# Patient Record
Sex: MALE | Race: WHITE | NOT HISPANIC OR LATINO | URBAN - METROPOLITAN AREA
[De-identification: names, ages, dates, MRNs, and addresses within clinical notes are randomized per-mention and may not be internally consistent; named-entity substitution may affect disease eponyms.]

---

## 2023-02-28 ENCOUNTER — EMERGENCY (EMERGENCY)
Facility: HOSPITAL | Age: 48
LOS: 1 days | Discharge: HOME | End: 2023-02-28
Attending: EMERGENCY MEDICINE
Payer: MEDICAID

## 2023-02-28 VITALS
RESPIRATION RATE: 17 BRPM | WEIGHT: 259.93 LBS | DIASTOLIC BLOOD PRESSURE: 93 MMHG | HEIGHT: 72 IN | TEMPERATURE: 98 F | SYSTOLIC BLOOD PRESSURE: 151 MMHG | HEART RATE: 84 BPM | OXYGEN SATURATION: 96 %

## 2023-02-28 PROCEDURE — 99283 EMERGENCY DEPT VISIT LOW MDM: CPT

## 2023-02-28 PROCEDURE — 99284 EMERGENCY DEPT VISIT MOD MDM: CPT

## 2023-02-28 NOTE — ED ADULT NURSE NOTE - OBJECTIVE STATEMENT
Patient presents to ED in no acute distress with no complaint.  Patient needs housing and was seen at [facility] today where he was provided with an Uber to go to [facility]. 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

## 2023-02-28 NOTE — ED ADULT TRIAGE NOTE - CHIEF COMPLAINT QUOTE
pt states he is homeless and needs help with housing, went to DSS today and was told they can not help him because he is diabetic and incontinent.

## 2023-02-28 NOTE — ED PROVIDER NOTE - PROGRESS NOTE DETAILS
Elliott: offered labs which he declined, "you're not coming at me with needles." offered SW to which he said "I guess, but they are not going to like what I have to say." Case d/w SW Christo: Offered evaluation for [facility] placement but patient declines. States "I'm not going there."

## 2023-02-28 NOTE — ED PROVIDER NOTE - PATIENT PORTAL LINK FT
You can access the FollowMyHealth Patient Portal offered by [facility] by registering at the following website: [email or web address]. By joining [facility]’s FollowMyHealth portal, you will also be able to view your health information using other applications (apps) compatible with our system.

## 2023-02-28 NOTE — ED PROVIDER NOTE - CLINICAL SUMMARY MEDICAL DECISION MAKING FREE TEXT BOX
48 year old male who presents with need for housing. Declines medical workup/evaluation. SW consulted after initial interview.

## 2023-02-28 NOTE — ED PROVIDER NOTE - ATTENDING CONTRIBUTION TO CARE
Flory: I performed a face to face evaluation of this patient and performed a full history and physical examination on the patient.  I agree with the resident's history, physical examination, and plan of the patient unless otherwise noted. My brief assessment is as follows: pmh as documented, neurogenic bladder s/p gbs in past requiring PT to get to ambulation with walker, recent admission for 2 weeks to [facility] for uti/chronic pain, ws d/dinh to [facility] for housing reports was declined from [facility] for housing d/t medical issues. is here now requesting transfer to [facility] so SW there can do something for him. denies any acute medical change. states cant/wont go to [facility]/NH as has been placed in some before and signs out ama after 24 hours and now is "banned from them." states doesn't want to go there and doesn't want other w/u anyway. non toxic, nad, ctab, rrr, abd benign. advised pt do not provide transfers to [facility] ED for housing or without medical cause. offered pt SW.

## 2023-02-28 NOTE — ED PROVIDER NOTE - OBJECTIVE STATEMENT
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 For the last 2 weeks the patient has been at [facility] for chronic pain and UTI. Discharged this morning to [facility]. When he arrived at [facility] he was told they would not be able to provide shelter to him as he has too many medical conditions. Pt took ambulance to  (wanted to go to  however  was closer). States that all he wants is to return to [facility] as that is where his neurologist and urologist are. States "I want to be their problem."

## 2023-02-28 NOTE — CHART NOTE - NSCHARTNOTEFT_GEN_A_CORE
Dhruv: p/c from pt's EDMD (            ) to inform worker pt to benefit from SW to discuss dispo options, pt has declined labs wants to go back to [facility] . Worker met with pt, Reina ,states he called 911 to go back  to [facility]  from [facility] (where he was told they can not provide housing due to his medical needs) .However, ambulance crew brought him to this ED given that was the closest  from [place]. Pt does not want to be at this hospital, worker informed pt unable to send him from ED to another ED . Encouraged to request a friend or a family member to assist. Per pt, he does not have anyone, states he is homeless and unable to get DSS assistance . Worker informed pt about a NH option ,we can complete a EDIN and request insurance auth . Pt states if he is going to stay overnight he will need a bed not a stretcher and pain medication , pt demanding a NH nearby ,states he can not go too far . Worker informed pt , EDIN would be circulating to multiple facilities unable to guarantee a local facility . Pt states he will not stay in the ED, he just wants to go back to [facility], does not have money for a taxi ,declined bus tickets ,decided to call his mother. Encouraged to ask his mother to call an Uberfor him , pt responded with foul language referring to his mother's lack of support. Pt's mother did not offer any assistance to pt, encouraged him to go to a NH which pt continues to refuse. Pt denies any other SW needs at this moment, "only wanted transportation to [facility]" . Pt informed by Dr Ruth he will be discharged given that pt has been refusing all options presented and there is no medical need to keep him at the hospital . Pt in agreement ,mentioned he may call the police to take him to [facility]. Dhruv: p/c from pt's EDresidentMD (Elliott ) to inform worker pt to benefit from SW to discuss dispo options, pt has declined labs wants to go back to [facility] . Worker met with pt, Reina ,states he called 911 to go back  to [facility]  from [facility] (where he was told they can not provide housing due to his medical status). However, ambulance crew brought him to this ED given that was the closest  from [place]. Pt does not want to be at this hospital, worker informed pt unable to send him from ED to another ED . Encouraged to request a friend or a family member to assist. Per pt, he does not have anyone, states he is homeless and unable to get DSS assistance . Worker informed pt about a NH option, a EDIN can be completed and  insurance auth can be requested . Pt states if he is going to stay overnight he will need a bed not a stretcher and pain medication , pt demanding a NH nearby ,states he "can not go to [facility]" ... Worker informed pt , EDIN would be circulated to multiple facilities unable to guarantee a local facility . Pt states he will not stay in the ED, he just wants to go back to [place], does not have money for a taxi ,declined bus tickets ,decided to call his mother. Encouraged to ask his mother to call an Uber for him , pt responded with foul language referring to his mother's lack of support. Pt's mother did not offer any assistance to pt, encouraged him to go to a NH which pt continues to refuse. Pt denies any other SW needs at this moment, "only wanted transportation to [facility]" . Pt informed by Dr Ruth he will be discharged given that pt has been refusing all options presented and there is no medical need to keep him at the hospital . Pt in agreement ,mentioned he may call the police to take him to [facility]. SW signing off.

## 2023-02-28 NOTE — ED PROVIDER NOTE - PHYSICAL EXAMINATION
Gen: NAD, alert, talkative   Head: NC/AT  Neck: trachea midline  Resp:  No distress  Ext: no deformities  Neuro:  A&O appears non focal  Skin:  Warm and dry as visualized  Psych: Calm

## 2023-03-11 ENCOUNTER — INPATIENT (INPATIENT)
Facility: HOSPITAL | Age: 48
LOS: 10 days | Discharge: NURSING, REHAB OR LONG-TERM CARE | DRG: 304 | End: 2023-03-22
Attending: STUDENT IN AN ORGANIZED HEALTH CARE EDUCATION/TRAINING PROGRAM | Admitting: INTERNAL MEDICINE
Payer: MEDICAID

## 2023-03-11 VITALS
DIASTOLIC BLOOD PRESSURE: 78 MMHG | SYSTOLIC BLOOD PRESSURE: 151 MMHG | RESPIRATION RATE: 19 BRPM | WEIGHT: 270.07 LBS | TEMPERATURE: 99 F | HEART RATE: 120 BPM | OXYGEN SATURATION: 96 %

## 2023-03-11 LAB
ALBUMIN SERPL ELPH-MCNC: 3.4 G/DL — SIGNIFICANT CHANGE UP (ref 3.3–5)
ALP SERPL-CCNC: 103 U/L — SIGNIFICANT CHANGE UP (ref 40–120)
ALT FLD-CCNC: 30 U/L — SIGNIFICANT CHANGE UP (ref 12–78)
ANION GAP SERPL CALC-SCNC: 6 MMOL/L — SIGNIFICANT CHANGE UP (ref 5–17)
APTT BLD: 29.6 SEC — SIGNIFICANT CHANGE UP (ref 27.5–35.5)
AST SERPL-CCNC: 21 U/L — SIGNIFICANT CHANGE UP (ref 15–37)
BASOPHILS # BLD AUTO: 0.1 K/UL — SIGNIFICANT CHANGE UP (ref 0–0.2)
BASOPHILS NFR BLD AUTO: 0.8 % — SIGNIFICANT CHANGE UP (ref 0–2)
BILIRUB SERPL-MCNC: 0.2 MG/DL — SIGNIFICANT CHANGE UP (ref 0.2–1.2)
BLD GP AB SCN SERPL QL: SIGNIFICANT CHANGE UP
BUN SERPL-MCNC: 37 MG/DL — HIGH (ref 7–23)
CALCIUM SERPL-MCNC: 9.1 MG/DL — SIGNIFICANT CHANGE UP (ref 8.5–10.1)
CHLORIDE SERPL-SCNC: 119 MMOL/L — HIGH (ref 96–108)
CO2 SERPL-SCNC: 23 MMOL/L — SIGNIFICANT CHANGE UP (ref 22–31)
CREAT SERPL-MCNC: 2.36 MG/DL — HIGH (ref 0.5–1.3)
EGFR: 33 ML/MIN/1.73M2 — LOW
EOSINOPHIL # BLD AUTO: 0.16 K/UL — SIGNIFICANT CHANGE UP (ref 0–0.5)
EOSINOPHIL NFR BLD AUTO: 1.3 % — SIGNIFICANT CHANGE UP (ref 0–6)
GLUCOSE SERPL-MCNC: 152 MG/DL — HIGH (ref 70–99)
HCT VFR BLD CALC: 39.6 % — SIGNIFICANT CHANGE UP (ref 39–50)
HGB BLD-MCNC: 12.8 G/DL — LOW (ref 13–17)
IMM GRANULOCYTES NFR BLD AUTO: 0.3 % — SIGNIFICANT CHANGE UP (ref 0–0.9)
INR BLD: 1.31 RATIO — HIGH (ref 0.88–1.16)
LYMPHOCYTES # BLD AUTO: 1.91 K/UL — SIGNIFICANT CHANGE UP (ref 1–3.3)
LYMPHOCYTES # BLD AUTO: 15.8 % — SIGNIFICANT CHANGE UP (ref 13–44)
MCHC RBC-ENTMCNC: 24.8 PG — LOW (ref 27–34)
MCHC RBC-ENTMCNC: 32.3 GM/DL — SIGNIFICANT CHANGE UP (ref 32–36)
MCV RBC AUTO: 76.6 FL — LOW (ref 80–100)
MONOCYTES # BLD AUTO: 1.04 K/UL — HIGH (ref 0–0.9)
MONOCYTES NFR BLD AUTO: 8.6 % — SIGNIFICANT CHANGE UP (ref 2–14)
NEUTROPHILS # BLD AUTO: 8.82 K/UL — HIGH (ref 1.8–7.4)
NEUTROPHILS NFR BLD AUTO: 73.2 % — SIGNIFICANT CHANGE UP (ref 43–77)
PLATELET # BLD AUTO: 264 K/UL — SIGNIFICANT CHANGE UP (ref 150–400)
POTASSIUM SERPL-MCNC: 4.5 MMOL/L — SIGNIFICANT CHANGE UP (ref 3.5–5.3)
POTASSIUM SERPL-SCNC: 4.5 MMOL/L — SIGNIFICANT CHANGE UP (ref 3.5–5.3)
PROT SERPL-MCNC: 7.6 GM/DL — SIGNIFICANT CHANGE UP (ref 6–8.3)
PROTHROM AB SERPL-ACNC: 15.2 SEC — HIGH (ref 10.5–13.4)
RBC # BLD: 5.17 M/UL — SIGNIFICANT CHANGE UP (ref 4.2–5.8)
RBC # FLD: 16.9 % — HIGH (ref 10.3–14.5)
SODIUM SERPL-SCNC: 148 MMOL/L — HIGH (ref 135–145)
WBC # BLD: 12.07 K/UL — HIGH (ref 3.8–10.5)
WBC # FLD AUTO: 12.07 K/UL — HIGH (ref 3.8–10.5)

## 2023-03-11 PROCEDURE — 85027 COMPLETE CBC AUTOMATED: CPT

## 2023-03-11 PROCEDURE — 72125 CT NECK SPINE W/O DYE: CPT

## 2023-03-11 PROCEDURE — 83036 HEMOGLOBIN GLYCOSYLATED A1C: CPT

## 2023-03-11 PROCEDURE — 36415 COLL VENOUS BLD VENIPUNCTURE: CPT

## 2023-03-11 PROCEDURE — C9399: CPT

## 2023-03-11 PROCEDURE — 81001 URINALYSIS AUTO W/SCOPE: CPT

## 2023-03-11 PROCEDURE — 86901 BLOOD TYPING SEROLOGIC RH(D): CPT

## 2023-03-11 PROCEDURE — 87086 URINE CULTURE/COLONY COUNT: CPT

## 2023-03-11 PROCEDURE — 99406 BEHAV CHNG SMOKING 3-10 MIN: CPT

## 2023-03-11 PROCEDURE — 72131 CT LUMBAR SPINE W/O DYE: CPT | Mod: 26,MB

## 2023-03-11 PROCEDURE — 71045 X-RAY EXAM CHEST 1 VIEW: CPT

## 2023-03-11 PROCEDURE — 70450 CT HEAD/BRAIN W/O DYE: CPT | Mod: 26,MB

## 2023-03-11 PROCEDURE — 97530 THERAPEUTIC ACTIVITIES: CPT | Mod: GP

## 2023-03-11 PROCEDURE — C1713: CPT

## 2023-03-11 PROCEDURE — 85025 COMPLETE CBC W/AUTO DIFF WBC: CPT

## 2023-03-11 PROCEDURE — 80048 BASIC METABOLIC PNL TOTAL CA: CPT

## 2023-03-11 PROCEDURE — 99223 1ST HOSP IP/OBS HIGH 75: CPT

## 2023-03-11 PROCEDURE — U0003: CPT

## 2023-03-11 PROCEDURE — 76000 FLUOROSCOPY <1 HR PHYS/QHP: CPT

## 2023-03-11 PROCEDURE — 93005 ELECTROCARDIOGRAM TRACING: CPT

## 2023-03-11 PROCEDURE — U0005: CPT

## 2023-03-11 PROCEDURE — 82962 GLUCOSE BLOOD TEST: CPT

## 2023-03-11 PROCEDURE — C9290: CPT

## 2023-03-11 PROCEDURE — 86850 RBC ANTIBODY SCREEN: CPT

## 2023-03-11 PROCEDURE — 85730 THROMBOPLASTIN TIME PARTIAL: CPT

## 2023-03-11 PROCEDURE — 72128 CT CHEST SPINE W/O DYE: CPT

## 2023-03-11 PROCEDURE — 99285 EMERGENCY DEPT VISIT HI MDM: CPT

## 2023-03-11 PROCEDURE — C1889: CPT

## 2023-03-11 PROCEDURE — 87635 SARS-COV-2 COVID-19 AMP PRB: CPT

## 2023-03-11 PROCEDURE — 86900 BLOOD TYPING SEROLOGIC ABO: CPT

## 2023-03-11 PROCEDURE — 97116 GAIT TRAINING THERAPY: CPT | Mod: GP

## 2023-03-11 PROCEDURE — 72100 X-RAY EXAM L-S SPINE 2/3 VWS: CPT | Mod: 26

## 2023-03-11 PROCEDURE — 85610 PROTHROMBIN TIME: CPT

## 2023-03-11 RX ORDER — LANOLIN ALCOHOL/MO/W.PET/CERES
3 CREAM (GRAM) TOPICAL AT BEDTIME
Refills: 0 | Status: DISCONTINUED | OUTPATIENT
Start: 2023-03-11 | End: 2023-03-22

## 2023-03-11 RX ORDER — HEPARIN SODIUM 5000 [USP'U]/ML
5000 INJECTION INTRAVENOUS; SUBCUTANEOUS ONCE
Refills: 0 | Status: DISCONTINUED | OUTPATIENT
Start: 2023-03-11 | End: 2023-03-11

## 2023-03-11 RX ORDER — ACETAMINOPHEN 500 MG
650 TABLET ORAL EVERY 6 HOURS
Refills: 0 | Status: DISCONTINUED | OUTPATIENT
Start: 2023-03-11 | End: 2023-03-22

## 2023-03-11 RX ORDER — CYCLOBENZAPRINE HYDROCHLORIDE 10 MG/1
10 TABLET, FILM COATED ORAL ONCE
Refills: 0 | Status: COMPLETED | OUTPATIENT
Start: 2023-03-11 | End: 2023-03-11

## 2023-03-11 RX ORDER — SODIUM CHLORIDE 9 MG/ML
1000 INJECTION, SOLUTION INTRAVENOUS
Refills: 0 | Status: DISCONTINUED | OUTPATIENT
Start: 2023-03-11 | End: 2023-03-22

## 2023-03-11 RX ORDER — ACETAMINOPHEN 500 MG
650 TABLET ORAL ONCE
Refills: 0 | Status: COMPLETED | OUTPATIENT
Start: 2023-03-11 | End: 2023-03-11

## 2023-03-11 RX ORDER — SODIUM CHLORIDE 9 MG/ML
500 INJECTION INTRAMUSCULAR; INTRAVENOUS; SUBCUTANEOUS ONCE
Refills: 0 | Status: COMPLETED | OUTPATIENT
Start: 2023-03-11 | End: 2023-03-11

## 2023-03-11 RX ORDER — SODIUM CHLORIDE 9 MG/ML
1000 INJECTION INTRAMUSCULAR; INTRAVENOUS; SUBCUTANEOUS
Refills: 0 | Status: DISCONTINUED | OUTPATIENT
Start: 2023-03-11 | End: 2023-03-12

## 2023-03-11 RX ORDER — ONDANSETRON 8 MG/1
4 TABLET, FILM COATED ORAL EVERY 8 HOURS
Refills: 0 | Status: DISCONTINUED | OUTPATIENT
Start: 2023-03-11 | End: 2023-03-22

## 2023-03-11 RX ORDER — MORPHINE SULFATE 50 MG/1
4 CAPSULE, EXTENDED RELEASE ORAL ONCE
Refills: 0 | Status: DISCONTINUED | OUTPATIENT
Start: 2023-03-11 | End: 2023-03-11

## 2023-03-11 RX ADMIN — MORPHINE SULFATE 4 MILLIGRAM(S): 50 CAPSULE, EXTENDED RELEASE ORAL at 23:46

## 2023-03-11 RX ADMIN — SODIUM CHLORIDE 500 MILLILITER(S): 9 INJECTION INTRAMUSCULAR; INTRAVENOUS; SUBCUTANEOUS at 19:33

## 2023-03-11 RX ADMIN — CYCLOBENZAPRINE HYDROCHLORIDE 10 MILLIGRAM(S): 10 TABLET, FILM COATED ORAL at 19:33

## 2023-03-11 NOTE — ED PROVIDER NOTE - OBJECTIVE STATEMENT
49 y/o male with PMHx of HTN, DM, right-sided stroke presents to the ED c/o back pain, back spasms, and generalized weakness to bilateral lower extremities for the past 3 days. Pt states that he is unable to walk. Pt was at [place] where he had to sit in a wheelchair for 30 hours. Pt states that's when he began to notice generalized bilateral leg weakness. Pt still has some residual right leg weakness and right drop foot s/p stroke 6 years ago but reports that weakness that he is experiencing now is much worse than baseline. No problems with upper extremity. No recent falls, injuries, or trauma. Normally walks with walker. Has not walked in the last 10 days. Pt is currently on Coumadin. Current smoker, no alcohol, recreational marijuana.

## 2023-03-11 NOTE — ED ADULT NURSE NOTE - OBJECTIVE STATEMENT
p/w with acute on chronic lower back pain, radiating to BL flank and to B/L thigh, currently living at [facility] shelter. denies falls/injuries

## 2023-03-11 NOTE — H&P ADULT - NSHPLABSRESULTS_GEN_ALL_CORE
Labs personally reviewed and interpreted. Notable for slight leukocytosis (WBC 12.07), but no left shift or lymphopenia. Hb 12.8 with MCV 76.6, plt 264, INR 1.31 (on Warfarin), Na 148, K 4.5, Cl 119, HCO3 23, BUN/creatinine 37/2.36 (creatinine 1.98 on 3/8 and 1.5 on 12/6/22), , calcium 9.1 with albumin 3.4, and LFTs wnl.  RVP pending.    X-ray lumbar spine personally reviewed and interpreted. Notable for no acute fracture or clear osseous deformity.  CT head personally reviewed and interpreted. Notable for no hemorrhage, acute large infarct, or mass effect.  CT L-spine without contrast personally reviewed and interpreted. Notable for no evidence of an acute lumbar spine fracture. Multilevel disc bulges/degenerative changes greatest at L4-5 where a moderate disc bulge and moderate facet and ligamentous degenerative changes results in severe canal and moderate bilateral neural foramina narrowing.      EKG personally reviewed. Sinus tachycardia, normal axis. Slight insignificant Q wave in lead III. Poor R wave progression. No ST changes. No prior EKG for comparison. Rate 108, , QTc 466.

## 2023-03-11 NOTE — ED PROVIDER NOTE - PROGRESS NOTE DETAILS
Isaura Oakes: Pt is stable. Talked to pt about results so far. Needs hydration, needs to take regular medications

## 2023-03-11 NOTE — ED ADULT TRIAGE NOTE - CHIEF COMPLAINT QUOTE
Pt complains of back pain, back spasms and generalized weakness to his B/L L/E. Pt unable to walk. Also, complains of left lower leg tenderness. Pt states he had an ultrasound done last week of his lower left leg and nothing was found.

## 2023-03-11 NOTE — ED PROVIDER NOTE - NEUROLOGICAL, MLM
Alert and oriented, no focal deficits, no sensory deficits. Decreased strength in left and right legs.

## 2023-03-11 NOTE — H&P ADULT - NSHPREVIEWOFSYSTEMS_GEN_ALL_CORE
Gen: + malaise. Negative for fevers or chills  Eyes: no blurred vision or lacrimation  ENT: no tinnitus, vertigo, or decreased hearing  Resp: no wheezing, dyspnea, cough, or pleuritic chest pain  CV: no chest pain, dyspnea on exertion, or palpitations  GI: no nausea, vomiting, abdominal pain, diarrhea, or constipation  : + incontinence, ?dysuria. No hematuria  MSK: + arthralgias. No joint swelling  Neuro: + LE weakness. No confusion or dizziness  Skin: no rash, lesions, or edema

## 2023-03-11 NOTE — H&P ADULT - NSHPPHYSICALEXAM_GEN_ALL_CORE
Vital Signs Last 24 Hrs  T(C): 37.2 (11 Mar 2023 17:11), Max: 37.2 (11 Mar 2023 17:11)  T(F): 99 (11 Mar 2023 17:11), Max: 99 (11 Mar 2023 17:11)  HR: 120 (11 Mar 2023 17:11) (120 - 120)  BP: 151/78 (11 Mar 2023 17:11) (151/78 - 151/78)  BP(mean): --  RR: 19 (11 Mar 2023 17:11) (19 - 19)  SpO2: 96% (11 Mar 2023 17:11) (96% - 96%)    GENERAL: No acute distress  HEENT: PERRL, EOMI, MMM, no oropharyngeal lesions  NECK: supple, no stiffness, no JVD, no thyromegaly  PULM: respirations non-labored, clear to auscultation bilaterally, no rales, rhonchi, or wheezes  CV: regular rate and rhythm, no murmurs, gallops, or rubs  GI: abdomen soft, + slight RUQ TTP, nondistended, no masses felt, normal bowel sounds  MSK: + lumbar spinal TTP. No joint swelling, erythema, or warmth.  LYMPH: no anterior cervical, posterior cervical, supraclavicular, or inguinal lymphadenopathy  NEURO: occasional spasms of b/l LEs. A&Ox3, sensation intact. Strength 2/5 b/l LEs  SKIN: 1+ b/l LE edema. No rashes or lesions

## 2023-03-11 NOTE — H&P ADULT - ASSESSMENT
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    #Intractable Back Pain / Inability to Ambulate / Degenerative Disc Disease  - Patient with acute on chronic back spasms and worsening lumbar back pain, likely radicular  - CT lumbar spine shows DD with severe canal stenosis and moderate bilateral neural foraminal narrowing at L4-5, along with other milder disc bulges throughout the lumbar spine  - No indication for steroids at this time  - C/w baclofen 5 mg BID for muscle spasms  - Appreciate ortho spine consult, currently NPO, f/u current  47 yo M with a PMH of Guillain-Barré Syndrome with peripheral neuropathy and urinary incontinence, DM2 (not on insulin), HTN, BPH, DDD, DVT with unknown prothrombotic state (on Warfarin) s/p IVC filter (per patient), and CVA (2016) who p/w worsening back and leg pain and spasms and inability to ambulate, likely due to worsening degenerative disc disease.    #Intractable Back Pain / Inability to Ambulate / Degenerative Disc Disease  - Patient with acute on chronic back spasms and worsening lumbar back pain, likely radicular  - CT lumbar spine shows DD with severe canal stenosis and moderate bilateral neural foraminal narrowing at L4-5, along with other milder disc bulges throughout the lumbar spine  - No indication for steroids at this time  - C/w baclofen 5 mg BID for muscle spasms  - Appreciate ortho spine consult, currently NPO, f/u current plans for surgery  - Pain control PRN with Tylenol/Oxycodone/Dilaudid with baclofen 5 mg BID. Patient has not had opiates prescribed since October 2022 ([facility] #[id number])  - F/u MRI C/T/L spines without contrast  - WBAT, PT eval    #Peripheral Neuropathy / History of Guillain-[place] Burn  - C/w duloxetine 60 mg QD and gabapentin 600 mg Q8H    #Type 2 Diabetes Mellitus  - Per patient (and confirmed by Dr First Jamison), patient was on alogliptin 12.5 mg QD  - However, on patient's physical med prescriptions he brought with him, it is not listed. Suspect that it was either (purposely or mistakenly) dropped after recent hospital admission  - Will need to determine from PCP whether patient needs to continue outpatient or not  - While inpatient, check FS with low dose SSI Q6H while NPO (or QAC + HS)  - Check A1C    #History of DVT  - Per patient, he had LE DVTs in 2016 (in the setting of prolonged intubation from Guillain-[place] Burn) and reportedly has a history of "a blood clot disorder"  - Per patient, he also has an IVC filter  - Has been on warfarin, but is non-compliant and subtherapeutic  - Heparin gtt for now (no warfarin), no loading dose, in case of surgery  - Will need to determine if will continue A/C as an outpatient    #BPH  - With urinary incontinence in the setting of DDD  - C/w finasteride 5 mg QD and tamsulosin equivalent of alfuzosin 10 mg QD    #HTN  - C/w hydralazine 25 mg QID (appears to have been on 100 mg QD until recent admission to [place] one month ago)  - C/w amlodipine 10 mg QD  - C/w clonidine 0.1 mg BID    #Smoking Cessation Counseling  - Patient continues to smoke heavily. Patient is not interested in quitting  - However, he is agreeable to nicotine patch and gum    #Prophylactic Measure  - DVT PPX: heparin gtt  - Diet: consistent carbs/DASH  - Dispo: pending improvement in sxs, surgical plan 49 yo M with a PMH of Guillain-Barré Syndrome with peripheral neuropathy and urinary incontinence, DM2 (not on insulin), HTN, BPH, DDD, DVT with unknown prothrombotic state (on Warfarin) s/p IVC filter (per patient), and CVA (2016) who p/w worsening back and leg pain and spasms and inability to ambulate, likely due to worsening degenerative disc disease.    #Intractable Low Back Pain / Inability to Walk / Degenerative Disc Disease (Lumbar)  - Patient with acute on chronic back spasms and worsening lumbar back pain, likely radicular  - CT lumbar spine shows DD with severe canal stenosis and moderate bilateral neural foraminal narrowing at L4-5, along with other milder disc bulges throughout the lumbar spine  - No indication for steroids at this time  - C/w baclofen 5 mg BID for muscle spasms  - Appreciate ortho spine consult, currently NPO, f/u current plans for surgery  - Pain control PRN with Tylenol/Oxycodone/Dilaudid with baclofen 5 mg BID. Patient has not had opiates prescribed since October 2022 ([facility] #[id number])  - F/u MRI C/T/L spines without contrast  - WBAT, PT eval    #Peripheral Neuropathy / History of Guillain-[place]  - C/w duloxetine 60 mg QD and gabapentin 600 mg Q8H    #Type 2 Diabetes Mellitus  - Per patient (and confirmed by Dr First Jamison), patient was on alogliptin 12.5 mg QD  - However, on patient's physical med prescriptions he brought with him, it is not listed. Suspect that it was either (purposely or mistakenly) dropped after recent hospital admission  - Will need to determine from PCP whether patient needs to continue outpatient or not  - While inpatient, check FS with low dose SSI Q6H while NPO (or QAC + HS)  - Check A1C    #History of DVT  - Per patient, he had LE DVTs in 2016 (in the setting of prolonged intubation from Guillain-[place]) and reportedly has a history of "a blood clot disorder"  - Per patient, he also has an IVC filter  - Has been on warfarin, but is non-compliant and subtherapeutic  - Heparin gtt for now (no warfarin), no loading dose, in case of surgery  - Will need to determine if will continue A/C as an outpatient    #BPH  - With urinary incontinence in the setting of DDD  - C/w finasteride 5 mg QD and tamsulosin equivalent of alfuzosin 10 mg QD  - Of note, patient says he was recently on abx for UTI and was diagnosed with VRE, unsure if he currently has dysuria  - Check UA and urine cx, f/u PVR    #HTN  - C/w hydralazine 25 mg QID (appears to have been on 100 mg QD until recent admission to [place] one month ago)  - C/w amlodipine 10 mg QD  - C/w clonidine 0.1 mg BID    #Encounter for Smoking Cessation Counseling  - Patient continues to smoke heavily. Patient is not interested in quitting  - However, he is agreeable to nicotine patch and gum    #Prophylactic Measure  - DVT PPX: heparin gtt  - Diet: consistent carbs/DASH  - Dispo: pending improvement in sxs, surgical plan 47 yo M with a PMH of Guillain-Barré Syndrome with peripheral neuropathy and urinary incontinence, DM2 (not on insulin), HTN, BPH, DDD, DVT with unknown prothrombotic state (on Warfarin) s/p IVC filter (per patient), and CVA (2016) who p/w worsening back and leg pain and spasms and inability to ambulate, likely due to worsening degenerative disc disease.    #Intractable Low Back Pain / Inability to Walk / Degenerative Disc Disease (Lumbar)  - Patient with acute on chronic back spasms and worsening lumbar back pain, likely radicular  - CT lumbar spine shows DD with severe canal stenosis and moderate bilateral neural foraminal narrowing at L4-5, along with other milder disc bulges throughout the lumbar spine  - No indication for steroids at this time  - C/w baclofen 5 mg BID for muscle spasms  - Appreciate ortho spine consult, currently NPO, f/u current plans for surgery  - Pain control PRN with Tylenol/Oxycodone/Dilaudid with baclofen 5 mg BID. Patient has not had opiates prescribed since October 2022 ([facility] #[id number])  - F/u MRI C/T/L spines without contrast  - WBAT, PT eval    #Peripheral Neuropathy / History of Guillain-[place]  - C/w duloxetine 60 mg QD and gabapentin 600 mg Q8H    #Type 2 Diabetes Mellitus  - Per patient (and confirmed by Dr First Jamison), patient was on alogliptin 12.5 mg QD  - However, on patient's physical med prescriptions he brought with him, it is not listed. Suspect that it was either (purposely or mistakenly) dropped after recent hospital admission  - Will need to determine from PCP whether patient needs to continue outpatient or not  - While inpatient, check FS with low dose SSI Q6H while NPO (or QAC + HS)  - Check A1C    #History of DVT  - Per patient, he had LE DVTs in 2016 (in the setting of prolonged intubation from Guillain-[place]) and reportedly has a history of "a blood clot disorder"  - Per patient, he also has an IVC filter  - Has been on warfarin, but is non-compliant and subtherapeutic  - Heparin gtt for now (no warfarin), no loading dose, in case of surgery  - Will need to determine if will continue A/C as an outpatient    #BPH  - With urinary incontinence in the setting of DDD  - C/w finasteride 5 mg QD and tamsulosin equivalent of alfuzosin 10 mg QD  - Of note, patient says he was recently on abx for UTI and was diagnosed with VRE, unsure if he currently has dysuria  - Check UA and urine cx, f/u PVR    #HTN  - C/w hydralazine 25 mg QID (appears to have been on 100 mg QD until recent admission to [place] one month ago)  - C/w amlodipine 10 mg QD  - C/w clonidine 0.1 mg BID    #ANTONIO Superimposed on CKD  - ANTONIO on CKD3, creatinine 2.36 on admission, was 2.0 several days prior and baseline 1.5 in December  - Likely prerenal  - S/p IVF bolus and continue maintenance for now  - F/u BMP    #Encounter for Smoking Cessation Counseling  - Patient continues to smoke heavily. Patient is not interested in quitting  - However, he is agreeable to nicotine patch and gum    #Prophylactic Measure  - DVT PPX: heparin gtt  - Diet: consistent carbs/DASH  - Dispo: pending improvement in sxs, surgical plan 49 yo M with a PMH of Guillain-Barré Syndrome with peripheral neuropathy and urinary incontinence, DM2 (not on insulin), HTN, BPH, DDD, DVT with unknown prothrombotic state (on Warfarin) s/p IVC filter (per patient), and CVA (2016) who p/w worsening back and leg pain and spasms and inability to ambulate, likely due to worsening degenerative disc disease.    #Intractable Low Back Pain / Inability to Walk / Degenerative Disc Disease (Lumbar)  - Patient with acute on chronic back spasms and worsening lumbar back pain, likely radicular  - CT lumbar spine shows DD with severe canal stenosis and moderate bilateral neural foraminal narrowing at L4-5, along with other milder disc bulges throughout the lumbar spine  - No indication for steroids at this time  - C/w baclofen 5 mg BID for muscle spasms  - Appreciate ortho spine consult, currently NPO, f/u current plans for surgery  - Pain control PRN with Tylenol/Oxycodone/Dilaudid with baclofen 5 mg BID. Patient has not had opiates prescribed since October 2022 ([facility] #[id number])  - F/u MRI C/T/L spines without contrast  - WBAT, PT eval    #Preoperative Clearance  - Patient's RCRI is 2-3 (possible ischemic disease with poor R wave progression on EKG)  - Patient has a moderate risk of adverse cardiac event during surgery, with a 10.1% 30-day risk of death, MI, or cardiac arrest  - Given this, would have cardiology evaluation prior to surgery  - Repeat BMP after IVFs to evaluate improvement in creatinine from likely prerenal ANTONIO    #Peripheral Neuropathy / History of Guillain-[place]  - C/w duloxetine 60 mg QD and gabapentin 600 mg Q8H    #Type 2 Diabetes Mellitus  - Per patient (and confirmed by Dr First Jamison), patient was on alogliptin 12.5 mg QD  - However, on patient's physical med prescriptions he brought with him, it is not listed. Suspect that it was either (purposely or mistakenly) dropped after recent hospital admission  - Will need to determine from PCP whether patient needs to continue outpatient or not  - While inpatient, check FS with low dose SSI Q6H while NPO (or QAC + HS)  - Check A1C    #History of DVT  - Per patient, he had LE DVTs in 2016 (in the setting of prolonged intubation from Guillain-[place]) and reportedly has a history of "a blood clot disorder"  - Per patient, he also has an IVC filter  - Has been on warfarin, but is non-compliant and subtherapeutic  - Heparin gtt for now (no warfarin), no loading dose, in case of surgery  - Will need to determine if will continue A/C as an outpatient    #BPH  - With urinary incontinence in the setting of DDD  - C/w finasteride 5 mg QD and tamsulosin equivalent of alfuzosin 10 mg QD  - Of note, patient says he was recently on abx for UTI and was diagnosed with VRE, unsure if he currently has dysuria  - Check UA and urine cx, f/u PVR    #HTN  - C/w hydralazine 25 mg QID (appears to have been on 100 mg QD until recent admission to [facility] one month ago)  - C/w amlodipine 10 mg QD  - C/w clonidine 0.1 mg BID    #ANTONIO Superimposed on CKD  - ANTONIO on CKD3, creatinine 2.36 on admission, was 2.0 several days prior and baseline 1.5 in December  - Likely prerenal  - S/p IVF bolus and continue maintenance for now  - F/u BMP    #Encounter for Smoking Cessation Counseling  - Patient continues to smoke heavily. Patient is not interested in quitting  - However, he is agreeable to nicotine patch and gum    #Prophylactic Measure  - DVT PPX: heparin gtt  - Diet: consistent carbs/DASH  - Dispo: pending improvement in sxs, surgical plan

## 2023-03-11 NOTE — H&P ADULT - NSICDXPASTMEDICALHX_GEN_ALL_CORE_FT
PAST MEDICAL HISTORY:  BPH (benign prostatic hyperplasia)     DDD (degenerative disc disease), lumbar     DVT, lower extremity     History of CVA in adulthood     History of Guillain-[place] syndrome     HTN (hypertension)     Peripheral neuropathy     Type 2 diabetes mellitus

## 2023-03-11 NOTE — H&P ADULT - NSHPSOCIALHISTORY_GEN_ALL_CORE
Used to work in [employer].  He is homeless, and was in between hospitals for several months (his lease  at the beginning of January). He has been at a homeless shelter for about 3-4 weeks.

## 2023-03-11 NOTE — PHARMACOTHERAPY INTERVENTION NOTE - COMMENTS
Medication history complete. Medications and allergies reviewed with patient and confirmed with .

## 2023-03-11 NOTE — ED PROVIDER NOTE - CONSTITUTIONAL, MLM
normal... Fairly overweight male, awake, alert, oriented to person, place, time/situation and in no apparent distress.

## 2023-03-11 NOTE — ED PROVIDER NOTE - IV ALTEPLASE INCLUSION HIDDEN
84 year old female with PMHx of bipolar disorder and HTN presents to the ED complaining of SOB. Per , for the past month pt has had decreased appetite, increased sleeping, and bipolar episode for past 2 weeks of describing her food and clothing being "poisoned", wanting everything to be washed. Pt says she "can't breath". Nonsmoker. PCP Dr. Woodard .Found to have large R pleural effusion and underwent thoracocentesis in ED .Nephrology cons called due to  Palliative care consult requested ,to discuss advance directives and complete MOLST  (13 Sep 2022 18:38)    hyponatremia is corrected too fast   1- dc  sodium chloride 0.9%. 1000 milliLiter(s) (45 mL/Hr) IV Continuous   2- dc salt tablet   3- change diet to low salt diet   4- start d5w 45 cc per hr     will check ua , urine osmolality , urine sodium , urine uric acid , serum sodium , serum osmolality , serum uric acid , f/u with hyponatremia work up , f/u with bmp , monitor i and o  sodium chloride 1 Gram(s) Oral daily      gi ppx   pantoprazole    Tablet 40 milliGRAM(s) Oral before breakfast    BP monitoring,continue current antihypertensive meds, low salt diet,followup with PMD in 1-2 weeks  amLODIPine   Tablet 10 milliGRAM(s) Oral daily  
The patient is an 84 year old female with a history of HTN who presents with shortness of breath in the setting of large right pleural effusion.    Plan:  - ECG with no evidence of ischemia or infarction  - CT chest with right large pleural effusion  - Concern for malignancy or infection given unilateral findings  - BNP normal making heart failure unlikely  - Echo with normal LV systolic function, no significant valve issues  - Cardiac enzymes negative  - Continue amlodipine 10 mg daily  - Continue losartan 50 mg daily (formulary equivalent)  - Await results of thoracentesis  - Pulm follow-up
The patient is an 84 year old female with a history of HTN who presents with shortness of breath in the setting of large right pleural effusion.    Plan:  - ECG with no evidence of ischemia or infarction  - CT chest with right large pleural effusion  - Concern for malignancy or infection given unilateral findings  - BNP normal making heart failure unlikely  - Echo with normal LV systolic function, no significant valve issues  - Cardiac enzymes negative  - Continue amlodipine 10 mg daily  - Continue losartan 50 mg daily (formulary equivalent)  - Await results of thoracentesis - cytology pending
[ASSESSMENT and  PLAN]  85yo elderly F with large L effusion  admitted with dyspnea. more comfortable after thoracentesis    Hx HTN  hx Bipolar d/o  recent ? paranoia sx.     RECOMMENDATIONS  Await cytology  Supportive measures    With advanced age, GOC discussion recommended.     Consider psychiatry eval    DVT Prophylaxis    
The patient is an 84 year old female with a history of HTN who presents with shortness of breath in the setting of large right pleural effusion.    Plan:  - ECG with no evidence of ischemia or infarction  - CT chest with right large pleural effusion  - Concern for malignancy or infection given unilateral findings  - BNP normal making heart failure unlikely  - Echo with normal LV systolic function, no significant valve issues  - Cardiac enzymes negative  - Continue amlodipine 10 mg daily  - Continue losartan 50 mg daily (formulary equivalent)  - Cytology negative for malignant cells although malignancy remains likely
84 year old female with PMHx of bipolar disorder and HTN presents to the ED complaining of SOB. Per , for the past month pt has had decreased appetite, increased sleeping, and bipolar episode for past 2 weeks of describing her food and clothing being "poisoned", wanting everything to be washed. Pt says she "can't breath". Nonsmoker. PCP Dr. Woodard .Found to have large R pleural effusion and underwent thoracocentesis in ED .Nephrology cons called due to  Palliative care consult requested ,to discuss advance directives and complete MOLST  (13 Sep 2022 18:38)    hyponatremia is corrected too fast   1- dc  sodium chloride 0.9%. 1000 milliLiter(s) (45 mL/Hr) IV Continuous   2- dc salt tablet   3- change diet to low salt diet   4- start d5w 45 cc per hr     will check ua , urine osmolality , urine sodium , urine uric acid , serum sodium , serum osmolality , serum uric acid , f/u with hyponatremia work up , f/u with bmp , monitor i and o  sodium chloride 1 Gram(s) Oral daily      gi ppx   pantoprazole    Tablet 40 milliGRAM(s) Oral before breakfast    BP monitoring,continue current antihypertensive meds, low salt diet,followup with PMD in 1-2 weeks  amLODIPine   Tablet 10 milliGRAM(s) Oral daily  
85 y/o wman presented w SOB and found w on CT chest, large right effusion and nodularity of the right ant pleural hemidiaphramatic surface. left w a e3w scattered samll peripheral /subpleural ground glass opacities in apex and LL.  Pleural fluid cytology negative      -pt has been given name of CT surgeon Dr Calvo  -discussed w son and  wrt findings and sig, to see Dr Calvo, ?VATS, bx for definitive dx, ?pleuredesis if needed.    pt and family expressed understanding    discussed w Medicine  stable from Heme/Onc standpoint for planned dc
  REVIEW OF SYMPTOMS      Able to give (reliable) ROS  NO     PHYSICAL EXAM    HEENT Unremarkable  atraumatic   RESP Fair air entry EXP prolonged    Harsh breath sound Resp distres mild   CARDIAC S1 S2 No S3     NO JVD    ABDOMEN SOFT BS PRESENT NOT DISTENDED No hepatosplenomegaly   PEDAL EDEMA present No calf tenderness  NO rash       AGE/SEX.   84 f  DOA.  9/13/2022  CC .  9/13/2022 sob   PROCEDURE.  9/13/2022 Thorac 1.9 l      PMH .  pmh bipolar   pmh     GENERAL DATA .   GOC.  9/13/2022 full code       ALLGY.    nka                         WT.    9/13/2022 46     BMI.     9/13/2022 22     ICU STAY. none  COVID.  9/13/2022 scv2 (-)     BEST PRACTICE ISSUES.    HOB ELEVATN. Yes  DVT PPLX.  9/14/2022 lvnx 40    GO PPLX.      INFN PPLX.    SP SW BOO.        DIET.   9/14/2022 regl                   VS/ PO/IO/ VENT/ DRIPS.   9/15/2022 afeb 92 100/60   9/15/2022 ra 98%    ASSESSMENT/RECOMMENDATIONS .   RESP.  Gas exchange.  target po 90-95%    Pleural effusion.  ct ch 9/13/2022 large r pl effs with near complete atelect r lung nodular pl thickening along hemidiaphragm  9/13/2022 R thoracentesis 1.9 l   PFA  9/13 l 506/299  p 5.8/7.2 p5 l 27   a/r lymph predominant exudate which can be seen in cancer   9/13 cyto (-)    9/15/2022 dw pts spouse advised to see me in office for followup     INFECTION.  W 9/13-9/14/2022 w 10.2- 9.3   rvp 9/13/2022 (-)   no active infection suspected      CARDIAC.  bnp 9/13/2022 bnp 370  echo 9/14/2022 n lvsf   9/13/2022 amlodipine 10   9/13/2022 losartan 50   check echo    GI.  HEMAT.  Hb 9/13/2022 Hb 13.6     RENAL.  Hyponatremia.  Na 9/13-9/14/2022 Na 125 - 138   Cr 9/13/2022 cr .5   uosm 9/13 98 U Na 31 UUA 3.3   monitor       TIME SPENT   Over 25 minutes aggregate care time spent on encounter; activities included   direct patient care, counseling and/or coordinating care reviewing notes, lab data/ imaging , discussion with multidisciplinary team/ patient  /family and explaining in detail risks, benefits, alternatives  of the recommendations     NYEIN BRETT MIN 9/13/2022 1938 DR LYNNE RIVERO     
  REVIEW OF SYMPTOMS      Able to give (reliable) ROS  NO     PHYSICAL EXAM    HEENT Unremarkable  atraumatic   RESP Fair air entry EXP prolonged    Harsh breath sound Resp distres mild   CARDIAC S1 S2 No S3     NO JVD    ABDOMEN SOFT BS PRESENT NOT DISTENDED No hepatosplenomegaly   PEDAL EDEMA present No calf tenderness  NO rash       AGE/SEX.   84 f  DOA.  9/13/2022  CC .  9/13/2022 sob   PROCEDURE.  9/13/2022 Thorac 1.9 l      PMH .  pmh bipolar   pmh   pmh  pmh   pmh         GENERAL DATA .   GOC.  9/13/2022 full code       ALLGY.    nka                         WT.    9/13/2022 46     BMI.     9/13/2022 22     ICU STAY. none  COVID.  9/13/2022 scv2 (-)     BEST PRACTICE ISSUES.    HOB ELEVATN. Yes  DVT PPLX.  9/14/2022 lvnx 40    GO PPLX.      INFN PPLX.    SP SW BOO.        DIET.   9/14/2022 regl                ASSESSMENT/RECOMMENDATIONS .   RESP.  Gas exchange.  target po 90-95%    Pleural effusion.  ct ch 9/13/2022 large r pl effs with near complete atelect r lung nodular pl thickening along hemidiaphragm  9/13/2022 R thoracentesis 1.9 l   PFA  9/13 l 506/299  p 5.8/7.2 p5 l 27   a/r lymph predominant exudate which can be seen in cancer   9/13 cyto (-)    9/15/2022 dw pts spouse advised to see me in office for followup     INFECTION.  W 9/13-9/14/2022 w 10.2- 9.3   rvp 9/13/2022 (-)   no active infection suspected      CARDIAC.  bnp 9/13/2022 bnp 370  echo 9/14/2022 n lvsf   9/13/2022 amlodipine 10   9/13/2022 losartan 50   check echo    GI.  HEMAT.  Hb 9/13/2022 Hb 13.6     RENAL.  Hyponatremia.  Na 9/13-9/14/2022 Na 125 - 138   Cr 9/13/2022 cr .5   uosm 9/13 98 U Na 31 UUA 3.3   monitor       TIME SPENT   Over 25 minutes aggregate care time spent on encounter; activities included   direct patient care, counseling and/or coordinating care reviewing notes, lab data/ imaging , discussion with multidisciplinary team/ patient  /family and explaining in detail risks, benefits, alternatives  of the recommendations     NYEIN BRETT MIN 9/13/2022 1938 DR LYNNE CABRERA
  REVIEW OF SYMPTOMS      Able to give ROS  Yes     RELIABLE +/-   CONSTITUTIONAL Weakness Yes  Chills No   ENDOCRINE  No heat or cold intolerance    ALLERGY No hives  Sore throat No stridor  RESP Coughing blood no  Shortness of breath YES   NEURO No Headache  Confusion Pain neck No   CARDIAC No Chest pain No Palpitations   GI  Pain abdomen NO   Vomiting NO     NOTABLE FINDINGS    PHYSICAL EXAM    HEENT Unremarkable  atraumatic   RESP Fair air entry EXP prolonged    Harsh breath sound Resp distres mild   CARDIAC S1 S2 No S3     NO JVD    ABDOMEN SOFT BS PRESENT NOT DISTENDED No hepatosplenomegaly PEDAL EDEMA present No calf tenderness  NO rash       AGE/SEX.   84 f  DOA.  9/13/2022  CC .  9/13/2022 sob   PROCEDURE.  9/13/2022 Thorac 1.9 l  HOSPITAL COURSE.   Shortness of breath poa 9/13/2022  Pleural effsn r 1.9 l thora 9/13/2022   Hyponatremia 9/13/2022 Na 125      PMH .  pmh bipolar   pmh     GENERAL DATA .   GOC.  9/13/2022 full code       ALLGY.    nka                         WT.    9/13/2022 46                                BMI.     9/13/2022 22                          ICU STAY. none  COVID.  9/13/2022 scv2 (-)     BEST PRACTICE ISSUES.    HOB ELEVATN. Yes  DVT PPLX.  9/14/2022 lvnx 40    GO PPLX.      INFN PPLX.    SP SW BOO.        DIET.   9/14/2022 regl              VS/ PO/IO/ VENT/ DRIPS.   9/14/2022 afeb 80 100/50   9/14/2022 ra 96%     ASSESSMENT/RECOMMENDATIONS .   RESP.  Gas exchange.  target po 90-95%    Pleural effusion.  ct ch 9/13/2022 large r pl effs with near complete atelect r lung nodular pl thickening along hemidiaphragm  9/13/2022 R thoracentesis 1.9 l   PFA  9/13 l 506/299  p 5.8/7.2 p5 l 27   a/r lymph predominant exudate which can be seen in cancer   awaitcyto      INFECTION.  W 9/13-9/14/2022 w 10.2- 9.3   rvp 9/13/2022 (-)   no active infection suspected      CARDIAC.  bnp 9/13/2022 bnp 370  echo 9/14/2022 n lvsf   9/13/2022 amlodipine 10   9/13/2022 losartan 50   check echo    GI.  HEMAT.  Hb 9/13/2022 Hb 13.6     RENAL.  Hyponatremia.  Na 9/13-9/14/2022 Na 125 - 138   Cr 9/13/2022 cr .5   monitor       TIME SPENT   Over 25 minutes aggregate care time spent on encounter; activities included   direct patient care, counseling and/or coordinating care reviewing notes, lab data/ imaging , discussion with multidisciplinary team/ patient  /family and explaining in detail risks, benefits, alternatives  of the recommendations     CARLOS WEST MIN 9/13/2022 1938 DR LYNNE RIVERO 
84 year old female with PMHx of bipolar disorder and HTN presents to the ED complaining of SOB.     1.9 L drained - bloody eff - likely malignant - exudate - path neg  ONC eval noted  psych follow up noted    I sury  spoke with    and Son - GYN MDs  pt is full code at present  cardio eval  ct imaging reviewed  path - NEG  prognosis guarded  w/u in progress    
84 year old female with PMHx of bipolar disorder and HTN presents to the ED complaining of SOB.     1.9 L drained - bloody eff - likely malignant - exudate - path pending    specimens sent  I sury  spoke with    and Son - GYN MDs  pt is full code at present  cardio eval  ct imaging reviewed  path -   cytology -   prognosis guarded  w/u in progress    
84 year old female with PMHx of bipolar disorder and HTN presents to the ED complaining of SOB.     1.9 L drained - bloody eff - likely malignant - exudate - path pending  ONC eval noted    I sury  spoke with    and Son - GYN MDs  pt is full code at present  cardio eval  ct imaging reviewed  path -   cytology -   prognosis guarded  w/u in progress    
84 year old female with PMHx of bipolar disorder and HTN presents to the ED complaining of SOB. Per , for the past month pt has had decreased appetite, increased sleeping, and bipolar episode for past 2 weeks of describing her food and clothing being "poisoned", wanting everything to be washed. Pt says she "can't breath". Nonsmoker. PCP Dr. Woodard .Found to have large R pleural effusion and underwent thoracocentesis in ED .Nephrology cons called due to  Palliative care consult requested ,to discuss advance directives and complete MOLST  (13 Sep 2022 18:38)    hyponatremia is corrected too fast   1- dc  sodium chloride 0.9%. 1000 milliLiter(s) (45 mL/Hr) IV Continuous   2- dc salt tablet   3- change diet to low salt diet   4- start d5w     will check ua , urine osmolality , urine sodium , urine uric acid , serum sodium , serum osmolality , serum uric acid , f/u with hyponatremia work up , f/u with bmp , monitor i and o  sodium chloride 1 Gram(s) Oral daily      gi ppx   pantoprazole    Tablet 40 milliGRAM(s) Oral before breakfast    BP monitoring,continue current antihypertensive meds, low salt diet,followup with PMD in 1-2 weeks  amLODIPine   Tablet 10 milliGRAM(s) Oral daily  
< from: CT Chest No Cont (09.13.22 @ 14:24) >    ACC: [id number] EXAM:  CT CHEST                          PROCEDURE DATE:  09/13/2022          INTERPRETATION:  CLINICAL INFORMATION: Shortness of breath.    COMPARISON: Chest x-ray 9/13/2022.    CONTRAST/COMPLICATIONS:  IV Contrast: NONE  Oral Contrast: NONE  Complications: None reported at time of study completion    PROCEDURE:  CT of the Chest was performed.  Sagittal and coronal reformats were performed.    FINDINGS:    LUNGS AND AIRWAYS: PLEURA:  There is a large right-sided pleural effusion.  There is near complete atelectasis involving the right lung with a small   aerated portion of the right upper lobe.  There is marked narrowing of the right mainstem bronchus with collapse of   the right upper/middle and lower lobe bronchi.    There is leftward cardiomediastinal shift.    There are few scattered small peripheral/subpleural groundglass opacities   at the left lung apex and left lower lobe.  There is mild linear atelectasis or fibrosis inferior left lingula and   left lower lobes.  There appears to be in nodular thickening along the right anterior   pleural hemidiaphragmatic surface, evaluation limited without intravenous   contrast.    MEDIASTINUM AND RADHA:  The evaluation of the pulmonary hilum is limited without intravenous   contrast.  No enlarged mediastinal lymphadenopathy.    VESSELS: Atherosclerotic changes thoracic aorta and coronary artery   calcifications.    HEART: Heart size is normal. Trace pericardial effusion/thickening.    CHEST WALL AND LOWER NECK: Within normal limits.    VISUALIZED UPPER ABDOMEN:  Cholecystectomy.  Biliary ductal prominence.    BONES: Degenerative changes.    IMPRESSION:    Large right-sided pleural effusion with near complete atelectasis right   lung.  Correlate clinically for obstructive atelectasis.    Suggestion of nodular pleural thickening along the hemidiaphragmatic   surface.  Correlate for malignant pleural effusion.    Other findings as discussed above.    < end of copied text >
< from: CT Chest No Cont (09.13.22 @ 14:24) >    ACC: [id number] EXAM:  CT CHEST                          PROCEDURE DATE:  09/13/2022          INTERPRETATION:  CLINICAL INFORMATION: Shortness of breath.    COMPARISON: Chest x-ray 9/13/2022.    CONTRAST/COMPLICATIONS:  IV Contrast: NONE  Oral Contrast: NONE  Complications: None reported at time of study completion    PROCEDURE:  CT of the Chest was performed.  Sagittal and coronal reformats were performed.    FINDINGS:    LUNGS AND AIRWAYS: PLEURA:  There is a large right-sided pleural effusion.  There is near complete atelectasis involving the right lung with a small   aerated portion of the right upper lobe.  There is marked narrowing of the right mainstem bronchus with collapse of   the right upper/middle and lower lobe bronchi.    There is leftward cardiomediastinal shift.    There are few scattered small peripheral/subpleural groundglass opacities   at the left lung apex and left lower lobe.  There is mild linear atelectasis or fibrosis inferior left lingula and   left lower lobes.  There appears to be in nodular thickening along the right anterior   pleural hemidiaphragmatic surface, evaluation limited without intravenous   contrast.    MEDIASTINUM AND RADHA:  The evaluation of the pulmonary hilum is limited without intravenous   contrast.  No enlarged mediastinal lymphadenopathy.    VESSELS: Atherosclerotic changes thoracic aorta and coronary artery   calcifications.    HEART: Heart size is normal. Trace pericardial effusion/thickening.    CHEST WALL AND LOWER NECK: Within normal limits.    VISUALIZED UPPER ABDOMEN:  Cholecystectomy.  Biliary ductal prominence.    BONES: Degenerative changes.    IMPRESSION:    Large right-sided pleural effusion with near complete atelectasis right   lung.  Correlate clinically for obstructive atelectasis.    Suggestion of nodular pleural thickening along the hemidiaphragmatic   surface.  Correlate for malignant pleural effusion.    Other findings as discussed above.    < end of copied text >
< from: CT Chest No Cont (09.13.22 @ 14:24) >    ACC: [id number] EXAM:  CT CHEST                          PROCEDURE DATE:  09/13/2022          INTERPRETATION:  CLINICAL INFORMATION: Shortness of breath.    COMPARISON: Chest x-ray 9/13/2022.    CONTRAST/COMPLICATIONS:  IV Contrast: NONE  Oral Contrast: NONE  Complications: None reported at time of study completion    PROCEDURE:  CT of the Chest was performed.  Sagittal and coronal reformats were performed.    FINDINGS:    LUNGS AND AIRWAYS: PLEURA:  There is a large right-sided pleural effusion.  There is near complete atelectasis involving the right lung with a small   aerated portion of the right upper lobe.  There is marked narrowing of the right mainstem bronchus with collapse of   the right upper/middle and lower lobe bronchi.    There is leftward cardiomediastinal shift.    There are few scattered small peripheral/subpleural groundglass opacities   at the left lung apex and left lower lobe.  There is mild linear atelectasis or fibrosis inferior left lingula and   left lower lobes.  There appears to be in nodular thickening along the right anterior   pleural hemidiaphragmatic surface, evaluation limited without intravenous   contrast.    MEDIASTINUM AND RADHA:  The evaluation of the pulmonary hilum is limited without intravenous   contrast.  No enlarged mediastinal lymphadenopathy.    VESSELS: Atherosclerotic changes thoracic aorta and coronary artery   calcifications.    HEART: Heart size is normal. Trace pericardial effusion/thickening.    CHEST WALL AND LOWER NECK: Within normal limits.    VISUALIZED UPPER ABDOMEN:  Cholecystectomy.  Biliary ductal prominence.    BONES: Degenerative changes.    IMPRESSION:    Large right-sided pleural effusion with near complete atelectasis right   lung.  Correlate clinically for obstructive atelectasis.    Suggestion of nodular pleural thickening along the hemidiaphragmatic   surface.  Correlate for malignant pleural effusion.    Other findings as discussed above.    < end of copied text >
show

## 2023-03-11 NOTE — CONSULT NOTE ADULT - SUBJECTIVE AND OBJECTIVE BOX
Patient is a 48y homeless Male with complicated chronic medical history and inconsistent medication compliance who presents c/o inability to ambulate for the past 3 weeks. Patient states he has been bedridden for the past 3 weeks due to weakness and bilateral radicular pain. Patient states he normally walks with a walker and has not had any recent trauma or falls that would explain his symptoms. Patient reports history of sepsis from pyelonephritis. sp _. Denies HS/LOC. Denies pain/injury elsewhere. Denies numbness/tingling/paresthesias/weakness. Denies bowel/bladder incontinence. Denies fevers/chills. No other complaints at this time.    HEALTH ISSUES - PROBLEM Dx:          MEDICATIONS  (STANDING):      Allergies    sulfa drugs (Hives)    Intolerances        PAST MEDICAL & SURGICAL HISTORY:                            12.8   12.07 )-----------( 264      ( 11 Mar 2023 18:05 )             39.6       11 Mar 2023 18:05    148    |  119    |  37     ----------------------------<  152    4.5     |  23     |  2.36     Ca    9.1        11 Mar 2023 18:05    TPro  7.6    /  Alb  3.4    /  TBili  0.2    /  DBili  x      /  AST  21     /  ALT  30     /  AlkPhos  103    11 Mar 2023 18:05      PT/INR - ( 11 Mar 2023 18:05 )   PT: 15.2 sec;   INR: 1.31 ratio         PTT - ( 11 Mar 2023 18:05 )  PTT:29.6 sec        Vital Signs Last 24 Hrs  T(C): 37.2 (03-11-23 @ 17:11), Max: 37.2 (03-11-23 @ 17:11)  T(F): 99 (03-11-23 @ 17:11), Max: 99 (03-11-23 @ 17:11)  HR: 120 (03-11-23 @ 17:11) (120 - 120)  BP: 151/78 (03-11-23 @ 17:11) (151/78 - 151/78)  BP(mean): --  RR: 19 (03-11-23 @ 17:11) (19 - 19)  SpO2: 96% (03-11-23 @ 17:11) (96% - 96%)    Physical Exam:  Gen: NAD  Spine:  Skin intact  No gross deformity  No midline TTP C/T/L/S spine  No bony step offs  No paraspinal muscle ttp/hypertonicity   Negative Straight leg raise  Negative clonus  Negative babinski  Negative craig  + rectal tone  No saddle anesthesia    Motor:                   C5                C6              C7               C8           T1   R            5/5                5/5            5/5             5/5          5/5  L             5/5               5/5             5/5             5/5          5/5                L2             L3             L4               L5            S1  R         5/5           5/5          5/5             5/5           5/5  L          5/5          5/5           5/5             5/5           5/5    Sensory:            C5         C6         C7      C8       T1        (0=absent, 1=impaired, 2=normal, NT=not testable)  R         2            2           2        2         2  L          2            2           2        2         2               L2          L3         L4      L5       S1         (0=absent, 1=impaired, 2=normal, NT=not testable)  R         2            2            2        2        2  L          2            2           2        2         2    Imaging:     A/P: 48y Male with   Pain control  WBAT with assistive devices as needed  FU Labs/imaging  SCDs   Patient is a 48y homeless Male with complicated chronic medical history and inconsistent medication compliance who presents c/o inability to ambulate for the past 3 weeks. Patient states he has been bedridden for the past 3 weeks due to weakness and bilateral radicular pain. Patient states he normally walks with a walker and has not had any recent trauma or falls that would explain his symptoms. Patient reports 6 years ago he had a CVA and was diagnosed with Guillain [place] syndrome. Since then he as had a residual right foot drop for which he wears a special shoe/brace and urinary incontinence. 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 MRI Lspine reviewed from 2017 shows L4-S1 cord compression and his symptoms are consistent with chronic cauda equina syndrome. Patient reports he was at [facility] 3-4 times in the last 3 weeks and each time he was discharged and told to work on strengthening with PT. He reports being able to ambulate 300ft with PT at [facility] around 3 weeks ago. Denies recent fevers, chills, or cancer history. Additonally, patient reports history of sepsis 2/2 pyelonephritis about 1 year ago when he was hospitalized at [facility].     HEALTH ISSUES - PROBLEM Dx:          MEDICATIONS  (STANDING):      Allergies    sulfa drugs (Hives)    Intolerances        PAST MEDICAL & SURGICAL HISTORY:                            12.8   12.07 )-----------( 264      ( 11 Mar 2023 18:05 )             39.6       11 Mar 2023 18:05    148    |  119    |  37     ----------------------------<  152    4.5     |  23     |  2.36     Ca    9.1        11 Mar 2023 18:05    TPro  7.6    /  Alb  3.4    /  TBili  0.2    /  DBili  x      /  AST  21     /  ALT  30     /  AlkPhos  103    11 Mar 2023 18:05      PT/INR - ( 11 Mar 2023 18:05 )   PT: 15.2 sec;   INR: 1.31 ratio         PTT - ( 11 Mar 2023 18:05 )  PTT:29.6 sec        Vital Signs Last 24 Hrs  T(C): 37.2 (03-11-23 @ 17:11), Max: 37.2 (03-11-23 @ 17:11)  T(F): 99 (03-11-23 @ 17:11), Max: 99 (03-11-23 @ 17:11)  HR: 120 (03-11-23 @ 17:11) (120 - 120)  BP: 151/78 (03-11-23 @ 17:11) (151/78 - 151/78)  BP(mean): --  RR: 19 (03-11-23 @ 17:11) (19 - 19)  SpO2: 96% (03-11-23 @ 17:11) (96% - 96%)    Physical Exam:  Gen: NAD  Spine:  Skin intact, no sacral decub ulcer  No gross deformity  diffuse midline TTP C/T/L/S spine  No bony step offs  No paraspinal muscle ttp/hypertonicity   Negative Straight leg raise  positive clonus on the left  Negative babinski  Negative craig  + rectal tone  No saddle anesthesia    Motor:                   C5                C6              C7               C8           T1   R            5/5                4/5            4/5             5/5          5/5  L             5/5               4/5             4/5             4/5          5/5                L2             L3             L4               L5            S1  R         2/5           4/5          1/5             1/5           4/5  L          2/5          5/5           4/5             4/5           4/5    Sensory:            C5         C6         C7      C8       T1        (0=absent, 1=impaired, 2=normal, NT=not testable)  R         2            2           2        2         2  L          2            2           2        2         2               L2          L3         L4      L5       S1         (0=absent, 1=impaired, 2=normal, NT=not testable)  R         2            2            1        1        1  L          2            2           2        2         2    Imaging:   CT Lspine: Multilevel disc bulges/degenerative changes greatest at L4-5 where a moderate disc bulge and moderate facet and ligamentous degenerative changes results in severe canal and moderate bilateral neural foramina narrowing  XR Lspine: degenerative changes and bridging osteophytes T11-L1, pending official read    A/P: 48y Male with new onset inability to ambulate likely 2/2 progression of lumbar spine cord compression    Patient seen and examined at bedside with Dr. Mills  Imaging and outpatient MRI Lspine from 2017 at [facility] reviewed  Patient reports having an MRI of the spine several months ago at [facility] which showed a fluid collection that was subsequently drained  Plan for possible surgical intervention pending full spine MRI  FU MRI C/T/L spine without contrast  plan pending imaging results  admit to medicine for optimization, please document  FU neuro c/s  WBAT with assistive devices as needed  pain control prn  FU Labs/imaging  DVT ppx with SCDs in anticipation for possible OR  discussed with Dr. Mills who agrees with plan

## 2023-03-11 NOTE — ED ADULT NURSE NOTE - NS ED NURSE TRANSPORT WITH
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  Patient states she was told by OB to follow up with .   soonest appointment is 11.16.18.  Patient is requesting a work in appointment with  only 'any day or time in the next few weeks'.  Patient is requesting a call back with response.[SH1.1M]  Routed to provider's clinical pool.     Next and Last Visit with Provider and Department  Next visit with ALBERTO PORTILLO is on No match found  Next visit with [facility] is on No match found  Last visit with ALBERTO PORTILLO. was on 09/06/2017 at  8:50 AM in [facility] SEQ  Last visit with FAMILY [facility]ACTICE was on 09/06/2017 at  8:50 AM in [facility] SEQ     WEIGHT AND HEIGHT: As of 10/10/2018 weight is 206 lbs.(93.441 kg).   BMI is 38.91 kg/(m^2) calculated from:     Height 5' 3\" (1.6 m) as of 8/29/18     Weight 219 lb 9.3 oz (99.6 kg) as of 8/29/18      Allergies      Allergen   Reactions   • Codeine  Other - See Comments     agitation      Current outpatient prescriptions       Medication  Sig Dispense Refill   • cephALEXin (KEFLEX) 500 MG Cap TK ONE C PO  Q 6 H TAT  0   • amlodipine (NORVASC) 10 MG tablet TK 1 T PO  D  0   • Labetalol HCl (NORMODYNE) 100 MG tablet Take 1 Tab by mouth 2 (two) times daily. 60 Tab 0   • triamcinolone (KENALOG) 0.1 % cream Apply to aa BID for 2 weeks 80 g 2   • hydrOXYzine (ATARAX) 25 MG tablet Take 1 Tab by mouth every 6 (six) hours as needed for Itching. 30 Tab 2   • albuterol (PROAIR HFA) 108 (90 BASE) MCG/ACT inhaler  Inhale 1-2 Puffs by mouth every 4 (four) hours as needed for Wheezing. 1 Inhaler 1   • valacyclovir (VALTREX) 500 MG tablet take 1-2 tabs daily as directed for suppression (Patient taking differently: 500 mg 2 (two) times daily. take 1-2 tabs daily as directed for suppression) 180 Tab 3   • levocetirizine (XYZAL ALLERGY 24HR) 5 MG tablet Take 1 Tab by mouth every evening. 90 Tab 3      PHARMACY to use:[SH1.1T] [address][SH1.2T]            Pharmacy preference(s) on file: [address]    CALL BACK INFO:[SH1.1T] Ok to leave response (including medical information) on answering machine[SH1.1M]  ROUTING:[SH1.1T] Patient's physician/staff[SH1.1M]        PCP: Bev Chanel DO         INS: Payor: [employer] / Plan: *No Plan* / Product Type: *No Product type* / Note: This is the primary coverage, but no account was found for this location or the patient's primary location.   ADDRESS:  [address] [place] [id number][SH1.1T]       Revision History        User Key Date/Time User Provider Type Action    > SH1.2 10/12/18 12:30 PM Antionette Laureano Patient  Sign     SH1.1 10/12/18 12:28 PM Antionette Laureano Patient      M - Manual, T - Template             IV pump

## 2023-03-11 NOTE — H&P ADULT - HISTORY OF PRESENT ILLNESS
47 yo M with a PMH of Guillain-Barré Syndrome, DM2 (not on insulin)< HTN, HLD, and CVA (2016) who p/w worsening back and leg pain and spasms and inability to ambulate.    In the ED, he was given Flexeril 10 mg PO x1 and  mg x1 with Morphine 4 mg IV x1 ordered. 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 He has had off-and-on lower back pain with peripheral LE neuropathy since 2016 when he had Guillain-Barré and a CVA with R sided deficits in 2016. Normally, he can walk "shaky with a walker." However, over the past 3 weeks, his symptoms have gotten worse, with frequent burning pain in his lower back and spasms. His symptoms are worse when he lies flat or when he tries to transition to his walker. Therefore, he has had marked difficulty ambulating. He has baseline urinary incontinence and uses Pull-ups. He has some improved bowel continence, but cannot hold his bowels in for long. Those symptoms are not new. He denies CP, cough, N/V, abdominal pain, CP, or SOB.  He went to [facility] 2 nights ago and was "in the ER for 30 hours" not moving. His pain worsened even more after discharge.    He also notes a history of VRE and was recently (1 week ago) prescribed Augmentin, which he only took 2 days of. He says when he goes to [facility] he is "always on isolation," because of his history of VRE. He is not sure if he has dysuria currently, but he had it when he was prescribed antibiotics last week.  He also is on Warfarin for an unknown "blood clotting disorder," stemming from a blood clot in 2016 (of note, he was intubated at that time for Guillain-[place] and suffered a CVA during that time), but he has not had a blood clot since then. He states he has an IVC filter. He is non-compliant with his Warfarin, stating he takes it about 3-4 days per week.    In the ED, he was given Flexeril 10 mg PO x1 and  mg x1 with Morphine 4 mg IV x1 ordered. 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 He has had off-and-on lower back pain with peripheral LE neuropathy since 2016 when he had Guillain-Barré and a CVA with R sided deficits in 2016. Normally, he can walk "shaky with a walker." However, over the past 3 weeks, his symptoms have gotten worse, with frequent burning pain in his lower back and spasms. His symptoms are worse when he lies flat or when he tries to transition to his walker. Therefore, he has had marked difficulty ambulating. He has baseline urinary incontinence and uses Pull-ups. He has some improved bowel continence, but cannot hold his bowels in for long. Those symptoms are not new. He denies CP, cough, N/V, abdominal pain, CP, or SOB.  He went to [facility] 2 nights ago and was "in the ER for 30 hours" not moving. His pain worsened even more after discharge.    He also notes a history of VRE and was recently (1 week ago) prescribed Augmentin, which he only took 2 days of. He says when he goes to [facility] he is "always on isolation," because of his history of VRE. He is not sure if he has dysuria currently, but he had it when he was prescribed antibiotics last week.  He also is on Warfarin for an unknown "blood clotting disorder," stemming from a blood clot in 2016 (of note, he was intubated at that time for Guillain-[place] and suffered a CVA during that time), but he has not had a blood clot since then. He states he has an IVC filter. He is non-compliant with his Warfarin, stating he takes it about 3-4 days per week.    In the ED, he was given Flexeril 10 mg PO x1 and  mg x1 with Morphine 4 mg IV x1 ordered.

## 2023-03-12 DIAGNOSIS — Z01.818 ENCOUNTER FOR OTHER PREPROCEDURAL EXAMINATION: ICD-10-CM

## 2023-03-12 DIAGNOSIS — Z95.828 PRESENCE OF OTHER VASCULAR IMPLANTS AND GRAFTS: Chronic | ICD-10-CM

## 2023-03-12 DIAGNOSIS — M54.9 DORSALGIA, UNSPECIFIED: ICD-10-CM

## 2023-03-12 LAB
A1C WITH ESTIMATED AVERAGE GLUCOSE RESULT: 6.8 % — HIGH (ref 4–5.6)
ABO RH CONFIRMATION: SIGNIFICANT CHANGE UP
ANION GAP SERPL CALC-SCNC: 5 MMOL/L — SIGNIFICANT CHANGE UP (ref 5–17)
APPEARANCE UR: CLEAR — SIGNIFICANT CHANGE UP
APTT BLD: 143.2 SEC — CRITICAL HIGH (ref 27.5–35.5)
BACTERIA # UR AUTO: ABNORMAL
BASOPHILS # BLD AUTO: 0.07 K/UL — SIGNIFICANT CHANGE UP (ref 0–0.2)
BASOPHILS NFR BLD AUTO: 0.7 % — SIGNIFICANT CHANGE UP (ref 0–2)
BILIRUB UR-MCNC: NEGATIVE — SIGNIFICANT CHANGE UP
BUN SERPL-MCNC: 35 MG/DL — HIGH (ref 7–23)
CALCIUM SERPL-MCNC: 8.5 MG/DL — SIGNIFICANT CHANGE UP (ref 8.5–10.1)
CHLORIDE SERPL-SCNC: 116 MMOL/L — HIGH (ref 96–108)
CO2 SERPL-SCNC: 23 MMOL/L — SIGNIFICANT CHANGE UP (ref 22–31)
COLOR SPEC: YELLOW — SIGNIFICANT CHANGE UP
CREAT SERPL-MCNC: 2.07 MG/DL — HIGH (ref 0.5–1.3)
DIFF PNL FLD: ABNORMAL
EGFR: 39 ML/MIN/1.73M2 — LOW
EOSINOPHIL # BLD AUTO: 0.34 K/UL — SIGNIFICANT CHANGE UP (ref 0–0.5)
EOSINOPHIL NFR BLD AUTO: 3.6 % — SIGNIFICANT CHANGE UP (ref 0–6)
EPI CELLS # UR: ABNORMAL
ESTIMATED AVERAGE GLUCOSE: 148 MG/DL — HIGH (ref 68–114)
GLUCOSE BLDC GLUCOMTR-MCNC: 111 MG/DL — HIGH (ref 70–99)
GLUCOSE BLDC GLUCOMTR-MCNC: 151 MG/DL — HIGH (ref 70–99)
GLUCOSE BLDC GLUCOMTR-MCNC: 157 MG/DL — HIGH (ref 70–99)
GLUCOSE SERPL-MCNC: 169 MG/DL — HIGH (ref 70–99)
GLUCOSE UR QL: NEGATIVE — SIGNIFICANT CHANGE UP
HCT VFR BLD CALC: 33.9 % — LOW (ref 39–50)
HGB BLD-MCNC: 10.4 G/DL — LOW (ref 13–17)
IMM GRANULOCYTES NFR BLD AUTO: 0.4 % — SIGNIFICANT CHANGE UP (ref 0–0.9)
INR BLD: 1.36 RATIO — HIGH (ref 0.88–1.16)
KETONES UR-MCNC: NEGATIVE — SIGNIFICANT CHANGE UP
LEUKOCYTE ESTERASE UR-ACNC: ABNORMAL
LYMPHOCYTES # BLD AUTO: 3.1 K/UL — SIGNIFICANT CHANGE UP (ref 1–3.3)
LYMPHOCYTES # BLD AUTO: 32.5 % — SIGNIFICANT CHANGE UP (ref 13–44)
MCHC RBC-ENTMCNC: 24.3 PG — LOW (ref 27–34)
MCHC RBC-ENTMCNC: 30.7 GM/DL — LOW (ref 32–36)
MCV RBC AUTO: 79.2 FL — LOW (ref 80–100)
MONOCYTES # BLD AUTO: 0.8 K/UL — SIGNIFICANT CHANGE UP (ref 0–0.9)
MONOCYTES NFR BLD AUTO: 8.4 % — SIGNIFICANT CHANGE UP (ref 2–14)
NEUTROPHILS # BLD AUTO: 5.2 K/UL — SIGNIFICANT CHANGE UP (ref 1.8–7.4)
NEUTROPHILS NFR BLD AUTO: 54.4 % — SIGNIFICANT CHANGE UP (ref 43–77)
NITRITE UR-MCNC: NEGATIVE — SIGNIFICANT CHANGE UP
PH UR: 5 — SIGNIFICANT CHANGE UP (ref 5–8)
PLATELET # BLD AUTO: 190 K/UL — SIGNIFICANT CHANGE UP (ref 150–400)
POTASSIUM SERPL-MCNC: 3.7 MMOL/L — SIGNIFICANT CHANGE UP (ref 3.5–5.3)
POTASSIUM SERPL-SCNC: 3.7 MMOL/L — SIGNIFICANT CHANGE UP (ref 3.5–5.3)
PROT UR-MCNC: 100
PROTHROM AB SERPL-ACNC: 15.8 SEC — HIGH (ref 10.5–13.4)
RAPID RVP RESULT: SIGNIFICANT CHANGE UP
RBC # BLD: 4.28 M/UL — SIGNIFICANT CHANGE UP (ref 4.2–5.8)
RBC # FLD: 16.9 % — HIGH (ref 10.3–14.5)
RBC CASTS # UR COMP ASSIST: ABNORMAL /HPF (ref 0–4)
SARS-COV-2 RNA SPEC QL NAA+PROBE: SIGNIFICANT CHANGE UP
SODIUM SERPL-SCNC: 144 MMOL/L — SIGNIFICANT CHANGE UP (ref 135–145)
SP GR SPEC: 1.02 — SIGNIFICANT CHANGE UP (ref 1.01–1.02)
UROBILINOGEN FLD QL: NEGATIVE — SIGNIFICANT CHANGE UP
WBC # BLD: 9.55 K/UL — SIGNIFICANT CHANGE UP (ref 3.8–10.5)
WBC # FLD AUTO: 9.55 K/UL — SIGNIFICANT CHANGE UP (ref 3.8–10.5)
WBC UR QL: >50 /HPF (ref 0–5)

## 2023-03-12 PROCEDURE — 99223 1ST HOSP IP/OBS HIGH 75: CPT

## 2023-03-12 PROCEDURE — 93010 ELECTROCARDIOGRAM REPORT: CPT

## 2023-03-12 PROCEDURE — 71045 X-RAY EXAM CHEST 1 VIEW: CPT | Mod: 26

## 2023-03-12 PROCEDURE — 99233 SBSQ HOSP IP/OBS HIGH 50: CPT

## 2023-03-12 PROCEDURE — 99222 1ST HOSP IP/OBS MODERATE 55: CPT

## 2023-03-12 RX ORDER — GLUCAGON INJECTION, SOLUTION 0.5 MG/.1ML
1 INJECTION, SOLUTION SUBCUTANEOUS ONCE
Refills: 0 | Status: DISCONTINUED | OUTPATIENT
Start: 2023-03-12 | End: 2023-03-22

## 2023-03-12 RX ORDER — ACETAMINOPHEN 500 MG
1000 TABLET ORAL ONCE
Refills: 0 | Status: COMPLETED | OUTPATIENT
Start: 2023-03-12 | End: 2023-03-15

## 2023-03-12 RX ORDER — OXYCODONE HYDROCHLORIDE 5 MG/1
5 TABLET ORAL EVERY 4 HOURS
Refills: 0 | Status: DISCONTINUED | OUTPATIENT
Start: 2023-03-12 | End: 2023-03-19

## 2023-03-12 RX ORDER — AMLODIPINE BESYLATE 2.5 MG/1
10 TABLET ORAL DAILY
Refills: 0 | Status: DISCONTINUED | OUTPATIENT
Start: 2023-03-12 | End: 2023-03-22

## 2023-03-12 RX ORDER — NICOTINE POLACRILEX 2 MG
2 GUM BUCCAL
Refills: 0 | Status: DISCONTINUED | OUTPATIENT
Start: 2023-03-12 | End: 2023-03-17

## 2023-03-12 RX ORDER — ALFUZOSIN HYDROCHLORIDE 10 MG/1
1 TABLET, EXTENDED RELEASE ORAL
Qty: 0 | Refills: 0 | DISCHARGE

## 2023-03-12 RX ORDER — BACLOFEN 100 %
5 POWDER (GRAM) MISCELLANEOUS ONCE
Refills: 0 | Status: COMPLETED | OUTPATIENT
Start: 2023-03-12 | End: 2023-03-12

## 2023-03-12 RX ORDER — FINASTERIDE 5 MG/1
5 TABLET, FILM COATED ORAL DAILY
Refills: 0 | Status: DISCONTINUED | OUTPATIENT
Start: 2023-03-12 | End: 2023-03-22

## 2023-03-12 RX ORDER — SODIUM CHLORIDE 9 MG/ML
75 INJECTION, SOLUTION INTRAVENOUS
Qty: 0 | Refills: 0 | DISCHARGE
Start: 2023-03-12

## 2023-03-12 RX ORDER — HYDRALAZINE HCL 50 MG
25 TABLET ORAL
Refills: 0 | Status: DISCONTINUED | OUTPATIENT
Start: 2023-03-12 | End: 2023-03-22

## 2023-03-12 RX ORDER — TAMSULOSIN HYDROCHLORIDE 0.4 MG/1
0.4 CAPSULE ORAL AT BEDTIME
Refills: 0 | Status: DISCONTINUED | OUTPATIENT
Start: 2023-03-12 | End: 2023-03-22

## 2023-03-12 RX ORDER — AMPICILLIN SODIUM AND SULBACTAM SODIUM 250; 125 MG/ML; MG/ML
1.5 INJECTION, POWDER, FOR SUSPENSION INTRAMUSCULAR; INTRAVENOUS
Qty: 0 | Refills: 0 | DISCHARGE
Start: 2023-03-12

## 2023-03-12 RX ORDER — DEXTROSE 50 % IN WATER 50 %
15 SYRINGE (ML) INTRAVENOUS ONCE
Refills: 0 | Status: DISCONTINUED | OUTPATIENT
Start: 2023-03-12 | End: 2023-03-22

## 2023-03-12 RX ORDER — NICOTINE POLACRILEX 2 MG
1 GUM BUCCAL DAILY
Refills: 0 | Status: DISCONTINUED | OUTPATIENT
Start: 2023-03-12 | End: 2023-03-22

## 2023-03-12 RX ORDER — DULOXETINE HYDROCHLORIDE 30 MG/1
60 CAPSULE, DELAYED RELEASE ORAL DAILY
Refills: 0 | Status: DISCONTINUED | OUTPATIENT
Start: 2023-03-12 | End: 2023-03-22

## 2023-03-12 RX ORDER — AMPICILLIN SODIUM AND SULBACTAM SODIUM 250; 125 MG/ML; MG/ML
1.5 INJECTION, POWDER, FOR SUSPENSION INTRAMUSCULAR; INTRAVENOUS EVERY 6 HOURS
Refills: 0 | Status: DISCONTINUED | OUTPATIENT
Start: 2023-03-12 | End: 2023-03-13

## 2023-03-12 RX ORDER — HYDRALAZINE HCL 50 MG
1 TABLET ORAL
Qty: 0 | Refills: 0 | DISCHARGE

## 2023-03-12 RX ORDER — BACLOFEN 100 %
5 POWDER (GRAM) MISCELLANEOUS EVERY 12 HOURS
Refills: 0 | Status: DISCONTINUED | OUTPATIENT
Start: 2023-03-12 | End: 2023-03-22

## 2023-03-12 RX ORDER — HYDROMORPHONE HYDROCHLORIDE 2 MG/ML
1 INJECTION INTRAMUSCULAR; INTRAVENOUS; SUBCUTANEOUS EVERY 6 HOURS
Refills: 0 | Status: DISCONTINUED | OUTPATIENT
Start: 2023-03-12 | End: 2023-03-15

## 2023-03-12 RX ORDER — NICOTINE POLACRILEX 2 MG
1 GUM BUCCAL
Qty: 0 | Refills: 0 | DISCHARGE
Start: 2023-03-12

## 2023-03-12 RX ORDER — WARFARIN SODIUM 2.5 MG/1
1 TABLET ORAL
Qty: 0 | Refills: 0 | DISCHARGE

## 2023-03-12 RX ORDER — GABAPENTIN 400 MG/1
600 CAPSULE ORAL EVERY 8 HOURS
Refills: 0 | Status: DISCONTINUED | OUTPATIENT
Start: 2023-03-12 | End: 2023-03-22

## 2023-03-12 RX ORDER — DEXTROSE 50 % IN WATER 50 %
25 SYRINGE (ML) INTRAVENOUS ONCE
Refills: 0 | Status: DISCONTINUED | OUTPATIENT
Start: 2023-03-12 | End: 2023-03-22

## 2023-03-12 RX ORDER — INSULIN LISPRO 100/ML
VIAL (ML) SUBCUTANEOUS EVERY 6 HOURS
Refills: 0 | Status: DISCONTINUED | OUTPATIENT
Start: 2023-03-12 | End: 2023-03-17

## 2023-03-12 RX ORDER — HYDRALAZINE HCL 50 MG
1 TABLET ORAL
Qty: 0 | Refills: 0 | DISCHARGE
Start: 2023-03-12

## 2023-03-12 RX ORDER — HYDROMORPHONE HYDROCHLORIDE 2 MG/ML
1 INJECTION INTRAMUSCULAR; INTRAVENOUS; SUBCUTANEOUS
Qty: 0 | Refills: 0 | DISCHARGE
Start: 2023-03-12

## 2023-03-12 RX ORDER — HEPARIN SODIUM 5000 [USP'U]/ML
INJECTION INTRAVENOUS; SUBCUTANEOUS
Qty: 25000 | Refills: 0 | Status: DISCONTINUED | OUTPATIENT
Start: 2023-03-12 | End: 2023-03-12

## 2023-03-12 RX ORDER — HEPARIN SODIUM 5000 [USP'U]/ML
5000 INJECTION INTRAVENOUS; SUBCUTANEOUS EVERY 6 HOURS
Refills: 0 | Status: DISCONTINUED | OUTPATIENT
Start: 2023-03-12 | End: 2023-03-12

## 2023-03-12 RX ORDER — INSULIN LISPRO 100/ML
2 VIAL (ML) SUBCUTANEOUS
Qty: 0 | Refills: 0 | DISCHARGE
Start: 2023-03-12

## 2023-03-12 RX ORDER — HEPARIN SODIUM 5000 [USP'U]/ML
10000 INJECTION INTRAVENOUS; SUBCUTANEOUS EVERY 6 HOURS
Refills: 0 | Status: DISCONTINUED | OUTPATIENT
Start: 2023-03-12 | End: 2023-03-12

## 2023-03-12 RX ORDER — NICOTINE POLACRILEX 2 MG
2 GUM BUCCAL
Refills: 0 | Status: DISCONTINUED | OUTPATIENT
Start: 2023-03-12 | End: 2023-03-12

## 2023-03-12 RX ORDER — DEXTROSE 50 % IN WATER 50 %
12.5 SYRINGE (ML) INTRAVENOUS ONCE
Refills: 0 | Status: DISCONTINUED | OUTPATIENT
Start: 2023-03-12 | End: 2023-03-22

## 2023-03-12 RX ADMIN — Medication 1 PATCH: at 19:17

## 2023-03-12 RX ADMIN — SODIUM CHLORIDE 75 MILLILITER(S): 9 INJECTION, SOLUTION INTRAVENOUS at 00:13

## 2023-03-12 RX ADMIN — HYDROMORPHONE HYDROCHLORIDE 1 MILLIGRAM(S): 2 INJECTION INTRAMUSCULAR; INTRAVENOUS; SUBCUTANEOUS at 20:24

## 2023-03-12 RX ADMIN — AMPICILLIN SODIUM AND SULBACTAM SODIUM 100 GRAM(S): 250; 125 INJECTION, POWDER, FOR SUSPENSION INTRAMUSCULAR; INTRAVENOUS at 19:20

## 2023-03-12 RX ADMIN — SODIUM CHLORIDE 75 MILLILITER(S): 9 INJECTION, SOLUTION INTRAVENOUS at 22:04

## 2023-03-12 RX ADMIN — HYDROMORPHONE HYDROCHLORIDE 1 MILLIGRAM(S): 2 INJECTION INTRAMUSCULAR; INTRAVENOUS; SUBCUTANEOUS at 19:54

## 2023-03-12 RX ADMIN — Medication 25 MILLIGRAM(S): at 19:23

## 2023-03-12 RX ADMIN — Medication 2 MILLIGRAM(S): at 19:20

## 2023-03-12 RX ADMIN — Medication 0.1 MILLIGRAM(S): at 22:05

## 2023-03-12 RX ADMIN — Medication 25 MILLIGRAM(S): at 03:27

## 2023-03-12 RX ADMIN — HYDROMORPHONE HYDROCHLORIDE 1 MILLIGRAM(S): 2 INJECTION INTRAMUSCULAR; INTRAVENOUS; SUBCUTANEOUS at 04:01

## 2023-03-12 RX ADMIN — Medication 0.1 MILLIGRAM(S): at 03:27

## 2023-03-12 RX ADMIN — Medication 5 MILLIGRAM(S): at 22:04

## 2023-03-12 RX ADMIN — OXYCODONE HYDROCHLORIDE 5 MILLIGRAM(S): 5 TABLET ORAL at 22:06

## 2023-03-12 RX ADMIN — GABAPENTIN 600 MILLIGRAM(S): 400 CAPSULE ORAL at 22:03

## 2023-03-12 RX ADMIN — GABAPENTIN 600 MILLIGRAM(S): 400 CAPSULE ORAL at 03:27

## 2023-03-12 RX ADMIN — HEPARIN SODIUM 2200 UNIT(S)/HR: 5000 INJECTION INTRAVENOUS; SUBCUTANEOUS at 08:24

## 2023-03-12 RX ADMIN — HEPARIN SODIUM 2200 UNIT(S)/HR: 5000 INJECTION INTRAVENOUS; SUBCUTANEOUS at 03:01

## 2023-03-12 RX ADMIN — TAMSULOSIN HYDROCHLORIDE 0.4 MILLIGRAM(S): 0.4 CAPSULE ORAL at 22:05

## 2023-03-12 NOTE — DISCHARGE NOTE PROVIDER - NSDCFUADDINST_GEN_ALL_CORE_FT
1.	Pain Control as needed  2.	Walking with full weight bearing as tolerated, with assistive devices (walker/Cane as Needed)  3.	PT as needed  4.	Follow up with Dr. Mills as Outpatient in 10-14 Days after Discharge from the Hospital or Rehab. Call Office For Appointment.  6.	Remove Staples Post-Op Day 14, and Remove Dressing Post-Op Day 10, with Daily Dressing Changes as Need.  7.	Ice affected area as Needed  8.	Keep Dressing  Clean and dry.

## 2023-03-12 NOTE — DISCHARGE NOTE PROVIDER - PROVIDER TOKENS
FREE:[LAST:[Orthospine],PHONE:[(   )    -],FAX:[(   )    -]] FREE:[LAST:[Orthospine],PHONE:[(   )    -],FAX:[(   )    -]],PROVIDER:[TOKEN:[[id number]]]

## 2023-03-12 NOTE — CONSULT NOTE ADULT - PROBLEM SELECTOR RECOMMENDATION 9
-Periop risk factors: DM - but NOT on insulin, CVA, CKD,  poor functional capacity,   -ECG w/ NSR poor R wave progression likely normal variant doubt anterior infarct  no ischemia.  -Surgery is urgent/emergent.    -May proceed w/ surgery w/o further cardiac testing - pt is "cleared"  -Intermediate risk for cardiac events periop.  -will sign off please call if questions.

## 2023-03-12 NOTE — ED ADULT NURSE REASSESSMENT NOTE - NS ED NURSE REASSESS COMMENT FT1
As per MD Pennington pt is able to eat until 1 am. Pt provided snack and water. Pt aware POC to admit and start heparin drip.

## 2023-03-12 NOTE — DISCHARGE NOTE PROVIDER - NSDCMRMEDTOKEN_GEN_ALL_CORE_FT
alfuzosin 10 mg oral tablet, extended release: 1 tab(s) orally once a day (at bedtime)  amLODIPine 10 mg oral tablet: 1 tab(s) orally once a day  baclofen 10 mg oral tablet: 0.5 tab(s) orally 2 times a day  cloNIDine 0.1 mg oral tablet: 1 tab(s) orally 2 times a day  DULoxetine 60 mg oral delayed release capsule: 1 cap(s) orally once a day  finasteride 5 mg oral tablet: 1 tab(s) orally once a day  gabapentin 300 mg oral capsule: 2 cap(s) orally every 8 hours  hydrALAZINE 25 mg oral tablet: 1 tab(s) orally 4 times a day  Melatonin 5 mg oral tablet: 1 tab(s) orally once a day (at bedtime)  warfarin 5 mg oral tablet: 1 tab(s) orally once a day   alfuzosin 10 mg oral tablet, extended release: 1 tab(s) orally once a day (at bedtime)  amLODIPine 10 mg oral tablet: 1 tab(s) orally once a day  ampicillin-sulbactam: 1.5 gram(s) intravenous every 6 hours  baclofen 10 mg oral tablet: 0.5 tab(s) orally 2 times a day  cloNIDine 0.1 mg oral tablet: 1 tab(s) orally 2 times a day  DULoxetine 60 mg oral delayed release capsule: 1 cap(s) orally once a day  finasteride 5 mg oral tablet: 1 tab(s) orally once a day  gabapentin 300 mg oral capsule: 2 cap(s) orally every 8 hours  hydrALAZINE 25 mg oral tablet: 1 tab(s) orally 4 times a day  HYDROmorphone: 1 milligram(s) intravenous every 6 hours, As Needed  insulin lispro 100 units/mL injectable solution: 2 unit(s) injectable 4 times a day (before meals and at bedtime)  use moderate insulin sliding scale  Lactated Ringers Injection intravenous solution: 75 milliliter(s) intravenous every hour  Melatonin 5 mg oral tablet: 1 tab(s) orally once a day (at bedtime)  nicotine 14 mg/24 hr transdermal film, extended release: 1 patch transdermal once a day   acetaminophen 325 mg oral tablet: 2 tab(s) orally every 6 hours, As needed, Temp greater or equal to 38C (100.4F), Mild Pain (1 - 3)  acetaminophen 325 mg oral tablet: 2 tab(s) orally every 6 hours, As needed, Mild Pain (1 - 3)  alfuzosin 10 mg oral tablet, extended release: 1 tab(s) orally once a day (at bedtime)  amLODIPine 10 mg oral tablet: 1 tab(s) orally once a day  baclofen 10 mg oral tablet: 0.5 tab(s) orally 2 times a day  cloNIDine 0.1 mg oral tablet: 1 tab(s) orally 2 times a day  DULoxetine 60 mg oral delayed release capsule: 1 cap(s) orally once a day  finasteride 5 mg oral tablet: 1 tab(s) orally once a day  gabapentin 300 mg oral capsule: 2 cap(s) orally every 8 hours  hydrALAZINE 25 mg oral tablet: 1 tab(s) orally 4 times a day  Melatonin 5 mg oral tablet: 1 tab(s) orally once a day (at bedtime)  nicotine 14 mg/24 hr transdermal film, extended release: 1 patch transdermal once a day  oxyCODONE 10 mg oral tablet: 1 tab(s) orally every 4 hours, As needed, Moderate Pain (4 - 6)  oxyCODONE 15 mg oral tablet, extended release: 1 tab(s) orally every 12 hours  oxyCODONE 5 mg oral tablet: 1 tab(s) orally every 4 hours, As needed, Mild Pain (1 - 3)

## 2023-03-12 NOTE — PROGRESS NOTE ADULT - SUBJECTIVE AND OBJECTIVE BOX
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 He has had off-and-on lower back pain with peripheral LE neuropathy since 2016 when he had Guillain-Barré and a CVA with R sided deficits in 2016. Normally, he can walk "shaky with a walker." However, over the past 3 weeks, his symptoms have gotten worse, with frequent burning pain in his lower back and spasms. His symptoms are worse when he lies flat or when he tries to transition to his walker. Therefore, he has had marked difficulty ambulating. He has baseline urinary incontinence and uses Pull-ups. He has some improved bowel continence, but cannot hold his bowels in for long. Those symptoms are not new. He denies CP, cough, N/V, abdominal pain, CP, or SOB.  He went to [facility] 2 nights ago and was "in the ER for 30 hours" not moving. His pain worsened even more after discharge.    He also notes a history of VRE and was recently (1 week ago) prescribed Augmentin, which he only took 2 days of. He says when he goes to [facility] he is "always on isolation," because of his history of VRE. He is not sure if he has dysuria currently, but he had it when he was prescribed antibiotics last week.  He also is on Warfarin for an unknown "blood clotting disorder," stemming from a blood clot in 2016 (of note, he was intubated at that time for Guillain-[place] and suffered a CVA during that time), but he has not had a blood clot since then. He states he has an IVC filter. He is non-compliant with his Warfarin, stating he takes it about 3-4 days per week.    In the ED, he was given Flexeril 10 mg PO x1 and  mg x1 with Morphine 4 mg IV x1 ordered.    3/12 afebrile , says has mild urethral burning on micturation, no flank pain, , no chills   Gen: + malaise. Negative for fevers or chills  Eyes: no blurred vision or lacrimation  ENT: no tinnitus, vertigo, or decreased hearing  Resp: no wheezing, dyspnea, cough, or pleuritic chest pain  CV: no chest pain, dyspnea on exertion, or palpitations  GI: no nausea, vomiting, abdominal pain, diarrhea, or constipation  : + incontinence, ?dysuria. No hematuria  MSK: + arthralgias. No joint swelling  Neuro: + LE weakness. No confusion or dizziness  Skin: no rash, lesions, or edemaGENERAL: No acute distress  HEENT: PERRL, EOMI, MMM, no oropharyngeal lesions  NECK: supple, no stiffness, no JVD, no thyromegaly  PULM: respirations non-labored, clear to auscultation bilaterally, no rales, rhonchi, or wheezes  CV: regular rate and rhythm, no murmurs, gallops, or rubs  GI: abdomen soft, + slight RUQ TTP, nondistended, no masses felt, normal bowel sounds  MSK: + lumbar spinal TTP. No joint swelling, erythema, or warmth.  LYMPH: no anterior cervical, posterior cervical, supraclavicular, or inguinal lymphadenopathy  NEURO: occasional spasms of b/l LEs. A&Ox3, sensation intact. Strength 2/5 b/l LEs  SKIN: 1+ b/l LE edema. No rashes or lesions      PHYSICAL EXAM:    Daily     Daily     ICU Vital Signs Last 24 Hrs  T(C): 36.6 (12 Mar 2023 04:56), Max: 37.2 (11 Mar 2023 17:11)  T(F): 97.9 (12 Mar 2023 04:56), Max: 99 (11 Mar 2023 17:11)  HR: 69 (12 Mar 2023 04:56) (69 - 120)  BP: 138/68 (12 Mar 2023 04:56) (138/68 - 151/78)  BP(mean): 103 (12 Mar 2023 00:55) (103 - 103)  ABP: --  ABP(mean): --  RR: 18 (12 Mar 2023 04:56) (18 - 19)  SpO2: 98% (12 Mar 2023 04:56) (96% - 98%)    O2 Parameters below as of 12 Mar 2023 04:00  Patient On (Oxygen Delivery Method): room air                                  10.4   9.55  )-----------( 190      ( 12 Mar 2023 08:27 )             33.9       CBC Full  -  ( 12 Mar 2023 08:27 )  WBC Count : 9.55 K/uL  RBC Count : 4.28 M/uL  Hemoglobin : 10.4 g/dL  Hematocrit : 33.9 %  Platelet Count - Automated : 190 K/uL  Mean Cell Volume : 79.2 fl  Mean Cell Hemoglobin : 24.3 pg  Mean Cell Hemoglobin Concentration : 30.7 gm/dL  Auto Neutrophil # : 5.20 K/uL  Auto Lymphocyte # : 3.10 K/uL  Auto Monocyte # : 0.80 K/uL  Auto Eosinophil # : 0.34 K/uL  Auto Basophil # : 0.07 K/uL  Auto Neutrophil % : 54.4 %  Auto Lymphocyte % : 32.5 %  Auto Monocyte % : 8.4 %  Auto Eosinophil % : 3.6 %  Auto Basophil % : 0.7 %          144  |  116<H>  |  35<H>  ----------------------------<  169<H>  3.7   |  23  |  2.07<H>    Ca    8.5      12 Mar 2023 08:27    TPro  7.6  /  Alb  3.4  /  TBili  0.2  /  DBili  x   /  AST  21  /  ALT  30  /  AlkPhos  103  03-11      LIVER FUNCTIONS - ( 11 Mar 2023 18:05 )  Alb: 3.4 g/dL / Pro: 7.6 gm/dL / ALK PHOS: 103 U/L / ALT: 30 U/L / AST: 21 U/L / GGT: x             PT/INR - ( 12 Mar 2023 08:27 )   PT: 15.8 sec;   INR: 1.36 ratio         PTT - ( 12 Mar 2023 08:27 )  PTT:143.2 sec          Urinalysis Basic - ( 12 Mar 2023 03:16 )    Color: Yellow / Appearance: Clear / S.020 / pH: x  Gluc: x / Ketone: Negative  / Bili: Negative / Urobili: Negative   Blood: x / Protein: 100 / Nitrite: Negative   Leuk Esterase: Moderate / RBC: 3-5 /HPF / WBC >50 /HPF   Sq Epi: x / Non Sq Epi: Moderate / Bacteria: Moderate            MEDICATIONS  (STANDING):  amLODIPine   Tablet 10 milliGRAM(s) Oral daily  baclofen 5 milliGRAM(s) Oral every 12 hours  baclofen 5 milliGRAM(s) Oral once  cloNIDine 0.1 milliGRAM(s) Oral two times a day  dextrose 50% Injectable 25 Gram(s) IV Push once  dextrose 50% Injectable 12.5 Gram(s) IV Push once  dextrose 50% Injectable 25 Gram(s) IV Push once  DULoxetine 60 milliGRAM(s) Oral daily  finasteride 5 milliGRAM(s) Oral daily  gabapentin 600 milliGRAM(s) Oral every 8 hours  glucagon  Injectable 1 milliGRAM(s) IntraMuscular once  hydrALAZINE 25 milliGRAM(s) Oral four times a day  insulin lispro (ADMELOG) corrective regimen sliding scale   SubCutaneous every 6 hours  lactated ringers. 1000 milliLiter(s) (75 mL/Hr) IV Continuous <Continuous>  nicotine  Polacrilex Gum 2 milliGRAM(s) Oral every 2 hours  nicotine -  14 mG/24Hr(s) Patch 1 Patch Transdermal daily  tamsulosin 0.4 milliGRAM(s) Oral at bedtime

## 2023-03-12 NOTE — PROGRESS NOTE ADULT - SUBJECTIVE AND OBJECTIVE BOX
Patient seen and examined at bedside. No new acute complaints at this time. Pain well controlled. Denies chest pain, shortness of breath, nausea or vomiting.     Physical Exam:  Gen: NAD  Spine:  No gross deformity  diffuse midline TTP C/T/L/S spine  No bony step offs  No paraspinal muscle ttp/hypertonicity   Negative Straight leg raise  positive clonus on the left  Negative babinski  Negative craig      Motor:                   C5                C6              C7               C8           T1   R            5/5                4/5            4/5             5/5          5/5  L             5/5               4/5             4/5             4/5          5/5                L2             L3             L4               L5            S1  R         2/5           4/5          1/5             1/5           4/5  L          2/5          5/5           4/5             4/5           4/5    Sensory:            C5         C6         C7      C8       T1        (0=absent, 1=impaired, 2=normal, NT=not testable)  R         2            2           2        2         2  L          2            2           2        2         2               L2          L3         L4      L5       S1         (0=absent, 1=impaired, 2=normal, NT=not testable)  R         2            2            1        1        1  L          2            2           2        2         2        A/P: 48y Male with new onset inability to ambulate likely 2/2 progression of lumbar spine cord compression    Plan for possible surgical intervention pending full spine MRI  FU MRI C/T/L spine without contrast  plan pending imaging results  admit to medicine for optimization, please document  FU neuro c/s  WBAT with assistive devices as needed  pain control prn  FU Labs/imaging  DVT ppx per primary team, hold chemical AC for possible OR, heparin ok  discussed with Dr. Mills who agrees with plan

## 2023-03-12 NOTE — DISCHARGE NOTE PROVIDER - NSDCCPCAREPLAN_GEN_ALL_CORE_FT
PRINCIPAL DISCHARGE DIAGNOSIS  Diagnosis: Back pain  Assessment and Plan of Treatment: plan to transfer to [facility] for MRI and back       PRINCIPAL DISCHARGE DIAGNOSIS  Diagnosis: Back pain  Assessment and Plan of Treatment: s/p L4-5 lami and PSF. Please followup with orthospine soon after discharge      SECONDARY DISCHARGE DIAGNOSES  Diagnosis: Acute UTI  Assessment and Plan of Treatment: You were treated for UTI    Diagnosis: Antiphospholipid syndrome  Assessment and Plan of Treatment: Please resume your Coumadin on 03/25/23 and have your INR check on a regular basis. Medication compliance was discussed with you.

## 2023-03-12 NOTE — DISCHARGE NOTE PROVIDER - CARE PROVIDER_API CALL
Orthospine,   Phone: (   )    -  Fax: (   )    -  Follow Up Time:    [facility],   Phone: (   )    -  Fax: (   )    -  Follow Up Time:     Courtney Mills (DO)  Orthopaedic Surgery Surgery  [address] [facility], [place] [address]  Phone: [phone]  Fax: [phone]  Follow Up Time:

## 2023-03-12 NOTE — CONSULT NOTE ADULT - SUBJECTIVE AND OBJECTIVE BOX
Patient is a 48y old  Male who presents with a chief complaint of inability to walk, intractable back pain (12 Mar 2023 05:24)      CC: back pain    HPI:  47 yo M with a PMH of Guillain-Barré Syndrome with peripheral neuropathy and urinary incontinence, DM2 (not on insulin), HTN, BPH, DDD, DVT with unknown prothrombotic state (on Warfarin) s/p IVC filter (per patient), and CVA (2016) who p/w worsening back and leg pain and spasms and inability to ambulate. He has had off-and-on lower back pain with peripheral LE neuropathy since 2016 when he had Guillain-Barré and a CVA with R sided deficits in 2016. Normally, he can walk "shaky with a walker." However, over the past 3 weeks, his symptoms have gotten worse, with frequent burning pain in his lower back and spasms. His symptoms are worse when he lies flat or when he tries to transition to his walker. Therefore, he has had marked difficulty ambulating. He has baseline urinary incontinence and uses Pull-ups. He has some improved bowel continence, but cannot hold his bowels in for long. Those symptoms are not new. He denies CP, cough, N/V, abdominal pain, CP, or SOB.  He went to [facility] 2 nights ago and was "in the ER for 30 hours" not moving. His pain worsened even more after discharge.    He also notes a history of VRE and was recently (1 week ago) prescribed Augmentin, which he only took 2 days of. He says when he goes to [facility] he is "always on isolation," because of his history of VRE. He is not sure if he has dysuria currently, but he had it when he was prescribed antibiotics last week.  He also is on Warfarin for an unknown "blood clotting disorder," stemming from a blood clot in 2016 (of note, he was intubated at that time for Guillain-[place] and suffered a CVA during that time), but he has not had a blood clot since then. He states he has an IVC filter. He is non-compliant with his Warfarin, stating he takes it about 3-4 days per week.    In the ED, he was given Flexeril 10 mg PO x1 and  mg x1 with Morphine 4 mg IV x1 ordered. (11 Mar 2023 23:39)     This morning, patient appears sedated, likely from pain medication.  Complains of low back pain radiating down legs posteriorly    PAST MEDICAL & SURGICAL HISTORY:  BPH (benign prostatic hyperplasia)  HTN (hypertension)  Type 2 diabetes mellitus  Peripheral neuropathy  History of Guillain-[place] syndrome  History of CVA in adulthood  DDD (degenerative disc disease), lumbar  DVT, lower extremity  S/P IVC filter      FAMILY HISTORY:  FH: heart disease  FH: HTN (hypertension)    Social Hx:  Nonsmoker, no drug or alcohol use    MEDICATIONS  (STANDING):  amLODIPine   Tablet 10 milliGRAM(s) Oral daily  baclofen 5 milliGRAM(s) Oral every 12 hours  baclofen 5 milliGRAM(s) Oral once  cloNIDine 0.1 milliGRAM(s) Oral two times a day  dextrose 50% Injectable 25 Gram(s) IV Push once  dextrose 50% Injectable 12.5 Gram(s) IV Push once  dextrose 50% Injectable 25 Gram(s) IV Push once  DULoxetine 60 milliGRAM(s) Oral daily  finasteride 5 milliGRAM(s) Oral daily  gabapentin 600 milliGRAM(s) Oral every 8 hours  glucagon  Injectable 1 milliGRAM(s) IntraMuscular once  heparin  Infusion.  Unit(s)/Hr (22 mL/Hr) IV Continuous <Continuous>  hydrALAZINE 25 milliGRAM(s) Oral four times a day  insulin lispro (ADMELOG) corrective regimen sliding scale   SubCutaneous every 6 hours  lactated ringers. 1000 milliLiter(s) (75 mL/Hr) IV Continuous <Continuous>  nicotine  Polacrilex Gum 2 milliGRAM(s) Oral every 2 hours  nicotine -  14 mG/24Hr(s) Patch 1 Patch Transdermal daily  tamsulosin 0.4 milliGRAM(s) Oral at bedtime       Allergies  sulfa drugs (Hives)      ROS: Pertinent positives in HPI, all other ROS were reviewed and are negative.      Vital Signs Last 24 Hrs  T(C): 36.6 (12 Mar 2023 04:56), Max: 37.2 (11 Mar 2023 17:11)  T(F): 97.9 (12 Mar 2023 04:56), Max: 99 (11 Mar 2023 17:11)  HR: 69 (12 Mar 2023 04:56) (69 - 120)  BP: 138/68 (12 Mar 2023 04:56) (138/68 - 151/78)  BP(mean): 103 (12 Mar 2023 00:55) (103 - 103)  RR: 18 (12 Mar 2023 04:56) (18 - 19)  SpO2: 98% (12 Mar 2023 04:56) (96% - 98%)    Parameters below as of 12 Mar 2023 04:00  Patient On (Oxygen Delivery Method): room air      Neurological exam:  HF: Sleepy but arousable, speech fluent, comprehension intact, oriented.  CN: STEVE, EOMI, VFF, facial sensation normal, no NLFD, tongue midline, Palate moves equally, SCM equal bilaterally  Motor: No pronator drift, Strength 5/5 in UEs b/l, raises both legs off the bed but complains of pain and ROM is limited  Sens: Impaired vibration sense in right toes  Reflexes: 1+ at BR/Biceps b/l, 2+ at patella b/l, 1+ at ankle b/l, negative Babinski  Coord:  No FNFA, dysmetria, KASSY intact       Labs:   03-12    144  |  116<H>  |  35<H>  ----------------------------<  169<H>  3.7   |  23  |  2.07<H>    Ca    8.5      12 Mar 2023 08:27    TPro  7.6  /  Alb  3.4  /  TBili  0.2  /  DBili  x   /  AST  21  /  ALT  30  /  AlkPhos  103  03-11                              10.4   9.55  )-----------( 190      ( 12 Mar 2023 08:27 )             33.9       A: 47 yo man with difficulty ambulating likely due to severe low back pain and bilateral radiculopathy related to lumbar spondylosis.  Reflexes are present making GBS unlikely.    Recommend:  1. Orthopedic spine follow up  2. MRI spine pending  3. pain mgmt    Antoine Zee MD  Neurology Attending Physician

## 2023-03-12 NOTE — DISCHARGE NOTE PROVIDER - HOSPITAL COURSE
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 He has had off-and-on lower back pain with peripheral LE neuropathy since 2016 when he had Guillain-Barré and a CVA with R sided deficits in 2016. Normally, he can walk "shaky with a walker." However, over the past 3 weeks, his symptoms have gotten worse, with frequent burning pain in his lower back and spasms. His symptoms are worse when he lies flat or when he tries to transition to his walker. Therefore, he has had marked difficulty ambulating. He has baseline urinary incontinence and uses Pull-ups. He has some improved bowel continence, but cannot hold his bowels in for long. Those symptoms are not new. He denies CP, cough, N/V, abdominal pain, CP, or SOB.  He went to [facility] 2 nights ago and was "in the ER for 30 hours" not moving. His pain worsened even more after discharge.    He also notes a history of VRE and was recently (1 week ago) prescribed Augmentin, which he only took 2 days of. He says when he goes to [facility] he is "always on isolation," because of his history of VRE. He is not sure if he has dysuria currently, but he had it when he was prescribed antibiotics last week.  He also is on Warfarin for an unknown "blood clotting disorder," stemming from a blood clot in 2016 (of note, he was intubated at that time for Guillain-[place] and suffered a CVA during that time), but he has not had a blood clot since then. He states he has an IVC filter. He is non-compliant with his Warfarin, stating he takes it about 3-4 days per week.    In the ED, he was given Flexeril 10 mg PO x1 and  mg x1 with Morphine 4 mg IV x1 ordered.    3/12 dysuria - hx VRE UTI- start empiric unasyn preop , f/u ucx   stop heparin drip for possible neurosurgery for cord compression - resume AC as soon as possible after OR  no acute medical contraindication for proceeding with OR   - Patient has a moderate risk of adverse cardiac event during surgery, with a 10.1% 30-day risk of death, MI, or cardiac arrest  dispo- plan to transfer to [facility] for MRI spine and then transfer back to [facility] [place] course  47 yo M with a PMH of Guillain-Barré Syndrome with peripheral neuropathy and urinary incontinence, DM2 (not on insulin), HTN, BPH, DDD, DVT with unknown prothrombotic state (on Warfarin) s/p IVC filter (per patient), and CVA (2016) who p/w worsening back and leg pain and spasms and inability to ambulate, likely due to worsening degenerative disc disease.    #Intractable Low Back Pain / Inability to Walk / Degenerative Disc Disease (Lumbar) r/o cord compression/Unlikely GBS , has DTR's  - Patient with acute on chronic back spasms and worsening lumbar back pain, likely radicular  - CT lumbar spine shows DD with severe canal stenosis and moderate bilateral neural foraminal narrowing at L4-5, along with other milder disc bulges throughout the lumbar spine  - No indication for steroids at this time  - C/w baclofen 5 mg BID for muscle spasms  - Appreciate ortho spine consult, currently NPO, f/u current plans for surgery  - Pain control PRN with Tylenol/Oxycodone/Dilaudid with baclofen 5 mg BID. Patient has not had opiates prescribed since October 2022 ([facility]STOP #[id number])  - F/u MRI C/T/L spines without contrast  - WBAT, PT eval  neuro and orthospine consults appreciated     #Preoperative Clearance  - Patient's RCRI is 2-3 (possible ischemic disease with poor R wave progression on EKG)  - Patient has a moderate risk of adverse cardiac event during surgery, with a 10.1% 30-day risk of death, MI, or cardiac arrest  -no acute medical contraindications for proceeding with emergent surgery    #Peripheral Neuropathy / History of Guillain-[place]  - C/w duloxetine 60 mg QD and gabapentin 600 mg Q8H    #Type 2 Diabetes Mellitus  - Per patient (and confirmed by Dr First Jamison), patient was on alogliptin 12.5 mg QD  - However, on patient's physical med prescriptions he brought with him, it is not listed. Suspect that it was either (purposely or mistakenly) dropped after recent hospital admission  - Will need to determine from PCP whether patient needs to continue outpatient or not  - While inpatient, check FS with low dose SSI Q6H while NPO (or QAC + HS)  - Check A1C    #History of DVT  - Per patient, he had LE DVTs in 2016 (in the setting of prolonged intubation from Guillain-[place]) and reportedly has a history of "a blood clot disorder"  - Per patient, he also has an IVC filter  - Has been on warfarin, but is non-compliant and subtherapeutic  - Heparin gtt for now (no warfarin), no loading dose, in case of surgery  - Will need to determine if will continue A/C as an outpatient  stop heparin drip for pre op    #BPH, dysuria suspect LUTI , hx VRE   - With urinary incontinence in the setting of DDD  - C/w finasteride 5 mg QD and tamsulosin equivalent of alfuzosin 10 mg QD  - Of note, patient says he was recently on abx for UTI and was diagnosed with VRE, unsure if he currently has dysuria   UA positive for UTI  and urine cx, f/u PVR  start unasyn pending ucx     #HTN  - C/w hydralazine 25 mg QID (appears to have been on 100 mg QD until recent admission to [facility] one month ago)  - C/w amlodipine 10 mg QD  - C/w clonidine 0.1 mg BID    #ANTONIO Superimposed on CKD  - ANTONIO on CKD3, creatinine 2.36 on admission, was 2.0 several days prior and baseline 1.5 in December  - Likely prerenal  - S/p IVF bolus and continue maintenance for now  cr slowly improving     #Encounter for Smoking Cessation Counseling  - Patient continues to smoke heavily. Patient is not interested in quitting  - However, he is agreeable to nicotine patch and gum    #Prophylactic Measure  - DVT PPX: heparin gtt  - Diet: consistent carbs/DASH  - Dispo: pending improvement in sxs, surgical plan    dispo- plan to transfer to [facility] for MRI spine and then transfer back to [facility]    transfer time 47mins 49 yo M with a PMH of Guillain-Barré Syndrome with peripheral neuropathy and urinary incontinence, DM2 (not on insulin), HTN, BPH, DDD, DVT with unknown prothrombotic state (on Warfarin) s/p IVC filter (per patient), and CVA (2016) who p/w worsening back and leg pain and spasms and inability to ambulate. He has had off-and-on lower back pain with peripheral LE neuropathy since 2016 when he had Guillain-Barré and a CVA with R sided deficits in 2016. Normally, he can walk "shaky with a walker." However, over the past 3 weeks, his symptoms have gotten worse, with frequent burning pain in his lower back and spasms. His symptoms are worse when he lies flat or when he tries to transition to his walker. Therefore, he has had marked difficulty ambulating. He has baseline urinary incontinence and uses Pull-ups. He has some improved bowel continence, but cannot hold his bowels in for long. Those symptoms are not new. He denies CP, cough, N/V, abdominal pain, CP, or SOB.  He went to [facility] 2 nights ago and was "in the ER for 30 hours" not moving. His pain worsened even more after discharge.  He also notes a history of VRE and was recently (1 week ago) prescribed Augmentin, which he only took 2 days of. He says when he goes to [facility] he is "always on isolation," because of his history of VRE. He is not sure if he has dysuria currently, but he had it when he was prescribed antibiotics last week.  He also is on Warfarin for an unknown "blood clotting disorder," stemming from a blood clot in 2016 (of note, he was intubated at that time for Guillain-[place] and suffered a CVA during that time), but he has not had a blood clot since then. He states he has an IVC filter. He is non-compliant with his Warfarin, stating he takes it about 3-4 days per week.  In the ED, he was given Flexeril 10 mg PO x1 and  mg x1 with Morphine 4 mg IV x1 ordered.    hospital course    49 yo M with a PMH of Guillain-Barré Syndrome with peripheral neuropathy and urinary incontinence, DM2 (not on insulin), HTN, BPH, DDD, DVT with unknown prothrombotic state (on Warfarin) s/p IVC filter (per patient), and CVA (2016) who p/w worsening back and leg pain and spasms and inability to ambulate, likely due to worsening degenerative disc disease.    #Intractable Low Back Pain / Inability to Walk / Degenerative Disc Disease (Lumbar), progression of lumbar spine cord compression  -transferred to outside facility for neuraxial imaging due to body habitus/MRI bore diameter - however only MRI LSpine was performed  -MRI LSpine reviewed with L4/5 severe central stenosis, L1/2 central HNP without nerve root compression  -s/p Fusion of posterior lumbar spine 4/5 and decompression with bilateral mayank laminectomy on 3/15  -pain management/muscle relaxers/bowel regimen   -No indication for steroids at this time  -Appreciate ortho spine consult-Patient has not had opiates prescribed since October 2022 ([facility]STOP #[id number])    #Hx of APLS, Hx of DVT s/p IVC filter placement  -high risk for VTE    -plan to resume AC, will need to be on Coumadin, resume on POD#10  -Has been on warfarin, but is non-compliant and subtherapeutic  -heme/onc consult and recommendations noted     #ANTONIO Superimposed on CKD  -gentle hydration   -baseline Cr~1.5  -stable     #Dysuria.  UTI, hx VRE  s/p IV Unasyn  s/p Augmentin, finished course on 03/16    #Peripheral Neuropathy / History of Guillain-[place]  -C/w duloxetine 60 mg QD and gabapentin 600 mg Q8H    #Type 2 Diabetes Mellitus  -A1c: 6.8  -RISS   -target BGM <180    #BPH  -With urinary incontinence in the setting of DDD  -C/w finasteride 5 mg QD   -Flomax 0.4mg   -ayala d/c'ed: 03/18    #HTN  -C/w hydralazine 25 mg QID (appears to have been on 100 mg QD until recent admission to [facility] one month ago)  -C/w amlodipine 10 mg QD  -C/w clonidine 0.1 mg BID  #Encounter for Smoking Cessation Counseling  -Patient continues to smoke heavily. Patient is not interested in quitting  -However, he is agreeable to nicotine patch and gum    #Prophylactic Measure  -SubQ Heparin   -Resume Coumadin on POD#10 (03/25) for ALPS  -AC consult and recommendations noted

## 2023-03-12 NOTE — CONSULT NOTE ADULT - SUBJECTIVE AND OBJECTIVE BOX
REQUESTING PHYSICIAN:    REASON FOR CONSULT:    CHIEF COMPLAINT:    HPI:  49 yo M with a PMH of Guillain-Barré Syndrome with peripheral neuropathy and urinary incontinence, DM2 (not on insulin), HTN, BPH, DDD, DVT with unknown prothrombotic state (on Warfarin) s/p IVC filter (per patient), and CVA (2016) who p/w worsening back and leg pain and spasms and inability to ambulate. He has had off-and-on lower back pain with peripheral LE neuropathy since 2016 when he had Guillain-Barré and a CVA with R sided deficits in 2016. Normally, he can walk "shaky with a walker." However, over the past 3 weeks, his symptoms have gotten worse, with frequent burning pain in his lower back and spasms. His symptoms are worse when he lies flat or when he tries to transition to his walker. Therefore, he has had marked difficulty ambulating. He has baseline urinary incontinence and uses Pull-ups. He has some improved bowel continence, but cannot hold his bowels in for long. Those symptoms are not new. He denies CP, cough, N/V, abdominal pain, CP, or SOB.  He went to [facility] 2 nights ago and was "in the ER for 30 hours" not moving. His pain worsened even more after discharge.    He also notes a history of VRE and was recently (1 week ago) prescribed Augmentin, which he only took 2 days of. He says when he goes to [facility] he is "always on isolation," because of his history of VRE. He is not sure if he has dysuria currently, but he had it when he was prescribed antibiotics last week.  He also is on Warfarin for an unknown "blood clotting disorder," stemming from a blood clot in 2016 (of note, he was intubated at that time for Guillain-[place] and suffered a CVA during that time), but he has not had a blood clot since then. He states he has an IVC filter. He is non-compliant with his Warfarin, stating he takes it about 3-4 days per week.    In the ED, he was given Flexeril 10 mg PO x1 and  mg x1 with Morphine 4 mg IV x1 ordered. (11 Mar 2023 23:39)    Consulted for preop clearance.  Ortho plans laminectomy.  no chest pain, dyspnea.  Functional capacity limited b/c wheelchair bound due to GB.      PAST MEDICAL AND SURGICAL HISTORY:  PAST MEDICAL & SURGICAL HISTORY:  BPH (benign prostatic hyperplasia)      HTN (hypertension)      Type 2 diabetes mellitus      Peripheral neuropathy      History of Guillain-[place] syndrome      History of CVA in adulthood      DDD (degenerative disc disease), lumbar      DVT, lower extremity      S/P IVC filter          ALLERGIES:  Allergies    sulfa drugs (Hives)    Intolerances        SOCIAL HISTORY:  Social History:  Used to work in [employer].  He is homeless, and was in between hospitals for several months (his lease  at the beginning of January). He has been at a homeless shelter for about 3-4 weeks. (11 Mar 2023 23:39)      FAMILY  HISTORY:  FAMILY HISTORY:  FH: heart disease    FH: HTN (hypertension)        MEDICATIONS:  OUTPATIENT:  Home Medications:  alfuzosin 10 mg oral tablet, extended release: 1 tab(s) orally once a day (at bedtime) (12 Mar 2023 08:12)  amLODIPine 10 mg oral tablet: 1 tab(s) orally once a day (12 Mar 2023 08:12)  baclofen 10 mg oral tablet: 0.5 tab(s) orally 2 times a day (12 Mar 2023 08:12)  cloNIDine 0.1 mg oral tablet: 1 tab(s) orally 2 times a day (12 Mar 2023 08:12)  DULoxetine 60 mg oral delayed release capsule: 1 cap(s) orally once a day (12 Mar 2023 08:12)  finasteride 5 mg oral tablet: 1 tab(s) orally once a day (12 Mar 2023 08:12)  gabapentin 300 mg oral capsule: 2 cap(s) orally every 8 hours (12 Mar 2023 08:12)  hydrALAZINE 25 mg oral tablet: 1 tab(s) orally 4 times a day (12 Mar 2023 08:12)  Melatonin 5 mg oral tablet: 1 tab(s) orally once a day (at bedtime) (12 Mar 2023 08:12)  warfarin 5 mg oral tablet: 1 tab(s) orally once a day (12 Mar 2023 08:12)      INPATIENT:  MEDICATIONS  (STANDING):  amLODIPine   Tablet 10 milliGRAM(s) Oral daily  ampicillin/sulbactam  IVPB 1.5 Gram(s) IV Intermittent every 6 hours  baclofen 5 milliGRAM(s) Oral every 12 hours  baclofen 5 milliGRAM(s) Oral once  cloNIDine 0.1 milliGRAM(s) Oral two times a day  dextrose 50% Injectable 25 Gram(s) IV Push once  dextrose 50% Injectable 12.5 Gram(s) IV Push once  dextrose 50% Injectable 25 Gram(s) IV Push once  DULoxetine 60 milliGRAM(s) Oral daily  finasteride 5 milliGRAM(s) Oral daily  gabapentin 600 milliGRAM(s) Oral every 8 hours  glucagon  Injectable 1 milliGRAM(s) IntraMuscular once  hydrALAZINE 25 milliGRAM(s) Oral four times a day  insulin lispro (ADMELOG) corrective regimen sliding scale   SubCutaneous every 6 hours  lactated ringers. 1000 milliLiter(s) (75 mL/Hr) IV Continuous <Continuous>  nicotine  Polacrilex Gum 2 milliGRAM(s) Oral every 2 hours  nicotine -  14 mG/24Hr(s) Patch 1 Patch Transdermal daily  tamsulosin 0.4 milliGRAM(s) Oral at bedtime    MEDICATIONS  (PRN):  acetaminophen     Tablet .. 650 milliGRAM(s) Oral every 6 hours PRN Temp greater or equal to 38C (100.4F), Mild Pain (1 - 3)  dextrose Oral Gel 15 Gram(s) Oral once PRN Blood Glucose LESS THAN 70 milliGRAM(s)/deciliter  HYDROmorphone  Injectable 1 milliGRAM(s) IV Push every 6 hours PRN Severe Pain (7 - 10)  melatonin 3 milliGRAM(s) Oral at bedtime PRN Insomnia  ondansetron Injectable 4 milliGRAM(s) IV Push every 8 hours PRN Nausea and/or Vomiting  oxyCODONE    IR 5 milliGRAM(s) Oral every 4 hours PRN Moderate Pain (4 - 6)    MEDICATIONS  (PRN):  acetaminophen     Tablet .. 650 milliGRAM(s) Oral every 6 hours PRN Temp greater or equal to 38C (100.4F), Mild Pain (1 - 3)  dextrose Oral Gel 15 Gram(s) Oral once PRN Blood Glucose LESS THAN 70 milliGRAM(s)/deciliter  HYDROmorphone  Injectable 1 milliGRAM(s) IV Push every 6 hours PRN Severe Pain (7 - 10)  melatonin 3 milliGRAM(s) Oral at bedtime PRN Insomnia  ondansetron Injectable 4 milliGRAM(s) IV Push every 8 hours PRN Nausea and/or Vomiting  oxyCODONE    IR 5 milliGRAM(s) Oral every 4 hours PRN Moderate Pain (4 - 6)      REVIEW OF SYSTEMS:  ===============================  ===============================  CONSTITUTIONAL: No weakness, fevers or chills  EYES/ENT: No visual changes;  No vertigo or throat pain   NECK: No pain or stiffness  RESPIRATORY: No cough, wheezing, hemoptysis; No shortness of breath  CARDIOVASCULAR: No chest pain or palpitations  GASTROINTESTINAL: No abdominal or epigastric pain. No nausea, vomiting, or hematemesis;   No diarrhea or constipation. No melena or hematochezia.  GENITOURINARY: No dysuria, frequency or hematuria  NEUROLOGICAL: No numbness or weakness  SKIN: No itching, burning, rashes, or lesions   All other review of systems is negative unless indicated above    Vital Signs Last 24 Hrs  T(C): 36.6 (12 Mar 2023 04:56), Max: 37.2 (11 Mar 2023 17:11)  T(F): 97.9 (12 Mar 2023 04:56), Max: 99 (11 Mar 2023 17:11)  HR: 69 (12 Mar 2023 04:56) (69 - 120)  BP: 138/68 (12 Mar 2023 04:56) (138/68 - 151/78)  BP(mean): 103 (12 Mar 2023 00:55) (103 - 103)  RR: 18 (12 Mar 2023 04:56) (18 - 19)  SpO2: 98% (12 Mar 2023 04:56) (96% - 98%)    Parameters below as of 12 Mar 2023 04:00  Patient On (Oxygen Delivery Method): room air        I&O's Summary      I&O's Detail      PHYSICAL EXAM:    Constitutional: NAD, awake and alert, well-developed  HEENT: PERR, EOMI,  No oral cyananosis.  Neck:  supple,  No JVD  Respiratory: Breath sounds are clear bilaterally, No wheezing, rales or rhonchi  Cardiovascular: S1 and S2, regular rate and rhythm, no Murmurs, gallops or rubs  Gastrointestinal: Bowel Sounds present, soft, nontender.   Extremities: No peripheral edema. No clubbing or cyanosis.  Vascular: 2+ peripheral pulses      ===============================  ===============================  LABS:                         10.4   9.55  )-----------( 190      ( 12 Mar 2023 08:27 )             33.9                         12.8   12.07 )-----------( 264      ( 11 Mar 2023 18:05 )             39.6     12 Mar 2023 08:27    144    |  116    |  35     ----------------------------<  169    3.7     |  23     |  2.07   11 Mar 2023 18:05    148    |  119    |  37     ----------------------------<  152    4.5     |  23     |  2.36     Ca    8.5        12 Mar 2023 08:27  Ca    9.1        11 Mar 2023 18:05    TPro  7.6    /  Alb  3.4    /  TBili  0.2    /  DBili  x      /  AST  21     /  ALT  30     /  AlkPhos  103    11 Mar 2023 18:05    PT/INR - ( 12 Mar 2023 08:27 )   PT: 15.8 sec;   INR: 1.36 ratio         PTT - ( 12 Mar 2023 08:27 )  PTT:143.2 sec    THYROID STUDIES:     ===============================  ===============================  EKG:  NSR borderline poor R wave progression w/ Rs in V3  & transition to pos by V4, no ischemic changes.        ===============================    Wily Smith M.D.  Cardiology, [facility]  Cell:   Office:   [phone] ([facility] Office)  [phone] ([facility] Office)    ===============================

## 2023-03-13 LAB
ANION GAP SERPL CALC-SCNC: 8 MMOL/L — SIGNIFICANT CHANGE UP (ref 5–17)
APTT BLD: 28.5 SEC — SIGNIFICANT CHANGE UP (ref 27.5–35.5)
BASOPHILS # BLD AUTO: 0.05 K/UL — SIGNIFICANT CHANGE UP (ref 0–0.2)
BASOPHILS NFR BLD AUTO: 0.6 % — SIGNIFICANT CHANGE UP (ref 0–2)
BUN SERPL-MCNC: 28 MG/DL — HIGH (ref 7–23)
CALCIUM SERPL-MCNC: 8.2 MG/DL — LOW (ref 8.5–10.1)
CHLORIDE SERPL-SCNC: 109 MMOL/L — HIGH (ref 96–108)
CO2 SERPL-SCNC: 22 MMOL/L — SIGNIFICANT CHANGE UP (ref 22–31)
CREAT SERPL-MCNC: 1.86 MG/DL — HIGH (ref 0.5–1.3)
CULTURE RESULTS: SIGNIFICANT CHANGE UP
EGFR: 44 ML/MIN/1.73M2 — LOW
EOSINOPHIL # BLD AUTO: 0.3 K/UL — SIGNIFICANT CHANGE UP (ref 0–0.5)
EOSINOPHIL NFR BLD AUTO: 3.6 % — SIGNIFICANT CHANGE UP (ref 0–6)
GLUCOSE BLDC GLUCOMTR-MCNC: 134 MG/DL — HIGH (ref 70–99)
GLUCOSE BLDC GLUCOMTR-MCNC: 140 MG/DL — HIGH (ref 70–99)
GLUCOSE BLDC GLUCOMTR-MCNC: 153 MG/DL — HIGH (ref 70–99)
GLUCOSE BLDC GLUCOMTR-MCNC: 159 MG/DL — HIGH (ref 70–99)
GLUCOSE BLDC GLUCOMTR-MCNC: 249 MG/DL — HIGH (ref 70–99)
GLUCOSE SERPL-MCNC: 163 MG/DL — HIGH (ref 70–99)
HCT VFR BLD CALC: 33 % — LOW (ref 39–50)
HGB BLD-MCNC: 10.5 G/DL — LOW (ref 13–17)
IMM GRANULOCYTES NFR BLD AUTO: 0.4 % — SIGNIFICANT CHANGE UP (ref 0–0.9)
LYMPHOCYTES # BLD AUTO: 2.13 K/UL — SIGNIFICANT CHANGE UP (ref 1–3.3)
LYMPHOCYTES # BLD AUTO: 25.8 % — SIGNIFICANT CHANGE UP (ref 13–44)
MCHC RBC-ENTMCNC: 24.9 PG — LOW (ref 27–34)
MCHC RBC-ENTMCNC: 31.8 GM/DL — LOW (ref 32–36)
MCV RBC AUTO: 78.4 FL — LOW (ref 80–100)
MONOCYTES # BLD AUTO: 0.58 K/UL — SIGNIFICANT CHANGE UP (ref 0–0.9)
MONOCYTES NFR BLD AUTO: 7 % — SIGNIFICANT CHANGE UP (ref 2–14)
NEUTROPHILS # BLD AUTO: 5.15 K/UL — SIGNIFICANT CHANGE UP (ref 1.8–7.4)
NEUTROPHILS NFR BLD AUTO: 62.6 % — SIGNIFICANT CHANGE UP (ref 43–77)
PLATELET # BLD AUTO: 176 K/UL — SIGNIFICANT CHANGE UP (ref 150–400)
POTASSIUM SERPL-MCNC: 3.6 MMOL/L — SIGNIFICANT CHANGE UP (ref 3.5–5.3)
POTASSIUM SERPL-SCNC: 3.6 MMOL/L — SIGNIFICANT CHANGE UP (ref 3.5–5.3)
RBC # BLD: 4.21 M/UL — SIGNIFICANT CHANGE UP (ref 4.2–5.8)
RBC # FLD: 16.4 % — HIGH (ref 10.3–14.5)
SODIUM SERPL-SCNC: 139 MMOL/L — SIGNIFICANT CHANGE UP (ref 135–145)
SPECIMEN SOURCE: SIGNIFICANT CHANGE UP
WBC # BLD: 8.24 K/UL — SIGNIFICANT CHANGE UP (ref 3.8–10.5)
WBC # FLD AUTO: 8.24 K/UL — SIGNIFICANT CHANGE UP (ref 3.8–10.5)

## 2023-03-13 PROCEDURE — 99233 SBSQ HOSP IP/OBS HIGH 50: CPT

## 2023-03-13 RX ORDER — HEPARIN SODIUM 5000 [USP'U]/ML
10000 INJECTION INTRAVENOUS; SUBCUTANEOUS EVERY 6 HOURS
Refills: 0 | Status: DISCONTINUED | OUTPATIENT
Start: 2023-03-13 | End: 2023-03-14

## 2023-03-13 RX ORDER — HEPARIN SODIUM 5000 [USP'U]/ML
5000 INJECTION INTRAVENOUS; SUBCUTANEOUS EVERY 6 HOURS
Refills: 0 | Status: DISCONTINUED | OUTPATIENT
Start: 2023-03-13 | End: 2023-03-14

## 2023-03-13 RX ORDER — HEPARIN SODIUM 5000 [USP'U]/ML
INJECTION INTRAVENOUS; SUBCUTANEOUS
Qty: 25000 | Refills: 0 | Status: DISCONTINUED | OUTPATIENT
Start: 2023-03-13 | End: 2023-03-14

## 2023-03-13 RX ADMIN — GABAPENTIN 600 MILLIGRAM(S): 400 CAPSULE ORAL at 22:57

## 2023-03-13 RX ADMIN — HEPARIN SODIUM 2200 UNIT(S)/HR: 5000 INJECTION INTRAVENOUS; SUBCUTANEOUS at 19:12

## 2023-03-13 RX ADMIN — Medication 5 MILLIGRAM(S): at 09:31

## 2023-03-13 RX ADMIN — Medication 1: at 07:05

## 2023-03-13 RX ADMIN — Medication 2 MILLIGRAM(S): at 18:54

## 2023-03-13 RX ADMIN — Medication 25 MILLIGRAM(S): at 14:03

## 2023-03-13 RX ADMIN — HYDROMORPHONE HYDROCHLORIDE 1 MILLIGRAM(S): 2 INJECTION INTRAMUSCULAR; INTRAVENOUS; SUBCUTANEOUS at 02:35

## 2023-03-13 RX ADMIN — OXYCODONE HYDROCHLORIDE 5 MILLIGRAM(S): 5 TABLET ORAL at 10:55

## 2023-03-13 RX ADMIN — OXYCODONE HYDROCHLORIDE 5 MILLIGRAM(S): 5 TABLET ORAL at 06:02

## 2023-03-13 RX ADMIN — Medication 0.1 MILLIGRAM(S): at 09:32

## 2023-03-13 RX ADMIN — Medication 2 MILLIGRAM(S): at 02:41

## 2023-03-13 RX ADMIN — FINASTERIDE 5 MILLIGRAM(S): 5 TABLET, FILM COATED ORAL at 09:32

## 2023-03-13 RX ADMIN — Medication 650 MILLIGRAM(S): at 01:10

## 2023-03-13 RX ADMIN — GABAPENTIN 600 MILLIGRAM(S): 400 CAPSULE ORAL at 14:04

## 2023-03-13 RX ADMIN — HYDROMORPHONE HYDROCHLORIDE 1 MILLIGRAM(S): 2 INJECTION INTRAMUSCULAR; INTRAVENOUS; SUBCUTANEOUS at 03:05

## 2023-03-13 RX ADMIN — HYDROMORPHONE HYDROCHLORIDE 1 MILLIGRAM(S): 2 INJECTION INTRAMUSCULAR; INTRAVENOUS; SUBCUTANEOUS at 17:00

## 2023-03-13 RX ADMIN — Medication 2 MILLIGRAM(S): at 23:00

## 2023-03-13 RX ADMIN — Medication 0.1 MILLIGRAM(S): at 22:57

## 2023-03-13 RX ADMIN — HYDROMORPHONE HYDROCHLORIDE 1 MILLIGRAM(S): 2 INJECTION INTRAMUSCULAR; INTRAVENOUS; SUBCUTANEOUS at 08:32

## 2023-03-13 RX ADMIN — Medication 2 MILLIGRAM(S): at 12:03

## 2023-03-13 RX ADMIN — AMLODIPINE BESYLATE 10 MILLIGRAM(S): 2.5 TABLET ORAL at 09:30

## 2023-03-13 RX ADMIN — Medication 2 MILLIGRAM(S): at 08:32

## 2023-03-13 RX ADMIN — HYDROMORPHONE HYDROCHLORIDE 1 MILLIGRAM(S): 2 INJECTION INTRAMUSCULAR; INTRAVENOUS; SUBCUTANEOUS at 09:00

## 2023-03-13 RX ADMIN — Medication 1 PATCH: at 08:54

## 2023-03-13 RX ADMIN — Medication 2 MILLIGRAM(S): at 14:03

## 2023-03-13 RX ADMIN — Medication 1 PATCH: at 09:31

## 2023-03-13 RX ADMIN — Medication 25 MILLIGRAM(S): at 00:30

## 2023-03-13 RX ADMIN — TAMSULOSIN HYDROCHLORIDE 0.4 MILLIGRAM(S): 0.4 CAPSULE ORAL at 22:58

## 2023-03-13 RX ADMIN — Medication 25 MILLIGRAM(S): at 17:39

## 2023-03-13 RX ADMIN — Medication 2 MILLIGRAM(S): at 04:48

## 2023-03-13 RX ADMIN — Medication 650 MILLIGRAM(S): at 00:40

## 2023-03-13 RX ADMIN — Medication 5 MILLIGRAM(S): at 22:58

## 2023-03-13 RX ADMIN — Medication 2 MILLIGRAM(S): at 16:42

## 2023-03-13 RX ADMIN — HYDROMORPHONE HYDROCHLORIDE 1 MILLIGRAM(S): 2 INJECTION INTRAMUSCULAR; INTRAVENOUS; SUBCUTANEOUS at 16:41

## 2023-03-13 RX ADMIN — SODIUM CHLORIDE 75 MILLILITER(S): 9 INJECTION, SOLUTION INTRAVENOUS at 16:41

## 2023-03-13 RX ADMIN — Medication 2 MILLIGRAM(S): at 00:12

## 2023-03-13 RX ADMIN — Medication 25 MILLIGRAM(S): at 06:02

## 2023-03-13 RX ADMIN — OXYCODONE HYDROCHLORIDE 5 MILLIGRAM(S): 5 TABLET ORAL at 11:55

## 2023-03-13 RX ADMIN — Medication 2 MILLIGRAM(S): at 09:32

## 2023-03-13 RX ADMIN — ONDANSETRON 4 MILLIGRAM(S): 8 TABLET, FILM COATED ORAL at 19:12

## 2023-03-13 RX ADMIN — Medication 2 MILLIGRAM(S): at 00:11

## 2023-03-13 RX ADMIN — Medication 2 MILLIGRAM(S): at 06:46

## 2023-03-13 RX ADMIN — AMPICILLIN SODIUM AND SULBACTAM SODIUM 100 GRAM(S): 250; 125 INJECTION, POWDER, FOR SUSPENSION INTRAMUSCULAR; INTRAVENOUS at 06:17

## 2023-03-13 RX ADMIN — Medication 1 TABLET(S): at 23:44

## 2023-03-13 RX ADMIN — AMPICILLIN SODIUM AND SULBACTAM SODIUM 100 GRAM(S): 250; 125 INJECTION, POWDER, FOR SUSPENSION INTRAMUSCULAR; INTRAVENOUS at 00:25

## 2023-03-13 RX ADMIN — GABAPENTIN 600 MILLIGRAM(S): 400 CAPSULE ORAL at 06:01

## 2023-03-13 RX ADMIN — Medication 1: at 00:26

## 2023-03-13 RX ADMIN — Medication 2 MILLIGRAM(S): at 05:18

## 2023-03-13 RX ADMIN — HYDROMORPHONE HYDROCHLORIDE 1 MILLIGRAM(S): 2 INJECTION INTRAMUSCULAR; INTRAVENOUS; SUBCUTANEOUS at 22:58

## 2023-03-13 RX ADMIN — DULOXETINE HYDROCHLORIDE 60 MILLIGRAM(S): 30 CAPSULE, DELAYED RELEASE ORAL at 09:31

## 2023-03-13 NOTE — CONSULT NOTE ADULT - SUBJECTIVE AND OBJECTIVE BOX
Patient is a 48y old  Male who presents with a chief complaint of inability to walk, intractable back pain (13 Mar 2023 07:16)    HPI:  47 yo M with a PMH of Guillain-Barré Syndrome with peripheral neuropathy and urinary incontinence, DM2 (not on insulin), HTN, BPH, DDD, DVT with unknown prothrombotic state (on Warfarin) s/p IVC filter (per patient), and CVA (2016) who p/w worsening back and leg pain and spasms and inability to ambulate. He has had off-and-on lower back pain with peripheral LE neuropathy since 2016 when he had Guillain-Barré and a CVA with R sided deficits in 2016. Normally, he can walk "shaky with a walker." However, over the past 3 weeks, his symptoms have gotten worse, with frequent burning pain in his lower back and spasms. His symptoms are worse when he lies flat or when he tries to transition to his walker. Therefore, he has had marked difficulty ambulating. He has baseline urinary incontinence and uses Pull-ups. He has some improved bowel continence, but cannot hold his bowels in for long. Those symptoms are not new. He went to [facility] 2 nights ago and was "in the ER for 30 hours" not moving. His pain worsened even more after discharge. He also notes a history of VRE and was recently (1 week ago) prescribed Augmentin, which he only took 2 days of. 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 He is not sure if he has dysuria currently, but he had it when he was prescribed antibiotics last week. He also is on Warfarin for an unknown "blood clotting disorder," stemming from a blood clot in 2016 (of note, he was intubated at that time for Guillain-[place] and suffered a CVA during that time), but he has not had a blood clot since then. He states he has an IVC filter. He is non-compliant with his Warfarin, stating he takes it about 3-4 days per week.In the ED, he was given Flexeril 10 mg PO x1 and  mg x1 with Morphine 4 mg IV x1 ordered. Noted with dysuria, concern for UTI raised, started on unasyn.      PMH: as above  PSH: as above  Meds: per reconciliation sheet, noted below  MEDICATIONS  (STANDING):  acetaminophen   IVPB .. 1000 milliGRAM(s) IV Intermittent once  amLODIPine   Tablet 10 milliGRAM(s) Oral daily  ampicillin/sulbactam  IVPB 1.5 Gram(s) IV Intermittent every 6 hours  baclofen 5 milliGRAM(s) Oral every 12 hours  baclofen 5 milliGRAM(s) Oral once  cloNIDine 0.1 milliGRAM(s) Oral two times a day  dextrose 50% Injectable 25 Gram(s) IV Push once  dextrose 50% Injectable 12.5 Gram(s) IV Push once  dextrose 50% Injectable 25 Gram(s) IV Push once  DULoxetine 60 milliGRAM(s) Oral daily  finasteride 5 milliGRAM(s) Oral daily  gabapentin 600 milliGRAM(s) Oral every 8 hours  glucagon  Injectable 1 milliGRAM(s) IntraMuscular once  hydrALAZINE 25 milliGRAM(s) Oral four times a day  insulin lispro (ADMELOG) corrective regimen sliding scale   SubCutaneous every 6 hours  lactated ringers. 1000 milliLiter(s) (75 mL/Hr) IV Continuous <Continuous>  nicotine  Polacrilex Gum 2 milliGRAM(s) Oral every 2 hours  nicotine -  14 mG/24Hr(s) Patch 1 Patch Transdermal daily  tamsulosin 0.4 milliGRAM(s) Oral at bedtime    Allergies    sulfa drugs (Hives)    Intolerances      Social: no smoking, no alcohol, no illegal drugs; no recent travel, no exposure to TB  FAMILY HISTORY:  FH: heart disease    FH: HTN (hypertension)       no history of premature cardiovascular disease in first degree relatives    ROS: the patient denies fever, no chills, no HA, no dizziness, no sore throat, no blurry vision, no CP, no palpitations, no SOB, no cough, no abdominal pain, no diarrhea, no N/V, +dysuria, no leg pain, no claudication, no rash, no joint aches, no rectal pain or bleeding, no night sweats + back pain    All other systems reviewed and are negative    Vital Signs Last 24 Hrs  T(C): 36.3 (13 Mar 2023 08:23), Max: 37.1 (12 Mar 2023 22:12)  T(F): 97.4 (13 Mar 2023 08:23), Max: 98.8 (12 Mar 2023 22:12)  HR: 58 (13 Mar 2023 08:23) (58 - 74)  BP: 135/73 (13 Mar 2023 08:23) (135/73 - 156/84)  BP(mean): --  RR: 18 (13 Mar 2023 08:23) (16 - 18)  SpO2: 100% (13 Mar 2023 08:23) (98% - 100%)    Parameters below as of 13 Mar 2023 08:23  Patient On (Oxygen Delivery Method): room air    Daily     Daily     PE:  Constitutional: NAD  HEENT: NC/AT, EOMI, PERRLA, conjunctivae clear; ears and nose atraumatic; pharynx benign  Neck: supple; thyroid not palpable  Back: no tenderness  Respiratory: respiratory effort normal; clear to auscultation  Cardiovascular: S1S2 regular, no murmurs  Abdomen: soft, not tender, not distended, positive BS; liver and spleen WNL  Genitourinary: no suprapubic tenderness  Lymphatic: no LN palpable  Musculoskeletal: no muscle tenderness, no joint swelling or tenderness  Extremities: no pedal edema  Neurological/ Psychiatric: AxOx3, Judgement and insight normal;  moving all extremities  Skin: no rashes; no palpable lesions    Labs: all available labs reviewed                        10.5   8.24  )-----------( 176      ( 13 Mar 2023 08:26 )             33.0     03-13    139  |  109<H>  |  28<H>  ----------------------------<  163<H>  3.6   |  22  |  1.86<H>    Ca    8.2<L>      13 Mar 2023 08:26    TPro  7.6  /  Alb  3.4  /  TBili  0.2  /  DBili  x   /  AST  21  /  ALT  30  /  AlkPhos  103  03-11     LIVER FUNCTIONS - ( 11 Mar 2023 18:05 )  Alb: 3.4 g/dL / Pro: 7.6 gm/dL / ALK PHOS: 103 U/L / ALT: 30 U/L / AST: 21 U/L / GGT: x           Urinalysis Basic - ( 12 Mar 2023 03:16 )    Color: Yellow / Appearance: Clear / S.020 / pH: x  Gluc: x / Ketone: Negative  / Bili: Negative / Urobili: Negative   Blood: x / Protein: 100 / Nitrite: Negative   Leuk Esterase: Moderate / RBC: 3-5 /HPF / WBC >50 /HPF   Sq Epi: x / Non Sq Epi: Moderate / Bacteria: Moderate      Culture - Urine (23 @ 03:16)   Specimen Source: Catheterized None   Culture Results:   <10,000 CFU/mL Normal Urogenital Ellen     Radiology: all available radiological tests reviewed  < from: CT Lumbar Spine No Cont (23 @ 19:15) >  ACC: [id number] EXAM:  CT LUMBAR SPINE   ORDERED BY: ANA PAULA OWEN     PROCEDURE DATE:  2023          INTERPRETATION:  CT LUMBAR SPINE    CLINICAL INFORMATION: weakness    TECHNIQUE:  Noncontrast CT.  Axial acquisition. Sagittal and coronal reformations.    FINDINGS:  FRACTURES:  No evidence of an acute lumbar spine fracture.    ALIGNMENT:  Mild straightening of the normal lumbar lordosis. No subluxation.    SPINAL COLUMN:  Mild degrees of disc space narrowing throughout. Small endplate   osteophytes throughout.    DISC LEVELS:  T12/L1: No disc bulge or protrusion. No canal or neural foramina   narrowing.  L1-2: Mild disc bulge. Mild canal and neural foramina narrowing.  L2-3: Mild disc bulge. Mild facet degenerative changes. Mild [place]   neural foramina narrowing  L3-4: Mild disc bulge. Mild facet degenerative changes. Mild canal and   mild to moderate bilateral neural foramina narrowing  L4-5: Moderate disc bulge. Moderate facet and ligamentous degenerative   changes. Severe canal and moderate bilateral neural foramina narrowing  L5-S1: Mild disc bulge. Moderate facet and ligamentous degenerative   changes. Mild canal and moderate to severe bilateral neural foramina   narrowing    OTHER:  An IVC filter is seen.  Atrophic left kidney with associated calcification    IMPRESSION:  No evidence of an acute lumbar spine fracture.  Multilevel disc bulges/degenerative changes greatest at L4-5 where a   moderate disc bulge and moderate facet and ligamentous degenerative   changes results in severe canal and moderate bilateral neural foramina   narrowing. Please see report for detail and additional findings.        ACC: [id number] EXAM:  CT BRAIN   ORDERED BY: ANA PAULA OWEN     PROCEDURE DATE:  2023          INTERPRETATION:  CT HEAD  HEAD CT    INDICATIONS: pain, No additional history was provided.    TECHNIQUE:    HEAD CT:  Serial axial images were obtained from the skull base to the vertex   without the use of intravenous contrast.    COMPARISON EXAMINATION: None.    FINDINGS:    HEAD CT:    VENTRICLES AND SULCI: Ventricles and sulci are unremarkable for patient   age.  INTRA-AXIAL: No intracranial mass, acute hemorrhage, or midline shift is   present. Old lacunar infarct left basal ganglia versus developmental cyst.  EXTRA-AXIAL: No extra-axial fluid collection is present.  INTRACRANIAL HEMORRHAGE: None.    VISUALIZED SINUSES: No air-fluid levels are identified.  VISUALIZED MASTOIDS:  Clear.  CALVARIUM:  Intact.  MISCELLANEOUS:  None.    SOFT TISSUES: Unremarkable.  BONES: Unremarkable.      IMPRESSION:    HEAD CT: No acute hemorrhage or midline shift.      Advanced directives addressed: full resuscitation

## 2023-03-13 NOTE — CONSULT NOTE ADULT - ASSESSMENT
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 He has had off-and-on lower back pain with peripheral LE neuropathy since 2016 when he had Guillain-Barré and a CVA with R sided deficits in 2016. Normally, he can walk "shaky with a walker." However, over the past 3 weeks, his symptoms have gotten worse, with frequent burning pain in his lower back and spasms. Therefore, he has had marked difficulty ambulating. He has baseline urinary incontinence and uses Pull-ups. He went to [facility] 2 nights ago and was "in the ER for 30 hours" not moving. He also notes a history of VRE and was recently (1 week ago) prescribed Augmentin, which he only took 2 days of. 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 He is not sure if he has dysuria currently, but he had it when he was prescribed antibiotics last week. He also is on Warfarin for an unknown "blood clotting disorder," stemming from a blood clot in 2016 (of note, he was intubated at that time for Guillain-[place] and suffered a CVA during that time), but he has not had a blood clot since then. In the ED, he was given Flexeril 10 mg PO x1 and  mg x1 with Morphine 4 mg IV x1 ordered. Noted with dysuria, concern for UTI raised, started on unasyn.    1. Dysuria. ? UTI. BPH. Urinary incontinence. Spinal stenosis   - had recent UTI [facility] - was on 2 days augmentin  - here urine cx no growth but could have been sterilized   - change unasyn to augmentin complete 3 more days then stop  - tolerating abx well so far; no side effects noted  - reason for abx use and side effects reviewed with patient  - supportive care  - fu cbc    2. other issues - care per medicine

## 2023-03-13 NOTE — PROGRESS NOTE ADULT - SUBJECTIVE AND OBJECTIVE BOX
Pt has no new complaints, is alert, has low back pain radiating down legs posteriorly    Awaiting MRI Lumbar / thoracic spine pending    ROS: As above, other ROS Negative    MEDICATIONS  (STANDING):  acetaminophen   IVPB .. 1000 milliGRAM(s) IV Intermittent once  amLODIPine   Tablet 10 milliGRAM(s) Oral daily  amoxicillin  875 milliGRAM(s)/clavulanate 1 Tablet(s) Oral every 12 hours  baclofen 5 milliGRAM(s) Oral every 12 hours  baclofen 5 milliGRAM(s) Oral once  cloNIDine 0.1 milliGRAM(s) Oral two times a day  dextrose 50% Injectable 25 Gram(s) IV Push once  dextrose 50% Injectable 12.5 Gram(s) IV Push once  dextrose 50% Injectable 25 Gram(s) IV Push once  DULoxetine 60 milliGRAM(s) Oral daily  finasteride 5 milliGRAM(s) Oral daily  gabapentin 600 milliGRAM(s) Oral every 8 hours  glucagon  Injectable 1 milliGRAM(s) IntraMuscular once  hydrALAZINE 25 milliGRAM(s) Oral four times a day  insulin lispro (ADMELOG) corrective regimen sliding scale   SubCutaneous every 6 hours  lactated ringers. 1000 milliLiter(s) (75 mL/Hr) IV Continuous <Continuous>  nicotine  Polacrilex Gum 2 milliGRAM(s) Oral every 2 hours  nicotine -  14 mG/24Hr(s) Patch 1 Patch Transdermal daily  tamsulosin 0.4 milliGRAM(s) Oral at bedtime      Vital Signs Last 24 Hrs  T(C): 36.3 (13 Mar 2023 08:23), Max: 37.1 (12 Mar 2023 22:12)  T(F): 97.4 (13 Mar 2023 08:23), Max: 98.8 (12 Mar 2023 22:12)  HR: 60 (13 Mar 2023 14:00) (58 - 74)  BP: 156/78 (13 Mar 2023 14:00) (135/73 - 156/84)  BP(mean): --  RR: 18 (13 Mar 2023 08:23) (16 - 18)  SpO2: 100% (13 Mar 2023 08:23) (98% - 100%)    Parameters below as of 13 Mar 2023 08:23  Patient On (Oxygen Delivery Method): room air    Neurological exam:  HF: Alert, attentive, O X 3, speech fluent, comprehension intact, oriented.  CN: STEVE, EOMI, VFF, facial sensation normal, no NLFD, tongue midline, Palate moves equally, SCM equal bilaterally  Motor: No pronator drift, Strength 5/5 in UEs b/l, raises both legs off the bed but complains of pain and ROM is limited  Sens: Impaired vibration sense in right toes  Reflexes: 1+ at BR/Biceps b/l, 2+ at patella b/l, 1+ at ankle b/l, negative Babinski  Coord:  No FNFA, dysmetria, KASSY intact     Labs:                        10.5   8.24  )-----------( 176      ( 13 Mar 2023 08:26 )             33.0     03-13    139  |  109<H>  |  28<H>  ----------------------------<  163<H>  3.6   |  22  |  1.86<H>    Ca    8.2<L>      13 Mar 2023 08:26    TPro  7.6  /  Alb  3.4  /  TBili  0.2  /  DBili  x   /  AST  21  /  ALT  30  /  AlkPhos  103  03-11          Radiology report:  < from: CT Head No Cont (03.11.23 @ 19:14) >    HEAD CT: No acute hemorrhage or midline shift.    -< from: CT Lumbar Spine No Cont (03.11.23 @ 19:15) >  IMPRESSION:  No evidence of an acute lumbar spine fracture.  Multilevel disc bulges/degenerative changes greatest at L4-5 where a   moderate disc bulge and moderate facet and ligamentous degenerative   changes results in severe canal and moderate bilateral neural foramina   narrowing. Please see report for detail and additional findings.    < end of copied text >

## 2023-03-13 NOTE — PROGRESS NOTE ADULT - SUBJECTIVE AND OBJECTIVE BOX
Patient seen and examined at bedside. No new acute complaints at this time. Pain well controlled. Denies chest pain, shortness of breath, nausea or vomiting.     Physical Exam:  Gen: NAD  Spine:  No gross deformity  Diffuse midline TTP C/T/L/S spine  No bony step offs  No paraspinal muscle ttp/hypertonicity   Negative Straight leg raise  positive clonus on the left  Negative babinski  Negative craig      Motor:                   C5                C6              C7               C8           T1   R            5/5                4/5            4/5             5/5          5/5  L             5/5               4/5             4/5             4/5          5/5                L2             L3             L4               L5            S1  R         3/5           4/5          1/5             1/5           4/5  L          3/5          5/5           4/5             4/5           4/5    Sensory:            C5         C6         C7      C8       T1        (0=absent, 1=impaired, 2=normal, NT=not testable)  R         2            1           1        2         2  L          2            2           2        2         2               L2          L3         L4      L5       S1         (0=absent, 1=impaired, 2=normal, NT=not testable)  R         2            2            1        1        1  L          2            2           2        2         2        A/P: 48y Male with new onset inability to ambulate likely 2/2 progression of lumbar spine cord compression    Plan for possible surgical intervention pending full spine MRI  FU MRI C/T/L spine without contrast -- may require transfer to outside facility 2/2 body habitus and MRI bore diameter  plan pending imaging results  WBAT with assistive devices as needed  pain control prn  FU Labs/imaging  DVT ppx per primary team, hold chemical AC for possible OR, heparin ok  discussed with Dr. Mills who agrees with plan   Patient seen and examined at bedside. No new acute complaints at this time. Pain well controlled. Denies chest pain, shortness of breath, nausea or vomiting.     Physical Exam:  Gen: NAD  Spine:  No gross deformity  Diffuse midline TTP C/T/L/S spine  No bony step offs  No paraspinal muscle ttp/hypertonicity   Negative Straight leg raise  positive clonus on the left  Negative babinski  Negative craig      Motor:                   C5                C6              C7               C8           T1   R            5/5                4/5            4/5             5/5          5/5  L             5/5               4/5             4/5             4/5          5/5                L2             L3             L4               L5            S1  R         3/5           4/5          1/5             1/5           4/5  L          3/5          5/5           4/5             4/5           4/5    Sensory:            C5         C6         C7      C8       T1        (0=absent, 1=impaired, 2=normal, NT=not testable)  R         2            1           1        2         2  L          2            2           2        2         2               L2          L3         L4      L5       S1         (0=absent, 1=impaired, 2=normal, NT=not testable)  R         2            2            1        1        1  L          2            2           2        2         2      Vital Signs Last 24 Hrs  T(C): 37.1 (12 Mar 2023 22:12), Max: 37.1 (12 Mar 2023 22:12)  T(F): 98.8 (12 Mar 2023 22:12), Max: 98.8 (12 Mar 2023 22:12)  HR: 74 (13 Mar 2023 06:10) (60 - 74)  BP: 140/80 (13 Mar 2023 06:10) (140/80 - 156/84)  BP(mean): --  RR: 16 (12 Mar 2023 22:12) (16 - 16)  SpO2: 98% (12 Mar 2023 22:12) (98% - 100%)    Parameters below as of 12 Mar 2023 22:12  Patient On (Oxygen Delivery Method): room air        A/P: 48y Male with new onset inability to ambulate likely 2/2 progression of lumbar spine cord compression    Plan for possible surgical intervention pending full spine MRI  FU MRI C/T/L spine without contrast -- may require transfer to outside facility 2/2 body habitus and MRI bore diameter  plan pending imaging results  WBAT with assistive devices as needed  pain control prn  FU Labs/imaging  DVT ppx per primary team, hold chemical AC for possible OR, heparin ok  discussed with Dr. Mills who agrees with plan

## 2023-03-13 NOTE — PROGRESS NOTE ADULT - SUBJECTIVE AND OBJECTIVE BOX
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 He has had off-and-on lower back pain with peripheral LE neuropathy since 2016 when he had Guillain-Barré and a CVA with R sided deficits in 2016. Normally, he can walk "shaky with a walker." However, over the past 3 weeks, his symptoms have gotten worse, with frequent burning pain in his lower back and spasms. His symptoms are worse when he lies flat or when he tries to transition to his walker. Therefore, he has had marked difficulty ambulating. He has baseline urinary incontinence and uses Pull-ups. He has some improved bowel continence, but cannot hold his bowels in for long. Those symptoms are not new. He denies CP, cough, N/V, abdominal pain, CP, or SOB.  He went to [facility] 2 nights ago and was "in the ER for 30 hours" not moving. His pain worsened even more after discharge.    He also notes a history of VRE and was recently (1 week ago) prescribed Augmentin, which he only took 2 days of. 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 He is not sure if he has dysuria currently, but he had it when he was prescribed antibiotics last week.  He also is on Warfarin for an unknown "blood clotting disorder," stemming from a blood clot in 2016 (of note, he was intubated at that time for Guillain-[place] and suffered a CVA during that time), but he has not had a blood clot since then. He states he has an IVC filter. He is non-compliant with his Warfarin, stating he takes it about 3-4 days per week.    In the ED, he was given Flexeril 10 mg PO x1 and  mg x1 with Morphine 4 mg IV x1 ordered.    3/13 afebrile , says has mild urethral burning on micturation, no flank pain, , no chills , lower back pain not controlled with oxycodone 5, will increase oxycodone 10   Gen: + malaise. Negative for fevers or chills  Eyes: no blurred vision or lacrimation  ENT: no tinnitus, vertigo, or decreased hearing  Resp: no wheezing, dyspnea, cough, or pleuritic chest pain  CV: no chest pain, dyspnea on exertion, or palpitations  GI: no nausea, vomiting, abdominal pain, diarrhea, or constipation  : + incontinence, ?dysuria. No hematuria  MSK: + arthralgias. No joint swelling  Neuro: + LE weakness. No confusion or dizziness  Skin: no rash, lesions, or edemaGENERAL: No acute distress  HEENT: PERRL, EOMI, MMM, no oropharyngeal lesions  NECK: supple, no stiffness, no JVD, no thyromegaly  PULM: respirations non-labored, clear to auscultation bilaterally, no rales, rhonchi, or wheezes  CV: regular rate and rhythm, no murmurs, gallops, or rubs  GI: abdomen soft, + slight RUQ TTP, nondistended, no masses felt, normal bowel sounds  MSK: + lumbar spinal TTP. No joint swelling, erythema, or warmth.  LYMPH: no anterior cervical, posterior cervical, supraclavicular, or inguinal lymphadenopathy  NEURO: occasional spasms of b/l LEs. A&Ox3, sensation intact. Strength 2/5 b/l LEs  SKIN: 1+ b/l LE edema. No rashes or lesions    labs reviewed

## 2023-03-14 LAB
ANION GAP SERPL CALC-SCNC: 4 MMOL/L — LOW (ref 5–17)
APTT BLD: 102.3 SEC — HIGH (ref 27.5–35.5)
APTT BLD: 29.7 SEC — SIGNIFICANT CHANGE UP (ref 27.5–35.5)
APTT BLD: 95.5 SEC — HIGH (ref 27.5–35.5)
BASOPHILS # BLD AUTO: 0.06 K/UL — SIGNIFICANT CHANGE UP (ref 0–0.2)
BASOPHILS NFR BLD AUTO: 0.6 % — SIGNIFICANT CHANGE UP (ref 0–2)
BUN SERPL-MCNC: 25 MG/DL — HIGH (ref 7–23)
CALCIUM SERPL-MCNC: 8.7 MG/DL — SIGNIFICANT CHANGE UP (ref 8.5–10.1)
CHLORIDE SERPL-SCNC: 109 MMOL/L — HIGH (ref 96–108)
CO2 SERPL-SCNC: 25 MMOL/L — SIGNIFICANT CHANGE UP (ref 22–31)
CREAT SERPL-MCNC: 1.74 MG/DL — HIGH (ref 0.5–1.3)
EGFR: 48 ML/MIN/1.73M2 — LOW
EOSINOPHIL # BLD AUTO: 0.3 K/UL — SIGNIFICANT CHANGE UP (ref 0–0.5)
EOSINOPHIL NFR BLD AUTO: 2.8 % — SIGNIFICANT CHANGE UP (ref 0–6)
GLUCOSE BLDC GLUCOMTR-MCNC: 136 MG/DL — HIGH (ref 70–99)
GLUCOSE BLDC GLUCOMTR-MCNC: 153 MG/DL — HIGH (ref 70–99)
GLUCOSE BLDC GLUCOMTR-MCNC: 167 MG/DL — HIGH (ref 70–99)
GLUCOSE BLDC GLUCOMTR-MCNC: 205 MG/DL — HIGH (ref 70–99)
GLUCOSE SERPL-MCNC: 139 MG/DL — HIGH (ref 70–99)
HCT VFR BLD CALC: 33.5 % — LOW (ref 39–50)
HCT VFR BLD CALC: 33.9 % — LOW (ref 39–50)
HGB BLD-MCNC: 10.6 G/DL — LOW (ref 13–17)
HGB BLD-MCNC: 10.9 G/DL — LOW (ref 13–17)
IMM GRANULOCYTES NFR BLD AUTO: 0.5 % — SIGNIFICANT CHANGE UP (ref 0–0.9)
INR BLD: 1.26 RATIO — HIGH (ref 0.88–1.16)
LYMPHOCYTES # BLD AUTO: 1.69 K/UL — SIGNIFICANT CHANGE UP (ref 1–3.3)
LYMPHOCYTES # BLD AUTO: 16 % — SIGNIFICANT CHANGE UP (ref 13–44)
MCHC RBC-ENTMCNC: 24.5 PG — LOW (ref 27–34)
MCHC RBC-ENTMCNC: 24.5 PG — LOW (ref 27–34)
MCHC RBC-ENTMCNC: 31.6 GM/DL — LOW (ref 32–36)
MCHC RBC-ENTMCNC: 32.2 GM/DL — SIGNIFICANT CHANGE UP (ref 32–36)
MCV RBC AUTO: 76.2 FL — LOW (ref 80–100)
MCV RBC AUTO: 77.4 FL — LOW (ref 80–100)
MONOCYTES # BLD AUTO: 0.65 K/UL — SIGNIFICANT CHANGE UP (ref 0–0.9)
MONOCYTES NFR BLD AUTO: 6.1 % — SIGNIFICANT CHANGE UP (ref 2–14)
NEUTROPHILS # BLD AUTO: 7.82 K/UL — HIGH (ref 1.8–7.4)
NEUTROPHILS NFR BLD AUTO: 74 % — SIGNIFICANT CHANGE UP (ref 43–77)
PLATELET # BLD AUTO: 206 K/UL — SIGNIFICANT CHANGE UP (ref 150–400)
PLATELET # BLD AUTO: 206 K/UL — SIGNIFICANT CHANGE UP (ref 150–400)
POTASSIUM SERPL-MCNC: 3.7 MMOL/L — SIGNIFICANT CHANGE UP (ref 3.5–5.3)
POTASSIUM SERPL-SCNC: 3.7 MMOL/L — SIGNIFICANT CHANGE UP (ref 3.5–5.3)
PROTHROM AB SERPL-ACNC: 14.6 SEC — HIGH (ref 10.5–13.4)
RBC # BLD: 4.33 M/UL — SIGNIFICANT CHANGE UP (ref 4.2–5.8)
RBC # BLD: 4.45 M/UL — SIGNIFICANT CHANGE UP (ref 4.2–5.8)
RBC # FLD: 15.8 % — HIGH (ref 10.3–14.5)
RBC # FLD: 15.9 % — HIGH (ref 10.3–14.5)
SODIUM SERPL-SCNC: 138 MMOL/L — SIGNIFICANT CHANGE UP (ref 135–145)
WBC # BLD: 10.57 K/UL — HIGH (ref 3.8–10.5)
WBC # BLD: 9.47 K/UL — SIGNIFICANT CHANGE UP (ref 3.8–10.5)
WBC # FLD AUTO: 10.57 K/UL — HIGH (ref 3.8–10.5)
WBC # FLD AUTO: 9.47 K/UL — SIGNIFICANT CHANGE UP (ref 3.8–10.5)

## 2023-03-14 PROCEDURE — 72128 CT CHEST SPINE W/O DYE: CPT | Mod: 26

## 2023-03-14 PROCEDURE — 72125 CT NECK SPINE W/O DYE: CPT | Mod: 26

## 2023-03-14 PROCEDURE — 99233 SBSQ HOSP IP/OBS HIGH 50: CPT

## 2023-03-14 RX ORDER — HEPARIN SODIUM 5000 [USP'U]/ML
INJECTION INTRAVENOUS; SUBCUTANEOUS
Qty: 25000 | Refills: 0 | Status: DISCONTINUED | OUTPATIENT
Start: 2023-03-14 | End: 2023-03-14

## 2023-03-14 RX ORDER — SODIUM CHLORIDE 9 MG/ML
1000 INJECTION, SOLUTION INTRAVENOUS
Refills: 0 | Status: DISCONTINUED | OUTPATIENT
Start: 2023-03-14 | End: 2023-03-22

## 2023-03-14 RX ORDER — HEPARIN SODIUM 5000 [USP'U]/ML
5000 INJECTION INTRAVENOUS; SUBCUTANEOUS EVERY 6 HOURS
Refills: 0 | Status: DISCONTINUED | OUTPATIENT
Start: 2023-03-14 | End: 2023-03-14

## 2023-03-14 RX ORDER — HEPARIN SODIUM 5000 [USP'U]/ML
10000 INJECTION INTRAVENOUS; SUBCUTANEOUS EVERY 6 HOURS
Refills: 0 | Status: DISCONTINUED | OUTPATIENT
Start: 2023-03-14 | End: 2023-03-14

## 2023-03-14 RX ORDER — METOCLOPRAMIDE HCL 10 MG
10 TABLET ORAL ONCE
Refills: 0 | Status: COMPLETED | OUTPATIENT
Start: 2023-03-14 | End: 2023-03-14

## 2023-03-14 RX ADMIN — HEPARIN SODIUM 2200 UNIT(S)/HR: 5000 INJECTION INTRAVENOUS; SUBCUTANEOUS at 19:13

## 2023-03-14 RX ADMIN — Medication 2 MILLIGRAM(S): at 17:21

## 2023-03-14 RX ADMIN — Medication 1: at 13:09

## 2023-03-14 RX ADMIN — Medication 0.1 MILLIGRAM(S): at 10:57

## 2023-03-14 RX ADMIN — DULOXETINE HYDROCHLORIDE 60 MILLIGRAM(S): 30 CAPSULE, DELAYED RELEASE ORAL at 10:57

## 2023-03-14 RX ADMIN — OXYCODONE HYDROCHLORIDE 5 MILLIGRAM(S): 5 TABLET ORAL at 13:40

## 2023-03-14 RX ADMIN — Medication 1 TABLET(S): at 23:50

## 2023-03-14 RX ADMIN — Medication 2 MILLIGRAM(S): at 17:20

## 2023-03-14 RX ADMIN — AMLODIPINE BESYLATE 10 MILLIGRAM(S): 2.5 TABLET ORAL at 10:58

## 2023-03-14 RX ADMIN — Medication 2 MILLIGRAM(S): at 17:36

## 2023-03-14 RX ADMIN — HYDROMORPHONE HYDROCHLORIDE 1 MILLIGRAM(S): 2 INJECTION INTRAMUSCULAR; INTRAVENOUS; SUBCUTANEOUS at 23:50

## 2023-03-14 RX ADMIN — Medication 2: at 17:19

## 2023-03-14 RX ADMIN — Medication 25 MILLIGRAM(S): at 17:20

## 2023-03-14 RX ADMIN — OXYCODONE HYDROCHLORIDE 5 MILLIGRAM(S): 5 TABLET ORAL at 13:10

## 2023-03-14 RX ADMIN — OXYCODONE HYDROCHLORIDE 5 MILLIGRAM(S): 5 TABLET ORAL at 05:41

## 2023-03-14 RX ADMIN — Medication 5 MILLIGRAM(S): at 23:50

## 2023-03-14 RX ADMIN — Medication 1 PATCH: at 10:56

## 2023-03-14 RX ADMIN — HEPARIN SODIUM 10000 UNIT(S): 5000 INJECTION INTRAVENOUS; SUBCUTANEOUS at 06:10

## 2023-03-14 RX ADMIN — Medication 2 MILLIGRAM(S): at 23:51

## 2023-03-14 RX ADMIN — Medication 5 MILLIGRAM(S): at 10:58

## 2023-03-14 RX ADMIN — OXYCODONE HYDROCHLORIDE 5 MILLIGRAM(S): 5 TABLET ORAL at 07:13

## 2023-03-14 RX ADMIN — Medication 2 MILLIGRAM(S): at 05:42

## 2023-03-14 RX ADMIN — TAMSULOSIN HYDROCHLORIDE 0.4 MILLIGRAM(S): 0.4 CAPSULE ORAL at 21:10

## 2023-03-14 RX ADMIN — Medication 2 MILLIGRAM(S): at 10:49

## 2023-03-14 RX ADMIN — Medication 1 PATCH: at 13:11

## 2023-03-14 RX ADMIN — HYDROMORPHONE HYDROCHLORIDE 1 MILLIGRAM(S): 2 INJECTION INTRAMUSCULAR; INTRAVENOUS; SUBCUTANEOUS at 17:20

## 2023-03-14 RX ADMIN — Medication 2 MILLIGRAM(S): at 22:00

## 2023-03-14 RX ADMIN — HYDROMORPHONE HYDROCHLORIDE 1 MILLIGRAM(S): 2 INJECTION INTRAMUSCULAR; INTRAVENOUS; SUBCUTANEOUS at 17:36

## 2023-03-14 RX ADMIN — Medication 1 TABLET(S): at 10:58

## 2023-03-14 RX ADMIN — Medication 2 MILLIGRAM(S): at 13:18

## 2023-03-14 RX ADMIN — GABAPENTIN 600 MILLIGRAM(S): 400 CAPSULE ORAL at 05:41

## 2023-03-14 RX ADMIN — Medication 25 MILLIGRAM(S): at 13:10

## 2023-03-14 RX ADMIN — Medication 1: at 23:58

## 2023-03-14 RX ADMIN — Medication 2 MILLIGRAM(S): at 07:14

## 2023-03-14 RX ADMIN — Medication 25 MILLIGRAM(S): at 05:41

## 2023-03-14 RX ADMIN — Medication 1 PATCH: at 12:23

## 2023-03-14 RX ADMIN — GABAPENTIN 600 MILLIGRAM(S): 400 CAPSULE ORAL at 21:09

## 2023-03-14 RX ADMIN — HYDROMORPHONE HYDROCHLORIDE 1 MILLIGRAM(S): 2 INJECTION INTRAMUSCULAR; INTRAVENOUS; SUBCUTANEOUS at 11:15

## 2023-03-14 RX ADMIN — OXYCODONE HYDROCHLORIDE 5 MILLIGRAM(S): 5 TABLET ORAL at 21:10

## 2023-03-14 RX ADMIN — HEPARIN SODIUM 2700 UNIT(S)/HR: 5000 INJECTION INTRAVENOUS; SUBCUTANEOUS at 06:05

## 2023-03-14 RX ADMIN — HEPARIN SODIUM 2200 UNIT(S)/HR: 5000 INJECTION INTRAVENOUS; SUBCUTANEOUS at 17:16

## 2023-03-14 RX ADMIN — GABAPENTIN 600 MILLIGRAM(S): 400 CAPSULE ORAL at 13:10

## 2023-03-14 RX ADMIN — HEPARIN SODIUM 2700 UNIT(S)/HR: 5000 INJECTION INTRAVENOUS; SUBCUTANEOUS at 07:24

## 2023-03-14 RX ADMIN — FINASTERIDE 5 MILLIGRAM(S): 5 TABLET, FILM COATED ORAL at 10:57

## 2023-03-14 RX ADMIN — HYDROMORPHONE HYDROCHLORIDE 1 MILLIGRAM(S): 2 INJECTION INTRAMUSCULAR; INTRAVENOUS; SUBCUTANEOUS at 10:58

## 2023-03-14 RX ADMIN — Medication 1 PATCH: at 07:14

## 2023-03-14 NOTE — CONSULT NOTE ADULT - ASSESSMENT
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 Consulted for AC recommendations.    # Intractable Low Back Pain / Inability to Walk / Cord Compression    - Patient with acute on chronic back spasms and worsening lumbar back pain  - CT lumbar spine shows DD with severe canal stenosis and moderate bilateral neural foraminal narrowing at L4-5, along with other milder disc bulges throughout the lumbar spine  - Ortho spine consulted  - Transferred for MR imaging 03/12  - MRI LSpine revealed L4/5 severe central stenosis, L1/2 central HNP without nerve root compression  - Pending surgical intervention decision      # PMHx DVT with IVC Filter 2016    - Patient hospitalized for GBS, intubated, developed DVTs 2016  - Has been taking Warfarin since, but non compliant, only 3-4 times weekly, found to be subtherapeutic  - Unknown prothrombotic cause/underlying thrombophilia  - Pending surgical decision, currently on IV Heparin gtt which will be held prior if necessary  - Would recommend transition to Lovenox post op and would need to follow up outpatient to have hypercoagulable/thrombophilia workup done  - Will discuss further with attending Dr Ghotra   47 y/o M with a PMH of Guillain-Barré Syndrome with peripheral neuropathy and urinary incontinence, DM2 (not on insulin), HTN, BPH, DDD, DVT with unknown prothrombotic state (on Warfarin) s/p IVC filter (per patient), and CVA (2016). Consulted for AC recommendations.    # Intractable Low Back Pain / Inability to Walk / Cord Compression    - Patient with acute on chronic back spasms and worsening lumbar back pain  - CT lumbar spine shows DD with severe canal stenosis and moderate bilateral neural foraminal narrowing at L4-5, along with other milder disc bulges throughout the lumbar spine  - Ortho spine consulted  - Transferred for MR imaging 03/12  - MRI LSpine revealed L4/5 severe central stenosis, L1/2 central HNP without nerve root compression  - Pending surgical intervention decision      # PMHx DVT with IVC Filter 2016    - Patient hospitalized for GBS, intubated, developed DVTs 2016  - Has been taking Warfarin since, but non compliant, only 3-4 times weekly, found to be subtherapeutic  - Unknown prothrombotic cause/underlying thrombophilia  - Pending surgical decision, currently on IV Heparin gtt which will be held prior if necessary  - Would recommend transition to Lovenox post op and have him follow up outpatient with provider prescribing Warfarin

## 2023-03-14 NOTE — PROGRESS NOTE ADULT - SUBJECTIVE AND OBJECTIVE BOX
Orthopaedics    Patient seen and examined. No new acute complaints at this time. Pain well controlled. Denies chest pain, shortness of breath, nausea or vomiting. Patient was transferred to [facility] last night for MRI neuraxial imaging - however only MRI LSpine was performed, to be reviewed with attending Dr. Mills for further management.    Vital Signs Last 24 Hrs  T(C): 36.6 (13 Mar 2023 16:13), Max: 36.6 (13 Mar 2023 16:13)  T(F): 97.8 (13 Mar 2023 16:13), Max: 97.8 (13 Mar 2023 16:13)  HR: 64 (13 Mar 2023 16:13) (58 - 74)  BP: 141/81 (13 Mar 2023 16:13) (135/73 - 156/78)  BP(mean): --  RR: 18 (13 Mar 2023 16:13) (18 - 18)  SpO2: 98% (13 Mar 2023 16:13) (98% - 100%)    Parameters below as of 13 Mar 2023 16:13  Patient On (Oxygen Delivery Method): room air                          10.6   10.57 )-----------( 206      ( 14 Mar 2023 05:16 )             33.5       Physical Exam:  Gen: NAD  Spine:  Skin intact. No abrasions/lacerations appreciated.  Diffuse midline TTP C/T/LSp; No bony step offs; No paraspinal muscle ttp/hypertonicity   Negative Straight leg raise  positive clonus on the left; Negative babinski; Negative craig      Motor:                   C5                C6              C7               C8           T1   R            5/5                4/5            4/5             5/5          5/5  L             5/5               4/5             4/5             4/5          5/5                L2             L3             L4               L5            S1  R         3/5           4/5          1/5             1/5           4/5  L          3/5          5/5           4/5             4/5           4/5    Sensory:            C5         C6         C7      C8       T1        (0=absent, 1=impaired, 2=normal, NT=not testable)  R         2            1           1        2         2  L          2            2           2        2         2               L2          L3         L4      L5       S1         (0=absent, 1=impaired, 2=normal, NT=not testable)  R         2            2            1        1        1  L          2            2           2        2         2        A/P: 48y Male with new onset inability to ambulate likely 2/2 progression of lumbar spine cord compression    Medical management appreciated  Patient was transferred to outside facility for neuraxial imaging due to body habitus/MRI bore diameter - however only MRI LSpine was performed  MRI LSpine reviewed with L4/5 severe central stenosis, L1/2 central HNP without nerve root compression  FU AM Labs  WBAT w/ assistive devices as needed  PT/OT  NPO except medications for possible surgical intervention; IVF while NPO  Pain control PRN  DVT ppx per primary team: Heparin gttt -> to be held prior to procedure if decision made to proceed with operative intervention  Will discuss case and imaging with Dr. Mills this morning to determine further plan of care, may require operative decompression. Will advise if any changes to plan

## 2023-03-14 NOTE — PROGRESS NOTE ADULT - SUBJECTIVE AND OBJECTIVE BOX
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 He has had off-and-on lower back pain with peripheral LE neuropathy since 2016 when he had Guillain-Barré and a CVA with R sided deficits in 2016. Normally, he can walk "shaky with a walker." However, over the past 3 weeks, his symptoms have gotten worse, with frequent burning pain in his lower back and spasms. His symptoms are worse when he lies flat or when he tries to transition to his walker. Therefore, he has had marked difficulty ambulating. He has baseline urinary incontinence and uses Pull-ups. He has some improved bowel continence, but cannot hold his bowels in for long. Those symptoms are not new. He denies CP, cough, N/V, abdominal pain, CP, or SOB.  He went to [facility] 2 nights ago and was "in the ER for 30 hours" not moving. His pain worsened even more after discharge.    He also notes a history of VRE and was recently (1 week ago) prescribed Augmentin, which he only took 2 days of. He says when he goes to [facility] he is "always on isolation," because of his history of VRE. He is not sure if he has dysuria currently, but he had it when he was prescribed antibiotics last week.  He also is on Warfarin for an unknown "blood clotting disorder," stemming from a blood clot in 2016 (of note, he was intubated at that time for Guillain-Kennard and suffered a CVA during that time), but he has not had a blood clot since then. He states he has an IVC filter. He is non-compliant with his Warfarin, stating he takes it about 3-4 days per week.    In the ED, he was given Flexeril 10 mg PO x1 and  mg x1 with Morphine 4 mg IV x1 ordered.    3/13 afebrile , says has mild urethral burning on micturation, no flank pain, , no chills , lower back pain not controlled with oxycodone 5, will increase oxycodone 10   3/14 afebrile , Patient was transferred to [facility] last night for MRI neuraxial imaging - however only MRI LSpine was performed,no acute overnight events, WBC 10, denies CP, sob, no new complaints   B/L lower back pain controlled with oxycodone and dilaudid       ROS    Gen: + malaise. Negative for fevers or chills  Eyes: no blurred vision or lacrimation  ENT: no tinnitus, vertigo, or decreased hearing  Resp: no wheezing, dyspnea, cough, or pleuritic chest pain  CV: no chest pain, dyspnea on exertion, or palpitations  GI: no nausea, vomiting, abdominal pain, diarrhea, or constipation  : + incontinence, ?dysuria. No hematuria  MSK: + arthralgias. No joint swelling  Neuro: + LE weakness. No confusion or dizziness  Skin: no rash, lesions, or edema      PHYSICAL EXAM:    Daily     Daily     ICU Vital Signs Last 24 Hrs  T(C): 36.1 (14 Mar 2023 15:20), Max: 36.6 (14 Mar 2023 05:57)  T(F): 97 (14 Mar 2023 15:20), Max: 97.9 (14 Mar 2023 05:57)  HR: 67 (14 Mar 2023 15:20) (51 - 67)  BP: 119/60 (14 Mar 2023 15:20) (119/60 - 147/116)  BP(mean): 73 (14 Mar 2023 15:20) (73 - 73)  ABP: --  ABP(mean): --  RR: 18 (14 Mar 2023 15:20) (18 - 18)  SpO2: 97% (14 Mar 2023 15:20) (96% - 99%)    O2 Parameters below as of 14 Mar 2023 15:20  Patient On (Oxygen Delivery Method): room air  Physical exam   GENERAL: No acute distress  HEENT: PERRL, EOMI, MMM, no oropharyngeal lesions  NECK: supple, no stiffness, no JVD, no thyromegaly  PULM: respirations non-labored, clear to auscultation bilaterally, no rales, rhonchi, or wheezes  CV: regular rate and rhythm, no murmurs, gallops, or rubs  GI: abdomen soft, + slight RUQ TTP, nondistended, no masses felt, normal bowel sounds  MSK: + lumbar spinal TTP. No joint swelling, erythema, or warmth.  LYMPH: no anterior cervical, posterior cervical, supraclavicular, or inguinal lymphadenopathy  NEURO: occasional spasms of b/l LEs. A&Ox3, sensation intact. Strength 2/5 b/l LEs  SKIN: 1+ b/l LE edema. No rashes or lesions                                  10.6   10.57 )-----------( 206      ( 14 Mar 2023 05:16 )             33.5       CBC Full  -  ( 14 Mar 2023 05:16 )  WBC Count : 10.57 K/uL  RBC Count : 4.33 M/uL  Hemoglobin : 10.6 g/dL  Hematocrit : 33.5 %  Platelet Count - Automated : 206 K/uL  Mean Cell Volume : 77.4 fl  Mean Cell Hemoglobin : 24.5 pg  Mean Cell Hemoglobin Concentration : 31.6 gm/dL  Auto Neutrophil # : 7.82 K/uL  Auto Lymphocyte # : 1.69 K/uL  Auto Monocyte # : 0.65 K/uL  Auto Eosinophil # : 0.30 K/uL  Auto Basophil # : 0.06 K/uL  Auto Neutrophil % : 74.0 %  Auto Lymphocyte % : 16.0 %  Auto Monocyte % : 6.1 %  Auto Eosinophil % : 2.8 %  Auto Basophil % : 0.6 %      03-14    138  |  109<H>  |  25<H>  ----------------------------<  139<H>  3.7   |  25  |  1.74<H>    Ca    8.7      14 Mar 2023 05:16            PT/INR - ( 14 Mar 2023 05:16 )   PT: 14.6 sec;   INR: 1.26 ratio         PTT - ( 14 Mar 2023 12:04 )  PTT:102.3 sec                  MEDICATIONS  (STANDING):  acetaminophen   IVPB .. 1000 milliGRAM(s) IV Intermittent once  amLODIPine   Tablet 10 milliGRAM(s) Oral daily  amoxicillin  875 milliGRAM(s)/clavulanate 1 Tablet(s) Oral every 12 hours  baclofen 5 milliGRAM(s) Oral every 12 hours  baclofen 5 milliGRAM(s) Oral once  cloNIDine 0.1 milliGRAM(s) Oral two times a day  dextrose 50% Injectable 25 Gram(s) IV Push once  dextrose 50% Injectable 12.5 Gram(s) IV Push once  dextrose 50% Injectable 25 Gram(s) IV Push once  DULoxetine 60 milliGRAM(s) Oral daily  finasteride 5 milliGRAM(s) Oral daily  gabapentin 600 milliGRAM(s) Oral every 8 hours  glucagon  Injectable 1 milliGRAM(s) IntraMuscular once  heparin  Infusion.  Unit(s)/Hr (22 mL/Hr) IV Continuous <Continuous>  hydrALAZINE 25 milliGRAM(s) Oral four times a day  insulin lispro (ADMELOG) corrective regimen sliding scale   SubCutaneous every 6 hours  lactated ringers. 1000 milliLiter(s) (75 mL/Hr) IV Continuous <Continuous>  lactated ringers. 1000 milliLiter(s) (75 mL/Hr) IV Continuous <Continuous>  nicotine  Polacrilex Gum 2 milliGRAM(s) Oral every 2 hours  nicotine -  14 mG/24Hr(s) Patch 1 Patch Transdermal daily  tamsulosin 0.4 milliGRAM(s) Oral at bedtime

## 2023-03-14 NOTE — CHART NOTE - NSCHARTNOTEFT_GEN_A_CORE
Patient follows with Heme/Onc Dr Gena NG who recently saw him in [facility]. Please consult their covering team.

## 2023-03-14 NOTE — CONSULT NOTE ADULT - SUBJECTIVE AND OBJECTIVE BOX
HPI:    47 y/o M with a PMH of Guillain-Barré Syndrome with peripheral neuropathy and urinary incontinence, DM2 (not on insulin), HTN, BPH, DDD, DVT with unknown prothrombotic state (on Warfarin) s/p IVC filter (per patient), and CVA (2016) who p/w worsening back and leg pain and spasms and inability to ambulate. He has had off-and-on lower back pain with peripheral LE neuropathy since 2016 when he had Guillain-Barré and a CVA with R sided deficits in 2016. Normally, he can walk "shaky with a walker." However, over the past 3 weeks, his symptoms have gotten worse, with frequent burning pain in his lower back and spasms. His symptoms are worse when he lies flat or when he tries to transition to his walker. Therefore, he has had marked difficulty ambulating. He has baseline urinary incontinence and uses Pull-ups. He has some improved bowel continence, but cannot hold his bowels in for long. Those symptoms are not new. He denies CP, cough, N/V, abdominal pain, CP, or SOB.  He went to [facility] 2 nights ago and was "in the ER for 30 hours" not moving. His pain worsened even more after discharge.    He also notes a history of VRE and was recently (1 week ago) prescribed Augmentin, which he only took 2 days of. He says when he goes to [facility] he is "always on isolation," because of his history of VRE. He is not sure if he has dysuria currently, but he had it when he was prescribed antibiotics last week.  He also is on Warfarin for an unknown "blood clotting disorder," stemming from a blood clot in 2016 (of note, he was intubated at that time for Guillain-Watseka and suffered a CVA during that time), but he has not had a blood clot since then. He states he has an IVC filter. He is non-compliant with his Warfarin, stating he takes it about 3-4 days per week.    In the ED, he was given Flexeril 10 mg PO x1 and  mg x1 with Morphine 4 mg IV x1 ordered. (11 Mar 2023 23:39)        03/14/2023:        PAST MEDICAL & SURGICAL HISTORY:    BPH    HTN    T2DM    Peripheral neuropathy    History of Guillain-Watseka syndrome 2016    History of CVA in adulthood    DDD, lumbar    DVT, lower extremity 2016    S/P IVC filter        MEDICATIONS  (STANDING):    acetaminophen   IVPB .. 1000 milliGRAM(s) IV Intermittent once  amLODIPine   Tablet 10 milliGRAM(s) Oral daily  amoxicillin  875 milliGRAM(s)/clavulanate 1 Tablet(s) Oral every 12 hours  baclofen 5 milliGRAM(s) Oral every 12 hours  baclofen 5 milliGRAM(s) Oral once  cloNIDine 0.1 milliGRAM(s) Oral two times a day  dextrose 50% Injectable 25 Gram(s) IV Push once  dextrose 50% Injectable 12.5 Gram(s) IV Push once  dextrose 50% Injectable 25 Gram(s) IV Push once  DULoxetine 60 milliGRAM(s) Oral daily  finasteride 5 milliGRAM(s) Oral daily  gabapentin 600 milliGRAM(s) Oral every 8 hours  glucagon  Injectable 1 milliGRAM(s) IntraMuscular once  hydrALAZINE 25 milliGRAM(s) Oral four times a day  insulin lispro (ADMELOG) corrective regimen sliding scale   SubCutaneous every 6 hours  lactated ringers. 1000 milliLiter(s) (75 mL/Hr) IV Continuous <Continuous>  nicotine  Polacrilex Gum 2 milliGRAM(s) Oral every 2 hours  nicotine -  14 mG/24Hr(s) Patch 1 Patch Transdermal daily  tamsulosin 0.4 milliGRAM(s) Oral at bedtime      MEDICATIONS  (PRN):    acetaminophen     Tablet .. 650 milliGRAM(s) Oral every 6 hours PRN Temp greater or equal to 38C (100.4F), Mild Pain (1 - 3)  dextrose Oral Gel 15 Gram(s) Oral once PRN Blood Glucose LESS THAN 70 milliGRAM(s)/deciliter  HYDROmorphone  Injectable 1 milliGRAM(s) IV Push every 6 hours PRN Severe Pain (7 - 10)  melatonin 3 milliGRAM(s) Oral at bedtime PRN Insomnia  ondansetron Injectable 4 milliGRAM(s) IV Push every 8 hours PRN Nausea and/or Vomiting  oxyCODONE    IR 5 milliGRAM(s) Oral every 4 hours PRN Moderate Pain (4 - 6)      ALLERGIES:    sulfa drugs (Hives)      FAMILY HISTORY:    heart disease  HTN      REVIEW OF SYSTEMS:    Constitutional, Eyes, ENT, Cardiovascular, Respiratory, Gastrointestinal, Genitourinary, Musculoskeletal, Integumentary, Neurological, Psychiatric, Endocrine, Heme/Lymph and Allergic/Immunologic review of systems are otherwise negative except as noted in HPI.       VITALS:    T(C): 36.4 (14 Mar 2023 09:13), Max: 36.6 (13 Mar 2023 16:13)  T(F): 97.5 (14 Mar 2023 09:13), Max: 97.9 (14 Mar 2023 05:57)  HR: 51 (14 Mar 2023 09:13) (51 - 64)  BP: 147/116 (14 Mar 2023 09:13) (138/74 - 156/78)  BP(mean): --  RR: 18 (14 Mar 2023 09:13) (18 - 18)  SpO2: 96% (14 Mar 2023 09:13) (96% - 99%)    Parameters below as of 14 Mar 2023 09:13  Patient On (Oxygen Delivery Method): room air        PHYSICAL:    Constitutional: no acute distress  Eyes: PERRL, EOMI  ENT: pharynx is unremarkable   Neck: supple without JVD  Pulmonary: clear to auscultation bilaterally, no dullness, no wheezing  Cardiac: RRR, normal S1S2  Vascular: no calf tenderness, venous stasis changes, varices  Abdomen: normoactive bowel sounds, soft and nontender, no hepatosplenomegaly or masses appreciated  Lymphatic: no peripheral adenopathy appreciated  Musculoskeletal: full range of motion and no deformities appreciated  Skin: normal appearance, no rash/erythema  Neurology: grossly intact        LABS:    CBC Full  -  ( 14 Mar 2023 05:16 )  WBC Count : 10.57 K/uL  RBC Count : 4.33 M/uL  Hemoglobin : 10.6 g/dL  Hematocrit : 33.5 %  Platelet Count - Automated : 206 K/uL  Mean Cell Volume : 77.4 fl  Mean Cell Hemoglobin : 24.5 pg  Mean Cell Hemoglobin Concentration : 31.6 gm/dL  Auto Neutrophil # : 7.82 K/uL  Auto Lymphocyte # : 1.69 K/uL  Auto Monocyte # : 0.65 K/uL  Auto Eosinophil # : 0.30 K/uL  Auto Basophil # : 0.06 K/uL  Auto Neutrophil % : 74.0 %  Auto Lymphocyte % : 16.0 %  Auto Monocyte % : 6.1 %  Auto Eosinophil % : 2.8 %  Auto Basophil % : 0.6 %    03-14    138  |  109<H>  |  25<H>  ----------------------------<  139<H>  3.7   |  25  |  1.74<H>    Ca    8.7      14 Mar 2023 05:16      PT/INR - ( 14 Mar 2023 05:16 )   PT: 14.6 sec;   INR: 1.26 ratio         PTT - ( 14 Mar 2023 05:16 )  PTT:29.7 sec      RADIOLOGY & ADDITIONAL STUDIES:        CT Head No Cont (03.11.23 @ 19:14)    IMPRESSION:    HEAD CT: No acute hemorrhage or midline shift.          CT Lumbar Spine No Cont (03.11.23 @ 19:15)    IMPRESSION:    No evidence of an acute lumbar spine fracture.  Multilevel disc bulges/degenerative changes greatest at L4-5 where a   moderate disc bulge and moderate facet and ligamentous degenerative   changes results in severe canal and moderate bilateral neural foramina   narrowing. Please see report for detail and additional findings.          Xray Lumbosacral Spine (03.11.23 @ 21:08)    IMPRESSION:    No fracture or subluxation.  Moderate DJD.  If symptoms persist recommend MRI.          Xray Chest 1 View- PORTABLE-Urgent (Xray Chest 1 View- PORTABLE-Urgent .) (03.12.23 @ 00:47)    IMPRESSION:  No radiographic evidence of acute cardiopulmonary disease.

## 2023-03-15 LAB
ANION GAP SERPL CALC-SCNC: 4 MMOL/L — LOW (ref 5–17)
ANION GAP SERPL CALC-SCNC: 4 MMOL/L — LOW (ref 5–17)
APTT BLD: 29.4 SEC — SIGNIFICANT CHANGE UP (ref 27.5–35.5)
BASOPHILS # BLD AUTO: 0.04 K/UL — SIGNIFICANT CHANGE UP (ref 0–0.2)
BASOPHILS # BLD AUTO: 0.06 K/UL — SIGNIFICANT CHANGE UP (ref 0–0.2)
BASOPHILS NFR BLD AUTO: 0.4 % — SIGNIFICANT CHANGE UP (ref 0–2)
BASOPHILS NFR BLD AUTO: 0.7 % — SIGNIFICANT CHANGE UP (ref 0–2)
BUN SERPL-MCNC: 29 MG/DL — HIGH (ref 7–23)
BUN SERPL-MCNC: 30 MG/DL — HIGH (ref 7–23)
CALCIUM SERPL-MCNC: 8.5 MG/DL — SIGNIFICANT CHANGE UP (ref 8.5–10.1)
CALCIUM SERPL-MCNC: 8.6 MG/DL — SIGNIFICANT CHANGE UP (ref 8.5–10.1)
CHLORIDE SERPL-SCNC: 106 MMOL/L — SIGNIFICANT CHANGE UP (ref 96–108)
CHLORIDE SERPL-SCNC: 107 MMOL/L — SIGNIFICANT CHANGE UP (ref 96–108)
CO2 SERPL-SCNC: 24 MMOL/L — SIGNIFICANT CHANGE UP (ref 22–31)
CO2 SERPL-SCNC: 26 MMOL/L — SIGNIFICANT CHANGE UP (ref 22–31)
CREAT SERPL-MCNC: 1.72 MG/DL — HIGH (ref 0.5–1.3)
CREAT SERPL-MCNC: 1.89 MG/DL — HIGH (ref 0.5–1.3)
EGFR: 44 ML/MIN/1.73M2 — LOW
EGFR: 49 ML/MIN/1.73M2 — LOW
EOSINOPHIL # BLD AUTO: 0.01 K/UL — SIGNIFICANT CHANGE UP (ref 0–0.5)
EOSINOPHIL # BLD AUTO: 0.3 K/UL — SIGNIFICANT CHANGE UP (ref 0–0.5)
EOSINOPHIL NFR BLD AUTO: 0.1 % — SIGNIFICANT CHANGE UP (ref 0–6)
EOSINOPHIL NFR BLD AUTO: 3.6 % — SIGNIFICANT CHANGE UP (ref 0–6)
GLUCOSE BLDC GLUCOMTR-MCNC: 174 MG/DL — HIGH (ref 70–99)
GLUCOSE BLDC GLUCOMTR-MCNC: 182 MG/DL — HIGH (ref 70–99)
GLUCOSE BLDC GLUCOMTR-MCNC: 214 MG/DL — HIGH (ref 70–99)
GLUCOSE BLDC GLUCOMTR-MCNC: 229 MG/DL — HIGH (ref 70–99)
GLUCOSE BLDC GLUCOMTR-MCNC: 335 MG/DL — HIGH (ref 70–99)
GLUCOSE SERPL-MCNC: 182 MG/DL — HIGH (ref 70–99)
GLUCOSE SERPL-MCNC: 228 MG/DL — HIGH (ref 70–99)
HCT VFR BLD CALC: 33.9 % — LOW (ref 39–50)
HCT VFR BLD CALC: 36.4 % — LOW (ref 39–50)
HGB BLD-MCNC: 10.9 G/DL — LOW (ref 13–17)
HGB BLD-MCNC: 11.6 G/DL — LOW (ref 13–17)
IMM GRANULOCYTES NFR BLD AUTO: 0.5 % — SIGNIFICANT CHANGE UP (ref 0–0.9)
IMM GRANULOCYTES NFR BLD AUTO: 0.8 % — SIGNIFICANT CHANGE UP (ref 0–0.9)
INR BLD: 1.18 RATIO — HIGH (ref 0.88–1.16)
LYMPHOCYTES # BLD AUTO: 0.55 K/UL — LOW (ref 1–3.3)
LYMPHOCYTES # BLD AUTO: 1.95 K/UL — SIGNIFICANT CHANGE UP (ref 1–3.3)
LYMPHOCYTES # BLD AUTO: 23.5 % — SIGNIFICANT CHANGE UP (ref 13–44)
LYMPHOCYTES # BLD AUTO: 5.8 % — LOW (ref 13–44)
MCHC RBC-ENTMCNC: 24.7 PG — LOW (ref 27–34)
MCHC RBC-ENTMCNC: 24.8 PG — LOW (ref 27–34)
MCHC RBC-ENTMCNC: 31.9 GM/DL — LOW (ref 32–36)
MCHC RBC-ENTMCNC: 32.2 GM/DL — SIGNIFICANT CHANGE UP (ref 32–36)
MCV RBC AUTO: 77 FL — LOW (ref 80–100)
MCV RBC AUTO: 77.4 FL — LOW (ref 80–100)
MONOCYTES # BLD AUTO: 0.1 K/UL — SIGNIFICANT CHANGE UP (ref 0–0.9)
MONOCYTES # BLD AUTO: 0.53 K/UL — SIGNIFICANT CHANGE UP (ref 0–0.9)
MONOCYTES NFR BLD AUTO: 1.1 % — LOW (ref 2–14)
MONOCYTES NFR BLD AUTO: 6.4 % — SIGNIFICANT CHANGE UP (ref 2–14)
NEUTROPHILS # BLD AUTO: 5.43 K/UL — SIGNIFICANT CHANGE UP (ref 1.8–7.4)
NEUTROPHILS # BLD AUTO: 8.65 K/UL — HIGH (ref 1.8–7.4)
NEUTROPHILS NFR BLD AUTO: 65.3 % — SIGNIFICANT CHANGE UP (ref 43–77)
NEUTROPHILS NFR BLD AUTO: 91.8 % — HIGH (ref 43–77)
PLATELET # BLD AUTO: 208 K/UL — SIGNIFICANT CHANGE UP (ref 150–400)
PLATELET # BLD AUTO: 210 K/UL — SIGNIFICANT CHANGE UP (ref 150–400)
POTASSIUM SERPL-MCNC: 3.6 MMOL/L — SIGNIFICANT CHANGE UP (ref 3.5–5.3)
POTASSIUM SERPL-MCNC: 4.2 MMOL/L — SIGNIFICANT CHANGE UP (ref 3.5–5.3)
POTASSIUM SERPL-SCNC: 3.6 MMOL/L — SIGNIFICANT CHANGE UP (ref 3.5–5.3)
POTASSIUM SERPL-SCNC: 4.2 MMOL/L — SIGNIFICANT CHANGE UP (ref 3.5–5.3)
PROTHROM AB SERPL-ACNC: 13.7 SEC — HIGH (ref 10.5–13.4)
RBC # BLD: 4.4 M/UL — SIGNIFICANT CHANGE UP (ref 4.2–5.8)
RBC # BLD: 4.7 M/UL — SIGNIFICANT CHANGE UP (ref 4.2–5.8)
RBC # FLD: 15.6 % — HIGH (ref 10.3–14.5)
RBC # FLD: 15.6 % — HIGH (ref 10.3–14.5)
SARS-COV-2 RNA SPEC QL NAA+PROBE: SIGNIFICANT CHANGE UP
SODIUM SERPL-SCNC: 134 MMOL/L — LOW (ref 135–145)
SODIUM SERPL-SCNC: 137 MMOL/L — SIGNIFICANT CHANGE UP (ref 135–145)
WBC # BLD: 8.31 K/UL — SIGNIFICANT CHANGE UP (ref 3.8–10.5)
WBC # BLD: 9.43 K/UL — SIGNIFICANT CHANGE UP (ref 3.8–10.5)
WBC # FLD AUTO: 8.31 K/UL — SIGNIFICANT CHANGE UP (ref 3.8–10.5)
WBC # FLD AUTO: 9.43 K/UL — SIGNIFICANT CHANGE UP (ref 3.8–10.5)

## 2023-03-15 PROCEDURE — 99233 SBSQ HOSP IP/OBS HIGH 50: CPT

## 2023-03-15 RX ORDER — FOLIC ACID 0.8 MG
1 TABLET ORAL DAILY
Refills: 0 | Status: DISCONTINUED | OUTPATIENT
Start: 2023-03-15 | End: 2023-03-22

## 2023-03-15 RX ORDER — HYDROMORPHONE HYDROCHLORIDE 2 MG/ML
0.5 INJECTION INTRAMUSCULAR; INTRAVENOUS; SUBCUTANEOUS ONCE
Refills: 0 | Status: DISCONTINUED | OUTPATIENT
Start: 2023-03-15 | End: 2023-03-18

## 2023-03-15 RX ORDER — OXYCODONE HYDROCHLORIDE 5 MG/1
5 TABLET ORAL EVERY 4 HOURS
Refills: 0 | Status: DISCONTINUED | OUTPATIENT
Start: 2023-03-15 | End: 2023-03-18

## 2023-03-15 RX ORDER — SODIUM CHLORIDE 9 MG/ML
1000 INJECTION, SOLUTION INTRAVENOUS
Refills: 0 | Status: DISCONTINUED | OUTPATIENT
Start: 2023-03-15 | End: 2023-03-22

## 2023-03-15 RX ORDER — INSULIN LISPRO 100/ML
VIAL (ML) SUBCUTANEOUS AT BEDTIME
Refills: 0 | Status: DISCONTINUED | OUTPATIENT
Start: 2023-03-15 | End: 2023-03-22

## 2023-03-15 RX ORDER — ONDANSETRON 8 MG/1
4 TABLET, FILM COATED ORAL EVERY 6 HOURS
Refills: 0 | Status: DISCONTINUED | OUTPATIENT
Start: 2023-03-15 | End: 2023-03-22

## 2023-03-15 RX ORDER — DEXTROSE 50 % IN WATER 50 %
25 SYRINGE (ML) INTRAVENOUS ONCE
Refills: 0 | Status: DISCONTINUED | OUTPATIENT
Start: 2023-03-15 | End: 2023-03-22

## 2023-03-15 RX ORDER — INSULIN LISPRO 100/ML
VIAL (ML) SUBCUTANEOUS
Refills: 0 | Status: DISCONTINUED | OUTPATIENT
Start: 2023-03-15 | End: 2023-03-22

## 2023-03-15 RX ORDER — HYDROMORPHONE HYDROCHLORIDE 2 MG/ML
1 INJECTION INTRAMUSCULAR; INTRAVENOUS; SUBCUTANEOUS ONCE
Refills: 0 | Status: DISCONTINUED | OUTPATIENT
Start: 2023-03-15 | End: 2023-03-15

## 2023-03-15 RX ORDER — HYDROMORPHONE HYDROCHLORIDE 2 MG/ML
0.5 INJECTION INTRAMUSCULAR; INTRAVENOUS; SUBCUTANEOUS
Refills: 0 | Status: DISCONTINUED | OUTPATIENT
Start: 2023-03-15 | End: 2023-03-15

## 2023-03-15 RX ORDER — HYDROMORPHONE HYDROCHLORIDE 2 MG/ML
0.3 INJECTION INTRAMUSCULAR; INTRAVENOUS; SUBCUTANEOUS
Refills: 0 | Status: DISCONTINUED | OUTPATIENT
Start: 2023-03-15 | End: 2023-03-15

## 2023-03-15 RX ORDER — OXYCODONE HYDROCHLORIDE 5 MG/1
5 TABLET ORAL ONCE
Refills: 0 | Status: DISCONTINUED | OUTPATIENT
Start: 2023-03-15 | End: 2023-03-15

## 2023-03-15 RX ORDER — HYDROMORPHONE HYDROCHLORIDE 2 MG/ML
2 INJECTION INTRAMUSCULAR; INTRAVENOUS; SUBCUTANEOUS EVERY 4 HOURS
Refills: 0 | Status: DISCONTINUED | OUTPATIENT
Start: 2023-03-15 | End: 2023-03-21

## 2023-03-15 RX ORDER — OXYCODONE HYDROCHLORIDE 5 MG/1
15 TABLET ORAL EVERY 12 HOURS
Refills: 0 | Status: COMPLETED | OUTPATIENT
Start: 2023-03-15 | End: 2023-03-22

## 2023-03-15 RX ORDER — ACETAMINOPHEN 500 MG
650 TABLET ORAL EVERY 6 HOURS
Refills: 0 | Status: DISCONTINUED | OUTPATIENT
Start: 2023-03-15 | End: 2023-03-22

## 2023-03-15 RX ORDER — DEXTROSE 50 % IN WATER 50 %
15 SYRINGE (ML) INTRAVENOUS ONCE
Refills: 0 | Status: DISCONTINUED | OUTPATIENT
Start: 2023-03-15 | End: 2023-03-22

## 2023-03-15 RX ORDER — GLUCAGON INJECTION, SOLUTION 0.5 MG/.1ML
1 INJECTION, SOLUTION SUBCUTANEOUS ONCE
Refills: 0 | Status: DISCONTINUED | OUTPATIENT
Start: 2023-03-15 | End: 2023-03-22

## 2023-03-15 RX ORDER — SENNA PLUS 8.6 MG/1
2 TABLET ORAL AT BEDTIME
Refills: 0 | Status: DISCONTINUED | OUTPATIENT
Start: 2023-03-15 | End: 2023-03-22

## 2023-03-15 RX ORDER — ASCORBIC ACID 60 MG
500 TABLET,CHEWABLE ORAL
Refills: 0 | Status: DISCONTINUED | OUTPATIENT
Start: 2023-03-15 | End: 2023-03-22

## 2023-03-15 RX ORDER — CYCLOBENZAPRINE HYDROCHLORIDE 10 MG/1
10 TABLET, FILM COATED ORAL EVERY 8 HOURS
Refills: 0 | Status: DISCONTINUED | OUTPATIENT
Start: 2023-03-15 | End: 2023-03-22

## 2023-03-15 RX ORDER — CEFAZOLIN SODIUM 1 G
3000 VIAL (EA) INJECTION EVERY 8 HOURS
Refills: 0 | Status: COMPLETED | OUTPATIENT
Start: 2023-03-15 | End: 2023-03-16

## 2023-03-15 RX ORDER — DEXTROSE 50 % IN WATER 50 %
12.5 SYRINGE (ML) INTRAVENOUS ONCE
Refills: 0 | Status: DISCONTINUED | OUTPATIENT
Start: 2023-03-15 | End: 2023-03-22

## 2023-03-15 RX ORDER — OXYCODONE HYDROCHLORIDE 5 MG/1
10 TABLET ORAL EVERY 4 HOURS
Refills: 0 | Status: DISCONTINUED | OUTPATIENT
Start: 2023-03-15 | End: 2023-03-18

## 2023-03-15 RX ORDER — OXYCODONE HYDROCHLORIDE 5 MG/1
10 TABLET ORAL ONCE
Refills: 0 | Status: DISCONTINUED | OUTPATIENT
Start: 2023-03-15 | End: 2023-03-15

## 2023-03-15 RX ORDER — ONDANSETRON 8 MG/1
4 TABLET, FILM COATED ORAL ONCE
Refills: 0 | Status: DISCONTINUED | OUTPATIENT
Start: 2023-03-15 | End: 2023-03-15

## 2023-03-15 RX ORDER — PANTOPRAZOLE SODIUM 20 MG/1
40 TABLET, DELAYED RELEASE ORAL
Refills: 0 | Status: DISCONTINUED | OUTPATIENT
Start: 2023-03-15 | End: 2023-03-22

## 2023-03-15 RX ORDER — SODIUM CHLORIDE 9 MG/ML
1000 INJECTION, SOLUTION INTRAVENOUS
Refills: 0 | Status: DISCONTINUED | OUTPATIENT
Start: 2023-03-15 | End: 2023-03-15

## 2023-03-15 RX ADMIN — Medication 2 MILLIGRAM(S): at 06:02

## 2023-03-15 RX ADMIN — Medication 2 MILLIGRAM(S): at 21:37

## 2023-03-15 RX ADMIN — Medication 1 PATCH: at 15:04

## 2023-03-15 RX ADMIN — Medication 2: at 21:36

## 2023-03-15 RX ADMIN — Medication 1: at 05:51

## 2023-03-15 RX ADMIN — Medication 2 MILLIGRAM(S): at 16:54

## 2023-03-15 RX ADMIN — HYDROMORPHONE HYDROCHLORIDE 1 MILLIGRAM(S): 2 INJECTION INTRAMUSCULAR; INTRAVENOUS; SUBCUTANEOUS at 17:58

## 2023-03-15 RX ADMIN — Medication 1 PATCH: at 18:08

## 2023-03-15 RX ADMIN — Medication 2 MILLIGRAM(S): at 04:01

## 2023-03-15 RX ADMIN — Medication 400 MILLIGRAM(S): at 14:19

## 2023-03-15 RX ADMIN — HYDROMORPHONE HYDROCHLORIDE 1 MILLIGRAM(S): 2 INJECTION INTRAMUSCULAR; INTRAVENOUS; SUBCUTANEOUS at 16:52

## 2023-03-15 RX ADMIN — HYDROMORPHONE HYDROCHLORIDE 1 MILLIGRAM(S): 2 INJECTION INTRAMUSCULAR; INTRAVENOUS; SUBCUTANEOUS at 18:08

## 2023-03-15 RX ADMIN — Medication 0.1 MILLIGRAM(S): at 21:28

## 2023-03-15 RX ADMIN — GABAPENTIN 600 MILLIGRAM(S): 400 CAPSULE ORAL at 21:28

## 2023-03-15 RX ADMIN — Medication 25 MILLIGRAM(S): at 16:53

## 2023-03-15 RX ADMIN — Medication 5 MILLIGRAM(S): at 05:35

## 2023-03-15 RX ADMIN — HYDROMORPHONE HYDROCHLORIDE 2 MILLIGRAM(S): 2 INJECTION INTRAMUSCULAR; INTRAVENOUS; SUBCUTANEOUS at 23:10

## 2023-03-15 RX ADMIN — OXYCODONE HYDROCHLORIDE 10 MILLIGRAM(S): 5 TABLET ORAL at 15:05

## 2023-03-15 RX ADMIN — Medication 1 PATCH: at 16:51

## 2023-03-15 RX ADMIN — FINASTERIDE 5 MILLIGRAM(S): 5 TABLET, FILM COATED ORAL at 16:51

## 2023-03-15 RX ADMIN — SODIUM CHLORIDE 100 MILLILITER(S): 9 INJECTION, SOLUTION INTRAVENOUS at 13:59

## 2023-03-15 RX ADMIN — Medication 2 MILLIGRAM(S): at 06:01

## 2023-03-15 RX ADMIN — Medication 2 MILLIGRAM(S): at 05:53

## 2023-03-15 RX ADMIN — OXYCODONE HYDROCHLORIDE 15 MILLIGRAM(S): 5 TABLET ORAL at 21:27

## 2023-03-15 RX ADMIN — SENNA PLUS 2 TABLET(S): 8.6 TABLET ORAL at 21:27

## 2023-03-15 RX ADMIN — CYCLOBENZAPRINE HYDROCHLORIDE 10 MILLIGRAM(S): 10 TABLET, FILM COATED ORAL at 21:30

## 2023-03-15 RX ADMIN — Medication 25 MILLIGRAM(S): at 01:03

## 2023-03-15 RX ADMIN — OXYCODONE HYDROCHLORIDE 10 MILLIGRAM(S): 5 TABLET ORAL at 13:52

## 2023-03-15 RX ADMIN — HYDROMORPHONE HYDROCHLORIDE 0.5 MILLIGRAM(S): 2 INJECTION INTRAMUSCULAR; INTRAVENOUS; SUBCUTANEOUS at 15:04

## 2023-03-15 RX ADMIN — HYDROMORPHONE HYDROCHLORIDE 1 MILLIGRAM(S): 2 INJECTION INTRAMUSCULAR; INTRAVENOUS; SUBCUTANEOUS at 17:28

## 2023-03-15 RX ADMIN — Medication 1000 MILLIGRAM(S): at 15:04

## 2023-03-15 RX ADMIN — DULOXETINE HYDROCHLORIDE 60 MILLIGRAM(S): 30 CAPSULE, DELAYED RELEASE ORAL at 16:51

## 2023-03-15 RX ADMIN — Medication 500 MILLIGRAM(S): at 21:35

## 2023-03-15 RX ADMIN — TAMSULOSIN HYDROCHLORIDE 0.4 MILLIGRAM(S): 0.4 CAPSULE ORAL at 21:27

## 2023-03-15 RX ADMIN — Medication 5 MILLIGRAM(S): at 21:28

## 2023-03-15 RX ADMIN — Medication 2: at 16:56

## 2023-03-15 RX ADMIN — Medication 25 MILLIGRAM(S): at 06:01

## 2023-03-15 RX ADMIN — HYDROMORPHONE HYDROCHLORIDE 2 MILLIGRAM(S): 2 INJECTION INTRAMUSCULAR; INTRAVENOUS; SUBCUTANEOUS at 18:53

## 2023-03-15 RX ADMIN — GABAPENTIN 600 MILLIGRAM(S): 400 CAPSULE ORAL at 06:01

## 2023-03-15 RX ADMIN — HYDROMORPHONE HYDROCHLORIDE 0.5 MILLIGRAM(S): 2 INJECTION INTRAMUSCULAR; INTRAVENOUS; SUBCUTANEOUS at 13:51

## 2023-03-15 RX ADMIN — Medication 2 MILLIGRAM(S): at 17:27

## 2023-03-15 RX ADMIN — SODIUM CHLORIDE 75 MILLILITER(S): 9 INJECTION, SOLUTION INTRAVENOUS at 16:53

## 2023-03-15 RX ADMIN — Medication 1 TABLET(S): at 21:27

## 2023-03-15 NOTE — PROGRESS NOTE ADULT - SUBJECTIVE AND OBJECTIVE BOX
Postop Check    Patient tolerated the procedure well. Patient seen and examined at bedside. No acute complaints at this time. Pain well controlled. Denies weakness, numbness or tingling. Denies chest pain, shortness of breath, nausea or vomiting.     PE:  Vital Signs Last 24 Hrs  T(C): 36.2 (03-15-23 @ 14:30), Max: 36.6 (03-15-23 @ 06:03)  T(F): 97.1 (03-15-23 @ 14:30), Max: 97.9 (03-15-23 @ 06:03)  HR: 71 (03-15-23 @ 14:30) (63 - 71)  BP: 155/67 (03-15-23 @ 14:30) (126/72 - 155/67)  BP(mean): --  RR: 13 (03-15-23 @ 14:30) (12 - 18)  SpO2: 100% (03-15-23 @ 14:30) (95% - 100%)    General: NAD, resting comforatbly in bed  Dressing C/D/I  Drains present  2+ radial pulses  2+ DP Pulses    Motor:                   C5                C6              C7               C8           T1   R            5/5                4/5            4/5             5/5          5/5  L             5/5               4/5             4/5             4/5          5/5                L2             L3             L4               L5            S1  R         4/5           4/5          2/5             2/5           4/5  L          4/5          5/5           4/5             4/5           4/5    Sensory:            C5         C6         C7      C8       T1        (0=absent, 1=impaired, 2=normal, NT=not testable)  R         2            2           2        2         2  L          2            2           2        2         2               L2          L3         L4      L5       S1         (0=absent, 1=impaired, 2=normal, NT=not testable)  R         2            2            2        2        2  L          2            2           2        2         2          PT/INR - ( 15 Mar 2023 05:44 )   PT: 13.7 sec;   INR: 1.18 ratio         PTT - ( 15 Mar 2023 05:44 )  PTT:29.4 sec    A/P:  48y m s/p L4-5 lami and PSF POD 0  -PT/OT   -WBAT  -Pain Control  -DVT ppxSCD hold chemo dvt ppx  -Continue perioperative abx while drains in  -FU AM Labs  -Incentive Spirometry  -Medical management appreciated

## 2023-03-15 NOTE — PROGRESS NOTE ADULT - SUBJECTIVE AND OBJECTIVE BOX
Patient seen and examined at bedside with Dr. Mills. Imaging findings and case reviewed and discussed with the patient. Discussed risks, benefits, and alternatives to surgical intervention in detail; at this time, patient stated desire to proceed with surgery. Plan for OR this AM at 0730 for L4-5 posterior spinal decompression. Today, patient notes symptoms are stable. NAEON. Patient denies fevers, chills, saddle anesthesia, bowel or bladder incontinence, leg pain, numbness, weakness, or any other orthopaedic complaint.     Vital Signs Last 24 Hrs  T(C): 36.1 (14 Mar 2023 15:20), Max: 36.6 (14 Mar 2023 05:57)  T(F): 97 (14 Mar 2023 15:20), Max: 97.9 (14 Mar 2023 05:57)  HR: 67 (14 Mar 2023 15:20) (51 - 67)  BP: 119/60 (14 Mar 2023 15:20) (119/60 - 147/116)  BP(mean): 73 (14 Mar 2023 15:20) (73 - 73)  RR: 18 (14 Mar 2023 15:20) (18 - 18)  SpO2: 97% (14 Mar 2023 15:20) (96% - 99%)    Parameters below as of 14 Mar 2023 15:20  Patient On (Oxygen Delivery Method): room air        Physical Exam:  Gen: NAD  Spine:  Skin intact. No abrasions/lacerations appreciated.  Diffuse midline TTP C/T/LSp; No bony step offs; No paraspinal muscle ttp/hypertonicity   Negative Straight leg raise  positive clonus on the left; Negative babinski; Negative craig      Motor:                   C5                C6              C7               C8           T1   R            5/5                4/5            4/5             5/5          5/5  L             5/5               4/5             4/5             4/5          5/5                L2             L3             L4               L5            S1  R         3/5           4/5          1/5             1/5           4/5  L          3/5          5/5           4/5             4/5           4/5    Sensory:            C5         C6         C7      C8       T1        (0=absent, 1=impaired, 2=normal, NT=not testable)  R         2            1           1        2         2  L          2            2           2        2         2               L2          L3         L4      L5       S1         (0=absent, 1=impaired, 2=normal, NT=not testable)  R         2            2            1        1        1  L          2            2           2        2         2                            10.9   9.47  )-----------( 206      ( 14 Mar 2023 23:22 )             33.9     03-14    138  |  109<H>  |  25<H>  ----------------------------<  139<H>  3.7   |  25  |  1.74<H>    Ca    8.7      14 Mar 2023 05:16          A/P: 48y Male with new onset inability to ambulate likely 2/2 progression of lumbar spine cord compression    Medical management appreciated  Patient was transferred to outside facility for neuraxial imaging due to body habitus/MRI bore diameter - however only MRI LSpine was performed  MRI LSpine reviewed with L4/5 severe central stenosis, L1/2 central HNP without nerve root compression  FU AM Labs  WBAT w/ assistive devices as needed  PT/OT  NPO for OR this AM, hold heparin gtt  Pain control PRN  Dispo: pending OR this AM  Will discuss case and imaging with Dr. Mills this morning to determine further plan of care, may require operative decompression. Will advise if any changes to plan

## 2023-03-15 NOTE — PROGRESS NOTE ADULT - SUBJECTIVE AND OBJECTIVE BOX
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 He has had off-and-on lower back pain with peripheral LE neuropathy since 2016 when he had Guillain-Barré and a CVA with R sided deficits in 2016. Normally, he can walk "shaky with a walker." However, over the past 3 weeks, his symptoms have gotten worse, with frequent burning pain in his lower back and spasms. His symptoms are worse when he lies flat or when he tries to transition to his walker. Therefore, he has had marked difficulty ambulating. He has baseline urinary incontinence and uses Pull-ups. He has some improved bowel continence, but cannot hold his bowels in for long. Those symptoms are not new. He denies CP, cough, N/V, abdominal pain, CP, or SOB.  He went to [facility] 2 nights ago and was "in the ER for 30 hours" not moving. His pain worsened even more after discharge.    He also notes a history of VRE and was recently (1 week ago) prescribed Augmentin, which he only took 2 days of. He says when he goes to [facility] he is "always on isolation," because of his history of VRE. He is not sure if he has dysuria currently, but he had it when he was prescribed antibiotics last week.  He also is on Warfarin for an unknown "blood clotting disorder," stemming from a blood clot in 2016 (of note, he was intubated at that time for Guillain-[place] and suffered a CVA during that time), but he has not had a blood clot since then. He states he has an IVC filter. He is non-compliant with his Warfarin, stating he takes it about 3-4 days per week.    In the ED, he was given Flexeril 10 mg PO x1 and  mg x1 with Morphine 4 mg IV x1 ordered.    3/13 afebrile , says has mild urethral burning on micturation, no flank pain, , no chills , lower back pain not controlled with oxycodone 5, will increase oxycodone 10   3/14 afebrile , Patient was transferred to [facility] last night for MRI neuraxial imaging - however only MRI LSpine was performed,no acute overnight events, WBC 10, denies CP, sob, no new complaints   B/L lower back pain controlled with oxycodone and dilaudid     3/15: s/p Fusion of posterior lumbar spine 4/5 and decompression with bilateral mayank laminectomy  asking for more pain meds; says 2 mg dilaudid iv did nothing.         PHYSICAL EXAM:    Vital Signs Last 24 Hrs  T(C): 36.2 (15 Mar 2023 14:30), Max: 36.6 (15 Mar 2023 06:03)  T(F): 97.1 (15 Mar 2023 14:30), Max: 97.9 (15 Mar 2023 06:03)  HR: 71 (15 Mar 2023 14:30) (63 - 71)  BP: 155/67 (15 Mar 2023 14:30) (126/72 - 155/67)  BP(mean): --  RR: 13 (15 Mar 2023 14:30) (12 - 18)  SpO2: 100% (15 Mar 2023 14:30) (95% - 100%)    Parameters below as of 15 Mar 2023 14:30  Patient On (Oxygen Delivery Method): nasal cannula  O2 Flow (L/min): 3      Physical exam   GENERAL: No acute distress  HEENT: PERRL, EOMI, MMM, no oropharyngeal lesions  NECK: supple, no stiffness, no JVD, no thyromegaly  PULM: respirations non-labored, clear to auscultation bilaterally, no rales, rhonchi, or wheezes  CV: regular rate and rhythm, no murmurs, gallops, or rubs  GI: abdomen soft, + slight RUQ TTP, nondistended, no masses felt, normal bowel sounds  MSK: + lumbar spinal TTP. No joint swelling, erythema, or warmth.  LYMPH: no anterior cervical, posterior cervical, supraclavicular, or inguinal lymphadenopathy  NEURO: occasional spasms of b/l LEs. A&Ox3, sensation intact. Strength 2/5 b/l LEs  SKIN: 1+ b/l LE edema. No rashes or lesions                              11.6   9.43  )-----------( 210      ( 15 Mar 2023 14:02 )             36.4                             10.6   10.57 )-----------( 206      ( 14 Mar 2023 05:16 )             33.5       CBC Full  -  ( 14 Mar 2023 05:16 )  WBC Count : 10.57 K/uL  RBC Count : 4.33 M/uL  Hemoglobin : 10.6 g/dL  Hematocrit : 33.5 %  Platelet Count - Automated : 206 K/uL  Mean Cell Volume : 77.4 fl  Mean Cell Hemoglobin : 24.5 pg  Mean Cell Hemoglobin Concentration : 31.6 gm/dL  Auto Neutrophil # : 7.82 K/uL  Auto Lymphocyte # : 1.69 K/uL  Auto Monocyte # : 0.65 K/uL  Auto Eosinophil # : 0.30 K/uL  Auto Basophil # : 0.06 K/uL  Auto Neutrophil % : 74.0 %  Auto Lymphocyte % : 16.0 %  Auto Monocyte % : 6.1 %  Auto Eosinophil % : 2.8 %  Auto Basophil % : 0.6 %    03-15    134<L>  |  106  |  29<H>  ----------------------------<  228<H>  4.2   |  24  |  1.89<H>    Ca    8.6      15 Mar 2023 14:02      03-14    138  |  109<H>  |  25<H>  ----------------------------<  139<H>  3.7   |  25  |  1.74<H>    Ca    8.7      14 Mar 2023 05:16            PT/INR - ( 14 Mar 2023 05:16 )   PT: 14.6 sec;   INR: 1.26 ratio         PTT - ( 14 Mar 2023 12:04 )  PTT:102.3 sec          MEDICATIONS  (STANDING):  amLODIPine   Tablet 10 milliGRAM(s) Oral daily  amoxicillin  875 milliGRAM(s)/clavulanate 1 Tablet(s) Oral every 12 hours  ascorbic acid 500 milliGRAM(s) Oral two times a day  baclofen 5 milliGRAM(s) Oral every 12 hours  ceFAZolin   IVPB 3000 milliGRAM(s) IV Intermittent every 8 hours  cloNIDine 0.1 milliGRAM(s) Oral two times a day  cyclobenzaprine 10 milliGRAM(s) Oral every 8 hours  dextrose 5%. 1000 milliLiter(s) (50 mL/Hr) IV Continuous <Continuous>  dextrose 5%. 1000 milliLiter(s) (100 mL/Hr) IV Continuous <Continuous>  dextrose 50% Injectable 25 Gram(s) IV Push once  dextrose 50% Injectable 12.5 Gram(s) IV Push once  dextrose 50% Injectable 25 Gram(s) IV Push once  dextrose 50% Injectable 25 Gram(s) IV Push once  dextrose 50% Injectable 12.5 Gram(s) IV Push once  dextrose 50% Injectable 25 Gram(s) IV Push once  DULoxetine 60 milliGRAM(s) Oral daily  finasteride 5 milliGRAM(s) Oral daily  folic acid 1 milliGRAM(s) Oral daily  gabapentin 600 milliGRAM(s) Oral every 8 hours  glucagon  Injectable 1 milliGRAM(s) IntraMuscular once  glucagon  Injectable 1 milliGRAM(s) IntraMuscular once  hydrALAZINE 25 milliGRAM(s) Oral four times a day  HYDROmorphone  Injectable 0.5 milliGRAM(s) IV Push once  insulin lispro (ADMELOG) corrective regimen sliding scale   SubCutaneous three times a day before meals  insulin lispro (ADMELOG) corrective regimen sliding scale   SubCutaneous at bedtime  insulin lispro (ADMELOG) corrective regimen sliding scale   SubCutaneous every 6 hours  lactated ringers. 1000 milliLiter(s) (75 mL/Hr) IV Continuous <Continuous>  lactated ringers. 1000 milliLiter(s) (75 mL/Hr) IV Continuous <Continuous>  lactated ringers. 1000 milliLiter(s) (75 mL/Hr) IV Continuous <Continuous>  metoclopramide 10 milliGRAM(s) Oral once  multivitamin 1 Tablet(s) Oral daily  nicotine  Polacrilex Gum 2 milliGRAM(s) Oral every 2 hours  nicotine -  14 mG/24Hr(s) Patch 1 Patch Transdermal daily  oxyCODONE  ER Tablet 15 milliGRAM(s) Oral every 12 hours  pantoprazole    Tablet 40 milliGRAM(s) Oral before breakfast  senna 2 Tablet(s) Oral at bedtime  tamsulosin 0.4 milliGRAM(s) Oral at bedtime    MEDICATIONS  (PRN):  acetaminophen     Tablet .. 650 milliGRAM(s) Oral every 6 hours PRN Mild Pain (1 - 3)  acetaminophen     Tablet .. 650 milliGRAM(s) Oral every 6 hours PRN Temp greater or equal to 38C (100.4F), Mild Pain (1 - 3)  dextrose Oral Gel 15 Gram(s) Oral once PRN Blood Glucose LESS THAN 70 milliGRAM(s)/deciliter  dextrose Oral Gel 15 Gram(s) Oral once PRN Blood Glucose LESS THAN 70 milliGRAM(s)/deciliter  HYDROmorphone  Injectable 2 milliGRAM(s) IV Push every 4 hours PRN Severe Pain (7 - 10)  melatonin 3 milliGRAM(s) Oral at bedtime PRN Insomnia  ondansetron Injectable 4 milliGRAM(s) IV Push every 6 hours PRN Nausea and/or Vomiting  ondansetron Injectable 4 milliGRAM(s) IV Push every 8 hours PRN Nausea and/or Vomiting  oxyCODONE    IR 5 milliGRAM(s) Oral every 4 hours PRN Moderate Pain (4 - 6)  oxyCODONE    IR 5 milliGRAM(s) Oral every 4 hours PRN Moderate Pain (4 - 6)  oxyCODONE    IR 10 milliGRAM(s) Oral every 4 hours PRN Severe Pain (7 - 10)

## 2023-03-15 NOTE — BRIEF OPERATIVE NOTE - NSICDXBRIEFPROCEDURE_GEN_ALL_CORE_FT
PROCEDURES:  Fusion of posterior lumbar spine 15-Mar-2023 13:37:18 L4/5 and decompression with bilateral hemilami Fortunato Ruelas

## 2023-03-15 NOTE — CONSULT NOTE ADULT - ASSESSMENT
47 yo M who follows with my colleague Dr. 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     #  h/o APLS  - pt with positive lupus anticoagulant in the past with h/o DVT, previous h/o IVC filter placement and CVA  - pt is high risk for recurrence of DVT given body habitus, previous clotting history and immobility  - held heparin gtt overnight in preparation for spinal decompression today of L4-5  - would recommend re-initiating hep gtt following surgery when safe as per ortho team   - will then plan for transition back to coumadin 5 mg qd thereafter- pt has been on coumadin for the past 6 years and follows with DR. Devon Avery outpatient for continued monitoring   - INR 1.18- will need to trend daily    will continue to follow

## 2023-03-15 NOTE — CONSULT NOTE ADULT - SUBJECTIVE AND OBJECTIVE BOX
HPI:  49 yo M who follows with my colleague Dr. 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     He has had off-and-on lower back pain with peripheral LE neuropathy since 2016 when he had Guillain-Barré and a CVA with R sided deficits in 2016. Normally, he can walk "shaky with a walker." However, over the past 3 weeks, his symptoms have gotten worse, with frequent burning pain in his lower back and spasms. His symptoms are worse when he lies flat or when he tries to transition to his walker. Therefore, he has had marked difficulty ambulating. He has baseline urinary incontinence and uses Pull-ups. He has some improved bowel continence, but cannot hold his bowels in for long. Those symptoms are not new. He denies CP, cough, N/V, abdominal pain, CP, or SOB.  He went to [facility] 2 nights ago and was "in the ER for 30 hours" not moving. His pain worsened even more after discharge.    He also notes a history of VRE and was recently (1 week ago) prescribed Augmentin, which he only took 2 days of. He says when he goes to [facility] he is "always on isolation," because of his history of VRE. He is not sure if he has dysuria currently, but he had it when he was prescribed antibiotics last week.  He also is on Warfarin for an unknown "blood clotting disorder," stemming from a blood clot in 2016 (of note, he was intubated at that time for Guillain-[place] and suffered a CVA during that time), but he has not had a blood clot since then. He states he has an IVC filter. He is non-compliant with his Warfarin, stating he takes it about 3-4 days per week.    In the ED, he was given Flexeril 10 mg PO x1 and  mg x1 with Morphine 4 mg IV x1 ordered.  PT was transferred to outside facility for neuraxial imaging due to body habitus/MRI bore diameter - however only MRI LSpine was performed  MRI LSpine reviewed with L4/5 severe central stenosis, L1/2 central HNP without nerve root compression    Plan for OR this AM at 0730 for L4-5 posterior spinal decompression    PAST MEDICAL & SURGICAL HISTORY:  BPH (benign prostatic hyperplasia)      HTN (hypertension)      Type 2 diabetes mellitus      Peripheral neuropathy      History of Guillain-[place] syndrome      History of CVA in adulthood      DDD (degenerative disc disease), lumbar      DVT, lower extremity      S/P IVC filter          Allergies    sulfa drugs (Hives)  sulfa drugs (Unknown)    Intolerances        MEDICATIONS  (STANDING):  acetaminophen   IVPB .. 1000 milliGRAM(s) IV Intermittent once  amLODIPine   Tablet 10 milliGRAM(s) Oral daily  amoxicillin  875 milliGRAM(s)/clavulanate 1 Tablet(s) Oral every 12 hours  baclofen 5 milliGRAM(s) Oral every 12 hours  cloNIDine 0.1 milliGRAM(s) Oral two times a day  dextrose 50% Injectable 25 Gram(s) IV Push once  dextrose 50% Injectable 12.5 Gram(s) IV Push once  dextrose 50% Injectable 25 Gram(s) IV Push once  DULoxetine 60 milliGRAM(s) Oral daily  finasteride 5 milliGRAM(s) Oral daily  gabapentin 600 milliGRAM(s) Oral every 8 hours  glucagon  Injectable 1 milliGRAM(s) IntraMuscular once  hydrALAZINE 25 milliGRAM(s) Oral four times a day  insulin lispro (ADMELOG) corrective regimen sliding scale   SubCutaneous every 6 hours  lactated ringers. 1000 milliLiter(s) (75 mL/Hr) IV Continuous <Continuous>  lactated ringers. 1000 milliLiter(s) (75 mL/Hr) IV Continuous <Continuous>  metoclopramide 10 milliGRAM(s) Oral once  nicotine  Polacrilex Gum 2 milliGRAM(s) Oral every 2 hours  nicotine -  14 mG/24Hr(s) Patch 1 Patch Transdermal daily  tamsulosin 0.4 milliGRAM(s) Oral at bedtime    MEDICATIONS  (PRN):  acetaminophen     Tablet .. 650 milliGRAM(s) Oral every 6 hours PRN Temp greater or equal to 38C (100.4F), Mild Pain (1 - 3)  dextrose Oral Gel 15 Gram(s) Oral once PRN Blood Glucose LESS THAN 70 milliGRAM(s)/deciliter  HYDROmorphone  Injectable 1 milliGRAM(s) IV Push every 6 hours PRN Severe Pain (7 - 10)  melatonin 3 milliGRAM(s) Oral at bedtime PRN Insomnia  ondansetron Injectable 4 milliGRAM(s) IV Push every 8 hours PRN Nausea and/or Vomiting  oxyCODONE    IR 5 milliGRAM(s) Oral every 4 hours PRN Moderate Pain (4 - 6)      FAMILY HISTORY:  FH: heart disease    FH: HTN (hypertension)        SOCIAL HISTORY: No EtOH, no tobacco    REVIEW OF SYSTEMS:    CONSTITUTIONAL: + weakness, no fevers or chills  EYES/ENT: No visual changes;  No vertigo or throat pain   NECK: No pain or stiffness  RESPIRATORY: No cough, wheezing, hemoptysis; No shortness of breath  CARDIOVASCULAR: No chest pain or palpitations  GASTROINTESTINAL: No abdominal or epigastric pain. No nausea, vomiting, or hematemesis; No diarrhea or constipation. No melena or hematochezia.  GENITOURINARY: +urinary incontinence  NEUROLOGICAL: + numbness or weakness  SKIN: No itching, burning, rashes, or lesions   All other review of systems is negative unless indicated above.        T(F): 97.9 (03-15-23 @ 06:03), Max: 97.9 (03-15-23 @ 06:03)  HR: 64 (03-15-23 @ 06:03)  BP: 143/73 (03-15-23 @ 06:03)  RR: 18 (03-15-23 @ 06:03)  SpO2: 97% (03-14-23 @ 15:20)  Wt(kg): --    GENERAL: NAD, obese  HEAD:  Atraumatic,  EYES: EOMI,  CHEST/LUNG: Clear to auscultation bilaterally;   HEART: Regular rate and rhythm;   ABDOMEN: Soft, Nontender, Nondistended  EXTREMITIES:  no edema  NEUROLOGY: aox3  SKIN: No rashes or lesions                          10.9   8.31  )-----------( 208      ( 15 Mar 2023 05:44 )             33.9       03-15    137  |  107  |  30<H>  ----------------------------<  182<H>  3.6   |  26  |  1.72<H>    Ca    8.5      15 Mar 2023 05:44            PT/INR - ( 15 Mar 2023 05:44 )   PT: 13.7 sec;   INR: 1.18 ratio         PTT - ( 15 Mar 2023 05:44 )  PTT:29.4 sec    Catheterized None  03-12 @ 03:16   <10,000 CFU/mL Normal Urogenital Ellen  --  --

## 2023-03-16 LAB
ANION GAP SERPL CALC-SCNC: 6 MMOL/L — SIGNIFICANT CHANGE UP (ref 5–17)
BASOPHILS # BLD AUTO: 0.04 K/UL — SIGNIFICANT CHANGE UP (ref 0–0.2)
BASOPHILS NFR BLD AUTO: 0.3 % — SIGNIFICANT CHANGE UP (ref 0–2)
BUN SERPL-MCNC: 31 MG/DL — HIGH (ref 7–23)
CALCIUM SERPL-MCNC: 8.5 MG/DL — SIGNIFICANT CHANGE UP (ref 8.5–10.1)
CHLORIDE SERPL-SCNC: 108 MMOL/L — SIGNIFICANT CHANGE UP (ref 96–108)
CO2 SERPL-SCNC: 27 MMOL/L — SIGNIFICANT CHANGE UP (ref 22–31)
CREAT SERPL-MCNC: 1.84 MG/DL — HIGH (ref 0.5–1.3)
EGFR: 45 ML/MIN/1.73M2 — LOW
EOSINOPHIL # BLD AUTO: 0.09 K/UL — SIGNIFICANT CHANGE UP (ref 0–0.5)
EOSINOPHIL NFR BLD AUTO: 0.6 % — SIGNIFICANT CHANGE UP (ref 0–6)
GLUCOSE BLDC GLUCOMTR-MCNC: 144 MG/DL — HIGH (ref 70–99)
GLUCOSE BLDC GLUCOMTR-MCNC: 172 MG/DL — HIGH (ref 70–99)
GLUCOSE BLDC GLUCOMTR-MCNC: 211 MG/DL — HIGH (ref 70–99)
GLUCOSE BLDC GLUCOMTR-MCNC: 225 MG/DL — HIGH (ref 70–99)
GLUCOSE SERPL-MCNC: 161 MG/DL — HIGH (ref 70–99)
HCT VFR BLD CALC: 30.2 % — LOW (ref 39–50)
HGB BLD-MCNC: 9.9 G/DL — LOW (ref 13–17)
IMM GRANULOCYTES NFR BLD AUTO: 0.5 % — SIGNIFICANT CHANGE UP (ref 0–0.9)
LYMPHOCYTES # BLD AUTO: 1.64 K/UL — SIGNIFICANT CHANGE UP (ref 1–3.3)
LYMPHOCYTES # BLD AUTO: 11.3 % — LOW (ref 13–44)
MCHC RBC-ENTMCNC: 24.8 PG — LOW (ref 27–34)
MCHC RBC-ENTMCNC: 32.8 GM/DL — SIGNIFICANT CHANGE UP (ref 32–36)
MCV RBC AUTO: 75.5 FL — LOW (ref 80–100)
MONOCYTES # BLD AUTO: 1.16 K/UL — HIGH (ref 0–0.9)
MONOCYTES NFR BLD AUTO: 8 % — SIGNIFICANT CHANGE UP (ref 2–14)
NEUTROPHILS # BLD AUTO: 11.53 K/UL — HIGH (ref 1.8–7.4)
NEUTROPHILS NFR BLD AUTO: 79.3 % — HIGH (ref 43–77)
PLATELET # BLD AUTO: 233 K/UL — SIGNIFICANT CHANGE UP (ref 150–400)
POTASSIUM SERPL-MCNC: 3.9 MMOL/L — SIGNIFICANT CHANGE UP (ref 3.5–5.3)
POTASSIUM SERPL-SCNC: 3.9 MMOL/L — SIGNIFICANT CHANGE UP (ref 3.5–5.3)
RBC # BLD: 4 M/UL — LOW (ref 4.2–5.8)
RBC # FLD: 15.7 % — HIGH (ref 10.3–14.5)
SODIUM SERPL-SCNC: 141 MMOL/L — SIGNIFICANT CHANGE UP (ref 135–145)
WBC # BLD: 14.53 K/UL — HIGH (ref 3.8–10.5)
WBC # FLD AUTO: 14.53 K/UL — HIGH (ref 3.8–10.5)

## 2023-03-16 PROCEDURE — 99233 SBSQ HOSP IP/OBS HIGH 50: CPT

## 2023-03-16 RX ADMIN — Medication 2: at 17:14

## 2023-03-16 RX ADMIN — CYCLOBENZAPRINE HYDROCHLORIDE 10 MILLIGRAM(S): 10 TABLET, FILM COATED ORAL at 14:33

## 2023-03-16 RX ADMIN — Medication 2 MILLIGRAM(S): at 07:58

## 2023-03-16 RX ADMIN — Medication 500 MILLIGRAM(S): at 11:00

## 2023-03-16 RX ADMIN — Medication 2 MILLIGRAM(S): at 01:12

## 2023-03-16 RX ADMIN — HYDROMORPHONE HYDROCHLORIDE 2 MILLIGRAM(S): 2 INJECTION INTRAMUSCULAR; INTRAVENOUS; SUBCUTANEOUS at 03:20

## 2023-03-16 RX ADMIN — HYDROMORPHONE HYDROCHLORIDE 2 MILLIGRAM(S): 2 INJECTION INTRAMUSCULAR; INTRAVENOUS; SUBCUTANEOUS at 07:58

## 2023-03-16 RX ADMIN — HYDROMORPHONE HYDROCHLORIDE 2 MILLIGRAM(S): 2 INJECTION INTRAMUSCULAR; INTRAVENOUS; SUBCUTANEOUS at 12:14

## 2023-03-16 RX ADMIN — Medication 1 MILLIGRAM(S): at 11:01

## 2023-03-16 RX ADMIN — FINASTERIDE 5 MILLIGRAM(S): 5 TABLET, FILM COATED ORAL at 11:01

## 2023-03-16 RX ADMIN — Medication 2 MILLIGRAM(S): at 21:28

## 2023-03-16 RX ADMIN — Medication 100 MILLIGRAM(S): at 06:42

## 2023-03-16 RX ADMIN — Medication 2 MILLIGRAM(S): at 06:42

## 2023-03-16 RX ADMIN — Medication 2 MILLIGRAM(S): at 17:15

## 2023-03-16 RX ADMIN — SENNA PLUS 2 TABLET(S): 8.6 TABLET ORAL at 21:27

## 2023-03-16 RX ADMIN — GABAPENTIN 600 MILLIGRAM(S): 400 CAPSULE ORAL at 21:26

## 2023-03-16 RX ADMIN — Medication 1 PATCH: at 11:01

## 2023-03-16 RX ADMIN — Medication 25 MILLIGRAM(S): at 12:14

## 2023-03-16 RX ADMIN — Medication 100 MILLIGRAM(S): at 01:05

## 2023-03-16 RX ADMIN — Medication 1 TABLET(S): at 11:00

## 2023-03-16 RX ADMIN — HYDROMORPHONE HYDROCHLORIDE 2 MILLIGRAM(S): 2 INJECTION INTRAMUSCULAR; INTRAVENOUS; SUBCUTANEOUS at 21:46

## 2023-03-16 RX ADMIN — HYDROMORPHONE HYDROCHLORIDE 2 MILLIGRAM(S): 2 INJECTION INTRAMUSCULAR; INTRAVENOUS; SUBCUTANEOUS at 08:40

## 2023-03-16 RX ADMIN — Medication 500 MILLIGRAM(S): at 21:26

## 2023-03-16 RX ADMIN — Medication 0.1 MILLIGRAM(S): at 21:29

## 2023-03-16 RX ADMIN — Medication 25 MILLIGRAM(S): at 01:06

## 2023-03-16 RX ADMIN — Medication 25 MILLIGRAM(S): at 18:05

## 2023-03-16 RX ADMIN — AMLODIPINE BESYLATE 10 MILLIGRAM(S): 2.5 TABLET ORAL at 11:01

## 2023-03-16 RX ADMIN — Medication 2 MILLIGRAM(S): at 12:19

## 2023-03-16 RX ADMIN — OXYCODONE HYDROCHLORIDE 15 MILLIGRAM(S): 5 TABLET ORAL at 11:09

## 2023-03-16 RX ADMIN — CYCLOBENZAPRINE HYDROCHLORIDE 10 MILLIGRAM(S): 10 TABLET, FILM COATED ORAL at 21:27

## 2023-03-16 RX ADMIN — Medication 5 MILLIGRAM(S): at 21:27

## 2023-03-16 RX ADMIN — DULOXETINE HYDROCHLORIDE 60 MILLIGRAM(S): 30 CAPSULE, DELAYED RELEASE ORAL at 11:01

## 2023-03-16 RX ADMIN — PANTOPRAZOLE SODIUM 40 MILLIGRAM(S): 20 TABLET, DELAYED RELEASE ORAL at 11:00

## 2023-03-16 RX ADMIN — Medication 2 MILLIGRAM(S): at 04:44

## 2023-03-16 RX ADMIN — GABAPENTIN 600 MILLIGRAM(S): 400 CAPSULE ORAL at 06:41

## 2023-03-16 RX ADMIN — HYDROMORPHONE HYDROCHLORIDE 2 MILLIGRAM(S): 2 INJECTION INTRAMUSCULAR; INTRAVENOUS; SUBCUTANEOUS at 17:11

## 2023-03-16 RX ADMIN — Medication 5 MILLIGRAM(S): at 11:00

## 2023-03-16 RX ADMIN — CYCLOBENZAPRINE HYDROCHLORIDE 10 MILLIGRAM(S): 10 TABLET, FILM COATED ORAL at 06:41

## 2023-03-16 RX ADMIN — OXYCODONE HYDROCHLORIDE 15 MILLIGRAM(S): 5 TABLET ORAL at 21:31

## 2023-03-16 RX ADMIN — OXYCODONE HYDROCHLORIDE 15 MILLIGRAM(S): 5 TABLET ORAL at 21:46

## 2023-03-16 RX ADMIN — Medication 2 MILLIGRAM(S): at 11:05

## 2023-03-16 RX ADMIN — Medication 0.1 MILLIGRAM(S): at 11:00

## 2023-03-16 RX ADMIN — Medication 2 MILLIGRAM(S): at 14:38

## 2023-03-16 RX ADMIN — HYDROMORPHONE HYDROCHLORIDE 2 MILLIGRAM(S): 2 INJECTION INTRAMUSCULAR; INTRAVENOUS; SUBCUTANEOUS at 21:32

## 2023-03-16 RX ADMIN — GABAPENTIN 600 MILLIGRAM(S): 400 CAPSULE ORAL at 14:33

## 2023-03-16 RX ADMIN — TAMSULOSIN HYDROCHLORIDE 0.4 MILLIGRAM(S): 0.4 CAPSULE ORAL at 21:27

## 2023-03-16 RX ADMIN — Medication 1: at 13:16

## 2023-03-16 RX ADMIN — Medication 25 MILLIGRAM(S): at 06:43

## 2023-03-16 NOTE — PHYSICAL THERAPY INITIAL EVALUATION ADULT - AMBULATION SKILLS, REHAB EVAL
had been able to ambulate up to 300ft with RW/R AFO before neurogenic claudication,burning pain BLEs forced him to stop,sit down (due to spinal stenosis likely)/needs device

## 2023-03-16 NOTE — PHYSICAL THERAPY INITIAL EVALUATION ADULT - LEVEL OF INDEPENDENCE, REHAB EVAL
instructed in LOG-ROLLING (vs segmental rolling) emphasized no TWISTING when rolling/moderate assist (50% patients effort)

## 2023-03-16 NOTE — PHYSICAL THERAPY INITIAL EVALUATION ADULT - MANUAL MUSCLE TESTING RESULTS, REHAB EVAL
Rankle/toe EXT= 0-1/5 with h/o chronic drop-foot x 6 years ; R proximal LE 3+ to 4-/5 , LLE > 4/5 ; LUE=5/5 thruout , RUE =4-4+/5

## 2023-03-16 NOTE — PROGRESS NOTE ADULT - SUBJECTIVE AND OBJECTIVE BOX
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 He has had off-and-on lower back pain with peripheral LE neuropathy since 2016 when he had Guillain-Barré and a CVA with R sided deficits in 2016. Normally, he can walk "shaky with a walker." However, over the past 3 weeks, his symptoms have gotten worse, with frequent burning pain in his lower back and spasms. His symptoms are worse when he lies flat or when he tries to transition to his walker. Therefore, he has had marked difficulty ambulating. He has baseline urinary incontinence and uses Pull-ups. He has some improved bowel continence, but cannot hold his bowels in for long. Those symptoms are not new. He denies CP, cough, N/V, abdominal pain, CP, or SOB.  He went to [facility] 2 nights ago and was "in the ER for 30 hours" not moving. His pain worsened even more after discharge.    He also notes a history of VRE and was recently (1 week ago) prescribed Augmentin, which he only took 2 days of. He says when he goes to [facility] he is "always on isolation," because of his history of VRE. He is not sure if he has dysuria currently, but he had it when he was prescribed antibiotics last week.  He also is on Warfarin for an unknown "blood clotting disorder," stemming from a blood clot in 2016 (of note, he was intubated at that time for Guillain-[place] and suffered a CVA during that time), but he has not had a blood clot since then. He states he has an IVC filter. He is non-compliant with his Warfarin, stating he takes it about 3-4 days per week.    In the ED, he was given Flexeril 10 mg PO x1 and  mg x1 with Morphine 4 mg IV x1 ordered.    3/13 afebrile , says has mild urethral burning on micturation, no flank pain, , no chills , lower back pain not controlled with oxycodone 5, will increase oxycodone 10   3/14 afebrile , Patient was transferred to [facility] last night for MRI neuraxial imaging - however only MRI LSpine was performed,no acute overnight events, WBC 10, denies CP, sob, no new complaints   B/L lower back pain controlled with oxycodone and dilaudid     3/15: s/p Fusion of posterior lumbar spine 4/5 and decompression with bilateral mayank laminectomy  asking for more pain meds; says 2 mg dilaudid iv did nothing.     3/16: s/p fusion of LS, POD #1, pain well controlled on current regimen. No acute complaints.         PHYSICAL EXAM:  Vital Signs Last 24 Hrs  T(C): 36.9 (16 Mar 2023 15:49), Max: 36.9 (16 Mar 2023 15:49)  T(F): 98.4 (16 Mar 2023 15:49), Max: 98.4 (16 Mar 2023 15:49)  HR: 74 (16 Mar 2023 18:00) (67 - 99)  BP: 140/73 (16 Mar 2023 18:00) (132/78 - 169/91)  RR: 14 (16 Mar 2023 15:49) (14 - 19)  SpO2: 96% (16 Mar 2023 15:49) (93% - 96%)    Parameters below as of 16 Mar 2023 15:49  Patient On (Oxygen Delivery Method): room air            Physical exam   GENERAL: No acute distress  HEENT: PERRL, EOMI, MMM, no oropharyngeal lesions  NECK: supple, no stiffness, no JVD, no thyromegaly  PULM: respirations non-labored, clear to auscultation bilaterally, no rales, rhonchi, or wheezes  CV: regular rate and rhythm, no murmurs, gallops, or rubs  GI: abdomen soft, + slight RUQ TTP, nondistended, no masses felt, normal bowel sounds  MSK: + lumbar spinal TTP. No joint swelling, erythema, or warmth.  LYMPH: no anterior cervical, posterior cervical, supraclavicular, or inguinal lymphadenopathy  NEURO: occasional spasms of b/l LEs. A&Ox3, sensation intact. Strength 2/5 b/l LEs  SKIN: 1+ b/l LE edema. No rashes or lesions                            9.9    14.53 )-----------( 233      ( 16 Mar 2023 08:13 )             30.2   03-16    141  |  108  |  31<H>  ----------------------------<  161<H>  3.9   |  27  |  1.84<H>    Ca    8.5      16 Mar 2023 08:13            CBC Full  -  ( 14 Mar 2023 05:16 )  WBC Count : 10.57 K/uL  RBC Count : 4.33 M/uL  Hemoglobin : 10.6 g/dL  Hematocrit : 33.5 %  Platelet Count - Automated : 206 K/uL  Mean Cell Volume : 77.4 fl  Mean Cell Hemoglobin : 24.5 pg  Mean Cell Hemoglobin Concentration : 31.6 gm/dL  Auto Neutrophil # : 7.82 K/uL  Auto Lymphocyte # : 1.69 K/uL  Auto Monocyte # : 0.65 K/uL  Auto Eosinophil # : 0.30 K/uL  Auto Basophil # : 0.06 K/uL  Auto Neutrophil % : 74.0 %  Auto Lymphocyte % : 16.0 %  Auto Monocyte % : 6.1 %  Auto Eosinophil % : 2.8 %  Auto Basophil % : 0.6 %    03-15    134<L>  |  106  |  29<H>  ----------------------------<  228<H>  4.2   |  24  |  1.89<H>    Ca    8.6      15 Mar 2023 14:02      03-14    138  |  109<H>  |  25<H>  ----------------------------<  139<H>  3.7   |  25  |  1.74<H>    Ca    8.7      14 Mar 2023 05:16            PT/INR - ( 14 Mar 2023 05:16 )   PT: 14.6 sec;   INR: 1.26 ratio         PTT - ( 14 Mar 2023 12:04 )  PTT:102.3 sec          MEDICATIONS  (STANDING):  amLODIPine   Tablet 10 milliGRAM(s) Oral daily  ascorbic acid 500 milliGRAM(s) Oral two times a day  baclofen 5 milliGRAM(s) Oral every 12 hours  cloNIDine 0.1 milliGRAM(s) Oral two times a day  cyclobenzaprine 10 milliGRAM(s) Oral every 8 hours  dextrose 5%. 1000 milliLiter(s) (100 mL/Hr) IV Continuous <Continuous>  dextrose 5%. 1000 milliLiter(s) (50 mL/Hr) IV Continuous <Continuous>  dextrose 50% Injectable 25 Gram(s) IV Push once  dextrose 50% Injectable 12.5 Gram(s) IV Push once  dextrose 50% Injectable 25 Gram(s) IV Push once  dextrose 50% Injectable 25 Gram(s) IV Push once  dextrose 50% Injectable 12.5 Gram(s) IV Push once  dextrose 50% Injectable 25 Gram(s) IV Push once  DULoxetine 60 milliGRAM(s) Oral daily  finasteride 5 milliGRAM(s) Oral daily  folic acid 1 milliGRAM(s) Oral daily  gabapentin 600 milliGRAM(s) Oral every 8 hours  glucagon  Injectable 1 milliGRAM(s) IntraMuscular once  glucagon  Injectable 1 milliGRAM(s) IntraMuscular once  hydrALAZINE 25 milliGRAM(s) Oral four times a day  HYDROmorphone  Injectable 0.5 milliGRAM(s) IV Push once  insulin lispro (ADMELOG) corrective regimen sliding scale   SubCutaneous three times a day before meals  insulin lispro (ADMELOG) corrective regimen sliding scale   SubCutaneous at bedtime  insulin lispro (ADMELOG) corrective regimen sliding scale   SubCutaneous every 6 hours  lactated ringers. 1000 milliLiter(s) (75 mL/Hr) IV Continuous <Continuous>  lactated ringers. 1000 milliLiter(s) (75 mL/Hr) IV Continuous <Continuous>  lactated ringers. 1000 milliLiter(s) (75 mL/Hr) IV Continuous <Continuous>  metoclopramide 10 milliGRAM(s) Oral once  multivitamin 1 Tablet(s) Oral daily  nicotine  Polacrilex Gum 2 milliGRAM(s) Oral every 2 hours  nicotine -  14 mG/24Hr(s) Patch 1 Patch Transdermal daily  oxyCODONE  ER Tablet 15 milliGRAM(s) Oral every 12 hours  pantoprazole    Tablet 40 milliGRAM(s) Oral before breakfast  senna 2 Tablet(s) Oral at bedtime  tamsulosin 0.4 milliGRAM(s) Oral at bedtime    MEDICATIONS  (PRN):  acetaminophen     Tablet .. 650 milliGRAM(s) Oral every 6 hours PRN Mild Pain (1 - 3)  acetaminophen     Tablet .. 650 milliGRAM(s) Oral every 6 hours PRN Temp greater or equal to 38C (100.4F), Mild Pain (1 - 3)  dextrose Oral Gel 15 Gram(s) Oral once PRN Blood Glucose LESS THAN 70 milliGRAM(s)/deciliter  dextrose Oral Gel 15 Gram(s) Oral once PRN Blood Glucose LESS THAN 70 milliGRAM(s)/deciliter  HYDROmorphone  Injectable 2 milliGRAM(s) IV Push every 4 hours PRN Severe Pain (7 - 10)  melatonin 3 milliGRAM(s) Oral at bedtime PRN Insomnia  ondansetron Injectable 4 milliGRAM(s) IV Push every 6 hours PRN Nausea and/or Vomiting  ondansetron Injectable 4 milliGRAM(s) IV Push every 8 hours PRN Nausea and/or Vomiting  oxyCODONE    IR 5 milliGRAM(s) Oral every 4 hours PRN Moderate Pain (4 - 6)  oxyCODONE    IR 5 milliGRAM(s) Oral every 4 hours PRN Moderate Pain (4 - 6)  oxyCODONE    IR 10 milliGRAM(s) Oral every 4 hours PRN Severe Pain (7 - 10)

## 2023-03-16 NOTE — PHYSICAL THERAPY INITIAL EVALUATION ADULT - PLANNED THERAPY INTERVENTIONS, PT EVAL
pain relieving modalities to lower back PRN (ie-ice packs )/bed mobility training/gait training/ROM/strengthening/stretching/transfer training

## 2023-03-16 NOTE — PHYSICAL THERAPY INITIAL EVALUATION ADULT - PERTINENT HX OF CURRENT PROBLEM, REHAB EVAL
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 He has had off-and-on lower back pain with peripheral LE neuropathy since 2016 when he had Guillain-Barré and a CVA with R sided deficits in 2016. Normally, he can walk "shaky with a walker." However, over the past 3 weeks, his symptoms have gotten worse, with frequent burning pain in his lower back and spasms. His symptoms are worse when he lies flat or when he tries to transition to his walker. Therefore, he has had marked difficulty ambulating. He has baseline urinary incontinence and uses Pull-ups. He has some improved bowel continence, but cannot hold his bowels in for long. Those symptoms are not new. He denies CP, cough, N/V, abdominal pain, CP, or SOB.  He went to [facility] 2 nights ago and was "in the ER for 30 hours" not moving. His pain worsened even more after discharge.    He also notes a history of VRE and was recently (1 week ago) prescribed Augmentin, which he only took 2 days of. He says when he goes to [facility] he is "always on isolation," because of his history of VRE. He is not sure if he has dysuria currently, but he had it when he was prescribed antibiotics last week.  He also is on Warfarin for an unknown "blood clotting disorder," stemming from a blood clot in 2016 (of note, he was intubated at that time for Guillain-[place] and suffered a CVA during that time), but he has not had a blood clot since then. He states he has an IVC filter. He is non-compliant with his Warfarin, stating he takes it about 3-4 days per week.

## 2023-03-16 NOTE — PHYSICAL THERAPY INITIAL EVALUATION ADULT - LEVEL OF INDEPENDENCE: SUPINE/SIT, REHAB EVAL
deferred out of bed on this encounter , pt reports NO SLEEP LAST NIGHT and c/o mod-severe pain at surgical site , spoke with TEJINDER Hui who will address with pt (recently had oxycodone per RN,wants to give oppurtunity to take effect)

## 2023-03-16 NOTE — PROGRESS NOTE ADULT - SUBJECTIVE AND OBJECTIVE BOX
[facility]  Operative Report  Date of Surgery: 03/15/23  Patient: Hardik Christianson  Surgeon: Courtney Mills DO – Orthopedic Surgery Attending  Assistant: Orthopedic Resident  Dictation:   Preop Diagnosis: Acute cauda equina syndrome, history of guillain [place] syndrome, foot drop, acute loss of ambulatory function, urinary incontinence   Post op Diagnosis: same  Procedure Summary:   Lumbar decompression with Laminectomy of segment L4, L5 for severe stenosis  Partial facetectomy bilatearal L4 inferior facet  Partial facetectomy bilatearal L5 superior facet  Foraminotomy bilateral L4/5  Neurolysis of severe adhesions L4 nerve root and L5 nerve root bilateral  Segmental Instrumentation L4/L5 with Pedicle Screw Insertion and Interconnecting bilateral rods  Arthrodesis L4/L4 posterolateral  Bone autograft local from lamina  Bone allograft putty with BMP  Flouroscopy for localization and instrumentation    Anesthesia: General Endotracheal Intubation  Positioning: Prone on Reggie frame  Complication None:  Procedure in Detail:  Preoperative Consent was obtained. Patient with compromised health at baseline with morbid obesity and history of guillian barre syndrome > 7 years ago.  patient after extensive treatment became ambulatory for the last 6 years but had chronic pain.  Patient for the last 3 weeks became non-ambulatory.  He also is urinary incontinent.  Unfortunately, due to body habitus we had to identify institution where patient's abdominal circumference could be positioned into MRI.  Patient was transferred to that facility and brought back and confirming severe stenosis and severe cauda equina compression at L4-5.  patient also has a foot drop.  Risks, benefits and alternatives were discussed.  Patient is informed goals of surgery are to help preserve his walking with a walker.  Since worsening already happened 3 weeks ago he may not recover all of his strength  Patient has been compromised to some degree as a sequela of his guillian barre.  Risks, benefits, and alternative have been discussed.  Informed consent obtained. All questions are answered.  Informed consent in obtained.  No guarantees implied.  Goals are improvement in pain and quality of life.  Patient received general anesthesia.  Neuromonitoring devices were placed by neuromonitoring service.  Anderson catheter was inserted. Patient was placed prone on the operative table.  All bony prominences were padded.  Neuromonitoring signals were confirmed.  Patient received preoperative antibiotics.  EMG and SSEP showed absent signals in bilateral Lower extremity below L3.    Patient’s lumbar spine was prepped and draped in sterile manner.  Flouroscopy was used to confirm the location of the pedicle. Midline incision were made and lamina of L4 and L5 exposed.  Due to patient's body habitus exposure took significantly longer and was challenging. Interspaced confirmed with xray.  Subsequently, facet joints and TP was exposed from L4 and L5.      Next Each pedicle was then tapped and stimulated.  All screws were above the threshold of 10. Pedicle stimulation was more than 10 at all levels.  Subsequently 6.0*45 and 50mm depuy pedicle screws were instrumented due to excellent bone quality otherwise.  TP was dissected laterally at both L5 and L4.  At this point wound was irrigated.      Next attention was placed to decompression of spinal canal.  Laminectomy of L4 and L5 is performed.  Eggshell technique is used where bone is thinned out with midas александр 2.5mm until eggshell is left and the kerrison is used to remove remaining bone.  dura was severely compressed into a pencil thin tube.   Severe bilateral facet hypertrophy of L4 and L5 facets is noted.  We performed about 60-70% resection of both facets to get wide decompression to completely free the cauda quina.  interspinous ligament was perserved to prevent any additional instability of spine. Both L5 nerves were severely adherent to the fibrosis and released of all adhesions.  Next dura was mobilized over the disc-space.   Bipolar was used to maintain coagulation.  Broad based degenerative subannular bulge is noted without extruded fragment. No pressure on the nerve roots noted.  Severe pressure on the nerve is removed.  Brandon was used to confirm adequate foraminal space as well after work with kerrison to open the foramen of L4/5 bilateral.  Subsequently, BMP, autograft from the facets and laminectomy and decortication and allograft using corticocancellous chips were placed in the gutters after decortication of joints, pars and TP for posterolateral fusion.  Subsequently, leola was inserted and set screws placed.  All set screws were final tightened.  Final xrays were taken and I was satisfied with overall positioning of hardware and instrumentation.     Next, each incision was closed with number 1 vicryl for fascia, 0 vicryl for subcutaneous tissue and 2.0 vicryl.  Drain was inserted over the epidural space.   2gm of tobramycin powder was also placed in deep and superficial fascia.    Skin adhesive was applied.  Dry sterile dressing was applied.  Patient was transferred to supine position on regular bed.  Patient was extubated.  Patient had tolerated the procedure well.     I personally spoke with patient at the end of case in PACU.    Courtney Mills,   Orthopedic Spine Surgeon

## 2023-03-16 NOTE — PROGRESS NOTE ADULT - SUBJECTIVE AND OBJECTIVE BOX
Patient tolerated the procedure well. Patient seen and examined at bedside. No acute complaints at this time. Pain well controlled. Denies weakness, numbness or tingling. Denies chest pain, shortness of breath, nausea or vomiting. Pt stating RLE sensation improved since preop but slightly less than LLE    PE:  Vital Signs Last 24 Hrs  T(C): 36.8 (16 Mar 2023 07:30), Max: 36.8 (16 Mar 2023 07:30)  T(F): 98.2 (16 Mar 2023 07:30), Max: 98.2 (16 Mar 2023 07:30)  HR: 67 (16 Mar 2023 09:35) (67 - 99)  BP: 144/79 (16 Mar 2023 09:35) (126/72 - 155/67)  BP(mean): --  RR: 19 (16 Mar 2023 07:30) (12 - 19)  SpO2: 95% (16 Mar 2023 07:30) (93% - 100%)    Parameters below as of 16 Mar 2023 07:30  Patient On (Oxygen Delivery Method): room air      General: NAD, resting comforatbly in bed  Dressing C/D/I  Drains present  2+ radial pulses  2+ DP Pulses    Motor:                   C5                C6              C7               C8           T1   R            5/5                4/5            4/5             5/5          5/5  L             5/5               4/5             4/5             4/5          5/5                L2             L3             L4               L5            S1  R         4/5           5/5          2/5             2/5           4/5  L          4/5          5/5           4/5             4/5           5/5    Sensory:            C5         C6         C7      C8       T1        (0=absent, 1=impaired, 2=normal, NT=not testable)  R         2            2           2        2         2  L          2            2           2        2         2               L2          L3         L4      L5       S1         (0=absent, 1=impaired, 2=normal, NT=not testable)  R         2            2            1*        1*        1*  L          2            2           2        2         2    *reports improved since preop                            9.9    14.53 )-----------( 233      ( 16 Mar 2023 08:13 )             30.2       03-16    141  |  108  |  31<H>  ----------------------------<  161<H>  3.9   |  27  |  1.84<H>    Ca    8.5      16 Mar 2023 08:13              PT/INR - ( 15 Mar 2023 05:44 )   PT: 13.7 sec;   INR: 1.18 ratio         PTT - ( 15 Mar 2023 05:44 )  PTT:29.4 sec        A/P:  48y m s/p L4-5 lami and PSF POD 1  -PT/OT   -WBAT  -Pain Control  -DVT ppx: SCD hold chemo dvt ppx  -Continue perioperative abx while drains in  -FU AM Labs  -Incentive Spirometry  -Medical management appreciated

## 2023-03-16 NOTE — PHYSICAL THERAPY INITIAL EVALUATION ADULT - PRECAUTIONS/LIMITATIONS, REHAB EVAL
NO BENDING/LIFTING/TWISTING; CHRONIC R FOOT DROP x6 years ,wears AFO/fall precautions/spinal precautions/surgical precautions

## 2023-03-16 NOTE — PHYSICAL THERAPY INITIAL EVALUATION ADULT - GENERAL OBSERVATIONS, REHAB EVAL
resting supine in bed ,awake, alert, Ox4, states he was at [facility] for >30 hours in ED ,they "kicked him out " and told him"there is nothing more we can do for you here" ,pt subsequently admitted here ,underwent imaging L/S and underwent back surgery to relieve lumbar spinal stenosis

## 2023-03-16 NOTE — PHYSICAL THERAPY INITIAL EVALUATION ADULT - NSACTIVITYREC_GEN_A_PT
Assist pt with positional changes/logrolling S/S in bed q2h , further recommendations pending out of bed assessment

## 2023-03-16 NOTE — PHYSICAL THERAPY INITIAL EVALUATION ADULT - PATIENT PROFILE REVIEW, REHAB EVAL
pt is technically homeless as his lease  23 ,family emptied his apt and put his things in storage as he has been repeatedly hospitalized and at various rehab facilities including [facility]/yes

## 2023-03-16 NOTE — PHYSICAL THERAPY INITIAL EVALUATION ADULT - DIAGNOSIS, PT EVAL
Intractable back pain, inability to ambulate; severe central spinal stenosis L4/5, HNP L1/2 ; s/p B mayank-laminectomy and PSF L4/5, old L CVA 2016 with residual R hemiparesis/R foot drop; h/o GBS 2016, ?Anti-Phospholipid Syndrome on Warfarin

## 2023-03-17 LAB
ANION GAP SERPL CALC-SCNC: 6 MMOL/L — SIGNIFICANT CHANGE UP (ref 5–17)
BASOPHILS # BLD AUTO: 0.06 K/UL — SIGNIFICANT CHANGE UP (ref 0–0.2)
BASOPHILS NFR BLD AUTO: 0.5 % — SIGNIFICANT CHANGE UP (ref 0–2)
BUN SERPL-MCNC: 31 MG/DL — HIGH (ref 7–23)
CALCIUM SERPL-MCNC: 8.6 MG/DL — SIGNIFICANT CHANGE UP (ref 8.5–10.1)
CHLORIDE SERPL-SCNC: 106 MMOL/L — SIGNIFICANT CHANGE UP (ref 96–108)
CO2 SERPL-SCNC: 23 MMOL/L — SIGNIFICANT CHANGE UP (ref 22–31)
CREAT SERPL-MCNC: 1.83 MG/DL — HIGH (ref 0.5–1.3)
EGFR: 45 ML/MIN/1.73M2 — LOW
EOSINOPHIL # BLD AUTO: 0.1 K/UL — SIGNIFICANT CHANGE UP (ref 0–0.5)
EOSINOPHIL NFR BLD AUTO: 0.8 % — SIGNIFICANT CHANGE UP (ref 0–6)
GLUCOSE BLDC GLUCOMTR-MCNC: 156 MG/DL — HIGH (ref 70–99)
GLUCOSE BLDC GLUCOMTR-MCNC: 183 MG/DL — HIGH (ref 70–99)
GLUCOSE BLDC GLUCOMTR-MCNC: 189 MG/DL — HIGH (ref 70–99)
GLUCOSE SERPL-MCNC: 163 MG/DL — HIGH (ref 70–99)
HCT VFR BLD CALC: 32.8 % — LOW (ref 39–50)
HGB BLD-MCNC: 10.3 G/DL — LOW (ref 13–17)
IMM GRANULOCYTES NFR BLD AUTO: 0.7 % — SIGNIFICANT CHANGE UP (ref 0–0.9)
LYMPHOCYTES # BLD AUTO: 1.31 K/UL — SIGNIFICANT CHANGE UP (ref 1–3.3)
LYMPHOCYTES # BLD AUTO: 10.1 % — LOW (ref 13–44)
MCHC RBC-ENTMCNC: 24.3 PG — LOW (ref 27–34)
MCHC RBC-ENTMCNC: 31.4 GM/DL — LOW (ref 32–36)
MCV RBC AUTO: 77.4 FL — LOW (ref 80–100)
MONOCYTES # BLD AUTO: 1.29 K/UL — HIGH (ref 0–0.9)
MONOCYTES NFR BLD AUTO: 10 % — SIGNIFICANT CHANGE UP (ref 2–14)
NEUTROPHILS # BLD AUTO: 10.08 K/UL — HIGH (ref 1.8–7.4)
NEUTROPHILS NFR BLD AUTO: 77.9 % — HIGH (ref 43–77)
PLATELET # BLD AUTO: 232 K/UL — SIGNIFICANT CHANGE UP (ref 150–400)
POTASSIUM SERPL-MCNC: 3.8 MMOL/L — SIGNIFICANT CHANGE UP (ref 3.5–5.3)
POTASSIUM SERPL-SCNC: 3.8 MMOL/L — SIGNIFICANT CHANGE UP (ref 3.5–5.3)
RBC # BLD: 4.24 M/UL — SIGNIFICANT CHANGE UP (ref 4.2–5.8)
RBC # FLD: 15.9 % — HIGH (ref 10.3–14.5)
SODIUM SERPL-SCNC: 135 MMOL/L — SIGNIFICANT CHANGE UP (ref 135–145)
WBC # BLD: 12.93 K/UL — HIGH (ref 3.8–10.5)
WBC # FLD AUTO: 12.93 K/UL — HIGH (ref 3.8–10.5)

## 2023-03-17 PROCEDURE — 99233 SBSQ HOSP IP/OBS HIGH 50: CPT

## 2023-03-17 RX ORDER — NICOTINE POLACRILEX 2 MG
2 GUM BUCCAL
Refills: 0 | Status: DISCONTINUED | OUTPATIENT
Start: 2023-03-17 | End: 2023-03-22

## 2023-03-17 RX ORDER — INFLUENZA VIRUS VACCINE 15; 15; 15; 15 UG/.5ML; UG/.5ML; UG/.5ML; UG/.5ML
0.5 SUSPENSION INTRAMUSCULAR ONCE
Refills: 0 | Status: COMPLETED | OUTPATIENT
Start: 2023-03-17 | End: 2023-03-17

## 2023-03-17 RX ADMIN — HYDROMORPHONE HYDROCHLORIDE 2 MILLIGRAM(S): 2 INJECTION INTRAMUSCULAR; INTRAVENOUS; SUBCUTANEOUS at 05:30

## 2023-03-17 RX ADMIN — GABAPENTIN 600 MILLIGRAM(S): 400 CAPSULE ORAL at 21:03

## 2023-03-17 RX ADMIN — AMLODIPINE BESYLATE 10 MILLIGRAM(S): 2.5 TABLET ORAL at 09:27

## 2023-03-17 RX ADMIN — OXYCODONE HYDROCHLORIDE 10 MILLIGRAM(S): 5 TABLET ORAL at 16:57

## 2023-03-17 RX ADMIN — Medication 1 PATCH: at 06:22

## 2023-03-17 RX ADMIN — Medication 2 MILLIGRAM(S): at 09:29

## 2023-03-17 RX ADMIN — HYDROMORPHONE HYDROCHLORIDE 2 MILLIGRAM(S): 2 INJECTION INTRAMUSCULAR; INTRAVENOUS; SUBCUTANEOUS at 18:25

## 2023-03-17 RX ADMIN — TAMSULOSIN HYDROCHLORIDE 0.4 MILLIGRAM(S): 0.4 CAPSULE ORAL at 21:01

## 2023-03-17 RX ADMIN — Medication 1 MILLIGRAM(S): at 09:28

## 2023-03-17 RX ADMIN — Medication 0.1 MILLIGRAM(S): at 09:28

## 2023-03-17 RX ADMIN — OXYCODONE HYDROCHLORIDE 10 MILLIGRAM(S): 5 TABLET ORAL at 13:47

## 2023-03-17 RX ADMIN — PANTOPRAZOLE SODIUM 40 MILLIGRAM(S): 20 TABLET, DELAYED RELEASE ORAL at 06:27

## 2023-03-17 RX ADMIN — Medication 1 PATCH: at 09:31

## 2023-03-17 RX ADMIN — Medication 5 MILLIGRAM(S): at 21:02

## 2023-03-17 RX ADMIN — HYDROMORPHONE HYDROCHLORIDE 2 MILLIGRAM(S): 2 INJECTION INTRAMUSCULAR; INTRAVENOUS; SUBCUTANEOUS at 09:41

## 2023-03-17 RX ADMIN — HYDROMORPHONE HYDROCHLORIDE 2 MILLIGRAM(S): 2 INJECTION INTRAMUSCULAR; INTRAVENOUS; SUBCUTANEOUS at 14:15

## 2023-03-17 RX ADMIN — Medication 1: at 09:41

## 2023-03-17 RX ADMIN — Medication 2 MILLIGRAM(S): at 05:25

## 2023-03-17 RX ADMIN — OXYCODONE HYDROCHLORIDE 15 MILLIGRAM(S): 5 TABLET ORAL at 21:01

## 2023-03-17 RX ADMIN — OXYCODONE HYDROCHLORIDE 15 MILLIGRAM(S): 5 TABLET ORAL at 09:31

## 2023-03-17 RX ADMIN — FINASTERIDE 5 MILLIGRAM(S): 5 TABLET, FILM COATED ORAL at 09:27

## 2023-03-17 RX ADMIN — HYDROMORPHONE HYDROCHLORIDE 2 MILLIGRAM(S): 2 INJECTION INTRAMUSCULAR; INTRAVENOUS; SUBCUTANEOUS at 18:16

## 2023-03-17 RX ADMIN — GABAPENTIN 600 MILLIGRAM(S): 400 CAPSULE ORAL at 12:46

## 2023-03-17 RX ADMIN — OXYCODONE HYDROCHLORIDE 15 MILLIGRAM(S): 5 TABLET ORAL at 09:28

## 2023-03-17 RX ADMIN — Medication 25 MILLIGRAM(S): at 12:46

## 2023-03-17 RX ADMIN — OXYCODONE HYDROCHLORIDE 10 MILLIGRAM(S): 5 TABLET ORAL at 01:06

## 2023-03-17 RX ADMIN — Medication 500 MILLIGRAM(S): at 21:01

## 2023-03-17 RX ADMIN — Medication 25 MILLIGRAM(S): at 00:01

## 2023-03-17 RX ADMIN — Medication 25 MILLIGRAM(S): at 05:26

## 2023-03-17 RX ADMIN — SENNA PLUS 2 TABLET(S): 8.6 TABLET ORAL at 21:01

## 2023-03-17 RX ADMIN — Medication 500 MILLIGRAM(S): at 09:27

## 2023-03-17 RX ADMIN — OXYCODONE HYDROCHLORIDE 10 MILLIGRAM(S): 5 TABLET ORAL at 21:01

## 2023-03-17 RX ADMIN — Medication 1 PATCH: at 04:15

## 2023-03-17 RX ADMIN — Medication 1 PATCH: at 18:25

## 2023-03-17 RX ADMIN — OXYCODONE HYDROCHLORIDE 10 MILLIGRAM(S): 5 TABLET ORAL at 00:10

## 2023-03-17 RX ADMIN — OXYCODONE HYDROCHLORIDE 10 MILLIGRAM(S): 5 TABLET ORAL at 12:52

## 2023-03-17 RX ADMIN — DULOXETINE HYDROCHLORIDE 60 MILLIGRAM(S): 30 CAPSULE, DELAYED RELEASE ORAL at 09:27

## 2023-03-17 RX ADMIN — Medication 2 MILLIGRAM(S): at 00:01

## 2023-03-17 RX ADMIN — CYCLOBENZAPRINE HYDROCHLORIDE 10 MILLIGRAM(S): 10 TABLET, FILM COATED ORAL at 12:46

## 2023-03-17 RX ADMIN — Medication 2 MILLIGRAM(S): at 17:00

## 2023-03-17 RX ADMIN — CYCLOBENZAPRINE HYDROCHLORIDE 10 MILLIGRAM(S): 10 TABLET, FILM COATED ORAL at 05:25

## 2023-03-17 RX ADMIN — OXYCODONE HYDROCHLORIDE 10 MILLIGRAM(S): 5 TABLET ORAL at 17:35

## 2023-03-17 RX ADMIN — Medication 1 TABLET(S): at 09:28

## 2023-03-17 RX ADMIN — OXYCODONE HYDROCHLORIDE 10 MILLIGRAM(S): 5 TABLET ORAL at 22:01

## 2023-03-17 RX ADMIN — OXYCODONE HYDROCHLORIDE 15 MILLIGRAM(S): 5 TABLET ORAL at 22:01

## 2023-03-17 RX ADMIN — Medication 1 PATCH: at 09:26

## 2023-03-17 RX ADMIN — HYDROMORPHONE HYDROCHLORIDE 2 MILLIGRAM(S): 2 INJECTION INTRAMUSCULAR; INTRAVENOUS; SUBCUTANEOUS at 14:06

## 2023-03-17 RX ADMIN — CYCLOBENZAPRINE HYDROCHLORIDE 10 MILLIGRAM(S): 10 TABLET, FILM COATED ORAL at 21:02

## 2023-03-17 RX ADMIN — Medication 5 MILLIGRAM(S): at 09:27

## 2023-03-17 RX ADMIN — HYDROMORPHONE HYDROCHLORIDE 2 MILLIGRAM(S): 2 INJECTION INTRAMUSCULAR; INTRAVENOUS; SUBCUTANEOUS at 05:45

## 2023-03-17 RX ADMIN — Medication 1: at 18:21

## 2023-03-17 RX ADMIN — Medication 1: at 12:56

## 2023-03-17 RX ADMIN — Medication 0.1 MILLIGRAM(S): at 21:02

## 2023-03-17 RX ADMIN — HYDROMORPHONE HYDROCHLORIDE 2 MILLIGRAM(S): 2 INJECTION INTRAMUSCULAR; INTRAVENOUS; SUBCUTANEOUS at 09:51

## 2023-03-17 RX ADMIN — GABAPENTIN 600 MILLIGRAM(S): 400 CAPSULE ORAL at 06:27

## 2023-03-17 NOTE — PROGRESS NOTE ADULT - SUBJECTIVE AND OBJECTIVE BOX
pt seen at bedside, comfortable in NAD  2 DEEPAK drains removed from back, no active drainage noted   dry dressing and tegaderm applied   pt tolerated well      A/P:  48y m s/p L4-5 lami and PSF POD 2  -PT/OT   -WBAT  -Pain Control  -DVT ppx: SCD hold chemo dvt ppx, may resume POD5  -Continue perioperative abx while drains in  -FU AM Labs  -Incentive Spirometry  -Medical management appreciated

## 2023-03-17 NOTE — PATIENT PROFILE ADULT - FUNCTIONAL ASSESSMENT - BASIC MOBILITY 4.

## 2023-03-17 NOTE — PROGRESS NOTE ADULT - SUBJECTIVE AND OBJECTIVE BOX
Patient seen and examined at bedside. No acute complaints at this time. Pain well controlled. Denies weakness, numbness or tingling. Denies chest pain, shortness of breath, nausea or vomiting. Pt stating RLE sensation improved since preop but slightly less than LLE    PE:  Vital Signs Last 24 Hrs  T(C): 36.8 (16 Mar 2023 23:16), Max: 36.9 (16 Mar 2023 15:49)  T(F): 98.2 (16 Mar 2023 23:16), Max: 98.4 (16 Mar 2023 15:49)  HR: 77 (16 Mar 2023 23:16) (67 - 77)  BP: 112/64 (16 Mar 2023 23:16) (112/64 - 169/91)  BP(mean): --  RR: 18 (16 Mar 2023 23:16) (14 - 19)  SpO2: 96% (16 Mar 2023 23:16) (95% - 96%)    Parameters below as of 16 Mar 2023 23:16  Patient On (Oxygen Delivery Method): room air        General: NAD, resting comforatbly in bed  Dressing C/D/I  Drains present  2+ radial pulses  2+ DP Pulses    Motor:                   C5                C6              C7               C8           T1   R            5/5                5/5            5/5             5/5          5/5  L             5/5               5/5             5/5             4/5          5/5                L2             L3             L4               L5            S1  R         4/5           5/5          1/5             1/5           4/5  L          4/5          5/5           4/5             4/5           5/5    Sensory:            C5         C6         C7      C8       T1        (0=absent, 1=impaired, 2=normal, NT=not testable)  R         2            2           2        2         2  L          2            2           2        2         2               L2          L3         L4      L5       S1         (0=absent, 1=impaired, 2=normal, NT=not testable)  R         2            2            1*        1*        1*  L          2            2           2        2         2    *reports improved since preop                                  9.9    14.53 )-----------( 233      ( 16 Mar 2023 08:13 )             30.2       03-16    141  |  108  |  31<H>  ----------------------------<  161<H>  3.9   |  27  |  1.84<H>    Ca    8.5      16 Mar 2023 08:13              A/P:  48y m s/p L4-5 lami and PSF POD 2  -PT/OT   -WBAT  -Pain Control  - FU drain output  -DVT ppx: SCD hold chemo dvt ppx, may resume POD5  -Continue perioperative abx while drains in  - FU Drain output   -FU AM Labs  -Incentive Spirometry  -Medical management appreciated

## 2023-03-17 NOTE — PROGRESS NOTE ADULT - SUBJECTIVE AND OBJECTIVE BOX
INTERVAL HPI/OVERNIGHT EVENTS:  Patient S&E at bedside.  pt s/p surgery with spinal cord decompression and laminectomy of segment L4, L5 for severe stenosis  pt remains off a/c  reports back pain still 8/10    PAST MEDICAL & SURGICAL HISTORY:  BPH (benign prostatic hyperplasia)      HTN (hypertension)      Type 2 diabetes mellitus      Peripheral neuropathy      History of Guillain-[place] syndrome      History of CVA in adulthood      DDD (degenerative disc disease), lumbar      DVT, lower extremity      S/P IVC filter          FAMILY HISTORY:  FH: heart disease    FH: HTN (hypertension)        VITAL SIGNS:  T(F): 98.2 (03-17-23 @ 07:28)  HR: 81 (03-17-23 @ 07:28)  BP: 125/64 (03-17-23 @ 07:28)  RR: 16 (03-17-23 @ 07:28)  SpO2: 92% (03-17-23 @ 07:28)  Wt(kg): --    PHYSICAL EXAM:    GENERAL: NAD, obese  HEAD:  Atraumatic,  EYES: EOMI,  CHEST/LUNG: Clear to auscultation bilaterally;   HEART: Regular rate and rhythm;   ABDOMEN: Soft, Nontender, Nondistended  EXTREMITIES:  no edema, drain in place  NEUROLOGY: aox3  SKIN: No rashes or lesions      MEDICATIONS  (STANDING):  amLODIPine   Tablet 10 milliGRAM(s) Oral daily  ascorbic acid 500 milliGRAM(s) Oral two times a day  baclofen 5 milliGRAM(s) Oral every 12 hours  cloNIDine 0.1 milliGRAM(s) Oral two times a day  cyclobenzaprine 10 milliGRAM(s) Oral every 8 hours  dextrose 5%. 1000 milliLiter(s) (100 mL/Hr) IV Continuous <Continuous>  dextrose 5%. 1000 milliLiter(s) (50 mL/Hr) IV Continuous <Continuous>  dextrose 50% Injectable 25 Gram(s) IV Push once  dextrose 50% Injectable 12.5 Gram(s) IV Push once  dextrose 50% Injectable 25 Gram(s) IV Push once  dextrose 50% Injectable 25 Gram(s) IV Push once  dextrose 50% Injectable 12.5 Gram(s) IV Push once  dextrose 50% Injectable 25 Gram(s) IV Push once  DULoxetine 60 milliGRAM(s) Oral daily  finasteride 5 milliGRAM(s) Oral daily  folic acid 1 milliGRAM(s) Oral daily  gabapentin 600 milliGRAM(s) Oral every 8 hours  glucagon  Injectable 1 milliGRAM(s) IntraMuscular once  glucagon  Injectable 1 milliGRAM(s) IntraMuscular once  hydrALAZINE 25 milliGRAM(s) Oral four times a day  HYDROmorphone  Injectable 0.5 milliGRAM(s) IV Push once  influenza   Vaccine 0.5 milliLiter(s) IntraMuscular once  insulin lispro (ADMELOG) corrective regimen sliding scale   SubCutaneous three times a day before meals  insulin lispro (ADMELOG) corrective regimen sliding scale   SubCutaneous at bedtime  insulin lispro (ADMELOG) corrective regimen sliding scale   SubCutaneous every 6 hours  lactated ringers. 1000 milliLiter(s) (75 mL/Hr) IV Continuous <Continuous>  lactated ringers. 1000 milliLiter(s) (75 mL/Hr) IV Continuous <Continuous>  lactated ringers. 1000 milliLiter(s) (75 mL/Hr) IV Continuous <Continuous>  multivitamin 1 Tablet(s) Oral daily  nicotine  Polacrilex Gum 2 milliGRAM(s) Oral every 2 hours  nicotine -  14 mG/24Hr(s) Patch 1 Patch Transdermal daily  oxyCODONE  ER Tablet 15 milliGRAM(s) Oral every 12 hours  pantoprazole    Tablet 40 milliGRAM(s) Oral before breakfast  senna 2 Tablet(s) Oral at bedtime  tamsulosin 0.4 milliGRAM(s) Oral at bedtime    MEDICATIONS  (PRN):  acetaminophen     Tablet .. 650 milliGRAM(s) Oral every 6 hours PRN Mild Pain (1 - 3)  acetaminophen     Tablet .. 650 milliGRAM(s) Oral every 6 hours PRN Temp greater or equal to 38C (100.4F), Mild Pain (1 - 3)  dextrose Oral Gel 15 Gram(s) Oral once PRN Blood Glucose LESS THAN 70 milliGRAM(s)/deciliter  dextrose Oral Gel 15 Gram(s) Oral once PRN Blood Glucose LESS THAN 70 milliGRAM(s)/deciliter  HYDROmorphone  Injectable 2 milliGRAM(s) IV Push every 4 hours PRN Severe Pain (7 - 10)  melatonin 3 milliGRAM(s) Oral at bedtime PRN Insomnia  ondansetron Injectable 4 milliGRAM(s) IV Push every 6 hours PRN Nausea and/or Vomiting  ondansetron Injectable 4 milliGRAM(s) IV Push every 8 hours PRN Nausea and/or Vomiting  oxyCODONE    IR 5 milliGRAM(s) Oral every 4 hours PRN Moderate Pain (4 - 6)  oxyCODONE    IR 5 milliGRAM(s) Oral every 4 hours PRN Moderate Pain (4 - 6)  oxyCODONE    IR 10 milliGRAM(s) Oral every 4 hours PRN Severe Pain (7 - 10)      Allergies    sulfa drugs (Hives)  sulfa drugs (Unknown)    Intolerances        LABS:                        10.3   12.93 )-----------( 232      ( 17 Mar 2023 08:50 )             32.8     03-17    135  |  106  |  31<H>  ----------------------------<  163<H>  3.8   |  23  |  1.83<H>    Ca    8.6      17 Mar 2023 08:50            RADIOLOGY & ADDITIONAL TESTS:  Studies reviewed.

## 2023-03-17 NOTE — PROGRESS NOTE ADULT - SUBJECTIVE AND OBJECTIVE BOX
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 He has had off-and-on lower back pain with peripheral LE neuropathy since 2016 when he had Guillain-Barré and a CVA with R sided deficits in 2016. Normally, he can walk "shaky with a walker." However, over the past 3 weeks, his symptoms have gotten worse, with frequent burning pain in his lower back and spasms. His symptoms are worse when he lies flat or when he tries to transition to his walker. Therefore, he has had marked difficulty ambulating. He has baseline urinary incontinence and uses Pull-ups. He has some improved bowel continence, but cannot hold his bowels in for long. Those symptoms are not new. He denies CP, cough, N/V, abdominal pain, CP, or SOB.  He went to [facility] 2 nights ago and was "in the ER for 30 hours" not moving. His pain worsened even more after discharge.    He also notes a history of VRE and was recently (1 week ago) prescribed Augmentin, which he only took 2 days of. He says when he goes to [facility] he is "always on isolation," because of his history of VRE. He is not sure if he has dysuria currently, but he had it when he was prescribed antibiotics last week.  He also is on Warfarin for an unknown "blood clotting disorder," stemming from a blood clot in 2016 (of note, he was intubated at that time for Guillain-[place] and suffered a CVA during that time), but he has not had a blood clot since then. He states he has an IVC filter. He is non-compliant with his Warfarin, stating he takes it about 3-4 days per week.    In the ED, he was given Flexeril 10 mg PO x1 and  mg x1 with Morphine 4 mg IV x1 ordered.    3/13 afebrile , says has mild urethral burning on micturation, no flank pain, , no chills , lower back pain not controlled with oxycodone 5, will increase oxycodone 10   3/14 afebrile , Patient was transferred to [facility] last night for MRI neuraxial imaging - however only MRI LSpine was performed,no acute overnight events, WBC 10, denies CP, sob, no new complaints   B/L lower back pain controlled with oxycodone and dilaudid     3/15: s/p Fusion of posterior lumbar spine 4/5 and decompression with bilateral mayank laminectomy  asking for more pain meds; says 2 mg dilaudid iv did nothing.     3/16: s/p fusion of LS, POD #1, pain well controlled on current regimen. No acute complaints.     3/17: s/p L4/5 laminectomy, POD#2. Still with pain. DEEPAK drains in place. Finished course of Augmentin          PHYSICAL EXAM:  Vital Signs Last 24 Hrs  T(C): 36.8 (17 Mar 2023 23:15), Max: 36.8 (17 Mar 2023 07:28)  T(F): 98.2 (17 Mar 2023 23:15), Max: 98.2 (17 Mar 2023 07:28)  HR: 80 (17 Mar 2023 23:15) (80 - 91)  BP: 110/67 (17 Mar 2023 23:15) (106/66 - 125/64)  RR: 18 (17 Mar 2023 23:15) (16 - 18)  SpO2: 98% (17 Mar 2023 23:15) (92% - 98%)    Parameters below as of 17 Mar 2023 23:15  Patient On (Oxygen Delivery Method): room air              Physical exam   GENERAL: No acute distress  HEENT: PERRL, EOMI, MMM, no oropharyngeal lesions  NECK: supple, no stiffness, no JVD, no thyromegaly  PULM: respirations non-labored, clear to auscultation bilaterally, no rales, rhonchi, or wheezes  CV: regular rate and rhythm, no murmurs, gallops, or rubs  GI: abdomen soft, NT/NT,  MSK: + lumbar spinal TTP. DEEPAK in place   LYMPH: no anterior cervical, posterior cervical, supraclavicular, or inguinal lymphadenopathy  NEURO: occasional spasms of b/l LEs. A&Ox3, sensation intact. Strength 2/5 b/l LEs  SKIN: 1+ b/l LE edema. No rashes or lesions    Labs                          10.3   12.93 )-----------( 232      ( 17 Mar 2023 08:50 )             32.8       03-17    135  |  106  |  31<H>  ----------------------------<  163<H>  3.8   |  23  |  1.83<H>    Ca    8.6      17 Mar 2023 08:50            CBC Full  -  ( 14 Mar 2023 05:16 )  WBC Count : 10.57 K/uL  RBC Count : 4.33 M/uL  Hemoglobin : 10.6 g/dL  Hematocrit : 33.5 %  Platelet Count - Automated : 206 K/uL  Mean Cell Volume : 77.4 fl  Mean Cell Hemoglobin : 24.5 pg  Mean Cell Hemoglobin Concentration : 31.6 gm/dL  Auto Neutrophil # : 7.82 K/uL  Auto Lymphocyte # : 1.69 K/uL  Auto Monocyte # : 0.65 K/uL  Auto Eosinophil # : 0.30 K/uL  Auto Basophil # : 0.06 K/uL  Auto Neutrophil % : 74.0 %  Auto Lymphocyte % : 16.0 %  Auto Monocyte % : 6.1 %  Auto Eosinophil % : 2.8 %  Auto Basophil % : 0.6 %    03-15    134<L>  |  106  |  29<H>  ----------------------------<  228<H>  4.2   |  24  |  1.89<H>    Ca    8.6      15 Mar 2023 14:02      03-14    138  |  109<H>  |  25<H>  ----------------------------<  139<H>  3.7   |  25  |  1.74<H>    Ca    8.7      14 Mar 2023 05:16            PT/INR - ( 14 Mar 2023 05:16 )   PT: 14.6 sec;   INR: 1.26 ratio         PTT - ( 14 Mar 2023 12:04 )  PTT:102.3 sec      I&O's Detail    16 Mar 2023 07:01  -  17 Mar 2023 07:00  --------------------------------------------------------  IN:  Total IN: 0 mL    OUT:    Bulb (mL): 10 mL    Bulb (mL): 30 mL    Indwelling Catheter - Urethral (mL): 4500 mL  Total OUT: 4540 mL    Total NET: -4540 mL      17 Mar 2023 07:01  -  18 Mar 2023 00:26  --------------------------------------------------------  IN:  Total IN: 0 mL    OUT:    Bulb (mL): 20 mL    Bulb (mL): 5 mL    Indwelling Catheter - Urethral (mL): 800 mL    Voided (mL): 600 mL  Total OUT: 1425 mL    Total NET: -1425 mL              MEDICATIONS  (STANDING):  amLODIPine   Tablet 10 milliGRAM(s) Oral daily  ascorbic acid 500 milliGRAM(s) Oral two times a day  baclofen 5 milliGRAM(s) Oral every 12 hours  cloNIDine 0.1 milliGRAM(s) Oral two times a day  cyclobenzaprine 10 milliGRAM(s) Oral every 8 hours  dextrose 5%. 1000 milliLiter(s) (100 mL/Hr) IV Continuous <Continuous>  dextrose 5%. 1000 milliLiter(s) (50 mL/Hr) IV Continuous <Continuous>  dextrose 50% Injectable 25 Gram(s) IV Push once  dextrose 50% Injectable 12.5 Gram(s) IV Push once  dextrose 50% Injectable 25 Gram(s) IV Push once  dextrose 50% Injectable 25 Gram(s) IV Push once  dextrose 50% Injectable 12.5 Gram(s) IV Push once  dextrose 50% Injectable 25 Gram(s) IV Push once  DULoxetine 60 milliGRAM(s) Oral daily  finasteride 5 milliGRAM(s) Oral daily  folic acid 1 milliGRAM(s) Oral daily  gabapentin 600 milliGRAM(s) Oral every 8 hours  glucagon  Injectable 1 milliGRAM(s) IntraMuscular once  glucagon  Injectable 1 milliGRAM(s) IntraMuscular once  hydrALAZINE 25 milliGRAM(s) Oral four times a day  HYDROmorphone  Injectable 0.5 milliGRAM(s) IV Push once  influenza   Vaccine 0.5 milliLiter(s) IntraMuscular once  insulin lispro (ADMELOG) corrective regimen sliding scale   SubCutaneous three times a day before meals  insulin lispro (ADMELOG) corrective regimen sliding scale   SubCutaneous at bedtime  lactated ringers. 1000 milliLiter(s) (75 mL/Hr) IV Continuous <Continuous>  lactated ringers. 1000 milliLiter(s) (75 mL/Hr) IV Continuous <Continuous>  lactated ringers. 1000 milliLiter(s) (75 mL/Hr) IV Continuous <Continuous>  multivitamin 1 Tablet(s) Oral daily  nicotine -  14 mG/24Hr(s) Patch 1 Patch Transdermal daily  oxyCODONE  ER Tablet 15 milliGRAM(s) Oral every 12 hours  pantoprazole    Tablet 40 milliGRAM(s) Oral before breakfast  senna 2 Tablet(s) Oral at bedtime  tamsulosin 0.4 milliGRAM(s) Oral at bedtime    MEDICATIONS  (PRN):  acetaminophen     Tablet .. 650 milliGRAM(s) Oral every 6 hours PRN Mild Pain (1 - 3)  acetaminophen     Tablet .. 650 milliGRAM(s) Oral every 6 hours PRN Temp greater or equal to 38C (100.4F), Mild Pain (1 - 3)  dextrose Oral Gel 15 Gram(s) Oral once PRN Blood Glucose LESS THAN 70 milliGRAM(s)/deciliter  dextrose Oral Gel 15 Gram(s) Oral once PRN Blood Glucose LESS THAN 70 milliGRAM(s)/deciliter  HYDROmorphone  Injectable 2 milliGRAM(s) IV Push every 4 hours PRN Severe Pain (7 - 10)  melatonin 3 milliGRAM(s) Oral at bedtime PRN Insomnia  nicotine  Polacrilex Gum 2 milliGRAM(s) Oral every 2 hours PRN nicotine withdrawal  ondansetron Injectable 4 milliGRAM(s) IV Push every 8 hours PRN Nausea and/or Vomiting  ondansetron Injectable 4 milliGRAM(s) IV Push every 6 hours PRN Nausea and/or Vomiting  oxyCODONE    IR 5 milliGRAM(s) Oral every 4 hours PRN Moderate Pain (4 - 6)  oxyCODONE    IR 5 milliGRAM(s) Oral every 4 hours PRN Moderate Pain (4 - 6)  oxyCODONE    IR 10 milliGRAM(s) Oral every 4 hours PRN Severe Pain (7 - 10)      < from: CT Cervical Spine No Cont (03.14.23 @ 09:40) >  INTERPRETATION:  Clinical indication: Preop planning    Serial thin sections on a multi slice scanner were obtained through the   cervical and thoracic spine from the C1 to the L3-4 level in a stacked   axial fashion reformatted at 1.25 mm sections with sagittal and coronal   computer generated reconstructed views.      The cervical vertebrae are normal in height and density. There is   straightening of the normal cervical lordosis. Mild degenerative   vertebral joint changes are identified at C5-6. No significant spinal   stenosis is identified.    Evaluation of the thoracic spine demonstrates the thoracic vertebral   bodies to be normal in height and density. There is anterior ossification   extending from T6 through L1. No acute fractures or dislocations are   identified. There is no significant spinal stenosis.    Paraspinal soft tissues are unremarkable. There is a right-sided IVC   filter.    There are small calcifications in the left kidney with some volume loss.    IMPRESSION: Mild degenerative changes of the cervical and thoracic spine.   No significant spinal stenosis.    < end of copied text >

## 2023-03-17 NOTE — PATIENT PROFILE ADULT - FALL HARM RISK - HARM RISK INTERVENTIONS

## 2023-03-18 LAB
ANION GAP SERPL CALC-SCNC: 7 MMOL/L — SIGNIFICANT CHANGE UP (ref 5–17)
APTT BLD: 30.2 SEC — SIGNIFICANT CHANGE UP (ref 27.5–35.5)
BUN SERPL-MCNC: 31 MG/DL — HIGH (ref 7–23)
CALCIUM SERPL-MCNC: 9.1 MG/DL — SIGNIFICANT CHANGE UP (ref 8.5–10.1)
CHLORIDE SERPL-SCNC: 107 MMOL/L — SIGNIFICANT CHANGE UP (ref 96–108)
CO2 SERPL-SCNC: 24 MMOL/L — SIGNIFICANT CHANGE UP (ref 22–31)
CREAT SERPL-MCNC: 2.03 MG/DL — HIGH (ref 0.5–1.3)
EGFR: 40 ML/MIN/1.73M2 — LOW
GLUCOSE BLDC GLUCOMTR-MCNC: 136 MG/DL — HIGH (ref 70–99)
GLUCOSE BLDC GLUCOMTR-MCNC: 161 MG/DL — HIGH (ref 70–99)
GLUCOSE BLDC GLUCOMTR-MCNC: 198 MG/DL — HIGH (ref 70–99)
GLUCOSE BLDC GLUCOMTR-MCNC: 198 MG/DL — HIGH (ref 70–99)
GLUCOSE SERPL-MCNC: 187 MG/DL — HIGH (ref 70–99)
HCT VFR BLD CALC: 33.3 % — LOW (ref 39–50)
HGB BLD-MCNC: 10.5 G/DL — LOW (ref 13–17)
INR BLD: 1.27 RATIO — HIGH (ref 0.88–1.16)
MCHC RBC-ENTMCNC: 24.4 PG — LOW (ref 27–34)
MCHC RBC-ENTMCNC: 31.5 GM/DL — LOW (ref 32–36)
MCV RBC AUTO: 77.3 FL — LOW (ref 80–100)
PLATELET # BLD AUTO: 232 K/UL — SIGNIFICANT CHANGE UP (ref 150–400)
POTASSIUM SERPL-MCNC: 3.7 MMOL/L — SIGNIFICANT CHANGE UP (ref 3.5–5.3)
POTASSIUM SERPL-SCNC: 3.7 MMOL/L — SIGNIFICANT CHANGE UP (ref 3.5–5.3)
PROTHROM AB SERPL-ACNC: 14.8 SEC — HIGH (ref 10.5–13.4)
RBC # BLD: 4.31 M/UL — SIGNIFICANT CHANGE UP (ref 4.2–5.8)
RBC # FLD: 15.8 % — HIGH (ref 10.3–14.5)
SODIUM SERPL-SCNC: 138 MMOL/L — SIGNIFICANT CHANGE UP (ref 135–145)
WBC # BLD: 14.23 K/UL — HIGH (ref 3.8–10.5)
WBC # FLD AUTO: 14.23 K/UL — HIGH (ref 3.8–10.5)

## 2023-03-18 PROCEDURE — 99233 SBSQ HOSP IP/OBS HIGH 50: CPT

## 2023-03-18 RX ORDER — POLYETHYLENE GLYCOL 3350 17 G/17G
17 POWDER, FOR SOLUTION ORAL DAILY
Refills: 0 | Status: DISCONTINUED | OUTPATIENT
Start: 2023-03-18 | End: 2023-03-22

## 2023-03-18 RX ORDER — SIMETHICONE 80 MG/1
80 TABLET, CHEWABLE ORAL THREE TIMES A DAY
Refills: 0 | Status: DISCONTINUED | OUTPATIENT
Start: 2023-03-18 | End: 2023-03-22

## 2023-03-18 RX ORDER — SODIUM CHLORIDE 9 MG/ML
1000 INJECTION INTRAMUSCULAR; INTRAVENOUS; SUBCUTANEOUS
Refills: 0 | Status: DISCONTINUED | OUTPATIENT
Start: 2023-03-18 | End: 2023-03-22

## 2023-03-18 RX ADMIN — FINASTERIDE 5 MILLIGRAM(S): 5 TABLET, FILM COATED ORAL at 09:15

## 2023-03-18 RX ADMIN — CYCLOBENZAPRINE HYDROCHLORIDE 10 MILLIGRAM(S): 10 TABLET, FILM COATED ORAL at 05:19

## 2023-03-18 RX ADMIN — DULOXETINE HYDROCHLORIDE 60 MILLIGRAM(S): 30 CAPSULE, DELAYED RELEASE ORAL at 09:15

## 2023-03-18 RX ADMIN — HYDROMORPHONE HYDROCHLORIDE 2 MILLIGRAM(S): 2 INJECTION INTRAMUSCULAR; INTRAVENOUS; SUBCUTANEOUS at 05:22

## 2023-03-18 RX ADMIN — HYDROMORPHONE HYDROCHLORIDE 2 MILLIGRAM(S): 2 INJECTION INTRAMUSCULAR; INTRAVENOUS; SUBCUTANEOUS at 23:50

## 2023-03-18 RX ADMIN — Medication 5 MILLIGRAM(S): at 21:48

## 2023-03-18 RX ADMIN — AMLODIPINE BESYLATE 10 MILLIGRAM(S): 2.5 TABLET ORAL at 09:15

## 2023-03-18 RX ADMIN — CYCLOBENZAPRINE HYDROCHLORIDE 10 MILLIGRAM(S): 10 TABLET, FILM COATED ORAL at 21:48

## 2023-03-18 RX ADMIN — HYDROMORPHONE HYDROCHLORIDE 2 MILLIGRAM(S): 2 INJECTION INTRAMUSCULAR; INTRAVENOUS; SUBCUTANEOUS at 09:47

## 2023-03-18 RX ADMIN — Medication 5 MILLIGRAM(S): at 09:15

## 2023-03-18 RX ADMIN — Medication 25 MILLIGRAM(S): at 18:29

## 2023-03-18 RX ADMIN — Medication 1 MILLIGRAM(S): at 09:15

## 2023-03-18 RX ADMIN — Medication 1 PATCH: at 06:14

## 2023-03-18 RX ADMIN — Medication 1: at 12:51

## 2023-03-18 RX ADMIN — Medication 25 MILLIGRAM(S): at 05:19

## 2023-03-18 RX ADMIN — Medication 1 PATCH: at 09:16

## 2023-03-18 RX ADMIN — HYDROMORPHONE HYDROCHLORIDE 2 MILLIGRAM(S): 2 INJECTION INTRAMUSCULAR; INTRAVENOUS; SUBCUTANEOUS at 05:40

## 2023-03-18 RX ADMIN — Medication 2 MILLIGRAM(S): at 05:18

## 2023-03-18 RX ADMIN — OXYCODONE HYDROCHLORIDE 15 MILLIGRAM(S): 5 TABLET ORAL at 21:48

## 2023-03-18 RX ADMIN — HYDROMORPHONE HYDROCHLORIDE 2 MILLIGRAM(S): 2 INJECTION INTRAMUSCULAR; INTRAVENOUS; SUBCUTANEOUS at 11:59

## 2023-03-18 RX ADMIN — PANTOPRAZOLE SODIUM 40 MILLIGRAM(S): 20 TABLET, DELAYED RELEASE ORAL at 05:19

## 2023-03-18 RX ADMIN — GABAPENTIN 600 MILLIGRAM(S): 400 CAPSULE ORAL at 21:47

## 2023-03-18 RX ADMIN — SODIUM CHLORIDE 50 MILLILITER(S): 9 INJECTION INTRAMUSCULAR; INTRAVENOUS; SUBCUTANEOUS at 12:50

## 2023-03-18 RX ADMIN — Medication 25 MILLIGRAM(S): at 00:09

## 2023-03-18 RX ADMIN — Medication 500 MILLIGRAM(S): at 09:19

## 2023-03-18 RX ADMIN — TAMSULOSIN HYDROCHLORIDE 0.4 MILLIGRAM(S): 0.4 CAPSULE ORAL at 21:49

## 2023-03-18 RX ADMIN — Medication 1: at 08:12

## 2023-03-18 RX ADMIN — HYDROMORPHONE HYDROCHLORIDE 2 MILLIGRAM(S): 2 INJECTION INTRAMUSCULAR; INTRAVENOUS; SUBCUTANEOUS at 18:55

## 2023-03-18 RX ADMIN — Medication 0.1 MILLIGRAM(S): at 09:15

## 2023-03-18 RX ADMIN — Medication 0.1 MILLIGRAM(S): at 21:48

## 2023-03-18 RX ADMIN — OXYCODONE HYDROCHLORIDE 15 MILLIGRAM(S): 5 TABLET ORAL at 09:47

## 2023-03-18 RX ADMIN — Medication 1 PATCH: at 09:19

## 2023-03-18 RX ADMIN — HYDROMORPHONE HYDROCHLORIDE 2 MILLIGRAM(S): 2 INJECTION INTRAMUSCULAR; INTRAVENOUS; SUBCUTANEOUS at 13:52

## 2023-03-18 RX ADMIN — SODIUM CHLORIDE 50 MILLILITER(S): 9 INJECTION INTRAMUSCULAR; INTRAVENOUS; SUBCUTANEOUS at 20:18

## 2023-03-18 RX ADMIN — ONDANSETRON 4 MILLIGRAM(S): 8 TABLET, FILM COATED ORAL at 18:26

## 2023-03-18 RX ADMIN — HYDROMORPHONE HYDROCHLORIDE 2 MILLIGRAM(S): 2 INJECTION INTRAMUSCULAR; INTRAVENOUS; SUBCUTANEOUS at 00:13

## 2023-03-18 RX ADMIN — GABAPENTIN 600 MILLIGRAM(S): 400 CAPSULE ORAL at 05:21

## 2023-03-18 RX ADMIN — Medication 2 MILLIGRAM(S): at 09:16

## 2023-03-18 RX ADMIN — Medication 1 PATCH: at 18:31

## 2023-03-18 RX ADMIN — HYDROMORPHONE HYDROCHLORIDE 2 MILLIGRAM(S): 2 INJECTION INTRAMUSCULAR; INTRAVENOUS; SUBCUTANEOUS at 15:37

## 2023-03-18 RX ADMIN — GABAPENTIN 600 MILLIGRAM(S): 400 CAPSULE ORAL at 13:20

## 2023-03-18 RX ADMIN — Medication 1 TABLET(S): at 09:15

## 2023-03-18 RX ADMIN — Medication 500 MILLIGRAM(S): at 21:48

## 2023-03-18 RX ADMIN — HYDROMORPHONE HYDROCHLORIDE 2 MILLIGRAM(S): 2 INJECTION INTRAMUSCULAR; INTRAVENOUS; SUBCUTANEOUS at 00:35

## 2023-03-18 RX ADMIN — CYCLOBENZAPRINE HYDROCHLORIDE 10 MILLIGRAM(S): 10 TABLET, FILM COATED ORAL at 13:20

## 2023-03-18 RX ADMIN — OXYCODONE HYDROCHLORIDE 15 MILLIGRAM(S): 5 TABLET ORAL at 09:15

## 2023-03-18 RX ADMIN — Medication 25 MILLIGRAM(S): at 13:20

## 2023-03-18 RX ADMIN — SENNA PLUS 2 TABLET(S): 8.6 TABLET ORAL at 21:49

## 2023-03-18 NOTE — PROGRESS NOTE ADULT - SUBJECTIVE AND OBJECTIVE BOX
Patient seen and examined at bedside. No acute complaints at this time. Pain well controlled. Denies weakness, numbness or tingling. Denies chest pain, shortness of breath, nausea or vomiting. Pt stating RLE sensation improved since preop but slightly less than LLE    PE:  Vital Signs Last 24 Hrs  T(C): 36.8 (17 Mar 2023 23:15), Max: 36.8 (17 Mar 2023 07:28)  T(F): 98.2 (17 Mar 2023 23:15), Max: 98.2 (17 Mar 2023 07:28)  HR: 80 (17 Mar 2023 23:15) (80 - 91)  BP: 110/67 (17 Mar 2023 23:15) (106/66 - 125/64)  BP(mean): --  RR: 18 (17 Mar 2023 23:15) (16 - 18)  SpO2: 98% (17 Mar 2023 23:15) (92% - 98%)    Parameters below as of 17 Mar 2023 23:15  Patient On (Oxygen Delivery Method): room air      General: NAD, resting comforatbly in bed  Dressing C/D/I  2+ radial pulses  2+ DP Pulses    Motor:                   C5                C6              C7               C8           T1   R            5/5                5/5            5/5             5/5          5/5  L             5/5               5/5             5/5             4/5          5/5                L2             L3             L4               L5            S1  R         4/5           5/5          1/5             1/5           4/5  L          4/5          5/5           4/5             4/5           5/5    Sensory:            C5         C6         C7      C8       T1        (0=absent, 1=impaired, 2=normal, NT=not testable)  R         2            2           2        2         2  L          2            2           2        2         2               L2          L3         L4      L5       S1         (0=absent, 1=impaired, 2=normal, NT=not testable)  R         2            2            1*        1*        1*  L          2            2           2        2         2    *reports improved since preop                                             10.3   12.93 )-----------( 232      ( 17 Mar 2023 08:50 )             32.8       03-17    135  |  106  |  31<H>  ----------------------------<  163<H>  3.8   |  23  |  1.83<H>    Ca    8.6      17 Mar 2023 08:50              A/P:  48y m s/p L4-5 lami and PSF POD 3  -PT/OT   -WBAT  -Pain Control  - Drains DC'd 3/17  -DVT ppx: SCD hold chemo dvt ppx, may resume POD5  -Continue perioperative abx while drains in  - FU Drain output   -FU AM Labs  -Incentive Spirometry  -Medical management appreciated  - Dispo - rehab

## 2023-03-18 NOTE — PROGRESS NOTE ADULT - SUBJECTIVE AND OBJECTIVE BOX
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 He has had off-and-on lower back pain with peripheral LE neuropathy since 2016 when he had Guillain-Barré and a CVA with R sided deficits in 2016. Normally, he can walk "shaky with a walker." However, over the past 3 weeks, his symptoms have gotten worse, with frequent burning pain in his lower back and spasms. His symptoms are worse when he lies flat or when he tries to transition to his walker. Therefore, he has had marked difficulty ambulating. He has baseline urinary incontinence and uses Pull-ups. He has some improved bowel continence, but cannot hold his bowels in for long. Those symptoms are not new. He denies CP, cough, N/V, abdominal pain, CP, or SOB.  He went to [facility] 2 nights ago and was "in the ER for 30 hours" not moving. His pain worsened even more after discharge.    He also notes a history of VRE and was recently (1 week ago) prescribed Augmentin, which he only took 2 days of. 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 He is not sure if he has dysuria currently, but he had it when he was prescribed antibiotics last week.  He also is on Warfarin for an unknown "blood clotting disorder," stemming from a blood clot in 2016 (of note, he was intubated at that time for Guillain-[place] and suffered a CVA during that time), but he has not had a blood clot since then. He states he has an IVC filter. He is non-compliant with his Warfarin, stating he takes it about 3-4 days per week.    In the ED, he was given Flexeril 10 mg PO x1 and  mg x1 with Morphine 4 mg IV x1 ordered.    3/13 afebrile , says has mild urethral burning on micturation, no flank pain, , no chills , lower back pain not controlled with oxycodone 5, will increase oxycodone 10   3/14 afebrile , Patient was transferred to [facility] last night for MRI neuraxial imaging - however only MRI LSpine was performed,no acute overnight events, WBC 10, denies CP, sob, no new complaints   B/L lower back pain controlled with oxycodone and dilaudid     3/15: s/p Fusion of posterior lumbar spine 4/5 and decompression with bilateral mayank laminectomy  asking for more pain meds; says 2 mg dilaudid iv did nothing.     3/16: s/p fusion of LS, POD #1, pain well controlled on current regimen. No acute complaints.     3/17: s/p L4/5 laminectomy, POD#2. Still with pain. DEEPAK drains in place. Finished course of Augmentin      3/18: s/p L4/5 laminectomy, POD##, DEEPAK removed yesterday. +flatus, no BM. Anderson removed today             PHYSICAL EXAM:  Vital Signs Last 24 Hrs  T(C): 37.3 (18 Mar 2023 18:44), Max: 37.3 (18 Mar 2023 18:44)  T(F): 99.1 (18 Mar 2023 18:44), Max: 99.1 (18 Mar 2023 18:44)  HR: 85 (18 Mar 2023 18:44) (75 - 85)  BP: 140/72 (18 Mar 2023 18:44) (108/70 - 141/79)  RR: 18 (18 Mar 2023 18:44) (18 - 18)  SpO2: 96% (18 Mar 2023 18:44) (96% - 98%)    Parameters below as of 18 Mar 2023 18:44  Patient On (Oxygen Delivery Method): room air                  Physical exam   GENERAL: No acute distress  HEENT: PERRL, EOMI, MMM, no oropharyngeal lesions  NECK: supple, no stiffness, no JVD, no thyromegaly  PULM: respirations non-labored, clear to auscultation bilaterally, no rales, rhonchi, or wheezes  CV: regular rate and rhythm, no murmurs, gallops, or rubs  GI: abdomen soft, NT/NT,  MSK: + lumbar spinal TTP.   LYMPH: no anterior cervical, posterior cervical, supraclavicular, or inguinal lymphadenopathy  NEURO: occasional spasms of b/l LEs. A&Ox3, sensation intact. Strength 2/5 b/l LEs  SKIN: no edema, . No rashes or lesions    Labs                          10.5   14.23 )-----------( 232      ( 18 Mar 2023 07:41 )             33.3       03-18    138  |  107  |  31<H>  ----------------------------<  187<H>  3.7   |  24  |  2.03<H>    Ca    9.1      18 Mar 2023 07:41          CBC Full  -  ( 14 Mar 2023 05:16 )  WBC Count : 10.57 K/uL  RBC Count : 4.33 M/uL  Hemoglobin : 10.6 g/dL  Hematocrit : 33.5 %  Platelet Count - Automated : 206 K/uL  Mean Cell Volume : 77.4 fl  Mean Cell Hemoglobin : 24.5 pg  Mean Cell Hemoglobin Concentration : 31.6 gm/dL  Auto Neutrophil # : 7.82 K/uL  Auto Lymphocyte # : 1.69 K/uL  Auto Monocyte # : 0.65 K/uL  Auto Eosinophil # : 0.30 K/uL  Auto Basophil # : 0.06 K/uL  Auto Neutrophil % : 74.0 %  Auto Lymphocyte % : 16.0 %  Auto Monocyte % : 6.1 %  Auto Eosinophil % : 2.8 %  Auto Basophil % : 0.6 %    03-15    134<L>  |  106  |  29<H>  ----------------------------<  228<H>  4.2   |  24  |  1.89<H>    Ca    8.6      15 Mar 2023 14:02      03-14    138  |  109<H>  |  25<H>  ----------------------------<  139<H>  3.7   |  25  |  1.74<H>    Ca    8.7      14 Mar 2023 05:16            PT/INR - ( 14 Mar 2023 05:16 )   PT: 14.6 sec;   INR: 1.26 ratio         PTT - ( 14 Mar 2023 12:04 )  PTT:102.3 sec      I&O's Detail    17 Mar 2023 07:01  -  18 Mar 2023 07:00  --------------------------------------------------------  IN:  Total IN: 0 mL    OUT:    Bulb (mL): 20 mL    Bulb (mL): 5 mL    Indwelling Catheter - Urethral (mL): 800 mL    Voided (mL): 1100 mL  Total OUT: 1925 mL    Total NET: -1925 mL      18 Mar 2023 07:01  -  18 Mar 2023 20:03  --------------------------------------------------------  IN:    sodium chloride 0.9%: 300 mL  Total IN: 300 mL    OUT:    Indwelling Catheter - Urethral (mL): 600 mL  Total OUT: 600 mL    Total NET: -300 mL          MEDICATIONS  (STANDING):  amLODIPine   Tablet 10 milliGRAM(s) Oral daily  ascorbic acid 500 milliGRAM(s) Oral two times a day  baclofen 5 milliGRAM(s) Oral every 12 hours  cloNIDine 0.1 milliGRAM(s) Oral two times a day  cyclobenzaprine 10 milliGRAM(s) Oral every 8 hours  dextrose 5%. 1000 milliLiter(s) (50 mL/Hr) IV Continuous <Continuous>  dextrose 5%. 1000 milliLiter(s) (100 mL/Hr) IV Continuous <Continuous>  dextrose 50% Injectable 25 Gram(s) IV Push once  dextrose 50% Injectable 12.5 Gram(s) IV Push once  dextrose 50% Injectable 25 Gram(s) IV Push once  dextrose 50% Injectable 25 Gram(s) IV Push once  dextrose 50% Injectable 12.5 Gram(s) IV Push once  dextrose 50% Injectable 25 Gram(s) IV Push once  DULoxetine 60 milliGRAM(s) Oral daily  finasteride 5 milliGRAM(s) Oral daily  folic acid 1 milliGRAM(s) Oral daily  gabapentin 600 milliGRAM(s) Oral every 8 hours  glucagon  Injectable 1 milliGRAM(s) IntraMuscular once  glucagon  Injectable 1 milliGRAM(s) IntraMuscular once  hydrALAZINE 25 milliGRAM(s) Oral four times a day  influenza   Vaccine 0.5 milliLiter(s) IntraMuscular once  insulin lispro (ADMELOG) corrective regimen sliding scale   SubCutaneous three times a day before meals  insulin lispro (ADMELOG) corrective regimen sliding scale   SubCutaneous at bedtime  lactated ringers. 1000 milliLiter(s) (75 mL/Hr) IV Continuous <Continuous>  lactated ringers. 1000 milliLiter(s) (75 mL/Hr) IV Continuous <Continuous>  lactated ringers. 1000 milliLiter(s) (75 mL/Hr) IV Continuous <Continuous>  multivitamin 1 Tablet(s) Oral daily  nicotine -  14 mG/24Hr(s) Patch 1 Patch Transdermal daily  oxyCODONE  ER Tablet 15 milliGRAM(s) Oral every 12 hours  pantoprazole    Tablet 40 milliGRAM(s) Oral before breakfast  senna 2 Tablet(s) Oral at bedtime  sodium chloride 0.9%. 1000 milliLiter(s) (50 mL/Hr) IV Continuous <Continuous>  tamsulosin 0.4 milliGRAM(s) Oral at bedtime    MEDICATIONS  (PRN):  acetaminophen     Tablet .. 650 milliGRAM(s) Oral every 6 hours PRN Mild Pain (1 - 3)  acetaminophen     Tablet .. 650 milliGRAM(s) Oral every 6 hours PRN Temp greater or equal to 38C (100.4F), Mild Pain (1 - 3)  bisacodyl 5 milliGRAM(s) Oral every 12 hours PRN Constipation  dextrose Oral Gel 15 Gram(s) Oral once PRN Blood Glucose LESS THAN 70 milliGRAM(s)/deciliter  dextrose Oral Gel 15 Gram(s) Oral once PRN Blood Glucose LESS THAN 70 milliGRAM(s)/deciliter  HYDROmorphone  Injectable 2 milliGRAM(s) IV Push every 4 hours PRN Severe Pain (7 - 10)  melatonin 3 milliGRAM(s) Oral at bedtime PRN Insomnia  nicotine  Polacrilex Gum 2 milliGRAM(s) Oral every 2 hours PRN nicotine withdrawal  ondansetron Injectable 4 milliGRAM(s) IV Push every 8 hours PRN Nausea and/or Vomiting  ondansetron Injectable 4 milliGRAM(s) IV Push every 6 hours PRN Nausea and/or Vomiting  oxyCODONE    IR 5 milliGRAM(s) Oral every 4 hours PRN Moderate Pain (4 - 6)  polyethylene glycol 3350 17 Gram(s) Oral daily PRN Constipation  simethicone 80 milliGRAM(s) Chew three times a day PRN Gas        < from: CT Cervical Spine No Cont (03.14.23 @ 09:40) >  INTERPRETATION:  Clinical indication: Preop planning    Serial thin sections on a multi slice scanner were obtained through the   cervical and thoracic spine from the C1 to the L3-4 level in a stacked   axial fashion reformatted at 1.25 mm sections with sagittal and coronal   computer generated reconstructed views.      The cervical vertebrae are normal in height and density. There is   straightening of the normal cervical lordosis. Mild degenerative   vertebral joint changes are identified at C5-6. No significant spinal   stenosis is identified.    Evaluation of the thoracic spine demonstrates the thoracic vertebral   bodies to be normal in height and density. There is anterior ossification   extending from T6 through L1. No acute fractures or dislocations are   identified. There is no significant spinal stenosis.    Paraspinal soft tissues are unremarkable. There is a right-sided IVC   filter.    There are small calcifications in the left kidney with some volume loss.    IMPRESSION: Mild degenerative changes of the cervical and thoracic spine.   No significant spinal stenosis.    < end of copied text >

## 2023-03-19 LAB
ANION GAP SERPL CALC-SCNC: 8 MMOL/L — SIGNIFICANT CHANGE UP (ref 5–17)
APTT BLD: 26.8 SEC — LOW (ref 27.5–35.5)
BUN SERPL-MCNC: 32 MG/DL — HIGH (ref 7–23)
CALCIUM SERPL-MCNC: 8.4 MG/DL — LOW (ref 8.5–10.1)
CHLORIDE SERPL-SCNC: 109 MMOL/L — HIGH (ref 96–108)
CO2 SERPL-SCNC: 22 MMOL/L — SIGNIFICANT CHANGE UP (ref 22–31)
CREAT SERPL-MCNC: 1.88 MG/DL — HIGH (ref 0.5–1.3)
EGFR: 44 ML/MIN/1.73M2 — LOW
GLUCOSE BLDC GLUCOMTR-MCNC: 151 MG/DL — HIGH (ref 70–99)
GLUCOSE BLDC GLUCOMTR-MCNC: 161 MG/DL — HIGH (ref 70–99)
GLUCOSE BLDC GLUCOMTR-MCNC: 207 MG/DL — HIGH (ref 70–99)
GLUCOSE BLDC GLUCOMTR-MCNC: 218 MG/DL — HIGH (ref 70–99)
GLUCOSE SERPL-MCNC: 150 MG/DL — HIGH (ref 70–99)
HCT VFR BLD CALC: 30 % — LOW (ref 39–50)
HGB BLD-MCNC: 9.6 G/DL — LOW (ref 13–17)
INR BLD: 1.24 RATIO — HIGH (ref 0.88–1.16)
MCHC RBC-ENTMCNC: 24.7 PG — LOW (ref 27–34)
MCHC RBC-ENTMCNC: 32 GM/DL — SIGNIFICANT CHANGE UP (ref 32–36)
MCV RBC AUTO: 77.3 FL — LOW (ref 80–100)
PLATELET # BLD AUTO: 196 K/UL — SIGNIFICANT CHANGE UP (ref 150–400)
POTASSIUM SERPL-MCNC: 3.6 MMOL/L — SIGNIFICANT CHANGE UP (ref 3.5–5.3)
POTASSIUM SERPL-SCNC: 3.6 MMOL/L — SIGNIFICANT CHANGE UP (ref 3.5–5.3)
PROTHROM AB SERPL-ACNC: 14.4 SEC — HIGH (ref 10.5–13.4)
RBC # BLD: 3.88 M/UL — LOW (ref 4.2–5.8)
RBC # FLD: 15.3 % — HIGH (ref 10.3–14.5)
SODIUM SERPL-SCNC: 139 MMOL/L — SIGNIFICANT CHANGE UP (ref 135–145)
WBC # BLD: 12.07 K/UL — HIGH (ref 3.8–10.5)
WBC # FLD AUTO: 12.07 K/UL — HIGH (ref 3.8–10.5)

## 2023-03-19 PROCEDURE — 99233 SBSQ HOSP IP/OBS HIGH 50: CPT

## 2023-03-19 RX ADMIN — HYDROMORPHONE HYDROCHLORIDE 2 MILLIGRAM(S): 2 INJECTION INTRAMUSCULAR; INTRAVENOUS; SUBCUTANEOUS at 10:17

## 2023-03-19 RX ADMIN — HYDROMORPHONE HYDROCHLORIDE 2 MILLIGRAM(S): 2 INJECTION INTRAMUSCULAR; INTRAVENOUS; SUBCUTANEOUS at 00:20

## 2023-03-19 RX ADMIN — Medication 500 MILLIGRAM(S): at 22:33

## 2023-03-19 RX ADMIN — Medication 650 MILLIGRAM(S): at 01:23

## 2023-03-19 RX ADMIN — Medication 1: at 09:07

## 2023-03-19 RX ADMIN — CYCLOBENZAPRINE HYDROCHLORIDE 10 MILLIGRAM(S): 10 TABLET, FILM COATED ORAL at 05:40

## 2023-03-19 RX ADMIN — PANTOPRAZOLE SODIUM 40 MILLIGRAM(S): 20 TABLET, DELAYED RELEASE ORAL at 05:39

## 2023-03-19 RX ADMIN — Medication 650 MILLIGRAM(S): at 11:55

## 2023-03-19 RX ADMIN — Medication 2 MILLIGRAM(S): at 20:09

## 2023-03-19 RX ADMIN — GABAPENTIN 600 MILLIGRAM(S): 400 CAPSULE ORAL at 22:33

## 2023-03-19 RX ADMIN — Medication 1 MILLIGRAM(S): at 10:17

## 2023-03-19 RX ADMIN — Medication 1 PATCH: at 10:16

## 2023-03-19 RX ADMIN — Medication 2 MILLIGRAM(S): at 12:20

## 2023-03-19 RX ADMIN — OXYCODONE HYDROCHLORIDE 5 MILLIGRAM(S): 5 TABLET ORAL at 01:53

## 2023-03-19 RX ADMIN — Medication 1 TABLET(S): at 10:17

## 2023-03-19 RX ADMIN — Medication 1 PATCH: at 18:14

## 2023-03-19 RX ADMIN — OXYCODONE HYDROCHLORIDE 5 MILLIGRAM(S): 5 TABLET ORAL at 13:46

## 2023-03-19 RX ADMIN — GABAPENTIN 600 MILLIGRAM(S): 400 CAPSULE ORAL at 05:40

## 2023-03-19 RX ADMIN — Medication 2 MILLIGRAM(S): at 02:41

## 2023-03-19 RX ADMIN — HYDROMORPHONE HYDROCHLORIDE 2 MILLIGRAM(S): 2 INJECTION INTRAMUSCULAR; INTRAVENOUS; SUBCUTANEOUS at 20:06

## 2023-03-19 RX ADMIN — Medication 25 MILLIGRAM(S): at 05:39

## 2023-03-19 RX ADMIN — Medication 2: at 17:45

## 2023-03-19 RX ADMIN — Medication 0.1 MILLIGRAM(S): at 22:33

## 2023-03-19 RX ADMIN — AMLODIPINE BESYLATE 10 MILLIGRAM(S): 2.5 TABLET ORAL at 10:16

## 2023-03-19 RX ADMIN — Medication 2: at 11:54

## 2023-03-19 RX ADMIN — Medication 650 MILLIGRAM(S): at 12:55

## 2023-03-19 RX ADMIN — Medication 650 MILLIGRAM(S): at 01:53

## 2023-03-19 RX ADMIN — OXYCODONE HYDROCHLORIDE 15 MILLIGRAM(S): 5 TABLET ORAL at 22:34

## 2023-03-19 RX ADMIN — Medication 0.1 MILLIGRAM(S): at 10:17

## 2023-03-19 RX ADMIN — Medication 2 MILLIGRAM(S): at 10:14

## 2023-03-19 RX ADMIN — OXYCODONE HYDROCHLORIDE 5 MILLIGRAM(S): 5 TABLET ORAL at 18:33

## 2023-03-19 RX ADMIN — Medication 5 MILLIGRAM(S): at 10:15

## 2023-03-19 RX ADMIN — HYDROMORPHONE HYDROCHLORIDE 2 MILLIGRAM(S): 2 INJECTION INTRAMUSCULAR; INTRAVENOUS; SUBCUTANEOUS at 20:36

## 2023-03-19 RX ADMIN — Medication 25 MILLIGRAM(S): at 12:20

## 2023-03-19 RX ADMIN — DULOXETINE HYDROCHLORIDE 60 MILLIGRAM(S): 30 CAPSULE, DELAYED RELEASE ORAL at 12:20

## 2023-03-19 RX ADMIN — Medication 2 MILLIGRAM(S): at 17:45

## 2023-03-19 RX ADMIN — Medication 5 MILLIGRAM(S): at 22:33

## 2023-03-19 RX ADMIN — Medication 1 PATCH: at 06:23

## 2023-03-19 RX ADMIN — HYDROMORPHONE HYDROCHLORIDE 2 MILLIGRAM(S): 2 INJECTION INTRAMUSCULAR; INTRAVENOUS; SUBCUTANEOUS at 15:03

## 2023-03-19 RX ADMIN — HYDROMORPHONE HYDROCHLORIDE 2 MILLIGRAM(S): 2 INJECTION INTRAMUSCULAR; INTRAVENOUS; SUBCUTANEOUS at 10:30

## 2023-03-19 RX ADMIN — OXYCODONE HYDROCHLORIDE 5 MILLIGRAM(S): 5 TABLET ORAL at 01:24

## 2023-03-19 RX ADMIN — Medication 500 MILLIGRAM(S): at 10:17

## 2023-03-19 RX ADMIN — OXYCODONE HYDROCHLORIDE 15 MILLIGRAM(S): 5 TABLET ORAL at 10:20

## 2023-03-19 RX ADMIN — TAMSULOSIN HYDROCHLORIDE 0.4 MILLIGRAM(S): 0.4 CAPSULE ORAL at 22:37

## 2023-03-19 RX ADMIN — Medication 25 MILLIGRAM(S): at 01:22

## 2023-03-19 RX ADMIN — CYCLOBENZAPRINE HYDROCHLORIDE 10 MILLIGRAM(S): 10 TABLET, FILM COATED ORAL at 13:46

## 2023-03-19 RX ADMIN — OXYCODONE HYDROCHLORIDE 5 MILLIGRAM(S): 5 TABLET ORAL at 14:45

## 2023-03-19 RX ADMIN — HYDROMORPHONE HYDROCHLORIDE 2 MILLIGRAM(S): 2 INJECTION INTRAMUSCULAR; INTRAVENOUS; SUBCUTANEOUS at 15:20

## 2023-03-19 RX ADMIN — OXYCODONE HYDROCHLORIDE 15 MILLIGRAM(S): 5 TABLET ORAL at 11:20

## 2023-03-19 RX ADMIN — OXYCODONE HYDROCHLORIDE 5 MILLIGRAM(S): 5 TABLET ORAL at 19:30

## 2023-03-19 RX ADMIN — CYCLOBENZAPRINE HYDROCHLORIDE 10 MILLIGRAM(S): 10 TABLET, FILM COATED ORAL at 22:34

## 2023-03-19 RX ADMIN — GABAPENTIN 600 MILLIGRAM(S): 400 CAPSULE ORAL at 13:47

## 2023-03-19 RX ADMIN — FINASTERIDE 5 MILLIGRAM(S): 5 TABLET, FILM COATED ORAL at 10:17

## 2023-03-19 RX ADMIN — Medication 25 MILLIGRAM(S): at 17:41

## 2023-03-19 NOTE — PROGRESS NOTE ADULT - SUBJECTIVE AND OBJECTIVE BOX
Patient seen and examined at bedside. No acute complaints at this time. Pain well controlled. Denies weakness, numbness or tingling. Known R foot drop. Reports moving RLE slightly more than preop baseline. Denies chest pain, shortness of breath, nausea or vomiting. Pt stating RLE sensation improved since preop, equal to LLE    PE:  Vital Signs Last 24 Hrs  T(C): 37 (19 Mar 2023 04:00), Max: 37.3 (18 Mar 2023 18:44)  T(F): 98.6 (19 Mar 2023 04:00), Max: 99.1 (18 Mar 2023 18:44)  HR: 72 (19 Mar 2023 04:00) (72 - 94)  BP: 130/71 (19 Mar 2023 04:00) (108/70 - 141/79)  BP(mean): --  RR: 18 (19 Mar 2023 04:00) (18 - 18)  SpO2: 97% (19 Mar 2023 04:00) (96% - 98%)    Parameters below as of 19 Mar 2023 04:00  Patient On (Oxygen Delivery Method): room air          General: NAD, resting comfortably in bed  Dressing C/D/I  2+ radial pulses  2+ DP Pulses    Motor:                   C5                C6              C7               C8           T1   R            5/5                5/5            5/5             5/5          5/5  L             5/5               5/5             5/5             4/5          5/5                L2             L3             L4               L5            S1  R         5/5           5/5          1/5             1/5           4/5  L          5/5          5/5           4/5             4/5           5/5    Sensory:            C5         C6         C7      C8       T1        (0=absent, 1=impaired, 2=normal, NT=not testable)  R         2            2           2        2         2  L          2            2           2        2         2               L2          L3         L4      L5       S1         (0=absent, 1=impaired, 2=normal, NT=not testable)  R         2            2            1*        1*        1*  L          2            2           2        2         2    *reports improved since preop                                             10.5   14.23 )-----------( 232      ( 18 Mar 2023 07:41 )             33.3       03-18    138  |  107  |  31<H>  ----------------------------<  187<H>  3.7   |  24  |  2.03<H>    Ca    9.1      18 Mar 2023 07:41                PT/INR - ( 18 Mar 2023 07:41 )   PT: 14.8 sec;   INR: 1.27 ratio         PTT - ( 18 Mar 2023 07:41 )  PTT:30.2 sec                A/P:  48y m s/p L4-5 lami and PSF POD 4  -PT/OT   -WBAT  -Pain Control  - Drains DC'd 3/17  -DVT ppx: SCD hold chemo dvt ppx, may resume POD5  -FU AM Labs  -Incentive Spirometry  -Medical management appreciated  - Dispo - rehab

## 2023-03-19 NOTE — PROGRESS NOTE ADULT - SUBJECTIVE AND OBJECTIVE BOX
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 He has had off-and-on lower back pain with peripheral LE neuropathy since 2016 when he had Guillain-Barré and a CVA with R sided deficits in 2016. Normally, he can walk "shaky with a walker." However, over the past 3 weeks, his symptoms have gotten worse, with frequent burning pain in his lower back and spasms. His symptoms are worse when he lies flat or when he tries to transition to his walker. Therefore, he has had marked difficulty ambulating. He has baseline urinary incontinence and uses Pull-ups. He has some improved bowel continence, but cannot hold his bowels in for long. Those symptoms are not new. He denies CP, cough, N/V, abdominal pain, CP, or SOB.  He went to [facility] 2 nights ago and was "in the ER for 30 hours" not moving. His pain worsened even more after discharge.    He also notes a history of VRE and was recently (1 week ago) prescribed Augmentin, which he only took 2 days of. 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 He is not sure if he has dysuria currently, but he had it when he was prescribed antibiotics last week.  He also is on Warfarin for an unknown "blood clotting disorder," stemming from a blood clot in 2016 (of note, he was intubated at that time for Guillain-[place] and suffered a CVA during that time), but he has not had a blood clot since then. He states he has an IVC filter. He is non-compliant with his Warfarin, stating he takes it about 3-4 days per week.    In the ED, he was given Flexeril 10 mg PO x1 and  mg x1 with Morphine 4 mg IV x1 ordered.    3/13 afebrile , says has mild urethral burning on micturation, no flank pain, , no chills , lower back pain not controlled with oxycodone 5, will increase oxycodone 10   3/14 afebrile , Patient was transferred to [facility] last night for MRI neuraxial imaging - however only MRI LSpine was performed,no acute overnight events, WBC 10, denies CP, sob, no new complaints   B/L lower back pain controlled with oxycodone and dilaudid     3/15: s/p Fusion of posterior lumbar spine 4/5 and decompression with bilateral mayank laminectomy  asking for more pain meds; says 2 mg dilaudid iv did nothing.     3/16: s/p fusion of LS, POD #1, pain well controlled on current regimen. No acute complaints.     3/17: s/p L4/5 laminectomy, POD#2. Still with pain. DEEPAK drains in place. Finished course of Augmentin      3/18: s/p L4/5 laminectomy, POD#3, DEEPAK removed yesterday. +flatus, no BM. Anderson removed today     3/19: s/p L4/5 laminectomy, POD#4, +BM today. +worked with PT today. No acute complaints.             PHYSICAL EXAM:  Vital Signs Last 24 Hrs  T(C): 37.2 (19 Mar 2023 16:02), Max: 37.3 (18 Mar 2023 18:44)  T(F): 99 (19 Mar 2023 16:02), Max: 99.1 (18 Mar 2023 18:44)  HR: 72 (19 Mar 2023 16:02) (71 - 94)  BP: 111/58 (19 Mar 2023 16:02) (102/48 - 140/72)  RR: 16 (19 Mar 2023 16:02) (16 - 18)  SpO2: 96% (19 Mar 2023 16:02) (95% - 100%)    Parameters below as of 19 Mar 2023 16:02  Patient On (Oxygen Delivery Method): room air        Physical exam   GENERAL: No acute distress  HEENT: PERRL, EOMI, MMM, no oropharyngeal lesions  NECK: supple, no stiffness, no JVD, no thyromegaly  PULM: respirations non-labored, clear to auscultation bilaterally, no rales, rhonchi, or wheezes  CV: regular rate and rhythm, no murmurs, gallops, or rubs  GI: abdomen soft, NT/NT,  MSK: + lumbar spinal TTP.   LYMPH: no anterior cervical, posterior cervical, supraclavicular, or inguinal lymphadenopathy  NEURO: occasional spasms of b/l LEs. A&Ox3, sensation intact. Strength 2/5 b/l LEs  SKIN: no edema, . No rashes or lesions    Labs                        9.6    12.07 )-----------( 196      ( 19 Mar 2023 08:08 )             30.0   03-19    139  |  109<H>  |  32<H>  ----------------------------<  150<H>  3.6   |  22  |  1.88<H>    Ca    8.4<L>      19 Mar 2023 08:08            CBC Full  -  ( 14 Mar 2023 05:16 )  WBC Count : 10.57 K/uL  RBC Count : 4.33 M/uL  Hemoglobin : 10.6 g/dL  Hematocrit : 33.5 %  Platelet Count - Automated : 206 K/uL  Mean Cell Volume : 77.4 fl  Mean Cell Hemoglobin : 24.5 pg  Mean Cell Hemoglobin Concentration : 31.6 gm/dL  Auto Neutrophil # : 7.82 K/uL  Auto Lymphocyte # : 1.69 K/uL  Auto Monocyte # : 0.65 K/uL  Auto Eosinophil # : 0.30 K/uL  Auto Basophil # : 0.06 K/uL  Auto Neutrophil % : 74.0 %  Auto Lymphocyte % : 16.0 %  Auto Monocyte % : 6.1 %  Auto Eosinophil % : 2.8 %  Auto Basophil % : 0.6 %    03-15    134<L>  |  106  |  29<H>  ----------------------------<  228<H>  4.2   |  24  |  1.89<H>    Ca    8.6      15 Mar 2023 14:02      03-14    138  |  109<H>  |  25<H>  ----------------------------<  139<H>  3.7   |  25  |  1.74<H>    Ca    8.7      14 Mar 2023 05:16      PT/INR - ( 19 Mar 2023 09:08 )   PT: 14.4 sec;   INR: 1.24 ratio         PTT - ( 19 Mar 2023 09:08 )  PTT:26.8 sec    I&O's Detail    18 Mar 2023 07:01  -  19 Mar 2023 07:00  --------------------------------------------------------  IN:    sodium chloride 0.9%: 300 mL  Total IN: 300 mL    OUT:    Indwelling Catheter - Urethral (mL): 600 mL    Stool (mL): 1 mL    Voided (mL): 200 mL  Total OUT: 801 mL    Total NET: -501 mL      MEDICATIONS  (STANDING):  amLODIPine   Tablet 10 milliGRAM(s) Oral daily  ascorbic acid 500 milliGRAM(s) Oral two times a day  baclofen 5 milliGRAM(s) Oral every 12 hours  cloNIDine 0.1 milliGRAM(s) Oral two times a day  cyclobenzaprine 10 milliGRAM(s) Oral every 8 hours  dextrose 5%. 1000 milliLiter(s) (100 mL/Hr) IV Continuous <Continuous>  dextrose 5%. 1000 milliLiter(s) (50 mL/Hr) IV Continuous <Continuous>  dextrose 50% Injectable 25 Gram(s) IV Push once  dextrose 50% Injectable 12.5 Gram(s) IV Push once  dextrose 50% Injectable 25 Gram(s) IV Push once  dextrose 50% Injectable 25 Gram(s) IV Push once  dextrose 50% Injectable 12.5 Gram(s) IV Push once  dextrose 50% Injectable 25 Gram(s) IV Push once  DULoxetine 60 milliGRAM(s) Oral daily  finasteride 5 milliGRAM(s) Oral daily  folic acid 1 milliGRAM(s) Oral daily  gabapentin 600 milliGRAM(s) Oral every 8 hours  glucagon  Injectable 1 milliGRAM(s) IntraMuscular once  glucagon  Injectable 1 milliGRAM(s) IntraMuscular once  hydrALAZINE 25 milliGRAM(s) Oral four times a day  influenza   Vaccine 0.5 milliLiter(s) IntraMuscular once  insulin lispro (ADMELOG) corrective regimen sliding scale   SubCutaneous three times a day before meals  insulin lispro (ADMELOG) corrective regimen sliding scale   SubCutaneous at bedtime  lactated ringers. 1000 milliLiter(s) (75 mL/Hr) IV Continuous <Continuous>  lactated ringers. 1000 milliLiter(s) (75 mL/Hr) IV Continuous <Continuous>  lactated ringers. 1000 milliLiter(s) (75 mL/Hr) IV Continuous <Continuous>  multivitamin 1 Tablet(s) Oral daily  nicotine -  14 mG/24Hr(s) Patch 1 Patch Transdermal daily  oxyCODONE  ER Tablet 15 milliGRAM(s) Oral every 12 hours  pantoprazole    Tablet 40 milliGRAM(s) Oral before breakfast  senna 2 Tablet(s) Oral at bedtime  sodium chloride 0.9%. 1000 milliLiter(s) (50 mL/Hr) IV Continuous <Continuous>  tamsulosin 0.4 milliGRAM(s) Oral at bedtime    MEDICATIONS  (PRN):  acetaminophen     Tablet .. 650 milliGRAM(s) Oral every 6 hours PRN Mild Pain (1 - 3)  acetaminophen     Tablet .. 650 milliGRAM(s) Oral every 6 hours PRN Temp greater or equal to 38C (100.4F), Mild Pain (1 - 3)  bisacodyl 5 milliGRAM(s) Oral every 12 hours PRN Constipation  dextrose Oral Gel 15 Gram(s) Oral once PRN Blood Glucose LESS THAN 70 milliGRAM(s)/deciliter  dextrose Oral Gel 15 Gram(s) Oral once PRN Blood Glucose LESS THAN 70 milliGRAM(s)/deciliter  HYDROmorphone  Injectable 2 milliGRAM(s) IV Push every 4 hours PRN Severe Pain (7 - 10)  melatonin 3 milliGRAM(s) Oral at bedtime PRN Insomnia  nicotine  Polacrilex Gum 2 milliGRAM(s) Oral every 2 hours PRN nicotine withdrawal  ondansetron Injectable 4 milliGRAM(s) IV Push every 6 hours PRN Nausea and/or Vomiting  ondansetron Injectable 4 milliGRAM(s) IV Push every 8 hours PRN Nausea and/or Vomiting  oxyCODONE    IR 5 milliGRAM(s) Oral every 4 hours PRN Moderate Pain (4 - 6)  polyethylene glycol 3350 17 Gram(s) Oral daily PRN Constipation  simethicone 80 milliGRAM(s) Chew three times a day PRN Gas                    < from: CT Cervical Spine No Cont (03.14.23 @ 09:40) >  INTERPRETATION:  Clinical indication: Preop planning    Serial thin sections on a multi slice scanner were obtained through the   cervical and thoracic spine from the C1 to the L3-4 level in a stacked   axial fashion reformatted at 1.25 mm sections with sagittal and coronal   computer generated reconstructed views.      The cervical vertebrae are normal in height and density. There is   straightening of the normal cervical lordosis. Mild degenerative   vertebral joint changes are identified at C5-6. No significant spinal   stenosis is identified.    Evaluation of the thoracic spine demonstrates the thoracic vertebral   bodies to be normal in height and density. There is anterior ossification   extending from T6 through L1. No acute fractures or dislocations are   identified. There is no significant spinal stenosis.    Paraspinal soft tissues are unremarkable. There is a right-sided IVC   filter.    There are small calcifications in the left kidney with some volume loss.    IMPRESSION: Mild degenerative changes of the cervical and thoracic spine.   No significant spinal stenosis.    < end of copied text >

## 2023-03-20 LAB
ANION GAP SERPL CALC-SCNC: 8 MMOL/L — SIGNIFICANT CHANGE UP (ref 5–17)
APTT BLD: 28 SEC — SIGNIFICANT CHANGE UP (ref 27.5–35.5)
BUN SERPL-MCNC: 36 MG/DL — HIGH (ref 7–23)
CALCIUM SERPL-MCNC: 8.1 MG/DL — LOW (ref 8.5–10.1)
CHLORIDE SERPL-SCNC: 109 MMOL/L — HIGH (ref 96–108)
CO2 SERPL-SCNC: 22 MMOL/L — SIGNIFICANT CHANGE UP (ref 22–31)
CREAT SERPL-MCNC: 1.98 MG/DL — HIGH (ref 0.5–1.3)
EGFR: 41 ML/MIN/1.73M2 — LOW
GLUCOSE BLDC GLUCOMTR-MCNC: 178 MG/DL — HIGH (ref 70–99)
GLUCOSE BLDC GLUCOMTR-MCNC: 180 MG/DL — HIGH (ref 70–99)
GLUCOSE BLDC GLUCOMTR-MCNC: 184 MG/DL — HIGH (ref 70–99)
GLUCOSE BLDC GLUCOMTR-MCNC: 210 MG/DL — HIGH (ref 70–99)
GLUCOSE SERPL-MCNC: 180 MG/DL — HIGH (ref 70–99)
HCT VFR BLD CALC: 28.7 % — LOW (ref 39–50)
HGB BLD-MCNC: 9 G/DL — LOW (ref 13–17)
INR BLD: 1.2 RATIO — HIGH (ref 0.88–1.16)
MCHC RBC-ENTMCNC: 24.7 PG — LOW (ref 27–34)
MCHC RBC-ENTMCNC: 31.4 GM/DL — LOW (ref 32–36)
MCV RBC AUTO: 78.8 FL — LOW (ref 80–100)
PLATELET # BLD AUTO: 221 K/UL — SIGNIFICANT CHANGE UP (ref 150–400)
POTASSIUM SERPL-MCNC: 3.5 MMOL/L — SIGNIFICANT CHANGE UP (ref 3.5–5.3)
POTASSIUM SERPL-SCNC: 3.5 MMOL/L — SIGNIFICANT CHANGE UP (ref 3.5–5.3)
PROTHROM AB SERPL-ACNC: 13.9 SEC — HIGH (ref 10.5–13.4)
RBC # BLD: 3.64 M/UL — LOW (ref 4.2–5.8)
RBC # FLD: 15.4 % — HIGH (ref 10.3–14.5)
SARS-COV-2 RNA SPEC QL NAA+PROBE: SIGNIFICANT CHANGE UP
SODIUM SERPL-SCNC: 139 MMOL/L — SIGNIFICANT CHANGE UP (ref 135–145)
WBC # BLD: 10.19 K/UL — SIGNIFICANT CHANGE UP (ref 3.8–10.5)
WBC # FLD AUTO: 10.19 K/UL — SIGNIFICANT CHANGE UP (ref 3.8–10.5)

## 2023-03-20 PROCEDURE — 99221 1ST HOSP IP/OBS SF/LOW 40: CPT

## 2023-03-20 PROCEDURE — 99232 SBSQ HOSP IP/OBS MODERATE 35: CPT

## 2023-03-20 RX ORDER — OXYCODONE HYDROCHLORIDE 5 MG/1
5 TABLET ORAL EVERY 4 HOURS
Refills: 0 | Status: DISCONTINUED | OUTPATIENT
Start: 2023-03-20 | End: 2023-03-21

## 2023-03-20 RX ORDER — HEPARIN SODIUM 5000 [USP'U]/ML
5000 INJECTION INTRAVENOUS; SUBCUTANEOUS EVERY 8 HOURS
Refills: 0 | Status: DISCONTINUED | OUTPATIENT
Start: 2023-03-20 | End: 2023-03-22

## 2023-03-20 RX ADMIN — PANTOPRAZOLE SODIUM 40 MILLIGRAM(S): 20 TABLET, DELAYED RELEASE ORAL at 05:39

## 2023-03-20 RX ADMIN — CYCLOBENZAPRINE HYDROCHLORIDE 10 MILLIGRAM(S): 10 TABLET, FILM COATED ORAL at 05:38

## 2023-03-20 RX ADMIN — Medication 1 PATCH: at 09:00

## 2023-03-20 RX ADMIN — HEPARIN SODIUM 5000 UNIT(S): 5000 INJECTION INTRAVENOUS; SUBCUTANEOUS at 13:52

## 2023-03-20 RX ADMIN — DULOXETINE HYDROCHLORIDE 60 MILLIGRAM(S): 30 CAPSULE, DELAYED RELEASE ORAL at 09:06

## 2023-03-20 RX ADMIN — Medication 1 MILLIGRAM(S): at 09:06

## 2023-03-20 RX ADMIN — OXYCODONE HYDROCHLORIDE 15 MILLIGRAM(S): 5 TABLET ORAL at 23:16

## 2023-03-20 RX ADMIN — HYDROMORPHONE HYDROCHLORIDE 2 MILLIGRAM(S): 2 INJECTION INTRAMUSCULAR; INTRAVENOUS; SUBCUTANEOUS at 09:58

## 2023-03-20 RX ADMIN — Medication 5 MILLIGRAM(S): at 09:08

## 2023-03-20 RX ADMIN — HYDROMORPHONE HYDROCHLORIDE 2 MILLIGRAM(S): 2 INJECTION INTRAMUSCULAR; INTRAVENOUS; SUBCUTANEOUS at 18:41

## 2023-03-20 RX ADMIN — HYDROMORPHONE HYDROCHLORIDE 2 MILLIGRAM(S): 2 INJECTION INTRAMUSCULAR; INTRAVENOUS; SUBCUTANEOUS at 01:02

## 2023-03-20 RX ADMIN — GABAPENTIN 600 MILLIGRAM(S): 400 CAPSULE ORAL at 13:47

## 2023-03-20 RX ADMIN — Medication 1: at 16:41

## 2023-03-20 RX ADMIN — CYCLOBENZAPRINE HYDROCHLORIDE 10 MILLIGRAM(S): 10 TABLET, FILM COATED ORAL at 13:48

## 2023-03-20 RX ADMIN — OXYCODONE HYDROCHLORIDE 15 MILLIGRAM(S): 5 TABLET ORAL at 09:06

## 2023-03-20 RX ADMIN — FINASTERIDE 5 MILLIGRAM(S): 5 TABLET, FILM COATED ORAL at 09:08

## 2023-03-20 RX ADMIN — HYDROMORPHONE HYDROCHLORIDE 2 MILLIGRAM(S): 2 INJECTION INTRAMUSCULAR; INTRAVENOUS; SUBCUTANEOUS at 06:09

## 2023-03-20 RX ADMIN — OXYCODONE HYDROCHLORIDE 5 MILLIGRAM(S): 5 TABLET ORAL at 17:10

## 2023-03-20 RX ADMIN — Medication 1 PATCH: at 09:06

## 2023-03-20 RX ADMIN — HYDROMORPHONE HYDROCHLORIDE 2 MILLIGRAM(S): 2 INJECTION INTRAMUSCULAR; INTRAVENOUS; SUBCUTANEOUS at 14:26

## 2023-03-20 RX ADMIN — Medication 2 MILLIGRAM(S): at 15:38

## 2023-03-20 RX ADMIN — Medication 25 MILLIGRAM(S): at 18:01

## 2023-03-20 RX ADMIN — Medication 1: at 08:08

## 2023-03-20 RX ADMIN — OXYCODONE HYDROCHLORIDE 5 MILLIGRAM(S): 5 TABLET ORAL at 12:49

## 2023-03-20 RX ADMIN — TAMSULOSIN HYDROCHLORIDE 0.4 MILLIGRAM(S): 0.4 CAPSULE ORAL at 23:14

## 2023-03-20 RX ADMIN — HEPARIN SODIUM 5000 UNIT(S): 5000 INJECTION INTRAVENOUS; SUBCUTANEOUS at 23:15

## 2023-03-20 RX ADMIN — Medication 25 MILLIGRAM(S): at 23:57

## 2023-03-20 RX ADMIN — Medication 5 MILLIGRAM(S): at 23:15

## 2023-03-20 RX ADMIN — OXYCODONE HYDROCHLORIDE 5 MILLIGRAM(S): 5 TABLET ORAL at 16:40

## 2023-03-20 RX ADMIN — Medication 2: at 11:52

## 2023-03-20 RX ADMIN — HYDROMORPHONE HYDROCHLORIDE 2 MILLIGRAM(S): 2 INJECTION INTRAMUSCULAR; INTRAVENOUS; SUBCUTANEOUS at 05:39

## 2023-03-20 RX ADMIN — Medication 25 MILLIGRAM(S): at 01:03

## 2023-03-20 RX ADMIN — OXYCODONE HYDROCHLORIDE 5 MILLIGRAM(S): 5 TABLET ORAL at 12:19

## 2023-03-20 RX ADMIN — Medication 500 MILLIGRAM(S): at 23:16

## 2023-03-20 RX ADMIN — Medication 0.1 MILLIGRAM(S): at 23:16

## 2023-03-20 RX ADMIN — Medication 2 MILLIGRAM(S): at 18:30

## 2023-03-20 RX ADMIN — GABAPENTIN 600 MILLIGRAM(S): 400 CAPSULE ORAL at 23:15

## 2023-03-20 RX ADMIN — HYDROMORPHONE HYDROCHLORIDE 2 MILLIGRAM(S): 2 INJECTION INTRAMUSCULAR; INTRAVENOUS; SUBCUTANEOUS at 10:13

## 2023-03-20 RX ADMIN — Medication 1 TABLET(S): at 09:08

## 2023-03-20 RX ADMIN — Medication 2 MILLIGRAM(S): at 12:19

## 2023-03-20 RX ADMIN — HYDROMORPHONE HYDROCHLORIDE 2 MILLIGRAM(S): 2 INJECTION INTRAMUSCULAR; INTRAVENOUS; SUBCUTANEOUS at 18:26

## 2023-03-20 RX ADMIN — HYDROMORPHONE HYDROCHLORIDE 2 MILLIGRAM(S): 2 INJECTION INTRAMUSCULAR; INTRAVENOUS; SUBCUTANEOUS at 01:32

## 2023-03-20 RX ADMIN — HYDROMORPHONE HYDROCHLORIDE 2 MILLIGRAM(S): 2 INJECTION INTRAMUSCULAR; INTRAVENOUS; SUBCUTANEOUS at 14:41

## 2023-03-20 RX ADMIN — Medication 2 MILLIGRAM(S): at 05:40

## 2023-03-20 RX ADMIN — Medication 1 PATCH: at 18:46

## 2023-03-20 RX ADMIN — GABAPENTIN 600 MILLIGRAM(S): 400 CAPSULE ORAL at 05:39

## 2023-03-20 RX ADMIN — CYCLOBENZAPRINE HYDROCHLORIDE 10 MILLIGRAM(S): 10 TABLET, FILM COATED ORAL at 23:16

## 2023-03-20 RX ADMIN — Medication 500 MILLIGRAM(S): at 09:07

## 2023-03-20 RX ADMIN — Medication 2 MILLIGRAM(S): at 08:08

## 2023-03-20 RX ADMIN — Medication 1 PATCH: at 06:14

## 2023-03-20 NOTE — CONSULT NOTE ADULT - CONSULT REQUESTED BY NAME
[facility]
Dr Zamudio
ED
Dr Zamudio
[facility]
jefferson wilson
katie

## 2023-03-20 NOTE — CONSULT NOTE ADULT - CONSULT REASON
back pain
DVT/PE prophylaxis, risk stratification and Anticoagulation Management
? uti
inability to ambulate
AC Recs
preop clearance prior to spine surgery possibly today, Q waves on EKG
h/o apls

## 2023-03-20 NOTE — CONSULT NOTE ADULT - CONSULT REQUESTED DATE/TIME
11-Mar-2023 22:30
12-Mar-2023
13-Mar-2023 10:40
14-Mar-2023 11:14
12-Mar-2023 15:03
15-Mar-2023 08:02
20-Mar-2023 12:58

## 2023-03-20 NOTE — CONSULT NOTE ADULT - REASON FOR ADMISSION
inability to walk, intractable back pain

## 2023-03-20 NOTE — PROGRESS NOTE ADULT - SUBJECTIVE AND OBJECTIVE BOX
INTERVAL HPI/OVERNIGHT EVENTS:  Patient S&E at bedside. No o/n events,   pt reports back pain is well controlled with current pain regimen  having bms  no blood in stool reported    PAST MEDICAL & SURGICAL HISTORY:  BPH (benign prostatic hyperplasia)      HTN (hypertension)      Type 2 diabetes mellitus      Peripheral neuropathy      History of Guillain-Macfarlan syndrome      History of CVA in adulthood      DDD (degenerative disc disease), lumbar      DVT, lower extremity      S/P IVC filter          FAMILY HISTORY:  FH: heart disease    FH: HTN (hypertension)        VITAL SIGNS:  T(F): 98.1 (03-20-23 @ 07:52)  HR: 71 (03-20-23 @ 07:52)  BP: 103/59 (03-20-23 @ 07:52)  RR: 16 (03-20-23 @ 07:52)  SpO2: 99% (03-20-23 @ 07:52)  Wt(kg): --    PHYSICAL EXAM:    GENERAL: NAD, obese  HEAD:  Atraumatic,  EYES: EOMI,  CHEST/LUNG: Clear to auscultation bilaterally;   HEART: Regular rate and rhythm;   ABDOMEN: Soft, Nontender, Nondistended  EXTREMITIES:  no edema  NEUROLOGY: aox3        MEDICATIONS  (STANDING):  amLODIPine   Tablet 10 milliGRAM(s) Oral daily  ascorbic acid 500 milliGRAM(s) Oral two times a day  baclofen 5 milliGRAM(s) Oral every 12 hours  cloNIDine 0.1 milliGRAM(s) Oral two times a day  cyclobenzaprine 10 milliGRAM(s) Oral every 8 hours  dextrose 5%. 1000 milliLiter(s) (100 mL/Hr) IV Continuous <Continuous>  dextrose 5%. 1000 milliLiter(s) (50 mL/Hr) IV Continuous <Continuous>  dextrose 50% Injectable 25 Gram(s) IV Push once  dextrose 50% Injectable 12.5 Gram(s) IV Push once  dextrose 50% Injectable 25 Gram(s) IV Push once  dextrose 50% Injectable 25 Gram(s) IV Push once  dextrose 50% Injectable 12.5 Gram(s) IV Push once  dextrose 50% Injectable 25 Gram(s) IV Push once  DULoxetine 60 milliGRAM(s) Oral daily  finasteride 5 milliGRAM(s) Oral daily  folic acid 1 milliGRAM(s) Oral daily  gabapentin 600 milliGRAM(s) Oral every 8 hours  glucagon  Injectable 1 milliGRAM(s) IntraMuscular once  glucagon  Injectable 1 milliGRAM(s) IntraMuscular once  hydrALAZINE 25 milliGRAM(s) Oral four times a day  influenza   Vaccine 0.5 milliLiter(s) IntraMuscular once  insulin lispro (ADMELOG) corrective regimen sliding scale   SubCutaneous three times a day before meals  insulin lispro (ADMELOG) corrective regimen sliding scale   SubCutaneous at bedtime  lactated ringers. 1000 milliLiter(s) (75 mL/Hr) IV Continuous <Continuous>  lactated ringers. 1000 milliLiter(s) (75 mL/Hr) IV Continuous <Continuous>  lactated ringers. 1000 milliLiter(s) (75 mL/Hr) IV Continuous <Continuous>  multivitamin 1 Tablet(s) Oral daily  nicotine -  14 mG/24Hr(s) Patch 1 Patch Transdermal daily  oxyCODONE  ER Tablet 15 milliGRAM(s) Oral every 12 hours  pantoprazole    Tablet 40 milliGRAM(s) Oral before breakfast  senna 2 Tablet(s) Oral at bedtime  sodium chloride 0.9%. 1000 milliLiter(s) (50 mL/Hr) IV Continuous <Continuous>  tamsulosin 0.4 milliGRAM(s) Oral at bedtime    MEDICATIONS  (PRN):  acetaminophen     Tablet .. 650 milliGRAM(s) Oral every 6 hours PRN Mild Pain (1 - 3)  acetaminophen     Tablet .. 650 milliGRAM(s) Oral every 6 hours PRN Temp greater or equal to 38C (100.4F), Mild Pain (1 - 3)  bisacodyl 5 milliGRAM(s) Oral every 12 hours PRN Constipation  dextrose Oral Gel 15 Gram(s) Oral once PRN Blood Glucose LESS THAN 70 milliGRAM(s)/deciliter  dextrose Oral Gel 15 Gram(s) Oral once PRN Blood Glucose LESS THAN 70 milliGRAM(s)/deciliter  HYDROmorphone  Injectable 2 milliGRAM(s) IV Push every 4 hours PRN Severe Pain (7 - 10)  melatonin 3 milliGRAM(s) Oral at bedtime PRN Insomnia  nicotine  Polacrilex Gum 2 milliGRAM(s) Oral every 2 hours PRN nicotine withdrawal  ondansetron Injectable 4 milliGRAM(s) IV Push every 8 hours PRN Nausea and/or Vomiting  ondansetron Injectable 4 milliGRAM(s) IV Push every 6 hours PRN Nausea and/or Vomiting  polyethylene glycol 3350 17 Gram(s) Oral daily PRN Constipation  simethicone 80 milliGRAM(s) Chew three times a day PRN Gas      Allergies    sulfa drugs (Hives)  sulfa drugs (Unknown)    Intolerances        LABS:                        9.6    12.07 )-----------( 196      ( 19 Mar 2023 08:08 )             30.0     03-19    139  |  109<H>  |  32<H>  ----------------------------<  150<H>  3.6   |  22  |  1.88<H>    Ca    8.4<L>      19 Mar 2023 08:08      PT/INR - ( 19 Mar 2023 09:08 )   PT: 14.4 sec;   INR: 1.24 ratio         PTT - ( 19 Mar 2023 09:08 )  PTT:26.8 sec      RADIOLOGY & ADDITIONAL TESTS:  Studies reviewed.

## 2023-03-20 NOTE — CONSULT NOTE ADULT - ASSESSMENT
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 He is non-compliant with his Warfarin, stating he takes it about 3-4 days per week.who p/w worsening back and leg pain and spasms and inability to ambulate. He has had off-and-on lower back pain with peripheral LE neuropathy since 2016 when he had Guillain-Barré and a CVA with R sided deficits in 2016. Normally, he can walk "shaky with a walker."    now with lumbar cord compression and s/p surgical decompression of L4-5  Consulted by Dr. Deyvi peter   for VTE prophylaxis, risk stratification, and anticoagulation management.  pt is high risk for recurrence of DVT due to obesity, previous clotting history and immobility, and recent surgery discussed in length about the high risk of clotting and bleeding,  patient is high risk for bleeding due to recent spine surgery  ortho wants to hold full anticoagulation till PO day 10 patient is aware of the above     Plan    START Heparin 5,000 units SQ Q8hour resume coumadin when ok with ortho   Daily CBC/BMP  Enc ambulation  Venodynes  Thanks for the consult will f/u  Dispo:rehab Pt will f/u with his PCP out patient for Coumadin mgt

## 2023-03-20 NOTE — PROGRESS NOTE ADULT - SUBJECTIVE AND OBJECTIVE BOX
Patient seen and examined at bedside. Pain well controlled with medication. Notes that he has been ambulating well with PT and his symptomatology, both weakness and paresthesias, have been improving. Patient denies fevers, chills, saddle anesthesia, bowel or bladder incontinence, leg pain, numbness, weakness, or any other orthopaedic complaint. Would like to go to [place] for rehabilitation if possible.     PE:  Vital Signs Last 24 Hrs  T(C): 36.7 (20 Mar 2023 07:52), Max: 37.2 (19 Mar 2023 16:02)  T(F): 98.1 (20 Mar 2023 07:52), Max: 99 (19 Mar 2023 16:02)  HR: 71 (20 Mar 2023 07:52) (70 - 77)  BP: 103/59 (20 Mar 2023 07:52) (99/53 - 128/66)  BP(mean): --  ABP: --  ABP(mean): --  RR: 16 (20 Mar 2023 07:52) (16 - 18)  SpO2: 99% (20 Mar 2023 07:52) (95% - 100%)    O2 Parameters below as of 20 Mar 2023 07:52  Patient On (Oxygen Delivery Method): room air    Spine:  Dressing in place, c/d/i  Sonja-incisional TTP; otherwise, NTTP throughout the rest of the extremity.   Negative Burgos, Negative Babinski, positive L ankle clonus  Radial, DP pulses palpable.  No calf tenderness bilaterally.  Compartments soft and compressible.     Motor:                   Elbow Flex     Wrist Ext      Elbow Ext      Finger Flex     Finger Abd               R                   5/5                 5/5                 5/5                  5/5                 5/5  L                    5/5                 5/5                 5/5                  5/5                 5/5              Hip Flex     Knee Ext     Ankle Dorsi     Hallux Ext     Ankle Plant  R            4/5              5/5               1/5                    1/5                5/5  L             5/5             5/5                5/5                   5/5                 5/5    Sensory:            C5         C6         C7      C8       T1        (0=absent, 1=impaired, 2=normal, NT=not testable)  R         2            2           2        2         2  L          2            2           2        2         2               L2          L3         L4      L5       S1         (0=absent, 1=impaired, 2=normal, NT=not testable)  R         2            2            2        1        1  L          2            2           2        2         2                                                10.5   14.23 )-----------( 232      ( 18 Mar 2023 07:41 )             33.3       03-18    138  |  107  |  31<H>  ----------------------------<  187<H>  3.7   |  24  |  2.03<H>    Ca    9.1      18 Mar 2023 07:41                PT/INR - ( 18 Mar 2023 07:41 )   PT: 14.8 sec;   INR: 1.27 ratio         PTT - ( 18 Mar 2023 07:41 )  PTT:30.2 sec                A/P:  48y m s/p L4-5 lami and PSF POD 5  -PT/OT   -WBAT  -Pain Control  - Drains DC'd 3/17  -DVT ppx: may resume DVT ppx today  -FU AM Labs  -Incentive Spirometry  -Medical management appreciated  - Dispo - rehab    Patient seen and examined at bedside. Pain well controlled with medication. Notes that he has been ambulating well with PT and his symptomatology, both weakness and paresthesias, have been improving. Patient denies fevers, chills, saddle anesthesia, bowel or bladder incontinence, leg pain, numbness, weakness, or any other orthopaedic complaint. Would like to go to [place] for rehabilitation if possible.     PE:  Vital Signs Last 24 Hrs  T(C): 36.7 (20 Mar 2023 07:52), Max: 37.2 (19 Mar 2023 16:02)  T(F): 98.1 (20 Mar 2023 07:52), Max: 99 (19 Mar 2023 16:02)  HR: 71 (20 Mar 2023 07:52) (70 - 77)  BP: 103/59 (20 Mar 2023 07:52) (99/53 - 128/66)  BP(mean): --  ABP: --  ABP(mean): --  RR: 16 (20 Mar 2023 07:52) (16 - 18)  SpO2: 99% (20 Mar 2023 07:52) (95% - 100%)    O2 Parameters below as of 20 Mar 2023 07:52  Patient On (Oxygen Delivery Method): room air    Spine:  Dressing in place, c/d/i  Sonja-incisional TTP; otherwise, NTTP throughout the rest of the extremity.   Negative Burgos, Negative Babinski, positive L ankle clonus  Radial, DP pulses palpable.  No calf tenderness bilaterally.  Compartments soft and compressible.     Motor:                   Elbow Flex     Wrist Ext      Elbow Ext      Finger Flex     Finger Abd               R                   5/5                 5/5                 5/5                  5/5                 5/5  L                    5/5                 5/5                 5/5                  5/5                 5/5              Hip Flex     Knee Ext     Ankle Dorsi     Hallux Ext     Ankle Plant  R            4/5              5/5               1/5                    1/5                5/5  L             5/5             5/5                5/5                   5/5                 5/5    Sensory:            C5         C6         C7      C8       T1        (0=absent, 1=impaired, 2=normal, NT=not testable)  R         2            2           2        2         2  L          2            2           2        2         2               L2          L3         L4      L5       S1         (0=absent, 1=impaired, 2=normal, NT=not testable)  R         2            2            2        1        1  L          2            2           2        2         2                                                10.5   14.23 )-----------( 232      ( 18 Mar 2023 07:41 )             33.3       03-18    138  |  107  |  31<H>  ----------------------------<  187<H>  3.7   |  24  |  2.03<H>    Ca    9.1      18 Mar 2023 07:41                PT/INR - ( 18 Mar 2023 07:41 )   PT: 14.8 sec;   INR: 1.27 ratio         PTT - ( 18 Mar 2023 07:41 )  PTT:30.2 sec                A/P:  48y m s/p L4-5 lami and PSF POD 5  -PT/OT   -WBAT  -Pain Control  - Drains DC'd 3/17  -DVT ppx: may resume DVT ppx today --> per Dr. Mills, rec subq heparin and coumadin restarted POD10. Per primary team due to kidney disease and APLS contra-indicate DOACs; Risk of bleeding too elevated w/ hep gtt  -FU AM Labs  -Incentive Spirometry  -Medical management appreciated  - Dispo - rehab   - ortho stable for dc

## 2023-03-20 NOTE — PROGRESS NOTE ADULT - SUBJECTIVE AND OBJECTIVE BOX
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 He has had off-and-on lower back pain with peripheral LE neuropathy since 2016 when he had Guillain-Barré and a CVA with R sided deficits in 2016. Normally, he can walk "shaky with a walker." However, over the past 3 weeks, his symptoms have gotten worse, with frequent burning pain in his lower back and spasms. His symptoms are worse when he lies flat or when he tries to transition to his walker. Therefore, he has had marked difficulty ambulating. He has baseline urinary incontinence and uses Pull-ups. He has some improved bowel continence, but cannot hold his bowels in for long. Those symptoms are not new. He denies CP, cough, N/V, abdominal pain, CP, or SOB.  He went to [facility] 2 nights ago and was "in the ER for 30 hours" not moving. His pain worsened even more after discharge.    He also notes a history of VRE and was recently (1 week ago) prescribed Augmentin, which he only took 2 days of. He says when he goes to [facility] he is "always on isolation," because of his history of VRE. He is not sure if he has dysuria currently, but he had it when he was prescribed antibiotics last week.  He also is on Warfarin for an unknown "blood clotting disorder," stemming from a blood clot in 2016 (of note, he was intubated at that time for Guillain-[place] and suffered a CVA during that time), but he has not had a blood clot since then. He states he has an IVC filter. He is non-compliant with his Warfarin, stating he takes it about 3-4 days per week.    In the ED, he was given Flexeril 10 mg PO x1 and  mg x1 with Morphine 4 mg IV x1 ordered.    3/13 afebrile , says has mild urethral burning on micturation, no flank pain, , no chills , lower back pain not controlled with oxycodone 5, will increase oxycodone 10   3/14 afebrile , Patient was transferred to [facility] last night for MRI neuraxial imaging - however only MRI LSpine was performed,no acute overnight events, WBC 10, denies CP, sob, no new complaints   B/L lower back pain controlled with oxycodone and dilaudid     3/15: s/p Fusion of posterior lumbar spine 4/5 and decompression with bilateral mayank laminectomy  asking for more pain meds; says 2 mg dilaudid iv did nothing.     3/16: s/p fusion of LS, POD #1, pain well controlled on current regimen. No acute complaints.     3/17: s/p L4/5 laminectomy, POD#2. Still with pain. DEEPAK drains in place. Finished course of Augmentin      3/18: s/p L4/5 laminectomy, POD#3, DEEPAK removed yesterday. +flatus, no BM. Anderson removed today     3/19: s/p L4/5 laminectomy, POD#4, +BM today. +worked with PT today. No acute complaints.     03/20: s/p L4/5 laminectomy, POD#5, worked with PT. Ok to start SubQ heparin and not full dose AC at this time, discussed with ortho. AC team consulted       PHYSICAL EXAM:  Vital Signs Last 24 Hrs  T(C): 37.1 (20 Mar 2023 16:52), Max: 37.1 (20 Mar 2023 05:31)  T(F): 98.7 (20 Mar 2023 16:52), Max: 98.8 (20 Mar 2023 05:31)  HR: 73 (20 Mar 2023 16:52) (70 - 77)  BP: 124/60 (20 Mar 2023 16:52) (99/53 - 128/66)  RR: 16 (20 Mar 2023 16:52) (16 - 16)  SpO2: 98% (20 Mar 2023 16:52) (96% - 99%)    Parameters below as of 20 Mar 2023 16:52  Patient On (Oxygen Delivery Method): room air        Physical exam   GENERAL: No acute distress  HEENT: PERRL, EOMI, MMM, no oropharyngeal lesions  NECK: supple, no stiffness, no JVD, no thyromegaly  PULM: respirations non-labored, clear to auscultation bilaterally, no rales, rhonchi, or wheezes  CV: regular rate and rhythm, no murmurs, gallops, or rubs  GI: abdomen soft, NT/NT,  MSK: + lumbar spinal TTP.   LYMPH: no anterior cervical, posterior cervical, supraclavicular, or inguinal lymphadenopathy  NEURO: occasional spasms of b/l LEs. A&Ox3, sensation intact. Strength 2/5 b/l LEs  SKIN: no edema, . No rashes or lesions    Labs                        9.0    10.19 )-----------( 221      ( 20 Mar 2023 08:42 )             28.7   03-20    139  |  109<H>  |  36<H>  ----------------------------<  180<H>  3.5   |  22  |  1.98<H>    Ca    8.1<L>      20 Mar 2023 08:42    PT/INR - ( 20 Mar 2023 08:42 )   PT: 13.9 sec;   INR: 1.20 ratio         PTT - ( 20 Mar 2023 08:42 )  PTT:28.0 sec                         CBC Full  -  ( 14 Mar 2023 05:16 )  WBC Count : 10.57 K/uL  RBC Count : 4.33 M/uL  Hemoglobin : 10.6 g/dL  Hematocrit : 33.5 %  Platelet Count - Automated : 206 K/uL  Mean Cell Volume : 77.4 fl  Mean Cell Hemoglobin : 24.5 pg  Mean Cell Hemoglobin Concentration : 31.6 gm/dL  Auto Neutrophil # : 7.82 K/uL  Auto Lymphocyte # : 1.69 K/uL  Auto Monocyte # : 0.65 K/uL  Auto Eosinophil # : 0.30 K/uL  Auto Basophil # : 0.06 K/uL  Auto Neutrophil % : 74.0 %  Auto Lymphocyte % : 16.0 %  Auto Monocyte % : 6.1 %  Auto Eosinophil % : 2.8 %  Auto Basophil % : 0.6 %    03-15    134<L>  |  106  |  29<H>  ----------------------------<  228<H>  4.2   |  24  |  1.89<H>    Ca    8.6      15 Mar 2023 14:02      03-14    138  |  109<H>  |  25<H>  ----------------------------<  139<H>  3.7   |  25  |  1.74<H>    Ca    8.7      14 Mar 2023 05:16      PT/INR - ( 19 Mar 2023 09:08 )   PT: 14.4 sec;   INR: 1.24 ratio         PTT - ( 19 Mar 2023 09:08 )  PTT:26.8 sec    I&O's Detail    18 Mar 2023 07:01  -  19 Mar 2023 07:00  --------------------------------------------------------  IN:    sodium chloride 0.9%: 300 mL  Total IN: 300 mL    OUT:    Indwelling Catheter - Urethral (mL): 600 mL    Stool (mL): 1 mL    Voided (mL): 200 mL  Total OUT: 801 mL    Total NET: -501 mL      MEDICATIONS  (STANDING):  amLODIPine   Tablet 10 milliGRAM(s) Oral daily  ascorbic acid 500 milliGRAM(s) Oral two times a day  baclofen 5 milliGRAM(s) Oral every 12 hours  cloNIDine 0.1 milliGRAM(s) Oral two times a day  cyclobenzaprine 10 milliGRAM(s) Oral every 8 hours  dextrose 5%. 1000 milliLiter(s) (100 mL/Hr) IV Continuous <Continuous>  dextrose 5%. 1000 milliLiter(s) (50 mL/Hr) IV Continuous <Continuous>  dextrose 50% Injectable 25 Gram(s) IV Push once  dextrose 50% Injectable 12.5 Gram(s) IV Push once  dextrose 50% Injectable 25 Gram(s) IV Push once  dextrose 50% Injectable 25 Gram(s) IV Push once  dextrose 50% Injectable 12.5 Gram(s) IV Push once  dextrose 50% Injectable 25 Gram(s) IV Push once  DULoxetine 60 milliGRAM(s) Oral daily  finasteride 5 milliGRAM(s) Oral daily  folic acid 1 milliGRAM(s) Oral daily  gabapentin 600 milliGRAM(s) Oral every 8 hours  glucagon  Injectable 1 milliGRAM(s) IntraMuscular once  glucagon  Injectable 1 milliGRAM(s) IntraMuscular once  hydrALAZINE 25 milliGRAM(s) Oral four times a day  influenza   Vaccine 0.5 milliLiter(s) IntraMuscular once  insulin lispro (ADMELOG) corrective regimen sliding scale   SubCutaneous three times a day before meals  insulin lispro (ADMELOG) corrective regimen sliding scale   SubCutaneous at bedtime  lactated ringers. 1000 milliLiter(s) (75 mL/Hr) IV Continuous <Continuous>  lactated ringers. 1000 milliLiter(s) (75 mL/Hr) IV Continuous <Continuous>  lactated ringers. 1000 milliLiter(s) (75 mL/Hr) IV Continuous <Continuous>  multivitamin 1 Tablet(s) Oral daily  nicotine -  14 mG/24Hr(s) Patch 1 Patch Transdermal daily  oxyCODONE  ER Tablet 15 milliGRAM(s) Oral every 12 hours  pantoprazole    Tablet 40 milliGRAM(s) Oral before breakfast  senna 2 Tablet(s) Oral at bedtime  sodium chloride 0.9%. 1000 milliLiter(s) (50 mL/Hr) IV Continuous <Continuous>  tamsulosin 0.4 milliGRAM(s) Oral at bedtime    MEDICATIONS  (PRN):  acetaminophen     Tablet .. 650 milliGRAM(s) Oral every 6 hours PRN Mild Pain (1 - 3)  acetaminophen     Tablet .. 650 milliGRAM(s) Oral every 6 hours PRN Temp greater or equal to 38C (100.4F), Mild Pain (1 - 3)  bisacodyl 5 milliGRAM(s) Oral every 12 hours PRN Constipation  dextrose Oral Gel 15 Gram(s) Oral once PRN Blood Glucose LESS THAN 70 milliGRAM(s)/deciliter  dextrose Oral Gel 15 Gram(s) Oral once PRN Blood Glucose LESS THAN 70 milliGRAM(s)/deciliter  HYDROmorphone  Injectable 2 milliGRAM(s) IV Push every 4 hours PRN Severe Pain (7 - 10)  melatonin 3 milliGRAM(s) Oral at bedtime PRN Insomnia  nicotine  Polacrilex Gum 2 milliGRAM(s) Oral every 2 hours PRN nicotine withdrawal  ondansetron Injectable 4 milliGRAM(s) IV Push every 6 hours PRN Nausea and/or Vomiting  ondansetron Injectable 4 milliGRAM(s) IV Push every 8 hours PRN Nausea and/or Vomiting  oxyCODONE    IR 5 milliGRAM(s) Oral every 4 hours PRN Moderate Pain (4 - 6)  polyethylene glycol 3350 17 Gram(s) Oral daily PRN Constipation  simethicone 80 milliGRAM(s) Chew three times a day PRN Gas                    < from: CT Cervical Spine No Cont (03.14.23 @ 09:40) >  INTERPRETATION:  Clinical indication: Preop planning    Serial thin sections on a multi slice scanner were obtained through the   cervical and thoracic spine from the C1 to the L3-4 level in a stacked   axial fashion reformatted at 1.25 mm sections with sagittal and coronal   computer generated reconstructed views.      The cervical vertebrae are normal in height and density. There is   straightening of the normal cervical lordosis. Mild degenerative   vertebral joint changes are identified at C5-6. No significant spinal   stenosis is identified.    Evaluation of the thoracic spine demonstrates the thoracic vertebral   bodies to be normal in height and density. There is anterior ossification   extending from T6 through L1. No acute fractures or dislocations are   identified. There is no significant spinal stenosis.    Paraspinal soft tissues are unremarkable. There is a right-sided IVC   filter.    There are small calcifications in the left kidney with some volume loss.    IMPRESSION: Mild degenerative changes of the cervical and thoracic spine.   No significant spinal stenosis.    < end of copied text >

## 2023-03-20 NOTE — CONSULT NOTE ADULT - SUBJECTIVE AND OBJECTIVE BOX
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 He has had off-and-on lower back pain with peripheral LE neuropathy since 2016 when he had Guillain-Barré and a CVA with R sided deficits in 2016. Normally, he can walk "shaky with a walker." However, over the past 3 weeks, his symptoms have gotten worse, with frequent burning pain in his lower back and spasms. His symptoms are worse when he lies flat or when he tries to transition to his walker. Therefore, he has had marked difficulty ambulating. He has baseline urinary incontinence and uses Pull-ups. He has some improved bowel continence, but cannot hold his bowels in for long. Those symptoms are not new. He denies CP, cough, N/V, abdominal pain, CP, or SOB.  He went to [facility] 2 nights ago and was "in the ER for 30 hours" not moving. His pain worsened even more after discharge.    He also notes a history of VRE and was recently (1 week ago) prescribed Augmentin, which he only took 2 days of. He says when he goes to [facility] he is "always on isolation," because of his history of VRE. He is not sure if he has dysuria currently, but he had it when he was prescribed antibiotics last week.  He also is on Warfarin for an unknown "blood clotting disorder," stemming from a blood clot in 2016 (of note, he was intubated at that time for Guillain-Yoder and suffered a CVA during that time), but he has not had a blood clot since then. He states he has an IVC filter. He is non-compliant with his Warfarin, stating he takes it about 3-4 days per week.    In the ED, he was given Flexeril 10 mg PO x1 and  mg x1 with Morphine 4 mg IV x1 ordered. (11 Mar 2023 23:39)      Patient is a 48y old  Male who presents with a chief complaint of inability to walk, intractable back pain (20 Mar 2023 08:54)      Consulted by Dr. Deyvi peter   for VTE prophylaxis, risk stratification, and anticoagulation management.    PAST MEDICAL & SURGICAL HISTORY:  BPH (benign prostatic hyperplasia)      HTN (hypertension)      Type 2 diabetes mellitus      Peripheral neuropathy      History of Guillain-Yoder syndrome      History of CVA in adulthood      DDD (degenerative disc disease), lumbar      DVT, lower extremity      S/P IVC filter    3/20/2023: patient seen at bedside discussed the resumption of anticoagulation, , patienr was paced on Coumadin 6 yrs ago due to APS and positive DVT, patient gets [place] lab draw at home from his PCP, usually takes Coumadin 3mg daily, discussed the high risk of bleeding and clotting ortho is ok to start heparin SQ today           CrCl:78.5  BMI:    Caprini VTE Risk Score:  AGE RELATED RISK FACTORS                                                       MOBILITY RELATED FACTORS  [x ] Age 41-60 years                                            (1 Point)                  [ ] Bed rest /restricted mobility                             (1 Point)  [ ] Age: 61-74 years                                           (2 Points)                [ ] Plaster cast                                                   (2 Points)  [ ] Age= 75 years                                              (3 Points)                 [ ] Bed bound for more than 72 hours                   (2 Points)    DISEASE RELATED RISK FACTORS                                               GENDER SPECIFIC FACTORS  [ ] Edema in the lower extremities                       (1 Point)           [ ] Pregnancy                                                            (1 Point)  [ ] Varicose veins                                               (1 Point)                  [ ] Post-partum < 6 weeks                                      (1 Point)             [x ] BMI > 25 Kg/m2                                            (1 Point)                  [ ] Hormonal therapy or oral contraception       (1 Point)                 [ ] Sepsis (in the previous month)                        (1 Point)             [ ] History of pregnancy complications                (1Point)  [ ] Pneumonia or serious lung disease                                             [ ] Unexplained or recurrent  (=/>3), premature                                 (In the previous month)                               (1 Point)                birth with toxemia or growth-restricted infant (1 Point)  [ ] Abnormal pulmonary function test            (1 Point)                                   SURGERY RELATED RISK FACTORS  [ ] Acute myocardial infarction                       (1 Point)                  [ ]  Section                                         (1 Point)  [ ] Congestive heart failure (in the previous month) (1 Point)   [ ] Minor surgery   lasting <45 minutes       (1 Point)   [ ] Inflammatory bowel disease                             (1 Point)          [ ] Arthroscopic surgery                                  (2 Points)  [ ] Central venous access                                    (2 Points)            [x ] General surgery lasting >45 minutes      (2 Points)       [ ] Stroke (in the previous month)                  (5 Points)            [ ] Elective major lower extremity arthroplasty (5 Points)                                   [  ] Malignancy (present or past include skin melanoma                                          but exclude  basal skin cell)    (2 points)                                      TRAUMA RELATED RISK FACTORS                HEMATOLOGY RELATED FACTORS                                  [ ] Fracture of the hip, pelvis, or leg                       (5 Points)  [x ] Prior episodes of VTE                                     (3 Points)          [ ] Acute spinal cord injury (in the previous month)  (5 Points)  [ ] Positive family history for VTE                         (3 Points)       [ ] Paralysis (less than 1 month)                          (5 Points)  [ ] Prothrombin 37519 A                                      (3 Points)         [ ] Multiple Trauma (within 1month)                 (5Points)                                                                                                                                                                [ ] Factor V Leiden                                          (3 Points)                                OTHER RISK FACTORS                          [x ] Lupus anticoagulants                                     (3 Points)                       [ ] BMI > 40                          (1 Point)                                                         [ ] Anticardiolipin antibodies                                (3 Points)                   [ ] Smoking                              (1Point)                                                [ ] High homocysteine in the blood                      (3 Points)                [  ] Diabetes requiring insulin (1point)                         [ ] Other congenital or acquired thrombophilia       (3 Points)          [  ] Chemotherapy                   (1 Point)  [ ] Heparin induced thrombocytopenia                  (3 Points)             [  ] Blood Transfusion                (1 point)                                                                                                             Total Score [  10        ]                                                                                                                                                                                                                                                                                                                                                                                                                                         IMPROVE Bleeding Risk Score:3.5      Falls Risk:   High ( x )  Mod (  )  Low (  )      FAMILY HISTORY:  FH: heart disease    FH: HTN (hypertension)      Denies any personal or familial history of clotting or bleeding disorders.    Allergies    sulfa drugs (Hives)  sulfa drugs (Unknown)    Intolerances        REVIEW OF SYSTEMS    (  )Fever	     (  )Constipation	(  )SOB				(  )Headache	(  )Dysuria  (  )Chills	     (  )Melena	(  )Dyspnea present on exertion	                    (  )Dizziness                    (  )Polyuria  (  )Nausea	     (  )Hematochezia	(  )Cough			                    (  )Syncope   	(  )Hematuria  (  )Vomiting    (  )Chest Pain	(  )Wheezing			(x  )Weakness  (  )Diarrhea     (  )Palpitations	(  )Anorexia			( x )joint pain    All  other review of systems negative: Yes    Vital Signs Last 24 Hrs  T(C): 36.5 (20 Mar 2023 12:00), Max: 37.2 (19 Mar 2023 16:02)  T(F): 97.7 (20 Mar 2023 12:00), Max: 99 (19 Mar 2023 16:02)  HR: 75 (20 Mar 2023 12:00) (70 - 77)  BP: 115/54 (20 Mar 2023 12:00) (99/53 - 128/66)  BP(mean): --  RR: 16 (20 Mar 2023 12:00) (16 - 16)  SpO2: 98% (20 Mar 2023 12:00) (96% - 100%)    PHYSICAL EXAM:    Constitutional: Appears Well    Neurological: A& O x 3    Skin: Warm    Respiratory and Thorax: normal effort; Breath sounds: normal; No rales/wheezing/rhonchi  	  Cardiovascular: S1, S2, regular, NMBR	    Gastrointestinal: BS + x 4Q, nontender	    Genitourinary:  Bladder nondistended, nontender    Musculoskeletal:   General Right:   no muscle/joint tenderness,   normal tone, no joint swelling,   ROM: limited	    General Left:   no muscle/joint tenderness,   normal tone, no joint swelling,   ROM: limited    Back  Dressing CDI;              Lower extrems:   Right: no calf tenderness              negative agnes's sign               + pedal pulses    Left:   no calf tenderness              negative agnes's sign               + pedal pulses                          9.0    10.19 )-----------( 221      ( 20 Mar 2023 08:42 )             28.7       03-20    139  |  109<H>  |  36<H>  ----------------------------<  180<H>  3.5   |  22  |  1.98<H>    Ca    8.1<L>      20 Mar 2023 08:42        PT/INR - ( 20 Mar 2023 08:42 )   PT: 13.9 sec;   INR: 1.20 ratio         PTT - ( 20 Mar 2023 08:42 )  PTT:28.0 sec				    MEDICATIONS  (STANDING):  amLODIPine   Tablet 10 milliGRAM(s) Oral daily  ascorbic acid 500 milliGRAM(s) Oral two times a day  baclofen 5 milliGRAM(s) Oral every 12 hours  cloNIDine 0.1 milliGRAM(s) Oral two times a day  cyclobenzaprine 10 milliGRAM(s) Oral every 8 hours  dextrose 5%. 1000 milliLiter(s) IV Continuous <Continuous>  dextrose 5%. 1000 milliLiter(s) IV Continuous <Continuous>  dextrose 50% Injectable 25 Gram(s) IV Push once  dextrose 50% Injectable 12.5 Gram(s) IV Push once  dextrose 50% Injectable 25 Gram(s) IV Push once  dextrose 50% Injectable 25 Gram(s) IV Push once  dextrose 50% Injectable 12.5 Gram(s) IV Push once  dextrose 50% Injectable 25 Gram(s) IV Push once  DULoxetine 60 milliGRAM(s) Oral daily  finasteride 5 milliGRAM(s) Oral daily  folic acid 1 milliGRAM(s) Oral daily  gabapentin 600 milliGRAM(s) Oral every 8 hours  glucagon  Injectable 1 milliGRAM(s) IntraMuscular once  glucagon  Injectable 1 milliGRAM(s) IntraMuscular once  hydrALAZINE 25 milliGRAM(s) Oral four times a day  insulin lispro (ADMELOG) corrective regimen sliding scale   SubCutaneous three times a day before meals  insulin lispro (ADMELOG) corrective regimen sliding scale   SubCutaneous at bedtime  lactated ringers. 1000 milliLiter(s) IV Continuous <Continuous>  lactated ringers. 1000 milliLiter(s) IV Continuous <Continuous>  lactated ringers. 1000 milliLiter(s) IV Continuous <Continuous>  multivitamin 1 Tablet(s) Oral daily  nicotine -  14 mG/24Hr(s) Patch 1 Patch Transdermal daily  oxyCODONE  ER Tablet 15 milliGRAM(s) Oral every 12 hours  pantoprazole    Tablet 40 milliGRAM(s) Oral before breakfast  senna 2 Tablet(s) Oral at bedtime  sodium chloride 0.9%. 1000 milliLiter(s) IV Continuous <Continuous>  tamsulosin 0.4 milliGRAM(s) Oral at bedtime          DVT Prophylaxis:  LMWH                   (  )  Heparin SQ           (  )  Coumadin             (  )  Xarelto                  (  )  Eliquis                   (  )  Venodynes           (  )  Ambulation          (  )  UFH                       (  )  Contraindicated  (  )  EC ASPIRIN       (  )

## 2023-03-21 LAB
APTT BLD: 27.6 SEC — SIGNIFICANT CHANGE UP (ref 27.5–35.5)
GLUCOSE BLDC GLUCOMTR-MCNC: 144 MG/DL — HIGH (ref 70–99)
GLUCOSE BLDC GLUCOMTR-MCNC: 145 MG/DL — HIGH (ref 70–99)
GLUCOSE BLDC GLUCOMTR-MCNC: 145 MG/DL — HIGH (ref 70–99)
GLUCOSE BLDC GLUCOMTR-MCNC: 193 MG/DL — HIGH (ref 70–99)
HCT VFR BLD CALC: 27.9 % — LOW (ref 39–50)
HGB BLD-MCNC: 8.8 G/DL — LOW (ref 13–17)
INR BLD: 1.22 RATIO — HIGH (ref 0.88–1.16)
MCHC RBC-ENTMCNC: 24.3 PG — LOW (ref 27–34)
MCHC RBC-ENTMCNC: 31.5 GM/DL — LOW (ref 32–36)
MCV RBC AUTO: 77.1 FL — LOW (ref 80–100)
PLATELET # BLD AUTO: 222 K/UL — SIGNIFICANT CHANGE UP (ref 150–400)
PROTHROM AB SERPL-ACNC: 14.2 SEC — HIGH (ref 10.5–13.4)
RBC # BLD: 3.62 M/UL — LOW (ref 4.2–5.8)
RBC # FLD: 15.4 % — HIGH (ref 10.3–14.5)
WBC # BLD: 9.1 K/UL — SIGNIFICANT CHANGE UP (ref 3.8–10.5)
WBC # FLD AUTO: 9.1 K/UL — SIGNIFICANT CHANGE UP (ref 3.8–10.5)

## 2023-03-21 PROCEDURE — 99231 SBSQ HOSP IP/OBS SF/LOW 25: CPT

## 2023-03-21 PROCEDURE — 99232 SBSQ HOSP IP/OBS MODERATE 35: CPT

## 2023-03-21 RX ORDER — OXYCODONE HYDROCHLORIDE 5 MG/1
10 TABLET ORAL EVERY 4 HOURS
Refills: 0 | Status: DISCONTINUED | OUTPATIENT
Start: 2023-03-21 | End: 2023-03-22

## 2023-03-21 RX ORDER — HYDROMORPHONE HYDROCHLORIDE 2 MG/ML
1 INJECTION INTRAMUSCULAR; INTRAVENOUS; SUBCUTANEOUS EVERY 4 HOURS
Refills: 0 | Status: DISCONTINUED | OUTPATIENT
Start: 2023-03-21 | End: 2023-03-22

## 2023-03-21 RX ORDER — OXYCODONE HYDROCHLORIDE 5 MG/1
5 TABLET ORAL EVERY 4 HOURS
Refills: 0 | Status: DISCONTINUED | OUTPATIENT
Start: 2023-03-21 | End: 2023-03-22

## 2023-03-21 RX ADMIN — AMLODIPINE BESYLATE 10 MILLIGRAM(S): 2.5 TABLET ORAL at 10:18

## 2023-03-21 RX ADMIN — SENNA PLUS 2 TABLET(S): 8.6 TABLET ORAL at 21:16

## 2023-03-21 RX ADMIN — Medication 1 PATCH: at 21:08

## 2023-03-21 RX ADMIN — Medication 1 TABLET(S): at 10:18

## 2023-03-21 RX ADMIN — PANTOPRAZOLE SODIUM 40 MILLIGRAM(S): 20 TABLET, DELAYED RELEASE ORAL at 06:09

## 2023-03-21 RX ADMIN — Medication 25 MILLIGRAM(S): at 13:07

## 2023-03-21 RX ADMIN — HEPARIN SODIUM 5000 UNIT(S): 5000 INJECTION INTRAVENOUS; SUBCUTANEOUS at 13:07

## 2023-03-21 RX ADMIN — CYCLOBENZAPRINE HYDROCHLORIDE 10 MILLIGRAM(S): 10 TABLET, FILM COATED ORAL at 13:07

## 2023-03-21 RX ADMIN — Medication 2 MILLIGRAM(S): at 00:45

## 2023-03-21 RX ADMIN — Medication 1 MILLIGRAM(S): at 10:17

## 2023-03-21 RX ADMIN — GABAPENTIN 600 MILLIGRAM(S): 400 CAPSULE ORAL at 13:07

## 2023-03-21 RX ADMIN — Medication 2 MILLIGRAM(S): at 08:35

## 2023-03-21 RX ADMIN — OXYCODONE HYDROCHLORIDE 15 MILLIGRAM(S): 5 TABLET ORAL at 02:15

## 2023-03-21 RX ADMIN — HYDROMORPHONE HYDROCHLORIDE 1 MILLIGRAM(S): 2 INJECTION INTRAMUSCULAR; INTRAVENOUS; SUBCUTANEOUS at 21:28

## 2023-03-21 RX ADMIN — FINASTERIDE 5 MILLIGRAM(S): 5 TABLET, FILM COATED ORAL at 10:19

## 2023-03-21 RX ADMIN — Medication 25 MILLIGRAM(S): at 06:10

## 2023-03-21 RX ADMIN — Medication 5 MILLIGRAM(S): at 10:18

## 2023-03-21 RX ADMIN — HEPARIN SODIUM 5000 UNIT(S): 5000 INJECTION INTRAVENOUS; SUBCUTANEOUS at 21:17

## 2023-03-21 RX ADMIN — Medication 25 MILLIGRAM(S): at 17:08

## 2023-03-21 RX ADMIN — Medication 500 MILLIGRAM(S): at 21:16

## 2023-03-21 RX ADMIN — HYDROMORPHONE HYDROCHLORIDE 2 MILLIGRAM(S): 2 INJECTION INTRAMUSCULAR; INTRAVENOUS; SUBCUTANEOUS at 06:38

## 2023-03-21 RX ADMIN — DULOXETINE HYDROCHLORIDE 60 MILLIGRAM(S): 30 CAPSULE, DELAYED RELEASE ORAL at 10:18

## 2023-03-21 RX ADMIN — HYDROMORPHONE HYDROCHLORIDE 2 MILLIGRAM(S): 2 INJECTION INTRAMUSCULAR; INTRAVENOUS; SUBCUTANEOUS at 14:50

## 2023-03-21 RX ADMIN — OXYCODONE HYDROCHLORIDE 15 MILLIGRAM(S): 5 TABLET ORAL at 10:18

## 2023-03-21 RX ADMIN — OXYCODONE HYDROCHLORIDE 5 MILLIGRAM(S): 5 TABLET ORAL at 09:20

## 2023-03-21 RX ADMIN — Medication 2 MILLIGRAM(S): at 13:07

## 2023-03-21 RX ADMIN — OXYCODONE HYDROCHLORIDE 15 MILLIGRAM(S): 5 TABLET ORAL at 21:15

## 2023-03-21 RX ADMIN — CYCLOBENZAPRINE HYDROCHLORIDE 10 MILLIGRAM(S): 10 TABLET, FILM COATED ORAL at 21:16

## 2023-03-21 RX ADMIN — Medication 5 MILLIGRAM(S): at 21:16

## 2023-03-21 RX ADMIN — HYDROMORPHONE HYDROCHLORIDE 2 MILLIGRAM(S): 2 INJECTION INTRAMUSCULAR; INTRAVENOUS; SUBCUTANEOUS at 14:34

## 2023-03-21 RX ADMIN — HYDROMORPHONE HYDROCHLORIDE 2 MILLIGRAM(S): 2 INJECTION INTRAMUSCULAR; INTRAVENOUS; SUBCUTANEOUS at 01:19

## 2023-03-21 RX ADMIN — HEPARIN SODIUM 5000 UNIT(S): 5000 INJECTION INTRAVENOUS; SUBCUTANEOUS at 06:08

## 2023-03-21 RX ADMIN — OXYCODONE HYDROCHLORIDE 5 MILLIGRAM(S): 5 TABLET ORAL at 14:00

## 2023-03-21 RX ADMIN — CYCLOBENZAPRINE HYDROCHLORIDE 10 MILLIGRAM(S): 10 TABLET, FILM COATED ORAL at 06:10

## 2023-03-21 RX ADMIN — OXYCODONE HYDROCHLORIDE 5 MILLIGRAM(S): 5 TABLET ORAL at 13:07

## 2023-03-21 RX ADMIN — Medication 500 MILLIGRAM(S): at 10:18

## 2023-03-21 RX ADMIN — Medication 1 PATCH: at 21:17

## 2023-03-21 RX ADMIN — Medication 0.1 MILLIGRAM(S): at 21:16

## 2023-03-21 RX ADMIN — HYDROMORPHONE HYDROCHLORIDE 2 MILLIGRAM(S): 2 INJECTION INTRAMUSCULAR; INTRAVENOUS; SUBCUTANEOUS at 00:49

## 2023-03-21 RX ADMIN — HYDROMORPHONE HYDROCHLORIDE 1 MILLIGRAM(S): 2 INJECTION INTRAMUSCULAR; INTRAVENOUS; SUBCUTANEOUS at 21:58

## 2023-03-21 RX ADMIN — HYDROMORPHONE HYDROCHLORIDE 2 MILLIGRAM(S): 2 INJECTION INTRAMUSCULAR; INTRAVENOUS; SUBCUTANEOUS at 06:08

## 2023-03-21 RX ADMIN — TAMSULOSIN HYDROCHLORIDE 0.4 MILLIGRAM(S): 0.4 CAPSULE ORAL at 21:16

## 2023-03-21 RX ADMIN — Medication 0.1 MILLIGRAM(S): at 10:18

## 2023-03-21 RX ADMIN — GABAPENTIN 600 MILLIGRAM(S): 400 CAPSULE ORAL at 06:09

## 2023-03-21 RX ADMIN — GABAPENTIN 600 MILLIGRAM(S): 400 CAPSULE ORAL at 21:16

## 2023-03-21 RX ADMIN — Medication 1: at 17:06

## 2023-03-21 RX ADMIN — Medication 2 MILLIGRAM(S): at 19:49

## 2023-03-21 RX ADMIN — OXYCODONE HYDROCHLORIDE 5 MILLIGRAM(S): 5 TABLET ORAL at 08:34

## 2023-03-21 RX ADMIN — Medication 1 PATCH: at 10:35

## 2023-03-21 NOTE — PROGRESS NOTE ADULT - SUBJECTIVE AND OBJECTIVE BOX
Patient seen and examined at bedside. Pain well controlled with medication. Notes that he has been ambulating well with PT and his symptomatology, both weakness and paresthesias, have been improving. Patient denies fevers, chills, saddle anesthesia, bowel or bladder incontinence, leg pain, numbness, weakness, or any other orthopaedic complaint. Pt stating would like to go to [facility] for rehabilitation if possible.     PE:  Vital Signs Last 24 Hrs  T(C): 37.4 (21 Mar 2023 04:06), Max: 37.4 (21 Mar 2023 04:06)  T(F): 99.3 (21 Mar 2023 04:06), Max: 99.3 (21 Mar 2023 04:06)  HR: 71 (21 Mar 2023 04:06) (71 - 78)  BP: 135/73 (21 Mar 2023 04:06) (103/59 - 144/77)  BP(mean): --  RR: 18 (21 Mar 2023 04:06) (16 - 18)  SpO2: 95% (21 Mar 2023 04:06) (95% - 99%)    Parameters below as of 21 Mar 2023 04:06  Patient On (Oxygen Delivery Method): room air      Spine:  Dressing in place, c/d/i  Sonja-incisional TTP; otherwise, NTTP throughout the rest of the extremity.   Negative Burgos, Negative Babinski, positive L ankle clonus  Radial, DP pulses palpable.  No calf tenderness bilaterally.  Compartments soft and compressible.     Motor:                   Elbow Flex     Wrist Ext      Elbow Ext      Finger Flex     Finger Abd               R                   5/5                 5/5                 5/5                  5/5                 5/5  L                    5/5                 5/5                 5/5                  5/5                 5/5              Hip Flex     Knee Ext     Ankle Dorsi     Hallux Ext     Ankle Plant  R            4/5              5/5               1/5                    1/5                5/5  L             5/5             5/5                5/5                   5/5                 5/5    Sensory:            C5         C6         C7      C8       T1        (0=absent, 1=impaired, 2=normal, NT=not testable)  R         2            2           2        2         2  L          2            2           2        2         2               L2          L3         L4      L5       S1         (0=absent, 1=impaired, 2=normal, NT=not testable)  R         2            2            2        1        1  L          2            2           2        2         2                                      A/P:  48y m s/p L4-5 lami and PSF POD 6  -PT/OT   -WBAT  -Pain Control  - Drains DC'd 3/17  -DVT ppx: resumed subq Heparin 3/20--> per Dr. Mills, rec subq heparin and coumadin restarted POD10. Per primary team due to kidney disease and APLS contra-indicate DOACs; Risk of bleeding too elevated w/ hep gtt  -FU AM Labs  -Incentive Spirometry  -Medical management appreciated  - Dispo - rehab   - ortho stable for dc

## 2023-03-21 NOTE — PROGRESS NOTE ADULT - SUBJECTIVE AND OBJECTIVE BOX
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 He has had off-and-on lower back pain with peripheral LE neuropathy since 2016 when he had Guillain-Barré and a CVA with R sided deficits in 2016. Normally, he can walk "shaky with a walker." However, over the past 3 weeks, his symptoms have gotten worse, with frequent burning pain in his lower back and spasms. His symptoms are worse when he lies flat or when he tries to transition to his walker. Therefore, he has had marked difficulty ambulating. He has baseline urinary incontinence and uses Pull-ups. He has some improved bowel continence, but cannot hold his bowels in for long. Those symptoms are not new. He denies CP, cough, N/V, abdominal pain, CP, or SOB.  He went to [facility] 2 nights ago and was "in the ER for 30 hours" not moving. His pain worsened even more after discharge.    He also notes a history of VRE and was recently (1 week ago) prescribed Augmentin, which he only took 2 days of. He says when he goes to [facility] he is "always on isolation," because of his history of VRE. He is not sure if he has dysuria currently, but he had it when he was prescribed antibiotics last week.  He also is on Warfarin for an unknown "blood clotting disorder," stemming from a blood clot in 2016 (of note, he was intubated at that time for Guillain-[place] and suffered a CVA during that time), but he has not had a blood clot since then. He states he has an IVC filter. He is non-compliant with his Warfarin, stating he takes it about 3-4 days per week.    In the ED, he was given Flexeril 10 mg PO x1 and  mg x1 with Morphine 4 mg IV x1 ordered. (11 Mar 2023 23:39)      Patient is a 48y old  Male who presents with a chief complaint of inability to walk, intractable back pain (20 Mar 2023 08:54)      Consulted by Dr. Deyvi peter   for VTE prophylaxis, risk stratification, and anticoagulation management.    PAST MEDICAL & SURGICAL HISTORY:  BPH (benign prostatic hyperplasia)      HTN (hypertension)      Type 2 diabetes mellitus      Peripheral neuropathy      History of Guillain-[place] syndrome      History of CVA in adulthood      DDD (degenerative disc disease), lumbar      DVT, lower extremity      S/P IVC filter    3/20/2023: patient seen at bedside discussed the resumption of anticoagulation, , patienr was paced on Coumadin 6 yrs ago due to APS and positive DVT, patient gets Vandervoort lab draw at home from his PCP, usually takes Coumadin 3mg daily, discussed the high risk of bleeding and clotting ortho is ok to start heparin SQ today   3/21/2023: patient seen at bedside heparin started yesterday tolerating well no concerns         CrCl:78.5  BMI:    Caprini VTE Risk Score:  AGE RELATED RISK FACTORS                                                       MOBILITY RELATED FACTORS  [x ] Age 41-60 years                                            (1 Point)                  [ ] Bed rest /restricted mobility                             (1 Point)  [ ] Age: 61-74 years                                           (2 Points)                [ ] Plaster cast                                                   (2 Points)  [ ] Age= 75 years                                              (3 Points)                 [ ] Bed bound for more than 72 hours                   (2 Points)    DISEASE RELATED RISK FACTORS                                               GENDER SPECIFIC FACTORS  [ ] Edema in the lower extremities                       (1 Point)           [ ] Pregnancy                                                            (1 Point)  [ ] Varicose veins                                               (1 Point)                  [ ] Post-partum < 6 weeks                                      (1 Point)             [x ] BMI > 25 Kg/m2                                            (1 Point)                  [ ] Hormonal therapy or oral contraception       (1 Point)                 [ ] Sepsis (in the previous month)                        (1 Point)             [ ] History of pregnancy complications                (1Point)  [ ] Pneumonia or serious lung disease                                             [ ] Unexplained or recurrent  (=/>3), premature                                 (In the previous month)                               (1 Point)                birth with toxemia or growth-restricted infant (1 Point)  [ ] Abnormal pulmonary function test            (1 Point)                                   SURGERY RELATED RISK FACTORS  [ ] Acute myocardial infarction                       (1 Point)                  [ ]  Section                                         (1 Point)  [ ] Congestive heart failure (in the previous month) (1 Point)   [ ] Minor surgery   lasting <45 minutes       (1 Point)   [ ] Inflammatory bowel disease                             (1 Point)          [ ] Arthroscopic surgery                                  (2 Points)  [ ] Central venous access                                    (2 Points)            [x ] General surgery lasting >45 minutes      (2 Points)       [ ] Stroke (in the previous month)                  (5 Points)            [ ] Elective major lower extremity arthroplasty (5 Points)                                   [  ] Malignancy (present or past include skin melanoma                                          but exclude  basal skin cell)    (2 points)                                      TRAUMA RELATED RISK FACTORS                HEMATOLOGY RELATED FACTORS                                  [ ] Fracture of the hip, pelvis, or leg                       (5 Points)  [x ] Prior episodes of VTE                                     (3 Points)          [ ] Acute spinal cord injury (in the previous month)  (5 Points)  [ ] Positive family history for VTE                         (3 Points)       [ ] Paralysis (less than 1 month)                          (5 Points)  [ ] Prothrombin 60835 A                                      (3 Points)         [ ] Multiple Trauma (within 1month)                 (5Points)                                                                                                                                                                [ ] Factor V Leiden                                          (3 Points)                                OTHER RISK FACTORS                          [x ] Lupus anticoagulants                                     (3 Points)                       [ ] BMI > 40                          (1 Point)                                                         [ ] Anticardiolipin antibodies                                (3 Points)                   [ ] Smoking                              (1Point)                                                [ ] High homocysteine in the blood                      (3 Points)                [  ] Diabetes requiring insulin (1point)                         [ ] Other congenital or acquired thrombophilia       (3 Points)          [  ] Chemotherapy                   (1 Point)  [ ] Heparin induced thrombocytopenia                  (3 Points)             [  ] Blood Transfusion                (1 point)                                                                                                             Total Score [  10        ]                                                                                                                                                                                                                                                                                                                                                                                                                                         IMPROVE Bleeding Risk Score:3.5      Falls Risk:   High ( x )  Mod (  )  Low (  )      FAMILY HISTORY:  FH: heart disease    FH: HTN (hypertension)      Denies any personal or familial history of clotting or bleeding disorders.    Allergies    sulfa drugs (Hives)  sulfa drugs (Unknown)    Intolerances        REVIEW OF SYSTEMS    (  )Fever	     (  )Constipation	(  )SOB				(  )Headache	(  )Dysuria  (  )Chills	     (  )Melena	(  )Dyspnea present on exertion	                    (  )Dizziness                    (  )Polyuria  (  )Nausea	     (  )Hematochezia	(  )Cough			                    (  )Syncope   	(  )Hematuria  (  )Vomiting    (  )Chest Pain	(  )Wheezing			(x  )Weakness  (  )Diarrhea     (  )Palpitations	(  )Anorexia			( x )joint pain    All  other review of systems negative: Yes    Vital Signs Last 24 Hrs  T(C): 37.1 (21 Mar 2023 08:00), Max: 37.4 (21 Mar 2023 04:06)  T(F): 98.7 (21 Mar 2023 08:00), Max: 99.3 (21 Mar 2023 04:06)  HR: 69 (21 Mar 2023 08:00) (69 - 78)  BP: 135/64 (21 Mar 2023 08:00) (115/54 - 144/77)  BP(mean): --  RR: 18 (21 Mar 2023 08:00) (16 - 18)  SpO2: 97% (21 Mar 2023 08:00) (95% - 98%)    PHYSICAL EXAM:    Constitutional: Appears Well    Neurological: A& O x 3    Skin: Warm    Respiratory and Thorax: normal effort; Breath sounds: normal; No rales/wheezing/rhonchi  	  Cardiovascular: S1, S2, regular, NMBR	    Gastrointestinal: BS + x 4Q, nontender	    Genitourinary:  Bladder nondistended, nontender    Musculoskeletal:   General Right:   no muscle/joint tenderness,   normal tone, no joint swelling,   ROM: limited	    General Left:   no muscle/joint tenderness,   normal tone, no joint swelling,   ROM: limited    Back  Dressing CDI;              Lower extrems:   Right: no calf tenderness              negative agnes's sign               + pedal pulses    Left:   no calf tenderness              negative agnes's sign               + pedal pulses                          9.0    10.19 )-----------( 221      ( 20 Mar 2023 08:42 )             28.7       03-20    139  |  109<H>  |  36<H>  ----------------------------<  180<H>  3.5   |  22  |  1.98<H>    Ca    8.1<L>      20 Mar 2023 08:42        PT/INR - ( 20 Mar 2023 08:42 )   PT: 13.9 sec;   INR: 1.20 ratio         PTT - ( 20 Mar 2023 08:42 )  PTT:28.0 sec				    MEDICATIONS  (STANDING):  amLODIPine   Tablet 10 milliGRAM(s) Oral daily  ascorbic acid 500 milliGRAM(s) Oral two times a day  baclofen 5 milliGRAM(s) Oral every 12 hours  cloNIDine 0.1 milliGRAM(s) Oral two times a day  cyclobenzaprine 10 milliGRAM(s) Oral every 8 hours  dextrose 5%. 1000 milliLiter(s) (100 mL/Hr) IV Continuous <Continuous>  dextrose 5%. 1000 milliLiter(s) (50 mL/Hr) IV Continuous <Continuous>  dextrose 50% Injectable 25 Gram(s) IV Push once  dextrose 50% Injectable 12.5 Gram(s) IV Push once  dextrose 50% Injectable 25 Gram(s) IV Push once  dextrose 50% Injectable 25 Gram(s) IV Push once  dextrose 50% Injectable 12.5 Gram(s) IV Push once  dextrose 50% Injectable 25 Gram(s) IV Push once  DULoxetine 60 milliGRAM(s) Oral daily  finasteride 5 milliGRAM(s) Oral daily  folic acid 1 milliGRAM(s) Oral daily  gabapentin 600 milliGRAM(s) Oral every 8 hours  glucagon  Injectable 1 milliGRAM(s) IntraMuscular once  glucagon  Injectable 1 milliGRAM(s) IntraMuscular once  heparin   Injectable 5000 Unit(s) SubCutaneous every 8 hours  hydrALAZINE 25 milliGRAM(s) Oral four times a day  insulin lispro (ADMELOG) corrective regimen sliding scale   SubCutaneous three times a day before meals  insulin lispro (ADMELOG) corrective regimen sliding scale   SubCutaneous at bedtime  lactated ringers. 1000 milliLiter(s) (75 mL/Hr) IV Continuous <Continuous>  lactated ringers. 1000 milliLiter(s) (75 mL/Hr) IV Continuous <Continuous>  lactated ringers. 1000 milliLiter(s) (75 mL/Hr) IV Continuous <Continuous>  multivitamin 1 Tablet(s) Oral daily  nicotine -  14 mG/24Hr(s) Patch 1 Patch Transdermal daily  oxyCODONE  ER Tablet 15 milliGRAM(s) Oral every 12 hours  pantoprazole    Tablet 40 milliGRAM(s) Oral before breakfast  senna 2 Tablet(s) Oral at bedtime  sodium chloride 0.9%. 1000 milliLiter(s) (50 mL/Hr) IV Continuous <Continuous>  tamsulosin 0.4 milliGRAM(s) Oral at bedtime        DVT Prophylaxis:  LMWH                   (  )  Heparin SQ           (  )  Coumadin             (  )  Xarelto                  (  )  Eliquis                   (  )  Venodynes           (  )  Ambulation          (  )  UFH                       (  )  Contraindicated  (  )  EC ASPIRIN       (  )

## 2023-03-21 NOTE — PROGRESS NOTE ADULT - SUBJECTIVE AND OBJECTIVE BOX
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 He has had off-and-on lower back pain with peripheral LE neuropathy since 2016 when he had Guillain-Barré and a CVA with R sided deficits in 2016. Normally, he can walk "shaky with a walker." However, over the past 3 weeks, his symptoms have gotten worse, with frequent burning pain in his lower back and spasms. His symptoms are worse when he lies flat or when he tries to transition to his walker. Therefore, he has had marked difficulty ambulating. He has baseline urinary incontinence and uses Pull-ups. He has some improved bowel continence, but cannot hold his bowels in for long. Those symptoms are not new. He denies CP, cough, N/V, abdominal pain, CP, or SOB.  He went to [facility] 2 nights ago and was "in the ER for 30 hours" not moving. His pain worsened even more after discharge.    He also notes a history of VRE and was recently (1 week ago) prescribed Augmentin, which he only took 2 days of. He says when he goes to [facility] he is "always on isolation," because of his history of VRE. He is not sure if he has dysuria currently, but he had it when he was prescribed antibiotics last week.  He also is on Warfarin for an unknown "blood clotting disorder," stemming from a blood clot in 2016 (of note, he was intubated at that time for Guillain-[place] and suffered a CVA during that time), but he has not had a blood clot since then. He states he has an IVC filter. He is non-compliant with his Warfarin, stating he takes it about 3-4 days per week.    In the ED, he was given Flexeril 10 mg PO x1 and  mg x1 with Morphine 4 mg IV x1 ordered.    3/13 afebrile , says has mild urethral burning on micturation, no flank pain, , no chills , lower back pain not controlled with oxycodone 5, will increase oxycodone 10   3/14 afebrile , Patient was transferred to [facility] last night for MRI neuraxial imaging - however only MRI LSpine was performed,no acute overnight events, WBC 10, denies CP, sob, no new complaints   B/L lower back pain controlled with oxycodone and dilaudid     3/15: s/p Fusion of posterior lumbar spine 4/5 and decompression with bilateral mayank laminectomy  asking for more pain meds; says 2 mg dilaudid iv did nothing.     3/16: s/p fusion of LS, POD #1, pain well controlled on current regimen. No acute complaints.     3/17: s/p L4/5 laminectomy, POD#2. Still with pain. DEEPAK drains in place. Finished course of Augmentin      3/18: s/p L4/5 laminectomy, POD#3, DEEPAK removed yesterday. +flatus, no BM. Anderson removed today     3/19: s/p L4/5 laminectomy, POD#4, +BM today. +worked with PT today. No acute complaints.     03/20: s/p L4/5 laminectomy, POD#5, worked with PT. Ok to start SubQ heparin and not full dose AC at this time, discussed with ortho. AC team consulted     3/21: No complaints.      PHYSICAL EXAM:  Vital Signs Last 24 Hrs  T(C): 37.1 (21 Mar 2023 08:00), Max: 37.4 (21 Mar 2023 04:06)  T(F): 98.7 (21 Mar 2023 08:00), Max: 99.3 (21 Mar 2023 04:06)  HR: 76 (21 Mar 2023 17:05) (69 - 81)  BP: 112/62 (21 Mar 2023 17:05) (112/62 - 144/77)  RR: 18 (21 Mar 2023 08:00) (16 - 18)  SpO2: 97% (21 Mar 2023 08:00) (95% - 98%)    Parameters below as of 21 Mar 2023 08:00  Patient On (Oxygen Delivery Method): room air            Physical exam   GENERAL: No acute distress  HEENT: PERRL, EOMI, MMM, no oropharyngeal lesions  NECK: supple, no stiffness, no JVD, no thyromegaly  PULM: respirations non-labored, clear to auscultation bilaterally, no rales, rhonchi, or wheezes  CV: regular rate and rhythm, no murmurs, gallops, or rubs  GI: abdomen soft, NT/NT,  MSK: + lumbar spinal TTP.   LYMPH: no anterior cervical, posterior cervical, supraclavicular, or inguinal lymphadenopathy  NEURO: occasional spasms of b/l LEs. A&Ox3, sensation intact. Strength 2/5 b/l LEs  SKIN: no edema, . No rashes or lesions    Labs                        8.8    9.10  )-----------( 222      ( 21 Mar 2023 08:20 )             27.9   03-20    139  |  109<H>  |  36<H>  ----------------------------<  180<H>  3.5   |  22  |  1.98<H>    Ca    8.1<L>      20 Mar 2023 08:42                  CBC Full  -  ( 14 Mar 2023 05:16 )  WBC Count : 10.57 K/uL  RBC Count : 4.33 M/uL  Hemoglobin : 10.6 g/dL  Hematocrit : 33.5 %  Platelet Count - Automated : 206 K/uL  Mean Cell Volume : 77.4 fl  Mean Cell Hemoglobin : 24.5 pg  Mean Cell Hemoglobin Concentration : 31.6 gm/dL  Auto Neutrophil # : 7.82 K/uL  Auto Lymphocyte # : 1.69 K/uL  Auto Monocyte # : 0.65 K/uL  Auto Eosinophil # : 0.30 K/uL  Auto Basophil # : 0.06 K/uL  Auto Neutrophil % : 74.0 %  Auto Lymphocyte % : 16.0 %  Auto Monocyte % : 6.1 %  Auto Eosinophil % : 2.8 %  Auto Basophil % : 0.6 %    03-15    134<L>  |  106  |  29<H>  ----------------------------<  228<H>  4.2   |  24  |  1.89<H>    Ca    8.6      15 Mar 2023 14:02      03-14    138  |  109<H>  |  25<H>  ----------------------------<  139<H>  3.7   |  25  |  1.74<H>    Ca    8.7      14 Mar 2023 05:16      PT/INR - ( 19 Mar 2023 09:08 )   PT: 14.4 sec;   INR: 1.24 ratio         PTT - ( 19 Mar 2023 09:08 )  PTT:26.8 sec    I&O's Detail    18 Mar 2023 07:01  -  19 Mar 2023 07:00  --------------------------------------------------------  IN:    sodium chloride 0.9%: 300 mL  Total IN: 300 mL    OUT:    Indwelling Catheter - Urethral (mL): 600 mL    Stool (mL): 1 mL    Voided (mL): 200 mL  Total OUT: 801 mL    Total NET: -501 mL      MEDICATIONS  (STANDING):  amLODIPine   Tablet 10 milliGRAM(s) Oral daily  ascorbic acid 500 milliGRAM(s) Oral two times a day  baclofen 5 milliGRAM(s) Oral every 12 hours  cloNIDine 0.1 milliGRAM(s) Oral two times a day  cyclobenzaprine 10 milliGRAM(s) Oral every 8 hours  dextrose 5%. 1000 milliLiter(s) (50 mL/Hr) IV Continuous <Continuous>  dextrose 5%. 1000 milliLiter(s) (100 mL/Hr) IV Continuous <Continuous>  dextrose 50% Injectable 25 Gram(s) IV Push once  dextrose 50% Injectable 12.5 Gram(s) IV Push once  dextrose 50% Injectable 25 Gram(s) IV Push once  dextrose 50% Injectable 25 Gram(s) IV Push once  dextrose 50% Injectable 12.5 Gram(s) IV Push once  dextrose 50% Injectable 25 Gram(s) IV Push once  DULoxetine 60 milliGRAM(s) Oral daily  finasteride 5 milliGRAM(s) Oral daily  folic acid 1 milliGRAM(s) Oral daily  gabapentin 600 milliGRAM(s) Oral every 8 hours  glucagon  Injectable 1 milliGRAM(s) IntraMuscular once  glucagon  Injectable 1 milliGRAM(s) IntraMuscular once  heparin   Injectable 5000 Unit(s) SubCutaneous every 8 hours  hydrALAZINE 25 milliGRAM(s) Oral four times a day  insulin lispro (ADMELOG) corrective regimen sliding scale   SubCutaneous three times a day before meals  insulin lispro (ADMELOG) corrective regimen sliding scale   SubCutaneous at bedtime  lactated ringers. 1000 milliLiter(s) (75 mL/Hr) IV Continuous <Continuous>  lactated ringers. 1000 milliLiter(s) (75 mL/Hr) IV Continuous <Continuous>  lactated ringers. 1000 milliLiter(s) (75 mL/Hr) IV Continuous <Continuous>  multivitamin 1 Tablet(s) Oral daily  nicotine -  14 mG/24Hr(s) Patch 1 Patch Transdermal daily  oxyCODONE  ER Tablet 15 milliGRAM(s) Oral every 12 hours  pantoprazole    Tablet 40 milliGRAM(s) Oral before breakfast  senna 2 Tablet(s) Oral at bedtime  sodium chloride 0.9%. 1000 milliLiter(s) (50 mL/Hr) IV Continuous <Continuous>  tamsulosin 0.4 milliGRAM(s) Oral at bedtime    MEDICATIONS  (PRN):  acetaminophen     Tablet .. 650 milliGRAM(s) Oral every 6 hours PRN Mild Pain (1 - 3)  acetaminophen     Tablet .. 650 milliGRAM(s) Oral every 6 hours PRN Temp greater or equal to 38C (100.4F), Mild Pain (1 - 3)  bisacodyl 5 milliGRAM(s) Oral every 12 hours PRN Constipation  dextrose Oral Gel 15 Gram(s) Oral once PRN Blood Glucose LESS THAN 70 milliGRAM(s)/deciliter  dextrose Oral Gel 15 Gram(s) Oral once PRN Blood Glucose LESS THAN 70 milliGRAM(s)/deciliter  HYDROmorphone  Injectable 1 milliGRAM(s) SubCutaneous every 4 hours PRN Severe Pain (7 - 10)  HYDROmorphone  Injectable 2 milliGRAM(s) IV Push every 4 hours PRN Severe Pain (7 - 10)  melatonin 3 milliGRAM(s) Oral at bedtime PRN Insomnia  nicotine  Polacrilex Gum 2 milliGRAM(s) Oral every 2 hours PRN nicotine withdrawal  ondansetron Injectable 4 milliGRAM(s) IV Push every 6 hours PRN Nausea and/or Vomiting  ondansetron Injectable 4 milliGRAM(s) IV Push every 8 hours PRN Nausea and/or Vomiting  oxyCODONE    IR 5 milliGRAM(s) Oral every 4 hours PRN Mild Pain (1 - 3)  oxyCODONE    IR 10 milliGRAM(s) Oral every 4 hours PRN Moderate Pain (4 - 6)  polyethylene glycol 3350 17 Gram(s) Oral daily PRN Constipation  simethicone 80 milliGRAM(s) Chew three times a day PRN Gas                  < from: CT Cervical Spine No Cont (03.14.23 @ 09:40) >  INTERPRETATION:  Clinical indication: Preop planning    Serial thin sections on a multi slice scanner were obtained through the   cervical and thoracic spine from the C1 to the L3-4 level in a stacked   axial fashion reformatted at 1.25 mm sections with sagittal and coronal   computer generated reconstructed views.      The cervical vertebrae are normal in height and density. There is   straightening of the normal cervical lordosis. Mild degenerative   vertebral joint changes are identified at C5-6. No significant spinal   stenosis is identified.    Evaluation of the thoracic spine demonstrates the thoracic vertebral   bodies to be normal in height and density. There is anterior ossification   extending from T6 through L1. No acute fractures or dislocations are   identified. There is no significant spinal stenosis.    Paraspinal soft tissues are unremarkable. There is a right-sided IVC   filter.    There are small calcifications in the left kidney with some volume loss.    IMPRESSION: Mild degenerative changes of the cervical and thoracic spine.   No significant spinal stenosis.    < end of copied text >

## 2023-03-22 VITALS
RESPIRATION RATE: 17 BRPM | HEART RATE: 69 BPM | TEMPERATURE: 99 F | DIASTOLIC BLOOD PRESSURE: 67 MMHG | SYSTOLIC BLOOD PRESSURE: 122 MMHG | OXYGEN SATURATION: 98 %

## 2023-03-22 LAB
APTT BLD: 27.8 SEC — SIGNIFICANT CHANGE UP (ref 27.5–35.5)
GLUCOSE BLDC GLUCOMTR-MCNC: 129 MG/DL — HIGH (ref 70–99)
GLUCOSE BLDC GLUCOMTR-MCNC: 133 MG/DL — HIGH (ref 70–99)
GLUCOSE BLDC GLUCOMTR-MCNC: 161 MG/DL — HIGH (ref 70–99)
HCT VFR BLD CALC: 27.4 % — LOW (ref 39–50)
HGB BLD-MCNC: 8.9 G/DL — LOW (ref 13–17)
INR BLD: 1.2 RATIO — HIGH (ref 0.88–1.16)
MCHC RBC-ENTMCNC: 24.7 PG — LOW (ref 27–34)
MCHC RBC-ENTMCNC: 32.5 GM/DL — SIGNIFICANT CHANGE UP (ref 32–36)
MCV RBC AUTO: 76.1 FL — LOW (ref 80–100)
PLATELET # BLD AUTO: 229 K/UL — SIGNIFICANT CHANGE UP (ref 150–400)
PROTHROM AB SERPL-ACNC: 14 SEC — HIGH (ref 10.5–13.4)
RBC # BLD: 3.6 M/UL — LOW (ref 4.2–5.8)
RBC # FLD: 15.4 % — HIGH (ref 10.3–14.5)
WBC # BLD: 9.72 K/UL — SIGNIFICANT CHANGE UP (ref 3.8–10.5)
WBC # FLD AUTO: 9.72 K/UL — SIGNIFICANT CHANGE UP (ref 3.8–10.5)

## 2023-03-22 PROCEDURE — 99239 HOSP IP/OBS DSCHRG MGMT >30: CPT

## 2023-03-22 PROCEDURE — 99231 SBSQ HOSP IP/OBS SF/LOW 25: CPT

## 2023-03-22 RX ORDER — ACETAMINOPHEN 500 MG
2 TABLET ORAL
Qty: 0 | Refills: 0 | DISCHARGE
Start: 2023-03-22

## 2023-03-22 RX ORDER — OXYCODONE HYDROCHLORIDE 5 MG/1
1 TABLET ORAL
Qty: 0 | Refills: 0 | DISCHARGE
Start: 2023-03-22

## 2023-03-22 RX ADMIN — Medication 25 MILLIGRAM(S): at 05:16

## 2023-03-22 RX ADMIN — Medication 650 MILLIGRAM(S): at 17:35

## 2023-03-22 RX ADMIN — HYDROMORPHONE HYDROCHLORIDE 1 MILLIGRAM(S): 2 INJECTION INTRAMUSCULAR; INTRAVENOUS; SUBCUTANEOUS at 00:41

## 2023-03-22 RX ADMIN — Medication 1: at 17:33

## 2023-03-22 RX ADMIN — Medication 2 MILLIGRAM(S): at 13:33

## 2023-03-22 RX ADMIN — Medication 1 TABLET(S): at 10:23

## 2023-03-22 RX ADMIN — HEPARIN SODIUM 5000 UNIT(S): 5000 INJECTION INTRAVENOUS; SUBCUTANEOUS at 05:12

## 2023-03-22 RX ADMIN — OXYCODONE HYDROCHLORIDE 15 MILLIGRAM(S): 5 TABLET ORAL at 10:22

## 2023-03-22 RX ADMIN — Medication 25 MILLIGRAM(S): at 17:36

## 2023-03-22 RX ADMIN — Medication 0.1 MILLIGRAM(S): at 10:23

## 2023-03-22 RX ADMIN — Medication 2 MILLIGRAM(S): at 17:36

## 2023-03-22 RX ADMIN — Medication 1 PATCH: at 06:31

## 2023-03-22 RX ADMIN — Medication 500 MILLIGRAM(S): at 10:23

## 2023-03-22 RX ADMIN — HEPARIN SODIUM 5000 UNIT(S): 5000 INJECTION INTRAVENOUS; SUBCUTANEOUS at 13:43

## 2023-03-22 RX ADMIN — GABAPENTIN 600 MILLIGRAM(S): 400 CAPSULE ORAL at 05:12

## 2023-03-22 RX ADMIN — DULOXETINE HYDROCHLORIDE 60 MILLIGRAM(S): 30 CAPSULE, DELAYED RELEASE ORAL at 10:23

## 2023-03-22 RX ADMIN — HYDROMORPHONE HYDROCHLORIDE 1 MILLIGRAM(S): 2 INJECTION INTRAMUSCULAR; INTRAVENOUS; SUBCUTANEOUS at 05:11

## 2023-03-22 RX ADMIN — Medication 5 MILLIGRAM(S): at 10:23

## 2023-03-22 RX ADMIN — Medication 1 MILLIGRAM(S): at 10:23

## 2023-03-22 RX ADMIN — Medication 25 MILLIGRAM(S): at 01:02

## 2023-03-22 RX ADMIN — GABAPENTIN 600 MILLIGRAM(S): 400 CAPSULE ORAL at 13:34

## 2023-03-22 RX ADMIN — PANTOPRAZOLE SODIUM 40 MILLIGRAM(S): 20 TABLET, DELAYED RELEASE ORAL at 05:12

## 2023-03-22 RX ADMIN — AMLODIPINE BESYLATE 10 MILLIGRAM(S): 2.5 TABLET ORAL at 10:22

## 2023-03-22 RX ADMIN — FINASTERIDE 5 MILLIGRAM(S): 5 TABLET, FILM COATED ORAL at 10:22

## 2023-03-22 RX ADMIN — Medication 2 MILLIGRAM(S): at 06:10

## 2023-03-22 RX ADMIN — Medication 25 MILLIGRAM(S): at 13:33

## 2023-03-22 RX ADMIN — CYCLOBENZAPRINE HYDROCHLORIDE 10 MILLIGRAM(S): 10 TABLET, FILM COATED ORAL at 05:12

## 2023-03-22 RX ADMIN — CYCLOBENZAPRINE HYDROCHLORIDE 10 MILLIGRAM(S): 10 TABLET, FILM COATED ORAL at 13:34

## 2023-03-22 NOTE — PROGRESS NOTE ADULT - ASSESSMENT
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    #Intractable Low Back Pain / Inability to Walk / Degenerative Disc Disease (Lumbar)  #progression of lumbar spine cord compression  s/p Fusion of posterior lumbar spine 4/5 and decompression with bilateral mayank laminectomy on 3/15  no acute medical contraindication for proceeding with OR   - Patient has a moderate risk of adverse cardiac event during surgery, with a 10.1% 30-day risk of death, MI, or cardiac arrest   transferred to outside facility for neuraxial imaging due to body habitus/MRI bore diameter - however only MRI LSpine was performed  MRI LSpine reviewed with L4/5 severe central stenosis, L1/2 central HNP without nerve root compression   No indication for steroids at this time  - C/w baclofen 5 mg BID for muscle spasms  - Appreciate ortho spine consult, currently NPO, f/u current plans for surgery  - Pain control PRN with Tylenol/Oxycodone/Dilaudid with baclofen 5 mg BID. Patient has not had opiates prescribed since October 2022 ([facility] #[id number])        #History of DVT  - Per patient, he had LE DVTs in 2016 (in the setting of prolonged intubation from Guillain-[place]) and reportedly has a history of "a blood clot disorder"  - Per patient, he also has an IVC filter  - Has been on warfarin, but is non-compliant and subtherapeutic  - Will need to determine if will continue A/C as an outpatient and whether pt needs to continue bridging as inpatient - heme consult  Anticoagulation to be restarted when cleared by ortho     #ANTONIO Superimposed on CKD  - ANTONIO on CKD3, creatinine 2.36 on admission, was 2.0 several days prior and baseline 1.5 in December  - Likely prerenal  on IVF while NPO  Cr at baseline - continue monitoring     #  Dysuria.  UTI, hx VRE  s/p IV  unasyn now switch to  to augmentin complete on 3/16      #Peripheral Neuropathy / History of Guillain-[place]  - C/w duloxetine 60 mg QD and gabapentin 600 mg Q8H    #Type 2 Diabetes Mellitus  - Per patient (and confirmed by Dr First Jamison), patient was on alogliptin 12.5 mg QD  - However, on patient's physical med prescriptions he brought with him, it is not listed. Suspect that it was either (purposely or mistakenly) dropped after recent hospital admission  - Will need to determine from PCP whether patient needs to continue outpatient or not  - While inpatient, check FS with low dose SSI Q6H while NPO (or QAC + HS)  A1c 6.8      #BPH  - With urinary incontinence in the setting of DDD  - C/w finasteride 5 mg QD and tamsulosin equivalent of alfuzosin 10 mg QD  - Of note, patient says he was recently on abx for UTI and was diagnosed with VRE, unsure if he currently has dysuria  - Check UA and urine cx, f/u PVR    #HTN  - C/w hydralazine 25 mg QID (appears to have been on 100 mg QD until recent admission to [place] one month ago)  - C/w amlodipine 10 mg QD  - C/w clonidine 0.1 mg BID        #Encounter for Smoking Cessation Counseling  - Patient continues to smoke heavily. Patient is not interested in quitting  - However, he is agreeable to nicotine patch and gum    #Prophylactic Measure  - DVT PPX: heparin gtt    Dispo- s/p Fusion of posterior lumbar spine 4/5 and decompression with bilateral mayank laminectomy; f/u heme for recs on AC, on augmentin for UTI ; pain Mx        
47 yo M who follows with my colleague Dr. 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    #  h/o APLS  - pt with positive lupus anticoagulant in the past with h/o DVT, previous h/o IVC filter placement and CVA  - pt is high risk for recurrence of DVT given body habitus, previous clotting history and immobility  - held heparin gtt now s/p spinal decompression of L4-5  - would recommend re-initiating hep gtt following surgery when safe as per ortho team with overlap of coumadin 5 mg qd   - will need to monitor for any bleeding, worsening pain in back or any such s/s  - pt has been on coumadin for the past 6 years and follows with DR. Devon Avery outpatient for continued monitoring   - will need to trend INR daily- at baseline 3/19 1.24  - pt has [facility] come to house to monitor INR set up by his pmd     will continue to follow   
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 He is non-compliant with his Warfarin, stating he takes it about 3-4 days per week.who p/w worsening back and leg pain and spasms and inability to ambulate. He has had off-and-on lower back pain with peripheral LE neuropathy since 2016 when he had Guillain-Barré and a CVA with R sided deficits in 2016. Normally, he can walk "shaky with a walker."    now with lumbar cord compression and s/p surgical decompression of L4-5  Consulted by Dr. Deyvi peter   for VTE prophylaxis, risk stratification, and anticoagulation management.  pt is high risk for recurrence of DVT due to obesity, previous clotting history and immobility, and recent surgery discussed in length about the high risk of clotting and bleeding,  patient is high risk for bleeding due to recent spine surgery  ortho wants to hold full anticoagulation till PO day 10 patient is aware of the above     Plan    Continue  Heparin 5,000 units SQ Q8hour resume coumadin when ok with ortho (neurosurgery)  Daily CBC/BMP  Enc ambulation  Venodynes   will f/u  Dispo:rehab Pt will f/u with his PCP out patient for Coumadin mgt     
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    #Intractable Low Back Pain / Inability to Walk / Degenerative Disc Disease (Lumbar)  #progression of lumbar spine cord compression  s/p Fusion of posterior lumbar spine 4/5 and decompression with bilateral mayank laminectomy on 3/15  no acute medical contraindication for proceeding with OR   - Patient has a moderate risk of adverse cardiac event during surgery, with a 10.1% 30-day risk of death, MI, or cardiac arrest   transferred to outside facility for neuraxial imaging due to body habitus/MRI bore diameter - however only MRI LSpine was performed  MRI LSpine reviewed with L4/5 severe central stenosis, L1/2 central HNP without nerve root compression   No indication for steroids at this time  - C/w baclofen 5 mg BID for muscle spasms  - Appreciate ortho spine consult, currently NPO, f/u current plans for surgery  - Pain control PRN with Tylenol/Oxycodone/Dilaudid with baclofen 5 mg BID. Patient has not had opiates prescribed since October 2022 ([facility] #[id number])        #History of DVT  - Per patient, he had LE DVTs in 2016 (in the setting of prolonged intubation from Guillain-[place]) and reportedly has a history of "a blood clot disorder"  - Per patient, he also has an IVC filter  - Has been on warfarin, but is non-compliant and subtherapeutic  - Will need to determine if will continue A/C as an outpatient and whether pt needs to continue bridging as inpatient - heme consult  Anticoagulation to be restarted when cleared by ortho     #ANTONIO Superimposed on CKD  - ANTONIO on CKD3, creatinine 2.36 on admission, was 2.0 several days prior and baseline 1.5 in December  - Likely prerenal  on IVF while NPO  Cr at baseline - continue monitoring     #  Dysuria.  UTI, hx VRE  s/p IV  unasyn now switch to  to augmentin complete on 3/16      #Peripheral Neuropathy / History of Guillain-[place]  - C/w duloxetine 60 mg QD and gabapentin 600 mg Q8H    #Type 2 Diabetes Mellitus  - Per patient (and confirmed by Dr First Jamison), patient was on alogliptin 12.5 mg QD  - However, on patient's physical med prescriptions he brought with him, it is not listed. Suspect that it was either (purposely or mistakenly) dropped after recent hospital admission  - Will need to determine from PCP whether patient needs to continue outpatient or not  - While inpatient, check FS with low dose SSI Q6H while NPO (or QAC + HS)  A1c 6.8      #BPH  - With urinary incontinence in the setting of DDD  - C/w finasteride 5 mg QD and tamsulosin equivalent of alfuzosin 10 mg QD  - Of note, patient says he was recently on abx for UTI and was diagnosed with VRE, unsure if he currently has dysuria  - Check UA and urine cx, f/u PVR    #HTN  - C/w hydralazine 25 mg QID (appears to have been on 100 mg QD until recent admission to [place] one month ago)  - C/w amlodipine 10 mg QD  - C/w clonidine 0.1 mg BID        #Encounter for Smoking Cessation Counseling  - Patient continues to smoke heavily. Patient is not interested in quitting  - However, he is agreeable to nicotine patch and gum    #Prophylactic Measure  - DVT PPX: heparin gtt    Dispo  -s/p Fusion of posterior lumbar spine 4/5 and decompression with bilateral mayank laminectomy  -will resume AC when OK with Ortho. discussed with ortho  -f/u heme for recs on AC, on Augmentin for UTI ; pain Mx        
47 yo man with difficulty ambulating likely due to severe low back pain and bilateral radiculopathy related to lumbar spondylosis.  Reflexes are present making GBS unlikely.    # Lumbar radiculopathy:     Recommend:  1. Orthopedic spine follow up  2. MRI spine L/T pending  3. pain mgmt    As D/W pt in detail, he needs F/U with his neurologist Dr. Kruger for GBS / NCV studies
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    #Intractable Low Back Pain / Inability to Walk / Degenerative Disc Disease (Lumbar)  - Patient with acute on chronic back spasms and worsening lumbar back pain, likely radicular  - CT lumbar spine shows DD with severe canal stenosis and moderate bilateral neural foraminal narrowing at L4-5, along with other milder disc bulges throughout the lumbar spine  - No indication for steroids at this time  - C/w baclofen 5 mg BID for muscle spasms  - Appreciate ortho spine consult, currently NPO, f/u current plans for surgery  - Pain control PRN with Tylenol/Oxycodone/Dilaudid with baclofen 5 mg BID. Patient has not had opiates prescribed since October 2022 ([facility] #[id number])  - F/u MRI C/T/L spines without contrast  - WBAT, PT eval    #Preoperative Clearance  - Patient's RCRI is 2-3 (possible ischemic disease with poor R wave progression on EKG)  - Patient has a moderate risk of adverse cardiac event during surgery, with a 10.1% 30-day risk of death, MI, or cardiac arrest  - Given this, would have cardiology evaluation prior to surgery  - Repeat BMP after IVFs to evaluate improvement in creatinine from likely prerenal ANTONIO    #Peripheral Neuropathy / History of Guillain-[place]  - C/w duloxetine 60 mg QD and gabapentin 600 mg Q8H    #Type 2 Diabetes Mellitus  - Per patient (and confirmed by Dr First Jamison), patient was on alogliptin 12.5 mg QD  - However, on patient's physical med prescriptions he brought with him, it is not listed. Suspect that it was either (purposely or mistakenly) dropped after recent hospital admission  - Will need to determine from PCP whether patient needs to continue outpatient or not  - While inpatient, check FS with low dose SSI Q6H while NPO (or QAC + HS)  - Check A1C    #History of DVT  - Per patient, he had LE DVTs in 2016 (in the setting of prolonged intubation from Guillain-[place]) and reportedly has a history of "a blood clot disorder"  - Per patient, he also has an IVC filter  - Has been on warfarin, but is non-compliant and subtherapeutic  - Heparin gtt for now (no warfarin), no loading dose, in case of surgery  - Will need to determine if will continue A/C as an outpatient    #BPH  - With urinary incontinence in the setting of DDD  - C/w finasteride 5 mg QD and tamsulosin equivalent of alfuzosin 10 mg QD  - Of note, patient says he was recently on abx for UTI and was diagnosed with VRE, unsure if he currently has dysuria  - Check UA and urine cx, f/u PVR    #HTN  - C/w hydralazine 25 mg QID (appears to have been on 100 mg QD until recent admission to [place] one month ago)  - C/w amlodipine 10 mg QD  - C/w clonidine 0.1 mg BID    #ANTONIO Superimposed on CKD  - ANTONIO on CKD3, creatinine 2.36 on admission, was 2.0 several days prior and baseline 1.5 in December  - Likely prerenal  - S/p IVF bolus and continue maintenance for now  - F/u BMP    #Encounter for Smoking Cessation Counseling  - Patient continues to smoke heavily. Patient is not interested in quitting  - However, he is agreeable to nicotine patch and gum    #Prophylactic Measure  - DVT PPX: heparin gtt  - Diet: consistent carbs/DASH  - Dispo: pending improvement in sxs, surgical plan  
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    #Intractable Low Back Pain / Inability to Walk / Degenerative Disc Disease (Lumbar), progression of lumbar spine cord compression  -transferred to outside facility for neuraxial imaging due to body habitus/MRI bore diameter - however only MRI LSpine was performed  -MRI LSpine reviewed with L4/5 severe central stenosis, L1/2 central HNP without nerve root compression  -s/p Fusion of posterior lumbar spine 4/5 and decompression with bilateral mayank laminectomy on 3/15  -DEEPAK in place  -pain management/muscle relaxers/bowel regimen   -No indication for steroids at this time  -Appreciate ortho spine consult-Patient has not had opiates prescribed since October 2022 ([facility] #[id number])    #Hx of APLS, Hx of DVT s/p IVC filter placement  -high risk for VTE   -currently OFF AC, no AC until POD#5, per ortho   -plan to resume AC, will need to be on Coumadin   -Has been on warfarin, but is non-compliant and subtherapeutic  -heme/onc consult and recommendations noted     #ANTONIO Superimposed on CKD  -gentle hydration   -baseline Cr~1.5  -stable     #Dysuria.  UTI, hx VRE  s/p IV Unasyn  s/p Augmentin, finished course on 03/16    #Peripheral Neuropathy / History of Guillain-[place]  -C/w duloxetine 60 mg QD and gabapentin 600 mg Q8H    #Type 2 Diabetes Mellitus  -A1c: 6.8  -RISS   -target BGM <180    #BPH  -With urinary incontinence in the setting of DDD  -C/w finasteride 5 mg QD   -Flomax 0.4mg   -ayala d/c'ed: 03/18    #HTN  -C/w hydralazine 25 mg QID (appears to have been on 100 mg QD until recent admission to [place] one month ago)  -C/w amlodipine 10 mg QD  -C/w clonidine 0.1 mg BID  #Encounter for Smoking Cessation Counseling  -Patient continues to smoke heavily. Patient is not interested in quitting  -However, he is agreeable to nicotine patch and gum    #Prophylactic Measure  -SubQ Heparin   -Resume Coumadin when OK with ortho team   -AC consult and recommendations noted     Dispo  -discharge tomorrow                 
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    #Intractable Low Back Pain / Inability to Walk / Degenerative Disc Disease (Lumbar), progression of lumbar spine cord compression  -transferred to outside facility for neuraxial imaging due to body habitus/MRI bore diameter - however only MRI LSpine was performed  -MRI LSpine reviewed with L4/5 severe central stenosis, L1/2 central HNP without nerve root compression  -s/p Fusion of posterior lumbar spine 4/5 and decompression with bilateral mayank laminectomy on 3/15  -DEEPAK in place  -pain management/muscle relaxers/bowel regimen   -No indication for steroids at this time  -Appreciate ortho spine consult-Patient has not had opiates prescribed since October 2022 ([facility] #[id number])    #Hx of APLS, Hx of DVT s/p IVC filter placement  -high risk for VTE   -currently OFF AC, no AC until POD#5, per ortho   -plan to resume AC, will need to be on Coumadin   -Has been on warfarin, but is non-compliant and subtherapeutic  -heme/onc consult and recommendations noted     #ANTONIO Superimposed on CKD  -gentle hydration   -baseline Cr~1.5  -improving     #Dysuria.  UTI, hx VRE  s/p IV Unasyn  s/p Augmentin, finished course on 03/16    #Peripheral Neuropathy / History of Guillain-[place]  -C/w duloxetine 60 mg QD and gabapentin 600 mg Q8H    #Type 2 Diabetes Mellitus  -A1c: 6.8  -RISS   -target BGM <180    #BPH  -With urinary incontinence in the setting of DDD  -C/w finasteride 5 mg QD   -Flomax 0.4mg   -ayala d/c'ed: 03/18    #HTN  -C/w hydralazine 25 mg QID (appears to have been on 100 mg QD until recent admission to [place] one month ago)  -C/w amlodipine 10 mg QD  -C/w clonidine 0.1 mg BID        #Encounter for Smoking Cessation Counseling  -Patient continues to smoke heavily. Patient is not interested in quitting  -However, he is agreeable to nicotine patch and gum    #Prophylactic Measure  -SCDs for now     Dispo  -plan to discharge to [facility]   -resume AC, POD#5, per ortho for APLS/Hx of DVT               
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    #Intractable Low Back Pain / Inability to Walk / Degenerative Disc Disease (Lumbar)  - Patient with acute on chronic back spasms and worsening lumbar back pain, likely radicular  - CT lumbar spine shows DD with severe canal stenosis and moderate bilateral neural foraminal narrowing at L4-5, along with other milder disc bulges throughout the lumbar spine  - No indication for steroids at this time  - C/w baclofen 5 mg BID for muscle spasms  - Appreciate ortho spine consult, currently NPO, f/u current plans for surgery  - Pain control PRN with Tylenol/Oxycodone/Dilaudid with baclofen 5 mg BID. Patient has not had opiates prescribed since October 2022 ([facility]STOP #[id number])  - F/u MRI C/T/L spines without contrast  - WBAT, PT eval    #Preoperative Clearance  - Patient's RCRI is 2-3 (possible ischemic disease with poor R wave progression on EKG)  - Patient has a moderate risk of adverse cardiac event during surgery, with a 10.1% 30-day risk of death, MI, or cardiac arrest  - Given this, would have cardiology evaluation prior to surgery  - Repeat BMP after IVFs to evaluate improvement in creatinine from likely prerenal ANTONIO    #Peripheral Neuropathy / History of Guillain-[place]  - C/w duloxetine 60 mg QD and gabapentin 600 mg Q8H    #Type 2 Diabetes Mellitus  - Per patient (and confirmed by Dr First Jamison), patient was on alogliptin 12.5 mg QD  - However, on patient's physical med prescriptions he brought with him, it is not listed. Suspect that it was either (purposely or mistakenly) dropped after recent hospital admission  - Will need to determine from PCP whether patient needs to continue outpatient or not  - While inpatient, check FS with low dose SSI Q6H while NPO (or QAC + HS)  - Check A1C    #History of DVT  - Per patient, he had LE DVTs in 2016 (in the setting of prolonged intubation from Guillain-[place]) and reportedly has a history of "a blood clot disorder"  - Per patient, he also has an IVC filter  - Has been on warfarin, but is non-compliant and subtherapeutic  - Heparin gtt for now (no warfarin), no loading dose, in case of surgery  - Will need to determine if will continue A/C as an outpatient    #BPH  - With urinary incontinence in the setting of DDD  - C/w finasteride 5 mg QD and tamsulosin equivalent of alfuzosin 10 mg QD  - Of note, patient says he was recently on abx for UTI and was diagnosed with VRE, unsure if he currently has dysuria  - Check UA and urine cx, f/u PVR    #HTN  - C/w hydralazine 25 mg QID (appears to have been on 100 mg QD until recent admission to [place] one month ago)  - C/w amlodipine 10 mg QD  - C/w clonidine 0.1 mg BID    #ANTONIO Superimposed on CKD  - ANTONIO on CKD3, creatinine 2.36 on admission, was 2.0 several days prior and baseline 1.5 in December  - Likely prerenal  - S/p IVF bolus and continue maintenance for now  - F/u BMP    #Encounter for Smoking Cessation Counseling  - Patient continues to smoke heavily. Patient is not interested in quitting  - However, he is agreeable to nicotine patch and gum    #Prophylactic Measure  - DVT PPX: heparin gtt  - Diet: consistent carbs/DASH  - Dispo: pending improvement in sxs, surgical plan    
47 yo M who follows with my colleague Dr. 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     #  h/o APLS  - pt with positive lupus anticoagulant in the past with h/o DVT, previous h/o IVC filter placement and CVA  - pt is high risk for recurrence of DVT given body habitus, previous clotting history and immobility  - held heparin gtt now s/p spinal decompression of L4-5  - would recommend re-initiating hep gtt following surgery when safe as per ortho team   - will then plan for transition back to coumadin 5 mg qd thereafter- pt has been on coumadin for the past 6 years and follows with DR. Devon Avery outpatient for continued monitoring   - will need to trend INR daily  - pt has [facility] come to house to monitor INR set up by his pmd     will continue to follow   
      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    #Intractable Low Back Pain / Inability to Walk / Degenerative Disc Disease (Lumbar)  #progression of lumbar spine cord compression    NPO after midnight patient declined OR today , plan by ortho for OR tmrw   no acute medical contraindication for proceeding with OR   - Patient has a moderate risk of adverse cardiac event during surgery, with a 10.1% 30-day risk of death, MI, or cardiac arrest   transferred to outside facility for neuraxial imaging due to body habitus/MRI bore diameter - however only MRI LSpine was performed  MRI LSpine reviewed with L4/5 severe central stenosis, L1/2 central HNP without nerve root compression   No indication for steroids at this time  - C/w baclofen 5 mg BID for muscle spasms  - Appreciate ortho spine consult, currently NPO, f/u current plans for surgery  - Pain control PRN with Tylenol/Oxycodone/Dilaudid with baclofen 5 mg BID. Patient has not had opiates prescribed since October 2022 ([facility] #[id number])        #History of DVT  - Per patient, he had LE DVTs in 2016 (in the setting of prolonged intubation from Guillain-[place]) and reportedly has a history of "a blood clot disorder"  - Per patient, he also has an IVC filter  - Has been on warfarin, but is non-compliant and subtherapeutic  - Heparin gtt for now (no warfarin), no loading dose, in lieu of OR tmrw   - Will need to determine if will continue A/C as an outpatient and whether pt needs to continue bridging as inpatient - heme consult    #ANTONIO Superimposed on CKD  - ANTONIO on CKD3, creatinine 2.36 on admission, was 2.0 several days prior and baseline 1.5 in December  - Likely prerenal  on IVF while NPO  Cr at baseline - continue monitoring     #  Dysuria.  UTI, hx VRE  s/p IV  unasyn now switch to  to augmentin complete 3 more days then stop  .recent UTI [place] [facility] - was on 2 days augmentin       #Peripheral Neuropathy / History of Guillain-[place]  - C/w duloxetine 60 mg QD and gabapentin 600 mg Q8H    #Type 2 Diabetes Mellitus  - Per patient (and confirmed by Dr First Jamison), patient was on alogliptin 12.5 mg QD  - However, on patient's physical med prescriptions he brought with him, it is not listed. Suspect that it was either (purposely or mistakenly) dropped after recent hospital admission  - Will need to determine from PCP whether patient needs to continue outpatient or not  - While inpatient, check FS with low dose SSI Q6H while NPO (or QAC + HS)  A1c 6.8      #BPH  - With urinary incontinence in the setting of DDD  - C/w finasteride 5 mg QD and tamsulosin equivalent of alfuzosin 10 mg QD  - Of note, patient says he was recently on abx for UTI and was diagnosed with VRE, unsure if he currently has dysuria  - Check UA and urine cx, f/u PVR    #HTN  - C/w hydralazine 25 mg QID (appears to have been on 100 mg QD until recent admission to [facility] one month ago)  - C/w amlodipine 10 mg QD  - C/w clonidine 0.1 mg BID        #Encounter for Smoking Cessation Counseling  - Patient continues to smoke heavily. Patient is not interested in quitting  - However, he is agreeable to nicotine patch and gum    #Prophylactic Measure  - DVT PPX: heparin gtt    Dispo- plan for OR by Ortho 3/15 for spinal cord decompression, on heparin gtt to be stopped 6 to 8 hrs prior to OR determined by ortho-ortho aware, f/u heme for recs on AC, on augmentin for UTI         
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 He is non-compliant with his Warfarin, stating he takes it about 3-4 days per week.who p/w worsening back and leg pain and spasms and inability to ambulate. He has had off-and-on lower back pain with peripheral LE neuropathy since 2016 when he had Guillain-Barré and a CVA with R sided deficits in 2016. Normally, he can walk "shaky with a walker."    now with lumbar cord compression and s/p surgical decompression of L4-5  Consulted by Dr. Deyvi peter   for VTE prophylaxis, risk stratification, and anticoagulation management.  pt is high risk for recurrence of DVT due to obesity, previous clotting history and immobility, and recent surgery discussed in length about the high risk of clotting and bleeding,  patient is high risk for bleeding due to recent spine surgery  ortho wants to hold full anticoagulation till PO day 10 patient is aware of the above     Plan    Continue  Heparin 5,000 units SQ Q8hour resume coumadin when ok with ortho   Daily CBC/BMP  Enc ambulation  Venodynes   will f/u  Dispo:rehab Pt will f/u with his PCP out patient for Coumadin mgt     
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    #Intractable Low Back Pain / Inability to Walk / Degenerative Disc Disease (Lumbar), progression of lumbar spine cord compression  -transferred to outside facility for neuraxial imaging due to body habitus/MRI bore diameter - however only MRI LSpine was performed  -MRI LSpine reviewed with L4/5 severe central stenosis, L1/2 central HNP without nerve root compression  -s/p Fusion of posterior lumbar spine 4/5 and decompression with bilateral mayank laminectomy on 3/15  -DEEPAK in place  -pain management/muscle relaxers/bowel regimen   -No indication for steroids at this time  -Appreciate ortho spine consult-Patient has not had opiates prescribed since October 2022 ([facility] #[id number])    #Hx of APLS, Hx of DVT s/p IVC filter placement  -high risk for VTE   -currently OFF AC, no AC until POD#5, per ortho   -plan to resume AC, will need to be on Coumadin   -Has been on warfarin, but is non-compliant and subtherapeutic  -heme/onc consult and recommendations noted     #ANTONIO Superimposed on CKD  -gentle hydration   -baseline Cr~1.5  -improving     #Dysuria.  UTI, hx VRE  s/p IV Unasyn  s/p Augmentin, finished course on 03/16    #Peripheral Neuropathy / History of Guillain-[place]  -C/w duloxetine 60 mg QD and gabapentin 600 mg Q8H    #Type 2 Diabetes Mellitus  -A1c: 6.8  -RISS   -target BGM <180    #BPH  -With urinary incontinence in the setting of DDD  -C/w finasteride 5 mg QD   -Flomax 0.4mg   -ayala d/c'ed: 03/18    #HTN  -C/w hydralazine 25 mg QID (appears to have been on 100 mg QD until recent admission to [place] [facility] one month ago)  -C/w amlodipine 10 mg QD  -C/w clonidine 0.1 mg BID        #Encounter for Smoking Cessation Counseling  -Patient continues to smoke heavily. Patient is not interested in quitting  -However, he is agreeable to nicotine patch and gum    #Prophylactic Measure  -SCDs for now     Dispo  -plan to discharge to [facility]   -resume AC, POD#5, per ortho for APLS/Hx of DVT               
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    #Intractable Low Back Pain / Inability to Walk / Degenerative Disc Disease (Lumbar), progression of lumbar spine cord compression  -transferred to outside facility for neuraxial imaging due to body habitus/MRI bore diameter - however only MRI LSpine was performed  -MRI LSpine reviewed with L4/5 severe central stenosis, L1/2 central HNP without nerve root compression  -s/p Fusion of posterior lumbar spine 4/5 and decompression with bilateral mayank laminectomy on 3/15  -DEEPAK in place  -pain management/muscle relaxers/bowel regimen   -No indication for steroids at this time  -Appreciate ortho spine consult-Patient has not had opiates prescribed since October 2022 ([facility] #[id number])    #Hx of APLS, Hx of DVT s/p IVC filter placement  -high risk for VTE   -currently OFF AC, no AC until POD#5, per ortho   -plan to resume AC, will need to be on Coumadin   -Has been on warfarin, but is non-compliant and subtherapeutic  -heme/onc consult and recommendations noted     #ANTONIO Superimposed on CKD  -gentle hydration   -baseline Cr~1.5  -stable     #Dysuria.  UTI, hx VRE  s/p IV Unasyn  s/p Augmentin, finished course on 03/16    #Peripheral Neuropathy / History of Guillain-[place]  -C/w duloxetine 60 mg QD and gabapentin 600 mg Q8H    #Type 2 Diabetes Mellitus  -A1c: 6.8  -RISS   -target BGM <180    #BPH  -With urinary incontinence in the setting of DDD  -C/w finasteride 5 mg QD   -Flomax 0.4mg   -ayala d/c'ed: 03/18    #HTN  -C/w hydralazine 25 mg QID (appears to have been on 100 mg QD until recent admission to [place] one month ago)  -C/w amlodipine 10 mg QD  -C/w clonidine 0.1 mg BID  #Encounter for Smoking Cessation Counseling  -Patient continues to smoke heavily. Patient is not interested in quitting  -However, he is agreeable to nicotine patch and gum    #Prophylactic Measure  -SubQ Heparin   -Resume Coumadin when OK with ortho team   -AC consult and recommendations noted     Dispo  -discharge plan to [facility]                 
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    #Intractable Low Back Pain / Inability to Walk / Degenerative Disc Disease (Lumbar), progression of lumbar spine cord compression  -transferred to outside facility for neuraxial imaging due to body habitus/MRI bore diameter - however only MRI LSpine was performed  -MRI LSpine reviewed with L4/5 severe central stenosis, L1/2 central HNP without nerve root compression  -s/p Fusion of posterior lumbar spine 4/5 and decompression with bilateral mayank laminectomy on 3/15  -EDEPAK in place  -pain management/muscle relaxers/bowel regimen   -No indication for steroids at this time  -Appreciate ortho spine consult-Patient has not had opiates prescribed since October 2022 ([facility] #[id number])    #Hx of APLS, Hx of DVT s/p IVC filter placement  -high risk for VTE   -currently OFF AC, no AC until POD#5, per ortho   -plan to resume AC, will need to be on Coumadin   -Has been on warfarin, but is non-compliant and subtherapeutic  -heme/onc consult and recommendations noted           #ANTONIO Superimposed on CKD  -gentle hydration   -baseline Cr~1.5  -improving     #Dysuria.  UTI, hx VRE  s/p IV Unasyn  s/p Augmentin, finished course on 03/16    #Peripheral Neuropathy / History of Guillain-[place]  -C/w duloxetine 60 mg QD and gabapentin 600 mg Q8H    #Type 2 Diabetes Mellitus  -A1c: 6.8  -RISS   -target BGM <180    #BPH  -With urinary incontinence in the setting of DDD  -C/w finasteride 5 mg QD   -Flomax 0.4mg   -[place] in place   -will do TOV     #HTN  -C/w hydralazine 25 mg QID (appears to have been on 100 mg QD until recent admission to [place] one month ago)  -C/w amlodipine 10 mg QD  -C/w clonidine 0.1 mg BID        #Encounter for Smoking Cessation Counseling  -Patient continues to smoke heavily. Patient is not interested in quitting  -However, he is agreeable to nicotine patch and gum    #Prophylactic Measure  -SCDs for now     Dispo  -plan to discharge to [facility]   -resume AC, POD#5, per ortho for APLS/Hx of DVT

## 2023-03-22 NOTE — PROGRESS NOTE ADULT - PROVIDER SPECIALTY LIST ADULT
Anticoag Management
Hospitalist
Orthopedics
Heme/Onc
Hospitalist
Orthopedics
Anticoag Management
Hospitalist
Hospitalist
Neurology
Orthopedics
Orthopedics
Heme/Onc
Hospitalist
Hospitalist

## 2023-03-22 NOTE — PROGRESS NOTE ADULT - SUBJECTIVE AND OBJECTIVE BOX
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 He has had off-and-on lower back pain with peripheral LE neuropathy since 2016 when he had Guillain-Barré and a CVA with R sided deficits in 2016. Normally, he can walk "shaky with a walker." However, over the past 3 weeks, his symptoms have gotten worse, with frequent burning pain in his lower back and spasms. His symptoms are worse when he lies flat or when he tries to transition to his walker. Therefore, he has had marked difficulty ambulating. He has baseline urinary incontinence and uses Pull-ups. He has some improved bowel continence, but cannot hold his bowels in for long. Those symptoms are not new. He denies CP, cough, N/V, abdominal pain, CP, or SOB.  He went to [facility] 2 nights ago and was "in the ER for 30 hours" not moving. His pain worsened even more after discharge.    He also notes a history of VRE and was recently (1 week ago) prescribed Augmentin, which he only took 2 days of. He says when he goes to [facility] he is "always on isolation," because of his history of VRE. He is not sure if he has dysuria currently, but he had it when he was prescribed antibiotics last week.  He also is on Warfarin for an unknown "blood clotting disorder," stemming from a blood clot in 2016 (of note, he was intubated at that time for Guillain-[place] and suffered a CVA during that time), but he has not had a blood clot since then. He states he has an IVC filter. He is non-compliant with his Warfarin, stating he takes it about 3-4 days per week.    In the ED, he was given Flexeril 10 mg PO x1 and  mg x1 with Morphine 4 mg IV x1 ordered. (11 Mar 2023 23:39)      Patient is a 48y old  Male who presents with a chief complaint of inability to walk, intractable back pain (20 Mar 2023 08:54)      Consulted by Dr. Deyvi peter   for VTE prophylaxis, risk stratification, and anticoagulation management.    PAST MEDICAL & SURGICAL HISTORY:  BPH (benign prostatic hyperplasia)      HTN (hypertension)      Type 2 diabetes mellitus      Peripheral neuropathy      History of Guillain-[place] syndrome      History of CVA in adulthood      DDD (degenerative disc disease), lumbar      DVT, lower extremity      S/P IVC filter    3/20/2023: patient seen at bedside discussed the resumption of anticoagulation, , patienr was paced on Coumadin 6 yrs ago due to APS and positive DVT, patient gets [place] lab draw at home from his PCP, usually takes Coumadin 3mg daily, discussed the high risk of bleeding and clotting ortho is ok to start heparin SQ today   3/21/2023: patient seen at bedside heparin started yesterday tolerating well no concerns   3-: Pt seen at bedside on [facility].  Discussed his anticoagulation with heparin for now then switching back to his coumaidn after 10 days as per neuro surg.  Pt v/u the need but kept his eyes closed during visit.        CrCl:78.5  BMI:    Caprini VTE Risk Score:  AGE RELATED RISK FACTORS                                                       MOBILITY RELATED FACTORS  [x ] Age 41-60 years                                            (1 Point)                  [ ] Bed rest /restricted mobility                             (1 Point)  [ ] Age: 61-74 years                                           (2 Points)                [ ] Plaster cast                                                   (2 Points)  [ ] Age= 75 years                                              (3 Points)                 [ ] Bed bound for more than 72 hours                   (2 Points)    DISEASE RELATED RISK FACTORS                                               GENDER SPECIFIC FACTORS  [ ] Edema in the lower extremities                       (1 Point)           [ ] Pregnancy                                                            (1 Point)  [ ] Varicose veins                                               (1 Point)                  [ ] Post-partum < 6 weeks                                      (1 Point)             [x ] BMI > 25 Kg/m2                                            (1 Point)                  [ ] Hormonal therapy or oral contraception       (1 Point)                 [ ] Sepsis (in the previous month)                        (1 Point)             [ ] History of pregnancy complications                (1Point)  [ ] Pneumonia or serious lung disease                                             [ ] Unexplained or recurrent  (=/>3), premature                                 (In the previous month)                               (1 Point)                birth with toxemia or growth-restricted infant (1 Point)  [ ] Abnormal pulmonary function test            (1 Point)                                   SURGERY RELATED RISK FACTORS  [ ] Acute myocardial infarction                       (1 Point)                  [ ]  Section                                         (1 Point)  [ ] Congestive heart failure (in the previous month) (1 Point)   [ ] Minor surgery   lasting <45 minutes       (1 Point)   [ ] Inflammatory bowel disease                             (1 Point)          [ ] Arthroscopic surgery                                  (2 Points)  [ ] Central venous access                                    (2 Points)            [x ] General surgery lasting >45 minutes      (2 Points)       [ ] Stroke (in the previous month)                  (5 Points)            [ ] Elective major lower extremity arthroplasty (5 Points)                                   [  ] Malignancy (present or past include skin melanoma                                          but exclude  basal skin cell)    (2 points)                                      TRAUMA RELATED RISK FACTORS                HEMATOLOGY RELATED FACTORS                                  [ ] Fracture of the hip, pelvis, or leg                       (5 Points)  [x ] Prior episodes of VTE                                     (3 Points)          [ ] Acute spinal cord injury (in the previous month)  (5 Points)  [ ] Positive family history for VTE                         (3 Points)       [ ] Paralysis (less than 1 month)                          (5 Points)  [ ] Prothrombin 02323 A                                      (3 Points)         [ ] Multiple Trauma (within 1month)                 (5Points)                                                                                                                                                                [ ] Factor V Leiden                                          (3 Points)                                OTHER RISK FACTORS                          [x ] Lupus anticoagulants                                     (3 Points)                       [ ] BMI > 40                          (1 Point)                                                         [ ] Anticardiolipin antibodies                                (3 Points)                   [ ] Smoking                              (1Point)                                                [ ] High homocysteine in the blood                      (3 Points)                [  ] Diabetes requiring insulin (1point)                         [ ] Other congenital or acquired thrombophilia       (3 Points)          [  ] Chemotherapy                   (1 Point)  [ ] Heparin induced thrombocytopenia                  (3 Points)             [  ] Blood Transfusion                (1 point)                                                                                                             Total Score [  10        ]                                                                                                                                                                                                                                                                                                                                                                                                                                         IMPROVE Bleeding Risk Score:3.5      Falls Risk:   High ( x )  Mod (  )  Low (  )      FAMILY HISTORY:  FH: heart disease    FH: HTN (hypertension)      Denies any personal or familial history of clotting or bleeding disorders.    Allergies    sulfa drugs (Hives)  sulfa drugs (Unknown)    Intolerances        REVIEW OF SYSTEMS    (  )Fever	     (  )Constipation	(  )SOB				(  )Headache	(  )Dysuria  (  )Chills	     (  )Melena	(  )Dyspnea present on exertion	                    (  )Dizziness                    (  )Polyuria  (  )Nausea	     (  )Hematochezia	(  )Cough			                    (  )Syncope   	(  )Hematuria  (  )Vomiting    (  )Chest Pain	(  )Wheezing			(x  )Weakness  (  )Diarrhea     (  )Palpitations	(  )Anorexia			( x )joint pain    All  other review of systems negative: Yes    Vital Signs Last 24 Hrs  T(C): 37.1 (23 @ 04:21), Max: 37.3 (23 @ 00:00)  T(F): 98.8 (23 @ 04:21), Max: 99.1 (23 @ 00:00)  HR: 69 (23 @ 04:21) (69 - 81)  BP: 122/67 (23 @ 04:21) (112/62 - 144/69)  BP(mean): 87 (23 @ 20:46) (87 - 87)  RR: 17 (23 @ 04:21) (17 - 18)  SpO2: 98% (23 @ 04:21) (97% - 100%)    PHYSICAL EXAM:    Constitutional: Appears Well    Neurological: A& O x 3    Skin: Warm    Respiratory and Thorax: normal effort; Breath sounds: normal; No rales/wheezing/rhonchi  	  Cardiovascular: S1, S2, regular, NMBR	    Gastrointestinal: BS + x 4Q, nontender	    Genitourinary:  Bladder nondistended, nontender    Musculoskeletal:   General Right:   no muscle/joint tenderness,   normal tone, no joint swelling,   ROM: limited	    General Left:   no muscle/joint tenderness,   normal tone, no joint swelling,   ROM: limited    Back  Dressing CDI;              Lower extrems:   Right: no calf tenderness              negative agnes's sign               + pedal pulses    Left:   no calf tenderness              negative agnes's sign               + pedal pulses                                   8.9    9.72  )-----------( 229      ( 22 Mar 2023 08:06 )             27.4                      PTT - ( 22 Mar 2023 08:06 )  PTT:27.8 sec             9.0    10.19 )-----------( 221      ( 20 Mar 2023 08:42 )             28.7       03-20    139  |  109<H>  |  36<H>  ----------------------------<  180<H>  3.5   |  22  |  1.98<H>    Ca    8.1<L>      20 Mar 2023 08:42      PT/INR - ( 22 Mar 2023 08:06 )   PT: 14.0 sec;   INR: 1.20 ratio    PT/INR - ( 20 Mar 2023 08:42 )   PT: 13.9 sec;   INR: 1.20 ratio         PTT - ( 20 Mar 2023 08:42 )  PTT:28.0 sec				    MEDICATIONS  (STANDING):  amLODIPine   Tablet 10 milliGRAM(s) Oral daily  ascorbic acid 500 milliGRAM(s) Oral two times a day  baclofen 5 milliGRAM(s) Oral every 12 hours  cloNIDine 0.1 milliGRAM(s) Oral two times a day  cyclobenzaprine 10 milliGRAM(s) Oral every 8 hours  dextrose 5%. 1000 milliLiter(s) IV Continuous <Continuous>  dextrose 5%. 1000 milliLiter(s) IV Continuous <Continuous>  dextrose 50% Injectable 25 Gram(s) IV Push once  dextrose 50% Injectable 12.5 Gram(s) IV Push once  dextrose 50% Injectable 25 Gram(s) IV Push once  dextrose 50% Injectable 25 Gram(s) IV Push once  dextrose 50% Injectable 12.5 Gram(s) IV Push once  dextrose 50% Injectable 25 Gram(s) IV Push once  DULoxetine 60 milliGRAM(s) Oral daily  finasteride 5 milliGRAM(s) Oral daily  folic acid 1 milliGRAM(s) Oral daily  gabapentin 600 milliGRAM(s) Oral every 8 hours  glucagon  Injectable 1 milliGRAM(s) IntraMuscular once  glucagon  Injectable 1 milliGRAM(s) IntraMuscular once  heparin   Injectable 5000 Unit(s) SubCutaneous every 8 hours  hydrALAZINE 25 milliGRAM(s) Oral four times a day  insulin lispro (ADMELOG) corrective regimen sliding scale   SubCutaneous three times a day before meals  insulin lispro (ADMELOG) corrective regimen sliding scale   SubCutaneous at bedtime  lactated ringers. 1000 milliLiter(s) IV Continuous <Continuous>  lactated ringers. 1000 milliLiter(s) IV Continuous <Continuous>  lactated ringers. 1000 milliLiter(s) IV Continuous <Continuous>  multivitamin 1 Tablet(s) Oral daily  nicotine -  14 mG/24Hr(s) Patch 1 Patch Transdermal daily  oxyCODONE  ER Tablet 15 milliGRAM(s) Oral every 12 hours  pantoprazole    Tablet 40 milliGRAM(s) Oral before breakfast  senna 2 Tablet(s) Oral at bedtime  sodium chloride 0.9%. 1000 milliLiter(s) IV Continuous <Continuous>  tamsulosin 0.4 milliGRAM(s) Oral at bedtime          DVT Prophylaxis:  LMWH                   (  )  Heparin SQ           (  )  Coumadin             (  )  Xarelto                  (  )  Eliquis                   (  )  Venodynes           (  )  Ambulation          (  )  UFH                       (  )  Contraindicated  (  )  EC ASPIRIN       (  )

## 2023-03-22 NOTE — DISCHARGE NOTE NURSING/CASE MANAGEMENT/SOCIAL WORK - NSDCPEFALRISK_GEN_ALL_CORE
For information on Fall & Injury Prevention, visit: [email or web address] OR  [email or web address] OR  https://www.cdc.gov/steadi/patient.html

## 2023-03-22 NOTE — PROGRESS NOTE ADULT - REASON FOR ADMISSION
inability to walk, intractable back pain

## 2023-03-22 NOTE — PROGRESS NOTE ADULT - SUBJECTIVE AND OBJECTIVE BOX
Patient seen and examined at bedside. Pain well controlled with medication. Notes that he has been ambulating well with PT and his symptomatology, both weakness and paresthesias, have been improving. Patient denies fevers, chills, saddle anesthesia, bowel or bladder incontinence, leg pain, numbness, weakness, or any other orthopaedic complaint. Pt stating would like to go to [place] for rehabilitation if possible.     PE:  Vital Signs Last 24 Hrs  T(C): 37.1 (22 Mar 2023 04:21), Max: 37.3 (22 Mar 2023 00:00)  T(F): 98.8 (22 Mar 2023 04:21), Max: 99.1 (22 Mar 2023 00:00)  HR: 69 (22 Mar 2023 04:21) (69 - 81)  BP: 122/67 (22 Mar 2023 04:21) (112/62 - 144/69)  BP(mean): 87 (21 Mar 2023 20:46) (87 - 87)  RR: 17 (22 Mar 2023 04:21) (17 - 18)  SpO2: 98% (22 Mar 2023 04:21) (97% - 100%)    Parameters below as of 22 Mar 2023 04:21  Patient On (Oxygen Delivery Method): room air                          8.9    9.72  )-----------( 229      ( 22 Mar 2023 08:06 )             27.4         PT/INR - ( 21 Mar 2023 08:20 )   PT: 14.2 sec;   INR: 1.22 ratio         PTT - ( 21 Mar 2023 08:20 )  PTT:27.6 sec        Spine:  Dressing in place, c/d/i  Sonja-incisional TTP; otherwise, NTTP throughout   Negative Burgos, Negative Babinski, positive L ankle clonus  Radial, DP pulses palpable.  No calf tenderness bilaterally.  Compartments soft and compressible.     Motor:                   Elbow Flex     Wrist Ext      Elbow Ext      Finger Flex     Finger Abd               R                   5/5                 5/5                 5/5                  5/5                 5/5  L                    5/5                 5/5                 5/5                  5/5                 5/5              Hip Flex     Knee Ext     Ankle Dorsi     Hallux Ext     Ankle Plant  R            5/5              5/5               1/5                    1/5                5/5  L             5/5             5/5                5/5                   5/5                 5/5    Sensory:            C5         C6         C7      C8       T1        (0=absent, 1=impaired, 2=normal, NT=not testable)  R         2            2           2        2         2  L          2            2           2        2         2               L2          L3         L4      L5       S1         (0=absent, 1=impaired, 2=normal, NT=not testable)  R         2            2            2        1        1  L          2            2           2        2         2         A/P:  48y m s/p L4-5 lami and PSF POD 7  -PT/OT   -WBAT  -Pain Control  - Drains DC'd 3/17  -DVT ppx: resumed subq Heparin 3/20--> per Dr. Mills, rec subq heparin and coumadin restarted POD10. Per primary team due to kidney disease and APLS contra-indicate DOACs; Risk of bleeding too elevated w/ hep gtt  -FU AM Labs  -Incentive Spirometry  -Medical management appreciated  - Dispo - rehab   - ortho stable for dc

## 2023-03-22 NOTE — DISCHARGE NOTE NURSING/CASE MANAGEMENT/SOCIAL WORK - PATIENT PORTAL LINK FT
You can access the FollowMyHealth Patient Portal offered by [facility] by registering at the following website: [email or web address]. By joining [employer]’s FollowMyHealth portal, you will also be able to view your health information using other applications (apps) compatible with our system.

## 2023-03-24 ENCOUNTER — EMERGENCY (EMERGENCY)
Facility: HOSPITAL | Age: 48
LOS: 0 days | Discharge: HOME | End: 2023-03-24
Attending: EMERGENCY MEDICINE
Payer: MEDICAID

## 2023-03-24 VITALS
RESPIRATION RATE: 18 BRPM | SYSTOLIC BLOOD PRESSURE: 110 MMHG | HEART RATE: 89 BPM | DIASTOLIC BLOOD PRESSURE: 70 MMHG | TEMPERATURE: 98 F | OXYGEN SATURATION: 99 %

## 2023-03-24 VITALS
RESPIRATION RATE: 18 BRPM | OXYGEN SATURATION: 99 % | WEIGHT: 309.97 LBS | HEIGHT: 72 IN | HEART RATE: 122 BPM | SYSTOLIC BLOOD PRESSURE: 107 MMHG | TEMPERATURE: 98 F | DIASTOLIC BLOOD PRESSURE: 66 MMHG

## 2023-03-24 DIAGNOSIS — Z86.73 PERSONAL HISTORY OF TRANSIENT ISCHEMIC ATTACK (TIA), AND CEREBRAL INFARCTION WITHOUT RESIDUAL DEFICITS: ICD-10-CM

## 2023-03-24 DIAGNOSIS — M21.371 FOOT DROP, RIGHT FOOT: ICD-10-CM

## 2023-03-24 DIAGNOSIS — Z86.718 PERSONAL HISTORY OF OTHER VENOUS THROMBOSIS AND EMBOLISM: ICD-10-CM

## 2023-03-24 DIAGNOSIS — E66.9 OBESITY, UNSPECIFIED: ICD-10-CM

## 2023-03-24 DIAGNOSIS — Z20.822 CONTACT WITH AND (SUSPECTED) EXPOSURE TO COVID-19: ICD-10-CM

## 2023-03-24 DIAGNOSIS — M51.36 OTHER INTERVERTEBRAL DISC DEGENERATION, LUMBAR REGION: ICD-10-CM

## 2023-03-24 DIAGNOSIS — N40.0 BENIGN PROSTATIC HYPERPLASIA WITHOUT LOWER URINARY TRACT SYMPTOMS: ICD-10-CM

## 2023-03-24 DIAGNOSIS — R20.8 OTHER DISTURBANCES OF SKIN SENSATION: ICD-10-CM

## 2023-03-24 DIAGNOSIS — Z59.00 HOMELESSNESS UNSPECIFIED: ICD-10-CM

## 2023-03-24 DIAGNOSIS — M54.50 LOW BACK PAIN, UNSPECIFIED: ICD-10-CM

## 2023-03-24 DIAGNOSIS — Z95.828 PRESENCE OF OTHER VASCULAR IMPLANTS AND GRAFTS: Chronic | ICD-10-CM

## 2023-03-24 DIAGNOSIS — I10 ESSENTIAL (PRIMARY) HYPERTENSION: ICD-10-CM

## 2023-03-24 DIAGNOSIS — E11.42 TYPE 2 DIABETES MELLITUS WITH DIABETIC POLYNEUROPATHY: ICD-10-CM

## 2023-03-24 DIAGNOSIS — Z88.2 ALLERGY STATUS TO SULFONAMIDES: ICD-10-CM

## 2023-03-24 DIAGNOSIS — R68.2 DRY MOUTH, UNSPECIFIED: ICD-10-CM

## 2023-03-24 DIAGNOSIS — G61.0 GUILLAIN-BARRE SYNDROME: ICD-10-CM

## 2023-03-24 LAB
ALBUMIN SERPL ELPH-MCNC: 2.7 G/DL — LOW (ref 3.3–5)
ALP SERPL-CCNC: 82 U/L — SIGNIFICANT CHANGE UP (ref 40–120)
ALT FLD-CCNC: 28 U/L — SIGNIFICANT CHANGE UP (ref 12–78)
ANION GAP SERPL CALC-SCNC: 8 MMOL/L — SIGNIFICANT CHANGE UP (ref 5–17)
AST SERPL-CCNC: 19 U/L — SIGNIFICANT CHANGE UP (ref 15–37)
BASOPHILS # BLD AUTO: 0.06 K/UL — SIGNIFICANT CHANGE UP (ref 0–0.2)
BASOPHILS NFR BLD AUTO: 0.5 % — SIGNIFICANT CHANGE UP (ref 0–2)
BILIRUB SERPL-MCNC: 0.3 MG/DL — SIGNIFICANT CHANGE UP (ref 0.2–1.2)
BUN SERPL-MCNC: 30 MG/DL — HIGH (ref 7–23)
CALCIUM SERPL-MCNC: 9.2 MG/DL — SIGNIFICANT CHANGE UP (ref 8.5–10.1)
CHLORIDE SERPL-SCNC: 104 MMOL/L — SIGNIFICANT CHANGE UP (ref 96–108)
CO2 SERPL-SCNC: 24 MMOL/L — SIGNIFICANT CHANGE UP (ref 22–31)
CREAT SERPL-MCNC: 1.97 MG/DL — HIGH (ref 0.5–1.3)
EGFR: 41 ML/MIN/1.73M2 — LOW
EOSINOPHIL # BLD AUTO: 0.08 K/UL — SIGNIFICANT CHANGE UP (ref 0–0.5)
EOSINOPHIL NFR BLD AUTO: 0.6 % — SIGNIFICANT CHANGE UP (ref 0–6)
GLUCOSE SERPL-MCNC: 167 MG/DL — HIGH (ref 70–99)
HCT VFR BLD CALC: 33 % — LOW (ref 39–50)
HGB BLD-MCNC: 10.6 G/DL — LOW (ref 13–17)
IMM GRANULOCYTES NFR BLD AUTO: 0.7 % — SIGNIFICANT CHANGE UP (ref 0–0.9)
LYMPHOCYTES # BLD AUTO: 0.96 K/UL — LOW (ref 1–3.3)
LYMPHOCYTES # BLD AUTO: 7.5 % — LOW (ref 13–44)
MCHC RBC-ENTMCNC: 24.1 PG — LOW (ref 27–34)
MCHC RBC-ENTMCNC: 32.1 GM/DL — SIGNIFICANT CHANGE UP (ref 32–36)
MCV RBC AUTO: 75.2 FL — LOW (ref 80–100)
MONOCYTES # BLD AUTO: 0.61 K/UL — SIGNIFICANT CHANGE UP (ref 0–0.9)
MONOCYTES NFR BLD AUTO: 4.8 % — SIGNIFICANT CHANGE UP (ref 2–14)
NEUTROPHILS # BLD AUTO: 10.98 K/UL — HIGH (ref 1.8–7.4)
NEUTROPHILS NFR BLD AUTO: 85.9 % — HIGH (ref 43–77)
PLATELET # BLD AUTO: 319 K/UL — SIGNIFICANT CHANGE UP (ref 150–400)
POTASSIUM SERPL-MCNC: 4.2 MMOL/L — SIGNIFICANT CHANGE UP (ref 3.5–5.3)
POTASSIUM SERPL-SCNC: 4.2 MMOL/L — SIGNIFICANT CHANGE UP (ref 3.5–5.3)
PROT SERPL-MCNC: 7.6 GM/DL — SIGNIFICANT CHANGE UP (ref 6–8.3)
RBC # BLD: 4.39 M/UL — SIGNIFICANT CHANGE UP (ref 4.2–5.8)
RBC # FLD: 15.4 % — HIGH (ref 10.3–14.5)
SARS-COV-2 RNA SPEC QL NAA+PROBE: SIGNIFICANT CHANGE UP
SODIUM SERPL-SCNC: 136 MMOL/L — SIGNIFICANT CHANGE UP (ref 135–145)
WBC # BLD: 12.78 K/UL — HIGH (ref 3.8–10.5)
WBC # FLD AUTO: 12.78 K/UL — HIGH (ref 3.8–10.5)

## 2023-03-24 PROCEDURE — 80053 COMPREHEN METABOLIC PANEL: CPT

## 2023-03-24 PROCEDURE — 96375 TX/PRO/DX INJ NEW DRUG ADDON: CPT

## 2023-03-24 PROCEDURE — 72100 X-RAY EXAM L-S SPINE 2/3 VWS: CPT

## 2023-03-24 PROCEDURE — 36415 COLL VENOUS BLD VENIPUNCTURE: CPT

## 2023-03-24 PROCEDURE — 72100 X-RAY EXAM L-S SPINE 2/3 VWS: CPT | Mod: 26

## 2023-03-24 PROCEDURE — 72131 CT LUMBAR SPINE W/O DYE: CPT | Mod: MA

## 2023-03-24 PROCEDURE — 99285 EMERGENCY DEPT VISIT HI MDM: CPT | Mod: 25

## 2023-03-24 PROCEDURE — 99285 EMERGENCY DEPT VISIT HI MDM: CPT

## 2023-03-24 PROCEDURE — 96374 THER/PROPH/DIAG INJ IV PUSH: CPT

## 2023-03-24 PROCEDURE — 72131 CT LUMBAR SPINE W/O DYE: CPT | Mod: 26,MA

## 2023-03-24 PROCEDURE — 85025 COMPLETE CBC W/AUTO DIFF WBC: CPT

## 2023-03-24 PROCEDURE — 93005 ELECTROCARDIOGRAM TRACING: CPT

## 2023-03-24 PROCEDURE — 93010 ELECTROCARDIOGRAM REPORT: CPT

## 2023-03-24 PROCEDURE — 87635 SARS-COV-2 COVID-19 AMP PRB: CPT

## 2023-03-24 RX ORDER — ONDANSETRON 8 MG/1
4 TABLET, FILM COATED ORAL ONCE
Refills: 0 | Status: COMPLETED | OUTPATIENT
Start: 2023-03-24 | End: 2023-03-24

## 2023-03-24 RX ORDER — SODIUM CHLORIDE 9 MG/ML
1000 INJECTION INTRAMUSCULAR; INTRAVENOUS; SUBCUTANEOUS ONCE
Refills: 0 | Status: COMPLETED | OUTPATIENT
Start: 2023-03-24 | End: 2023-03-24

## 2023-03-24 RX ORDER — OXYCODONE AND ACETAMINOPHEN 5; 325 MG/1; MG/1
1 TABLET ORAL
Qty: 12 | Refills: 0
Start: 2023-03-24 | End: 2023-03-26

## 2023-03-24 RX ORDER — MORPHINE SULFATE 50 MG/1
4 CAPSULE, EXTENDED RELEASE ORAL ONCE
Refills: 0 | Status: DISCONTINUED | OUTPATIENT
Start: 2023-03-24 | End: 2023-03-24

## 2023-03-24 RX ADMIN — MORPHINE SULFATE 4 MILLIGRAM(S): 50 CAPSULE, EXTENDED RELEASE ORAL at 16:43

## 2023-03-24 RX ADMIN — SODIUM CHLORIDE 1000 MILLILITER(S): 9 INJECTION INTRAMUSCULAR; INTRAVENOUS; SUBCUTANEOUS at 16:44

## 2023-03-24 RX ADMIN — SODIUM CHLORIDE 666.67 MILLILITER(S): 9 INJECTION INTRAMUSCULAR; INTRAVENOUS; SUBCUTANEOUS at 17:14

## 2023-03-24 RX ADMIN — ONDANSETRON 4 MILLIGRAM(S): 8 TABLET, FILM COATED ORAL at 16:43

## 2023-03-24 SDOH — ECONOMIC STABILITY - HOUSING INSECURITY: HOMELESSNESS UNSPECIFIED: Z59.00

## 2023-03-24 NOTE — ED PROVIDER NOTE - [employer]
pt homeless s/p signed out AMA earlier today from [facility] facility

## 2023-03-24 NOTE — ED ADULT NURSE REASSESSMENT NOTE - NS ED NURSE REASSESS COMMENT FT1
pt escorted out with security awaiting taxi, pt belongings taken out pt has walker, wheelchair and 6 bags of belongings with him.

## 2023-03-24 NOTE — ED PROVIDER NOTE - PATIENT PORTAL LINK FT
You can access the FollowMyHealth Patient Portal offered by [facility] by registering at the following website: [email or web address]. By joining [employer]’s FollowMyHealth portal, you will also be able to view your health information using other applications (apps) compatible with our system.

## 2023-03-24 NOTE — ED PROVIDER NOTE - CLINICAL SUMMARY MEDICAL DECISION MAKING FREE TEXT BOX
Plan: XR, CT, pain meds, consult ortho spine. Plan: XR, CT, pain meds, consult ortho spine.    C MD Martín, ED Attdg MD:  49 y/o male with mult. PMHx incl. CVA, DDD, DVT, DM2, Guillain-Bigfoot dz., HTN, peripheral neuropathy presents ambulatory with walker to the ED s/p lumbar spine surgery 3/15, + signed out AMA from [facility] facility today, c/o acute LBP incurred while being rolled onto his side yesterday. Plan: basic labs, including COVID, LS x-ray and CT, IV fluid, IV morphine and zofran.  Ortho consult.    See Progress Notes re: case progression, further management & disposition.

## 2023-03-24 NOTE — ED ADULT TRIAGE NOTE - CHIEF COMPLAINT QUOTE
Pt with c/o back pain.  States he had back surgery at  9 days ago and went to [facility].  States he was rolled in bed by the aid and felt discomfort.  C/O discomfort since then.  Minimal pain relief with Oxy.

## 2023-03-24 NOTE — ED PROVIDER NOTE - ATTENDING APP SHARED VISIT CONTRIBUTION OF CARE
LINDA Resendiz MD, ED Attending physician:  This was a shared visit with MARYJANE.  I reviewed and verified the documentation and independently performed the documented history/exam/mdm.

## 2023-03-24 NOTE — ED STATDOCS - NSICDXPASTMEDICALHX_GEN_ALL_CORE_FT
PAST MEDICAL HISTORY:  BPH (benign prostatic hyperplasia)     DDD (degenerative disc disease), lumbar     DVT, lower extremity     History of CVA in adulthood     History of Guillain-[place] syndrome     HTN (hypertension)     Peripheral neuropathy     Type 2 diabetes mellitus

## 2023-03-24 NOTE — CONSULT NOTE ADULT - SUBJECTIVE AND OBJECTIVE BOX
Patient is a 48yMale  home ambulator with assistive devices who presents to  ED w/ a c/o of lumbar apin with burning sensation to LLE. Patient states last night at rehab facility nursing aide rotated him in bed causing him to experience a sharp pain in his lumbar spine, denies radiation to either LE. Pt states able to ambulate yesterday morning berfore incident with walker and AFO brace, pt now stated difficult to ambulate 2/2 lumbar pain. Denies pain down legs, denies numbness/tingling/weakness, denies incontinence of bowel/bladder or saddle anesthesia. Pt complaining of subjective burning sensation LLE.  Denies having any other pain elsewhere. Pt s/p L4-5 PSF on 3/15 with Dr. Mills, incision examined, appears C/D/I approximated with staples. No other orthopaedic concerns at this time.       PAST MEDICAL & SURGICAL HISTORY:  BPH (benign prostatic hyperplasia)      HTN (hypertension)      Type 2 diabetes mellitus      Peripheral neuropathy      History of Guillain-[place] syndrome      History of CVA in adulthood      DDD (degenerative disc disease), lumbar      DVT, lower extremity      S/P IVC filter          Vital Signs Last 24 Hrs  T(C): 36.9 (24 Mar 2023 13:20), Max: 36.9 (24 Mar 2023 13:20)  T(F): 98.5 (24 Mar 2023 13:20), Max: 98.5 (24 Mar 2023 13:20)  HR: 122 (24 Mar 2023 13:20) (122 - 122)  BP: 107/66 (24 Mar 2023 13:20) (107/66 - 107/66)  BP(mean): 80 (24 Mar 2023 13:20) (80 - 80)  RR: 18 (24 Mar 2023 13:20) (18 - 18)  SpO2: 99% (24 Mar 2023 13:20) (99% - 99%)    Parameters below as of 24 Mar 2023 13:20  Patient On (Oxygen Delivery Method): room air      I&O's Detail      LABS:                        10.6   12.78 )-----------( 319      ( 24 Mar 2023 16:15 )             33.0     03-24    136  |  104  |  30<H>  ----------------------------<  167<H>  4.2   |  24  |  1.97<H>    Ca    9.2      24 Mar 2023 16:15    TPro  7.6  /  Alb  2.7<L>  /  TBili  0.3  /  DBili  x   /  AST  19  /  ALT  28  /  AlkPhos  82  03-24          PHYSICAL EXAM:  General; Awake and alert, Oriented x 3  Spine: No TTP over spinous processes or paraspinal muscles at C/T/L spine. No palpable step off.  Surgical incision healing, well approximated with staples, C/D/I, redressed.   Able to actively SLR BL.              Motor exam:        C5       C6       C7       C8       T1   R  5/5      5/5     5/5     5/5      5/5  L   5/5      5/5     5/5     5/5      5/5         L2        L3       L4       L5      S1  R  4/5      5/5     1/5     1/5    5/5  L   4/5     5/5      5/5     5/5    5/5    Sensory:  (0=absent, 1=impaired, 2=normal, NT=not testable)      C5    C6   C7   C8   T1         R   2     2      2     2      2  L    2     2      2     2      2        L2    L3   L4   L5   S1   R   2     2     2     1     1  L    2     2     2     2     2    Right Upper extremity:   Negative Burgos  +Rad Pulse  Compartments soft and compressible    Left Upper extremity:   Negative Burgos  +Rad Pulse  Compartments soft and compressible    Right Lower Extremity:   Negative Clonus  Negative Babinski  +DP  Compartments soft and compressible           Left Lower Extremity:  Positive Clonus   Negative Babinski  +DP  Compartments soft and compressible      Imaging: XR/CT L Spine s/p L4-5 PSF pending official read, no obvious fracture or deformity                                           A/P :   Patient is a 48yMale w/ lumbar pain     ***INCOMPLETE NOTE, CHARTING IN PROGRESS***      -Clinical presentation and physical exam are not consistent w/ acute cord compression/cauda equina. Does not demonstrate red flag symptoms such as bowel/bladder incontinence, saddle anesthesia, fevers/chills, or weight loss.    - WBAT with assistive devices prn   -No heavy lifting/twisting  -Multimodal analgesia - recommend low dose opioids, acetaminophen, muscle relaxant as tolerated  - Remove staples on POD 14  -Will disuss w/ attending and advise if plan changes.  - Plan pending discussion with Dr. Mills        .sig  .pv/.ht   Patient is a 48yMale  home ambulator with assistive devices who presents to  ED w/ a c/o of lumbar apin with burning sensation to LLE. Patient states last night at rehab facility nursing aide rotated him in bed causing him to experience a sharp pain in his lumbar spine, denies radiation to either LE. Pt states able to ambulate yesterday morning berfore incident with walker and AFO brace, pt now stated difficult to ambulate 2/2 lumbar pain. Denies pain down legs, denies numbness/tingling/weakness, denies incontinence of bowel/bladder or saddle anesthesia. Pt complaining of subjective burning sensation LLE.  Denies having any other pain elsewhere. Pt s/p L4-5 PSF on 3/15 with Dr. Mills, incision examined, appears C/D/I approximated with staples. No other orthopaedic concerns at this time.       PAST MEDICAL & SURGICAL HISTORY:  BPH (benign prostatic hyperplasia)      HTN (hypertension)      Type 2 diabetes mellitus      Peripheral neuropathy      History of Guillain-[place] syndrome      History of CVA in adulthood      DDD (degenerative disc disease), lumbar      DVT, lower extremity      S/P IVC filter          Vital Signs Last 24 Hrs  T(C): 36.9 (24 Mar 2023 13:20), Max: 36.9 (24 Mar 2023 13:20)  T(F): 98.5 (24 Mar 2023 13:20), Max: 98.5 (24 Mar 2023 13:20)  HR: 122 (24 Mar 2023 13:20) (122 - 122)  BP: 107/66 (24 Mar 2023 13:20) (107/66 - 107/66)  BP(mean): 80 (24 Mar 2023 13:20) (80 - 80)  RR: 18 (24 Mar 2023 13:20) (18 - 18)  SpO2: 99% (24 Mar 2023 13:20) (99% - 99%)    Parameters below as of 24 Mar 2023 13:20  Patient On (Oxygen Delivery Method): room air      I&O's Detail      LABS:                        10.6   12.78 )-----------( 319      ( 24 Mar 2023 16:15 )             33.0     03-24    136  |  104  |  30<H>  ----------------------------<  167<H>  4.2   |  24  |  1.97<H>    Ca    9.2      24 Mar 2023 16:15    TPro  7.6  /  Alb  2.7<L>  /  TBili  0.3  /  DBili  x   /  AST  19  /  ALT  28  /  AlkPhos  82  03-24          PHYSICAL EXAM:  General; Awake and alert, Oriented x 3  Spine: No TTP over spinous processes or paraspinal muscles at C/T/L spine. No palpable step off.  Surgical incision healing, well approximated with staples, C/D/I, redressed.   Able to actively SLR BL.              Motor exam:        C5       C6       C7       C8       T1   R  5/5      5/5     5/5     5/5      5/5  L   5/5      5/5     5/5     5/5      5/5         L2        L3       L4       L5      S1  R  4/5      5/5     1/5     1/5    5/5  L   4/5     5/5      5/5     5/5    5/5    Sensory:  (0=absent, 1=impaired, 2=normal, NT=not testable)      C5    C6   C7   C8   T1         R   2     2      2     2      2  L    2     2      2     2      2        L2    L3   L4   L5   S1   R   2     2     2     1     1  L    2     2     2     2     2    Right Upper extremity:   Negative Burgos  +Rad Pulse  Compartments soft and compressible    Left Upper extremity:   Negative Burgos  +Rad Pulse  Compartments soft and compressible    Right Lower Extremity:   Negative Clonus  Negative Babinski  +DP  Compartments soft and compressible           Left Lower Extremity:  Positive Clonus   Negative Babinski  +DP  Compartments soft and compressible      Imaging: XR/CT L Spine s/p L4-5 PSF pending official read, no obvious fracture or deformity                                           A/P :   Patient is a 48y Male w/ lumbar pain, S/p L4-5 PSF w/ Dr. Mills       - Clinical presentation and physical exam are not consistent w/ acute cord compression/cauda equina. Does not demonstrate red flag symptoms such as bowel/bladder incontinence, saddle anesthesia, fevers/chills, or weight loss.  - WBAT with assistive devices prn   - No heavy lifting/twisting  - Multimodal analgesia - recommend low dose opioids, acetaminophen, muscle relaxant as tolerated  - Remove staples on POD 14  - PT/OT  - No acute orthopaedic surgical intervention indicated  - Will discuss w/ attending and advise if plan changes. Patient is a 48yMale  home ambulator with assistive devices who presents to  ED w/ a c/o of lumbar apin with burning sensation to LLE. Patient states last night at rehab facility nursing aide rotated him in bed causing him to experience a sharp pain in his lumbar spine, denies radiation to either LE. Pt states able to ambulate yesterday morning berfore incident with walker and AFO brace, pt now stated difficult to ambulate 2/2 lumbar pain. Denies pain down legs, denies numbness/tingling/weakness, denies incontinence of bowel/bladder or saddle anesthesia. Pt complaining of subjective burning sensation LLE.  Denies having any other pain elsewhere. Pt s/p L4-5 PSF on 3/15 with Dr. Mills, incision examined, appears C/D/I approximated with staples. No other orthopaedic concerns at this time.       PAST MEDICAL & SURGICAL HISTORY:  BPH (benign prostatic hyperplasia)      HTN (hypertension)      Type 2 diabetes mellitus      Peripheral neuropathy      History of Guillain-[place] syndrome      History of CVA in adulthood      DDD (degenerative disc disease), lumbar      DVT, lower extremity      S/P IVC filter          Vital Signs Last 24 Hrs  T(C): 36.9 (24 Mar 2023 13:20), Max: 36.9 (24 Mar 2023 13:20)  T(F): 98.5 (24 Mar 2023 13:20), Max: 98.5 (24 Mar 2023 13:20)  HR: 122 (24 Mar 2023 13:20) (122 - 122)  BP: 107/66 (24 Mar 2023 13:20) (107/66 - 107/66)  BP(mean): 80 (24 Mar 2023 13:20) (80 - 80)  RR: 18 (24 Mar 2023 13:20) (18 - 18)  SpO2: 99% (24 Mar 2023 13:20) (99% - 99%)    Parameters below as of 24 Mar 2023 13:20  Patient On (Oxygen Delivery Method): room air      I&O's Detail      LABS:                        10.6   12.78 )-----------( 319      ( 24 Mar 2023 16:15 )             33.0     03-24    136  |  104  |  30<H>  ----------------------------<  167<H>  4.2   |  24  |  1.97<H>    Ca    9.2      24 Mar 2023 16:15    TPro  7.6  /  Alb  2.7<L>  /  TBili  0.3  /  DBili  x   /  AST  19  /  ALT  28  /  AlkPhos  82  03-24          PHYSICAL EXAM:  General; Awake and alert, Oriented x 3  Spine: No TTP over spinous processes or paraspinal muscles at C/T/L spine. No palpable step off.  Surgical incision healing, well approximated with staples, C/D/I, redressed.   Able to actively SLR BL.              Motor exam:        C5       C6       C7       C8       T1   R  5/5      5/5     5/5     5/5      5/5  L   5/5      5/5     5/5     5/5      5/5         L2        L3       L4       L5      S1  R  4/5      5/5     1/5     1/5    5/5  L   4/5     5/5      5/5     5/5    5/5    Sensory:  (0=absent, 1=impaired, 2=normal, NT=not testable)      C5    C6   C7   C8   T1         R   2     2      2     2      2  L    2     2      2     2      2        L2    L3   L4   L5   S1   R   2     2     2     1     1  L    2     2     2     2     2    Right Upper extremity:   Negative Burgos  +Rad Pulse  Compartments soft and compressible    Left Upper extremity:   Negative Burgos  +Rad Pulse  Compartments soft and compressible    Right Lower Extremity:   Negative Clonus  Negative Babinski  +DP  Compartments soft and compressible           Left Lower Extremity:  Positive Clonus   Negative Babinski  +DP  Compartments soft and compressible      Imaging: XR/CT L Spine s/p L4-5 PSF pending official read, no obvious fracture or deformity                                           A/P :   Patient is a 48y Male w/ lumbar pain, S/p L4-5 PSF w/ Dr. Mills       - Clinical presentation and physical exam are not consistent w/ acute cord compression/cauda equina. Does not demonstrate red flag symptoms such as bowel/bladder incontinence, saddle anesthesia, fevers/chills, or weight loss.  - WBAT with assistive devices prn   - No heavy lifting/twisting  - Multimodal analgesia - recommend low dose opioids, acetaminophen, muscle relaxant as tolerated  - Remove staples on POD 14  - PT/OT  - DVT ppx subQ heparin until POD 10 (3/25), after than plan to restart coumadin, please refer to documentation from prior hospitalization.   - No acute orthopaedic surgical intervention indicated  - Will discuss w/ attending and advise if plan changes. Patient is a 48yMale  home ambulator with assistive devices who presents to  ED w/ a c/o of lumbar apin with burning sensation to LLE. Patient states last night at rehab facility nursing aide rotated him in bed causing him to experience a sharp pain in his lumbar spine, denies radiation to either LE. Pt states able to ambulate yesterday morning berfore incident with walker and AFO brace, pt now stated difficult to ambulate 2/2 lumbar pain. Denies pain down legs, denies numbness/tingling/weakness, denies incontinence of bowel/bladder or saddle anesthesia. Pt complaining of subjective burning sensation LLE.  Denies having any other pain elsewhere. Pt s/p L4-5 PSF on 3/15 with Dr. Mills, incision examined, appears C/D/I approximated with staples. No other orthopaedic concerns at this time.       PAST MEDICAL & SURGICAL HISTORY:  BPH (benign prostatic hyperplasia)      HTN (hypertension)      Type 2 diabetes mellitus      Peripheral neuropathy      History of Guillain-[place] syndrome      History of CVA in adulthood      DDD (degenerative disc disease), lumbar      DVT, lower extremity      S/P IVC filter          Vital Signs Last 24 Hrs  T(C): 36.9 (24 Mar 2023 13:20), Max: 36.9 (24 Mar 2023 13:20)  T(F): 98.5 (24 Mar 2023 13:20), Max: 98.5 (24 Mar 2023 13:20)  HR: 122 (24 Mar 2023 13:20) (122 - 122)  BP: 107/66 (24 Mar 2023 13:20) (107/66 - 107/66)  BP(mean): 80 (24 Mar 2023 13:20) (80 - 80)  RR: 18 (24 Mar 2023 13:20) (18 - 18)  SpO2: 99% (24 Mar 2023 13:20) (99% - 99%)    Parameters below as of 24 Mar 2023 13:20  Patient On (Oxygen Delivery Method): room air      I&O's Detail      LABS:                        10.6   12.78 )-----------( 319      ( 24 Mar 2023 16:15 )             33.0     03-24    136  |  104  |  30<H>  ----------------------------<  167<H>  4.2   |  24  |  1.97<H>    Ca    9.2      24 Mar 2023 16:15    TPro  7.6  /  Alb  2.7<L>  /  TBili  0.3  /  DBili  x   /  AST  19  /  ALT  28  /  AlkPhos  82  03-24          PHYSICAL EXAM:  General; Awake and alert, Oriented x 3  Spine: No TTP over spinous processes or paraspinal muscles at C/T/L spine. No palpable step off.  Surgical incision healing, well approximated with staples, C/D/I, redressed.   Able to actively SLR BL.              Motor exam:        C5       C6       C7       C8       T1   R  5/5      5/5     5/5     5/5      5/5  L   5/5      5/5     5/5     5/5      5/5         L2        L3       L4       L5      S1  R  4/5      5/5     1/5     1/5    5/5  L   4/5     5/5      5/5     5/5    5/5    Sensory:  (0=absent, 1=impaired, 2=normal, NT=not testable)      C5    C6   C7   C8   T1         R   2     2      2     2      2  L    2     2      2     2      2        L2    L3   L4   L5   S1   R   2     2     2     1     1  L    2     2     2     2     2    Right Upper extremity:   Negative Burgos  +Rad Pulse  Compartments soft and compressible    Left Upper extremity:   Negative Burgos  +Rad Pulse  Compartments soft and compressible    Right Lower Extremity:   Negative Clonus  Negative Babinski  +DP  Compartments soft and compressible           Left Lower Extremity:  Positive Clonus   Negative Babinski  +DP  Compartments soft and compressible      Imaging: XR/CT L Spine s/p L4-5 PSF pending official read, no obvious fracture or deformity                                           A/P :   Patient is a 48y Male w/ lumbar pain, S/p L4-5 PSF w/ Dr. Mills       - Clinical presentation and physical exam are not consistent w/ acute cord compression/cauda equina. Does not demonstrate red flag symptoms such as bowel/bladder incontinence, saddle anesthesia, fevers/chills, or weight loss.  - WBAT with assistive devices prn   - No heavy lifting/twisting  - Multimodal analgesia - recommend low dose opioids, acetaminophen, muscle relaxant as tolerated  - Remove staples on POD 21  - PT/OT  - DVT ppx subQ heparin until POD 10 (3/25), after than plan to restart coumadin, please refer to documentation from prior hospitalization.   - No acute orthopaedic surgical intervention indicated  - Ortho stable for discharge  - Discussed with Dr. Mills who is aware and agrees with above plan

## 2023-03-24 NOTE — CHART NOTE - NSCHARTNOTEFT_GEN_A_CORE
Chart reviewed. Met with patient at bedside. He was recently DC from [facility] after an extended stay. He was discharged to [facility] in [place] a few days ago. Today, he signed out AMA from their facility and took an Uber to [facility]. He is refusing to return to the facility. We called the facility and they said that they will not accept him back because he left AMA. He can ambulate with a walker, but states that only for short distances. He also has a wheelchair and a leg brace.  Fatou MEHTA and I met with patient at bedside and he told us that he is homeless. He was instructed to call the DSS after hours line. He refused. Fatou Torres is calling DSS to see if she can find placement. There is no reason for hospital admission at this time. Chart reviewed. Met with patient at bedside. He was recently DC from [facility] after an extended stay. He was discharged to [facility] in [place] a few days ago. Today, he signed out AMA from their facility and took an Uber to [facility]. He is refusing to return to the facility. We called the facility and they said that they will not accept him back because he left AMA. He can ambulate with a walker, but states that only for short distances. He also has a wheelchair and a leg brace.  Fatou MEHTA and I met with patient at bedside and he told us that he is homeless. He was instructed to call the DSS after hours line. He refused. Fatou Torres called DSS to see if she could find placement. She got placement at [facility] shelter in [place]. She will offer him that placement.

## 2023-03-24 NOTE — ED PROVIDER NOTE - CONSTITUTIONAL, MLM
normal... Obese male laying in bed, awake, alert, oriented to person, place, time/situation and in no apparent distress.

## 2023-03-24 NOTE — ED STATDOCS - CARE PROVIDER_API CALL
Courtney Mills (DO)  Orthopaedic Surgery Surgery  [address] [facility], [address] [place] [address]  Phone: [phone]  Fax: [phone]  Established Patient  Follow Up Time:

## 2023-03-24 NOTE — ED STATDOCS - PROGRESS NOTE DETAILS
Isaura Roberson for attending Dr. 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 Pt reports he is s/p back surgery at  x12 days ago, states he was at [facility] yesterday where was rolled in bed by the aid when he felt a sudden discomfort and heard a pop which has developed into constant back pain. Pt endorses taking Oxy w/ mild improvement, no meds taken PTA. Pt states he is able to walk w/ a walker but w/ difficulty. Pt endorses marijuana use today. Will send pt to main ED for further evaluation. Isaura Roberson for attending Dr. 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 Pt reports he is s/p lower back surgery at  x12 days ago w. Dr. Mckeon, states he was at [facility] yesterday where was rolled in bed by the aid when he felt a sudden discomfort and heard a pop which has developed into constant back pain. Pt endorses taking Oxy w/ mild improvement, no meds taken PTA. Pt states he is able to walk w/ a walker but w/ difficulty. Pt endorses marijuana use today. Will send pt to main ED for further evaluation. Pt was evaluated by Ortho Spine in the ED.  CT and Xray imaging reviewed.  Pt cleared from their perspective for new back pain that started with turning in bed at the rehab facility last night.  Case discussed with SW for possible return to Rehab facility ,but pt had signed out AMA from the facility earlier today.  Pt will be dc home and is placed by DSS.  Pt should f/u with Dr. Mills as directed.  Irma Lai PA-C Complex Repair And Flap Additional Text (Will Appearing After The Standard Complex Repair Text): The complex repair was not sufficient to completely close the primary defect. The remaining additional defect was repaired with the flap mentioned below.

## 2023-03-24 NOTE — ED ADULT NURSE NOTE - OBJECTIVE STATEMENT
49 y/o male presents ambulatory with walker to the ED c/o back pain. Pt s/p L4/L5 fusion with decompression on 3/15 with Dr. Mills. Pt reports acute onset of pain "like knuckle popping" in his lumbar spine yesterday while being turned in bed at his rehab facility. Pain 10/10 and constant since this motion. Pt was getting Oxycodone 30mg at rehab without relief, last dose last night. Pt called a cab, was able to use walker to leave facility and come to the ED for evaluation. No new neuro issues. No other complaints at this time. pt reports that only Dilaudid works for the pain

## 2023-03-24 NOTE — ED PROVIDER NOTE - NSICDXPASTMEDICALHX_GEN_ALL_CORE_FT
PAST MEDICAL HISTORY:  BPH (benign prostatic hyperplasia)     DDD (degenerative disc disease), lumbar     DVT, lower extremity     History of CVA in adulthood     History of Guillain-[place] syndrome     HTN (hypertension)     Peripheral neuropathy     Type 2 diabetes mellitus

## 2023-03-24 NOTE — ED STATDOCS - PATIENT PORTAL LINK FT
You can access the FollowMyHealth Patient Portal offered by [facility] by registering at the following website: [email or web address]. By joining [employer]’s FollowMyHealth portal, you will also be able to view your health information using other applications (apps) compatible with our system.

## 2023-03-24 NOTE — ED ADULT NURSE NOTE - CHIEF COMPLAINT QUOTE
Pt with c/o back pain.  States he had back surgery at  9 days ago and went to [facility].  States he was rolled in bed by the aid and felt discomfort.  C/O discomfort since then.  Minimal pain relief with Oxy.

## 2023-03-24 NOTE — ED PROVIDER NOTE - NS ED ATTENDING STATEMENT MOD
This was a shared visit with the MARYJANE. I reviewed and verified the documentation and independently performed the documented:

## 2023-03-24 NOTE — ED ADULT NURSE NOTE - EXTENSIONS OF SELF_ADULT
Follow up with [facility] Department of Ophthalmology within 1 week of after discharge, sooner if symptoms worsen or change at:
None

## 2023-03-24 NOTE — ED STATDOCS - NS ED ATTENDING STATEMENT MOD
Attending Only This was a shared visit with the MARYJANE. I reviewed and verified the documentation and independently performed the documented:

## 2023-03-24 NOTE — ED PROVIDER NOTE - MUSCULOSKELETAL, MLM
Spine appears normal, range of motion is not limited, no muscle or joint tenderness. Brace for foot drop on RLE. Dressing in place over L S spine. Spine appears normal, no muscle or joint tenderness. Brace for foot drop on RLE.  Surgical dressing in place over LS spine.

## 2023-03-24 NOTE — ED ADULT TRIAGE NOTE - NSWEIGHTCALCTOOLDRUG_GEN_A_CORE
Admit Note     Met with patient to assess needs. Patient is a 55 y.o. single female, admitted for elevated liver enzymes .      Patient admitted from direct admission on 3/9/2018 .  At this time, patient presents as alert and oriented x 4, pleasant, good eye contact, communicative, cooperative and asking and answering questions appropriately.  At this time, patients caregiver were not present at the time the pt is currently alone.     Household/Family Systems      Patient resides with patient's daughter and three grandchildren, at [address] [address].  Support system includes pt's adult children Zaire and Reginaldo and siblings.  Patient does not have dependents that are need of being cared for.     Patients primary caregiver is Zaire Bazan, patients son, and daughter, Viola Bazan phone number [phone] (Zaire) and [phone] (Segundo) .  Confirmed patients contact information is [phone] (home);          Telephone Information:   Mobile [phone]         During admission, patient's caregiver plans to stay in patient's room.  Confirmed patient and patients caregivers do have access to reliable transportation.     Cognitive Status/Learning      Patient reports reading ability as college and states patient does have difficulty with vision and wears prescription glasses. Patient reports patient learns best by verbal information.   Needed: No.   Highest education level: Associate/Bachelor Degree     Vocation/Disability      Working for Income: No  If no, reason not working: Demands of Treatment    Patient is physically unable to work due to her medical issues and is in process of appealing her disability claim. Pt reported she went to court and the court system has given pt 30 days to present most recent medical records to her . Pt reported previous SW assist her with completing and submitting for the information but she has still not been successful.  SW provided  "education regarding the next steps to be taken.  Pt reported she is having a hard time getting the department to respond to their request.  SW provided information for Patient Relations. Pt reports that she will update SW team once she finds out the outcome of her disability.      Adherence      Patient reports a high level of adherence to patients health care regimen.  Adherence counseling and education provided. Patient verbalizes understanding.     Substance Use     Patient reports the following substance usage.    Tobacco: none, patient denies any use.  Alcohol: none, patient denies any use. Previously documented and confirmed with pt on today.  "Pt denies any use since pt became acutely ill in October 2017. Pt enrolled in an inpatient program at [facility] located at [address] [place] [address] [place] [address] (ph#[phone]). Pt reported she was enrolled there in October and was about 2 weeks from graduation. Pt was unable to complete before transplant due to being too ill. Pt reports that once she has improved physical strength she will enroll in the outpatient program and reports that she has already been in touch with the . Pt reported she has not felt well enough to sit up for several hours during the day due to abdominal and leg pain. Pt also reported she would like to enroll when she is able to drive and it is a large commitment from family members to drive her. Pt reported she is still going to medical appointments about 3-4 times per week. Pt reported she has a positive view on inpatient rehab and is excited to start IOP. Pt reported she was told by  that she would be eligible to receive in home therapy. Pt has not yet enrolled with in home therapy as she stated she was hoping to get into IOP first. SW encouraged pt to set this up as soon as she has a better idea of her discharge date. KARLA explained that if pt is unable to sit up long enough for IOP she " "needs to set up any services available since she has access to in home therapy. Pt expressed understanding and in agreement. SW requested a call from pt when she does enroll. Pt expressed understanding and in agreement. Pt reiterated her excitement for starting therapy again."      Illicit Drugs/Non-prescribed Medications: none, patient denies any use.  Patient states clear understanding of the potential impact of substance use.  Substance abstinence/cessation counseling, education and resources provided and reviewed.      Services Utilizing/ADLS     Infusion Service: Prior to admission, patient utilizing? no  Home Health: Prior to admission, patient utilizing? no; Pt enrolled with [facility] outpatient PT program at [facility] (#[phone]).  DME: Prior to admission, yes; Pt has a rolling walker but does not use. Pt reported she uses hospital wheelchair for long distances during appointments.   Pulmonary/Cardiac Rehab: Prior to admission, no  Dialysis:  Prior to admission, no  Transplant Specialty Pharmacy:  Prior to admission, yes; [facility] located at [address] [place] [address].     Prior to admission, patient reports patient was independent with ADLS and was not driving.  Patient reports patient is able to care for self at this time. Patient indicates a willingness to care for self once medically cleared to do so.     Insurance/Medications     Insured by   Payor/Plan Subscr  Sex Relation Sub. Ins. ID Effective Group Num   1. [facility]* GUADALUPE TOMLIN 1962 Female   [id number]                                     [address] [address]      2. MEDICAID - * GUADALUPE TOMLIN 1962 Female   [id number] [place] [employer]                                   [address] [address]         Primary Insurance (for UNOS reporting): Public Insurance - Medicaid-[facility]  Secondary Insurance (for UNOS reporting): None     Patient reports patient is able to obtain and afford medications at " this time and at time of discharge.     Living Will/Healthcare Power of      Patient states patient does not have a LW and/or HCPA.   provided education regarding LW and HCPA and the completion of forms.     Coping/Mental Health     Patient is coping adequately with the aid of  family members and bhavin.      Discharge Planning     At time of discharge, patient plans to return to patient's home under the care of pt's daughter Reginaldo.  Patients daughter will transport patient.  Per rounds today, expected discharge date has not been medically determined at this time. Patient and patients caregiver  verbalize understanding and are involved in treatment planning and discharge process.     Additional Concerns  Pt reports she is still trying to obtain paper from her medical records for her disability.  SW provided education.  Patient is being followed for needs, education, resources, information, emotional support, supportive counseling, and for supportive and skilled discharge plan of care.  providing ongoing psychosocial support, education, resources and d/c planning as needed.  SW remains available.     used

## 2023-03-24 NOTE — ED PROVIDER NOTE - OBJECTIVE STATEMENT
49 y/o male with a PMHx of BPH, CVA, DDD, DVT, DM2, Guillain [place], HTN, peripheral neuropathy presents ambulatory with walker to the ED c/o back pain. Pt s/p L4/L5 fusion with decompression on 3/15 with Dr. Mills. Pt reports acute onset of pain "like knuckle popping" in his lumbar spine yesterday while being turned in bed at his rehab facility. Pain 10/10 and constant since this motion. Pt was getting Oxycodone 30mg at rehab without relief, last dose last night. Pt called a cab, was able to use walker to leave facility and come to the ED for evaluation. No new neuro issues. No other complaints at this time. 49 y/o male with multiple PMHx of BPH, CVA, DDD, DVT, DM2, Guillain-Solgohachia synd., HTN, peripheral neuropathy presents ambulatory with walker via cab from [facility] facility from which he signed out AMA, to the ED c/o back pain. Pt s/p L4/L5 fusion with decompression on 3/15 with Dr. Mills. Pt reports acute onset of pain "like knuckle popping" in his lumbar spine yesterday while being turned in bed at his rehab facility. Pt c/os that he wasn't properly log-rolled at that time. Pain 10/10 and constant since that incident. Pt was getting Oxycodone 30mg at rehab without relief, last dose last night. Pt requested transfer or DC to  for Ortho Spine eval & management, but facility refused.  Pt today called a cab, was able to use walker to leave facility and come to the ED for evaluation. No F/C, nor new Neuro issues. No other complaints at this time.

## 2023-03-24 NOTE — ED PROVIDER NOTE - PROGRESS NOTE DETAILS
Isaura CO for Dr. Resendiz   In addition to information in HPI: Pt never received the x-ray at [facility] and [facility] and signed out AMA and took a cab to [facility]. Denies any urinary or bowel incontinence. Allergy to sulfa drugs.   Constitutional: overweight white male, awake, alert no respiratory distress  Eyes: PERRL EOMI   HENT: mild to moderately dry mouth  Cardiac: regular rate and rhythm, normal radial pulse  Respiratory: lungs are clear, respirations normal  Abdomen: bowel sounds positive, nontender  MSK: RIOS x4, bilateral SLR 10 degrees 4/5 motor  Skin: no rash, no tactile warmth  Neuro: AAO x3, normal speech, CN intact, non acute LE mild motor weakens, +symmetric, +right foot brace in position with chronic foot drop 47 y/o male with a PMHx of BPH, CVA, DDD, DVT, DM2, Guillain [place], HTN, peripheral neuropathy presents ambulatory with walker to the ED s/p lumbar spine surgery 3/15 after signed out AMA form [facility] c/o acute LBP incurred while being rolled onto his side yesterday. Plan: basic labs, including COVID, LS x-ray and CT, IV fluid, IV morphine, IV zofran. I Jose Cowart attest that this documentation has been prepared under the direction and in the presence of Dr. Resendiz 47 y/o male with a PMHx of BPH, CVA, DDD, DVT, DM2, Guillain [place], HTN, peripheral neuropathy presents ambulatory with walker to the ED s/p lumbar spine surgery 3/15 after signed out AMA form [facility] facility c/o acute LBP incurred while being rolled onto his side yesterday. Plan: basic labs, including COVID, LS x-ray and CT, IV fluid, IV morphine and zofran. Pt's pulse was repeated with Pulse ox and was only 83.  Irma Lai PA-C Isaura MCFARLAND for Dr. Resendiz  Wet read for Lspine x-ray: Hardware in place and intact, +IVC filter, no fx. Isaura MCFARLAND for Dr. Resendiz  Pt cleared by orthopedic by their perspective. Involving case management/social work for possible placement. Isaura CO for Dr. Resendiz   In addition to information in HPI: Pt never received the x-ray at [facility] and [facility] and signed out AMA and took a cab to [facility]. Denies any urinary or bowel incontinence. Allergy to sulfa drugs.   Constitutional: overweight white male, awake, alert no respiratory distress  Eyes: PERRL EOMI   HENT: mild to moderately dry mouth  Cardiac: regular rate and rhythm, normal radial pulse  Respiratory: lungs are clear, respirations normal  Abdomen: bowel sounds positive, nontender  MSK: RIOS x4, bilateral SLR 10 degrees with 4/5 motor  Skin: no rash, no tactile warmth  Neuro: AAO x3, normal speech, CNs intact, nonacute LE mild motor weakens, +symmetric, +right foot brace in position with chronic foot drop Isaura Resendiz  Labs results reveiwed.  Pt cleared by orthopedic by their perspective. Involving case management/social work for possible placement. PAT Resendiz MD:  Pt + ambulates w/ walker, no current medical indication for admission.  Pt refuses to go back to facility where just was at & signed out AMA from.  Facility likewise refuses to receive pt back d/t he signed out AMA.  This then leaves pt homeless.  SW, Case Management consulted --> DSS involved & obtained placement at shelter.

## 2023-03-26 PROBLEM — G62.9 POLYNEUROPATHY, UNSPECIFIED: Chronic | Status: ACTIVE | Noted: 2023-03-12

## 2023-03-26 PROBLEM — I10 ESSENTIAL (PRIMARY) HYPERTENSION: Chronic | Status: ACTIVE | Noted: 2023-03-12

## 2023-03-26 PROBLEM — I82.409 ACUTE EMBOLISM AND THROMBOSIS OF UNSPECIFIED DEEP VEINS OF UNSPECIFIED LOWER EXTREMITY: Chronic | Status: ACTIVE | Noted: 2023-03-12

## 2023-03-26 PROBLEM — M51.36 OTHER INTERVERTEBRAL DISC DEGENERATION, LUMBAR REGION: Chronic | Status: ACTIVE | Noted: 2023-03-12

## 2023-03-26 PROBLEM — Z86.73 PERSONAL HISTORY OF TRANSIENT ISCHEMIC ATTACK (TIA), AND CEREBRAL INFARCTION WITHOUT RESIDUAL DEFICITS: Chronic | Status: ACTIVE | Noted: 2023-03-12

## 2023-03-26 PROBLEM — N40.0 BENIGN PROSTATIC HYPERPLASIA WITHOUT LOWER URINARY TRACT SYMPTOMS: Chronic | Status: ACTIVE | Noted: 2023-03-12

## 2023-03-26 PROBLEM — Z86.69 PERSONAL HISTORY OF OTHER DISEASES OF THE NERVOUS SYSTEM AND SENSE ORGANS: Chronic | Status: ACTIVE | Noted: 2023-03-12

## 2023-03-26 PROBLEM — E11.9 TYPE 2 DIABETES MELLITUS WITHOUT COMPLICATIONS: Chronic | Status: ACTIVE | Noted: 2023-03-12

## 2023-03-28 DIAGNOSIS — M47.26 OTHER SPONDYLOSIS WITH RADICULOPATHY, LUMBAR REGION: ICD-10-CM

## 2023-03-28 DIAGNOSIS — Z86.19 PERSONAL HISTORY OF OTHER INFECTIOUS AND PARASITIC DISEASES: ICD-10-CM

## 2023-03-28 DIAGNOSIS — N40.1 BENIGN PROSTATIC HYPERPLASIA WITH LOWER URINARY TRACT SYMPTOMS: ICD-10-CM

## 2023-03-28 DIAGNOSIS — Z79.899 OTHER LONG TERM (CURRENT) DRUG THERAPY: ICD-10-CM

## 2023-03-28 DIAGNOSIS — E66.01 MORBID (SEVERE) OBESITY DUE TO EXCESS CALORIES: ICD-10-CM

## 2023-03-28 DIAGNOSIS — N18.30 CHRONIC KIDNEY DISEASE, STAGE 3 UNSPECIFIED: ICD-10-CM

## 2023-03-28 DIAGNOSIS — Z79.01 LONG TERM (CURRENT) USE OF ANTICOAGULANTS: ICD-10-CM

## 2023-03-28 DIAGNOSIS — R32 UNSPECIFIED URINARY INCONTINENCE: ICD-10-CM

## 2023-03-28 DIAGNOSIS — M51.16 INTERVERTEBRAL DISC DISORDERS WITH RADICULOPATHY, LUMBAR REGION: ICD-10-CM

## 2023-03-28 DIAGNOSIS — I69.341 MONOPLEGIA OF LOWER LIMB FOLLOWING CEREBRAL INFARCTION AFFECTING RIGHT DOMINANT SIDE: ICD-10-CM

## 2023-03-28 DIAGNOSIS — Z91.14 PATIENT'S OTHER NONCOMPLIANCE WITH MEDICATION REGIMEN: ICD-10-CM

## 2023-03-28 DIAGNOSIS — I12.9 HYPERTENSIVE CHRONIC KIDNEY DISEASE WITH STAGE 1 THROUGH STAGE 4 CHRONIC KIDNEY DISEASE, OR UNSPECIFIED CHRONIC KIDNEY DISEASE: ICD-10-CM

## 2023-03-28 DIAGNOSIS — D68.61 ANTIPHOSPHOLIPID SYNDROME: ICD-10-CM

## 2023-03-28 DIAGNOSIS — N17.9 ACUTE KIDNEY FAILURE, UNSPECIFIED: ICD-10-CM

## 2023-03-28 DIAGNOSIS — F17.210 NICOTINE DEPENDENCE, CIGARETTES, UNCOMPLICATED: ICD-10-CM

## 2023-03-28 DIAGNOSIS — G65.0 SEQUELAE OF GUILLAIN-BARRE SYNDROME: ICD-10-CM

## 2023-03-28 DIAGNOSIS — G83.4 CAUDA EQUINA SYNDROME: ICD-10-CM

## 2023-03-28 DIAGNOSIS — M48.061 SPINAL STENOSIS, LUMBAR REGION WITHOUT NEUROGENIC CLAUDICATION: ICD-10-CM

## 2023-03-28 DIAGNOSIS — E11.22 TYPE 2 DIABETES MELLITUS WITH DIABETIC CHRONIC KIDNEY DISEASE: ICD-10-CM

## 2023-03-28 DIAGNOSIS — Z59.01 SHELTERED HOMELESSNESS: ICD-10-CM

## 2023-03-28 DIAGNOSIS — Z95.828 PRESENCE OF OTHER VASCULAR IMPLANTS AND GRAFTS: ICD-10-CM

## 2023-03-28 DIAGNOSIS — Z88.2 ALLERGY STATUS TO SULFONAMIDES: ICD-10-CM

## 2023-03-28 DIAGNOSIS — M21.371 FOOT DROP, RIGHT FOOT: ICD-10-CM

## 2023-03-28 DIAGNOSIS — Z86.718 PERSONAL HISTORY OF OTHER VENOUS THROMBOSIS AND EMBOLISM: ICD-10-CM

## 2023-03-28 DIAGNOSIS — N39.0 URINARY TRACT INFECTION, SITE NOT SPECIFIED: ICD-10-CM

## 2023-03-28 SDOH — ECONOMIC STABILITY - HOUSING INSECURITY: SHELTERED HOMELESSNESS: Z59.01

## 2024-02-17 ENCOUNTER — INPATIENT (INPATIENT)
Facility: HOSPITAL | Age: 49
LOS: 5 days | Discharge: NURSING, REHAB OR LONG-TERM CARE | DRG: 690 | End: 2024-02-23
Attending: STUDENT IN AN ORGANIZED HEALTH CARE EDUCATION/TRAINING PROGRAM | Admitting: STUDENT IN AN ORGANIZED HEALTH CARE EDUCATION/TRAINING PROGRAM
Payer: MEDICAID

## 2024-02-17 VITALS
WEIGHT: 300.05 LBS | TEMPERATURE: 98 F | OXYGEN SATURATION: 96 % | SYSTOLIC BLOOD PRESSURE: 134 MMHG | HEART RATE: 60 BPM | HEIGHT: 72 IN | DIASTOLIC BLOOD PRESSURE: 83 MMHG | RESPIRATION RATE: 18 BRPM

## 2024-02-17 DIAGNOSIS — Z95.828 PRESENCE OF OTHER VASCULAR IMPLANTS AND GRAFTS: Chronic | ICD-10-CM

## 2024-02-17 DIAGNOSIS — Z29.9 ENCOUNTER FOR PROPHYLACTIC MEASURES, UNSPECIFIED: ICD-10-CM

## 2024-02-17 DIAGNOSIS — N39.0 URINARY TRACT INFECTION, SITE NOT SPECIFIED: ICD-10-CM

## 2024-02-17 DIAGNOSIS — Z86.69 PERSONAL HISTORY OF OTHER DISEASES OF THE NERVOUS SYSTEM AND SENSE ORGANS: ICD-10-CM

## 2024-02-17 DIAGNOSIS — N40.0 BENIGN PROSTATIC HYPERPLASIA WITHOUT LOWER URINARY TRACT SYMPTOMS: ICD-10-CM

## 2024-02-17 DIAGNOSIS — Z87.39 PERSONAL HISTORY OF OTHER DISEASES OF THE MUSCULOSKELETAL SYSTEM AND CONNECTIVE TISSUE: ICD-10-CM

## 2024-02-17 DIAGNOSIS — N17.9 ACUTE KIDNEY FAILURE, UNSPECIFIED: ICD-10-CM

## 2024-02-17 DIAGNOSIS — E11.9 TYPE 2 DIABETES MELLITUS WITHOUT COMPLICATIONS: ICD-10-CM

## 2024-02-17 DIAGNOSIS — I10 ESSENTIAL (PRIMARY) HYPERTENSION: ICD-10-CM

## 2024-02-17 DIAGNOSIS — Z98.890 OTHER SPECIFIED POSTPROCEDURAL STATES: Chronic | ICD-10-CM

## 2024-02-17 DIAGNOSIS — D68.59 OTHER PRIMARY THROMBOPHILIA: ICD-10-CM

## 2024-02-17 DIAGNOSIS — D50.9 IRON DEFICIENCY ANEMIA, UNSPECIFIED: ICD-10-CM

## 2024-02-17 DIAGNOSIS — R10.9 UNSPECIFIED ABDOMINAL PAIN: ICD-10-CM

## 2024-02-17 DIAGNOSIS — F41.9 ANXIETY DISORDER, UNSPECIFIED: ICD-10-CM

## 2024-02-17 DIAGNOSIS — M54.9 DORSALGIA, UNSPECIFIED: ICD-10-CM

## 2024-02-17 DIAGNOSIS — N18.9 CHRONIC KIDNEY DISEASE, UNSPECIFIED: ICD-10-CM

## 2024-02-17 LAB
ALBUMIN SERPL ELPH-MCNC: 2.6 G/DL — LOW (ref 3.3–5)
ALP SERPL-CCNC: 80 U/L — SIGNIFICANT CHANGE UP (ref 40–120)
ALT FLD-CCNC: 23 U/L — SIGNIFICANT CHANGE UP (ref 12–78)
ANION GAP SERPL CALC-SCNC: 6 MMOL/L — SIGNIFICANT CHANGE UP (ref 5–17)
APPEARANCE UR: CLEAR — SIGNIFICANT CHANGE UP
APTT BLD: 44.5 SEC — HIGH (ref 24.5–35.6)
AST SERPL-CCNC: 17 U/L — SIGNIFICANT CHANGE UP (ref 15–37)
BASOPHILS # BLD AUTO: 0.09 K/UL — SIGNIFICANT CHANGE UP (ref 0–0.2)
BASOPHILS NFR BLD AUTO: 0.7 % — SIGNIFICANT CHANGE UP (ref 0–2)
BILIRUB SERPL-MCNC: 0.3 MG/DL — SIGNIFICANT CHANGE UP (ref 0.2–1.2)
BILIRUB UR-MCNC: NEGATIVE — SIGNIFICANT CHANGE UP
BUN SERPL-MCNC: 39 MG/DL — HIGH (ref 7–23)
CALCIUM SERPL-MCNC: 8.7 MG/DL — SIGNIFICANT CHANGE UP (ref 8.5–10.1)
CHLORIDE SERPL-SCNC: 110 MMOL/L — HIGH (ref 96–108)
CO2 SERPL-SCNC: 22 MMOL/L — SIGNIFICANT CHANGE UP (ref 22–31)
COLOR SPEC: YELLOW — SIGNIFICANT CHANGE UP
CREAT SERPL-MCNC: 2.2 MG/DL — HIGH (ref 0.5–1.3)
DIFF PNL FLD: ABNORMAL
EGFR: 36 ML/MIN/1.73M2 — LOW
EOSINOPHIL # BLD AUTO: 0.35 K/UL — SIGNIFICANT CHANGE UP (ref 0–0.5)
EOSINOPHIL NFR BLD AUTO: 2.8 % — SIGNIFICANT CHANGE UP (ref 0–6)
GLUCOSE SERPL-MCNC: 124 MG/DL — HIGH (ref 70–99)
GLUCOSE UR QL: NEGATIVE MG/DL — SIGNIFICANT CHANGE UP
HCT VFR BLD CALC: 33.3 % — LOW (ref 39–50)
HGB BLD-MCNC: 10.6 G/DL — LOW (ref 13–17)
IMM GRANULOCYTES NFR BLD AUTO: 0.4 % — SIGNIFICANT CHANGE UP (ref 0–0.9)
INR BLD: 2.24 RATIO — HIGH (ref 0.85–1.18)
KETONES UR-MCNC: NEGATIVE MG/DL — SIGNIFICANT CHANGE UP
LEUKOCYTE ESTERASE UR-ACNC: ABNORMAL
LIDOCAIN IGE QN: 21 U/L — SIGNIFICANT CHANGE UP (ref 13–75)
LYMPHOCYTES # BLD AUTO: 2.55 K/UL — SIGNIFICANT CHANGE UP (ref 1–3.3)
LYMPHOCYTES # BLD AUTO: 20.4 % — SIGNIFICANT CHANGE UP (ref 13–44)
MCHC RBC-ENTMCNC: 24.3 PG — LOW (ref 27–34)
MCHC RBC-ENTMCNC: 31.8 GM/DL — LOW (ref 32–36)
MCV RBC AUTO: 76.2 FL — LOW (ref 80–100)
MONOCYTES # BLD AUTO: 0.68 K/UL — SIGNIFICANT CHANGE UP (ref 0–0.9)
MONOCYTES NFR BLD AUTO: 5.4 % — SIGNIFICANT CHANGE UP (ref 2–14)
NEUTROPHILS # BLD AUTO: 8.8 K/UL — HIGH (ref 1.8–7.4)
NEUTROPHILS NFR BLD AUTO: 70.3 % — SIGNIFICANT CHANGE UP (ref 43–77)
NITRITE UR-MCNC: NEGATIVE — SIGNIFICANT CHANGE UP
NRBC # BLD: 0 /100 WBCS — SIGNIFICANT CHANGE UP (ref 0–0)
PH UR: 6 — SIGNIFICANT CHANGE UP (ref 5–8)
PLATELET # BLD AUTO: 211 K/UL — SIGNIFICANT CHANGE UP (ref 150–400)
POTASSIUM SERPL-MCNC: 3.9 MMOL/L — SIGNIFICANT CHANGE UP (ref 3.5–5.3)
POTASSIUM SERPL-SCNC: 3.9 MMOL/L — SIGNIFICANT CHANGE UP (ref 3.5–5.3)
PROT SERPL-MCNC: 6.9 G/DL — SIGNIFICANT CHANGE UP (ref 6–8.3)
PROT UR-MCNC: 100 MG/DL
PROTHROM AB SERPL-ACNC: 25.6 SEC — HIGH (ref 9.5–13)
RBC # BLD: 4.37 M/UL — SIGNIFICANT CHANGE UP (ref 4.2–5.8)
RBC # FLD: 17.1 % — HIGH (ref 10.3–14.5)
SODIUM SERPL-SCNC: 138 MMOL/L — SIGNIFICANT CHANGE UP (ref 135–145)
SP GR SPEC: 1.01 — SIGNIFICANT CHANGE UP (ref 1–1.03)
UROBILINOGEN FLD QL: 0.2 MG/DL — SIGNIFICANT CHANGE UP (ref 0.2–1)
WBC # BLD: 12.52 K/UL — HIGH (ref 3.8–10.5)
WBC # FLD AUTO: 12.52 K/UL — HIGH (ref 3.8–10.5)

## 2024-02-17 PROCEDURE — 99285 EMERGENCY DEPT VISIT HI MDM: CPT

## 2024-02-17 PROCEDURE — 74176 CT ABD & PELVIS W/O CONTRAST: CPT | Mod: 26,MA

## 2024-02-17 RX ORDER — PANTOPRAZOLE SODIUM 20 MG/1
40 TABLET, DELAYED RELEASE ORAL
Refills: 0 | Status: DISCONTINUED | OUTPATIENT
Start: 2024-02-17 | End: 2024-02-23

## 2024-02-17 RX ORDER — HYDROMORPHONE HYDROCHLORIDE 2 MG/ML
1 INJECTION INTRAMUSCULAR; INTRAVENOUS; SUBCUTANEOUS ONCE
Refills: 0 | Status: DISCONTINUED | OUTPATIENT
Start: 2024-02-17 | End: 2024-02-17

## 2024-02-17 RX ORDER — MEROPENEM 1 G/30ML
1000 INJECTION INTRAVENOUS EVERY 12 HOURS
Refills: 0 | Status: DISCONTINUED | OUTPATIENT
Start: 2024-02-17 | End: 2024-02-19

## 2024-02-17 RX ORDER — NICOTINE POLACRILEX 2 MG
2 GUM BUCCAL
Refills: 0 | Status: DISCONTINUED | OUTPATIENT
Start: 2024-02-17 | End: 2024-02-23

## 2024-02-17 RX ORDER — CEFTRIAXONE 500 MG/1
1000 INJECTION, POWDER, FOR SOLUTION INTRAMUSCULAR; INTRAVENOUS ONCE
Refills: 0 | Status: COMPLETED | OUTPATIENT
Start: 2024-02-17 | End: 2024-02-17

## 2024-02-17 RX ORDER — GLUCAGON INJECTION, SOLUTION 0.5 MG/.1ML
1 INJECTION, SOLUTION SUBCUTANEOUS ONCE
Refills: 0 | Status: DISCONTINUED | OUTPATIENT
Start: 2024-02-17 | End: 2024-02-23

## 2024-02-17 RX ORDER — HYDROMORPHONE HYDROCHLORIDE 2 MG/ML
0.5 INJECTION INTRAMUSCULAR; INTRAVENOUS; SUBCUTANEOUS EVERY 4 HOURS
Refills: 0 | Status: DISCONTINUED | OUTPATIENT
Start: 2024-02-17 | End: 2024-02-19

## 2024-02-17 RX ORDER — POLYETHYLENE GLYCOL 3350 17 G/17G
17 POWDER, FOR SOLUTION ORAL DAILY
Refills: 0 | Status: DISCONTINUED | OUTPATIENT
Start: 2024-02-17 | End: 2024-02-23

## 2024-02-17 RX ORDER — INSULIN LISPRO 100/ML
VIAL (ML) SUBCUTANEOUS
Refills: 0 | Status: DISCONTINUED | OUTPATIENT
Start: 2024-02-17 | End: 2024-02-23

## 2024-02-17 RX ORDER — INSULIN LISPRO 100/ML
2 VIAL (ML) SUBCUTANEOUS
Refills: 0 | Status: DISCONTINUED | OUTPATIENT
Start: 2024-02-17 | End: 2024-02-23

## 2024-02-17 RX ORDER — TAMSULOSIN HYDROCHLORIDE 0.4 MG/1
0.4 CAPSULE ORAL AT BEDTIME
Refills: 0 | Status: DISCONTINUED | OUTPATIENT
Start: 2024-02-17 | End: 2024-02-23

## 2024-02-17 RX ORDER — NICOTINE POLACRILEX 2 MG
1 GUM BUCCAL DAILY
Refills: 0 | Status: DISCONTINUED | OUTPATIENT
Start: 2024-02-17 | End: 2024-02-19

## 2024-02-17 RX ORDER — SODIUM CHLORIDE 9 MG/ML
1000 INJECTION, SOLUTION INTRAVENOUS
Refills: 0 | Status: DISCONTINUED | OUTPATIENT
Start: 2024-02-17 | End: 2024-02-23

## 2024-02-17 RX ORDER — MEROPENEM 1 G/30ML
1000 INJECTION INTRAVENOUS ONCE
Refills: 0 | Status: COMPLETED | OUTPATIENT
Start: 2024-02-17 | End: 2024-02-17

## 2024-02-17 RX ORDER — METOPROLOL TARTRATE 50 MG
25 TABLET ORAL
Refills: 0 | Status: DISCONTINUED | OUTPATIENT
Start: 2024-02-17 | End: 2024-02-23

## 2024-02-17 RX ORDER — FERROUS SULFATE 325(65) MG
325 TABLET ORAL DAILY
Refills: 0 | Status: DISCONTINUED | OUTPATIENT
Start: 2024-02-17 | End: 2024-02-23

## 2024-02-17 RX ORDER — GABAPENTIN 400 MG/1
600 CAPSULE ORAL THREE TIMES A DAY
Refills: 0 | Status: DISCONTINUED | OUTPATIENT
Start: 2024-02-17 | End: 2024-02-23

## 2024-02-17 RX ORDER — HYDROMORPHONE HYDROCHLORIDE 2 MG/ML
1 INJECTION INTRAMUSCULAR; INTRAVENOUS; SUBCUTANEOUS EVERY 6 HOURS
Refills: 0 | Status: DISCONTINUED | OUTPATIENT
Start: 2024-02-17 | End: 2024-02-17

## 2024-02-17 RX ORDER — LANOLIN ALCOHOL/MO/W.PET/CERES
5 CREAM (GRAM) TOPICAL AT BEDTIME
Refills: 0 | Status: DISCONTINUED | OUTPATIENT
Start: 2024-02-17 | End: 2024-02-23

## 2024-02-17 RX ORDER — HYDROMORPHONE HYDROCHLORIDE 2 MG/ML
0.5 INJECTION INTRAMUSCULAR; INTRAVENOUS; SUBCUTANEOUS EVERY 6 HOURS
Refills: 0 | Status: DISCONTINUED | OUTPATIENT
Start: 2024-02-17 | End: 2024-02-17

## 2024-02-17 RX ORDER — SODIUM CHLORIDE 9 MG/ML
1000 INJECTION INTRAMUSCULAR; INTRAVENOUS; SUBCUTANEOUS
Refills: 0 | Status: DISCONTINUED | OUTPATIENT
Start: 2024-02-17 | End: 2024-02-21

## 2024-02-17 RX ORDER — KETOROLAC TROMETHAMINE 30 MG/ML
30 SYRINGE (ML) INJECTION ONCE
Refills: 0 | Status: DISCONTINUED | OUTPATIENT
Start: 2024-02-17 | End: 2024-02-17

## 2024-02-17 RX ORDER — DULOXETINE HYDROCHLORIDE 30 MG/1
60 CAPSULE, DELAYED RELEASE ORAL DAILY
Refills: 0 | Status: DISCONTINUED | OUTPATIENT
Start: 2024-02-17 | End: 2024-02-23

## 2024-02-17 RX ORDER — HYDROMORPHONE HYDROCHLORIDE 2 MG/ML
2 INJECTION INTRAMUSCULAR; INTRAVENOUS; SUBCUTANEOUS ONCE
Refills: 0 | Status: DISCONTINUED | OUTPATIENT
Start: 2024-02-17 | End: 2024-02-17

## 2024-02-17 RX ORDER — WARFARIN SODIUM 2.5 MG/1
4 TABLET ORAL ONCE
Refills: 0 | Status: COMPLETED | OUTPATIENT
Start: 2024-02-17 | End: 2024-02-17

## 2024-02-17 RX ORDER — INSULIN LISPRO 100/ML
VIAL (ML) SUBCUTANEOUS AT BEDTIME
Refills: 0 | Status: DISCONTINUED | OUTPATIENT
Start: 2024-02-17 | End: 2024-02-23

## 2024-02-17 RX ORDER — AMLODIPINE BESYLATE 2.5 MG/1
10 TABLET ORAL DAILY
Refills: 0 | Status: DISCONTINUED | OUTPATIENT
Start: 2024-02-17 | End: 2024-02-23

## 2024-02-17 RX ORDER — LACTOBACILLUS ACIDOPHILUS 100MM CELL
1 CAPSULE ORAL DAILY
Refills: 0 | Status: DISCONTINUED | OUTPATIENT
Start: 2024-02-17 | End: 2024-02-23

## 2024-02-17 RX ORDER — MEROPENEM 1 G/30ML
INJECTION INTRAVENOUS
Refills: 0 | Status: DISCONTINUED | OUTPATIENT
Start: 2024-02-17 | End: 2024-02-19

## 2024-02-17 RX ORDER — ASCORBIC ACID 60 MG
500 TABLET,CHEWABLE ORAL DAILY
Refills: 0 | Status: DISCONTINUED | OUTPATIENT
Start: 2024-02-17 | End: 2024-02-23

## 2024-02-17 RX ORDER — DEXTROSE 50 % IN WATER 50 %
25 SYRINGE (ML) INTRAVENOUS ONCE
Refills: 0 | Status: DISCONTINUED | OUTPATIENT
Start: 2024-02-17 | End: 2024-02-23

## 2024-02-17 RX ORDER — HYDROMORPHONE HYDROCHLORIDE 2 MG/ML
0.5 INJECTION INTRAMUSCULAR; INTRAVENOUS; SUBCUTANEOUS ONCE
Refills: 0 | Status: DISCONTINUED | OUTPATIENT
Start: 2024-02-17 | End: 2024-02-17

## 2024-02-17 RX ORDER — METHOCARBAMOL 500 MG/1
750 TABLET, FILM COATED ORAL THREE TIMES A DAY
Refills: 0 | Status: DISCONTINUED | OUTPATIENT
Start: 2024-02-17 | End: 2024-02-23

## 2024-02-17 RX ORDER — FINASTERIDE 5 MG/1
5 TABLET, FILM COATED ORAL DAILY
Refills: 0 | Status: DISCONTINUED | OUTPATIENT
Start: 2024-02-17 | End: 2024-02-23

## 2024-02-17 RX ORDER — INSULIN GLARGINE 100 [IU]/ML
12 INJECTION, SOLUTION SUBCUTANEOUS AT BEDTIME
Refills: 0 | Status: DISCONTINUED | OUTPATIENT
Start: 2024-02-17 | End: 2024-02-23

## 2024-02-17 RX ORDER — DEXTROSE 50 % IN WATER 50 %
12.5 SYRINGE (ML) INTRAVENOUS ONCE
Refills: 0 | Status: DISCONTINUED | OUTPATIENT
Start: 2024-02-17 | End: 2024-02-23

## 2024-02-17 RX ORDER — TRAZODONE HCL 50 MG
50 TABLET ORAL AT BEDTIME
Refills: 0 | Status: DISCONTINUED | OUTPATIENT
Start: 2024-02-17 | End: 2024-02-23

## 2024-02-17 RX ORDER — ACETAMINOPHEN 500 MG
650 TABLET ORAL EVERY 6 HOURS
Refills: 0 | Status: DISCONTINUED | OUTPATIENT
Start: 2024-02-17 | End: 2024-02-23

## 2024-02-17 RX ORDER — HYDROMORPHONE HYDROCHLORIDE 2 MG/ML
0.2 INJECTION INTRAMUSCULAR; INTRAVENOUS; SUBCUTANEOUS EVERY 6 HOURS
Refills: 0 | Status: DISCONTINUED | OUTPATIENT
Start: 2024-02-17 | End: 2024-02-17

## 2024-02-17 RX ORDER — HYDROMORPHONE HYDROCHLORIDE 2 MG/ML
1 INJECTION INTRAMUSCULAR; INTRAVENOUS; SUBCUTANEOUS EVERY 4 HOURS
Refills: 0 | Status: DISCONTINUED | OUTPATIENT
Start: 2024-02-17 | End: 2024-02-19

## 2024-02-17 RX ORDER — SODIUM CHLORIDE 9 MG/ML
1000 INJECTION INTRAMUSCULAR; INTRAVENOUS; SUBCUTANEOUS ONCE
Refills: 0 | Status: COMPLETED | OUTPATIENT
Start: 2024-02-17 | End: 2024-02-17

## 2024-02-17 RX ORDER — DEXTROSE 50 % IN WATER 50 %
15 SYRINGE (ML) INTRAVENOUS ONCE
Refills: 0 | Status: DISCONTINUED | OUTPATIENT
Start: 2024-02-17 | End: 2024-02-23

## 2024-02-17 RX ADMIN — SODIUM CHLORIDE 60 MILLILITER(S): 9 INJECTION INTRAMUSCULAR; INTRAVENOUS; SUBCUTANEOUS at 10:10

## 2024-02-17 RX ADMIN — Medication 500 MILLIGRAM(S): at 11:36

## 2024-02-17 RX ADMIN — Medication 50 MILLIGRAM(S): at 21:13

## 2024-02-17 RX ADMIN — Medication 1 TABLET(S): at 11:36

## 2024-02-17 RX ADMIN — Medication 2 MILLIGRAM(S): at 22:00

## 2024-02-17 RX ADMIN — Medication 5 MILLIGRAM(S): at 21:12

## 2024-02-17 RX ADMIN — CEFTRIAXONE 100 MILLIGRAM(S): 500 INJECTION, POWDER, FOR SOLUTION INTRAMUSCULAR; INTRAVENOUS at 05:45

## 2024-02-17 RX ADMIN — Medication 2 UNIT(S): at 10:55

## 2024-02-17 RX ADMIN — HYDROMORPHONE HYDROCHLORIDE 1 MILLIGRAM(S): 2 INJECTION INTRAMUSCULAR; INTRAVENOUS; SUBCUTANEOUS at 23:10

## 2024-02-17 RX ADMIN — HYDROMORPHONE HYDROCHLORIDE 1 MILLIGRAM(S): 2 INJECTION INTRAMUSCULAR; INTRAVENOUS; SUBCUTANEOUS at 22:14

## 2024-02-17 RX ADMIN — TAMSULOSIN HYDROCHLORIDE 0.4 MILLIGRAM(S): 0.4 CAPSULE ORAL at 21:12

## 2024-02-17 RX ADMIN — Medication 1 PATCH: at 21:17

## 2024-02-17 RX ADMIN — HYDROMORPHONE HYDROCHLORIDE 1 MILLIGRAM(S): 2 INJECTION INTRAMUSCULAR; INTRAVENOUS; SUBCUTANEOUS at 17:40

## 2024-02-17 RX ADMIN — HYDROMORPHONE HYDROCHLORIDE 1 MILLIGRAM(S): 2 INJECTION INTRAMUSCULAR; INTRAVENOUS; SUBCUTANEOUS at 07:30

## 2024-02-17 RX ADMIN — Medication 325 MILLIGRAM(S): at 11:36

## 2024-02-17 RX ADMIN — Medication 2: at 17:10

## 2024-02-17 RX ADMIN — WARFARIN SODIUM 4 MILLIGRAM(S): 2.5 TABLET ORAL at 21:13

## 2024-02-17 RX ADMIN — DULOXETINE HYDROCHLORIDE 60 MILLIGRAM(S): 30 CAPSULE, DELAYED RELEASE ORAL at 11:36

## 2024-02-17 RX ADMIN — MEROPENEM 100 MILLIGRAM(S): 1 INJECTION INTRAVENOUS at 21:13

## 2024-02-17 RX ADMIN — HYDROMORPHONE HYDROCHLORIDE 1 MILLIGRAM(S): 2 INJECTION INTRAMUSCULAR; INTRAVENOUS; SUBCUTANEOUS at 06:43

## 2024-02-17 RX ADMIN — GABAPENTIN 600 MILLIGRAM(S): 400 CAPSULE ORAL at 21:12

## 2024-02-17 RX ADMIN — POLYETHYLENE GLYCOL 3350 17 GRAM(S): 17 POWDER, FOR SOLUTION ORAL at 11:38

## 2024-02-17 RX ADMIN — SODIUM CHLORIDE 1000 MILLILITER(S): 9 INJECTION INTRAMUSCULAR; INTRAVENOUS; SUBCUTANEOUS at 04:04

## 2024-02-17 RX ADMIN — Medication 2 UNIT(S): at 17:11

## 2024-02-17 RX ADMIN — INSULIN GLARGINE 12 UNIT(S): 100 INJECTION, SOLUTION SUBCUTANEOUS at 22:01

## 2024-02-17 RX ADMIN — HYDROMORPHONE HYDROCHLORIDE 1 MILLIGRAM(S): 2 INJECTION INTRAMUSCULAR; INTRAVENOUS; SUBCUTANEOUS at 13:03

## 2024-02-17 RX ADMIN — HYDROMORPHONE HYDROCHLORIDE 0.5 MILLIGRAM(S): 2 INJECTION INTRAMUSCULAR; INTRAVENOUS; SUBCUTANEOUS at 10:06

## 2024-02-17 RX ADMIN — MEROPENEM 100 MILLIGRAM(S): 1 INJECTION INTRAVENOUS at 08:52

## 2024-02-17 RX ADMIN — HYDROMORPHONE HYDROCHLORIDE 2 MILLIGRAM(S): 2 INJECTION INTRAMUSCULAR; INTRAVENOUS; SUBCUTANEOUS at 10:12

## 2024-02-17 RX ADMIN — GABAPENTIN 600 MILLIGRAM(S): 400 CAPSULE ORAL at 16:34

## 2024-02-17 RX ADMIN — FINASTERIDE 5 MILLIGRAM(S): 5 TABLET, FILM COATED ORAL at 11:37

## 2024-02-17 RX ADMIN — HYDROMORPHONE HYDROCHLORIDE 1 MILLIGRAM(S): 2 INJECTION INTRAMUSCULAR; INTRAVENOUS; SUBCUTANEOUS at 18:00

## 2024-02-17 RX ADMIN — HYDROMORPHONE HYDROCHLORIDE 0.5 MILLIGRAM(S): 2 INJECTION INTRAMUSCULAR; INTRAVENOUS; SUBCUTANEOUS at 04:04

## 2024-02-17 RX ADMIN — METHOCARBAMOL 750 MILLIGRAM(S): 500 TABLET, FILM COATED ORAL at 17:40

## 2024-02-17 RX ADMIN — HYDROMORPHONE HYDROCHLORIDE 0.5 MILLIGRAM(S): 2 INJECTION INTRAMUSCULAR; INTRAVENOUS; SUBCUTANEOUS at 08:51

## 2024-02-17 NOTE — H&P ADULT - NSHPSOCIALHISTORY_GEN_ALL_CORE
Tobacco:   EtOH:    Recreational drug use:   Lives with:   Ambulates:   ADLs: Tobacco: 1-2 packs a day since 16  EtOH:  None  Recreational drug use: 1 bowl of marijuana daily 0.5g  Ambulates: Walker  ADLs: Independent

## 2024-02-17 NOTE — H&P ADULT - PROBLEM SELECTOR PLAN 2
Hgb 10.6, MCV 76.2 on admission  - no signs of bleeding on exam  - f/u AM iron panel  - monitor daily CBC Hgb 10.6, MCV 76.2 on admission  - no signs of bleeding on exam  - f/u AM iron panel  - c/w iron  - monitor daily CBC

## 2024-02-17 NOTE — H&P ADULT - PROBLEM SELECTOR PLAN 3
ANTONIO (on CKD) likely 2/2 pre-renal azotemia in the setting of dehydration, GIB, poor PO intake, medications (NSAIDs, ACE inhibitors, chemotherapy, IV contrast)  - Baseline creatinine 1.5  - Creatinine Currently 2.2  - IVF 75 cc/hr for 12 hours  - Monitor BMP  - FeNa/FeUrea, UA  - Avoid nephrotoxic medications as able  - Consider nephrology consult for worsening renal function ANTONIO (on CKD) likely 2/2 pre-renal azotemia in the setting of , poor PO intake   - Baseline creatinine 1.5  - Creatinine Currently 2.2  - IVF ?  - Monitor BMP  - FeNa/FeUrea, UA  - Avoid nephrotoxic medications as able ANTONIO (on CKD) likely 2/2 pre-renal azotemia in the setting of , poor PO intake   - Baseline creatinine 1.5  - Creatinine Currently 2.2  - IVF 60 cc/hr   - Monitor BMP  - FeNa/FeUrea, UA  - Avoid nephrotoxic medications as able

## 2024-02-17 NOTE — H&P ADULT - NSICDXFAMILYHX_GEN_ALL_CORE_FT
FAMILY HISTORY:  Father  Still living? Unknown  Family history of sinus tachycardia, Age at diagnosis: Age Unknown    Mother  Still living? Unknown  FH: HTN (hypertension), Age at diagnosis: Age Unknown

## 2024-02-17 NOTE — ED PROVIDER NOTE - CLINICAL SUMMARY MEDICAL DECISION MAKING FREE TEXT BOX
48-year-old male history of renal stones being followed by urologist in [place] was diagnosed with 2 stones 2 weeks ago unsure whether it was intrarenal or obstructive complaining of left-sided flank pain today sent from [facility].  Has been on 10 mg of oxycodone without much relief.  Denies any nausea vomiting diarrhea.  Denies any fever or chills.    r/o renal stone, infection, labs, CT, UA, IV analgesia

## 2024-02-17 NOTE — ED ADULT NURSE NOTE - ED STAT RN HANDOFF DETAILS
Report given to Seema RN, pt alert and oriented able to make all needs known, rr even and unlabored GCS 15 NAD last contact with pt

## 2024-02-17 NOTE — H&P ADULT - NSHPREVIEWOFSYSTEMS_GEN_ALL_CORE
CONSTITUTIONAL: denies fever, chills, fatigue, weakness  HEENT: denies blurred vision, sore throat  SKIN: denies new lesions, rash  CARDIOVASCULAR: denies chest pain, chest pressure, palpitations  RESPIRATORY: denies shortness of breath, sputum production  GASTROINTESTINAL: denies nausea, vomiting, diarrhea, abdominal pain  GENITOURINARY: denies dysuria, discharge  NEUROLOGICAL: denies numbness, headache, focal weakness  MUSCULOSKELETAL: denies new joint pain, muscle aches  HEMATOLOGIC: denies gross bleeding, bruising  LYMPHATICS: denies enlarged lymph nodes, extremity swelling  PSYCHIATRIC: denies recent changes in anxiety, depression  ENDOCRINOLOGIC: denies sweating, cold or heat intolerance CONSTITUTIONAL: denies fever, chills, fatigue, weakness  HEENT: denies blurred vision, sore throat  SKIN: denies new lesions, rash  CARDIOVASCULAR: denies chest pain, chest pressure, palpitations  RESPIRATORY: denies shortness of breath, sputum production  GASTROINTESTINAL: denies nausea, vomiting, diarrhea, + left flank pain, denies abdominal pain  GENITOURINARY: denies dysuria, discharge  NEUROLOGICAL: denies numbness, headache, focal weakness  MUSCULOSKELETAL: denies new joint pain, muscle aches  HEMATOLOGIC: denies gross bleeding, bruising  LYMPHATICS: denies enlarged lymph nodes, extremity swelling  PSYCHIATRIC: denies recent changes in anxiety, depression  ENDOCRINOLOGIC: denies sweating, cold or heat intolerance CONSTITUTIONAL: denies fever, chills, fatigue, weakness  HEENT: denies blurred vision, sore throat  SKIN: denies new lesions, rash  CARDIOVASCULAR: denies chest pain, chest pressure, palpitations  RESPIRATORY: denies shortness of breath, sputum production  GASTROINTESTINAL: denies nausea, vomiting, diarrhea, + left flank pain, denies abdominal pain  GENITOURINARY: denies dysuria, discharge  NEUROLOGICAL: denies numbness, headache, focal weakness  MUSCULOSKELETAL: denies new joint pain, muscle aches  HEMATOLOGIC: denies gross bleeding, bruising  PSYCHIATRIC: denies recent changes in anxiety, depression  ENDOCRINOLOGIC: denies sweating, cold or heat intolerance

## 2024-02-17 NOTE — ED PROVIDER NOTE - NS ED MD DISPO ADMITTING SERVICE
[address] [facility]  Outpatient Progress Note  Mel Aguilera MD    Patient Name: Bridger Lucas MRN: <[id number]>   Age: 81 y.o. YOB: 1936   Sex: female      [address] Complaint   Patient presents with    Follow-up     Right Ankle Pain ref by Andry   Bridger Lucas is a 81 y.o. female presents to the office today for evaluation of complaints of pain in her right lower leg. Patient reports chronic pain in the lateral aspect of the foot ankle and calf. Symptoms present themselves most frequently at night. She has had no injury to her foot or ankle. She reports history of chronic low back pain. She has to use a rolling walker to ambulate because of the pain. Often the pain will radiate from her lower back into her right lateral hip thigh knee lower leg and ankle. She has occasional numbness in the lateral aspect of the leg. She has a history of poorly controlled diabetes. She has not previously been diagnosed with peripheral neuropathy but does note some numbness from time to time in her feet. She had seen Dr. Betito Alexandre for problem with her shoulder. He suggested that she see me for evaluation of her ankle pain.     Pain Assessment  Location of Pain: Ankle  Location Modifiers: Right  Severity of Pain: 9  Quality of Pain: Throbbing,Aching,Popping  Duration of Pain: Persistent  Frequency of Pain: Constant  Aggravating Factors: Walking,Standing  Limiting Behavior: Yes  Relieving Factors: Rest  Result of Injury: No  Work-Related Injury: No  Are there other pain locations you wish to document?: No    PAST MEDICAL HISTORY      Past Medical History:   Diagnosis Date    [facility]     Cancer ([facility]     Diabetes mellitus ([facility]     GERD (gastroesophageal reflux disease)     History of therapeutic radiation     Hyperlipidemia     Hypertension     Influenza A 12/19/2017    Kidney stone        PAST SURGICAL HISTORY     Past Surgical History:   Procedure Laterality Date    BREAST LUMPECTOMY      right    COLONOSCOPY  8/31/2015    HYSTERECTOMY      LITHOTRIPSY      RECTAL SURGERY         MEDICATIONS     Current Outpatient Medications   Medication Sig Dispense Refill    metoprolol tartrate (LOPRESSOR) 50 MG tablet Take 50 mg by mouth 2 times daily      cephALEXin (KEFLEX) 500 MG capsule Take 250 mg by mouth 2 times daily      insulin lispro (HUMALOG) 100 UNIT/ML injection vial Inject 25 Units into the skin 3 times daily (before meals)       sitagliptan-metformin (JANUMET)  MG per tablet Take 1 tablet by mouth 2 times daily (with meals).  lisinopril (PRINIVIL;ZESTRIL) 40 MG tablet Take 40 mg by mouth daily.  pantoprazole (PROTONIX) 40 MG tablet Take 20 mg by mouth daily       aspirin 81 MG EC tablet Take 81 mg by mouth daily.  simvastatin (ZOCOR) 20 MG tablet Take 20 mg by mouth nightly.  insulin glargine (LANTUS) 100 UNIT/ML injection Inject 55 Units into the skin nightly        No current facility-administered medications for this visit.        ALLERGIES     Allergies   Allergen Reactions    Demerol Other (See Comments)     mean    Antihistamine [Diphenhydramine Hcl]     Ciprofloxacin     Codeine     Levofloxacin     Librax [Chlordiazepoxide-Methscopol]     Macrodantin [Nitrofurantoin Macrocrystal]     Morphine     Penicillins     Pyridium [Phenazopyridine Hcl]     Ranitidine Hcl     Spironolactone Diarrhea    Sulfa Antibiotics     Vicodin [Hydrocodone-Acetaminophen] Nausea Only     STATES THAT IT MOSTLY MAKES HER GOOFY, AND SLIGHTLY NAUSEOUS    Dilaudid [Hydromorphone] Nausea And Vomiting     WORKED WELL FOR PAIN BUT MADE HER NAUSEOUS FOR 2 HOURS       FAMILY HISTORY     Family History   Problem Relation Age of Onset    Breast Cancer Paternal Cousin     Breast Cancer Maternal Cousin        SOCIAL HISTORY     Social History     Socioeconomic History    Marital status:      Spouse name: None    Number of children: None    Years of education: None    Highest education level: None   Occupational History    None   Tobacco Use    Smoking status: Never Smoker    Smokeless tobacco: Never Used   Substance and Sexual Activity    Alcohol use: No    Drug use: None    Sexual activity: None   Other Topics Concern    None   Social History Narrative    None     Social Determinants of Health     Financial Resource Strain:     Difficulty of Paying Living Expenses: Not on file   Food Insecurity:     Worried About Running Out of Food in the Last Year: Not on file    Andres of Food in the Last Year: Not on file   Transportation Needs:     Lack of Transportation (Medical): Not on file    Lack of Transportation (Non-Medical):  Not on file   Physical Activity:     Days of Exercise per Week: Not on file    Minutes of Exercise per Session: Not on file   Stress:     Feeling of Stress : Not on file   Social Connections:     Frequency of Communication with Friends and Family: Not on file    Frequency of Social Gatherings with Friends and Family: Not on file    Attends [employer] Services: Not on file    Active Member of [address] [place] [address] or Organizations: Not on file    Attends Club or Organization Meetings: Not on file    Marital Status: Not on file   Intimate Partner Violence:     Fear of Current or Ex-Partner: Not on file    Emotionally Abused: Not on file    Physically Abused: Not on file    Sexually Abused: Not on file   Housing Stability:     Unable to Pay for Housing in the Last Year: Not on file    Number of [place] in the Last Year: Not on file    Unstable Housing in the Last Year: Not on file       REVIEW OF SYSTEMS   General: no fever, chills, night sweats, anorexia, malaise, fatigue, or weight change  Hematologic:  no unexplained bleeding or bruising  HEENT:   no nasal congestion, rhinorrhea, sore throat, or facial pain  Respiratory:  no cough, dyspnea, or chest pain  Cardiovascular:  no angina, SHARMA, PND, orthopnea, dependent edema, or palpitations  Gastrointestinal:  no nausea, vomiting, diarrhea, constipation, or abdominal pain  Genitourinary:  no urinary urgency, frequency, dysuria, or hematuria  Musculoskeletal: see HPI  Endocrine:  no heat or cold intolerance and no polyphagia, polydipsia, or polyuria  Skin:  no skin eruptions or changing lesions  Neurologic:  no focal weakness, numbness/tingling, tremor, or severe headache. See HPI. See HPI for pertinent positives. PHYSICAL EXAM   Vital Signs:   Vitals:    02/08/22 1201   Weight: 250 lb (113.4 kg)   Height: 5' 2\" (1.575 m)       General appearance: healthy, alert, no distress  Skin: Skin color, texture, turgor normal. No rashes or lesions  HEENT: atraumatic, normocephalic. PERRL  Respiratory: Unlabored breathing  Lymphatic: No adenopathy   Neuro: Alert and oriented, normal distal sensation, normal bilateral DTRs  Vascular: Normal distal capillary and distal pulses  Muskuloskeletal Exam: Bilateral foot and ankle examination reveals no swelling erythema warmth ecchymosis or edema. Weightbearing alignment of the ankle hindfoot midfoot and forefoot are normal.  There is no specific tenderness to palpation. There is no restriction in range of motion. No ligamentous instability. She has 2+ dorsalis pedis pulses 1+ posterior tib pulses. No sensory deficit. No motor deficit. RADIOLOGY   X-rays obtained and reviewed in office:  Views bilateral feet and ankles standing 3 views    Impression: No significant abnormality    IMPRESSION     1. Lumbar radiculopathy, right         PLAN   I had a lengthy discussion with patient today regarding diagnosis and treatment options and recommendations. No identifiable foot or ankle pathology. I suspect her symptoms are secondary to a lumbar radiculopathy.   Recommend further evaluation with EMG and nerve conduction velocities as well as an MRI scan of the lumbar spine followed by consultation with the spine team    FOLLOWUP     Return for consultation with spine team.    Orders Placed This Encounter   Procedures    XR ANKLE RIGHT (MIN 3 VIEWS)     Standing Status:   Future     Number of Occurrences:   1     Standing Expiration Date:   2/2/2023     Order Specific Question:   Reason for exam:     Answer:   PAIN    XR FOOT RIGHT (MIN 3 VIEWS)     Standing Status:   Future     Number of Occurrences:   1     Standing Expiration Date:   2/2/2023     Order Specific Question:   Reason for exam:     Answer:   PAIN      No orders of the defined types were placed in this encounter.       Patient was instructed on appropriate use of braces, participation in home exercise programs, healthy lifestyle choices and weight loss as appropriate     Sera Sykes MD MED

## 2024-02-17 NOTE — H&P ADULT - PROBLEM SELECTOR PLAN 4
DVT ppx: heparin chronic  c/w home clonidine and metoprolol chronic  c/w home clonidine, amlodipine, and metoprolol

## 2024-02-17 NOTE — CONSULT NOTE ADULT - ASSESSMENT
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 non-obstructive B/L renal calculi are present.  Mild trabeculated bladder wall thickening.  - per chart review, cultures show klebsiella pneumonia ESBL resistant to rocephin  Recommendations:   C/w meropenem for now  F/u cx  Trend temps/WBC  Monitor renal fxn  Appreciate  recs  Additional care per primary team    Dr. Callahan to resume care 2/19  Infectious Diseases will continue to follow. Please call with any questions.   Frieda Unger M.D.  [facility] Division of Infectious Diseases [phone]  For after 5 P.M. and weekends, please call [phone]

## 2024-02-17 NOTE — H&P ADULT - PROBLEM SELECTOR PLAN 7
on warfarin for unknown prothrombic state after hx of dvt  - INR 2.2  - c/w ___ on warfarin for unknown prothrombic state after hx of dvt  - INR 2.2  - hold until uro recs

## 2024-02-17 NOTE — CONSULT NOTE ADULT - SUBJECTIVE AND OBJECTIVE BOX
[facility], Division of Infectious Diseases  FAIZAN [facility], Y. Patel, 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    Patient states that 4 days ago, he began to develop left sided flank pain. Patient describes the pain as sharp, tender, and worse with movement. He states the pain is 10/10 and has been taking oxycodone 10mg and tylenol twice a day, without much relief. Patient has a history of multiple utis, and kidney stones (s/p lithotripsy ).  He endorses nausea, but denies vomiting. The pain has worsened progressively over the past few days, which prompted him to visit the emergency room for further evaluation.     Of note, he recently had a uti last month, and was treated with meropenem     Patient denies F/C, HA, N/V, CP, SOB, C/D, Dysuria, Leg pain, or Leg swelling.    Patient denies any recent travel, sick contacts, or long periods of immobilization.      ED Course:  Vitals: 134/83mmHg, 60bpm, 97.5F, 96% on RA  Labs: WBc 12.5, Hgb 10.6, MCV 76.2, PT 25.6, INR 2.24, aPTT 44.5, Cl 110, BUN/Cr 39/2.2, glucose 2.2, GFR 36  UA: many bacteria, squamous cells present. mod blood. large leuk esterase, 100 protein  CT renal stone hunt: mild B/L hydroureteronephrosis w/o calcified stone identified along course of the ureter. non-obstructive B/L renal calculi are present.  Mild trabeculated bladder wall thickening.  Given - rocephin 1g, IV dilaudid 0.5mg x1, IV dilaudid 1mg x1, 1L NS (2024 07:12)    Pt seen at bedside  Hx as above      Active Medications--  acetaminophen     Tablet .. 650 milliGRAM(s) Oral every 6 hours PRN  amLODIPine   Tablet 10 milliGRAM(s) Oral daily  ascorbic acid 500 milliGRAM(s) Oral daily  cloNIDine 0.1 milliGRAM(s) Oral daily  dextrose 5%. 1000 milliLiter(s) IV Continuous <Continuous>  dextrose 5%. 1000 milliLiter(s) IV Continuous <Continuous>  dextrose 50% Injectable 12.5 Gram(s) IV Push once  dextrose 50% Injectable 25 Gram(s) IV Push once  dextrose 50% Injectable 25 Gram(s) IV Push once  dextrose Oral Gel 15 Gram(s) Oral once PRN  DULoxetine 60 milliGRAM(s) Oral daily  ferrous    sulfate 325 milliGRAM(s) Oral daily  finasteride 5 milliGRAM(s) Oral daily  gabapentin 600 milliGRAM(s) Oral three times a day  glucagon  Injectable 1 milliGRAM(s) IntraMuscular once  HYDROmorphone  Injectable 0.5 milliGRAM(s) IV Push every 4 hours PRN  HYDROmorphone  Injectable 1 milliGRAM(s) IV Push every 4 hours PRN  insulin glargine Injectable (LANTUS) 12 Unit(s) SubCutaneous at bedtime  insulin lispro (ADMELOG) corrective regimen sliding scale   SubCutaneous at bedtime  insulin lispro (ADMELOG) corrective regimen sliding scale   SubCutaneous three times a day before meals  insulin lispro Injectable (ADMELOG) 2 Unit(s) SubCutaneous three times a day before meals  lactobacillus acidophilus 1 Tablet(s) Oral daily  melatonin 5 milliGRAM(s) Oral at bedtime  meropenem  IVPB 1000 milliGRAM(s) IV Intermittent every 12 hours  meropenem  IVPB      methocarbamol 750 milliGRAM(s) Oral three times a day  metoprolol tartrate 25 milliGRAM(s) Oral two times a day  multivitamin 1 Tablet(s) Oral daily  nicotine  Polacrilex Gum 2 milliGRAM(s) Oral every 2 hours PRN  pantoprazole    Tablet 40 milliGRAM(s) Oral before breakfast  polyethylene glycol 3350 17 Gram(s) Oral daily  sodium chloride 0.9%. 1000 milliLiter(s) IV Continuous <Continuous>  tamsulosin 0.4 milliGRAM(s) Oral at bedtime  traZODone 50 milliGRAM(s) Oral at bedtime  warfarin 4 milliGRAM(s) Oral once    Antimicrobials:   meropenem  IVPB 1000 milliGRAM(s) IV Intermittent every 12 hours  meropenem  IVPB        Immunologic:     ROS:  CONSTITUTIONAL: No fevers or chills. No weakness or headache. No weight changes.  EYES/ENT: No visual or hearing changes. No sore throat or throat pain .  NECK: No pain or stiffness  RESPIRATORY: No cough, wheezing, or hemoptysis. No shortness of breath  CARDIOVASCULAR: No chest pain or palpitations  GASTROINTESTINAL: No abdominal pain. No nausea or vomiting. No diarrhea or constipation.  GENITOURINARY: No dysuria, frequency or hematuria  NEUROLOGICAL: No numbness or weakness  SKIN: No itching or rashes  PSYCHIATRIC: Pleasant. Appropriate affect    Allergies: sulfa drugs (Rash)    PMH -- Renal stones    H/O Guillain-Shawnee syndrome    History of neurogenic bowel    DM2 (diabetes mellitus, type 2)    HTN (hypertension)    BPH without obstruction/lower urinary tract symptoms    Degenerative arthritis    DVT of lower limb, acute    CVA (cerebrovascular accident)      PSH -- H/O lithotripsy      FH -- Family history of sinus tachycardia (Father)    FH: HTN (hypertension) (Mother)      Social History --  EtOH: denies   Tobacco: denies   Drug Use: denies     Travel/Environmental/Occupational History:    Physical Exam--  Vital Signs Last 24 Hrs  T(F): 97 (2024 12:06), Max: 97.5 (2024 01:56)  HR: 58 (2024 17:36) (48 - 60)  BP: 135/85 (2024 17:36) (112/69 - 135/85)  RR: 17 (2024 12:06) (16 - 18)  SpO2: 93% (2024 12:06) (93% - 96%)  General: nontoxic-appearing, no acute distress  HEENT: NC/AT, EOMI,   Lungs: Clear bilaterally without rales, wheezing or rhonchi  Heart: Regular rate and rhythm. No murmur, rub or gallop.  Abdomen: Soft. Nondistended. Nontender  Extremities: No cyanosis or clubbing. No edema.   Skin: Warm. Dry. Good turgor.     Laboratory & Imaging Data:  CBC:                       10.6   12.52 )-----------( 211      ( 2024 03:36 )             33.3     CMP: 02-17    138  |  110<H>  |  39<H>  ----------------------------<  124<H>  3.9   |  22  |  2.20<H>    Ca    8.7      2024 03:36    TPro  6.9  /  Alb  2.6<L>  /  TBili  0.3  /  DBili  x   /  AST  17  /  ALT  23  /  AlkPhos  80      LIVER FUNCTIONS - ( 2024 03:36 )  Alb: 2.6 g/dL / Pro: 6.9 g/dL / ALK PHOS: 80 U/L / ALT: 23 U/L / AST: 17 U/L / GGT: x           Urinalysis Basic - ( 2024 04:25 )    Color: Yellow / Appearance: Clear / S.008 / pH: x  Gluc: x / Ketone: Negative mg/dL  / Bili: Negative / Urobili: 0.2 mg/dL   Blood: x / Protein: 100 mg/dL / Nitrite: Negative   Leuk Esterase: Large / RBC: 3-5 /HPF / WBC >50 /HPF   Sq Epi: x / Non Sq Epi: x / Bacteria: Many /HPF        Microbiology: reviewed        Radiology: reviewed    < from: CT Renal Stone Hunt (24 @ 05:02) >    ACC: [id number] EXAM:  CT RENAL STONE ROSALES   ORDERED BY: SAMPSON JONES     PROCEDURE DATE:  2024          INTERPRETATION:  CLINICAL INFORMATION: Left flank pain.    COMPARISON: None.    PROCEDURE:  CT of the Abdomen and Pelvis was performed without intravenous contrast.  Intravenous contrast: None.  Oral contrast: None.  Sagittal and coronal reformats were performed.    FINDINGS: Absence of intravenous contrast limits evaluation of   vasculature and solid organs.    LOWER CHEST: Mild bibasilar dependent atelectasis.    LIVER: Within normal limits.  BILE DUCTS: Normal caliber.  GALLBLADDER: Vague opacities identified at the fundal portion of the   gallbladder. Cholelithiasis with stone measuring up to 3.2 cm the body of   the gallbladder. No significant wall thickening or pericholecystic edema.  SPLEEN: Within normal limits.  PANCREAS: Within normal limits.  ADRENALS: Within normal limits.  KIDNEYS/URETERS: Mild bilateral hydroureteronephrosis without calcified   stone identified along thecourse of the ureter. Additional punctate   nonobstructive bilateral renal calculi are present.    BLADDER: Mild trabeculated bladder wall thickening. Anderson catheter   identified.  REPRODUCTIVE ORGANS: No prostatomegaly.    BOWEL: No bowel obstruction. Normal appendix.  PERITONEUM: No ascites.  VESSELS:  Normal aortic caliber. Infrarenal IVC filter.  RETROPERITONEUM: No lymphadenopathy.  ABDOMINAL WALL: Soft tissue density at the level of sacral coccygeal   junction, likely decubitus ulcer. No foci of air or abscess identified.    Small bilateral groin hernias containing fat. Left spigelian hernia   containing nonobstructed bowel loop.  BONES: Degenerative changes. Posterior metallic fusion of L4 and L5,   metallic artifact limits detailed evaluation of the spinal canal on   adjacent structures.    IMPRESSION:    Cholelithiasis. Vague opacities at the fundal portion of the gallbladder.   Right upper quadrant ultrasound suggested. No significant wall thickening   or pericholecystic edema.    Mild bilateral hydroureteronephrosis without calcified stone identified   along the course of the ureter. Differential includes recently passed   kidney stone or infection. Additional punctate nonobstructive bilateral   renal calculi are present.    Mild trabeculated bladder wall thickening. Correlate with urinalysis and   lab values to assess for cystitis and/or ascending urinary tract   infection.              --- End of Report ---            CAROLINE MEDRANO MD; Attending Radiologist  This document has been electronically signed. 2024  5:21AM    < end of copied text >

## 2024-02-17 NOTE — ED ADULT NURSE REASSESSMENT NOTE - NS ED NURSE REASSESS COMMENT FT1
report received from previous rn. received patient resting in stretcher asleep at this time, easily arousable, with respirations even and nonlabored. awaiting urology consult.

## 2024-02-17 NOTE — H&P ADULT - PROBLEM SELECTOR PLAN 1
- mild leukocytosis, afebrile on admission  - CT renal stone hunt - mild B/L hydroureteronephrosis w/o calcified stone identified along course of the ureter. non-obstructive B/L renal calculi are present.  Mild trabeculated bladder wall thickening.  - UA shows UTI  - Given rocephin 1g, IV dilaudid 0.5mg x1, IV dilaudid 1mg x1, 1L NS in ED  - per chart review, cultures show klebsiella pneumonia ESBL resistant to rocephin  - will start meropenem.  - PRN pain regimen: PO tylenol for mild, IV   - urology consulted - mild leukocytosis, afebrile on admission  - CT renal stone hunt - mild B/L hydroureteronephrosis w/o calcified stone identified along course of the ureter. non-obstructive B/L renal calculi are present.  Mild trabeculated bladder wall thickening.  - UA shows UTI  - Given rocephin 1g, IV dilaudid 0.5mg x1, IV dilaudid 1mg x1, 1L NS in ED  - per chart review, cultures show klebsiella pneumonia ESBL resistant to rocephin  - will start meropenem.  - PRN pain regimen: PO tylenol for mild, IV Dilaudid 1mg mod, IV Dilaudid 2mg Severe  - urology consulted - mild leukocytosis, afebrile on admission  - CT renal stone hunt - mild B/L hydroureteronephrosis w/o calcified stone identified along course of the ureter. non-obstructive B/L renal calculi are present.  Mild trabeculated bladder wall thickening.  - UA shows UTI  - Given rocephin 1g, IV dilaudid 0.5mg x1, IV dilaudid 1mg x1, 1L NS in ED  - per chart review, cultures show klebsiella pneumonia ESBL resistant to rocephin  - will start meropenem.  - PRN pain regimen: PO tylenol for mild, IV Dilaudid .5 mg mod, IV Dilaudid 1 mg Severe  - c/w miralax  - urology consulted - mild leukocytosis, afebrile on admission  - CT renal stone hunt - mild B/L hydroureteronephrosis w/o calcified stone identified along course of the ureter. non-obstructive B/L renal calculi are present.  Mild trabeculated bladder wall thickening.  - UA shows UTI  - Given rocephin 1g, IV dilaudid 0.5mg x1, IV dilaudid 1mg x1, 1L NS in ED  - per chart review, cultures show klebsiella pneumonia ESBL resistant to rocephin  - will start meropenem.  - PRN pain regimen: PO tylenol for mild, IV Dilaudid .2 mg mod, IV Dilaudid .5 mg Severe  - c/w miralax  - Npo except meds until uro recs  - urology consulted

## 2024-02-17 NOTE — PHARMACY COMMUNICATION NOTE - COMMENTS
Search Terms: agnes estrada, 1975Search Date: 02/17/2024 08:37:05 AM    The Drug Utilization Report below displays all of the controlled substance prescriptions, if any, that your patient has filled in the last twelve months. The information displayed on this report is compiled from pharmacy submissions to the Department, and accurately reflects the information as submitted by the pharmacies.    This report was requested by: Ursula Vera | Reference #: [id number]    Practitioner Count: 1  Pharmacy Count: 1  Current Opioid Prescriptions: 0  Current Benzodiazepine Prescriptions: 0  Current Stimulant Prescriptions: 0      Patient Demographic Information (PDI)       PDI	First Name	Last Name	Birth Date	Gender	Street Address	[facility] Code  A	Agnes Estrada	1975	Male	[address] [facility]	[address]  B	Agnes Estrada	1975	Male	[address] [place]	[address]  C	Agnes Estrada	1975	Male	[address]	[address]    Prescription Information      PDI Filter:    PDI	Current Rx	Drug Type	Rx Written	Rx Dispensed	Drug	Quantity	Days Supply	Prescriber Name	Prescriber GOPI #	Payment Method	Dispenser  A	N	O	01/18/2024	01/18/2024	oxycodone hcl (ir) 10 mg tab	60	10	Sony Das	[id number]	[facility]  A	N		01/07/2024	01/07/2024	zolpidem tartrate 5 mg tablet	15	15	Sony Das	[id number]	Medicaid	[facility]  A	N	O	12/03/2023	01/01/2024	oxycodone hcl (ir) 10 mg tab	18	3	Sony Das	[id number]	[facility]  A	N		12/03/2023	12/17/2023	zolpidem tartrate 5 mg tablet	15	15	Sony Das	[id number]	Medicaid	[facility]  A	N	O	12/03/2023	12/04/2023	oxycodone hcl (ir) 10 mg tab	42	7	Sony Das	[id number]	[facility]  A	N		12/03/2023	12/04/2023	zolpidem tartrate 5 mg tablet	15	15	Sony Das	[id number]	Medicaid	[facility]  A	N		11/07/2023	11/20/2023	zolpidem tartrate 5 mg tablet	15	15	Sony Das	[id number]	Medicaid	[facility]  A	N	O	10/02/2023	11/13/2023	oxycodone hcl (ir) 10 mg tab	30	5	Sony Das	[id number]	[facility]  A	N		11/07/2023	11/07/2023	zolpidem tartrate 5 mg tablet	15	15	Sony Das	[id number]	Medicaid	[facility]  A	N		10/07/2023	10/07/2023	zolpidem tartrate 5 mg tablet	30	30	Sony Das	[id number]	Medicaid	[facility]  A	N	O	10/02/2023	10/03/2023	oxycodone hcl (ir) 10 mg tab	30	5	Sony Das	[id number]	[facility]  A	N	O	10/02/2023	10/02/2023	oxycodone hcl (ir) 10 mg tab	42	7	Sony Das	[id number]	[facility]  A	N	O	09/24/2023	09/24/2023	oxycodone hcl (ir) 5 mg tablet	7	7	Sony Das	[id number]	[facility]  A	N		08/31/2023	09/15/2023	zolpidem tartrate 5 mg tablet	15	15	Sony Das	[id number]	Medicaid	[facility]  A	N		08/31/2023	08/31/2023	zolpidem tartrate 5 mg tablet	15	15	Sony Das	[id number]	Medicaid	[facility]  A	N	O	08/28/2023	08/28/2023	oxycodone hcl (ir) 5 mg tablet	28	14	Sony Das	[id number]	Medicaid	[facility]  A	N		08/02/2023	08/18/2023	zolpidem tartrate 5 mg tablet	15	15	Sony Das	[id number]	Medicaid	[facility]  A	N	O	07/13/2023	08/13/2023	oxycodone hcl (ir) 10 mg tab	28	7	Sony Das	[id number]	[facility]  A	N		08/02/2023	08/03/2023	zolpidem tartrate 5 mg tablet	15	15	Sony Das	[id number]	Medicaid	[facility]  A	N	O	07/13/2023	07/13/2023	oxycodone hcl (ir) 10 mg tab	28	7	Sony Das	[id number]	Medicaid	[facility]  A	N	O	06/05/2023	06/06/2023	oxycodone hcl (ir) 15 mg tab	42	7	Sony Das	[id number]	Medicaid	[facility]  A	N	O	04/09/2023	04/29/2023	oxycodone hcl (ir) 15 mg tab	39	6	Thiago Sony	[id number]	[facility]  A	N	O	04/09/2023	04/21/2023	oxycodone hcl (ir) 15 mg tab	15	2	Thiago Sony	[id number]	[facility]  A	N	O	04/09/2023	04/17/2023	oxycodone hcl (ir) 15 mg tab	15	2	Thiago Sony	[id number]	[facility]  A	N	O	04/05/2023	04/05/2023	oxycodone hcl (ir) 5 mg tablet	28	7	Charissa Torres	[id number]	Medicaid	[facility]  A	N	O	04/05/2023	04/05/2023	oxycodone hcl (ir) 10 mg tab	42	7	Charissa Torres	[id number]	Medicaid	[facility]  B	N	O	03/24/2023	04/01/2023	oxycodone-acetaminophen 5-325 mg tablet	12	3	Irma Lai (MS)	[id number]	Medicaid	[id number]  C	N	O	03/23/2023	03/23/2023	oxycodone hcl (ir) 15 mg tab	60	30	Alberto Bird	[id number]	[facility]    * - Details of Drug Type : O = Opioid, B = Benzodiazepine, S = Stimulant    * - Drugs marked with an asterisk are compound drugs. If the compound drug is made up of more than one controlled substance, then each controlled substance will be a separate row in the table.

## 2024-02-17 NOTE — ED PROVIDER NOTE - OBJECTIVE STATEMENT
48-year-old male history of renal stones being followed by urologist in [place] was diagnosed with 2 stones 2 weeks ago unsure whether it was intrarenal or obstructive complaining of left-sided flank pain today sent from [facility].  Has been on 10 mg of oxycodone without much relief.  Denies any nausea vomiting diarrhea.  Denies any fever or chills.

## 2024-02-17 NOTE — PROGRESS NOTE ADULT - PROBLEM SELECTOR PLAN 1
Hx of recurrent UTIs in the setting of urinary incontinence   - UA showed moderate blood, protein+, LE large, and bacteria+  - Last month urine culture grew ESBL given meropenem   - Will continue meropenem until cultures come back and ID recs   - Continue current pain regimen   - Trend fever and CBC with diff   - ID Dr. Callahan consulted   - Uro consulted by admitting team

## 2024-02-17 NOTE — H&P ADULT - ATTENDING COMMENTS
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  IVF, pain control, f/u cultures, trend WBC and fever curves.  Urology consulted, will f/u recs,    Daniel Parmar, Attending Physician

## 2024-02-17 NOTE — H&P ADULT - NSHPPHYSICALEXAM_GEN_ALL_CORE
T(C): 36.4 (02-17-24 @ 01:56), Max: 36.4 (02-17-24 @ 01:56)  HR: 60 (02-17-24 @ 01:56) (60 - 60)  BP: 134/83 (02-17-24 @ 01:56) (134/83 - 134/83)  RR: 18 (02-17-24 @ 01:56) (18 - 18)  SpO2: 96% (02-17-24 @ 01:56) (96% - 96%)    GENERAL: patient appears well, no acute distress, appropriate, pleasant  EYES: sclera clear, no exudates  ENMT: oropharynx clear without erythema, no exudates, moist mucous membranes  NECK: supple, soft, no thyromegaly noted  LUNGS: good air entry bilaterally, clear to auscultation, symmetric breath sounds, no wheezing or rhonchi appreciated  HEART: soft S1/S2, regular rate and rhythm, no murmurs noted, no lower extremity edema  GASTROINTESTINAL: abdomen is soft, nontender, nondistended, normoactive bowel sounds, no palpable masses  INTEGUMENT: good skin turgor, no lesions noted  MUSCULOSKELETAL: no clubbing or cyanosis, no obvious deformity  NEUROLOGIC: awake, alert, oriented x3, good muscle tone in 4 extremities, no obvious sensory deficits  PSYCHIATRIC: mood is good, affect is congruent, linear and logical thought process  HEME/LYMPH: no palpable supraclavicular nodules, no obvious ecchymosis or petechiae T(C): 36.4 (02-17-24 @ 01:56), Max: 36.4 (02-17-24 @ 01:56)  HR: 60 (02-17-24 @ 01:56) (60 - 60)  BP: 134/83 (02-17-24 @ 01:56) (134/83 - 134/83)  RR: 18 (02-17-24 @ 01:56) (18 - 18)  SpO2: 96% (02-17-24 @ 01:56) (96% - 96%)    GENERAL: patient appears well, no acute distress, appropriate, pleasant  EYES: sclera clear, no exudates  ENMT: oropharynx clear without erythema, no exudates, moist mucous membranes  NECK: supple, soft, no thyromegaly noted  LUNGS: good air entry bilaterally, clear to auscultation, symmetric breath sounds, no wheezing or rhonchi appreciated  HEART: soft S1/S2, regular rate and rhythm, no murmurs noted, no lower extremity edema  GASTROINTESTINAL: abdomen is soft, nontender, nondistended, normoactive bowel sounds, no palpable masses, + left flank pain  INTEGUMENT: good skin turgor, no lesions noted  MUSCULOSKELETAL: no clubbing or cyanosis, no obvious deformity  NEUROLOGIC: awake, alert, oriented x3, good muscle tone in 4 extremities, no obvious sensory deficits  PSYCHIATRIC: mood is good, affect is congruent, linear and logical thought process

## 2024-02-17 NOTE — H&P ADULT - HISTORY OF PRESENT ILLNESS
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      -followed by urologist in [place] was diagnosed with 2 stones 2 weeks ago unsure whether it was intrarenal or obstructive complaining of left-sided flank pain today sent from [facility].  Has been on 10 mg of oxycodone without much relief.  Denies any nausea vomiting diarrhea.  Denies any fever or chills.    ED Course:  Vitals: 134/83mmHg, 60bpm, 97.5F, 96% on RA  Labs: WBc 12.5, Hgb 10.6, MCV 76.2, PT 25.6, INR 2.24, aPTT 44.5, Cl 110, BUN/Cr 39/2.2, glucose 2.2, GFR 36  UA: many bacteria, squamous cells present. mod blood. large leuk esterase, 100 protein  CT renal stone hunt: mild B/L hydroureteronephrosis w/o calcified stone identified along course of the ureter. non-obstructive B/L renal calculi are present.  Mild trabeculated bladder wall thickening.    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    Patient states that 4 days ago, he began to develop left sided flank pain. Patient describes the pain as sharp, tender, and worse with movement. He states the pain is 10/10 and has been taking oxycodone 10mg and tylenol twice a day, without much relief. Patient has a history of multiple utis, and kidney stones (s/p lithotripsy 2023).  He endorses nausea, but denies vomiting. The pain has worsened progressively over the past few days, which prompted him to visit the emergency room for further evaluation.     Of note, he recently had a uti last month, and was treated with meropenem     Patient denies F/C, HA, N/V, CP, SOB, C/D, Dysuria, Leg pain, or Leg swelling.    Patient denies any recent travel, sick contacts, or long periods of immobilization.      ED Course:  Vitals: 134/83mmHg, 60bpm, 97.5F, 96% on RA  Labs: WBc 12.5, Hgb 10.6, MCV 76.2, PT 25.6, INR 2.24, aPTT 44.5, Cl 110, BUN/Cr 39/2.2, glucose 2.2, GFR 36  UA: many bacteria, squamous cells present. mod blood. large leuk esterase, 100 protein  CT renal stone hunt: mild B/L hydroureteronephrosis w/o calcified stone identified along course of the ureter. non-obstructive B/L renal calculi are present.  Mild trabeculated bladder wall thickening.  Given - rocephin 1g, IV dilaudid 0.5mg x1, IV dilaudid 1mg x1, 1L NS

## 2024-02-17 NOTE — PROGRESS NOTE ADULT - SUBJECTIVE AND OBJECTIVE BOX
Patient is a 48y old  Male who presents with a chief complaint of UTI (2024 07:12)    ----  INTERVAL HPI/OVERNIGHT EVENTS: Pt seen and evaluated at the bedside. Patient complaining persistent severe left flank pain, states that oxycodone is not helping anymore with pain.     ----  PAST MEDICAL & SURGICAL HISTORY:  Renal stones  H/OGuillain-[place] syndrome  History of neurogenic bowel  DM2 (diabetes mellitus, type 2)  HTN (hypertension)  BPH without obstruction/lower urinary tract symptoms  Degenerative arthritis  DVT of lower limb, acute  CVA (cerebrovascular accident)  H/O lithotripsy          FAMILY HISTORY:  Family history of sinus tachycardia (Father)    FH: HTN (hypertension) (Mother)        Allergies    sulfa drugs (Rash)    Intolerances        ----  REVIEW OF SYSTEMS:  CONSTITUTIONAL: denies fever, chills, fatigue, weakness  HEENT: denies blurred vision, sore throat  SKIN: denies new lesions, rash  CARDIOVASCULAR: denies chest pain, chest pressure, palpitations  RESPIRATORY: denies shortness of breath, sputum production  GASTROINTESTINAL: denies nausea, vomiting, diarrhea, abdominal pain  GENITOURINARY: admits urinary incontinence   NEUROLOGICAL: chronic LE weakness   MUSCULOSKELETAL: denies new joint pain, muscle aches  HEMATOLOGIC: denies gross bleeding, bruising  LYMPHATICS: denies enlarged lymph nodes, extremity swelling  PSYCHIATRIC: denies recent changes in anxiety, depression  ENDOCRINOLOGIC: denies sweating, cold or heat intolerance    ----  PHYSICAL EXAM:  GENERAL: patient appears well  ENMT: , moist mucous membranes  LUNGS: good air entry bilaterally, clear to auscultation, no wheezing or rhonchi appreciated  HEART: S1/S2, regular rate and rhythm, no murmurs noted, no noted edema to b/l LE  GASTROINTESTINAL: abdomen is soft, mild Left flank pain during palpation, nondistended, normoactive bowel sounds. Obese  INTEGUMENT: good skin turgor, appropriate for ethnicity, appears well perfused, no jaundice noted  MUSCULOSKELETAL: no clubbing or cyanosis, no obvious deformity  NEUROLOGIC: awake, alert, oriented x3  PSYCHIATRIC: mood is good      T(C): 36.1 (24 @ 12:06), Max: 36.4 (24 @ 01:56)  HR: 52 (24 @ 12:06) (48 - 60)  BP: 126/73 (24 @ 12:06) (112/69 - 134/83)  RR: 17 (24 @ 12:06) (16 - 18)  SpO2: 93% (24 @ 12:06) (93% - 96%)  Wt(kg): --    ----  I&O's Summary      LABS:                        10.6   12.52 )-----------( 211      ( 2024 03:36 )             33.3         138  |  110<H>  |  39<H>  ----------------------------<  124<H>  3.9   |  22  |  2.20<H>    Ca    8.7      2024 03:36    TPro  6.9  /  Alb  2.6<L>  /  TBili  0.3  /  DBili  x   /  AST  17  /  ALT  23  /  AlkPhos  80      PT/INR - ( 2024 03:36 )   PT: 25.6 sec;   INR: 2.24 ratio         PTT - ( 2024 03:36 )  PTT:44.5 sec  Urinalysis Basic - ( 2024 04:25 )    Color: Yellow / Appearance: Clear / S.008 / pH: x  Gluc: x / Ketone: Negative mg/dL  / Bili: Negative / Urobili: 0.2 mg/dL   Blood: x / Protein: 100 mg/dL / Nitrite: Negative   Leuk Esterase: Large / RBC: 3-5 /HPF / WBC >50 /HPF   Sq Epi: x / Non Sq Epi: x / Bacteria: Many /HPF      CAPILLARY BLOOD GLUCOSE      POCT Blood Glucose.: 135 mg/dL (2024 12:55)  POCT Blood Glucose.: 137 mg/dL (2024 10:54)        ---  CT Renal Stone Gallego (24 @ 05:02) >  Cholelithiasis. Vague opacities at the fundal portion of the gallbladder.   Right upper quadrant ultrasound suggested. No significant wall thickening   or pericholecystic edema.    Mild bilateral hydroureteronephrosis without calcified stone identified   along the course of the ureter. Differential includes recently passed   kidney stone or infection. Additional punctate nonobstructive bilateral   renal calculi are present.    Mild trabeculated bladder wall thickening. Correlate with urinalysis and   lab values to assess for cystitis and/or ascending urinary tract   infection.              ----  Personally reviewed:  Vital sign trends: [  ] yes    [  ] no     [  ] n/a  Laboratory results: [  ] yes    [  ] no     [  ] n/a  Radiology results: [  ] yes    [  ] no     [  ] n/a  Culture results: [  ] yes    [  ] no     [  ] n/a  Consultant recommendations: [  ] yes    [  ] no     [  ] n/a

## 2024-02-17 NOTE — H&P ADULT - NSICDXPASTMEDICALHX_GEN_ALL_CORE_FT
PAST MEDICAL HISTORY:  BPH without obstruction/lower urinary tract symptoms     CVA (cerebrovascular accident)     Degenerative arthritis     DM2 (diabetes mellitus, type 2)     DVT of lower limb, acute     H/O Guillain-[place] syndrome     History of neurogenic bowel     HTN (hypertension)     Renal stones

## 2024-02-17 NOTE — H&P ADULT - ASSESSMENT
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

## 2024-02-17 NOTE — PROGRESS NOTE ADULT - ASSESSMENT
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

## 2024-02-17 NOTE — ED PROVIDER NOTE - CARE PLAN
Principal Discharge DX:	Lt flank pain   1 Principal Discharge DX:	Lt flank pain  Secondary Diagnosis:	Acute UTI

## 2024-02-18 DIAGNOSIS — K80.20 CALCULUS OF GALLBLADDER WITHOUT CHOLECYSTITIS WITHOUT OBSTRUCTION: ICD-10-CM

## 2024-02-18 LAB
A1C WITH ESTIMATED AVERAGE GLUCOSE RESULT: 7.8 % — HIGH (ref 4–5.6)
ALBUMIN SERPL ELPH-MCNC: 2.5 G/DL — LOW (ref 3.3–5)
ALP SERPL-CCNC: 82 U/L — SIGNIFICANT CHANGE UP (ref 40–120)
ALT FLD-CCNC: 23 U/L — SIGNIFICANT CHANGE UP (ref 12–78)
ANION GAP SERPL CALC-SCNC: 5 MMOL/L — SIGNIFICANT CHANGE UP (ref 5–17)
AST SERPL-CCNC: 15 U/L — SIGNIFICANT CHANGE UP (ref 15–37)
BASOPHILS # BLD AUTO: 0.07 K/UL — SIGNIFICANT CHANGE UP (ref 0–0.2)
BASOPHILS NFR BLD AUTO: 0.8 % — SIGNIFICANT CHANGE UP (ref 0–2)
BILIRUB SERPL-MCNC: 0.3 MG/DL — SIGNIFICANT CHANGE UP (ref 0.2–1.2)
BUN SERPL-MCNC: 32 MG/DL — HIGH (ref 7–23)
CALCIUM SERPL-MCNC: 8.6 MG/DL — SIGNIFICANT CHANGE UP (ref 8.5–10.1)
CHLORIDE SERPL-SCNC: 112 MMOL/L — HIGH (ref 96–108)
CHOLEST SERPL-MCNC: 172 MG/DL — SIGNIFICANT CHANGE UP
CO2 SERPL-SCNC: 24 MMOL/L — SIGNIFICANT CHANGE UP (ref 22–31)
CREAT SERPL-MCNC: 2.1 MG/DL — HIGH (ref 0.5–1.3)
EGFR: 38 ML/MIN/1.73M2 — LOW
EOSINOPHIL # BLD AUTO: 0.29 K/UL — SIGNIFICANT CHANGE UP (ref 0–0.5)
EOSINOPHIL NFR BLD AUTO: 3.2 % — SIGNIFICANT CHANGE UP (ref 0–6)
ESTIMATED AVERAGE GLUCOSE: 177 MG/DL — HIGH (ref 68–114)
GLUCOSE SERPL-MCNC: 140 MG/DL — HIGH (ref 70–99)
HCT VFR BLD CALC: 33.5 % — LOW (ref 39–50)
HDLC SERPL-MCNC: 24 MG/DL — LOW
HGB BLD-MCNC: 10.6 G/DL — LOW (ref 13–17)
IMM GRANULOCYTES NFR BLD AUTO: 0.2 % — SIGNIFICANT CHANGE UP (ref 0–0.9)
INR BLD: 2.36 RATIO — HIGH (ref 0.85–1.18)
LIPID PNL WITH DIRECT LDL SERPL: 122 MG/DL — HIGH
LYMPHOCYTES # BLD AUTO: 1.84 K/UL — SIGNIFICANT CHANGE UP (ref 1–3.3)
LYMPHOCYTES # BLD AUTO: 20.4 % — SIGNIFICANT CHANGE UP (ref 13–44)
MCHC RBC-ENTMCNC: 24.1 PG — LOW (ref 27–34)
MCHC RBC-ENTMCNC: 31.6 GM/DL — LOW (ref 32–36)
MCV RBC AUTO: 76.3 FL — LOW (ref 80–100)
MELD SCORE WITH DIALYSIS: 29 POINTS — SIGNIFICANT CHANGE UP
MELD SCORE WITHOUT DIALYSIS: 23 POINTS — SIGNIFICANT CHANGE UP
MONOCYTES # BLD AUTO: 0.53 K/UL — SIGNIFICANT CHANGE UP (ref 0–0.9)
MONOCYTES NFR BLD AUTO: 5.9 % — SIGNIFICANT CHANGE UP (ref 2–14)
NEUTROPHILS # BLD AUTO: 6.29 K/UL — SIGNIFICANT CHANGE UP (ref 1.8–7.4)
NEUTROPHILS NFR BLD AUTO: 69.5 % — SIGNIFICANT CHANGE UP (ref 43–77)
NON HDL CHOLESTEROL: 148 MG/DL — HIGH
NRBC # BLD: 0 /100 WBCS — SIGNIFICANT CHANGE UP (ref 0–0)
PLATELET # BLD AUTO: 222 K/UL — SIGNIFICANT CHANGE UP (ref 150–400)
POTASSIUM SERPL-MCNC: 3.7 MMOL/L — SIGNIFICANT CHANGE UP (ref 3.5–5.3)
POTASSIUM SERPL-SCNC: 3.7 MMOL/L — SIGNIFICANT CHANGE UP (ref 3.5–5.3)
PROT SERPL-MCNC: 6.5 G/DL — SIGNIFICANT CHANGE UP (ref 6–8.3)
PROTHROM AB SERPL-ACNC: 26.9 SEC — HIGH (ref 9.5–13)
RBC # BLD: 4.39 M/UL — SIGNIFICANT CHANGE UP (ref 4.2–5.8)
RBC # FLD: 16.8 % — HIGH (ref 10.3–14.5)
SODIUM SERPL-SCNC: 141 MMOL/L — SIGNIFICANT CHANGE UP (ref 135–145)
TRIGL SERPL-MCNC: 142 MG/DL — SIGNIFICANT CHANGE UP
WBC # BLD: 9.04 K/UL — SIGNIFICANT CHANGE UP (ref 3.8–10.5)
WBC # FLD AUTO: 9.04 K/UL — SIGNIFICANT CHANGE UP (ref 3.8–10.5)

## 2024-02-18 RX ORDER — VANCOMYCIN HCL 1 G
VIAL (EA) INTRAVENOUS
Refills: 0 | Status: DISCONTINUED | OUTPATIENT
Start: 2024-02-18 | End: 2024-02-19

## 2024-02-18 RX ORDER — VANCOMYCIN HCL 1 G
1250 VIAL (EA) INTRAVENOUS ONCE
Refills: 0 | Status: COMPLETED | OUTPATIENT
Start: 2024-02-18 | End: 2024-02-18

## 2024-02-18 RX ORDER — WARFARIN SODIUM 2.5 MG/1
4 TABLET ORAL ONCE
Refills: 0 | Status: COMPLETED | OUTPATIENT
Start: 2024-02-18 | End: 2024-02-18

## 2024-02-18 RX ORDER — HYDROMORPHONE HYDROCHLORIDE 2 MG/ML
1 INJECTION INTRAMUSCULAR; INTRAVENOUS; SUBCUTANEOUS ONCE
Refills: 0 | Status: DISCONTINUED | OUTPATIENT
Start: 2024-02-18 | End: 2024-02-18

## 2024-02-18 RX ORDER — VANCOMYCIN HCL 1 G
1250 VIAL (EA) INTRAVENOUS EVERY 24 HOURS
Refills: 0 | Status: DISCONTINUED | OUTPATIENT
Start: 2024-02-19 | End: 2024-02-19

## 2024-02-18 RX ADMIN — INSULIN GLARGINE 12 UNIT(S): 100 INJECTION, SOLUTION SUBCUTANEOUS at 22:13

## 2024-02-18 RX ADMIN — MEROPENEM 100 MILLIGRAM(S): 1 INJECTION INTRAVENOUS at 17:35

## 2024-02-18 RX ADMIN — TAMSULOSIN HYDROCHLORIDE 0.4 MILLIGRAM(S): 0.4 CAPSULE ORAL at 21:47

## 2024-02-18 RX ADMIN — Medication 1 PATCH: at 19:35

## 2024-02-18 RX ADMIN — Medication 1 PATCH: at 11:21

## 2024-02-18 RX ADMIN — Medication 1: at 11:33

## 2024-02-18 RX ADMIN — Medication 500 MILLIGRAM(S): at 11:22

## 2024-02-18 RX ADMIN — DULOXETINE HYDROCHLORIDE 60 MILLIGRAM(S): 30 CAPSULE, DELAYED RELEASE ORAL at 11:21

## 2024-02-18 RX ADMIN — Medication 2 UNIT(S): at 17:15

## 2024-02-18 RX ADMIN — Medication 25 MILLIGRAM(S): at 06:02

## 2024-02-18 RX ADMIN — HYDROMORPHONE HYDROCHLORIDE 1 MILLIGRAM(S): 2 INJECTION INTRAMUSCULAR; INTRAVENOUS; SUBCUTANEOUS at 14:25

## 2024-02-18 RX ADMIN — GABAPENTIN 600 MILLIGRAM(S): 400 CAPSULE ORAL at 21:47

## 2024-02-18 RX ADMIN — PANTOPRAZOLE SODIUM 40 MILLIGRAM(S): 20 TABLET, DELAYED RELEASE ORAL at 06:02

## 2024-02-18 RX ADMIN — Medication 2 MILLIGRAM(S): at 20:06

## 2024-02-18 RX ADMIN — METHOCARBAMOL 750 MILLIGRAM(S): 500 TABLET, FILM COATED ORAL at 21:48

## 2024-02-18 RX ADMIN — Medication 50 MILLIGRAM(S): at 21:47

## 2024-02-18 RX ADMIN — Medication 1: at 17:15

## 2024-02-18 RX ADMIN — GABAPENTIN 600 MILLIGRAM(S): 400 CAPSULE ORAL at 06:03

## 2024-02-18 RX ADMIN — AMLODIPINE BESYLATE 10 MILLIGRAM(S): 2.5 TABLET ORAL at 06:03

## 2024-02-18 RX ADMIN — FINASTERIDE 5 MILLIGRAM(S): 5 TABLET, FILM COATED ORAL at 11:21

## 2024-02-18 RX ADMIN — Medication 0.1 MILLIGRAM(S): at 06:03

## 2024-02-18 RX ADMIN — Medication 1 TABLET(S): at 11:22

## 2024-02-18 RX ADMIN — WARFARIN SODIUM 4 MILLIGRAM(S): 2.5 TABLET ORAL at 21:48

## 2024-02-18 RX ADMIN — GABAPENTIN 600 MILLIGRAM(S): 400 CAPSULE ORAL at 14:25

## 2024-02-18 RX ADMIN — Medication 2 MILLIGRAM(S): at 15:33

## 2024-02-18 RX ADMIN — Medication 650 MILLIGRAM(S): at 17:34

## 2024-02-18 RX ADMIN — HYDROMORPHONE HYDROCHLORIDE 1 MILLIGRAM(S): 2 INJECTION INTRAMUSCULAR; INTRAVENOUS; SUBCUTANEOUS at 02:20

## 2024-02-18 RX ADMIN — HYDROMORPHONE HYDROCHLORIDE 1 MILLIGRAM(S): 2 INJECTION INTRAMUSCULAR; INTRAVENOUS; SUBCUTANEOUS at 14:30

## 2024-02-18 RX ADMIN — HYDROMORPHONE HYDROCHLORIDE 1 MILLIGRAM(S): 2 INJECTION INTRAMUSCULAR; INTRAVENOUS; SUBCUTANEOUS at 23:19

## 2024-02-18 RX ADMIN — HYDROMORPHONE HYDROCHLORIDE 1 MILLIGRAM(S): 2 INJECTION INTRAMUSCULAR; INTRAVENOUS; SUBCUTANEOUS at 01:27

## 2024-02-18 RX ADMIN — Medication 2 MILLIGRAM(S): at 00:21

## 2024-02-18 RX ADMIN — Medication 2 UNIT(S): at 08:09

## 2024-02-18 RX ADMIN — Medication 166.67 MILLIGRAM(S): at 18:53

## 2024-02-18 RX ADMIN — Medication 325 MILLIGRAM(S): at 11:22

## 2024-02-18 RX ADMIN — MEROPENEM 100 MILLIGRAM(S): 1 INJECTION INTRAVENOUS at 06:03

## 2024-02-18 RX ADMIN — HYDROMORPHONE HYDROCHLORIDE 1 MILLIGRAM(S): 2 INJECTION INTRAMUSCULAR; INTRAVENOUS; SUBCUTANEOUS at 07:01

## 2024-02-18 RX ADMIN — METHOCARBAMOL 750 MILLIGRAM(S): 500 TABLET, FILM COATED ORAL at 06:03

## 2024-02-18 RX ADMIN — Medication 2 MILLIGRAM(S): at 22:13

## 2024-02-18 RX ADMIN — Medication 5 MILLIGRAM(S): at 21:47

## 2024-02-18 RX ADMIN — Medication 2 UNIT(S): at 11:34

## 2024-02-18 RX ADMIN — HYDROMORPHONE HYDROCHLORIDE 1 MILLIGRAM(S): 2 INJECTION INTRAMUSCULAR; INTRAVENOUS; SUBCUTANEOUS at 18:54

## 2024-02-18 RX ADMIN — METHOCARBAMOL 750 MILLIGRAM(S): 500 TABLET, FILM COATED ORAL at 00:21

## 2024-02-18 RX ADMIN — METHOCARBAMOL 750 MILLIGRAM(S): 500 TABLET, FILM COATED ORAL at 14:25

## 2024-02-18 RX ADMIN — Medication 1 TABLET(S): at 11:21

## 2024-02-18 RX ADMIN — Medication 25 MILLIGRAM(S): at 17:35

## 2024-02-18 NOTE — PROGRESS NOTE ADULT - PROBLEM SELECTOR PLAN 1
Hx of recurrent UTIs in the setting of urinary incontinence   - UA showed moderate blood, protein+, LE large, and bacteria+  - Last month urine culture grew ESBL given meropenem   - Will continue meropenem until cultures come back and ID recs   - CT renal stone hunt - mild B/L hydroureteronephrosis w/o calcified stone identified along course of the ureter. non-obstructive B/L renal calculi are present.  Mild trabeculated bladder wall thickening.  - Continue current pain regimen   - Trend fever and CBC with diff   - ID Dr. Callahan consulted   - Uro consult pending Hx of recurrent UTIs in the setting of urinary incontinence   - UA showed moderate blood, protein+, LE large, and bacteria+  - Last month urine culture grew ESBL given meropenem   - On meropenem until cultures come back and ID recs   - CT renal stone hunt - mild B/L hydroureteronephrosis w/o calcified stone identified along course of the ureter. non-obstructive B/L renal calculi are present.  Mild trabeculated bladder wall thickening.  - Continue current pain regimen   - Trend fever and CBC with diff   - ID Dr. Callahan consulted   - Uro consult pending

## 2024-02-18 NOTE — PROGRESS NOTE ADULT - PROBLEM SELECTOR PLAN 3
Incidentally found on CT: Cholelithiasis w/o significant wall thickening or pericholecystic edema.  -Denies RUQ pain  -Bender Sign negative  -RUQ US ordered

## 2024-02-18 NOTE — PROGRESS NOTE ADULT - PROBLEM SELECTOR PLAN 12
Diet: consistent carb   DVP: warfarin 4 mg   GI ppx: none   Dispo: [facility] Diet: consistent carb   DVP: warfarin 4 mg   GI ppx: none   Dispo: [facility], pending urine cultures

## 2024-02-18 NOTE — PHYSICAL THERAPY INITIAL EVALUATION ADULT - PERTINENT HX OF CURRENT PROBLEM, REHAB EVAL
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    Patient states that 4 days ago, he began to develop left sided flank pain. Patient describes the pain as sharp, tender, and worse with movement. He states the pain is 10/10 and has been taking oxycodone 10mg and tylenol twice a day, without much relief. Patient has a history of multiple utis, and kidney stones (s/p lithotripsy 2023).  He endorses nausea, but denies vomiting. The pain has worsened progressively over the past few days, which prompted him to visit the emergency room for further evaluation.   Of note, he recently had a uti last month, and was treated with meropenem   Patient denies F/C, HA, N/V, CP, SOB, C/D, Dysuria, Leg pain, or Leg swelling.  Patient denies any recent travel, sick contacts, or long periods of immobilization.

## 2024-02-18 NOTE — PROGRESS NOTE ADULT - ASSESSMENT
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 non-obstructive B/L renal calculi are present.  Mild trabeculated bladder wall thickening.  - per chart review, cultures show klebsiella pneumonia ESBL resistant to rocephin  - UCx from this admission noted +GPC    Recommendations:   C/w meropenem for now pending above  Adding vancomycin x1 dose  F/u pending cultures  Trend temps/WBC  Monitor renal fxn  Appreciate  recs--no intervention  Additional care per primary team    Dr. Callahan to resume care 2/19  Please call with any questions.   Frieda Unger M.D.  [facility] Division of Infectious Diseases [phone]  For after 5 P.M. 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 non-obstructive B/L renal calculi are present.  Mild trabeculated bladder wall thickening.  - per chart review, cultures show klebsiella pneumonia ESBL resistant to rocephin  - UCx from this admission noted +GPC    Recommendations:   C/w meropenem for now pending above final urine culture  Adding vancomycin given GPC  F/u pending cultures  Trend temps/WBC  Monitor renal fxn  Appreciate  recs--no intervention  Additional care per primary team    Dr. Callahan to resume care 2/19  Please call with any questions.   Frieda Unger M.D.  [facility] [phone]  For after 5 P.M. and weekends, please call [phone]

## 2024-02-18 NOTE — PROGRESS NOTE ADULT - SUBJECTIVE AND OBJECTIVE BOX
[facility], Division of Infectious Diseases  FAIZAN Grant Y. Patel, S. Shah, G. [facility]  [phone]    Name: JOIE BRUNO  Age: 48y  Gender: Male  MRN: [id number]    Chart reviewed        Microbiology: reviewed    Culture - Urine (collected 02-17-24 @ 04:25)  Source: Clean Catch Clean Catch (Midstream)  Preliminary Report (02-18-24 @ 15:06):    >100,000 CFU/ml Gram Positive Cocci in Pairs and Chains          Radiology: reviewed

## 2024-02-18 NOTE — PROGRESS NOTE ADULT - ASSESSMENT
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

## 2024-02-18 NOTE — PHYSICAL THERAPY INITIAL EVALUATION ADULT - TRANSFER TRAINING, PT EVAL
(3 - 5 sessions) Pt will be able to transfer sit to stand CG with rolling walker to improve mobility.

## 2024-02-18 NOTE — PROGRESS NOTE ADULT - SUBJECTIVE AND OBJECTIVE BOX
Patient is a 48y old  Male who presents with a chief complaint of UTI     ----  INTERVAL HPI/OVERNIGHT EVENTS:   Pt seen and evaluated at the bedside.   No fever over the past 24 hours   Patient reports pain is better controlled with current pain regimen Dilaudid      ----  PAST MEDICAL & SURGICAL HISTORY:  Renal stones  H/OGuillain-Pendleton syndrome  History of neurogenic bowel  DM2 (diabetes mellitus, type 2)  HTN (hypertension)  BPH without obstruction/lower urinary tract symptoms  Degenerative arthritis  DVT of lower limb, acute  CVA (cerebrovascular accident)  H/O lithotripsy          FAMILY HISTORY:  Family history of sinus tachycardia (Father)    FH: HTN (hypertension) (Mother)        Allergies    sulfa drugs (Rash)    Intolerances        ----  REVIEW OF SYSTEMS:  CONSTITUTIONAL: denies fever, chills, fatigue, weakness  HEENT: denies blurred vision, sore throat  SKIN: denies new lesions, rash  CARDIOVASCULAR: denies chest pain, chest pressure, palpitations  RESPIRATORY: denies shortness of breath, sputum production  GASTROINTESTINAL: denies nausea, vomiting, diarrhea, + flank pain  GENITOURINARY: admits urinary incontinence   NEUROLOGICAL: chronic LE weakness   MUSCULOSKELETAL: denies new joint pain, muscle aches  HEMATOLOGIC: denies gross bleeding, bruising  LYMPHATICS: denies enlarged lymph nodes, extremity swelling  PSYCHIATRIC: denies recent changes in anxiety, depression  ENDOCRINOLOGIC: denies sweating, cold or heat intolerance    ----  Vital Signs Last 24 Hrs  T(C): 37.1 (18 Feb 2024 05:05), Max: 37.1 (18 Feb 2024 05:05)  T(F): 98.8 (18 Feb 2024 05:05), Max: 98.8 (18 Feb 2024 05:05)  HR: 69 (18 Feb 2024 05:05) (52 - 69)  BP: 145/81 (18 Feb 2024 05:05) (126/73 - 169/84)  RR: 18 (18 Feb 2024 05:05) (17 - 18)  SpO2: 96% (18 Feb 2024 05:05) (93% - 96%)    PHYSICAL EXAM:  GENERAL: patient appears well  ENMT: , moist mucous membranes  LUNGS: good air entry bilaterally, clear to auscultation, no wheezing or rhonchi appreciated  HEART: S1/S2, regular rate and rhythm, no murmurs noted, no edema to b/l LE  GASTROINTESTINAL: abdomen is soft, Bender Sign negative mildly tender to palpation on left flank area, nondistended, normoactive bowel sounds. Obese  INTEGUMENT: good skin turgor, appropriate for ethnicity, appears well perfused, no jaundice noted  MUSCULOSKELETAL: no clubbing or cyanosis, no obvious deformity  NEUROLOGIC: awake, alert, oriented x3  PSYCHIATRIC: mood is good      .  LABS:                         10.6   9.04  )-----------( 222      ( 18 Feb 2024 06:30 )             33.5     02-18    141  |  112<H>  |  32<H>  ----------------------------<  140<H>  3.7   |  24  |  2.10<H>    Ca    8.6      18 Feb 2024 06:30    TPro  6.5  /  Alb  2.5<L>  /  TBili  0.3  /  DBili  x   /  AST  15  /  ALT  23  /  AlkPhos  82  02-18    PT/INR - ( 18 Feb 2024 06:30 )   PT: 26.9 sec;   INR: 2.36 ratio         PTT - ( 17 Feb 2024 03:36 )  PTT:44.5 sec  Urinalysis Basic - ( 18 Feb 2024 06:30 )    Color: x / Appearance: x / SG: x / pH: x  Gluc: 140 mg/dL / Ketone: x  / Bili: x / Urobili: x   Blood: x / Protein: x / Nitrite: x   Leuk Esterase: x / RBC: x / WBC x   Sq Epi: x / Non Sq Epi: x / Bacteria: x            RADIOLOGY, EKG & ADDITIONAL TESTS: Reviewed.    Patient is a 48y old  Male who presents with a chief complaint of UTI     ----  INTERVAL HPI/OVERNIGHT EVENTS:   Pt seen and evaluated at the bedside.   No fever over the past 24 hours   Patient reports pain is better controlled with current pain regimen Dilaudid  but still persistent left flank, described as a sharpy pain, 9/10 in severity, localized, during encounter this AM patient fell asleep. Patient denies N/V, dysuria, hematuria or other symptoms. This AM urology consulted.   Required Dilaudid 1 mg x4 within 24hrs period   ----  PAST MEDICAL & SURGICAL HISTORY:  Renal stones  H/OGuillain-Greene syndrome  History of neurogenic bowel  DM2 (diabetes mellitus, type 2)  HTN (hypertension)  BPH without obstruction/lower urinary tract symptoms  Degenerative arthritis  DVT of lower limb, acute  CVA (cerebrovascular accident)  H/O lithotripsy          FAMILY HISTORY:  Family history of sinus tachycardia (Father)    FH: HTN (hypertension) (Mother)        Allergies    sulfa drugs (Rash)    Intolerances        ----  REVIEW OF SYSTEMS:  CONSTITUTIONAL: denies fever, chills, fatigue, weakness  HEENT: denies blurred vision, sore throat  SKIN: denies new lesions, rash  CARDIOVASCULAR: denies chest pain, chest pressure, palpitations  RESPIRATORY: denies shortness of breath, sputum production  GASTROINTESTINAL: denies nausea, vomiting, diarrhea, + flank pain  GENITOURINARY: admits urinary incontinence, and left flank pain    NEUROLOGICAL: chronic LE weakness   MUSCULOSKELETAL: denies new joint pain, muscle aches  HEMATOLOGIC: denies gross bleeding, bruising  PSYCHIATRIC: denies recent changes in anxiety, depression      ----  Vital Signs Last 24 Hrs  T(C): 37.1 (18 Feb 2024 05:05), Max: 37.1 (18 Feb 2024 05:05)  T(F): 98.8 (18 Feb 2024 05:05), Max: 98.8 (18 Feb 2024 05:05)  HR: 69 (18 Feb 2024 05:05) (52 - 69)  BP: 145/81 (18 Feb 2024 05:05) (126/73 - 169/84)  RR: 18 (18 Feb 2024 05:05) (17 - 18)  SpO2: 96% (18 Feb 2024 05:05) (93% - 96%)    PHYSICAL EXAM:  GENERAL: patient appears well  ENMT: , moist mucous membranes  LUNGS: good air entry bilaterally, clear to auscultation, no wheezing or rhonchi appreciated  HEART: S1/S2, regular rate and rhythm, no murmurs noted, no edema to b/l LE  GASTROINTESTINAL: abdomen is soft, Bender Sign negative mildly tender to palpation on left flank area, nondistended, normoactive bowel sounds. Obese  INTEGUMENT: good skin turgor, appropriate for ethnicity, appears well perfused, no jaundice noted  MUSCULOSKELETAL: no clubbing or cyanosis, no obvious deformity  NEUROLOGIC: awake, alert, oriented x3  PSYCHIATRIC: mood is good      .  LABS:                         10.6   9.04  )-----------( 222      ( 18 Feb 2024 06:30 )             33.5     02-18    141  |  112<H>  |  32<H>  ----------------------------<  140<H>  3.7   |  24  |  2.10<H>    Ca    8.6      18 Feb 2024 06:30    TPro  6.5  /  Alb  2.5<L>  /  TBili  0.3  /  DBili  x   /  AST  15  /  ALT  23  /  AlkPhos  82  02-18    PT/INR - ( 18 Feb 2024 06:30 )   PT: 26.9 sec;   INR: 2.36 ratio         PTT - ( 17 Feb 2024 03:36 )  PTT:44.5 sec  Urinalysis Basic - ( 18 Feb 2024 06:30 )    Color: x / Appearance: x / SG: x / pH: x  Gluc: 140 mg/dL / Ketone: x  / Bili: x / Urobili: x   Blood: x / Protein: x / Nitrite: x   Leuk Esterase: x / RBC: x / WBC x   Sq Epi: x / Non Sq Epi: x / Bacteria: x      RADIOLOGY, EKG & ADDITIONAL TESTS: Reviewed.

## 2024-02-18 NOTE — PHYSICAL THERAPY INITIAL EVALUATION ADULT - ADDITIONAL COMMENTS
Pt reports prior to admission he was able to ambulate with rolling walker for a bout 20 ft with assistance. Pt mostly wheelchair bound but able to transfer to the chair with assistance. Pt has an aide for 24 hours. Pt has bariatric rolling walker and hospital bed.

## 2024-02-18 NOTE — CONSULT NOTE ADULT - SUBJECTIVE AND OBJECTIVE BOX
SURGERY PA CONSULT NOTE:    CHIEF COMPLAINT:  Patient is a 48y old  Male who presents with a chief complaint of UTI (17 Feb 2024 17:44)      HPI FROM ED:  HPI:  49yo M, PMHx of renal stones, Guillain-Hague Syndrome with peripheral neuropathy, urinary incontinence, DM2 (on insulin), HTN, BPH, DDD, DVT with unknown prothrombotic state (on Warfarin) , and CVA (2016) presented from [facility] complaining of left-sided flank pain.    Patient states that 4 days ago, he began to develop left sided flank pain. Patient describes the pain as sharp, tender, and worse with movement. He states the pain is 10/10 and has been taking oxycodone 10mg and tylenol twice a day, without much relief. Patient has a history of multiple utis, and kidney stones (s/p lithotripsy 2023).  He endorses nausea, but denies vomiting. The pain has worsened progressively over the past few days, which prompted him to visit the emergency room for further evaluation.     Of note, he recently had a uti last month, and was treated with meropenem     Patient denies F/C, HA, N/V, CP, SOB, C/D, Dysuria, Leg pain, or Leg swelling.    Patient denies any recent travel, sick contacts, or long periods of immobilization.      ED Course:  Vitals: 134/83mmHg, 60bpm, 97.5F, 96% on RA  Labs: WBc 12.5, Hgb 10.6, MCV 76.2, PT 25.6, INR 2.24, aPTT 44.5, Cl 110, BUN/Cr 39/2.2, glucose 2.2, GFR 36  UA: many bacteria, squamous cells present. mod blood. large leuk esterase, 100 protein  CT renal stone hunt: mild B/L hydroureteronephrosis w/o calcified stone identified along course of the ureter. non-obstructive B/L renal calculi are present.  Mild trabeculated bladder wall thickening.  Given - rocephin 1g, IV dilaudid 0.5mg x1, IV dilaudid 1mg x1, 1L NS (17 Feb 2024 07:12)      PAST MEDICAL HISTORY:  PAST MEDICAL & SURGICAL HISTORY:  Renal stones      H/O Guillain-Hague syndrome      History of neurogenic bowel      DM2 (diabetes mellitus, type 2)      HTN (hypertension)      BPH without obstruction/lower urinary tract symptoms      Degenerative arthritis      DVT of lower limb, acute      CVA (cerebrovascular accident)      H/O lithotripsy          PAST SURGICAL HISTORY:    REVIEW OF SYSTEMS:  General/Constitutional: No acute distress, no headache, weakness, fevers, or chills   HEENT: Denies auditory or visual changes/disturbances, no vertigo, no throat pain, no dysphagia    Neck: Denies neck pain/stiffness, denies swelling/lumps/hoarseness   Lymphatic: Denies lumps or swelling in the axillae, groin, or neck bilaterally   Respiratory: Denies cough/hemoptysis, denies wheezing/SOB/dyspnea  Cardiac: Denies chest pain, palpitations  Abdomen: Denies abdominal bloating/fullness, nausea or vomiting, denies abdominal pain  Extremities: Denies sores, swelling, discoloration bilat UE/LE  Genitourinary: Denies urinary issues or complaints, denies dysuria/hematuria  Neuro: Denies weakness, paraesthesias, paralysis, syncope, loss of vision  Skin: Denies pruritus, pain, rashes  Psych: Denies hallucinations, visual disturbances, or depression    MEDICATIONS:  Home Medications:  Acidophilus oral capsule: 1 cap(s) orally once a day (17 Feb 2024 08:22)  Admelog 100 units/mL injectable solution: 4 unit(s) injectable 3 times a day (before meals) 4-8 units (17 Feb 2024 07:57)  alfuzosin 10 mg oral tablet, extended release: 1 tab(s) orally once a day (at bedtime) (17 Feb 2024 08:21)  Ambien 5 mg oral tablet: 1 tab(s) orally once a day (at bedtime) (17 Feb 2024 08:21)  amLODIPine 10 mg oral tablet: 1 tab(s) orally once a day (17 Feb 2024 08:21)  ascorbic acid 500 mg oral tablet: 1 tab(s) orally once a day (17 Feb 2024 08:23)  cloNIDine 0.1 mg oral tablet: 1 tab(s) orally once a day (17 Feb 2024 07:57)  cranberry oral capsule: 1 cap(s) orally once a day (17 Feb 2024 08:21)  DULoxetine 60 mg oral delayed release capsule: 1 cap(s) orally once a day (17 Feb 2024 08:21)  ferrous sulfate 325 mg (65 mg elemental iron) oral delayed release tablet: 1 tab(s) orally once a day (17 Feb 2024 08:21)  finasteride 5 mg oral tablet: 1 tab(s) orally once a day (17 Feb 2024 08:21)  gabapentin 600 mg oral tablet: 1 tab(s) orally 3 times a day (17 Feb 2024 08:21)  Lantus 100 units/mL subcutaneous solution: 20 unit(s) subcutaneous once a day (17 Feb 2024 07:57)  melatonin 5 mg oral tablet: 2 tab(s) orally once a day (at bedtime) (17 Feb 2024 08:21)  methocarbamol 750 mg oral tablet: 1 tab(s) orally 3 times a day (17 Feb 2024 08:21)  metoprolol tartrate 25 mg oral tablet: 1 tab(s) orally 2 times a day (17 Feb 2024 08:23)  Multiple Vitamins oral capsule: 1 cap(s) orally once a day (17 Feb 2024 08:21)  nicotine 2 mg oral transmucosal gum: 1 gum chewed every 2 hours as needed for as needed (17 Feb 2024 08:22)  oxyCODONE 10 mg oral tablet: 1 tab(s) orally every 6 hours as needed for  moderate pain (17 Feb 2024 08:22)  polyethylene glycol 3350 oral kit: 1 dose(s) orally once a day (17 Feb 2024 08:21)  Protonix 40 mg oral delayed release tablet: 1 tab(s) orally once a day (17 Feb 2024 08:21)  traZODone 50 mg oral tablet: 1 tab(s) orally once a day (at bedtime) (17 Feb 2024 08:21)  warfarin 4 mg oral tablet: 1 tab(s) orally once a day (17 Feb 2024 07:57)    MEDICATIONS  (STANDING):  amLODIPine   Tablet 10 milliGRAM(s) Oral daily  ascorbic acid 500 milliGRAM(s) Oral daily  cloNIDine 0.1 milliGRAM(s) Oral daily  dextrose 5%. 1000 milliLiter(s) (50 mL/Hr) IV Continuous <Continuous>  dextrose 5%. 1000 milliLiter(s) (100 mL/Hr) IV Continuous <Continuous>  dextrose 50% Injectable 25 Gram(s) IV Push once  dextrose 50% Injectable 25 Gram(s) IV Push once  dextrose 50% Injectable 12.5 Gram(s) IV Push once  DULoxetine 60 milliGRAM(s) Oral daily  ferrous    sulfate 325 milliGRAM(s) Oral daily  finasteride 5 milliGRAM(s) Oral daily  gabapentin 600 milliGRAM(s) Oral three times a day  glucagon  Injectable 1 milliGRAM(s) IntraMuscular once  insulin glargine Injectable (LANTUS) 12 Unit(s) SubCutaneous at bedtime  insulin lispro (ADMELOG) corrective regimen sliding scale   SubCutaneous at bedtime  insulin lispro (ADMELOG) corrective regimen sliding scale   SubCutaneous three times a day before meals  insulin lispro Injectable (ADMELOG) 2 Unit(s) SubCutaneous three times a day before meals  lactobacillus acidophilus 1 Tablet(s) Oral daily  melatonin 5 milliGRAM(s) Oral at bedtime  meropenem  IVPB 1000 milliGRAM(s) IV Intermittent every 12 hours  meropenem  IVPB      methocarbamol 750 milliGRAM(s) Oral three times a day  metoprolol tartrate 25 milliGRAM(s) Oral two times a day  multivitamin 1 Tablet(s) Oral daily  nicotine - 21 mG/24Hr(s) Patch 1 Patch Transdermal daily  pantoprazole    Tablet 40 milliGRAM(s) Oral before breakfast  polyethylene glycol 3350 17 Gram(s) Oral daily  sodium chloride 0.9%. 1000 milliLiter(s) (60 mL/Hr) IV Continuous <Continuous>  tamsulosin 0.4 milliGRAM(s) Oral at bedtime  traZODone 50 milliGRAM(s) Oral at bedtime    MEDICATIONS  (PRN):  acetaminophen     Tablet .. 650 milliGRAM(s) Oral every 6 hours PRN Temp greater or equal to 38C (100.4F), Mild Pain (1 - 3)  dextrose Oral Gel 15 Gram(s) Oral once PRN Blood Glucose LESS THAN 70 milliGRAM(s)/deciliter  HYDROmorphone  Injectable 0.5 milliGRAM(s) IV Push every 4 hours PRN Moderate Pain (4 - 6)  HYDROmorphone  Injectable 1 milliGRAM(s) IV Push every 4 hours PRN Severe Pain (7 - 10)  nicotine  Polacrilex Gum 2 milliGRAM(s) Oral every 2 hours PRN Cravings      ALLERGIES:  Allergies    sulfa drugs (Rash)    Intolerances        SOCIAL HISTORY:  Social History:  Tobacco: 1-2 packs a day since 16  EtOH:  None  Recreational drug use: 1 bowl of marijuana daily 0.5g  Ambulates: Walker  ADLs: Independent (17 Feb 2024 07:12)    Smoking: Yes [ ]  No [ ]   ______pk yrs  ETOH  Yes [ ]  No [ ]  Social [ ]  DRUGS:  Yes [ ]  No [ ]  if so what______________    FAMILY HISTORY:  FAMILY HISTORY:  Family history of sinus tachycardia (Father)    FH: HTN (hypertension) (Mother)        VITAL SIGNS:  Vital Signs Last 24 Hrs  T(C): 37.1 (18 Feb 2024 05:05), Max: 37.1 (18 Feb 2024 05:05)  T(F): 98.8 (18 Feb 2024 05:05), Max: 98.8 (18 Feb 2024 05:05)  HR: 69 (18 Feb 2024 05:05) (52 - 69)  BP: 145/81 (18 Feb 2024 05:05) (126/73 - 169/84)  BP(mean): --  RR: 18 (18 Feb 2024 05:05) (17 - 18)  SpO2: 96% (18 Feb 2024 05:05) (93% - 96%)    Parameters below as of 18 Feb 2024 05:05  Patient On (Oxygen Delivery Method): room air        PHYSICAL EXAM:  General: No acute distress, appears comfortable, well-groomed, appears stated age  Head, Eyes, Ears, Nose, Throat: Normal cephalic/atraumatic, anicteric, conjunctiva-non injected and moist, vision grossly intact, hearing grossly intact, no nasal discharge, ears and nose symmetrical and atraumatic.  Nasal, oral, and oropharyngeal mucosa pink moist with no evidence of ulceration  Neck: Supple, carotids have good upstroke, trachea in the midline, without JVD or thyromegaly  Lymphatic: No evidence of masses or lymphadenopathy in the head, neck, trunk, axillary, inguinal, or supraclavicular regions  Chest: Lungs are clear to P&A, no wheezing, no rales, no ronchi, with good inspiratory effort  Heart: Heart rhythm regular, no murmurs  Abdomen: Soft, non tender, good bowel sounds present in all four quadrants.  No guarding, rebound, and no peritoneal signs.  No evidence of hepatosplenomegaly.  No evidence of abdominal wall hernias.  Inguinal regions are unremarkable with no evidence of hernias.   Extremity: No swelling, or open sores, no gross deformities,  good range of motion, 2+ peripheral pulses bilat UE/LE, no edema,  negative Vicki's sign, no lymphadenopathy  Neuro: Alert and oriented x3, motor and sensory intact  Psychiatric: Awake , alert, oriented x3 with an appropriate affect.   Skin: Good color, turgor, texture with no gross lesions, no eruptions, no rashes, no subcutaneous nodules and normal temperature.     LABS:                        10.6   9.04  )-----------( 222      ( 18 Feb 2024 06:30 )             33.5     02-18    141  |  112<H>  |  32<H>  ----------------------------<  140<H>  3.7   |  24  |  2.10<H>    Ca    8.6      18 Feb 2024 06:30    TPro  6.5  /  Alb  2.5<L>  /  TBili  0.3  /  DBili  x   /  AST  15  /  ALT  23  /  AlkPhos  82  02-18    PT/INR - ( 18 Feb 2024 06:30 )   PT: 26.9 sec;   INR: 2.36 ratio         PTT - ( 17 Feb 2024 03:36 )  PTT:44.5 sec  Urinalysis Basic - ( 18 Feb 2024 06:30 )    Color: x / Appearance: x / SG: x / pH: x  Gluc: 140 mg/dL / Ketone: x  / Bili: x / Urobili: x   Blood: x / Protein: x / Nitrite: x   Leuk Esterase: x / RBC: x / WBC x   Sq Epi: x / Non Sq Epi: x / Bacteria: x      LIVER FUNCTIONS - ( 18 Feb 2024 06:30 )  Alb: 2.5 g/dL / Pro: 6.5 g/dL / ALK PHOS: 82 U/L / ALT: 23 U/L / AST: 15 U/L / GGT: x               RADIOLOGY & ADDITIONAL STUDIES:    ASSESSMENT:    PLAN: SURGERY PA CONSULT NOTE:    CHIEF COMPLAINT:  Patient is a 48y old  Male who presents with a chief complaint of left flank pain x 4 days.     HPI FROM ED:  HPI:  47yo M, PMHx of renal stones, Guillain-Normalville Syndrome with peripheral neuropathy, urinary incontinence, DM2 (on insulin), HTN, BPH, DDD, DVT with unknown prothrombotic state (on Warfarin) , and CVA (2016) presented from [facility] complaining of left-sided flank pain x 4 days.     Patient states that 4 days ago, he began to develop left sided flank pain. Patient describes the pain as sharp, tender, and worse with movement. He states the pain is 10/10 and has been taking oxycodone 10mg and tylenol twice a day, without much relief. Patient has a history of multiple utis, and kidney stones (s/p lithotripsy 2023).  He endorses nausea, but denies vomiting. The pain has worsened progressively over the past few days, which prompted him to visit the emergency room for further evaluation.     Of note, he recently had a uti last month, and was treated with meropenem     Patient denies F/C, HA, N/V, CP, SOB, C/D, Dysuria, Leg pain, or Leg swelling.    Patient denies any recent travel, sick contacts, or long periods of immobilization.      ED Course:  Vitals: 134/83mmHg, 60bpm, 97.5F, 96% on RA  Labs: WBc 12.5, Hgb 10.6, MCV 76.2, PT 25.6, INR 2.24, aPTT 44.5, Cl 110, BUN/Cr 39/2.2, glucose 2.2, GFR 36  UA: many bacteria, squamous cells present. mod blood. large leuk esterase, 100 protein  CT renal stone hunt: mild B/L hydroureteronephrosis w/o calcified stone identified along course of the ureter. non-obstructive B/L renal calculi are present.  Mild trabeculated bladder wall thickening.  Given - rocephin 1g, IV dilaudid 0.5mg x1, IV dilaudid 1mg x1, 1L NS (17 Feb 2024 07:12)      Patient is seen and evaluated at Bedside. According to patient, he has been having worsening pain in left flank x 4 days, sharp and tender, worse with movement.  Reports continuous pain with movement, 10/10 in pain scale, thus he tries not to move.  He had similar pain 2 weeks ago and was seen at [facility] and was told he has renal stones but does not need to be treated. Pt reports hx of multiple kidney stones and underwent lithotripsy in 2022 at [facility].  Pt reports ayala placed in [facility] 3 weeks ago for Neurogenic bladder.  Denies fever, chills, n/v/d, abdominal pain, chest pain, SOB.          PAST MEDICAL HISTORY:  PAST MEDICAL & SURGICAL HISTORY:  Renal stones      H/O Guillain-Normalville syndrome      History of neurogenic bowel      DM2 (diabetes mellitus, type 2)      HTN (hypertension)      BPH without obstruction/lower urinary tract symptoms      Degenerative arthritis      DVT of lower limb, acute      CVA (cerebrovascular accident)      H/O lithotripsy          PAST SURGICAL HISTORY:    REVIEW OF SYSTEMS:  General/Constitutional: No acute distress, no headache, weakness, fevers, or chills   HEENT: Denies auditory or visual changes/disturbances, no vertigo, no throat pain, no dysphagia    Neck: Denies neck pain/stiffness, denies swelling/lumps/hoarseness   Lymphatic: Denies lumps or swelling in the axillae, groin, or neck bilaterally   Respiratory: Denies cough/hemoptysis, denies wheezing/SOB/dyspnea  Cardiac: Denies chest pain, palpitations  Abdomen: Denies abdominal bloating/fullness, nausea or vomiting, denies abdominal pain  Extremities: Denies sores, swelling, discoloration bilat UE/LE  Genitourinary: Denies urinary issues or complaints, denies dysuria/hematuria  Neuro: Denies weakness, paraesthesias, paralysis, syncope, loss of vision  Skin: Denies pruritus, pain, rashes  Psych: Denies hallucinations, visual disturbances, or depression    MEDICATIONS:  Home Medications:  Acidophilus oral capsule: 1 cap(s) orally once a day (17 Feb 2024 08:22)  Admelog 100 units/mL injectable solution: 4 unit(s) injectable 3 times a day (before meals) 4-8 units (17 Feb 2024 07:57)  alfuzosin 10 mg oral tablet, extended release: 1 tab(s) orally once a day (at bedtime) (17 Feb 2024 08:21)  Ambien 5 mg oral tablet: 1 tab(s) orally once a day (at bedtime) (17 Feb 2024 08:21)  amLODIPine 10 mg oral tablet: 1 tab(s) orally once a day (17 Feb 2024 08:21)  ascorbic acid 500 mg oral tablet: 1 tab(s) orally once a day (17 Feb 2024 08:23)  cloNIDine 0.1 mg oral tablet: 1 tab(s) orally once a day (17 Feb 2024 07:57)  cranberry oral capsule: 1 cap(s) orally once a day (17 Feb 2024 08:21)  DULoxetine 60 mg oral delayed release capsule: 1 cap(s) orally once a day (17 Feb 2024 08:21)  ferrous sulfate 325 mg (65 mg elemental iron) oral delayed release tablet: 1 tab(s) orally once a day (17 Feb 2024 08:21)  finasteride 5 mg oral tablet: 1 tab(s) orally once a day (17 Feb 2024 08:21)  gabapentin 600 mg oral tablet: 1 tab(s) orally 3 times a day (17 Feb 2024 08:21)  Lantus 100 units/mL subcutaneous solution: 20 unit(s) subcutaneous once a day (17 Feb 2024 07:57)  melatonin 5 mg oral tablet: 2 tab(s) orally once a day (at bedtime) (17 Feb 2024 08:21)  methocarbamol 750 mg oral tablet: 1 tab(s) orally 3 times a day (17 Feb 2024 08:21)  metoprolol tartrate 25 mg oral tablet: 1 tab(s) orally 2 times a day (17 Feb 2024 08:23)  Multiple Vitamins oral capsule: 1 cap(s) orally once a day (17 Feb 2024 08:21)  nicotine 2 mg oral transmucosal gum: 1 gum chewed every 2 hours as needed for as needed (17 Feb 2024 08:22)  oxyCODONE 10 mg oral tablet: 1 tab(s) orally every 6 hours as needed for  moderate pain (17 Feb 2024 08:22)  polyethylene glycol 3350 oral kit: 1 dose(s) orally once a day (17 Feb 2024 08:21)  Protonix 40 mg oral delayed release tablet: 1 tab(s) orally once a day (17 Feb 2024 08:21)  traZODone 50 mg oral tablet: 1 tab(s) orally once a day (at bedtime) (17 Feb 2024 08:21)  warfarin 4 mg oral tablet: 1 tab(s) orally once a day (17 Feb 2024 07:57)    MEDICATIONS  (STANDING):  amLODIPine   Tablet 10 milliGRAM(s) Oral daily  ascorbic acid 500 milliGRAM(s) Oral daily  cloNIDine 0.1 milliGRAM(s) Oral daily  dextrose 5%. 1000 milliLiter(s) (50 mL/Hr) IV Continuous <Continuous>  dextrose 5%. 1000 milliLiter(s) (100 mL/Hr) IV Continuous <Continuous>  dextrose 50% Injectable 25 Gram(s) IV Push once  dextrose 50% Injectable 25 Gram(s) IV Push once  dextrose 50% Injectable 12.5 Gram(s) IV Push once  DULoxetine 60 milliGRAM(s) Oral daily  ferrous    sulfate 325 milliGRAM(s) Oral daily  finasteride 5 milliGRAM(s) Oral daily  gabapentin 600 milliGRAM(s) Oral three times a day  glucagon  Injectable 1 milliGRAM(s) IntraMuscular once  insulin glargine Injectable (LANTUS) 12 Unit(s) SubCutaneous at bedtime  insulin lispro (ADMELOG) corrective regimen sliding scale   SubCutaneous at bedtime  insulin lispro (ADMELOG) corrective regimen sliding scale   SubCutaneous three times a day before meals  insulin lispro Injectable (ADMELOG) 2 Unit(s) SubCutaneous three times a day before meals  lactobacillus acidophilus 1 Tablet(s) Oral daily  melatonin 5 milliGRAM(s) Oral at bedtime  meropenem  IVPB 1000 milliGRAM(s) IV Intermittent every 12 hours  meropenem  IVPB      methocarbamol 750 milliGRAM(s) Oral three times a day  metoprolol tartrate 25 milliGRAM(s) Oral two times a day  multivitamin 1 Tablet(s) Oral daily  nicotine - 21 mG/24Hr(s) Patch 1 Patch Transdermal daily  pantoprazole    Tablet 40 milliGRAM(s) Oral before breakfast  polyethylene glycol 3350 17 Gram(s) Oral daily  sodium chloride 0.9%. 1000 milliLiter(s) (60 mL/Hr) IV Continuous <Continuous>  tamsulosin 0.4 milliGRAM(s) Oral at bedtime  traZODone 50 milliGRAM(s) Oral at bedtime    MEDICATIONS  (PRN):  acetaminophen     Tablet .. 650 milliGRAM(s) Oral every 6 hours PRN Temp greater or equal to 38C (100.4F), Mild Pain (1 - 3)  dextrose Oral Gel 15 Gram(s) Oral once PRN Blood Glucose LESS THAN 70 milliGRAM(s)/deciliter  HYDROmorphone  Injectable 0.5 milliGRAM(s) IV Push every 4 hours PRN Moderate Pain (4 - 6)  HYDROmorphone  Injectable 1 milliGRAM(s) IV Push every 4 hours PRN Severe Pain (7 - 10)  nicotine  Polacrilex Gum 2 milliGRAM(s) Oral every 2 hours PRN Cravings      ALLERGIES:  Allergies    sulfa drugs (Rash)    Intolerances        SOCIAL HISTORY:  Social History:  Tobacco: 1-2 packs a day since 16  EtOH:  None  Recreational drug use: 1 bowl of marijuana daily 0.5g  Ambulates: Walker  ADLs: Independent (17 Feb 2024 07:12)    Smoking: Yes [ ]  No [ ]   ______pk yrs  ETOH  Yes [ ]  No [ ]  Social [ ]  DRUGS:  Yes [ ]  No [ ]  if so what______________    FAMILY HISTORY:  FAMILY HISTORY:  Family history of sinus tachycardia (Father)    FH: HTN (hypertension) (Mother)        VITAL SIGNS:  Vital Signs Last 24 Hrs  T(C): 37.1 (18 Feb 2024 05:05), Max: 37.1 (18 Feb 2024 05:05)  T(F): 98.8 (18 Feb 2024 05:05), Max: 98.8 (18 Feb 2024 05:05)  HR: 69 (18 Feb 2024 05:05) (52 - 69)  BP: 145/81 (18 Feb 2024 05:05) (126/73 - 169/84)  BP(mean): --  RR: 18 (18 Feb 2024 05:05) (17 - 18)  SpO2: 96% (18 Feb 2024 05:05) (93% - 96%)    Parameters below as of 18 Feb 2024 05:05  Patient On (Oxygen Delivery Method): room air        PHYSICAL EXAM:  General: No acute distress, appears comfortable, obese.   Head, Eyes, Ears, Nose, Throat: Normal cephalic/atraumatic, anicteric, conjunctiva-non injected and moist, vision grossly intact, hearing grossly intact, no nasal discharge, ears and nose symmetrical and atraumatic.  Nasal, oral, and oropharyngeal mucosa pink moist.    Neck: Supple, carotids have good upstroke, trachea in the midline, without JVD or thyromegaly  Chest: Lungs are clear to P&A, no wheezing, no rales, no ronchi, with good inspiratory effort  Heart: Heart rhythm regular.   Abdomen: Soft, non tender, good bowel sounds present in all four quadrants.  No guarding, rebound, and no peritoneal signs.  Left CVAT.   Extremity: No swelling.   Neuro: Alert and oriented x3, motor and sensory intact  Psychiatric: Awake , alert, oriented x3 with an appropriate affect.   Skin: warm, dry.     LABS:                        10.6   9.04  )-----------( 222      ( 18 Feb 2024 06:30 )             33.5     02-18    141  |  112<H>  |  32<H>  ----------------------------<  140<H>  3.7   |  24  |  2.10<H>    Ca    8.6      18 Feb 2024 06:30    TPro  6.5  /  Alb  2.5<L>  /  TBili  0.3  /  DBili  x   /  AST  15  /  ALT  23  /  AlkPhos  82  02-18    PT/INR - ( 18 Feb 2024 06:30 )   PT: 26.9 sec;   INR: 2.36 ratio         PTT - ( 17 Feb 2024 03:36 )  PTT:44.5 sec  Urinalysis Basic - ( 18 Feb 2024 06:30 )    Color: x / Appearance: x / SG: x / pH: x  Gluc: 140 mg/dL / Ketone: x  / Bili: x / Urobili: x   Blood: x / Protein: x / Nitrite: x   Leuk Esterase: x / RBC: x / WBC x   Sq Epi: x / Non Sq Epi: x / Bacteria: x      LIVER FUNCTIONS - ( 18 Feb 2024 06:30 )  Alb: 2.5 g/dL / Pro: 6.5 g/dL / ALK PHOS: 82 U/L / ALT: 23 U/L / AST: 15 U/L / GGT: x               RADIOLOGY & ADDITIONAL STUDIES:      < from: CT Renal Stone Hunt (02.17.24 @ 05:02) >  IMPRESSION:    Cholelithiasis. Vague opacities at the fundal portion of the gallbladder.   Right upper quadrant ultrasound suggested. No significant wall thickening   or pericholecystic edema.    Mild bilateral hydroureteronephrosis without calcified stone identified   along the course of the ureter. Differential includes recently passed   kidney stone or infection. Additional punctate nonobstructive bilateral   renal calculi are present.    Mild trabeculated bladder wall thickening. Correlate with urinalysis and   lab values to assess for cystitis and/or ascending urinary tract   infection.              --- End of Report ---    < end of copied text >      ASSESSMENT: 47yo M, PMHx of renal stones, Guillain-Normal[place] Syndrome with peripheral neuropathy, urinary incontinence, DM2 (on insulin), HTN, BPH, DDD, DVT with unknown prothrombotic state (on Warfarin) , and CVA (2016) presented from [facility] complaining of left-sided flank pain x 4 days, admitted for UTI.  Urology consulted for CT renal stone hunt showing mild bilateral hydroureteronephrosis without calcified stone identified along the course of the ureter, additional punctate non-obstructive bilateral renal calculi are present.   Pt on ayala x 3 weeks (placed in [facility] for neurogenic bladder per patient) with adequate UOP.   Imaging without stones in ureter and only punctate renal calculi without any obstruction.   Afebrile. vitals are stable.     PLAN:  - Given CT Imaging not assuring for his left flank pain 2/2 punctate renal calculi.  Mild bilateral hydroureteronephrosis likely due to neurogenic bladder.   - No emergent Urology intervention indicated at this time.   - Trend Cr level   - Call Urology service if there is changes in clinical status - fever, chills, nausea, vomiting.   - Pain control   - Urology service following.   - Case and plan discussed with Dr. John

## 2024-02-18 NOTE — PHYSICAL THERAPY INITIAL EVALUATION ADULT - BED MOBILITY TRAINING, PT EVAL
(3 - 5 sessions) Pt will be able to transfer supine to sit independently to improve function and mobility upon d/c.

## 2024-02-18 NOTE — PROGRESS NOTE ADULT - PROBLEM SELECTOR PLAN 11
Chronic, s/p L5-5 SPF   - Continue home gabapentin and muscle relaxant   - Pain regimen: IV Dilaudid 0.5 q4hr prn for MP and 1 q4hrs prn for severe pain   - Bowel regimen while on opioids  - PT consulted Chronic, s/p L5-5 SPF   - Required Dilaudid 1 mg x4 within 24hrs period   - Continue home gabapentin and muscle relaxant   - Pain regimen: IV Dilaudid 0.5 q4hr prn for MP and 1 q4hrs prn for severe pain   - Bowel regimen while on opioids  - PT consulted

## 2024-02-19 LAB
-  AMPICILLIN: SIGNIFICANT CHANGE UP
-  CIPROFLOXACIN: SIGNIFICANT CHANGE UP
-  DAPTOMYCIN: SIGNIFICANT CHANGE UP
-  LEVOFLOXACIN: SIGNIFICANT CHANGE UP
-  LINEZOLID: SIGNIFICANT CHANGE UP
-  NITROFURANTOIN: SIGNIFICANT CHANGE UP
-  TETRACYCLINE: SIGNIFICANT CHANGE UP
-  VANCOMYCIN: SIGNIFICANT CHANGE UP
ALBUMIN SERPL ELPH-MCNC: 2.7 G/DL — LOW (ref 3.3–5)
ALP SERPL-CCNC: 86 U/L — SIGNIFICANT CHANGE UP (ref 40–120)
ALT FLD-CCNC: 24 U/L — SIGNIFICANT CHANGE UP (ref 12–78)
ANION GAP SERPL CALC-SCNC: 6 MMOL/L — SIGNIFICANT CHANGE UP (ref 5–17)
APTT BLD: 43 SEC — HIGH (ref 24.5–35.6)
AST SERPL-CCNC: 11 U/L — LOW (ref 15–37)
BASOPHILS # BLD AUTO: 0.06 K/UL — SIGNIFICANT CHANGE UP (ref 0–0.2)
BASOPHILS NFR BLD AUTO: 0.7 % — SIGNIFICANT CHANGE UP (ref 0–2)
BILIRUB SERPL-MCNC: 0.3 MG/DL — SIGNIFICANT CHANGE UP (ref 0.2–1.2)
BUN SERPL-MCNC: 30 MG/DL — HIGH (ref 7–23)
CALCIUM SERPL-MCNC: 8.8 MG/DL — SIGNIFICANT CHANGE UP (ref 8.5–10.1)
CHLORIDE SERPL-SCNC: 108 MMOL/L — SIGNIFICANT CHANGE UP (ref 96–108)
CO2 SERPL-SCNC: 26 MMOL/L — SIGNIFICANT CHANGE UP (ref 22–31)
CREAT SERPL-MCNC: 2.3 MG/DL — HIGH (ref 0.5–1.3)
CULTURE RESULTS: ABNORMAL
EGFR: 34 ML/MIN/1.73M2 — LOW
EOSINOPHIL # BLD AUTO: 0.3 K/UL — SIGNIFICANT CHANGE UP (ref 0–0.5)
EOSINOPHIL NFR BLD AUTO: 3.4 % — SIGNIFICANT CHANGE UP (ref 0–6)
GLUCOSE SERPL-MCNC: 169 MG/DL — HIGH (ref 70–99)
HCT VFR BLD CALC: 35.7 % — LOW (ref 39–50)
HGB BLD-MCNC: 11.4 G/DL — LOW (ref 13–17)
IMM GRANULOCYTES NFR BLD AUTO: 0.3 % — SIGNIFICANT CHANGE UP (ref 0–0.9)
INR BLD: 2.33 RATIO — HIGH (ref 0.85–1.18)
LYMPHOCYTES # BLD AUTO: 1.71 K/UL — SIGNIFICANT CHANGE UP (ref 1–3.3)
LYMPHOCYTES # BLD AUTO: 19.3 % — SIGNIFICANT CHANGE UP (ref 13–44)
MCHC RBC-ENTMCNC: 24.2 PG — LOW (ref 27–34)
MCHC RBC-ENTMCNC: 31.9 GM/DL — LOW (ref 32–36)
MCV RBC AUTO: 75.8 FL — LOW (ref 80–100)
METHOD TYPE: SIGNIFICANT CHANGE UP
MONOCYTES # BLD AUTO: 0.65 K/UL — SIGNIFICANT CHANGE UP (ref 0–0.9)
MONOCYTES NFR BLD AUTO: 7.3 % — SIGNIFICANT CHANGE UP (ref 2–14)
NEUTROPHILS # BLD AUTO: 6.12 K/UL — SIGNIFICANT CHANGE UP (ref 1.8–7.4)
NEUTROPHILS NFR BLD AUTO: 69 % — SIGNIFICANT CHANGE UP (ref 43–77)
NRBC # BLD: 0 /100 WBCS — SIGNIFICANT CHANGE UP (ref 0–0)
ORGANISM # SPEC MICROSCOPIC CNT: ABNORMAL
ORGANISM # SPEC MICROSCOPIC CNT: SIGNIFICANT CHANGE UP
PLATELET # BLD AUTO: 236 K/UL — SIGNIFICANT CHANGE UP (ref 150–400)
POTASSIUM SERPL-MCNC: 4.3 MMOL/L — SIGNIFICANT CHANGE UP (ref 3.5–5.3)
POTASSIUM SERPL-SCNC: 4.3 MMOL/L — SIGNIFICANT CHANGE UP (ref 3.5–5.3)
PROT SERPL-MCNC: 7 G/DL — SIGNIFICANT CHANGE UP (ref 6–8.3)
PROTHROM AB SERPL-ACNC: 26.6 SEC — HIGH (ref 9.5–13)
RBC # BLD: 4.71 M/UL — SIGNIFICANT CHANGE UP (ref 4.2–5.8)
RBC # FLD: 16.7 % — HIGH (ref 10.3–14.5)
SODIUM SERPL-SCNC: 140 MMOL/L — SIGNIFICANT CHANGE UP (ref 135–145)
SPECIMEN SOURCE: SIGNIFICANT CHANGE UP
WBC # BLD: 8.87 K/UL — SIGNIFICANT CHANGE UP (ref 3.8–10.5)
WBC # FLD AUTO: 8.87 K/UL — SIGNIFICANT CHANGE UP (ref 3.8–10.5)

## 2024-02-19 PROCEDURE — 99222 1ST HOSP IP/OBS MODERATE 55: CPT

## 2024-02-19 PROCEDURE — 76705 ECHO EXAM OF ABDOMEN: CPT | Mod: 26

## 2024-02-19 RX ORDER — HYDROMORPHONE HYDROCHLORIDE 2 MG/ML
1 INJECTION INTRAMUSCULAR; INTRAVENOUS; SUBCUTANEOUS EVERY 4 HOURS
Refills: 0 | Status: DISCONTINUED | OUTPATIENT
Start: 2024-02-19 | End: 2024-02-22

## 2024-02-19 RX ORDER — ACETAMINOPHEN 500 MG
1000 TABLET ORAL ONCE
Refills: 0 | Status: COMPLETED | OUTPATIENT
Start: 2024-02-19 | End: 2024-02-19

## 2024-02-19 RX ORDER — NICOTINE POLACRILEX 2 MG
1 GUM BUCCAL EVERY 24 HOURS
Refills: 0 | Status: DISCONTINUED | OUTPATIENT
Start: 2024-02-19 | End: 2024-02-23

## 2024-02-19 RX ORDER — LIDOCAINE 4 G/100G
1 CREAM TOPICAL ONCE
Refills: 0 | Status: COMPLETED | OUTPATIENT
Start: 2024-02-19 | End: 2024-02-19

## 2024-02-19 RX ORDER — DIPHENHYDRAMINE HCL 50 MG
25 CAPSULE ORAL ONCE
Refills: 0 | Status: COMPLETED | OUTPATIENT
Start: 2024-02-19 | End: 2024-02-19

## 2024-02-19 RX ORDER — HYDROMORPHONE HYDROCHLORIDE 2 MG/ML
1.5 INJECTION INTRAMUSCULAR; INTRAVENOUS; SUBCUTANEOUS EVERY 4 HOURS
Refills: 0 | Status: DISCONTINUED | OUTPATIENT
Start: 2024-02-19 | End: 2024-02-22

## 2024-02-19 RX ORDER — PIPERACILLIN AND TAZOBACTAM 4; .5 G/20ML; G/20ML
3.38 INJECTION, POWDER, LYOPHILIZED, FOR SOLUTION INTRAVENOUS ONCE
Refills: 0 | Status: COMPLETED | OUTPATIENT
Start: 2024-02-20 | End: 2024-02-20

## 2024-02-19 RX ORDER — WARFARIN SODIUM 2.5 MG/1
4 TABLET ORAL ONCE
Refills: 0 | Status: COMPLETED | OUTPATIENT
Start: 2024-02-19 | End: 2024-02-19

## 2024-02-19 RX ORDER — PIPERACILLIN AND TAZOBACTAM 4; .5 G/20ML; G/20ML
3.38 INJECTION, POWDER, LYOPHILIZED, FOR SOLUTION INTRAVENOUS ONCE
Refills: 0 | Status: COMPLETED | OUTPATIENT
Start: 2024-02-19 | End: 2024-02-19

## 2024-02-19 RX ORDER — PIPERACILLIN AND TAZOBACTAM 4; .5 G/20ML; G/20ML
3.38 INJECTION, POWDER, LYOPHILIZED, FOR SOLUTION INTRAVENOUS EVERY 8 HOURS
Refills: 0 | Status: DISCONTINUED | OUTPATIENT
Start: 2024-02-20 | End: 2024-02-20

## 2024-02-19 RX ORDER — DIPHENHYDRAMINE HCL 50 MG
25 CAPSULE ORAL EVERY 6 HOURS
Refills: 0 | Status: DISCONTINUED | OUTPATIENT
Start: 2024-02-19 | End: 2024-02-23

## 2024-02-19 RX ADMIN — HYDROMORPHONE HYDROCHLORIDE 1 MILLIGRAM(S): 2 INJECTION INTRAMUSCULAR; INTRAVENOUS; SUBCUTANEOUS at 03:31

## 2024-02-19 RX ADMIN — MEROPENEM 100 MILLIGRAM(S): 1 INJECTION INTRAVENOUS at 06:15

## 2024-02-19 RX ADMIN — Medication 650 MILLIGRAM(S): at 13:29

## 2024-02-19 RX ADMIN — Medication 50 MILLIGRAM(S): at 22:20

## 2024-02-19 RX ADMIN — LIDOCAINE 1 PATCH: 4 CREAM TOPICAL at 13:50

## 2024-02-19 RX ADMIN — HYDROMORPHONE HYDROCHLORIDE 1 MILLIGRAM(S): 2 INJECTION INTRAMUSCULAR; INTRAVENOUS; SUBCUTANEOUS at 08:20

## 2024-02-19 RX ADMIN — Medication 1: at 12:01

## 2024-02-19 RX ADMIN — Medication 400 MILLIGRAM(S): at 01:14

## 2024-02-19 RX ADMIN — PANTOPRAZOLE SODIUM 40 MILLIGRAM(S): 20 TABLET, DELAYED RELEASE ORAL at 06:14

## 2024-02-19 RX ADMIN — HYDROMORPHONE HYDROCHLORIDE 1 MILLIGRAM(S): 2 INJECTION INTRAMUSCULAR; INTRAVENOUS; SUBCUTANEOUS at 07:46

## 2024-02-19 RX ADMIN — METHOCARBAMOL 750 MILLIGRAM(S): 500 TABLET, FILM COATED ORAL at 06:15

## 2024-02-19 RX ADMIN — GABAPENTIN 600 MILLIGRAM(S): 400 CAPSULE ORAL at 22:20

## 2024-02-19 RX ADMIN — Medication 1000 MILLIGRAM(S): at 02:10

## 2024-02-19 RX ADMIN — GABAPENTIN 600 MILLIGRAM(S): 400 CAPSULE ORAL at 13:29

## 2024-02-19 RX ADMIN — FINASTERIDE 5 MILLIGRAM(S): 5 TABLET, FILM COATED ORAL at 12:00

## 2024-02-19 RX ADMIN — METHOCARBAMOL 750 MILLIGRAM(S): 500 TABLET, FILM COATED ORAL at 22:19

## 2024-02-19 RX ADMIN — Medication 1 PATCH: at 00:00

## 2024-02-19 RX ADMIN — HYDROMORPHONE HYDROCHLORIDE 1 MILLIGRAM(S): 2 INJECTION INTRAMUSCULAR; INTRAVENOUS; SUBCUTANEOUS at 00:15

## 2024-02-19 RX ADMIN — HYDROMORPHONE HYDROCHLORIDE 1.5 MILLIGRAM(S): 2 INJECTION INTRAMUSCULAR; INTRAVENOUS; SUBCUTANEOUS at 12:45

## 2024-02-19 RX ADMIN — Medication 325 MILLIGRAM(S): at 12:00

## 2024-02-19 RX ADMIN — Medication 1 TABLET(S): at 12:00

## 2024-02-19 RX ADMIN — Medication 650 MILLIGRAM(S): at 14:00

## 2024-02-19 RX ADMIN — WARFARIN SODIUM 4 MILLIGRAM(S): 2.5 TABLET ORAL at 22:22

## 2024-02-19 RX ADMIN — Medication 2 MILLIGRAM(S): at 12:01

## 2024-02-19 RX ADMIN — AMLODIPINE BESYLATE 10 MILLIGRAM(S): 2.5 TABLET ORAL at 06:14

## 2024-02-19 RX ADMIN — Medication 2 MILLIGRAM(S): at 20:40

## 2024-02-19 RX ADMIN — LIDOCAINE 1 PATCH: 4 CREAM TOPICAL at 01:14

## 2024-02-19 RX ADMIN — Medication 25 MILLIGRAM(S): at 17:30

## 2024-02-19 RX ADMIN — Medication 2 UNIT(S): at 08:04

## 2024-02-19 RX ADMIN — DULOXETINE HYDROCHLORIDE 60 MILLIGRAM(S): 30 CAPSULE, DELAYED RELEASE ORAL at 12:00

## 2024-02-19 RX ADMIN — HYDROMORPHONE HYDROCHLORIDE 1.5 MILLIGRAM(S): 2 INJECTION INTRAMUSCULAR; INTRAVENOUS; SUBCUTANEOUS at 16:26

## 2024-02-19 RX ADMIN — Medication 25 MILLIGRAM(S): at 18:05

## 2024-02-19 RX ADMIN — Medication 2 MILLIGRAM(S): at 03:37

## 2024-02-19 RX ADMIN — HYDROMORPHONE HYDROCHLORIDE 1.5 MILLIGRAM(S): 2 INJECTION INTRAMUSCULAR; INTRAVENOUS; SUBCUTANEOUS at 20:50

## 2024-02-19 RX ADMIN — Medication 2 MILLIGRAM(S): at 01:24

## 2024-02-19 RX ADMIN — Medication 1 PATCH: at 22:29

## 2024-02-19 RX ADMIN — Medication 2 UNIT(S): at 12:00

## 2024-02-19 RX ADMIN — Medication 1: at 16:32

## 2024-02-19 RX ADMIN — Medication 2 MILLIGRAM(S): at 16:27

## 2024-02-19 RX ADMIN — PIPERACILLIN AND TAZOBACTAM 200 GRAM(S): 4; .5 INJECTION, POWDER, LYOPHILIZED, FOR SOLUTION INTRAVENOUS at 16:27

## 2024-02-19 RX ADMIN — HYDROMORPHONE HYDROCHLORIDE 1.5 MILLIGRAM(S): 2 INJECTION INTRAMUSCULAR; INTRAVENOUS; SUBCUTANEOUS at 21:30

## 2024-02-19 RX ADMIN — HYDROMORPHONE HYDROCHLORIDE 1.5 MILLIGRAM(S): 2 INJECTION INTRAMUSCULAR; INTRAVENOUS; SUBCUTANEOUS at 16:58

## 2024-02-19 RX ADMIN — Medication 1 PATCH: at 00:41

## 2024-02-19 RX ADMIN — POLYETHYLENE GLYCOL 3350 17 GRAM(S): 17 POWDER, FOR SOLUTION ORAL at 12:01

## 2024-02-19 RX ADMIN — LIDOCAINE 1 PATCH: 4 CREAM TOPICAL at 07:22

## 2024-02-19 RX ADMIN — Medication 0.1 MILLIGRAM(S): at 06:14

## 2024-02-19 RX ADMIN — Medication 25 MILLIGRAM(S): at 06:14

## 2024-02-19 RX ADMIN — Medication 500 MILLIGRAM(S): at 12:00

## 2024-02-19 RX ADMIN — Medication 25 MILLIGRAM(S): at 22:28

## 2024-02-19 RX ADMIN — PIPERACILLIN AND TAZOBACTAM 25 GRAM(S): 4; .5 INJECTION, POWDER, LYOPHILIZED, FOR SOLUTION INTRAVENOUS at 22:14

## 2024-02-19 RX ADMIN — Medication 2 MILLIGRAM(S): at 22:33

## 2024-02-19 RX ADMIN — Medication 2 UNIT(S): at 16:32

## 2024-02-19 RX ADMIN — Medication 2 MILLIGRAM(S): at 06:15

## 2024-02-19 RX ADMIN — Medication 5 MILLIGRAM(S): at 22:20

## 2024-02-19 RX ADMIN — METHOCARBAMOL 750 MILLIGRAM(S): 500 TABLET, FILM COATED ORAL at 13:29

## 2024-02-19 RX ADMIN — HYDROMORPHONE HYDROCHLORIDE 1 MILLIGRAM(S): 2 INJECTION INTRAMUSCULAR; INTRAVENOUS; SUBCUTANEOUS at 04:30

## 2024-02-19 RX ADMIN — INSULIN GLARGINE 12 UNIT(S): 100 INJECTION, SOLUTION SUBCUTANEOUS at 22:21

## 2024-02-19 RX ADMIN — HYDROMORPHONE HYDROCHLORIDE 1.5 MILLIGRAM(S): 2 INJECTION INTRAMUSCULAR; INTRAVENOUS; SUBCUTANEOUS at 12:12

## 2024-02-19 RX ADMIN — TAMSULOSIN HYDROCHLORIDE 0.4 MILLIGRAM(S): 0.4 CAPSULE ORAL at 22:19

## 2024-02-19 RX ADMIN — Medication 1 PATCH: at 07:24

## 2024-02-19 RX ADMIN — GABAPENTIN 600 MILLIGRAM(S): 400 CAPSULE ORAL at 06:14

## 2024-02-19 NOTE — PROGRESS NOTE ADULT - PROBLEM SELECTOR PLAN 3
Found on CT: Cholelithiasis w/o significant wall thickening or pericholecystic edema.  -Bender Sign negative, on physical exam  -RUQ US: Cholelithiasis with sludge distended gallbladder fundus. Palpable tenderness/Bender' sign concerning for acute cholecystitis.  -Surgery consulted Found on CT: Cholelithiasis w/o significant wall thickening or pericholecystic edema.  -Bender Sign positve, on physical exam  -RUQ US: Cholelithiasis with sludge distended gallbladder fundus. Palpable tenderness/Bender' sign concerning for acute cholecystitis.  -Surgery consulted  - GI consulted

## 2024-02-19 NOTE — CONSULT NOTE ADULT - SUBJECTIVE AND OBJECTIVE BOX
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 patient is currently being treated for pyelonephritis on vanco  noted to have cholelithiases on CT scan prompting surgical consult.   patient states he has been eating regular diet with no n/v , abdominal pain, or any changes or GI complaints. no RUQ, cp, radiating pain has been endorsed , no known transaminitis           < from: US Abdomen Upper Quadrant Right (02.19.24 @ 06:52) >  Liver: Within normal limits.  Bile ducts: Normal caliber. Common bile duct measures 3 mm.  Gallbladder: 1-2 cm sized gallstones with sludge distended gallbladder   fundus, corresponding to recent CT. Palpable tenderness without   pericholecystic edema. 4 mm gallbladder mural prominence  Pancreas: Visualized portions are within normal limits.  Right kidney: 10 cm. No hydronephrosis.  Ascites: None.  IVC: Visualized portions are within normal limits.    IMPRESSION:  Cholelithiasis with sludge distended gallbladder fundus. Palpable   tenderness/Bender's sign raises possibility of acute cholecystitis      < from: CT Renal Stone Hunt (02.17.24 @ 05:02) >  LIVER: Within normal limits.  BILE DUCTS: Normal caliber.  GALLBLADDER: Vague opacities identified at the fundal portion of the   gallbladder. Cholelithiasis with stone measuring up to 3.2 cm the body of   the gallbladder. No significant wall thickening or pericholecystic edema.  SPLEEN: Within normal limits.  PANCREAS: Within normal limits.  ADRENALS: Within normal limits.  KIDNEYS/URETERS: Mild bilateral hydroureteronephrosis without calcified   stone identified along thecourse of the ureter. Additional punctate   nonobstructive bilateral renal calculi are present.    BLADDER: Mild trabeculated bladder wall thickening. Anderson catheter   identified.  REPRODUCTIVE ORGANS: No prostatomegaly.    BOWEL: No bowel obstruction. Normal appendix.  PERITONEUM: No ascites.  VESSELS:  Normal aortic caliber. Infrarenal IVC filter.  RETROPERITONEUM: No lymphadenopathy.  ABDOMINAL WALL: Soft tissue density at the level of sacral coccygeal   junction, likely decubitus ulcer. No foci of air or abscess identified.    Small bilateral groin hernias containing fat. Left spigelian hernia   containing nonobstructed bowel loop.  BONES: Degenerative changes. Posterior metallic fusion of L4 and L5,   metallic artifact limits detailed evaluation of the spinal canal on   adjacent structures.    IMPRESSION:    Cholelithiasis. Vague opacities at the fundal portion of the gallbladder.   Right upper quadrant ultrasound suggested. No significant wall thickening   or pericholecystic edema.    Mild bilateral hydroureteronephrosis without calcified stone identified   along the course of the ureter. Differential includes recently passed   kidney stone or infection. Additional punctate nonobstructive bilateral   renal calculi are present.              ED Course:  Vitals: 134/83mmHg, 60bpm, 97.5F, 96% on RA  Labs: WBc 12.5, Hgb 10.6, MCV 76.2, PT 25.6, INR 2.24, aPTT 44.5, Cl 110, BUN/Cr 39/2.2, glucose 2.2, GFR 36  UA: many bacteria, squamous cells present. mod blood. large leuk esterase, 100 protein  CT renal stone hunt: mild B/L hydroureteronephrosis w/o calcified stone identified along course of the ureter. non-obstructive B/L renal calculi are present.  Mild trabeculated bladder wall thickening.  Given - rocephin 1g, IV dilaudid 0.5mg x1, IV dilaudid 1mg x1, 1L NS (17 Feb 2024 07:12)      PAST MEDICAL & SURGICAL HISTORY:  Renal stones      H/O Guillain-[place] syndrome      History of neurogenic bowel      DM2 (diabetes mellitus, type 2)      HTN (hypertension)      BPH without obstruction/lower urinary tract symptoms      Degenerative arthritis      DVT of lower limb, acute      CVA (cerebrovascular accident)      H/O lithotripsy          Review of Systems:    I have reviewed 9 systems with the patient and the only positive findings were     MEDICATIONS  (STANDING):  amLODIPine   Tablet 10 milliGRAM(s) Oral daily  ascorbic acid 500 milliGRAM(s) Oral daily  cloNIDine 0.1 milliGRAM(s) Oral daily  dextrose 5%. 1000 milliLiter(s) (50 mL/Hr) IV Continuous <Continuous>  dextrose 5%. 1000 milliLiter(s) (100 mL/Hr) IV Continuous <Continuous>  dextrose 50% Injectable 12.5 Gram(s) IV Push once  dextrose 50% Injectable 25 Gram(s) IV Push once  dextrose 50% Injectable 25 Gram(s) IV Push once  DULoxetine 60 milliGRAM(s) Oral daily  ferrous    sulfate 325 milliGRAM(s) Oral daily  finasteride 5 milliGRAM(s) Oral daily  gabapentin 600 milliGRAM(s) Oral three times a day  glucagon  Injectable 1 milliGRAM(s) IntraMuscular once  insulin glargine Injectable (LANTUS) 12 Unit(s) SubCutaneous at bedtime  insulin lispro (ADMELOG) corrective regimen sliding scale   SubCutaneous at bedtime  insulin lispro (ADMELOG) corrective regimen sliding scale   SubCutaneous three times a day before meals  insulin lispro Injectable (ADMELOG) 2 Unit(s) SubCutaneous three times a day before meals  lactobacillus acidophilus 1 Tablet(s) Oral daily  melatonin 5 milliGRAM(s) Oral at bedtime  methocarbamol 750 milliGRAM(s) Oral three times a day  metoprolol tartrate 25 milliGRAM(s) Oral two times a day  multivitamin 1 Tablet(s) Oral daily  nicotine - 21 mG/24Hr(s) Patch 1 Patch Transdermal every 24 hours  pantoprazole    Tablet 40 milliGRAM(s) Oral before breakfast  polyethylene glycol 3350 17 Gram(s) Oral daily  sodium chloride 0.9%. 1000 milliLiter(s) (60 mL/Hr) IV Continuous <Continuous>  tamsulosin 0.4 milliGRAM(s) Oral at bedtime  traZODone 50 milliGRAM(s) Oral at bedtime  vancomycin  IVPB      vancomycin  IVPB 1250 milliGRAM(s) IV Intermittent every 24 hours  warfarin 4 milliGRAM(s) Oral once    MEDICATIONS  (PRN):  acetaminophen     Tablet .. 650 milliGRAM(s) Oral every 6 hours PRN Temp greater or equal to 38C (100.4F), Mild Pain (1 - 3)  dextrose Oral Gel 15 Gram(s) Oral once PRN Blood Glucose LESS THAN 70 milliGRAM(s)/deciliter  HYDROmorphone  Injectable 1.5 milliGRAM(s) IV Push every 4 hours PRN Severe Pain (7 - 10)  HYDROmorphone  Injectable 1 milliGRAM(s) IV Push every 4 hours PRN Moderate Pain (4 - 6)  nicotine  Polacrilex Gum 2 milliGRAM(s) Oral every 2 hours PRN Cravings      Allergies    sulfa drugs (Rash)    Intolerances        SOCIAL HISTORY          Smoking: Yes [ ]  No [ ]   ______pk yrs          ETOH  Yes [ ]  No [ ]  Social [ ]          DRUGS:  Yes [ ]  No [ ]  if so what______________    FAMILY HISTORY:  Family history of sinus tachycardia (Father)    FH: HTN (hypertension) (Mother)        Vital Signs Last 24 Hrs  T(C): 36.6 (19 Feb 2024 12:24), Max: 36.7 (19 Feb 2024 05:16)  T(F): 97.9 (19 Feb 2024 12:24), Max: 98.1 (19 Feb 2024 05:16)  HR: 57 (19 Feb 2024 12:24) (57 - 68)  BP: 164/91 (19 Feb 2024 12:24) (135/78 - 164/91)  BP(mean): --  RR: 18 (19 Feb 2024 12:24) (18 - 18)  SpO2: 92% (19 Feb 2024 12:24) (92% - 94%)    Parameters below as of 19 Feb 2024 12:24  Patient On (Oxygen Delivery Method): room air        Physical Exam:    General:  Appears stated age, well-groomed, well-nourished, no distress  Eyes : ODELL  HENT:  WNL, no JVD  Chest:  clear breath sounds good insiratory effort   Cardiovascular:  Regular rate & rhythm  Abdomen:    Extremities:  no edema , good capillary refill   Skin:  No rash  Musculoskeletal:  normal strength  Neuro/Psych:  Alert, oriented tp time, place and person       LABS:                        11.4   8.87  )-----------( 236      ( 19 Feb 2024 09:05 )             35.7     02-19    140  |  108  |  30<H>  ----------------------------<  169<H>  4.3   |  26  |  2.30<H>    Ca    8.8      19 Feb 2024 09:05    TPro  7.0  /  Alb  2.7<L>  /  TBili  0.3  /  DBili  x   /  AST  11<L>  /  ALT  24  /  AlkPhos  86  02-19    PT/INR - ( 18 Feb 2024 06:30 )   PT: 26.9 sec;   INR: 2.36 ratio           Urinalysis Basic - ( 19 Feb 2024 09:05 )    Color: x / Appearance: x / SG: x / pH: x  Gluc: 169 mg/dL / Ketone: x  / Bili: x / Urobili: x   Blood: x / Protein: x / Nitrite: x   Leuk Esterase: x / RBC: x / WBC x   Sq Epi: x / Non Sq Epi: x / Bacteria: x           GENERAL SURGERY CONSULT NOTE    Patient is a 48y old  Male who presents with a chief complaint of UTI , now consulted for r/o cholecystitis on incidental finding of gallstone on CT       HPI:  47yo M, PMHx of renal stones, 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 patient is currently being treated for pyelonephritis on vanco  noted to have cholelithiases on CT scan prompting surgical consult.   patient states he has been eating regular diet with no n/v , abdominal pain, or any changes or GI complaints. no RUQ, cp, radiating pain has been endorsed , no known transaminitis           < from: US Abdomen Upper Quadrant Right (02.19.24 @ 06:52) >  Liver: Within normal limits.  Bile ducts: Normal caliber. Common bile duct measures 3 mm.  Gallbladder: 1-2 cm sized gallstones with sludge distended gallbladder   fundus, corresponding to recent CT. Palpable tenderness without   pericholecystic edema. 4 mm gallbladder mural prominence  Pancreas: Visualized portions are within normal limits.  Right kidney: 10 cm. No hydronephrosis.  Ascites: None.  IVC: Visualized portions are within normal limits.    IMPRESSION:  Cholelithiasis with sludge distended gallbladder fundus. Palpable   tenderness/Bender's sign raises possibility of acute cholecystitis      < from: CT Renal Stone Hunt (02.17.24 @ 05:02) >  LIVER: Within normal limits.  BILE DUCTS: Normal caliber.  GALLBLADDER: Vague opacities identified at the fundal portion of the   gallbladder. Cholelithiasis with stone measuring up to 3.2 cm the body of   the gallbladder. No significant wall thickening or pericholecystic edema.  SPLEEN: Within normal limits.  PANCREAS: Within normal limits.  ADRENALS: Within normal limits.  KIDNEYS/URETERS: Mild bilateral hydroureteronephrosis without calcified   stone identified along thecourse of the ureter. Additional punctate   nonobstructive bilateral renal calculi are present.    BLADDER: Mild trabeculated bladder wall thickening. Anderson catheter   identified.  REPRODUCTIVE ORGANS: No prostatomegaly.    BOWEL: No bowel obstruction. Normal appendix.  PERITONEUM: No ascites.  VESSELS:  Normal aortic caliber. Infrarenal IVC filter.  RETROPERITONEUM: No lymphadenopathy.  ABDOMINAL WALL: Soft tissue density at the level of sacral coccygeal   junction, likely decubitus ulcer. No foci of air or abscess identified.    Small bilateral groin hernias containing fat. Left spigelian hernia   containing nonobstructed bowel loop.  BONES: Degenerative changes. Posterior metallic fusion of L4 and L5,   metallic artifact limits detailed evaluation of the spinal canal on   adjacent structures.    IMPRESSION:    Cholelithiasis. Vague opacities at the fundal portion of the gallbladder.   Right upper quadrant ultrasound suggested. No significant wall thickening   or pericholecystic edema.    Mild bilateral hydroureteronephrosis without calcified stone identified   along the course of the ureter. Differential includes recently passed   kidney stone or infection. Additional punctate nonobstructive bilateral   renal calculi are present.              ED Course:  Vitals: 134/83mmHg, 60bpm, 97.5F, 96% on RA  Labs: WBc 12.5, Hgb 10.6, MCV 76.2, PT 25.6, INR 2.24, aPTT 44.5, Cl 110, BUN/Cr 39/2.2, glucose 2.2, GFR 36  UA: many bacteria, squamous cells present. mod blood. large leuk esterase, 100 protein  CT renal stone hunt: mild B/L hydroureteronephrosis w/o calcified stone identified along course of the ureter. non-obstructive B/L renal calculi are present.  Mild trabeculated bladder wall thickening.  Given - rocephin 1g, IV dilaudid 0.5mg x1, IV dilaudid 1mg x1, 1L NS (17 Feb 2024 07:12)      PAST MEDICAL & SURGICAL HISTORY:  Renal stones      H/O Guillain-[place] syndrome      History of neurogenic bowel      DM2 (diabetes mellitus, type 2)      HTN (hypertension)      BPH without obstruction/lower urinary tract symptoms      Degenerative arthritis      DVT of lower limb, acute      CVA (cerebrovascular accident)      H/O lithotripsy          Review of Systems:    I have reviewed 9 systems with the patient and the only positive findings were     MEDICATIONS  (STANDING):  amLODIPine   Tablet 10 milliGRAM(s) Oral daily  ascorbic acid 500 milliGRAM(s) Oral daily  cloNIDine 0.1 milliGRAM(s) Oral daily  dextrose 5%. 1000 milliLiter(s) (50 mL/Hr) IV Continuous <Continuous>  dextrose 5%. 1000 milliLiter(s) (100 mL/Hr) IV Continuous <Continuous>  dextrose 50% Injectable 12.5 Gram(s) IV Push once  dextrose 50% Injectable 25 Gram(s) IV Push once  dextrose 50% Injectable 25 Gram(s) IV Push once  DULoxetine 60 milliGRAM(s) Oral daily  ferrous    sulfate 325 milliGRAM(s) Oral daily  finasteride 5 milliGRAM(s) Oral daily  gabapentin 600 milliGRAM(s) Oral three times a day  glucagon  Injectable 1 milliGRAM(s) IntraMuscular once  insulin glargine Injectable (LANTUS) 12 Unit(s) SubCutaneous at bedtime  insulin lispro (ADMELOG) corrective regimen sliding scale   SubCutaneous at bedtime  insulin lispro (ADMELOG) corrective regimen sliding scale   SubCutaneous three times a day before meals  insulin lispro Injectable (ADMELOG) 2 Unit(s) SubCutaneous three times a day before meals  lactobacillus acidophilus 1 Tablet(s) Oral daily  melatonin 5 milliGRAM(s) Oral at bedtime  methocarbamol 750 milliGRAM(s) Oral three times a day  metoprolol tartrate 25 milliGRAM(s) Oral two times a day  multivitamin 1 Tablet(s) Oral daily  nicotine - 21 mG/24Hr(s) Patch 1 Patch Transdermal every 24 hours  pantoprazole    Tablet 40 milliGRAM(s) Oral before breakfast  polyethylene glycol 3350 17 Gram(s) Oral daily  sodium chloride 0.9%. 1000 milliLiter(s) (60 mL/Hr) IV Continuous <Continuous>  tamsulosin 0.4 milliGRAM(s) Oral at bedtime  traZODone 50 milliGRAM(s) Oral at bedtime  vancomycin  IVPB      vancomycin  IVPB 1250 milliGRAM(s) IV Intermittent every 24 hours  warfarin 4 milliGRAM(s) Oral once    MEDICATIONS  (PRN):  acetaminophen     Tablet .. 650 milliGRAM(s) Oral every 6 hours PRN Temp greater or equal to 38C (100.4F), Mild Pain (1 - 3)  dextrose Oral Gel 15 Gram(s) Oral once PRN Blood Glucose LESS THAN 70 milliGRAM(s)/deciliter  HYDROmorphone  Injectable 1.5 milliGRAM(s) IV Push every 4 hours PRN Severe Pain (7 - 10)  HYDROmorphone  Injectable 1 milliGRAM(s) IV Push every 4 hours PRN Moderate Pain (4 - 6)  nicotine  Polacrilex Gum 2 milliGRAM(s) Oral every 2 hours PRN Cravings      Allergies    sulfa drugs (Rash)    Intolerances        SOCIAL HISTORY          Smoking: Yes [ ]  No [ ]   ______pk yrs          ETOH  Yes [ ]  No [ ]  Social [ ]          DRUGS:  Yes [ ]  No [ ]  if so what______________    FAMILY HISTORY:  Family history of sinus tachycardia (Father)    FH: HTN (hypertension) (Mother)        Vital Signs Last 24 Hrs  T(C): 36.6 (19 Feb 2024 12:24), Max: 36.7 (19 Feb 2024 05:16)  T(F): 97.9 (19 Feb 2024 12:24), Max: 98.1 (19 Feb 2024 05:16)  HR: 57 (19 Feb 2024 12:24) (57 - 68)  BP: 164/91 (19 Feb 2024 12:24) (135/78 - 164/91)  BP(mean): --  RR: 18 (19 Feb 2024 12:24) (18 - 18)  SpO2: 92% (19 Feb 2024 12:24) (92% - 94%)    Parameters below as of 19 Feb 2024 12:24  Patient On (Oxygen Delivery Method): room air        Physical Exam:    General:  , no distress  Eyes : ODELL  HENT:  WNL, no JVD  Chest:  clear breath sounds good inspiratory effort   Cardiovascular:  Regular rate & rhythm  Abdomen: soft , obese, Nt <ND , no guarding , no pain on palpation , +bs    Extremities:  no edema , good capillary refill   Skin:  No rash  Musculoskeletal:  normal strength  Neuro/Psych:  Alert, oriented tp time, place and person       LABS:                        11.4   8.87  )-----------( 236      ( 19 Feb 2024 09:05 )             35.7     02-19    140  |  108  |  30<H>  ----------------------------<  169<H>  4.3   |  26  |  2.30<H>    Ca    8.8      19 Feb 2024 09:05    TPro  7.0  /  Alb  2.7<L>  /  TBili  0.3  /  DBili  x   /  AST  11<L>  /  ALT  24  /  AlkPhos  86  02-19    PT/INR - ( 18 Feb 2024 06:30 )   PT: 26.9 sec;   INR: 2.36 ratio           Urinalysis Basic - ( 19 Feb 2024 09:05 )    Color: x / Appearance: x / SG: x / pH: x  Gluc: 169 mg/dL / Ketone: x  / Bili: x / Urobili: x   Blood: x / Protein: x / Nitrite: x   Leuk Esterase: x / RBC: x / WBC x   Sq Epi: x / Non Sq Epi: x / Bacteria: x

## 2024-02-19 NOTE — CONSULT NOTE ADULT - ASSESSMENT
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        Plan: no indication for surgical intervention at this time, patient remains asymptomatic  please notify surgical team should there be any changes in physical exam, post prandial n/v or transaminitis   48 year old male with complex PMH.  Now admitted with complaints/ clinical picture c/w UTI- history of same- discussed with ID consultant.  Denies abdominal pain now at rest and denies ongoing GI complaints.  Exam generally reassuring with only +/- tenderness to deepest palpation of RUQ without any guarding/ rebound/ referred pain.  Labs non suggestive with normal WBCs, no shift and normal WBCs.  Sono and CT without obvious evidence of acute cholecystitis, though tenderness/ ? Bender's noted on Sono?  As such, history goes against acute cholecystitis.  However, imaging indeterminate.  Will move forward with HIDA.  Placed to low fat diet, NPO after midnight, HIDA ordered for tomorrow am.  IF negative, will consider surgically stable.  IF positive, will need to consider R/B of cholecystectomy given his complex PMH/ anticoagulation and further clinical course.     Nahed Johnson)  2021 21:11:24 Nahed Johnson)  2021 21:01:02

## 2024-02-19 NOTE — PROGRESS NOTE ADULT - PROBLEM SELECTOR PLAN 1
Hx of recurrent UTIs in the setting of urinary incontinence   - UA showed moderate blood, protein+, LE large, and bacteria+  - Last month urine culture grew ESBL given meropenem   - On meropenem, Vancomycin by ID, given GPC   - CT renal stone hunt - mild B/L hydroureteronephrosis w/o calcified stone identified along course of the ureter. non-obstructive B/L renal calculi are present.  Mild trabeculated bladder wall thickening.  - Continue current pain regimen   - Trend fever and CBC with diff   - ID Dr. Callahan consulted   - Uro consulted, no surgical intervention at this time Hx of recurrent UTIs in the setting of urinary incontinence   - Last month urine culture grew ESBL given meropenem   - Urine culture growing Enterococcus Fecalis, f/up final   - On meropenem, Vancomycin by ID, given GPC   - CT renal stone hunt - mild B/L hydroureteronephrosis w/o calcified stone identified along course of the ureter. non-obstructive B/L renal calculi are present.  Mild trabeculated bladder wall thickening.  - Trend fever and CBC with diff   - ID Dr. Callahan following   - Uro consulted, no surgical intervention at this time

## 2024-02-19 NOTE — PROGRESS NOTE ADULT - PROBLEM SELECTOR PLAN 12
Diet: consistent carb   DVP: warfarin 4 mg   GI ppx: none   Dispo: RMF, pending clinical improvement

## 2024-02-19 NOTE — PROGRESS NOTE ADULT - SUBJECTIVE AND OBJECTIVE BOX
Patient is a 48y old  Male who presents with a chief complaint of UTI     ----  INTERVAL HPI/OVERNIGHT EVENTS:   Pt seen and evaluated at the bedside.   Has been afebrile for the past 24 hours   Patient improving has received Dilaudid 1 mg IVP x 2, pain over the past 12 hours comes back when meds wear off.  Patient denies N/V, dysuria, hematuria or other symptoms.       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  History of neurogenic bowel  DM2 (diabetes mellitus, type 2)  HTN (hypertension)  BPH without obstruction/lower urinary tract symptoms  Degenerative arthritis  DVT of lower limb, acute  CVA (cerebrovascular accident)  H/O lithotripsy          FAMILY HISTORY:  Family history of sinus tachycardia (Father)    FH: HTN (hypertension) (Mother)        Allergies    sulfa drugs (Rash)    Intolerances        ----  REVIEW OF SYSTEMS:  CONSTITUTIONAL: denies fever, chills, fatigue, weakness  HEENT: denies blurred vision, sore throat  SKIN: denies new lesions, rash  CARDIOVASCULAR: denies chest pain, chest pressure, palpitations  RESPIRATORY: denies shortness of breath, sputum production  GASTROINTESTINAL: denies nausea, vomiting, diarrhea, + flank pain  GENITOURINARY: admits urinary incontinence, and left flank pain    NEUROLOGICAL: chronic LE weakness   MUSCULOSKELETAL: denies new joint pain, muscle aches  HEMATOLOGIC: denies gross bleeding, bruising  PSYCHIATRIC: denies recent changes in anxiety, depression      ----  Vital Signs Last 24 Hrs  T(C): 36.7 (19 Feb 2024 05:16), Max: 36.7 (18 Feb 2024 11:57)  T(F): 98.1 (19 Feb 2024 05:16), Max: 98.1 (19 Feb 2024 05:16)  HR: 68 (19 Feb 2024 05:16) (64 - 68)  BP: 135/78 (19 Feb 2024 05:16) (135/78 - 159/80)  RR: 18 (19 Feb 2024 05:16) (18 - 18)  SpO2: 94% (19 Feb 2024 05:16) (94% - 94%)      PHYSICAL EXAM:  GENERAL: patient appears well  ENMT: , moist mucous membranes  LUNGS: good air entry bilaterally, clear to auscultation, no wheezing or rhonchi appreciated  HEART: S1/S2, regular rate and rhythm, no murmurs noted, no edema to b/l LE  GASTROINTESTINAL: abdomen is soft, Bender Sign negative but mildly tender to palpation on left flank area, nondistended, normoactive bowel sounds. Obese  INTEGUMENT: good skin turgor, appropriate for ethnicity, appears well perfused, no jaundice noted  MUSCULOSKELETAL: no clubbing or cyanosis, no obvious deformity  NEUROLOGIC: awake, alert, oriented x3  PSYCHIATRIC: mood is good    -----  .  LABS:                         11.4   8.87  )-----------( 236      ( 19 Feb 2024 09:05 )             35.7     02-19    140  |  108  |  30<H>  ----------------------------<  169<H>  4.3   |  26  |  2.30<H>    Ca    8.8      19 Feb 2024 09:05    TPro  7.0  /  Alb  2.7<L>  /  TBili  0.3  /  DBili  x   /  AST  11<L>  /  ALT  24  /  AlkPhos  86  02-19    PT/INR - ( 18 Feb 2024 06:30 )   PT: 26.9 sec;   INR: 2.36 ratio           Urinalysis Basic - ( 19 Feb 2024 09:05 )    Color: x / Appearance: x / SG: x / pH: x  Gluc: 169 mg/dL / Ketone: x  / Bili: x / Urobili: x   Blood: x / Protein: x / Nitrite: x   Leuk Esterase: x / RBC: x / WBC x   Sq Epi: x / Non Sq Epi: x / Bacteria: x      PROCEDURE DATE:  02/19/2024          INTERPRETATION:  CLINICAL INFORMATION: Cholelithiasis    COMPARISON: CT 2/17/2024    TECHNIQUE: Sonography of the right upper quadrant.    FINDINGS:  Liver: Within normal limits.  Bile ducts: Normal caliber. Common bile duct measures 3 mm.  Gallbladder: 1-2 cm sized gallstones with sludge distended gallbladder   fundus, corresponding to recent CT. Palpable tenderness without   pericholecystic edema. 4 mm gallbladder mural prominence  Pancreas: Visualized portions are within normal limits.  Right kidney: 10 cm. No hydronephrosis.  Ascites: None.  IVC: Visualized portions are within normal limits.    IMPRESSION:  Cholelithiasis with sludge distended gallbladder fundus. Palpable   tenderness/Bender's sign raises possibility of acute cholecystitis    < end of copied text >   Patient is a 48y old  Male who presents with a chief complaint of UTI     ----  INTERVAL HPI/OVERNIGHT EVENTS:   Pt seen and evaluated at the bedside.   Has been afebrile for the past 24 hours   Patient improving has received Dilaudid 1 mg IVP x4 within 24hrs period. Pt complaining persistent severe left flank pain.   Patient denies N/V, dysuria, hematuria or other symptoms.       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  History of neurogenic bowel  DM2 (diabetes mellitus, type 2)  HTN (hypertension)  BPH without obstruction/lower urinary tract symptoms  Degenerative arthritis  DVT of lower limb, acute  CVA (cerebrovascular accident)  H/O lithotripsy          FAMILY HISTORY:  Family history of sinus tachycardia (Father)    FH: HTN (hypertension) (Mother)        Allergies    sulfa drugs (Rash)    Intolerances        ----  REVIEW OF SYSTEMS:  CONSTITUTIONAL: denies fever, chills, fatigue, weakness  HEENT: denies blurred vision, sore throat  SKIN: denies new lesions, rash  CARDIOVASCULAR: denies chest pain, chest pressure, palpitations  RESPIRATORY: denies shortness of breath, sputum production  GASTROINTESTINAL: denies nausea, vomiting, diarrhea, severe pain in the left flank pain  GENITOURINARY: admits urinary incontinence, and left flank pain    NEUROLOGICAL: chronic LE weakness   MUSCULOSKELETAL: denies new joint pain, muscle aches  HEMATOLOGIC: denies gross bleeding, bruising  PSYCHIATRIC: denies recent changes in anxiety, depression      ----  Vital Signs Last 24 Hrs  T(C): 36.7 (19 Feb 2024 05:16), Max: 36.7 (18 Feb 2024 11:57)  T(F): 98.1 (19 Feb 2024 05:16), Max: 98.1 (19 Feb 2024 05:16)  HR: 68 (19 Feb 2024 05:16) (64 - 68)  BP: 135/78 (19 Feb 2024 05:16) (135/78 - 159/80)  RR: 18 (19 Feb 2024 05:16) (18 - 18)  SpO2: 94% (19 Feb 2024 05:16) (94% - 94%)      PHYSICAL EXAM:  GENERAL: patient appears well  ENMT: , moist mucous membranes  LUNGS: good air entry bilaterally, clear to auscultation, no wheezing or rhonchi appreciated  HEART: S1/S2, regular rate and rhythm, no murmurs noted, no edema to b/l LE  GASTROINTESTINAL: abdomen is soft, Bender Sign positive but mildly tender to palpation on left flank area, nondistended, normoactive bowel sounds. Obese  INTEGUMENT: good skin turgor, appropriate for ethnicity, appears well perfused, no jaundice noted  MUSCULOSKELETAL: no clubbing or cyanosis, no obvious deformity  NEUROLOGIC: awake, alert, oriented x3  PSYCHIATRIC: mood is good    -----  .  LABS:                         11.4   8.87  )-----------( 236      ( 19 Feb 2024 09:05 )             35.7     02-19    140  |  108  |  30<H>  ----------------------------<  169<H>  4.3   |  26  |  2.30<H>    Ca    8.8      19 Feb 2024 09:05    TPro  7.0  /  Alb  2.7<L>  /  TBili  0.3  /  DBili  x   /  AST  11<L>  /  ALT  24  /  AlkPhos  86  02-19    PT/INR - ( 18 Feb 2024 06:30 )   PT: 26.9 sec;   INR: 2.36 ratio           Urinalysis Basic - ( 19 Feb 2024 09:05 )    Color: x / Appearance: x / SG: x / pH: x  Gluc: 169 mg/dL / Ketone: x  / Bili: x / Urobili: x   Blood: x / Protein: x / Nitrite: x   Leuk Esterase: x / RBC: x / WBC x   Sq Epi: x / Non Sq Epi: x / Bacteria: x    -------    COMPARISON: CT 2/17/2024    TECHNIQUE: Sonography of the right upper quadrant.    FINDINGS:  Liver: Within normal limits.  Bile ducts: Normal caliber. Common bile duct measures 3 mm.  Gallbladder: 1-2 cm sized gallstones with sludge distended gallbladder   fundus, corresponding to recent CT. Palpable tenderness without   pericholecystic edema. 4 mm gallbladder mural prominence  Pancreas: Visualized portions are within normal limits.  Right kidney: 10 cm. No hydronephrosis.  Ascites: None.  IVC: Visualized portions are within normal limits.    IMPRESSION:  Cholelithiasis with sludge distended gallbladder fundus. Palpable   tenderness/Bender's sign raises possibility of acute cholecystitis

## 2024-02-19 NOTE — PHARMACOTHERAPY INTERVENTION NOTE - COMMENTS
Patient currently on IV Vancomycin 1000 mg q12h for treatment of UTI - discussed with ID Dr. Callahan to recommend discontinuing vancomycin since cultures growing vancomycin resistant E. Faecalis. MD agreed and would like to switch to zosyn 3.375 q8h since team is also looking to rule out possible cholecystitis. Will re-evaluate abx regimen after HIDA scan. orders updated to reflect discussion.

## 2024-02-19 NOTE — PROGRESS NOTE ADULT - ASSESSMENT
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 Found to have Cholelithiasis, surgery team consulted

## 2024-02-19 NOTE — PROGRESS NOTE ADULT - SUBJECTIVE AND OBJECTIVE BOX
[facility]   INFECTIOUS DISEASES   [address] [place] [address]  Tel: [phone]     Fax: [phone]  =========================================================  MD Cornel Regan Kaushal, MD Cho, Michelle, MD Sunjit, Jaspal, MD  =========================================================    JOIE BRUNO [id number]    Follow up: UTI    He has a ayala, no symptoms. No fever. No abdominal pain but has left flank pain.     Allergies:  sulfa drugs (Rash)    acetaminophen     Tablet .. 650 milliGRAM(s) Oral every 6 hours PRN  amLODIPine   Tablet 10 milliGRAM(s) Oral daily  ascorbic acid 500 milliGRAM(s) Oral daily  cloNIDine 0.1 milliGRAM(s) Oral daily  dextrose 5%. 1000 milliLiter(s) IV Continuous <Continuous>  dextrose 5%. 1000 milliLiter(s) IV Continuous <Continuous>  dextrose 50% Injectable 25 Gram(s) IV Push once  dextrose 50% Injectable 25 Gram(s) IV Push once  dextrose 50% Injectable 12.5 Gram(s) IV Push once  dextrose Oral Gel 15 Gram(s) Oral once PRN  DULoxetine 60 milliGRAM(s) Oral daily  ferrous    sulfate 325 milliGRAM(s) Oral daily  finasteride 5 milliGRAM(s) Oral daily  gabapentin 600 milliGRAM(s) Oral three times a day  glucagon  Injectable 1 milliGRAM(s) IntraMuscular once  HYDROmorphone  Injectable 1.5 milliGRAM(s) IV Push every 4 hours PRN  HYDROmorphone  Injectable 1 milliGRAM(s) IV Push every 4 hours PRN  insulin glargine Injectable (LANTUS) 12 Unit(s) SubCutaneous at bedtime  insulin lispro (ADMELOG) corrective regimen sliding scale   SubCutaneous at bedtime  insulin lispro (ADMELOG) corrective regimen sliding scale   SubCutaneous three times a day before meals  insulin lispro Injectable (ADMELOG) 2 Unit(s) SubCutaneous three times a day before meals  lactobacillus acidophilus 1 Tablet(s) Oral daily  melatonin 5 milliGRAM(s) Oral at bedtime  methocarbamol 750 milliGRAM(s) Oral three times a day  metoprolol tartrate 25 milliGRAM(s) Oral two times a day  multivitamin 1 Tablet(s) Oral daily  nicotine  Polacrilex Gum 2 milliGRAM(s) Oral every 2 hours PRN  nicotine - 21 mG/24Hr(s) Patch 1 Patch Transdermal every 24 hours  pantoprazole    Tablet 40 milliGRAM(s) Oral before breakfast  polyethylene glycol 3350 17 Gram(s) Oral daily  sodium chloride 0.9%. 1000 milliLiter(s) IV Continuous <Continuous>  tamsulosin 0.4 milliGRAM(s) Oral at bedtime  traZODone 50 milliGRAM(s) Oral at bedtime  vancomycin  IVPB      vancomycin  IVPB 1250 milliGRAM(s) IV Intermittent every 24 hours  warfarin 4 milliGRAM(s) Oral once    REVIEW OF SYSTEMS:  CONSTITUTIONAL:  No Fever or chills  HEENT:   No diplopia or blurred vision.  No earache, sore throat or runny nose.  CARDIOVASCULAR:  No chest pain  RESPIRATORY:  No cough, shortness of breath, PND or orthopnea.  GASTROINTESTINAL:  No nausea, vomiting or diarrhea.  GENITOURINARY:  No dysuria, frequency or urgency. No Blood in urine  MUSCULOSKELETAL:  no joint aches, no muscle pain  SKIN:  No change in skin, hair or nails.     Physical Exam:  ICU Vital Signs Last 24 Hrs  T(C): 36.6 (19 Feb 2024 12:24), Max: 36.7 (19 Feb 2024 05:16)  T(F): 97.9 (19 Feb 2024 12:24), Max: 98.1 (19 Feb 2024 05:16)  HR: 57 (19 Feb 2024 12:24) (57 - 68)  BP: 164/91 (19 Feb 2024 12:24) (135/78 - 164/91)  RR: 18 (19 Feb 2024 12:24) (18 - 18)  SpO2: 92% (19 Feb 2024 12:24) (92% - 94%)  O2 Parameters below as of 19 Feb 2024 12:24  Patient On (Oxygen Delivery Method): room air  GEN: NAD  HEENT: normocephalic and atraumatic. EOMI. STEVE.    NECK: Supple.  No lymphadenopathy   LUNGS: Clear to auscultation.  HEART: Regular rate and rhythm without murmur.  ABDOMEN: Soft, nontender, and nondistended.  Positive bowel sounds.    : No CVA tenderness  EXTREMITIES: Without edema.  MSK: no joint swelling  NEUROLOGIC: grossly intact.  PSYCHIATRIC: Appropriate affect .  SKIN: No rash     Labs:  02-19    140  |  108  |  30<H>  ----------------------------<  169<H>  4.3   |  26  |  2.30<H>    Ca    8.8      19 Feb 2024 09:05    TPro  7.0  /  Alb  2.7<L>  /  TBili  0.3  /  DBili  x   /  AST  11<L>  /  ALT  24  /  AlkPhos  86  02-19                        11.4   8.87  )-----------( 236      ( 19 Feb 2024 09:05 )             35.7     PT/INR - ( 18 Feb 2024 06:30 )   PT: 26.9 sec;   INR: 2.36 ratio      Urinalysis Basic - ( 19 Feb 2024 09:05 )    Color: x / Appearance: x / SG: x / pH: x  Gluc: 169 mg/dL / Ketone: x  / Bili: x / Urobili: x   Blood: x / Protein: x / Nitrite: x   Leuk Esterase: x / RBC: x / WBC x   Sq Epi: x / Non Sq Epi: x / Bacteria: x    LIVER FUNCTIONS - ( 19 Feb 2024 09:05 )  Alb: 2.7 g/dL / Pro: 7.0 g/dL / ALK PHOS: 86 U/L / ALT: 24 U/L / AST: 11 U/L / GGT: x           RECENT CULTURES:  02-17 @ 04:25 Clean Catch Clean Catch (Midstream) Enterococcus faecalis (vancomycin resistant)    >100,000 CFU/ml Enterococcus faecalis (vancomycin resistant)    All imaging and data are reviewed.   < from: CT Renal Stone Hunt (02.17.24 @ 05:02) >  IMPRESSION:  Cholelithiasis. Vague opacities at the fundal portion of the gallbladder.   Right upper quadrant ultrasound suggested. No significant wall thickening   or pericholecystic edema.  Mild bilateral hydroureteronephrosis without calcified stone identified   along the course of the ureter. Differential includes recently passed   kidney stone or infection. Additional punctate nonobstructive bilateral   renal calculi are present.  Mild trabeculated bladder wall thickening. Correlate with urinalysis and   lab values to assess for cystitis and/or ascending urinary tract   infection.    < from: US Abdomen Upper Quadrant Right (02.19.24 @ 06:52) >  IMPRESSION:  Cholelithiasis with sludge distended gallbladder fundus. 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 non-obstructive B/L renal calculi are present. Mild trabeculated bladder wall thickening.  - In the past h/o klebsiella pneumonia ESBL resistant to Rocephin  - UCx from this admission noted enterococcus faecalis   - Stop Meropenem   - Continue vancomycin   - Check levels   - Trend temps/WBC  - Monitor renal fxn  -  recs--no intervention  - Additional care per primary team    Will follow.     Sia Callahan MD  Division of Infectious Diseases   Please call ID service at [phone] with any question.      50 minutes spent on total encounter assessing patient, examination, chart review, counseling and coordinating care by the attending physician/nurse/care manager.   [facility]   INFECTIOUS DISEASES   [address] [place] [address]  Tel: [phone]     Fax: [phone]  =========================================================  MD Cornel Regan Kaushal, MD Cho, Michelle, MD Sunjit, Jaspal, MD  =========================================================    JOIE BRUNO [id number]    Follow up: UTI    He has a ayala, no symptoms. No fever. No abdominal pain but has left flank pain.     Allergies:  sulfa drugs (Rash)    acetaminophen     Tablet .. 650 milliGRAM(s) Oral every 6 hours PRN  amLODIPine   Tablet 10 milliGRAM(s) Oral daily  ascorbic acid 500 milliGRAM(s) Oral daily  cloNIDine 0.1 milliGRAM(s) Oral daily  dextrose 5%. 1000 milliLiter(s) IV Continuous <Continuous>  dextrose 5%. 1000 milliLiter(s) IV Continuous <Continuous>  dextrose 50% Injectable 25 Gram(s) IV Push once  dextrose 50% Injectable 25 Gram(s) IV Push once  dextrose 50% Injectable 12.5 Gram(s) IV Push once  dextrose Oral Gel 15 Gram(s) Oral once PRN  DULoxetine 60 milliGRAM(s) Oral daily  ferrous    sulfate 325 milliGRAM(s) Oral daily  finasteride 5 milliGRAM(s) Oral daily  gabapentin 600 milliGRAM(s) Oral three times a day  glucagon  Injectable 1 milliGRAM(s) IntraMuscular once  HYDROmorphone  Injectable 1.5 milliGRAM(s) IV Push every 4 hours PRN  HYDROmorphone  Injectable 1 milliGRAM(s) IV Push every 4 hours PRN  insulin glargine Injectable (LANTUS) 12 Unit(s) SubCutaneous at bedtime  insulin lispro (ADMELOG) corrective regimen sliding scale   SubCutaneous at bedtime  insulin lispro (ADMELOG) corrective regimen sliding scale   SubCutaneous three times a day before meals  insulin lispro Injectable (ADMELOG) 2 Unit(s) SubCutaneous three times a day before meals  lactobacillus acidophilus 1 Tablet(s) Oral daily  melatonin 5 milliGRAM(s) Oral at bedtime  methocarbamol 750 milliGRAM(s) Oral three times a day  metoprolol tartrate 25 milliGRAM(s) Oral two times a day  multivitamin 1 Tablet(s) Oral daily  nicotine  Polacrilex Gum 2 milliGRAM(s) Oral every 2 hours PRN  nicotine - 21 mG/24Hr(s) Patch 1 Patch Transdermal every 24 hours  pantoprazole    Tablet 40 milliGRAM(s) Oral before breakfast  polyethylene glycol 3350 17 Gram(s) Oral daily  sodium chloride 0.9%. 1000 milliLiter(s) IV Continuous <Continuous>  tamsulosin 0.4 milliGRAM(s) Oral at bedtime  traZODone 50 milliGRAM(s) Oral at bedtime  vancomycin  IVPB      vancomycin  IVPB 1250 milliGRAM(s) IV Intermittent every 24 hours  warfarin 4 milliGRAM(s) Oral once    REVIEW OF SYSTEMS:  CONSTITUTIONAL:  No Fever or chills  HEENT:   No diplopia or blurred vision.  No earache, sore throat or runny nose.  CARDIOVASCULAR:  No chest pain  RESPIRATORY:  No cough, shortness of breath, PND or orthopnea.  GASTROINTESTINAL:  No nausea, vomiting or diarrhea.  GENITOURINARY:  No dysuria, frequency or urgency. No Blood in urine  MUSCULOSKELETAL:  no joint aches, no muscle pain  SKIN:  No change in skin, hair or nails.     Physical Exam:  ICU Vital Signs Last 24 Hrs  T(C): 36.6 (19 Feb 2024 12:24), Max: 36.7 (19 Feb 2024 05:16)  T(F): 97.9 (19 Feb 2024 12:24), Max: 98.1 (19 Feb 2024 05:16)  HR: 57 (19 Feb 2024 12:24) (57 - 68)  BP: 164/91 (19 Feb 2024 12:24) (135/78 - 164/91)  RR: 18 (19 Feb 2024 12:24) (18 - 18)  SpO2: 92% (19 Feb 2024 12:24) (92% - 94%)  O2 Parameters below as of 19 Feb 2024 12:24  Patient On (Oxygen Delivery Method): room air  GEN: NAD  HEENT: normocephalic and atraumatic. EOMI. STEVE.    NECK: Supple.  No lymphadenopathy   LUNGS: Clear to auscultation.  HEART: Regular rate and rhythm without murmur.  ABDOMEN: Soft, nontender, and nondistended.  Positive bowel sounds.    : No CVA tenderness  EXTREMITIES: Without edema.  MSK: no joint swelling  NEUROLOGIC: grossly intact.  PSYCHIATRIC: Appropriate affect .  SKIN: No rash     Labs:  02-19    140  |  108  |  30<H>  ----------------------------<  169<H>  4.3   |  26  |  2.30<H>    Ca    8.8      19 Feb 2024 09:05    TPro  7.0  /  Alb  2.7<L>  /  TBili  0.3  /  DBili  x   /  AST  11<L>  /  ALT  24  /  AlkPhos  86  02-19                        11.4   8.87  )-----------( 236      ( 19 Feb 2024 09:05 )             35.7     PT/INR - ( 18 Feb 2024 06:30 )   PT: 26.9 sec;   INR: 2.36 ratio      Urinalysis Basic - ( 19 Feb 2024 09:05 )    Color: x / Appearance: x / SG: x / pH: x  Gluc: 169 mg/dL / Ketone: x  / Bili: x / Urobili: x   Blood: x / Protein: x / Nitrite: x   Leuk Esterase: x / RBC: x / WBC x   Sq Epi: x / Non Sq Epi: x / Bacteria: x    LIVER FUNCTIONS - ( 19 Feb 2024 09:05 )  Alb: 2.7 g/dL / Pro: 7.0 g/dL / ALK PHOS: 86 U/L / ALT: 24 U/L / AST: 11 U/L / GGT: x           RECENT CULTURES:  02-17 @ 04:25 Clean Catch Clean Catch (Midstream) Enterococcus faecalis (vancomycin resistant)    >100,000 CFU/ml Enterococcus faecalis (vancomycin resistant)    All imaging and data are reviewed.   < from: CT Renal Stone Hunt (02.17.24 @ 05:02) >  IMPRESSION:  Cholelithiasis. Vague opacities at the fundal portion of the gallbladder.   Right upper quadrant ultrasound suggested. No significant wall thickening   or pericholecystic edema.  Mild bilateral hydroureteronephrosis without calcified stone identified   along the course of the ureter. Differential includes recently passed   kidney stone or infection. Additional punctate nonobstructive bilateral   renal calculi are present.  Mild trabeculated bladder wall thickening. Correlate with urinalysis and   lab values to assess for cystitis and/or ascending urinary tract   infection.    < from: US Abdomen Upper Quadrant Right (02.19.24 @ 06:52) >  IMPRESSION:  Cholelithiasis with sludge distended gallbladder fundus. 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 non-obstructive B/L renal calculi are present. Mild trabeculated bladder wall thickening.  - In the past h/o klebsiella pneumonia ESBL resistant to Rocephin  - UCx from this admission noted enterococcus faecalis   - Stop Meropenem   - Continue vancomycin   - Check levels   - Trend temps/WBC  - Monitor renal fxn  -  recs--no intervention  - Seen by surgery to rule out possible cholecystitis (no symptoms, LFTs normal)    Will follow.     Sia Callahan MD  Division of Infectious Diseases   Please call ID service at [phone] with any question.      50 minutes spent on total encounter assessing patient, examination, chart review, counseling and coordinating care by the attending physician/nurse/care manager.

## 2024-02-19 NOTE — CONSULT NOTE ADULT - SUBJECTIVE AND OBJECTIVE BOX
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    Patient states that 4 days ago, he began to develop left sided flank pain. Patient describes the pain as sharp, tender, and worse with movement. He states the pain is 10/10 and has been taking oxycodone 10mg and tylenol twice a day, without much relief. Patient has a history of multiple utis, and kidney stones (s/p lithotripsy 2023).  He endorses nausea, but denies vomiting. The pain has worsened progressively over the past few days, which prompted him to visit the emergency room for further evaluation.     Allergies:  sulfa drugs (Rash)      Medications:  acetaminophen     Tablet .. 650 milliGRAM(s) Oral every 6 hours PRN  amLODIPine   Tablet 10 milliGRAM(s) Oral daily  ascorbic acid 500 milliGRAM(s) Oral daily  cloNIDine 0.1 milliGRAM(s) Oral daily  dextrose 5%. 1000 milliLiter(s) IV Continuous <Continuous>  dextrose 5%. 1000 milliLiter(s) IV Continuous <Continuous>  dextrose 50% Injectable 12.5 Gram(s) IV Push once  dextrose 50% Injectable 25 Gram(s) IV Push once  dextrose 50% Injectable 25 Gram(s) IV Push once  dextrose Oral Gel 15 Gram(s) Oral once PRN  DULoxetine 60 milliGRAM(s) Oral daily  ferrous    sulfate 325 milliGRAM(s) Oral daily  finasteride 5 milliGRAM(s) Oral daily  gabapentin 600 milliGRAM(s) Oral three times a day  glucagon  Injectable 1 milliGRAM(s) IntraMuscular once  HYDROmorphone  Injectable 1.5 milliGRAM(s) IV Push every 4 hours PRN  HYDROmorphone  Injectable 1 milliGRAM(s) IV Push every 4 hours PRN  insulin glargine Injectable (LANTUS) 12 Unit(s) SubCutaneous at bedtime  insulin lispro (ADMELOG) corrective regimen sliding scale   SubCutaneous at bedtime  insulin lispro (ADMELOG) corrective regimen sliding scale   SubCutaneous three times a day before meals  insulin lispro Injectable (ADMELOG) 2 Unit(s) SubCutaneous three times a day before meals  lactobacillus acidophilus 1 Tablet(s) Oral daily  melatonin 5 milliGRAM(s) Oral at bedtime  methocarbamol 750 milliGRAM(s) Oral three times a day  metoprolol tartrate 25 milliGRAM(s) Oral two times a day  multivitamin 1 Tablet(s) Oral daily  nicotine  Polacrilex Gum 2 milliGRAM(s) Oral every 2 hours PRN  nicotine - 21 mG/24Hr(s) Patch 1 Patch Transdermal every 24 hours  pantoprazole    Tablet 40 milliGRAM(s) Oral before breakfast  polyethylene glycol 3350 17 Gram(s) Oral daily  sodium chloride 0.9%. 1000 milliLiter(s) IV Continuous <Continuous>  tamsulosin 0.4 milliGRAM(s) Oral at bedtime  traZODone 50 milliGRAM(s) Oral at bedtime  vancomycin  IVPB      vancomycin  IVPB 1250 milliGRAM(s) IV Intermittent every 24 hours      PMHX/PSHX:  Renal stones    H/O Guillain-Marlboro syndrome    History of neurogenic bowel    DM2 (diabetes mellitus, type 2)    HTN (hypertension)    BPH without obstruction/lower urinary tract symptoms    Degenerative arthritis    DVT of lower limb, acute    CVA (cerebrovascular accident)    H/O lithotripsy        Family history:  Family history of sinus tachycardia (Father)    FH: HTN (hypertension) (Mother)        Social History:     ROS:     General:  no fevers, chills, night sweats, fatigue,   Eyes:  Good vision, no reported pain  ENT:  No sore throat, pain, runny nose, dysphagia  CV:  No pain, palpitations, hypo/hypertension  Resp:  No dyspnea, cough, tachypnea, wheezing  GI:  No pain, No nausea, No vomiting, No diarrhea, No constipation, No weight loss, No fever, No pruritis, No rectal bleeding, No tarry stools, No dysphagia,  :  No pain, bleeding, incontinence, nocturia  Muscle:  No pain, weakness  Neuro:  No weakness, tingling, memory problems  Psych:  No fatigue, insomnia, mood problems, depression  Endocrine:  No polyuria, polydipsia, cold/heat intolerance  Heme:  No petechiae, ecchymosis, easy bruisability  Skin:  No rash, tattoos, scars, edema      PHYSICAL EXAM:   Vital Signs:  Vital Signs Last 24 Hrs  T(C): 36.7 (19 Feb 2024 05:16), Max: 36.7 (18 Feb 2024 11:57)  T(F): 98.1 (19 Feb 2024 05:16), Max: 98.1 (19 Feb 2024 05:16)  HR: 68 (19 Feb 2024 05:16) (64 - 68)  BP: 135/78 (19 Feb 2024 05:16) (135/78 - 159/80)  BP(mean): --  RR: 18 (19 Feb 2024 05:16) (18 - 18)  SpO2: 94% (19 Feb 2024 05:16) (94% - 94%)    Parameters below as of 19 Feb 2024 05:16  Patient On (Oxygen Delivery Method): room air      Daily     Daily     GENERAL:  Appears stated age,   HEENT:  NC/AT,    CHEST:  Full & symmetric excursion,   HEART:  Regular rhythm  ABDOMEN:  Soft, non-tender, non-distended,   EXTEREMITIES:  no cyanosis,clubbing or edema  SKIN:  No rash  NEURO:  Alert,    LABS:                        11.4   8.87  )-----------( 236      ( 19 Feb 2024 09:05 )             35.7     02-19    140  |  108  |  30<H>  ----------------------------<  169<H>  4.3   |  26  |  2.30<H>    Ca    8.8      19 Feb 2024 09:05    TPro  7.0  /  Alb  2.7<L>  /  TBili  0.3  /  DBili  x   /  AST  11<L>  /  ALT  24  /  AlkPhos  86  02-19    LIVER FUNCTIONS - ( 19 Feb 2024 09:05 )  Alb: 2.7 g/dL / Pro: 7.0 g/dL / ALK PHOS: 86 U/L / ALT: 24 U/L / AST: 11 U/L / GGT: x           PT/INR - ( 18 Feb 2024 06:30 )   PT: 26.9 sec;   INR: 2.36 ratio           Urinalysis Basic - ( 19 Feb 2024 09:05 )    Color: x / Appearance: x / SG: x / pH: x  Gluc: 169 mg/dL / Ketone: x  / Bili: x / Urobili: x   Blood: x / Protein: x / Nitrite: x   Leuk Esterase: x / RBC: x / WBC x   Sq Epi: x / Non Sq Epi: x / Bacteria: x          Imaging:

## 2024-02-19 NOTE — CONSULT NOTE ADULT - ASSESSMENT
gallstones    CT and u/s noted  doubt acute ccy  patient clinically asymptomatic  cont iv antibiotics for UTI  if he develops ruq pain then consider hida but can defer for now  reg diet  will follow   d/w patient

## 2024-02-19 NOTE — CHART NOTE - NSCHARTNOTEFT_GEN_A_CORE
Called by RN for Pt c/o itchiness around IV site. Patient most recently received Zosyn through the IV. RN confirmed the IV flushes. Patient denies pain or tenderness but endorses itching and redness around the IV site. Patient denies itching or redness anywhere else. Denies any sob or swelling or the face or mouth. Denies lighteadedness/dizziness, chest pain.        T(C): 36.6 (02-19-24 @ 12:24), Max: 36.7 (02-19-24 @ 05:16)  HR: 63 (02-19-24 @ 17:25) (57 - 68)  BP: 151/87 (02-19-24 @ 17:25) (135/78 - 164/91)  RR: 18 (02-19-24 @ 12:24) (18 - 18)  SpO2: 92% (02-19-24 @ 12:24) (92% - 94%)  Wt(kg): --    Physical :  Gen- NAD  Cardio - s+1,s+2  Lung - unlabored respirations  Ext- right upper extremity erythema and mild edema localized to the IV site. Non-tender to palpation. No other obvious site of erythema  Neuro- appropriately answers questions and follows commands    LABS:                        11.4   8.87  )-----------( 236      ( 19 Feb 2024 09:05 )             35.7     02-19    140  |  108  |  30<H>  ----------------------------<  169<H>  4.3   |  26  |  2.30<H>    Ca    8.8      19 Feb 2024 09:05    TPro  7.0  /  Alb  2.7<L>  /  TBili  0.3  /  DBili  x   /  AST  11<L>  /  ALT  24  /  AlkPhos  86  02-19    PT/INR - ( 19 Feb 2024 13:45 )   PT: 26.6 sec;   INR: 2.33 ratio         PTT - ( 19 Feb 2024 13:45 )  PTT:43.0 sec  Urinalysis Basic - ( 19 Feb 2024 09:05 )    Color: x / Appearance: x / SG: x / pH: x  Gluc: 169 mg/dL / Ketone: x  / Bili: x / Urobili: x   Blood: x / Protein: x / Nitrite: x   Leuk Esterase: x / RBC: x / WBC x   Sq Epi: x / Non Sq Epi: x / Bacteria: x              Assessment/Plan  48yMale admitted for UTI and hydroureteronephrosis  1. Ordered 25mg benadryl stat  2. Ordered PRN benadryl with pharmacy's recommendation    RN to call if any changes

## 2024-02-19 NOTE — PROGRESS NOTE ADULT - PROBLEM SELECTOR PLAN 11
Chronic, s/p L5-5 SPF   - Required Dilaudid 1 mg x4 within 12hrs period   - Continue home gabapentin and muscle relaxant   - Pain regimen: IV Dilaudid 0.5 q4hr prn for MP and 1 q4hrs prn for severe pain   - Bowel regimen while on opioids  - PT consulted Chronic, s/p L5-5 SPF   - Required Dilaudid 1 mg x4 within 12hrs period   - Continue home gabapentin and muscle relaxant   - Increased IV Dilaudid to 1 mg q4hr for MP and 1.5 mg q4hr prn for SP.   - Narcan ordered.    - Bowel regimen while on opioids  - PT consulted

## 2024-02-20 LAB
ALBUMIN SERPL ELPH-MCNC: 2.6 G/DL — LOW (ref 3.3–5)
ALP SERPL-CCNC: 79 U/L — SIGNIFICANT CHANGE UP (ref 40–120)
ALT FLD-CCNC: 17 U/L — SIGNIFICANT CHANGE UP (ref 12–78)
ANION GAP SERPL CALC-SCNC: 8 MMOL/L — SIGNIFICANT CHANGE UP (ref 5–17)
APTT BLD: 42.3 SEC — HIGH (ref 24.5–35.6)
AST SERPL-CCNC: 12 U/L — LOW (ref 15–37)
BASOPHILS # BLD AUTO: 0.05 K/UL — SIGNIFICANT CHANGE UP (ref 0–0.2)
BASOPHILS NFR BLD AUTO: 0.6 % — SIGNIFICANT CHANGE UP (ref 0–2)
BILIRUB SERPL-MCNC: 0.3 MG/DL — SIGNIFICANT CHANGE UP (ref 0.2–1.2)
BUN SERPL-MCNC: 31 MG/DL — HIGH (ref 7–23)
CALCIUM SERPL-MCNC: 8.7 MG/DL — SIGNIFICANT CHANGE UP (ref 8.5–10.1)
CHLORIDE SERPL-SCNC: 106 MMOL/L — SIGNIFICANT CHANGE UP (ref 96–108)
CO2 SERPL-SCNC: 26 MMOL/L — SIGNIFICANT CHANGE UP (ref 22–31)
CREAT SERPL-MCNC: 2.4 MG/DL — HIGH (ref 0.5–1.3)
EGFR: 33 ML/MIN/1.73M2 — LOW
EOSINOPHIL # BLD AUTO: 0.35 K/UL — SIGNIFICANT CHANGE UP (ref 0–0.5)
EOSINOPHIL NFR BLD AUTO: 4.5 % — SIGNIFICANT CHANGE UP (ref 0–6)
GLUCOSE SERPL-MCNC: 134 MG/DL — HIGH (ref 70–99)
HCT VFR BLD CALC: 33.6 % — LOW (ref 39–50)
HGB BLD-MCNC: 10.7 G/DL — LOW (ref 13–17)
IMM GRANULOCYTES NFR BLD AUTO: 0.3 % — SIGNIFICANT CHANGE UP (ref 0–0.9)
INR BLD: 2.16 RATIO — HIGH (ref 0.85–1.18)
LYMPHOCYTES # BLD AUTO: 1.21 K/UL — SIGNIFICANT CHANGE UP (ref 1–3.3)
LYMPHOCYTES # BLD AUTO: 15.4 % — SIGNIFICANT CHANGE UP (ref 13–44)
MCHC RBC-ENTMCNC: 24 PG — LOW (ref 27–34)
MCHC RBC-ENTMCNC: 31.8 GM/DL — LOW (ref 32–36)
MCV RBC AUTO: 75.3 FL — LOW (ref 80–100)
MONOCYTES # BLD AUTO: 0.56 K/UL — SIGNIFICANT CHANGE UP (ref 0–0.9)
MONOCYTES NFR BLD AUTO: 7.1 % — SIGNIFICANT CHANGE UP (ref 2–14)
NEUTROPHILS # BLD AUTO: 5.66 K/UL — SIGNIFICANT CHANGE UP (ref 1.8–7.4)
NEUTROPHILS NFR BLD AUTO: 72.1 % — SIGNIFICANT CHANGE UP (ref 43–77)
NRBC # BLD: 0 /100 WBCS — SIGNIFICANT CHANGE UP (ref 0–0)
PLATELET # BLD AUTO: 228 K/UL — SIGNIFICANT CHANGE UP (ref 150–400)
POTASSIUM SERPL-MCNC: 3.6 MMOL/L — SIGNIFICANT CHANGE UP (ref 3.5–5.3)
POTASSIUM SERPL-SCNC: 3.6 MMOL/L — SIGNIFICANT CHANGE UP (ref 3.5–5.3)
PROT SERPL-MCNC: 6.3 G/DL — SIGNIFICANT CHANGE UP (ref 6–8.3)
PROTHROM AB SERPL-ACNC: 24.7 SEC — HIGH (ref 9.5–13)
RBC # BLD: 4.46 M/UL — SIGNIFICANT CHANGE UP (ref 4.2–5.8)
RBC # FLD: 16.7 % — HIGH (ref 10.3–14.5)
SODIUM SERPL-SCNC: 140 MMOL/L — SIGNIFICANT CHANGE UP (ref 135–145)
WBC # BLD: 7.85 K/UL — SIGNIFICANT CHANGE UP (ref 3.8–10.5)
WBC # FLD AUTO: 7.85 K/UL — SIGNIFICANT CHANGE UP (ref 3.8–10.5)

## 2024-02-20 PROCEDURE — 99231 SBSQ HOSP IP/OBS SF/LOW 25: CPT

## 2024-02-20 PROCEDURE — 78226 HEPATOBILIARY SYSTEM IMAGING: CPT | Mod: 26

## 2024-02-20 RX ORDER — WARFARIN SODIUM 2.5 MG/1
4 TABLET ORAL ONCE
Refills: 0 | Status: COMPLETED | OUTPATIENT
Start: 2024-02-20 | End: 2024-02-20

## 2024-02-20 RX ORDER — AMPICILLIN TRIHYDRATE 250 MG
1 CAPSULE ORAL EVERY 6 HOURS
Refills: 0 | Status: DISCONTINUED | OUTPATIENT
Start: 2024-02-20 | End: 2024-02-21

## 2024-02-20 RX ADMIN — INSULIN GLARGINE 12 UNIT(S): 100 INJECTION, SOLUTION SUBCUTANEOUS at 22:05

## 2024-02-20 RX ADMIN — Medication 2 UNIT(S): at 12:04

## 2024-02-20 RX ADMIN — Medication 1 TABLET(S): at 12:05

## 2024-02-20 RX ADMIN — WARFARIN SODIUM 4 MILLIGRAM(S): 2.5 TABLET ORAL at 23:35

## 2024-02-20 RX ADMIN — FINASTERIDE 5 MILLIGRAM(S): 5 TABLET, FILM COATED ORAL at 12:05

## 2024-02-20 RX ADMIN — Medication 1 TABLET(S): at 12:04

## 2024-02-20 RX ADMIN — Medication 2 MILLIGRAM(S): at 12:24

## 2024-02-20 RX ADMIN — Medication 108 GRAM(S): at 23:35

## 2024-02-20 RX ADMIN — HYDROMORPHONE HYDROCHLORIDE 1.5 MILLIGRAM(S): 2 INJECTION INTRAMUSCULAR; INTRAVENOUS; SUBCUTANEOUS at 01:40

## 2024-02-20 RX ADMIN — TAMSULOSIN HYDROCHLORIDE 0.4 MILLIGRAM(S): 0.4 CAPSULE ORAL at 22:05

## 2024-02-20 RX ADMIN — Medication 2 MILLIGRAM(S): at 18:06

## 2024-02-20 RX ADMIN — PIPERACILLIN AND TAZOBACTAM 25 GRAM(S): 4; .5 INJECTION, POWDER, LYOPHILIZED, FOR SOLUTION INTRAVENOUS at 03:15

## 2024-02-20 RX ADMIN — HYDROMORPHONE HYDROCHLORIDE 1.5 MILLIGRAM(S): 2 INJECTION INTRAMUSCULAR; INTRAVENOUS; SUBCUTANEOUS at 18:52

## 2024-02-20 RX ADMIN — METHOCARBAMOL 750 MILLIGRAM(S): 500 TABLET, FILM COATED ORAL at 22:06

## 2024-02-20 RX ADMIN — Medication 5 MILLIGRAM(S): at 22:50

## 2024-02-20 RX ADMIN — Medication 2 MILLIGRAM(S): at 23:42

## 2024-02-20 RX ADMIN — Medication 2 MILLIGRAM(S): at 06:17

## 2024-02-20 RX ADMIN — Medication 25 MILLIGRAM(S): at 18:10

## 2024-02-20 RX ADMIN — METHOCARBAMOL 750 MILLIGRAM(S): 500 TABLET, FILM COATED ORAL at 15:15

## 2024-02-20 RX ADMIN — Medication 1 PATCH: at 09:07

## 2024-02-20 RX ADMIN — HYDROMORPHONE HYDROCHLORIDE 1.5 MILLIGRAM(S): 2 INJECTION INTRAMUSCULAR; INTRAVENOUS; SUBCUTANEOUS at 23:35

## 2024-02-20 RX ADMIN — Medication 500 MILLIGRAM(S): at 12:04

## 2024-02-20 RX ADMIN — Medication 1 PATCH: at 00:10

## 2024-02-20 RX ADMIN — HYDROMORPHONE HYDROCHLORIDE 1.5 MILLIGRAM(S): 2 INJECTION INTRAMUSCULAR; INTRAVENOUS; SUBCUTANEOUS at 06:25

## 2024-02-20 RX ADMIN — Medication 108 GRAM(S): at 18:07

## 2024-02-20 RX ADMIN — HYDROMORPHONE HYDROCHLORIDE 1.5 MILLIGRAM(S): 2 INJECTION INTRAMUSCULAR; INTRAVENOUS; SUBCUTANEOUS at 01:13

## 2024-02-20 RX ADMIN — Medication 2 MILLIGRAM(S): at 14:41

## 2024-02-20 RX ADMIN — Medication 25 MILLIGRAM(S): at 18:07

## 2024-02-20 RX ADMIN — Medication 2 MILLIGRAM(S): at 01:21

## 2024-02-20 RX ADMIN — Medication 325 MILLIGRAM(S): at 12:05

## 2024-02-20 RX ADMIN — HYDROMORPHONE HYDROCHLORIDE 1.5 MILLIGRAM(S): 2 INJECTION INTRAMUSCULAR; INTRAVENOUS; SUBCUTANEOUS at 06:17

## 2024-02-20 RX ADMIN — Medication 1 PATCH: at 23:35

## 2024-02-20 RX ADMIN — GABAPENTIN 600 MILLIGRAM(S): 400 CAPSULE ORAL at 15:15

## 2024-02-20 RX ADMIN — Medication 25 MILLIGRAM(S): at 23:49

## 2024-02-20 RX ADMIN — Medication 50 MILLIGRAM(S): at 22:05

## 2024-02-20 RX ADMIN — Medication 1 PATCH: at 00:37

## 2024-02-20 RX ADMIN — HYDROMORPHONE HYDROCHLORIDE 1.5 MILLIGRAM(S): 2 INJECTION INTRAMUSCULAR; INTRAVENOUS; SUBCUTANEOUS at 11:30

## 2024-02-20 RX ADMIN — GABAPENTIN 600 MILLIGRAM(S): 400 CAPSULE ORAL at 22:05

## 2024-02-20 RX ADMIN — DULOXETINE HYDROCHLORIDE 60 MILLIGRAM(S): 30 CAPSULE, DELAYED RELEASE ORAL at 12:04

## 2024-02-20 RX ADMIN — Medication 2 UNIT(S): at 17:49

## 2024-02-20 RX ADMIN — HYDROMORPHONE HYDROCHLORIDE 1.5 MILLIGRAM(S): 2 INJECTION INTRAMUSCULAR; INTRAVENOUS; SUBCUTANEOUS at 15:40

## 2024-02-20 RX ADMIN — HYDROMORPHONE HYDROCHLORIDE 1.5 MILLIGRAM(S): 2 INJECTION INTRAMUSCULAR; INTRAVENOUS; SUBCUTANEOUS at 14:41

## 2024-02-20 RX ADMIN — HYDROMORPHONE HYDROCHLORIDE 1.5 MILLIGRAM(S): 2 INJECTION INTRAMUSCULAR; INTRAVENOUS; SUBCUTANEOUS at 10:34

## 2024-02-20 NOTE — PROGRESS NOTE ADULT - SUBJECTIVE AND OBJECTIVE BOX
r/o acute cally    SUBJECTIVE:  Patient seen and examined at bedside, resting comfortably, denies any acute complaints, NAEON, AVSS    MEDICATIONS  (STANDING):  amLODIPine   Tablet 10 milliGRAM(s) Oral daily  ascorbic acid 500 milliGRAM(s) Oral daily  cloNIDine 0.1 milliGRAM(s) Oral daily  dextrose 5%. 1000 milliLiter(s) (100 mL/Hr) IV Continuous <Continuous>  dextrose 5%. 1000 milliLiter(s) (50 mL/Hr) IV Continuous <Continuous>  dextrose 50% Injectable 25 Gram(s) IV Push once  dextrose 50% Injectable 12.5 Gram(s) IV Push once  dextrose 50% Injectable 25 Gram(s) IV Push once  DULoxetine 60 milliGRAM(s) Oral daily  ferrous    sulfate 325 milliGRAM(s) Oral daily  finasteride 5 milliGRAM(s) Oral daily  gabapentin 600 milliGRAM(s) Oral three times a day  glucagon  Injectable 1 milliGRAM(s) IntraMuscular once  insulin glargine Injectable (LANTUS) 12 Unit(s) SubCutaneous at bedtime  insulin lispro (ADMELOG) corrective regimen sliding scale   SubCutaneous at bedtime  insulin lispro (ADMELOG) corrective regimen sliding scale   SubCutaneous three times a day before meals  insulin lispro Injectable (ADMELOG) 2 Unit(s) SubCutaneous three times a day before meals  lactobacillus acidophilus 1 Tablet(s) Oral daily  melatonin 5 milliGRAM(s) Oral at bedtime  methocarbamol 750 milliGRAM(s) Oral three times a day  metoprolol tartrate 25 milliGRAM(s) Oral two times a day  multivitamin 1 Tablet(s) Oral daily  nicotine - 21 mG/24Hr(s) Patch 1 Patch Transdermal every 24 hours  pantoprazole    Tablet 40 milliGRAM(s) Oral before breakfast  piperacillin/tazobactam IVPB.- 3.375 Gram(s) IV Intermittent once  piperacillin/tazobactam IVPB.. 3.375 Gram(s) IV Intermittent every 8 hours  polyethylene glycol 3350 17 Gram(s) Oral daily  sodium chloride 0.9%. 1000 milliLiter(s) (60 mL/Hr) IV Continuous <Continuous>  tamsulosin 0.4 milliGRAM(s) Oral at bedtime  traZODone 50 milliGRAM(s) Oral at bedtime    MEDICATIONS  (PRN):  acetaminophen     Tablet .. 650 milliGRAM(s) Oral every 6 hours PRN Temp greater or equal to 38C (100.4F), Mild Pain (1 - 3)  dextrose Oral Gel 15 Gram(s) Oral once PRN Blood Glucose LESS THAN 70 milliGRAM(s)/deciliter  diphenhydrAMINE 25 milliGRAM(s) Oral every 6 hours PRN Rash and/or Itching  HYDROmorphone  Injectable 1.5 milliGRAM(s) IV Push every 4 hours PRN Severe Pain (7 - 10)  HYDROmorphone  Injectable 1 milliGRAM(s) IV Push every 4 hours PRN Moderate Pain (4 - 6)  nicotine  Polacrilex Gum 2 milliGRAM(s) Oral every 2 hours PRN Cravings      Vital Signs Last 24 Hrs  T(C): 36.4 (20 Feb 2024 04:44), Max: 36.6 (19 Feb 2024 12:24)  T(F): 97.5 (20 Feb 2024 04:44), Max: 97.9 (19 Feb 2024 12:24)  HR: 55 (20 Feb 2024 04:44) (55 - 63)  BP: 146/79 (20 Feb 2024 04:44) (146/79 - 164/91)  BP(mean): --  RR: 18 (20 Feb 2024 04:44) (18 - 18)  SpO2: 95% (20 Feb 2024 04:44) (92% - 95%)    Parameters below as of 20 Feb 2024 04:44  Patient On (Oxygen Delivery Method): room air        PHYSICAL EXAM:  Constitutional: AAOx3, no acute distress  HEENT: NCAT, airway patent  Cardiovascular: RRR, pulses present bilaterally  Respiratory: nonlabored breathing  Gastrointestinal: abdomen soft, obese, nontender, non distended, no rebound or guarding  Neuro: no focal deficits  Extremities: non edematous, no calf pain bilaterally       I&O's Detail    19 Feb 2024 07:01  -  20 Feb 2024 07:00  --------------------------------------------------------  IN:  Total IN: 0 mL    OUT:    Indwelling Catheter - Urethral (mL): 1100 mL    Voided (mL): 3100 mL  Total OUT: 4200 mL    Total NET: -4200 mL          LABS:                        10.7   7.85  )-----------( 228      ( 20 Feb 2024 06:18 )             33.6     02-20    140  |  106  |  31<H>  ----------------------------<  134<H>  3.6   |  26  |  2.40<H>    Ca    8.7      20 Feb 2024 06:18    TPro  6.3  /  Alb  2.6<L>  /  TBili  0.3  /  DBili  x   /  AST  12<L>  /  ALT  17  /  AlkPhos  79  02-20    PT/INR - ( 20 Feb 2024 06:18 )   PT: 24.7 sec;   INR: 2.16 ratio         PTT - ( 20 Feb 2024 06:18 )  PTT:42.3 sec  Urinalysis Basic - ( 20 Feb 2024 06:18 )    Color: x / Appearance: x / SG: x / pH: x  Gluc: 134 mg/dL / Ketone: x  / Bili: x / Urobili: x   Blood: x / Protein: x / Nitrite: x   Leuk Esterase: x / RBC: x / WBC x   Sq Epi: x / Non Sq Epi: x / Bacteria: x        RADIOLOGY & ADDITIONAL STUDIES:

## 2024-02-20 NOTE — PROGRESS NOTE ADULT - ASSESSMENT
gallstones    CT and u/s noted  doubt acute ccy  patient clinically asymptomatic  cont iv antibiotics for UTI  HIDA negative  reg diet  will follow

## 2024-02-20 NOTE — CARE COORDINATION ASSESSMENT. - NSPASTMEDSURGHISTORY_GEN_ALL_CORE_FT
PAST MEDICAL & SURGICAL HISTORY:  CVA (cerebrovascular accident)      DVT of lower limb, acute      Degenerative arthritis      BPH without obstruction/lower urinary tract symptoms      HTN (hypertension)      DM2 (diabetes mellitus, type 2)      History of neurogenic bowel      H/O Guillain-[place] syndrome      Renal stones      H/O lithotripsy

## 2024-02-20 NOTE — PROGRESS NOTE ADULT - TIME BILLING
Reviewed with patient, GI team, Surgical PA's as well as RN team.
as above
Note written by attending. Meds, labs, vitals, chart reviewed.
as above

## 2024-02-20 NOTE — PROGRESS NOTE ADULT - ASSESSMENT
49 yo male with extensive pmh, a/w UTI, consulted for abdominal pain with ultrasound findings of cholelithiasis and sludge with positive murphys. Exam reassuring and labs grossly wnl    Plan:  - HIDA this morning to r/o acute cholecystitis   - clinical course pending results  47 yo male with extensive pmh, a/w UTI, consulted for abdominal pain with ultrasound findings of cholelithiasis and sludge with positive murphys. Exam reassuring and labs grossly wnl    Plan:  - HIDA this morning to r/o acute cholecystitis   - clinical course pending results     As in above full PA notation except if countered below.  Mr. Colindres personally seen/ examined during evening rounds.  No abdominal pain.  Vitals non-suggestive.  Abdomen soft without RUQ tenderness.  Labs reassuring with normal WBCs and normal LFT's.  HIDA NEGATIVE.  Clinically, gallstones without now clinical/ radiographic evidence of acute cholecystitis.  Surgically, stable for present.  To continue current care as per medical services.  Surgery remains available as needed.  Please recall PRN for questions/ concerns/ changes.  Thank you.

## 2024-02-20 NOTE — CONSULT NOTE ADULT - CONSULT REASON
UTI
incidental finding of cholelithiases
ANTONIO on CKD
Nephrolithiasis and hydronephrosis.
r/o acute ccy

## 2024-02-20 NOTE — PATIENT CHOICE NOTE. - NSPTCHOICESTATE_GEN_ALL_CORE

## 2024-02-20 NOTE — PROGRESS NOTE ADULT - SUBJECTIVE AND OBJECTIVE BOX
[facility]   INFECTIOUS DISEASES   [address] [place] [address]  Tel: [phone]     Fax: [phone]  =========================================================  MD Cornel Regan Kaushal, MD Cho, Michelle, MD Sunjit, Jaspal, MD  =========================================================    JOIE BRUNO [id number]    Follow up: UTI    He has a chronic ayala due to neurogenic bladder, no symptoms. No fever. No abdominal pain but has left flank pain.     Allergies:  sulfa drugs (Rash)    MEDICATIONS  (STANDING):  amLODIPine   Tablet 10 milliGRAM(s) Oral daily  ascorbic acid 500 milliGRAM(s) Oral daily  cloNIDine 0.1 milliGRAM(s) Oral daily  dextrose 5%. 1000 milliLiter(s) (50 mL/Hr) IV Continuous <Continuous>  dextrose 5%. 1000 milliLiter(s) (100 mL/Hr) IV Continuous <Continuous>  dextrose 50% Injectable 25 Gram(s) IV Push once  dextrose 50% Injectable 25 Gram(s) IV Push once  dextrose 50% Injectable 12.5 Gram(s) IV Push once  DULoxetine 60 milliGRAM(s) Oral daily  ferrous    sulfate 325 milliGRAM(s) Oral daily  finasteride 5 milliGRAM(s) Oral daily  gabapentin 600 milliGRAM(s) Oral three times a day  glucagon  Injectable 1 milliGRAM(s) IntraMuscular once  insulin glargine Injectable (LANTUS) 12 Unit(s) SubCutaneous at bedtime  insulin lispro (ADMELOG) corrective regimen sliding scale   SubCutaneous at bedtime  insulin lispro (ADMELOG) corrective regimen sliding scale   SubCutaneous three times a day before meals  insulin lispro Injectable (ADMELOG) 2 Unit(s) SubCutaneous three times a day before meals  lactobacillus acidophilus 1 Tablet(s) Oral daily  melatonin 5 milliGRAM(s) Oral at bedtime  methocarbamol 750 milliGRAM(s) Oral three times a day  metoprolol tartrate 25 milliGRAM(s) Oral two times a day  multivitamin 1 Tablet(s) Oral daily  nicotine - 21 mG/24Hr(s) Patch 1 Patch Transdermal every 24 hours  pantoprazole    Tablet 40 milliGRAM(s) Oral before breakfast  piperacillin/tazobactam IVPB.. 3.375 Gram(s) IV Intermittent every 8 hours  polyethylene glycol 3350 17 Gram(s) Oral daily  sodium chloride 0.9%. 1000 milliLiter(s) (60 mL/Hr) IV Continuous <Continuous>  tamsulosin 0.4 milliGRAM(s) Oral at bedtime  traZODone 50 milliGRAM(s) Oral at bedtime  warfarin 4 milliGRAM(s) Oral once    REVIEW OF SYSTEMS:  CONSTITUTIONAL:  No Fever or chills  HEENT:   No diplopia or blurred vision.  No earache, sore throat or runny nose.  CARDIOVASCULAR:  No chest pain  RESPIRATORY:  No cough, shortness of breath, PND or orthopnea.  GASTROINTESTINAL:  No nausea, vomiting or diarrhea.  GENITOURINARY:  No dysuria, frequency or urgency. No Blood in urine  MUSCULOSKELETAL:  no joint aches, no muscle pain  SKIN:  No change in skin, hair or nails.     Physical Exam:  Vital Signs Last 24 Hrs  T(C): 36.4 (20 Feb 2024 12:20), Max: 36.6 (19 Feb 2024 20:31)  T(F): 97.6 (20 Feb 2024 12:20), Max: 97.9 (19 Feb 2024 20:31)  HR: 56 (20 Feb 2024 12:20) (55 - 63)  BP: 159/90 (20 Feb 2024 12:20) (146/79 - 159/90)  BP(mean): --  RR: 18 (20 Feb 2024 12:20) (18 - 18)  SpO2: 95% (20 Feb 2024 12:20) (94% - 95%)  Parameters below as of 20 Feb 2024 12:20  Patient On (Oxygen Delivery Method): room air  GEN: NAD  HEENT: normocephalic and atraumatic. EOMI. STEVE.    NECK: Supple.  No lymphadenopathy   LUNGS: Clear to auscultation.  HEART: Regular rate and rhythm without murmur.  ABDOMEN: Soft, nontender, and nondistended.  Positive bowel sounds.    : No CVA tenderness  EXTREMITIES: Without edema.  MSK: no joint swelling  NEUROLOGIC: grossly intact.  PSYCHIATRIC: Appropriate affect .  SKIN: No rash       Labs:                        10.7   7.85  )-----------( 228      ( 20 Feb 2024 06:18 )             33.6     02-20    140  |  106  |  31<H>  ----------------------------<  134<H>  3.6   |  26  |  2.40<H>    Ca    8.7      20 Feb 2024 06:18    TPro  6.3  /  Alb  2.6<L>  /  TBili  0.3  /  DBili  x   /  AST  12<L>  /  ALT  17  /  AlkPhos  79  02-20    Culture - Urine (collected 02-17-24 @ 04:25)  Source: Clean Catch Clean Catch (Midstream)  Final Report (02-19-24 @ 12:24):    >100,000 CFU/ml Enterococcus faecalis (vancomycin resistant)  Organism: Enterococcus faecalis (vancomycin resistant) (02-19-24 @ 12:24)  Organism: Enterococcus faecalis (vancomycin resistant) (02-19-24 @ 12:24)    Sensitivities:      Method Type: JOSE RAMON      -  Ampicillin: S <=2 Predicts results to ampicillin/sulbactam, amoxacillin-clavulanate and  piperacillin-tazobactam.      -  Ciprofloxacin: R >2      -  Daptomycin: S 1      -  Levofloxacin: R >4      -  Linezolid: S 2      -  Nitrofurantoin: S <=32 Should not be used to treat pyelonephritis.      -  Tetracycline: R >8      -  Vancomycin: R >16    WBC Count: 7.85 K/uL (02-20-24 @ 06:18)  WBC Count: 8.87 K/uL (02-19-24 @ 09:05)  WBC Count: 9.04 K/uL (02-18-24 @ 06:30)  WBC Count: 12.52 K/uL (02-17-24 @ 03:36)    Creatinine: 2.40 mg/dL (02-20-24 @ 06:18)  Creatinine: 2.30 mg/dL (02-19-24 @ 09:05)  Creatinine: 2.10 mg/dL (02-18-24 @ 06:30)  Creatinine: 2.20 mg/dL (02-17-24 @ 03:36)    All imaging and data are reviewed.   < from: CT Renal Stone Hunt (02.17.24 @ 05:02) >  IMPRESSION:  Cholelithiasis. Vague opacities at the fundal portion of the gallbladder.   Right upper quadrant ultrasound suggested. No significant wall thickening   or pericholecystic edema.  Mild bilateral hydroureteronephrosis without calcified stone identified   along the course of the ureter. Differential includes recently passed   kidney stone or infection. Additional punctate nonobstructive bilateral   renal calculi are present.  Mild trabeculated bladder wall thickening. Correlate with urinalysis and   lab values to assess for cystitis and/or ascending urinary tract   infection.    < from: US Abdomen Upper Quadrant Right (02.19.24 @ 06:52) >  IMPRESSION:  Cholelithiasis with sludge distended gallbladder fundus. 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 non-obstructive B/L renal calculi are present. Mild trabeculated bladder wall thickening.    # Acute Cystitis   # ANTONIO  # R/PCholelithiasis     - In the past h/o klebsiella pneumonia ESBL resistant to Rocephin  - UCx from this admission noted enterococcus faecalis S to ampi but R to vancomycin   - Switched to zosyn yesterday while cover for possible Cholecystosis but HIDA is back negative   - Can switch to ampicillin from zosyn to cover VRE UTI, adjust for renal function   - Trend temps/WBC  - Monitor renal fxn  -  recs--no intervention    Will follow.     Sia Callahan MD  Division of Infectious Diseases   Please call ID service at [phone] with any question.      50 minutes spent on total encounter assessing patient, examination, chart review, counseling and coordinating care by the attending physician/nurse/care manager.   [facility]   INFECTIOUS DISEASES   [address] [place] [address]  Tel: [phone]     Fax: [phone]  =========================================================  MD Cornel Regan Kaushal, MD Cho, Michelle, MD Sunjit, Jaspal, MD  =========================================================    JOIE BRUNO [id number]    Follow up: UTI    He has a chronic ayala due to neurogenic bladder, no symptoms. No fever. No abdominal pain but has left flank pain.     Allergies:  sulfa drugs (Rash)    MEDICATIONS  (STANDING):  amLODIPine   Tablet 10 milliGRAM(s) Oral daily  ascorbic acid 500 milliGRAM(s) Oral daily  cloNIDine 0.1 milliGRAM(s) Oral daily  dextrose 5%. 1000 milliLiter(s) (50 mL/Hr) IV Continuous <Continuous>  dextrose 5%. 1000 milliLiter(s) (100 mL/Hr) IV Continuous <Continuous>  dextrose 50% Injectable 25 Gram(s) IV Push once  dextrose 50% Injectable 25 Gram(s) IV Push once  dextrose 50% Injectable 12.5 Gram(s) IV Push once  DULoxetine 60 milliGRAM(s) Oral daily  ferrous    sulfate 325 milliGRAM(s) Oral daily  finasteride 5 milliGRAM(s) Oral daily  gabapentin 600 milliGRAM(s) Oral three times a day  glucagon  Injectable 1 milliGRAM(s) IntraMuscular once  insulin glargine Injectable (LANTUS) 12 Unit(s) SubCutaneous at bedtime  insulin lispro (ADMELOG) corrective regimen sliding scale   SubCutaneous at bedtime  insulin lispro (ADMELOG) corrective regimen sliding scale   SubCutaneous three times a day before meals  insulin lispro Injectable (ADMELOG) 2 Unit(s) SubCutaneous three times a day before meals  lactobacillus acidophilus 1 Tablet(s) Oral daily  melatonin 5 milliGRAM(s) Oral at bedtime  methocarbamol 750 milliGRAM(s) Oral three times a day  metoprolol tartrate 25 milliGRAM(s) Oral two times a day  multivitamin 1 Tablet(s) Oral daily  nicotine - 21 mG/24Hr(s) Patch 1 Patch Transdermal every 24 hours  pantoprazole    Tablet 40 milliGRAM(s) Oral before breakfast  piperacillin/tazobactam IVPB.. 3.375 Gram(s) IV Intermittent every 8 hours  polyethylene glycol 3350 17 Gram(s) Oral daily  sodium chloride 0.9%. 1000 milliLiter(s) (60 mL/Hr) IV Continuous <Continuous>  tamsulosin 0.4 milliGRAM(s) Oral at bedtime  traZODone 50 milliGRAM(s) Oral at bedtime  warfarin 4 milliGRAM(s) Oral once    REVIEW OF SYSTEMS:  CONSTITUTIONAL:  No Fever or chills  HEENT:   No diplopia or blurred vision.  No earache, sore throat or runny nose.  CARDIOVASCULAR:  No chest pain  RESPIRATORY:  No cough, shortness of breath, PND or orthopnea.  GASTROINTESTINAL:  No nausea, vomiting or diarrhea.  GENITOURINARY:  No dysuria, frequency or urgency. No Blood in urine  MUSCULOSKELETAL:  no joint aches, no muscle pain  SKIN:  No change in skin, hair or nails.     Physical Exam:  Vital Signs Last 24 Hrs  T(C): 36.4 (20 Feb 2024 12:20), Max: 36.6 (19 Feb 2024 20:31)  T(F): 97.6 (20 Feb 2024 12:20), Max: 97.9 (19 Feb 2024 20:31)  HR: 56 (20 Feb 2024 12:20) (55 - 63)  BP: 159/90 (20 Feb 2024 12:20) (146/79 - 159/90)  BP(mean): --  RR: 18 (20 Feb 2024 12:20) (18 - 18)  SpO2: 95% (20 Feb 2024 12:20) (94% - 95%)  Parameters below as of 20 Feb 2024 12:20  Patient On (Oxygen Delivery Method): room air  GEN: NAD  HEENT: normocephalic and atraumatic. EOMI. STEVE.    NECK: Supple.  No lymphadenopathy   LUNGS: Clear to auscultation.  HEART: Regular rate and rhythm without murmur.  ABDOMEN: Soft, nontender, and nondistended.  Positive bowel sounds.    : No CVA tenderness  EXTREMITIES: Without edema.  MSK: no joint swelling  NEUROLOGIC: grossly intact.  PSYCHIATRIC: Appropriate affect .  SKIN: No rash       Labs:                        10.7   7.85  )-----------( 228      ( 20 Feb 2024 06:18 )             33.6     02-20    140  |  106  |  31<H>  ----------------------------<  134<H>  3.6   |  26  |  2.40<H>    Ca    8.7      20 Feb 2024 06:18    TPro  6.3  /  Alb  2.6<L>  /  TBili  0.3  /  DBili  x   /  AST  12<L>  /  ALT  17  /  AlkPhos  79  02-20    Culture - Urine (collected 02-17-24 @ 04:25)  Source: Clean Catch Clean Catch (Midstream)  Final Report (02-19-24 @ 12:24):    >100,000 CFU/ml Enterococcus faecalis (vancomycin resistant)  Organism: Enterococcus faecalis (vancomycin resistant) (02-19-24 @ 12:24)  Organism: Enterococcus faecalis (vancomycin resistant) (02-19-24 @ 12:24)    Sensitivities:      Method Type: JOSE RAMON      -  Ampicillin: S <=2 Predicts results to ampicillin/sulbactam, amoxacillin-clavulanate and  piperacillin-tazobactam.      -  Ciprofloxacin: R >2      -  Daptomycin: S 1      -  Levofloxacin: R >4      -  Linezolid: S 2      -  Nitrofurantoin: S <=32 Should not be used to treat pyelonephritis.      -  Tetracycline: R >8      -  Vancomycin: R >16    WBC Count: 7.85 K/uL (02-20-24 @ 06:18)  WBC Count: 8.87 K/uL (02-19-24 @ 09:05)  WBC Count: 9.04 K/uL (02-18-24 @ 06:30)  WBC Count: 12.52 K/uL (02-17-24 @ 03:36)    Creatinine: 2.40 mg/dL (02-20-24 @ 06:18)  Creatinine: 2.30 mg/dL (02-19-24 @ 09:05)  Creatinine: 2.10 mg/dL (02-18-24 @ 06:30)  Creatinine: 2.20 mg/dL (02-17-24 @ 03:36)    All imaging and data are reviewed.   < from: CT Renal Stone Hunt (02.17.24 @ 05:02) >  IMPRESSION:  Cholelithiasis. Vague opacities at the fundal portion of the gallbladder.   Right upper quadrant ultrasound suggested. No significant wall thickening   or pericholecystic edema.  Mild bilateral hydroureteronephrosis without calcified stone identified   along the course of the ureter. Differential includes recently passed   kidney stone or infection. Additional punctate nonobstructive bilateral   renal calculi are present.  Mild trabeculated bladder wall thickening. Correlate with urinalysis and   lab values to assess for cystitis and/or ascending urinary tract   infection.    < from: US Abdomen Upper Quadrant Right (02.19.24 @ 06:52) >  IMPRESSION:  Cholelithiasis with sludge distended gallbladder fundus. 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 non-obstructive B/L renal calculi are present. Mild trabeculated bladder wall thickening.    Reported last night that he had itching on the IV site, no issue at this time, I don't believe it is related to zosyn allergy.     # Acute Cystitis   # ANTONIO  # R/PCholelithiasis     - In the past h/o klebsiella pneumonia ESBL resistant to Rocephin  - UCx from this admission noted enterococcus faecalis S to ampi but R to vancomycin   - Switched to zosyn yesterday while cover for possible Cholecystosis but HIDA is back negative   - Can switch to ampicillin from zosyn to cover VRE UTI, adjust for renal function (will watch for reaction)  - Trend temps/WBC  - Monitor renal fxn  -  follow up for possible suprapubic catheter (he is interested and asking)    Will follow.     Sia Callahan MD  Division of Infectious Diseases   Please call ID service at [phone] with any question.      50 minutes spent on total encounter assessing patient, examination, chart review, counseling and coordinating care by the attending physician/nurse/care manager.

## 2024-02-20 NOTE — CONSULT NOTE ADULT - SUBJECTIVE AND OBJECTIVE BOX
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    Patient states that 4 days ago, he began to develop left sided flank pain. Patient describes the pain as sharp, tender, and worse with movement. He states the pain is 10/10 and has been taking oxycodone 10mg and tylenol twice a day, without much relief. Patient has a history of multiple utis, and kidney stones (s/p lithotripsy 2023).  He endorses nausea, but denies vomiting. The pain has worsened progressively over the past few days, which prompted him to visit the emergency room for further evaluation.     Of note, he recently had a uti last month, and was treated with meropenem     Patient denies F/C, HA, N/V, CP, SOB, C/D, Dysuria, Leg pain, or Leg swelling.    Patient denies any recent travel, sick contacts, or long periods of immobilization.      ED Course:  Vitals: 134/83mmHg, 60bpm, 97.5F, 96% on RA  Labs: WBc 12.5, Hgb 10.6, MCV 76.2, PT 25.6, INR 2.24, aPTT 44.5, Cl 110, BUN/Cr 39/2.2, glucose 2.2, GFR 36  UA: many bacteria, squamous cells present. mod blood. large leuk esterase, 100 protein  CT renal stone hunt: mild B/L hydroureteronephrosis w/o calcified stone identified along course of the ureter. non-obstructive B/L renal calculi are present.  Mild trabeculated bladder wall thickening.  Given - rocephin 1g, IV dilaudid 0.5mg x1, IV dilaudid 1mg x1, 1L NS (17 Feb 2024 07:12)       PAST MEDICAL & SURGICAL HISTORY:  Renal stones      H/O Guillain-[place] syndrome      History of neurogenic bowel      DM2 (diabetes mellitus, type 2)      HTN (hypertension)      BPH without obstruction/lower urinary tract symptoms      Degenerative arthritis      DVT of lower limb, acute      CVA (cerebrovascular accident)      H/O lithotripsy           FAMILY HISTORY:  Family history of sinus tachycardia (Father)    FH: HTN (hypertension) (Mother)    NC    Social History:Non smoker    MEDICATIONS  (STANDING):  amLODIPine   Tablet 10 milliGRAM(s) Oral daily  ampicillin  IVPB 1 Gram(s) IV Intermittent every 6 hours  ascorbic acid 500 milliGRAM(s) Oral daily  cloNIDine 0.1 milliGRAM(s) Oral daily  dextrose 5%. 1000 milliLiter(s) (50 mL/Hr) IV Continuous <Continuous>  dextrose 5%. 1000 milliLiter(s) (100 mL/Hr) IV Continuous <Continuous>  dextrose 50% Injectable 25 Gram(s) IV Push once  dextrose 50% Injectable 25 Gram(s) IV Push once  dextrose 50% Injectable 12.5 Gram(s) IV Push once  DULoxetine 60 milliGRAM(s) Oral daily  ferrous    sulfate 325 milliGRAM(s) Oral daily  finasteride 5 milliGRAM(s) Oral daily  gabapentin 600 milliGRAM(s) Oral three times a day  glucagon  Injectable 1 milliGRAM(s) IntraMuscular once  insulin glargine Injectable (LANTUS) 12 Unit(s) SubCutaneous at bedtime  insulin lispro (ADMELOG) corrective regimen sliding scale   SubCutaneous three times a day before meals  insulin lispro (ADMELOG) corrective regimen sliding scale   SubCutaneous at bedtime  insulin lispro Injectable (ADMELOG) 2 Unit(s) SubCutaneous three times a day before meals  lactobacillus acidophilus 1 Tablet(s) Oral daily  melatonin 5 milliGRAM(s) Oral at bedtime  methocarbamol 750 milliGRAM(s) Oral three times a day  metoprolol tartrate 25 milliGRAM(s) Oral two times a day  multivitamin 1 Tablet(s) Oral daily  nicotine - 21 mG/24Hr(s) Patch 1 Patch Transdermal every 24 hours  pantoprazole    Tablet 40 milliGRAM(s) Oral before breakfast  polyethylene glycol 3350 17 Gram(s) Oral daily  sodium chloride 0.9%. 1000 milliLiter(s) (60 mL/Hr) IV Continuous <Continuous>  tamsulosin 0.4 milliGRAM(s) Oral at bedtime  traZODone 50 milliGRAM(s) Oral at bedtime  warfarin 4 milliGRAM(s) Oral once    MEDICATIONS  (PRN):  acetaminophen     Tablet .. 650 milliGRAM(s) Oral every 6 hours PRN Temp greater or equal to 38C (100.4F), Mild Pain (1 - 3)  dextrose Oral Gel 15 Gram(s) Oral once PRN Blood Glucose LESS THAN 70 milliGRAM(s)/deciliter  diphenhydrAMINE 25 milliGRAM(s) Oral every 6 hours PRN Rash and/or Itching  HYDROmorphone  Injectable 1.5 milliGRAM(s) IV Push every 4 hours PRN Severe Pain (7 - 10)  HYDROmorphone  Injectable 1 milliGRAM(s) IV Push every 4 hours PRN Moderate Pain (4 - 6)  nicotine  Polacrilex Gum 2 milliGRAM(s) Oral every 2 hours PRN Cravings   Meds reviewed    Allergies    sulfa drugs (Rash)    Intolerances    Pipercillin Sodium-Tazobactam Sodium (Pruritus)       REVIEW OF SYSTEMS:    Review of Systems:   Constitutional: Denies fatigue  HEENT: Denies headaches and dizziness  Respiratory: denies SOB, cough, or wheezing  Cardiovascular: denies CP, palpitations  Gastrointestinal: Denies nausea, denies vomiting, diarrhea, constipation, abdominal pain, or bloody stools  Genitourinary: denies painful urination, increased frequency, urgency, or bloody urine  Skin: denies rashes or itching  Musculoskeletal: denies muscle aches, joint swelling  Neurologic: Denies generalized weakness, denies loss of sensation, numbness, or tingling      Vital Signs Last 24 Hrs  T(C): 36.4 (20 Feb 2024 12:20), Max: 36.6 (19 Feb 2024 20:31)  T(F): 97.6 (20 Feb 2024 12:20), Max: 97.9 (19 Feb 2024 20:31)  HR: 93 (20 Feb 2024 18:09) (55 - 93)  BP: 152/81 (20 Feb 2024 18:09) (146/79 - 159/90)  BP(mean): --  RR: 18 (20 Feb 2024 12:20) (18 - 18)  SpO2: 95% (20 Feb 2024 12:20) (94% - 95%)    Parameters below as of 20 Feb 2024 12:20  Patient On (Oxygen Delivery Method): room air      Daily     Daily     PHYSICAL EXAM:    GENERAL: NAD  HEAD:  Atraumatic, Normocephalic  EYES: EOMI, conjunctiva and sclera clear  ENMT: No Drainage from nares, No drainage from ears  NERVOUS SYSTEM:  Awake and Alert  CHEST/LUNG: Clear to percussion bilaterally  EXTREMITIES:  No Edema  SKIN: No rashes No obvious ecchymosis      LABS:                        10.7   7.85  )-----------( 228      ( 20 Feb 2024 06:18 )             33.6     02-20    140  |  106  |  31<H>  ----------------------------<  134<H>  3.6   |  26  |  2.40<H>    Ca    8.7      20 Feb 2024 06:18    TPro  6.3  /  Alb  2.6<L>  /  TBili  0.3  /  DBili  x   /  AST  12<L>  /  ALT  17  /  AlkPhos  79  02-20    PT/INR - ( 20 Feb 2024 06:18 )   PT: 24.7 sec;   INR: 2.16 ratio         PTT - ( 20 Feb 2024 06:18 )  PTT:42.3 sec  Urinalysis Basic - ( 20 Feb 2024 06:18 )    Color: x / Appearance: x / SG: x / pH: x  Gluc: 134 mg/dL / Ketone: x  / Bili: x / Urobili: x   Blood: x / Protein: x / Nitrite: x   Leuk Esterase: x / RBC: x / WBC x   Sq Epi: x / Non Sq Epi: x / Bacteria: x              RADIOLOGY & ADDITIONAL TESTS:

## 2024-02-20 NOTE — PROGRESS NOTE ADULT - SUBJECTIVE AND OBJECTIVE BOX
Patient is a 48y old  Male who presents with a chief complaint of UTI (19 Feb 2024 13:45)   SOB    INTERVAL HPI/OVERNIGHT EVENTS:    MEDICATIONS  (STANDING):  amLODIPine   Tablet 10 milliGRAM(s) Oral daily  ascorbic acid 500 milliGRAM(s) Oral daily  cloNIDine 0.1 milliGRAM(s) Oral daily  dextrose 5%. 1000 milliLiter(s) (50 mL/Hr) IV Continuous <Continuous>  dextrose 5%. 1000 milliLiter(s) (100 mL/Hr) IV Continuous <Continuous>  dextrose 50% Injectable 25 Gram(s) IV Push once  dextrose 50% Injectable 25 Gram(s) IV Push once  dextrose 50% Injectable 12.5 Gram(s) IV Push once  DULoxetine 60 milliGRAM(s) Oral daily  ferrous    sulfate 325 milliGRAM(s) Oral daily  finasteride 5 milliGRAM(s) Oral daily  gabapentin 600 milliGRAM(s) Oral three times a day  glucagon  Injectable 1 milliGRAM(s) IntraMuscular once  insulin glargine Injectable (LANTUS) 12 Unit(s) SubCutaneous at bedtime  insulin lispro (ADMELOG) corrective regimen sliding scale   SubCutaneous at bedtime  insulin lispro (ADMELOG) corrective regimen sliding scale   SubCutaneous three times a day before meals  insulin lispro Injectable (ADMELOG) 2 Unit(s) SubCutaneous three times a day before meals  lactobacillus acidophilus 1 Tablet(s) Oral daily  melatonin 5 milliGRAM(s) Oral at bedtime  methocarbamol 750 milliGRAM(s) Oral three times a day  metoprolol tartrate 25 milliGRAM(s) Oral two times a day  multivitamin 1 Tablet(s) Oral daily  nicotine - 21 mG/24Hr(s) Patch 1 Patch Transdermal every 24 hours  pantoprazole    Tablet 40 milliGRAM(s) Oral before breakfast  piperacillin/tazobactam IVPB.- 3.375 Gram(s) IV Intermittent once  piperacillin/tazobactam IVPB.. 3.375 Gram(s) IV Intermittent every 8 hours  polyethylene glycol 3350 17 Gram(s) Oral daily  sodium chloride 0.9%. 1000 milliLiter(s) (60 mL/Hr) IV Continuous <Continuous>  tamsulosin 0.4 milliGRAM(s) Oral at bedtime  traZODone 50 milliGRAM(s) Oral at bedtime    MEDICATIONS  (PRN):  acetaminophen     Tablet .. 650 milliGRAM(s) Oral every 6 hours PRN Temp greater or equal to 38C (100.4F), Mild Pain (1 - 3)  dextrose Oral Gel 15 Gram(s) Oral once PRN Blood Glucose LESS THAN 70 milliGRAM(s)/deciliter  diphenhydrAMINE 25 milliGRAM(s) Oral every 6 hours PRN Rash and/or Itching  HYDROmorphone  Injectable 1.5 milliGRAM(s) IV Push every 4 hours PRN Severe Pain (7 - 10)  HYDROmorphone  Injectable 1 milliGRAM(s) IV Push every 4 hours PRN Moderate Pain (4 - 6)  nicotine  Polacrilex Gum 2 milliGRAM(s) Oral every 2 hours PRN Cravings      Allergies    sulfa drugs (Rash)    Intolerances    Pipercillin Sodium-Tazobactam Sodium (Pruritus)      REVIEW OF SYSTEMS:  CONSTITUTIONAL: No fever, weight loss, or fatigue  EYES: No eye pain, visual disturbances, or discharge  ENMT:  No difficulty hearing, tinnitus, vertigo; No sinus or throat pain  NECK: No pain or stiffness  BREASTS: No pain, masses, or nipple discharge  RESPIRATORY: No cough, wheezing, chills or hemoptysis; No shortness of breath  CARDIOVASCULAR: No chest pain, palpitations, dizziness, or leg swelling  GASTROINTESTINAL: No abdominal or epigastric pain. No nausea, vomiting, or hematemesis; No diarrhea or constipation. No melena or hematochezia.  GENITOURINARY: No dysuria, frequency, hematuria, or incontinence  NEUROLOGICAL: No headaches, memory loss, loss of strength, numbness, or tremors  SKIN: No itching, burning, rashes, or lesions   LYMPH NODES: No enlarged glands  ENDOCRINE: No heat or cold intolerance; No hair loss; No polydipsia or polyuria  MUSCULOSKELETAL: No joint pain or swelling; No muscle, back, or extremity pain  PSYCHIATRIC: No depression, anxiety, mood swings, or difficulty sleeping  HEME/LYMPH: No easy bruising, or bleeding gums  ALLERGY AND IMMUNOLOGIC: No hives or eczema    Vital Signs Last 24 Hrs  T(C): 36.4 (20 Feb 2024 04:44), Max: 36.6 (19 Feb 2024 12:24)  T(F): 97.5 (20 Feb 2024 04:44), Max: 97.9 (19 Feb 2024 12:24)  HR: 55 (20 Feb 2024 04:44) (55 - 63)  BP: 146/79 (20 Feb 2024 04:44) (146/79 - 164/91)  BP(mean): --  RR: 18 (20 Feb 2024 04:44) (18 - 18)  SpO2: 95% (20 Feb 2024 04:44) (92% - 95%)    Parameters below as of 20 Feb 2024 04:44  Patient On (Oxygen Delivery Method): room air        PHYSICAL EXAM:  GENERAL: NAD, well-groomed, well-developed  HEAD:  Atraumatic, Normocephalic  EYES: EOMI, PERRLA, conjunctiva and sclera clear  ENMT: No tonsillar erythema, exudates, or enlargement; Moist mucous membranes, Good dentition, No lesions  NECK: Supple, No JVD, Normal thyroid  NERVOUS SYSTEM:  Alert & Oriented X3, Good concentration; Motor Strength 5/5 B/L upper and lower extremities; DTRs 2+ intact and symmetric  CHEST/LUNG: Clear to auscultation bilaterally; No rales, rhonchi, wheezing, or rubs  HEART: Regular rate and rhythm; No murmurs, rubs, or gallops  ABDOMEN: Soft, Nontender, Nondistended; Bowel sounds present  EXTREMITIES:  2+ Peripheral Pulses, No clubbing, cyanosis, or edema  LYMPH: No lymphadenopathy noted  SKIN: No rashes or lesions    LABS:                        10.7   7.85  )-----------( 228      ( 20 Feb 2024 06:18 )             33.6     20 Feb 2024 06:18    140    |  106    |  31     ----------------------------<  134    3.6     |  26     |  2.40     Ca    8.7        20 Feb 2024 06:18    TPro  6.3    /  Alb  2.6    /  TBili  0.3    /  DBili  x      /  AST  12     /  ALT  17     /  AlkPhos  79     20 Feb 2024 06:18    PT/INR - ( 20 Feb 2024 06:18 )   PT: 24.7 sec;   INR: 2.16 ratio         PTT - ( 20 Feb 2024 06:18 )  PTT:42.3 sec  CAPILLARY BLOOD GLUCOSE      POCT Blood Glucose.: 142 mg/dL (20 Feb 2024 02:20)  POCT Blood Glucose.: 160 mg/dL (19 Feb 2024 21:32)  POCT Blood Glucose.: 175 mg/dL (19 Feb 2024 16:27)  POCT Blood Glucose.: 185 mg/dL (19 Feb 2024 11:19)    BLOOD CULTURE  02-17 @ 04:25   >100,000 CFU/ml Enterococcus faecalis (vancomycin resistant)  --  Enterococcus faecalis (vancomycin resistant)    RADIOLOGY & ADDITIONAL TESTS:    Imaging Personally Reviewed:  [ ] YES     Consultant(s) Notes Reviewed:      Care Discussed with Consultants/Other Providers: Patient is a 48y old  Male who presents with a chief complaint of UTI (19 Feb 2024 13:45)      INTERVAL HPI/OVERNIGHT EVENTS: Patient seen and examined at bedside. No new events/complaints noted     MEDICATIONS  (STANDING):  amLODIPine   Tablet 10 milliGRAM(s) Oral daily  ascorbic acid 500 milliGRAM(s) Oral daily  cloNIDine 0.1 milliGRAM(s) Oral daily  dextrose 5%. 1000 milliLiter(s) (50 mL/Hr) IV Continuous <Continuous>  dextrose 5%. 1000 milliLiter(s) (100 mL/Hr) IV Continuous <Continuous>  dextrose 50% Injectable 25 Gram(s) IV Push once  dextrose 50% Injectable 25 Gram(s) IV Push once  dextrose 50% Injectable 12.5 Gram(s) IV Push once  DULoxetine 60 milliGRAM(s) Oral daily  ferrous    sulfate 325 milliGRAM(s) Oral daily  finasteride 5 milliGRAM(s) Oral daily  gabapentin 600 milliGRAM(s) Oral three times a day  glucagon  Injectable 1 milliGRAM(s) IntraMuscular once  insulin glargine Injectable (LANTUS) 12 Unit(s) SubCutaneous at bedtime  insulin lispro (ADMELOG) corrective regimen sliding scale   SubCutaneous at bedtime  insulin lispro (ADMELOG) corrective regimen sliding scale   SubCutaneous three times a day before meals  insulin lispro Injectable (ADMELOG) 2 Unit(s) SubCutaneous three times a day before meals  lactobacillus acidophilus 1 Tablet(s) Oral daily  melatonin 5 milliGRAM(s) Oral at bedtime  methocarbamol 750 milliGRAM(s) Oral three times a day  metoprolol tartrate 25 milliGRAM(s) Oral two times a day  multivitamin 1 Tablet(s) Oral daily  nicotine - 21 mG/24Hr(s) Patch 1 Patch Transdermal every 24 hours  pantoprazole    Tablet 40 milliGRAM(s) Oral before breakfast  piperacillin/tazobactam IVPB.- 3.375 Gram(s) IV Intermittent once  piperacillin/tazobactam IVPB.. 3.375 Gram(s) IV Intermittent every 8 hours  polyethylene glycol 3350 17 Gram(s) Oral daily  sodium chloride 0.9%. 1000 milliLiter(s) (60 mL/Hr) IV Continuous <Continuous>  tamsulosin 0.4 milliGRAM(s) Oral at bedtime  traZODone 50 milliGRAM(s) Oral at bedtime    MEDICATIONS  (PRN):  acetaminophen     Tablet .. 650 milliGRAM(s) Oral every 6 hours PRN Temp greater or equal to 38C (100.4F), Mild Pain (1 - 3)  dextrose Oral Gel 15 Gram(s) Oral once PRN Blood Glucose LESS THAN 70 milliGRAM(s)/deciliter  diphenhydrAMINE 25 milliGRAM(s) Oral every 6 hours PRN Rash and/or Itching  HYDROmorphone  Injectable 1.5 milliGRAM(s) IV Push every 4 hours PRN Severe Pain (7 - 10)  HYDROmorphone  Injectable 1 milliGRAM(s) IV Push every 4 hours PRN Moderate Pain (4 - 6)  nicotine  Polacrilex Gum 2 milliGRAM(s) Oral every 2 hours PRN Cravings      Allergies    sulfa drugs (Rash)    Intolerances    Pipercillin Sodium-Tazobactam Sodium (Pruritus)      REVIEW OF SYSTEMS:  CONSTITUTIONAL: denies fever, chills, fatigue, weakness  HEENT: denies blurred vision, sore throat  SKIN: denies new lesions, rash  CARDIOVASCULAR: denies chest pain, chest pressure, palpitations  RESPIRATORY: denies shortness of breath, sputum production  GASTROINTESTINAL: denies nausea, vomiting, diarrhea, severe pain in the left flank pain  GENITOURINARY: admits urinary incontinence, and left flank pain    NEUROLOGICAL: chronic LE weakness   MUSCULOSKELETAL: denies new joint pain, muscle aches  HEMATOLOGIC: denies gross bleeding, bruising  PSYCHIATRIC: denies recent changes in anxiety, depression    Vital Signs Last 24 Hrs  T(C): 36.4 (20 Feb 2024 04:44), Max: 36.6 (19 Feb 2024 12:24)  T(F): 97.5 (20 Feb 2024 04:44), Max: 97.9 (19 Feb 2024 12:24)  HR: 55 (20 Feb 2024 04:44) (55 - 63)  BP: 146/79 (20 Feb 2024 04:44) (146/79 - 164/91)  BP(mean): --  RR: 18 (20 Feb 2024 04:44) (18 - 18)  SpO2: 95% (20 Feb 2024 04:44) (92% - 95%)    Parameters below as of 20 Feb 2024 04:44  Patient On (Oxygen Delivery Method): room air        PHYSICAL EXAM:  GENERAL: patient appears well  ENMT: , moist mucous membranes  LUNGS: good air entry bilaterally, clear to auscultation, no wheezing or rhonchi appreciated  HEART: S1/S2, regular rate and rhythm, no murmurs noted, no edema to b/l LE  GASTROINTESTINAL: abdomen is soft, Bender Sign positive but mildly tender to palpation on left flank area, nondistended, normoactive bowel sounds. Obese  INTEGUMENT: good skin turgor, appropriate for ethnicity, appears well perfused, no jaundice noted  MUSCULOSKELETAL: no clubbing or cyanosis, no obvious deformity  NEUROLOGIC: awake, alert, oriented x3  PSYCHIATRIC: mood is good    LABS:                        10.7   7.85  )-----------( 228      ( 20 Feb 2024 06:18 )             33.6     20 Feb 2024 06:18    140    |  106    |  31     ----------------------------<  134    3.6     |  26     |  2.40     Ca    8.7        20 Feb 2024 06:18    TPro  6.3    /  Alb  2.6    /  TBili  0.3    /  DBili  x      /  AST  12     /  ALT  17     /  AlkPhos  79     20 Feb 2024 06:18    PT/INR - ( 20 Feb 2024 06:18 )   PT: 24.7 sec;   INR: 2.16 ratio         PTT - ( 20 Feb 2024 06:18 )  PTT:42.3 sec  CAPILLARY BLOOD GLUCOSE      POCT Blood Glucose.: 142 mg/dL (20 Feb 2024 02:20)  POCT Blood Glucose.: 160 mg/dL (19 Feb 2024 21:32)  POCT Blood Glucose.: 175 mg/dL (19 Feb 2024 16:27)  POCT Blood Glucose.: 185 mg/dL (19 Feb 2024 11:19)    BLOOD CULTURE  02-17 @ 04:25   >100,000 CFU/ml Enterococcus faecalis (vancomycin resistant)  --  Enterococcus faecalis (vancomycin resistant)    RADIOLOGY & ADDITIONAL TESTS:    Imaging Personally Reviewed:  [ ] YES     Consultant(s) Notes Reviewed:      Care Discussed with Consultants/Other Providers:

## 2024-02-20 NOTE — CONSULT NOTE ADULT - ASSESSMENT
CKD ANTONIO on CKD 3  HTN  Anemia    -Baseline Creatinine appears to be around 2  -ANTONIO mild, likely Decreased EABV  -Check urine lytes  -Check UA  -Check Phos   -May benefit from ELIO  -No emergent indication for RRT

## 2024-02-20 NOTE — PROGRESS NOTE ADULT - PROBLEM SELECTOR PLAN 3
Found on CT: Cholelithiasis w/o significant wall thickening or pericholecystic edema.  -Bender Sign positve, on physical exam  -RUQ US: Cholelithiasis with sludge distended gallbladder fundus. Palpable tenderness/Bender' sign concerning for acute cholecystitis.  -Surgery consulted  - GI consulted Found on CT: Cholelithiasis w/o significant wall thickening or pericholecystic edema.  -Bender Sign positve, on physical exam  -RUQ US: Cholelithiasis with sludge distended gallbladder fundus. Palpable tenderness/Bender' sign concerning for acute cholecystitis.  -Surgery consulted. HIDA scan today   - GI consulted

## 2024-02-20 NOTE — PROGRESS NOTE ADULT - PROBLEM SELECTOR PLAN 11
Chronic, s/p L5-5 SPF   - Required Dilaudid 1 mg x4 within 12hrs period   - Continue home gabapentin and muscle relaxant   - Increased IV Dilaudid to 1 mg q4hr for MP and 1.5 mg q4hr prn for SP.   - Narcan ordered.    - Bowel regimen while on opioids  - PT consulted

## 2024-02-20 NOTE — PROGRESS NOTE ADULT - PROBLEM SELECTOR PLAN 1
Hx of recurrent UTIs in the setting of urinary incontinence   - Last month urine culture grew ESBL given meropenem   - Urine culture growing Enterococcus Fecalis, f/up final   - On meropenem, Vancomycin by ID, given GPC   - CT renal stone hunt - mild B/L hydroureteronephrosis w/o calcified stone identified along course of the ureter. non-obstructive B/L renal calculi are present.  Mild trabeculated bladder wall thickening.  - Trend fever and CBC with diff   - ID Dr. Callahan following   - Uro consulted, no surgical intervention at this time Hx of recurrent UTIs in the setting of urinary incontinence   - Last month urine culture grew ESBL given meropenem   - Urine culture growing Enterococcus Fecalis, f/up final   - On Zosyn   - CT renal stone hunt - mild B/L hydroureteronephrosis w/o calcified stone identified along course of the ureter. non-obstructive B/L renal calculi are present.  Mild trabeculated bladder wall thickening.  - Trend fever and CBC with diff   - ID Dr. Callahan following  - Uro consulted, no surgical intervention at this time

## 2024-02-20 NOTE — CARE COORDINATION ASSESSMENT. - READMITTED REASON YN
pt reports that he was at [facility] recently and ddc to his SNF. Pt reports that he had the same symptoms and is now at [facility] with same symptoms./Yes

## 2024-02-20 NOTE — PROGRESS NOTE ADULT - ASSESSMENT
49yo M, PMHx of renal stones, Guillain-Denton Syndrome with peripheral neuropathy, urinary incontinence, DM2 (on insulin), HTN, BPH, DDD, DVT with unknown prothrombotic state (on Warfarin) , and CVA (2016) presented from [facility] complaining of left-sided flank pain admitted for hydroureteronephrosis. Found to have Cholelithiasis, surgery team consulted

## 2024-02-20 NOTE — CARE COORDINATION ASSESSMENT. - NSCAREPROVIDERS_GEN_ALL_CORE_FT
CARE PROVIDERS:  Accepting Physician: Lucretia Robert  Administration: Sidney Estrada  Administration: Tato Ellis  Administration: Sony Das  Admitting: Lucretia Robert  Attending: Andreia Stovall  Consultant: Portillo Hines  Consultant: Elio Pop  Consultant: Lilia Tucker  Consultant: Weil, Patricia  Consultant: Julius Mccollum  Consultant: Sia Callahan  Consultant: Frankie Watson  Consultant: Frieda Unger  Consultant: Lakeisha Ruiz  Consultant: Ashanti Rodas  Consultant: Sarah Connell  ED Attending: Tda Lacey  ED Nurse: Seema Limon  HIM/Billing & Coding: Bee Barba  Nurse: Elier Powers  Nurse: Shruthi Mabry  Ordered: ADM, User  Ordered: Doctor, Unknown  Override: Aaron Garvey  PCA/Nursing Assistant: Caty Durbin  Primary Team: Magen Mccracken  Primary Team: Andreia Stovall  Primary Team: Cisco Hamilton  Primary Team: Lucretia Robert  Primary Team: Sarah Leigh  Primary Team: Candida Francisco  Primary Team: Wesley Shelton  Radiology Technician: Kathy Finch  : Patti Tobin  Team: [facility], Team  UR// Supp. Assoc.: Quynh Newberry  UR// Supp. Assoc.: Loren Aggarwal

## 2024-02-20 NOTE — PROGRESS NOTE ADULT - SUBJECTIVE AND OBJECTIVE BOX
[facility]  Bartolo Rodas PA-C  [address] [place] [address]  [phone]      INTERVAL HPI/OVERNIGHT EVENTS:  Pt at HIDA    MEDICATIONS  (STANDING):  amLODIPine   Tablet 10 milliGRAM(s) Oral daily  ascorbic acid 500 milliGRAM(s) Oral daily  cloNIDine 0.1 milliGRAM(s) Oral daily  dextrose 5%. 1000 milliLiter(s) (50 mL/Hr) IV Continuous <Continuous>  dextrose 5%. 1000 milliLiter(s) (100 mL/Hr) IV Continuous <Continuous>  dextrose 50% Injectable 25 Gram(s) IV Push once  dextrose 50% Injectable 25 Gram(s) IV Push once  dextrose 50% Injectable 12.5 Gram(s) IV Push once  DULoxetine 60 milliGRAM(s) Oral daily  ferrous    sulfate 325 milliGRAM(s) Oral daily  finasteride 5 milliGRAM(s) Oral daily  gabapentin 600 milliGRAM(s) Oral three times a day  glucagon  Injectable 1 milliGRAM(s) IntraMuscular once  insulin glargine Injectable (LANTUS) 12 Unit(s) SubCutaneous at bedtime  insulin lispro (ADMELOG) corrective regimen sliding scale   SubCutaneous at bedtime  insulin lispro (ADMELOG) corrective regimen sliding scale   SubCutaneous three times a day before meals  insulin lispro Injectable (ADMELOG) 2 Unit(s) SubCutaneous three times a day before meals  lactobacillus acidophilus 1 Tablet(s) Oral daily  melatonin 5 milliGRAM(s) Oral at bedtime  methocarbamol 750 milliGRAM(s) Oral three times a day  metoprolol tartrate 25 milliGRAM(s) Oral two times a day  multivitamin 1 Tablet(s) Oral daily  nicotine - 21 mG/24Hr(s) Patch 1 Patch Transdermal every 24 hours  pantoprazole    Tablet 40 milliGRAM(s) Oral before breakfast  piperacillin/tazobactam IVPB.. 3.375 Gram(s) IV Intermittent every 8 hours  polyethylene glycol 3350 17 Gram(s) Oral daily  sodium chloride 0.9%. 1000 milliLiter(s) (60 mL/Hr) IV Continuous <Continuous>  tamsulosin 0.4 milliGRAM(s) Oral at bedtime  traZODone 50 milliGRAM(s) Oral at bedtime  warfarin 4 milliGRAM(s) Oral once    MEDICATIONS  (PRN):  acetaminophen     Tablet .. 650 milliGRAM(s) Oral every 6 hours PRN Temp greater or equal to 38C (100.4F), Mild Pain (1 - 3)  dextrose Oral Gel 15 Gram(s) Oral once PRN Blood Glucose LESS THAN 70 milliGRAM(s)/deciliter  diphenhydrAMINE 25 milliGRAM(s) Oral every 6 hours PRN Rash and/or Itching  HYDROmorphone  Injectable 1.5 milliGRAM(s) IV Push every 4 hours PRN Severe Pain (7 - 10)  HYDROmorphone  Injectable 1 milliGRAM(s) IV Push every 4 hours PRN Moderate Pain (4 - 6)  nicotine  Polacrilex Gum 2 milliGRAM(s) Oral every 2 hours PRN Cravings      Allergies    sulfa drugs (Rash)    Intolerances    Pipercillin Sodium-Tazobactam Sodium (Pruritus)      PHYSICAL EXAM:   Vital Signs:  Vital Signs Last 24 Hrs  T(C): 36.4 (20 Feb 2024 04:44), Max: 36.6 (19 Feb 2024 12:24)  T(F): 97.5 (20 Feb 2024 04:44), Max: 97.9 (19 Feb 2024 12:24)  HR: 55 (20 Feb 2024 04:44) (55 - 63)  BP: 146/79 (20 Feb 2024 04:44) (146/79 - 164/91)  BP(mean): --  RR: 18 (20 Feb 2024 04:44) (18 - 18)  SpO2: 95% (20 Feb 2024 04:44) (92% - 95%)    Parameters below as of 20 Feb 2024 04:44  Patient On (Oxygen Delivery Method): room air    Will examine when patient returns    LABS:                        10.7   7.85  )-----------( 228      ( 20 Feb 2024 06:18 )             33.6     02-20    140  |  106  |  31<H>  ----------------------------<  134<H>  3.6   |  26  |  2.40<H>    Ca    8.7      20 Feb 2024 06:18    TPro  6.3  /  Alb  2.6<L>  /  TBili  0.3  /  DBili  x   /  AST  12<L>  /  ALT  17  /  AlkPhos  79  02-20    PT/INR - ( 20 Feb 2024 06:18 )   PT: 24.7 sec;   INR: 2.16 ratio         PTT - ( 20 Feb 2024 06:18 )  PTT:42.3 sec  Urinalysis Basic - ( 20 Feb 2024 06:18 )    Color: x / Appearance: x / SG: x / pH: x  Gluc: 134 mg/dL / Ketone: x  / Bili: x / Urobili: x   Blood: x / Protein: x / Nitrite: x   Leuk Esterase: x / RBC: x / WBC x   Sq Epi: x / Non Sq Epi: x / Bacteria: x    RADIOLOGY:  < from: CT Renal Stone Hunt (02.17.24 @ 05:02) >    ACC: [id number] EXAM:  CT RENAL STONE ROSALES   ORDERED BY: SAMPSON JONES     PROCEDURE DATE:  02/17/2024          INTERPRETATION:  CLINICAL INFORMATION: Left flank pain.    COMPARISON: None.    PROCEDURE:  CT of the Abdomen and Pelvis was performed without intravenous contrast.  Intravenous contrast: None.  Oral contrast: None.  Sagittal and coronal reformats were performed.    FINDINGS: Absence of intravenous contrast limits evaluation of   vasculature and solid organs.    LOWER CHEST: Mild bibasilar dependent atelectasis.    LIVER: Within normal limits.  BILE DUCTS: Normal caliber.  GALLBLADDER: Vague opacities identified at the fundal portion of the   gallbladder. Cholelithiasis with stone measuring up to 3.2 cm the body of   the gallbladder. No significant wall thickening or pericholecystic edema.  SPLEEN: Within normal limits.  PANCREAS: Within normal limits.  ADRENALS: Within normal limits.  KIDNEYS/URETERS: Mild bilateral hydroureteronephrosis without calcified   stone identified along thecourse of the ureter. Additional punctate   nonobstructive bilateral renal calculi are present.    BLADDER: Mild trabeculated bladder wall thickening. Anderson catheter   identified.  REPRODUCTIVE ORGANS: No prostatomegaly.    BOWEL: No bowel obstruction. Normal appendix.  PERITONEUM: No ascites.  VESSELS:  Normal aortic caliber. Infrarenal IVC filter.  RETROPERITONEUM: No lymphadenopathy.  ABDOMINAL WALL: Soft tissue density at the level of sacral coccygeal   junction, likely decubitus ulcer. No foci of air or abscess identified.    Small bilateral groin hernias containing fat. Left spigelian hernia   containing nonobstructed bowel loop.  BONES: Degenerative changes. Posterior metallic fusion of L4 and L5,   metallic artifact limits detailed evaluation of the spinal canal on   adjacent structures.    IMPRESSION:    Cholelithiasis. Vague opacities at the fundal portion of the gallbladder.   Right upper quadrant ultrasound suggested. No significant wall thickening   or pericholecystic edema.    Mild bilateral hydroureteronephrosis without calcified stone identified   along the course of the ureter. Differential includes recently passed   kidney stone or infection. Additional punctate nonobstructive bilateral   renal calculi are present.    Mild trabeculated bladder wall thickening. Correlate with urinalysis and   lab values to assess for cystitis and/or ascending urinary tract   infection.              --- End of Report ---            CAROLINE MEDRANO MD; Attending Radiologist  This document has been electronically signed. Feb 17 2024  5:21AM    < end of copied text >

## 2024-02-20 NOTE — CARE COORDINATION ASSESSMENT. - FACILITY OF RESIDENCE YN
"Pt spoken to by nurse regarding boundaries with select female peer on unit  Pt became highly defensive and irritable, saying to nurse, \"you don't know our history, do you? \" Pt goes on to state that he and this peer had an intimate relationship prior to admission  Nurse politely reminded pt that appropriate boundaries need to be maintained while on unit  As nurse walked away, pt cursing and laughing, saying \"they're fucking whack! \" Nurse re-approached pt and asked pt if there were any questions about unit policy  Pt becomes confrontational with nurse and asked, Kale Frees we going to have a problem? Go back in your office! \" Pt reminded that he does not direct unit staff and that as long as he is on unit, boundaries will be set and maintained     " No

## 2024-02-21 LAB
ANION GAP SERPL CALC-SCNC: 5 MMOL/L — SIGNIFICANT CHANGE UP (ref 5–17)
APPEARANCE UR: CLEAR — SIGNIFICANT CHANGE UP
APTT BLD: 46.4 SEC — HIGH (ref 24.5–35.6)
BILIRUB UR-MCNC: NEGATIVE — SIGNIFICANT CHANGE UP
BUN SERPL-MCNC: 33 MG/DL — HIGH (ref 7–23)
CALCIUM SERPL-MCNC: 9 MG/DL — SIGNIFICANT CHANGE UP (ref 8.5–10.1)
CHLORIDE SERPL-SCNC: 108 MMOL/L — SIGNIFICANT CHANGE UP (ref 96–108)
CO2 SERPL-SCNC: 26 MMOL/L — SIGNIFICANT CHANGE UP (ref 22–31)
COLOR SPEC: YELLOW — SIGNIFICANT CHANGE UP
CREAT ?TM UR-MCNC: 55 MG/DL — SIGNIFICANT CHANGE UP
CREAT SERPL-MCNC: 2.3 MG/DL — HIGH (ref 0.5–1.3)
DIFF PNL FLD: ABNORMAL
EGFR: 34 ML/MIN/1.73M2 — LOW
GLUCOSE SERPL-MCNC: 142 MG/DL — HIGH (ref 70–99)
GLUCOSE UR QL: NEGATIVE MG/DL — SIGNIFICANT CHANGE UP
HCT VFR BLD CALC: 34.5 % — LOW (ref 39–50)
HGB BLD-MCNC: 11 G/DL — LOW (ref 13–17)
INR BLD: 2.32 RATIO — HIGH (ref 0.85–1.18)
KETONES UR-MCNC: NEGATIVE MG/DL — SIGNIFICANT CHANGE UP
LEUKOCYTE ESTERASE UR-ACNC: ABNORMAL
MAGNESIUM SERPL-MCNC: 1.9 MG/DL — SIGNIFICANT CHANGE UP (ref 1.6–2.6)
MCHC RBC-ENTMCNC: 24 PG — LOW (ref 27–34)
MCHC RBC-ENTMCNC: 31.9 GM/DL — LOW (ref 32–36)
MCV RBC AUTO: 75.2 FL — LOW (ref 80–100)
NITRITE UR-MCNC: NEGATIVE — SIGNIFICANT CHANGE UP
NRBC # BLD: 0 /100 WBCS — SIGNIFICANT CHANGE UP (ref 0–0)
OSMOLALITY UR: 330 MOSM/KG — SIGNIFICANT CHANGE UP (ref 50–1200)
PH UR: 6.5 — SIGNIFICANT CHANGE UP (ref 5–8)
PHOSPHATE SERPL-MCNC: 3.4 MG/DL — SIGNIFICANT CHANGE UP (ref 2.5–4.5)
PLATELET # BLD AUTO: 207 K/UL — SIGNIFICANT CHANGE UP (ref 150–400)
POTASSIUM SERPL-MCNC: 3.9 MMOL/L — SIGNIFICANT CHANGE UP (ref 3.5–5.3)
POTASSIUM SERPL-SCNC: 3.9 MMOL/L — SIGNIFICANT CHANGE UP (ref 3.5–5.3)
POTASSIUM UR-SCNC: 31.3 MMOL/L — SIGNIFICANT CHANGE UP
PROT ?TM UR-MCNC: 140 MG/DL — HIGH (ref 0–12)
PROT UR-MCNC: 300 MG/DL
PROT/CREAT UR-RTO: 2.5 RATIO — HIGH (ref 0–0.2)
PROTHROM AB SERPL-ACNC: 26.5 SEC — HIGH (ref 9.5–13)
RBC # BLD: 4.59 M/UL — SIGNIFICANT CHANGE UP (ref 4.2–5.8)
RBC # FLD: 16.5 % — HIGH (ref 10.3–14.5)
SODIUM SERPL-SCNC: 139 MMOL/L — SIGNIFICANT CHANGE UP (ref 135–145)
SODIUM UR-SCNC: 58 MMOL/L — SIGNIFICANT CHANGE UP
SP GR SPEC: 1.01 — SIGNIFICANT CHANGE UP (ref 1–1.03)
UROBILINOGEN FLD QL: 0.2 MG/DL — SIGNIFICANT CHANGE UP (ref 0.2–1)
UUN UR-MCNC: 368 MG/DL — SIGNIFICANT CHANGE UP
WBC # BLD: 8.28 K/UL — SIGNIFICANT CHANGE UP (ref 3.8–10.5)
WBC # FLD AUTO: 8.28 K/UL — SIGNIFICANT CHANGE UP (ref 3.8–10.5)

## 2024-02-21 PROCEDURE — 99232 SBSQ HOSP IP/OBS MODERATE 35: CPT

## 2024-02-21 RX ORDER — MENTHOL AND METHYL SALICYLATE 10; 30 G/100G; G/100G
1 CREAM TOPICAL EVERY 6 HOURS
Refills: 0 | Status: DISCONTINUED | OUTPATIENT
Start: 2024-02-21 | End: 2024-02-23

## 2024-02-21 RX ORDER — WARFARIN SODIUM 2.5 MG/1
1 TABLET ORAL
Refills: 0 | DISCHARGE

## 2024-02-21 RX ORDER — LIDOCAINE 4 G/100G
1 CREAM TOPICAL ONCE
Refills: 0 | Status: COMPLETED | OUTPATIENT
Start: 2024-02-21 | End: 2024-02-22

## 2024-02-21 RX ORDER — OXYCODONE HYDROCHLORIDE 5 MG/1
1 TABLET ORAL
Refills: 0 | DISCHARGE

## 2024-02-21 RX ORDER — ZOLPIDEM TARTRATE 10 MG/1
1 TABLET ORAL
Refills: 0 | DISCHARGE

## 2024-02-21 RX ORDER — HYDROMORPHONE HYDROCHLORIDE 2 MG/ML
0.5 INJECTION INTRAMUSCULAR; INTRAVENOUS; SUBCUTANEOUS DAILY
Refills: 0 | Status: DISCONTINUED | OUTPATIENT
Start: 2024-02-22 | End: 2024-02-22

## 2024-02-21 RX ORDER — WARFARIN SODIUM 2.5 MG/1
4 TABLET ORAL ONCE
Refills: 0 | Status: COMPLETED | OUTPATIENT
Start: 2024-02-21 | End: 2024-02-21

## 2024-02-21 RX ORDER — INSULIN LISPRO 100/ML
4 VIAL (ML) SUBCUTANEOUS
Refills: 0 | DISCHARGE

## 2024-02-21 RX ORDER — HYDROMORPHONE HYDROCHLORIDE 2 MG/ML
0.5 INJECTION INTRAMUSCULAR; INTRAVENOUS; SUBCUTANEOUS ONCE
Refills: 0 | Status: DISCONTINUED | OUTPATIENT
Start: 2024-02-21 | End: 2024-02-22

## 2024-02-21 RX ORDER — LACTOBACILLUS ACIDOPHILUS 100MM CELL
1 CAPSULE ORAL
Refills: 0 | DISCHARGE

## 2024-02-21 RX ORDER — INSULIN GLARGINE 100 [IU]/ML
20 INJECTION, SOLUTION SUBCUTANEOUS
Refills: 0 | DISCHARGE

## 2024-02-21 RX ADMIN — HYDROMORPHONE HYDROCHLORIDE 1.5 MILLIGRAM(S): 2 INJECTION INTRAMUSCULAR; INTRAVENOUS; SUBCUTANEOUS at 21:57

## 2024-02-21 RX ADMIN — METHOCARBAMOL 750 MILLIGRAM(S): 500 TABLET, FILM COATED ORAL at 13:12

## 2024-02-21 RX ADMIN — POLYETHYLENE GLYCOL 3350 17 GRAM(S): 17 POWDER, FOR SOLUTION ORAL at 11:17

## 2024-02-21 RX ADMIN — HYDROMORPHONE HYDROCHLORIDE 1.5 MILLIGRAM(S): 2 INJECTION INTRAMUSCULAR; INTRAVENOUS; SUBCUTANEOUS at 08:47

## 2024-02-21 RX ADMIN — HYDROMORPHONE HYDROCHLORIDE 1.5 MILLIGRAM(S): 2 INJECTION INTRAMUSCULAR; INTRAVENOUS; SUBCUTANEOUS at 18:05

## 2024-02-21 RX ADMIN — METHOCARBAMOL 750 MILLIGRAM(S): 500 TABLET, FILM COATED ORAL at 05:52

## 2024-02-21 RX ADMIN — Medication 2 MILLIGRAM(S): at 21:39

## 2024-02-21 RX ADMIN — FINASTERIDE 5 MILLIGRAM(S): 5 TABLET, FILM COATED ORAL at 11:18

## 2024-02-21 RX ADMIN — GABAPENTIN 600 MILLIGRAM(S): 400 CAPSULE ORAL at 13:12

## 2024-02-21 RX ADMIN — Medication 1 PATCH: at 00:00

## 2024-02-21 RX ADMIN — Medication 2 MILLIGRAM(S): at 17:11

## 2024-02-21 RX ADMIN — Medication 0.1 MILLIGRAM(S): at 05:52

## 2024-02-21 RX ADMIN — Medication 2 MILLIGRAM(S): at 12:37

## 2024-02-21 RX ADMIN — Medication 1 PATCH: at 07:55

## 2024-02-21 RX ADMIN — Medication 25 MILLIGRAM(S): at 05:58

## 2024-02-21 RX ADMIN — Medication 1 PATCH: at 19:10

## 2024-02-21 RX ADMIN — Medication 50 MILLIGRAM(S): at 21:35

## 2024-02-21 RX ADMIN — Medication 2 UNIT(S): at 16:58

## 2024-02-21 RX ADMIN — HYDROMORPHONE HYDROCHLORIDE 1.5 MILLIGRAM(S): 2 INJECTION INTRAMUSCULAR; INTRAVENOUS; SUBCUTANEOUS at 12:48

## 2024-02-21 RX ADMIN — INSULIN GLARGINE 12 UNIT(S): 100 INJECTION, SOLUTION SUBCUTANEOUS at 21:44

## 2024-02-21 RX ADMIN — TAMSULOSIN HYDROCHLORIDE 0.4 MILLIGRAM(S): 0.4 CAPSULE ORAL at 21:35

## 2024-02-21 RX ADMIN — HYDROMORPHONE HYDROCHLORIDE 1.5 MILLIGRAM(S): 2 INJECTION INTRAMUSCULAR; INTRAVENOUS; SUBCUTANEOUS at 04:46

## 2024-02-21 RX ADMIN — HYDROMORPHONE HYDROCHLORIDE 1.5 MILLIGRAM(S): 2 INJECTION INTRAMUSCULAR; INTRAVENOUS; SUBCUTANEOUS at 13:17

## 2024-02-21 RX ADMIN — WARFARIN SODIUM 4 MILLIGRAM(S): 2.5 TABLET ORAL at 21:44

## 2024-02-21 RX ADMIN — AMLODIPINE BESYLATE 10 MILLIGRAM(S): 2.5 TABLET ORAL at 05:52

## 2024-02-21 RX ADMIN — HYDROMORPHONE HYDROCHLORIDE 1.5 MILLIGRAM(S): 2 INJECTION INTRAMUSCULAR; INTRAVENOUS; SUBCUTANEOUS at 00:00

## 2024-02-21 RX ADMIN — HYDROMORPHONE HYDROCHLORIDE 1.5 MILLIGRAM(S): 2 INJECTION INTRAMUSCULAR; INTRAVENOUS; SUBCUTANEOUS at 16:55

## 2024-02-21 RX ADMIN — Medication 25 MILLIGRAM(S): at 17:12

## 2024-02-21 RX ADMIN — DULOXETINE HYDROCHLORIDE 60 MILLIGRAM(S): 30 CAPSULE, DELAYED RELEASE ORAL at 11:18

## 2024-02-21 RX ADMIN — GABAPENTIN 600 MILLIGRAM(S): 400 CAPSULE ORAL at 21:36

## 2024-02-21 RX ADMIN — HYDROMORPHONE HYDROCHLORIDE 1.5 MILLIGRAM(S): 2 INJECTION INTRAMUSCULAR; INTRAVENOUS; SUBCUTANEOUS at 22:00

## 2024-02-21 RX ADMIN — Medication 2 UNIT(S): at 08:29

## 2024-02-21 RX ADMIN — Medication 1 PATCH: at 23:32

## 2024-02-21 RX ADMIN — GABAPENTIN 600 MILLIGRAM(S): 400 CAPSULE ORAL at 05:51

## 2024-02-21 RX ADMIN — Medication 1 TABLET(S): at 11:18

## 2024-02-21 RX ADMIN — PANTOPRAZOLE SODIUM 40 MILLIGRAM(S): 20 TABLET, DELAYED RELEASE ORAL at 05:52

## 2024-02-21 RX ADMIN — Medication 108 GRAM(S): at 05:51

## 2024-02-21 RX ADMIN — Medication 500 MILLIGRAM(S): at 11:18

## 2024-02-21 RX ADMIN — Medication 5 MILLIGRAM(S): at 21:36

## 2024-02-21 RX ADMIN — Medication 2 UNIT(S): at 12:36

## 2024-02-21 RX ADMIN — Medication 2 MILLIGRAM(S): at 04:45

## 2024-02-21 RX ADMIN — METHOCARBAMOL 750 MILLIGRAM(S): 500 TABLET, FILM COATED ORAL at 21:35

## 2024-02-21 RX ADMIN — Medication 325 MILLIGRAM(S): at 11:18

## 2024-02-21 NOTE — PROGRESS NOTE ADULT - ASSESSMENT
ANTONIO on CKD 3  HTN  Anemia    -Baseline Creatinine appears to be around 2  -ANTONIO mild, likely Decreased EABV  -Urine indices are pending  -Phos normal  -Creatinine improving; monitor  -May benefit from ELIO when Hb < 10  -No emergent indication for RRT

## 2024-02-21 NOTE — PROGRESS NOTE ADULT - PROBLEM SELECTOR PLAN 3
Found on CT: Cholelithiasis w/o significant wall thickening or pericholecystic edema.  -Bender Sign positve, on physical exam  -RUQ US: Cholelithiasis with sludge distended gallbladder fundus. Palpable tenderness/Bender' sign concerning for acute cholecystitis.  -Surgery consulted. HIDA negative for acute cholecystitis  - GI following

## 2024-02-21 NOTE — PROGRESS NOTE ADULT - PROBLEM SELECTOR PLAN 1
Hx of recurrent UTIs in the setting of urinary incontinence   - Last month urine culture grew ESBL given meropenem   - Urine culture growing Enterococcus Fecalis, f/up final   - On Zosyn, switched to ampicillin q6 per ID. ID Dr. Callahan following  - CT renal stone hunt - mild B/L hydroureteronephrosis w/o calcified stone identified along course of the ureter. non-obstructive B/L renal calculi are present.  Mild trabeculated bladder wall thickening.  - Trend fever and CBC with diff   - Uro consulted, no surgical intervention at this time

## 2024-02-21 NOTE — PROGRESS NOTE ADULT - SUBJECTIVE AND OBJECTIVE BOX
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    Patient states that 4 days ago, he began to develop left sided flank pain. Patient describes the pain as sharp, tender, and worse with movement. He states the pain is 10/10 and has been taking oxycodone 10mg and tylenol twice a day, without much relief. Patient has a history of multiple utis, and kidney stones (s/p lithotripsy 2023).  He endorses nausea, but denies vomiting. The pain has worsened progressively over the past few days, which prompted him to visit the emergency room for further evaluation.     Of note, he recently had a uti last month, and was treated with meropenem     Patient denies F/C, HA, N/V, CP, SOB, C/D, Dysuria, Leg pain, or Leg swelling.    Patient denies any recent travel, sick contacts, or long periods of immobilization.      ED Course:  Vitals: 134/83mmHg, 60bpm, 97.5F, 96% on RA  Labs: WBc 12.5, Hgb 10.6, MCV 76.2, PT 25.6, INR 2.24, aPTT 44.5, Cl 110, BUN/Cr 39/2.2, glucose 2.2, GFR 36  UA: many bacteria, squamous cells present. mod blood. large leuk esterase, 100 protein  CT renal stone hunt: mild B/L hydroureteronephrosis w/o calcified stone identified along course of the ureter. non-obstructive B/L renal calculi are present.  Mild trabeculated bladder wall thickening.  Given - rocephin 1g, IV dilaudid 0.5mg x1, IV dilaudid 1mg x1, 1L NS (17 Feb 2024 07:12)       PAST MEDICAL & SURGICAL HISTORY:  Renal stones      H/O Guillain-[place] syndrome      History of neurogenic bowel      DM2 (diabetes mellitus, type 2)      HTN (hypertension)      BPH without obstruction/lower urinary tract symptoms      Degenerative arthritis      DVT of lower limb, acute      CVA (cerebrovascular accident)      H/O lithotripsy           FAMILY HISTORY:  Family history of sinus tachycardia (Father)    FH: HTN (hypertension) (Mother)    NC    Social History:Non smoker    MEDICATIONS  (STANDING):  amLODIPine   Tablet 10 milliGRAM(s) Oral daily  ampicillin  IVPB 1 Gram(s) IV Intermittent every 6 hours  ascorbic acid 500 milliGRAM(s) Oral daily  cloNIDine 0.1 milliGRAM(s) Oral daily  dextrose 5%. 1000 milliLiter(s) (50 mL/Hr) IV Continuous <Continuous>  dextrose 5%. 1000 milliLiter(s) (100 mL/Hr) IV Continuous <Continuous>  dextrose 50% Injectable 25 Gram(s) IV Push once  dextrose 50% Injectable 25 Gram(s) IV Push once  dextrose 50% Injectable 12.5 Gram(s) IV Push once  DULoxetine 60 milliGRAM(s) Oral daily  ferrous    sulfate 325 milliGRAM(s) Oral daily  finasteride 5 milliGRAM(s) Oral daily  gabapentin 600 milliGRAM(s) Oral three times a day  glucagon  Injectable 1 milliGRAM(s) IntraMuscular once  insulin glargine Injectable (LANTUS) 12 Unit(s) SubCutaneous at bedtime  insulin lispro (ADMELOG) corrective regimen sliding scale   SubCutaneous three times a day before meals  insulin lispro (ADMELOG) corrective regimen sliding scale   SubCutaneous at bedtime  insulin lispro Injectable (ADMELOG) 2 Unit(s) SubCutaneous three times a day before meals  lactobacillus acidophilus 1 Tablet(s) Oral daily  melatonin 5 milliGRAM(s) Oral at bedtime  methocarbamol 750 milliGRAM(s) Oral three times a day  metoprolol tartrate 25 milliGRAM(s) Oral two times a day  multivitamin 1 Tablet(s) Oral daily  nicotine - 21 mG/24Hr(s) Patch 1 Patch Transdermal every 24 hours  pantoprazole    Tablet 40 milliGRAM(s) Oral before breakfast  polyethylene glycol 3350 17 Gram(s) Oral daily  sodium chloride 0.9%. 1000 milliLiter(s) (60 mL/Hr) IV Continuous <Continuous>  tamsulosin 0.4 milliGRAM(s) Oral at bedtime  traZODone 50 milliGRAM(s) Oral at bedtime  warfarin 4 milliGRAM(s) Oral once    MEDICATIONS  (PRN):  acetaminophen     Tablet .. 650 milliGRAM(s) Oral every 6 hours PRN Temp greater or equal to 38C (100.4F), Mild Pain (1 - 3)  dextrose Oral Gel 15 Gram(s) Oral once PRN Blood Glucose LESS THAN 70 milliGRAM(s)/deciliter  diphenhydrAMINE 25 milliGRAM(s) Oral every 6 hours PRN Rash and/or Itching  HYDROmorphone  Injectable 1.5 milliGRAM(s) IV Push every 4 hours PRN Severe Pain (7 - 10)  HYDROmorphone  Injectable 1 milliGRAM(s) IV Push every 4 hours PRN Moderate Pain (4 - 6)  nicotine  Polacrilex Gum 2 milliGRAM(s) Oral every 2 hours PRN Cravings   Meds reviewed    Allergies    sulfa drugs (Rash)    Intolerances    Pipercillin Sodium-Tazobactam Sodium (Pruritus)       REVIEW OF SYSTEMS:    Review of Systems:   Constitutional: Denies fatigue  HEENT: Denies headaches and dizziness  Respiratory: denies SOB, cough, or wheezing  Cardiovascular: denies CP, palpitations  Gastrointestinal: Denies nausea, denies vomiting, diarrhea, constipation, abdominal pain, or bloody stools  Genitourinary: denies painful urination, increased frequency, urgency, or bloody urine  Skin: denies rashes or itching  Musculoskeletal: denies muscle aches, joint swelling  Neurologic: Denies generalized weakness, denies loss of sensation, numbness, or tingling      Vital Signs Last 24 Hrs  T(C): 36.7 (21 Feb 2024 04:21), Max: 36.7 (20 Feb 2024 20:16)  T(F): 98.1 (21 Feb 2024 04:21), Max: 98.1 (20 Feb 2024 20:16)  HR: 56 (21 Feb 2024 04:21) (56 - 93)  BP: 154/92 (21 Feb 2024 04:21) (150/80 - 159/90)  BP(mean): --  RR: 18 (21 Feb 2024 04:21) (18 - 18)  SpO2: 94% (21 Feb 2024 04:21) (94% - 95%)    Parameters below as of 21 Feb 2024 04:21  Patient On (Oxygen Delivery Method): room air        Daily     Daily     PHYSICAL EXAM:    GENERAL: NAD  HEAD:  Atraumatic, Normocephalic  EYES: EOMI, conjunctiva and sclera clear  ENMT: No Drainage from nares, No drainage from ears  NERVOUS SYSTEM:  Awake and Alert  CHEST/LUNG: Clear to percussion bilaterally  EXTREMITIES:  No Edema  SKIN: No rashes No obvious ecchymosis      LABS:                                   11.0   8.28  )-----------( 207      ( 21 Feb 2024 08:13 )             34.5     02-21    139  |  108  |  33<H>  ----------------------------<  142<H>  3.9   |  26  |  2.30<H>    Ca    9.0      21 Feb 2024 08:13  Phos  3.4     02-21  Mg     1.9     02-21    TPro  6.3  /  Alb  2.6<L>  /  TBili  0.3  /  DBili  x   /  AST  12<L>  /  ALT  17  /  AlkPhos  79  02-20    PT/INR - ( 21 Feb 2024 08:13 )   PT: 26.5 sec;   INR: 2.32 ratio         PTT - ( 21 Feb 2024 08:13 )  PTT:46.4 sec  Urinalysis Basic - ( 21 Feb 2024 08:13 )    Color: x / Appearance: x / SG: x / pH: x  Gluc: 142 mg/dL / Ketone: x  / Bili: x / Urobili: x   Blood: x / Protein: x / Nitrite: x   Leuk Esterase: x / RBC: x / WBC x   Sq Epi: x / Non Sq Epi: x / Bacteria: x

## 2024-02-21 NOTE — PROGRESS NOTE ADULT - ASSESSMENT
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 Found to have Cholelithiasis, surgery team following

## 2024-02-21 NOTE — PROGRESS NOTE ADULT - SUBJECTIVE AND OBJECTIVE BOX
PROGRESS NOTE:   Authored by Dr. Lynn Argueta MD, Available on MS Teams    Patient is a 48y old  Male who presents with a chief complaint of UTI (21 Feb 2024 11:48)      SUBJECTIVE / OVERNIGHT EVENTS: Abdominal pain improved with pain meds. No nausea or vomiting.    ADDITIONAL REVIEW OF SYSTEMS:    MEDICATIONS  (STANDING):  amLODIPine   Tablet 10 milliGRAM(s) Oral daily  ascorbic acid 500 milliGRAM(s) Oral daily  cloNIDine 0.1 milliGRAM(s) Oral daily  dextrose 5%. 1000 milliLiter(s) (100 mL/Hr) IV Continuous <Continuous>  dextrose 5%. 1000 milliLiter(s) (50 mL/Hr) IV Continuous <Continuous>  dextrose 50% Injectable 12.5 Gram(s) IV Push once  dextrose 50% Injectable 25 Gram(s) IV Push once  dextrose 50% Injectable 25 Gram(s) IV Push once  DULoxetine 60 milliGRAM(s) Oral daily  ferrous    sulfate 325 milliGRAM(s) Oral daily  finasteride 5 milliGRAM(s) Oral daily  gabapentin 600 milliGRAM(s) Oral three times a day  glucagon  Injectable 1 milliGRAM(s) IntraMuscular once  insulin glargine Injectable (LANTUS) 12 Unit(s) SubCutaneous at bedtime  insulin lispro (ADMELOG) corrective regimen sliding scale   SubCutaneous three times a day before meals  insulin lispro (ADMELOG) corrective regimen sliding scale   SubCutaneous at bedtime  insulin lispro Injectable (ADMELOG) 2 Unit(s) SubCutaneous three times a day before meals  lactobacillus acidophilus 1 Tablet(s) Oral daily  melatonin 5 milliGRAM(s) Oral at bedtime  methocarbamol 750 milliGRAM(s) Oral three times a day  metoprolol tartrate 25 milliGRAM(s) Oral two times a day  multivitamin 1 Tablet(s) Oral daily  nicotine - 21 mG/24Hr(s) Patch 1 Patch Transdermal every 24 hours  pantoprazole    Tablet 40 milliGRAM(s) Oral before breakfast  polyethylene glycol 3350 17 Gram(s) Oral daily  tamsulosin 0.4 milliGRAM(s) Oral at bedtime  traZODone 50 milliGRAM(s) Oral at bedtime  warfarin 4 milliGRAM(s) Oral once    MEDICATIONS  (PRN):  acetaminophen     Tablet .. 650 milliGRAM(s) Oral every 6 hours PRN Temp greater or equal to 38C (100.4F), Mild Pain (1 - 3)  dextrose Oral Gel 15 Gram(s) Oral once PRN Blood Glucose LESS THAN 70 milliGRAM(s)/deciliter  diphenhydrAMINE 25 milliGRAM(s) Oral every 6 hours PRN Rash and/or Itching  HYDROmorphone  Injectable 1 milliGRAM(s) IV Push every 4 hours PRN Moderate Pain (4 - 6)  HYDROmorphone  Injectable 1.5 milliGRAM(s) IV Push every 4 hours PRN Severe Pain (7 - 10)  nicotine  Polacrilex Gum 2 milliGRAM(s) Oral every 2 hours PRN Cravings      CAPILLARY BLOOD GLUCOSE      POCT Blood Glucose.: 142 mg/dL (21 Feb 2024 12:14)  POCT Blood Glucose.: 148 mg/dL (21 Feb 2024 07:58)  POCT Blood Glucose.: 140 mg/dL (20 Feb 2024 21:44)  POCT Blood Glucose.: 138 mg/dL (20 Feb 2024 16:55)    I&O's Summary    20 Feb 2024 07:01  -  21 Feb 2024 07:00  --------------------------------------------------------  IN: 0 mL / OUT: 4200 mL / NET: -4200 mL        PHYSICAL EXAM:  Vital Signs Last 24 Hrs  T(C): 36.6 (21 Feb 2024 11:49), Max: 36.7 (20 Feb 2024 20:16)  T(F): 97.9 (21 Feb 2024 11:49), Max: 98.1 (20 Feb 2024 20:16)  HR: 55 (21 Feb 2024 11:49) (55 - 93)  BP: 126/76 (21 Feb 2024 11:49) (126/76 - 154/92)  BP(mean): --  RR: 18 (21 Feb 2024 11:49) (18 - 18)  SpO2: 90% (21 Feb 2024 11:49) (90% - 94%)    Parameters below as of 21 Feb 2024 11:49  Patient On (Oxygen Delivery Method): room air        CONSTITUTIONAL: NAD  RESPIRATORY: Normal respiratory effort; lungs are clear to auscultation bilaterally  CARDIOVASCULAR: Regular rate and rhythm, normal S1 and S2, no murmur/rub/gallop; No lower extremity edema  ABDOMEN: Nontender to palpation, normoactive bowel sounds, no rebound/guarding  MUSCLOSKELETAL: no clubbing or cyanosis of digits; no joint swelling or tenderness to palpation  PSYCH: A+O to person, place, and time; affect appropriate    LABS:                        11.0   8.28  )-----------( 207      ( 21 Feb 2024 08:13 )             34.5     02-21    139  |  108  |  33<H>  ----------------------------<  142<H>  3.9   |  26  |  2.30<H>    Ca    9.0      21 Feb 2024 08:13  Phos  3.4     02-21  Mg     1.9     02-21    TPro  6.3  /  Alb  2.6<L>  /  TBili  0.3  /  DBili  x   /  AST  12<L>  /  ALT  17  /  AlkPhos  79  02-20    PT/INR - ( 21 Feb 2024 08:13 )   PT: 26.5 sec;   INR: 2.32 ratio         PTT - ( 21 Feb 2024 08:13 )  PTT:46.4 sec      Urinalysis Basic - ( 21 Feb 2024 08:13 )    Color: x / Appearance: x / SG: x / pH: x  Gluc: 142 mg/dL / Ketone: x  / Bili: x / Urobili: x   Blood: x / Protein: x / Nitrite: x   Leuk Esterase: x / RBC: x / WBC x   Sq Epi: x / Non Sq Epi: x / Bacteria: x          RADIOLOGY & ADDITIONAL TESTS:  Results Reviewed:   Imaging Personally Reviewed:  Electrocardiogram Personally Reviewed:    COORDINATION OF CARE:  Care Discussed with Consultants/Other Providers [Y/N]:  Prior or Outpatient Records Reviewed [Y/N]:

## 2024-02-21 NOTE — PROGRESS NOTE ADULT - SUBJECTIVE AND OBJECTIVE BOX
[facility]  Bartolo Rodas PA-C  [address] [place] [address]  [phone]      INTERVAL HPI/OVERNIGHT EVENTS:  Pt s/e  No new GI events    MEDICATIONS  (STANDING):  amLODIPine   Tablet 10 milliGRAM(s) Oral daily  ascorbic acid 500 milliGRAM(s) Oral daily  cloNIDine 0.1 milliGRAM(s) Oral daily  dextrose 5%. 1000 milliLiter(s) (100 mL/Hr) IV Continuous <Continuous>  dextrose 5%. 1000 milliLiter(s) (50 mL/Hr) IV Continuous <Continuous>  dextrose 50% Injectable 12.5 Gram(s) IV Push once  dextrose 50% Injectable 25 Gram(s) IV Push once  dextrose 50% Injectable 25 Gram(s) IV Push once  DULoxetine 60 milliGRAM(s) Oral daily  ferrous    sulfate 325 milliGRAM(s) Oral daily  finasteride 5 milliGRAM(s) Oral daily  gabapentin 600 milliGRAM(s) Oral three times a day  glucagon  Injectable 1 milliGRAM(s) IntraMuscular once  insulin glargine Injectable (LANTUS) 12 Unit(s) SubCutaneous at bedtime  insulin lispro (ADMELOG) corrective regimen sliding scale   SubCutaneous three times a day before meals  insulin lispro (ADMELOG) corrective regimen sliding scale   SubCutaneous at bedtime  insulin lispro Injectable (ADMELOG) 2 Unit(s) SubCutaneous three times a day before meals  lactobacillus acidophilus 1 Tablet(s) Oral daily  melatonin 5 milliGRAM(s) Oral at bedtime  methocarbamol 750 milliGRAM(s) Oral three times a day  metoprolol tartrate 25 milliGRAM(s) Oral two times a day  multivitamin 1 Tablet(s) Oral daily  nicotine - 21 mG/24Hr(s) Patch 1 Patch Transdermal every 24 hours  pantoprazole    Tablet 40 milliGRAM(s) Oral before breakfast  polyethylene glycol 3350 17 Gram(s) Oral daily  tamsulosin 0.4 milliGRAM(s) Oral at bedtime  traZODone 50 milliGRAM(s) Oral at bedtime  warfarin 4 milliGRAM(s) Oral once    MEDICATIONS  (PRN):  acetaminophen     Tablet .. 650 milliGRAM(s) Oral every 6 hours PRN Temp greater or equal to 38C (100.4F), Mild Pain (1 - 3)  dextrose Oral Gel 15 Gram(s) Oral once PRN Blood Glucose LESS THAN 70 milliGRAM(s)/deciliter  diphenhydrAMINE 25 milliGRAM(s) Oral every 6 hours PRN Rash and/or Itching  HYDROmorphone  Injectable 1 milliGRAM(s) IV Push every 4 hours PRN Moderate Pain (4 - 6)  HYDROmorphone  Injectable 1.5 milliGRAM(s) IV Push every 4 hours PRN Severe Pain (7 - 10)  nicotine  Polacrilex Gum 2 milliGRAM(s) Oral every 2 hours PRN Cravings      Allergies    sulfa drugs (Hives)  sulfa drugs (Rash)  sulfa drugs (Unknown)    Intolerances    Pipercillin Sodium-Tazobactam Sodium (Pruritus)      PHYSICAL EXAM:   Vital Signs:  Vital Signs Last 24 Hrs  T(C): 36.7 (2024 04:21), Max: 36.7 (2024 20:16)  T(F): 98.1 (2024 04:21), Max: 98.1 (2024 20:16)  HR: 56 (2024 04:21) (56 - 93)  BP: 154/92 (2024 04:21) (150/80 - 159/90)  BP(mean): --  RR: 18 (2024 04:21) (18 - 18)  SpO2: 94% (2024 04:21) (94% - 95%)    Parameters below as of 2024 04:21  Patient On (Oxygen Delivery Method): room air      Daily     Daily Weight in k.4 (2024 04:21)    GENERAL:  Appears stated age  HEENT:  NC/AT  CHEST:  Full & symmetric excursion  HEART:  Regular rhythm  ABDOMEN:  Soft, non-tender, non-distended  EXTEREMITIES:  no cyanosis  SKIN:  No rash  NEURO:  Alert      LABS:                        11.0   8.28  )-----------( 207      ( 2024 08:13 )             34.5     02-21    139  |  108  |  33<H>  ----------------------------<  142<H>  3.9   |  26  |  2.30<H>    Ca    9.0      2024 08:13  Phos  3.4       Mg     1.9         TPro  6.3  /  Alb  2.6<L>  /  TBili  0.3  /  DBili  x   /  AST  12<L>  /  ALT  17  /  AlkPhos  79      PT/INR - ( 2024 08:13 )   PT: 26.5 sec;   INR: 2.32 ratio         PTT - ( 2024 08:13 )  PTT:46.4 sec  Urinalysis Basic - ( 2024 08:13 )    Color: x / Appearance: x / SG: x / pH: x  Gluc: 142 mg/dL / Ketone: x  / Bili: x / Urobili: x   Blood: x / Protein: x / Nitrite: x   Leuk Esterase: x / RBC: x / WBC x   Sq Epi: x / Non Sq Epi: x / Bacteria: x        RADIOLOGY & ADDITIONAL TESTS:  < from: NM Hepatobiliary Imaging (24 @ 10:02) >    ACC: [id number] EXAM:  NM HEPATOBILIARY IMG   ORDERED BY: CASSANDRA WILDER     PROCEDURE DATE:  2024          INTERPRETATION:  CLINICAL INFORMATION: Abnormal gallbladder ultrasound.   Evaluate for acute cholecystitis.    DURATION of DYNAMIC SERIES: 60 minutes  RADIOPHARMACEUTICAL: 3.3 mCi Tc-99m-Mebrofenin, I.V.  PHARMACOLOGIC INTERVENTION: None.    TECHNIQUE: Dynamic imaging of the anterior abdomen was performed   following radiopharmaceutical injection. Static images of the abdomen in   theanterior, right anterior oblique, and right lateral views were   obtained immediately thereafter.    COMPARISON: None    OTHER STUDIES USED FOR CORRELATION: Ultrasound 2024. CT 2024.    FINDINGS: There is prompt, homogeneous uptake of radiopharmaceutical by   the hepatocytes. Activity is seen in the gallbladder at approximately 25   minutes and in the bowel at approximately 60 minutes. There is good   clearance of activity from the liver by the end of the study. Images   after the patient drank a glass of water show good separation between the   bowel and gallbladder.    IMPRESSION: Normal hepatobiliary scan.    No evidence of acute cholcystitis or biliary obstruction.    --- End of Report ---            JAMEE HILL MD; Attending Radiologist  This document has been electronically signed. 2024 10:16AM    < end of copied text >

## 2024-02-21 NOTE — PROGRESS NOTE ADULT - SUBJECTIVE AND OBJECTIVE BOX
[facility]   INFECTIOUS DISEASES   [address] [place] [address]  Tel: [phone]     Fax: [phone]  =========================================================  MD Cornel Regan Kaushal, MD Cho, Michelle, MD Sunjit, Jaspal, MD  =========================================================    JOIE BRUNO [id number]    Follow up: UTI    He has a chronic ayala due to neurogenic bladder, no symptoms. No fever. No abdominal pain, left flank pain is better.     Allergies:  sulfa drugs (Rash)    MEDICATIONS  (STANDING):  amLODIPine   Tablet 10 milliGRAM(s) Oral daily  ascorbic acid 500 milliGRAM(s) Oral daily  cloNIDine 0.1 milliGRAM(s) Oral daily  dextrose 5%. 1000 milliLiter(s) (50 mL/Hr) IV Continuous <Continuous>  dextrose 5%. 1000 milliLiter(s) (100 mL/Hr) IV Continuous <Continuous>  dextrose 50% Injectable 25 Gram(s) IV Push once  dextrose 50% Injectable 25 Gram(s) IV Push once  dextrose 50% Injectable 12.5 Gram(s) IV Push once  DULoxetine 60 milliGRAM(s) Oral daily  ferrous    sulfate 325 milliGRAM(s) Oral daily  finasteride 5 milliGRAM(s) Oral daily  gabapentin 600 milliGRAM(s) Oral three times a day  glucagon  Injectable 1 milliGRAM(s) IntraMuscular once  insulin glargine Injectable (LANTUS) 12 Unit(s) SubCutaneous at bedtime  insulin lispro (ADMELOG) corrective regimen sliding scale   SubCutaneous at bedtime  insulin lispro (ADMELOG) corrective regimen sliding scale   SubCutaneous three times a day before meals  insulin lispro Injectable (ADMELOG) 2 Unit(s) SubCutaneous three times a day before meals  lactobacillus acidophilus 1 Tablet(s) Oral daily  melatonin 5 milliGRAM(s) Oral at bedtime  methocarbamol 750 milliGRAM(s) Oral three times a day  metoprolol tartrate 25 milliGRAM(s) Oral two times a day  multivitamin 1 Tablet(s) Oral daily  nicotine - 21 mG/24Hr(s) Patch 1 Patch Transdermal every 24 hours  pantoprazole    Tablet 40 milliGRAM(s) Oral before breakfast  piperacillin/tazobactam IVPB.. 3.375 Gram(s) IV Intermittent every 8 hours  polyethylene glycol 3350 17 Gram(s) Oral daily  sodium chloride 0.9%. 1000 milliLiter(s) (60 mL/Hr) IV Continuous <Continuous>  tamsulosin 0.4 milliGRAM(s) Oral at bedtime  traZODone 50 milliGRAM(s) Oral at bedtime  warfarin 4 milliGRAM(s) Oral once    REVIEW OF SYSTEMS:  CONSTITUTIONAL:  No Fever or chills  HEENT:   No diplopia or blurred vision.  No earache, sore throat or runny nose.  CARDIOVASCULAR:  No chest pain  RESPIRATORY:  No cough, shortness of breath, PND or orthopnea.  GASTROINTESTINAL:  No nausea, vomiting or diarrhea.  GENITOURINARY:  No dysuria, frequency or urgency. No Blood in urine  MUSCULOSKELETAL:  no joint aches, no muscle pain  SKIN:  No change in skin, hair or nails.     Physical Exam:  Vital Signs Last 24 Hrs  T(C): 36.6 (21 Feb 2024 11:49), Max: 36.7 (20 Feb 2024 20:16)  T(F): 97.9 (21 Feb 2024 11:49), Max: 98.1 (20 Feb 2024 20:16)  HR: 55 (21 Feb 2024 11:49) (55 - 93)  BP: 126/76 (21 Feb 2024 11:49) (126/76 - 154/92)  BP(mean): --  RR: 18 (21 Feb 2024 11:49) (18 - 18)  SpO2: 90% (21 Feb 2024 11:49) (90% - 94%)  Parameters below as of 21 Feb 2024 11:49  Patient On (Oxygen Delivery Method): room air  GEN: NAD  HEENT: normocephalic and atraumatic. EOMI. STEVE.    NECK: Supple.  No lymphadenopathy   LUNGS: Clear to auscultation.  HEART: Regular rate and rhythm without murmur.  ABDOMEN: Soft, nontender, and nondistended.  Positive bowel sounds.    : No CVA tenderness  EXTREMITIES: Without edema.  MSK: no joint swelling  NEUROLOGIC: grossly intact.  PSYCHIATRIC: Appropriate affect .  SKIN: No rash     Labs:                        11.0   8.28  )-----------( 207      ( 21 Feb 2024 08:13 )             34.5     02-21    139  |  108  |  33<H>  ----------------------------<  142<H>  3.9   |  26  |  2.30<H>    Ca    9.0      21 Feb 2024 08:13  Phos  3.4     02-21  Mg     1.9     02-21    TPro  6.3  /  Alb  2.6<L>  /  TBili  0.3  /  DBili  x   /  AST  12<L>  /  ALT  17  /  AlkPhos  79  02-20    Culture - Urine (collected 02-17-24 @ 04:25)  Source: Clean Catch Clean Catch (Midstream)  Final Report (02-19-24 @ 12:24):    >100,000 CFU/ml Enterococcus faecalis (vancomycin resistant)  Organism: Enterococcus faecalis (vancomycin resistant) (02-19-24 @ 12:24)  Organism: Enterococcus faecalis (vancomycin resistant) (02-19-24 @ 12:24)    Sensitivities:      -  Levofloxacin: R >4      -  Nitrofurantoin: S <=32 Should not be used to treat pyelonephritis.      -  Daptomycin: S 1      -  Linezolid: S 2      -  Vancomycin: R >16      -  Ciprofloxacin: R >2      -  Ampicillin: S <=2 Predicts results to ampicillin/sulbactam, amoxacillin-clavulanate and  piperacillin-tazobactam.      -  Tetracycline: R >8      Method Type: JOSE RAMON    Culture - Urine (collected 03-12-23 @ 03:16)  Source: Catheterized None  Final Report (03-13-23 @ 08:18):    <10,000 CFU/mL Normal Urogenital Ellen    WBC Count: 8.28 K/uL (02-21-24 @ 08:13)  WBC Count: 7.85 K/uL (02-20-24 @ 06:18)  WBC Count: 8.87 K/uL (02-19-24 @ 09:05)  WBC Count: 9.04 K/uL (02-18-24 @ 06:30)  WBC Count: 12.52 K/uL (02-17-24 @ 03:36)    Creatinine: 2.30 mg/dL (02-21-24 @ 08:13)  Creatinine: 2.40 mg/dL (02-20-24 @ 06:18)  Creatinine: 2.30 mg/dL (02-19-24 @ 09:05)  Creatinine: 2.10 mg/dL (02-18-24 @ 06:30)  Creatinine: 2.20 mg/dL (02-17-24 @ 03:36)    All imaging and data are reviewed.   < from: CT Renal Stone Hunt (02.17.24 @ 05:02) >  IMPRESSION:  Cholelithiasis. Vague opacities at the fundal portion of the gallbladder.   Right upper quadrant ultrasound suggested. No significant wall thickening   or pericholecystic edema.  Mild bilateral hydroureteronephrosis without calcified stone identified   along the course of the ureter. Differential includes recently passed   kidney stone or infection. Additional punctate nonobstructive bilateral   renal calculi are present.  Mild trabeculated bladder wall thickening. Correlate with urinalysis and   lab values to assess for cystitis and/or ascending urinary tract   infection.    < from: US Abdomen Upper Quadrant Right (02.19.24 @ 06:52) >  IMPRESSION:  Cholelithiasis with sludge distended gallbladder fundus. 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 non-obstructive B/L renal calculi are present. Mild trabeculated bladder wall thickening.    Reported last night that he had itching on the IV site, no issue at this time, I don't believe it is related to zosyn allergy.     # Acute Cystitis   # ANTONIO  # R/o Cholelithiasis/cholecystitis      - In the past h/o klebsiella pneumonia ESBL resistant to Rocephin  - UCx from this admission noted enterococcus faecalis S to ampi but R to vancomycin   - Switched to zosyn yesterday while cover for possible Cholecystosis but HIDA is back negative   - Continue ampicillin from zosyn to cover VRE, adjusted for renal function (will watch for reaction)  - Trend temps/WBC  - Monitor renal fxn  -  follow up for possible suprapubic catheter    Will follow.     Sia Callahan MD  Division of Infectious Diseases   Please call ID service at [phone] with any question.      50 minutes spent on total encounter assessing patient, examination, chart review, counseling and coordinating care by the attending physician/nurse/care manager.   [facility]   INFECTIOUS DISEASES   [address] [place] [address]  Tel: [phone]     Fax: [phone]  =========================================================  MD Cornel Regan Kaushal, MD Cho, Michelle, MD Sunjit, Jaspal, MD  =========================================================    JOIE BRUNO [id number]    Follow up: UTI    He has a chronic ayala due to neurogenic bladder, no symptoms. No fever. No abdominal pain, left flank pain is better.     Allergies:  sulfa drugs (Rash)    MEDICATIONS  (STANDING):  amLODIPine   Tablet 10 milliGRAM(s) Oral daily  ascorbic acid 500 milliGRAM(s) Oral daily  cloNIDine 0.1 milliGRAM(s) Oral daily  dextrose 5%. 1000 milliLiter(s) (50 mL/Hr) IV Continuous <Continuous>  dextrose 5%. 1000 milliLiter(s) (100 mL/Hr) IV Continuous <Continuous>  dextrose 50% Injectable 25 Gram(s) IV Push once  dextrose 50% Injectable 25 Gram(s) IV Push once  dextrose 50% Injectable 12.5 Gram(s) IV Push once  DULoxetine 60 milliGRAM(s) Oral daily  ferrous    sulfate 325 milliGRAM(s) Oral daily  finasteride 5 milliGRAM(s) Oral daily  gabapentin 600 milliGRAM(s) Oral three times a day  glucagon  Injectable 1 milliGRAM(s) IntraMuscular once  insulin glargine Injectable (LANTUS) 12 Unit(s) SubCutaneous at bedtime  insulin lispro (ADMELOG) corrective regimen sliding scale   SubCutaneous at bedtime  insulin lispro (ADMELOG) corrective regimen sliding scale   SubCutaneous three times a day before meals  insulin lispro Injectable (ADMELOG) 2 Unit(s) SubCutaneous three times a day before meals  lactobacillus acidophilus 1 Tablet(s) Oral daily  melatonin 5 milliGRAM(s) Oral at bedtime  methocarbamol 750 milliGRAM(s) Oral three times a day  metoprolol tartrate 25 milliGRAM(s) Oral two times a day  multivitamin 1 Tablet(s) Oral daily  nicotine - 21 mG/24Hr(s) Patch 1 Patch Transdermal every 24 hours  pantoprazole    Tablet 40 milliGRAM(s) Oral before breakfast  piperacillin/tazobactam IVPB.. 3.375 Gram(s) IV Intermittent every 8 hours  polyethylene glycol 3350 17 Gram(s) Oral daily  sodium chloride 0.9%. 1000 milliLiter(s) (60 mL/Hr) IV Continuous <Continuous>  tamsulosin 0.4 milliGRAM(s) Oral at bedtime  traZODone 50 milliGRAM(s) Oral at bedtime  warfarin 4 milliGRAM(s) Oral once    REVIEW OF SYSTEMS:  CONSTITUTIONAL:  No Fever or chills  HEENT:   No diplopia or blurred vision.  No earache, sore throat or runny nose.  CARDIOVASCULAR:  No chest pain  RESPIRATORY:  No cough, shortness of breath, PND or orthopnea.  GASTROINTESTINAL:  No nausea, vomiting or diarrhea.  GENITOURINARY:  No dysuria, frequency or urgency. No Blood in urine  MUSCULOSKELETAL:  no joint aches, no muscle pain  SKIN:  No change in skin, hair or nails.     Physical Exam:  Vital Signs Last 24 Hrs  T(C): 36.6 (21 Feb 2024 11:49), Max: 36.7 (20 Feb 2024 20:16)  T(F): 97.9 (21 Feb 2024 11:49), Max: 98.1 (20 Feb 2024 20:16)  HR: 55 (21 Feb 2024 11:49) (55 - 93)  BP: 126/76 (21 Feb 2024 11:49) (126/76 - 154/92)  BP(mean): --  RR: 18 (21 Feb 2024 11:49) (18 - 18)  SpO2: 90% (21 Feb 2024 11:49) (90% - 94%)  Parameters below as of 21 Feb 2024 11:49  Patient On (Oxygen Delivery Method): room air  GEN: NAD  HEENT: normocephalic and atraumatic. EOMI. STEVE.    NECK: Supple.  No lymphadenopathy   LUNGS: Clear to auscultation.  HEART: Regular rate and rhythm without murmur.  ABDOMEN: Soft, nontender, and nondistended.  Positive bowel sounds.    : No CVA tenderness  EXTREMITIES: Without edema.  MSK: no joint swelling  NEUROLOGIC: grossly intact.  PSYCHIATRIC: Appropriate affect .  SKIN: No rash     Labs:                        11.0   8.28  )-----------( 207      ( 21 Feb 2024 08:13 )             34.5     02-21    139  |  108  |  33<H>  ----------------------------<  142<H>  3.9   |  26  |  2.30<H>    Ca    9.0      21 Feb 2024 08:13  Phos  3.4     02-21  Mg     1.9     02-21    TPro  6.3  /  Alb  2.6<L>  /  TBili  0.3  /  DBili  x   /  AST  12<L>  /  ALT  17  /  AlkPhos  79  02-20    Culture - Urine (collected 02-17-24 @ 04:25)  Source: Clean Catch Clean Catch (Midstream)  Final Report (02-19-24 @ 12:24):    >100,000 CFU/ml Enterococcus faecalis (vancomycin resistant)  Organism: Enterococcus faecalis (vancomycin resistant) (02-19-24 @ 12:24)  Organism: Enterococcus faecalis (vancomycin resistant) (02-19-24 @ 12:24)    Sensitivities:      -  Levofloxacin: R >4      -  Nitrofurantoin: S <=32 Should not be used to treat pyelonephritis.      -  Daptomycin: S 1      -  Linezolid: S 2      -  Vancomycin: R >16      -  Ciprofloxacin: R >2      -  Ampicillin: S <=2 Predicts results to ampicillin/sulbactam, amoxacillin-clavulanate and  piperacillin-tazobactam.      -  Tetracycline: R >8      Method Type: JOSE RAMON    Culture - Urine (collected 03-12-23 @ 03:16)  Source: Catheterized None  Final Report (03-13-23 @ 08:18):    <10,000 CFU/mL Normal Urogenital Ellen    WBC Count: 8.28 K/uL (02-21-24 @ 08:13)  WBC Count: 7.85 K/uL (02-20-24 @ 06:18)  WBC Count: 8.87 K/uL (02-19-24 @ 09:05)  WBC Count: 9.04 K/uL (02-18-24 @ 06:30)  WBC Count: 12.52 K/uL (02-17-24 @ 03:36)    Creatinine: 2.30 mg/dL (02-21-24 @ 08:13)  Creatinine: 2.40 mg/dL (02-20-24 @ 06:18)  Creatinine: 2.30 mg/dL (02-19-24 @ 09:05)  Creatinine: 2.10 mg/dL (02-18-24 @ 06:30)  Creatinine: 2.20 mg/dL (02-17-24 @ 03:36)    All imaging and data are reviewed.   < from: CT Renal Stone Hunt (02.17.24 @ 05:02) >  IMPRESSION:  Cholelithiasis. Vague opacities at the fundal portion of the gallbladder.   Right upper quadrant ultrasound suggested. No significant wall thickening   or pericholecystic edema.  Mild bilateral hydroureteronephrosis without calcified stone identified   along the course of the ureter. Differential includes recently passed   kidney stone or infection. Additional punctate nonobstructive bilateral   renal calculi are present.  Mild trabeculated bladder wall thickening. Correlate with urinalysis and   lab values to assess for cystitis and/or ascending urinary tract   infection.    < from: US Abdomen Upper Quadrant Right (02.19.24 @ 06:52) >  IMPRESSION:  Cholelithiasis with sludge distended gallbladder fundus. 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 non-obstructive B/L renal calculi are present. Mild trabeculated bladder wall thickening.    Reported last night that he had itching on the IV site, no issue at this time, I don't believe it is related to zosyn allergy.     # Complicated UTI   # ANTONIO  # R/o Cholelithiasis/cholecystitis      - In the past h/o klebsiella pneumonia ESBL resistant to Rocephin  - UCx from this admission noted enterococcus faecalis S to ampi but R to vancomycin   - Switched to zosyn yesterday while cover for possible Cholecystosis but HIDA is back negative   - Continue ampicillin from zosyn to cover VRE, adjusted for renal function (will watch for reaction)  - When ready for discharge can switch to oral amoxicillin or Augmentin (total from start of zosyn, 10days)   - Trend temps/WBC  - Monitor renal fxn  -  follow up for possible suprapubic catheter    Will follow PRN.     Sia Callahan MD  Division of Infectious Diseases   Please call ID service at [phone] with any question.      50 minutes spent on total encounter assessing patient, examination, chart review, counseling and coordinating care by the attending physician/nurse/care manager.

## 2024-02-22 LAB
ANION GAP SERPL CALC-SCNC: 4 MMOL/L — LOW (ref 5–17)
BUN SERPL-MCNC: 36 MG/DL — HIGH (ref 7–23)
CALCIUM SERPL-MCNC: 9.1 MG/DL — SIGNIFICANT CHANGE UP (ref 8.5–10.1)
CHLORIDE SERPL-SCNC: 107 MMOL/L — SIGNIFICANT CHANGE UP (ref 96–108)
CO2 SERPL-SCNC: 28 MMOL/L — SIGNIFICANT CHANGE UP (ref 22–31)
CREAT SERPL-MCNC: 2.3 MG/DL — HIGH (ref 0.5–1.3)
EGFR: 34 ML/MIN/1.73M2 — LOW
GLUCOSE SERPL-MCNC: 148 MG/DL — HIGH (ref 70–99)
INR BLD: 2.32 RATIO — HIGH (ref 0.85–1.18)
MAGNESIUM SERPL-MCNC: 2 MG/DL — SIGNIFICANT CHANGE UP (ref 1.6–2.6)
PHOSPHATE SERPL-MCNC: 3.9 MG/DL — SIGNIFICANT CHANGE UP (ref 2.5–4.5)
POTASSIUM SERPL-MCNC: 3.6 MMOL/L — SIGNIFICANT CHANGE UP (ref 3.5–5.3)
POTASSIUM SERPL-SCNC: 3.6 MMOL/L — SIGNIFICANT CHANGE UP (ref 3.5–5.3)
PROTHROM AB SERPL-ACNC: 26.5 SEC — HIGH (ref 9.5–13)
SODIUM SERPL-SCNC: 139 MMOL/L — SIGNIFICANT CHANGE UP (ref 135–145)

## 2024-02-22 PROCEDURE — 99232 SBSQ HOSP IP/OBS MODERATE 35: CPT

## 2024-02-22 RX ORDER — AMOXICILLIN 250 MG/5ML
500 SUSPENSION, RECONSTITUTED, ORAL (ML) ORAL EVERY 8 HOURS
Refills: 0 | Status: DISCONTINUED | OUTPATIENT
Start: 2024-02-22 | End: 2024-02-23

## 2024-02-22 RX ORDER — HYDROMORPHONE HYDROCHLORIDE 2 MG/ML
4 INJECTION INTRAMUSCULAR; INTRAVENOUS; SUBCUTANEOUS EVERY 6 HOURS
Refills: 0 | Status: DISCONTINUED | OUTPATIENT
Start: 2024-02-22 | End: 2024-02-23

## 2024-02-22 RX ORDER — WARFARIN SODIUM 2.5 MG/1
4 TABLET ORAL ONCE
Refills: 0 | Status: COMPLETED | OUTPATIENT
Start: 2024-02-22 | End: 2024-02-22

## 2024-02-22 RX ADMIN — Medication 1: at 11:56

## 2024-02-22 RX ADMIN — HYDROMORPHONE HYDROCHLORIDE 0.5 MILLIGRAM(S): 2 INJECTION INTRAMUSCULAR; INTRAVENOUS; SUBCUTANEOUS at 01:30

## 2024-02-22 RX ADMIN — AMLODIPINE BESYLATE 10 MILLIGRAM(S): 2.5 TABLET ORAL at 06:03

## 2024-02-22 RX ADMIN — INSULIN GLARGINE 12 UNIT(S): 100 INJECTION, SOLUTION SUBCUTANEOUS at 23:10

## 2024-02-22 RX ADMIN — POLYETHYLENE GLYCOL 3350 17 GRAM(S): 17 POWDER, FOR SOLUTION ORAL at 11:28

## 2024-02-22 RX ADMIN — HYDROMORPHONE HYDROCHLORIDE 0.5 MILLIGRAM(S): 2 INJECTION INTRAMUSCULAR; INTRAVENOUS; SUBCUTANEOUS at 00:00

## 2024-02-22 RX ADMIN — HYDROMORPHONE HYDROCHLORIDE 1.5 MILLIGRAM(S): 2 INJECTION INTRAMUSCULAR; INTRAVENOUS; SUBCUTANEOUS at 02:45

## 2024-02-22 RX ADMIN — Medication 1 PATCH: at 19:14

## 2024-02-22 RX ADMIN — LIDOCAINE 1 PATCH: 4 CREAM TOPICAL at 00:01

## 2024-02-22 RX ADMIN — Medication 325 MILLIGRAM(S): at 11:28

## 2024-02-22 RX ADMIN — LIDOCAINE 1 PATCH: 4 CREAM TOPICAL at 12:33

## 2024-02-22 RX ADMIN — Medication 0.1 MILLIGRAM(S): at 06:03

## 2024-02-22 RX ADMIN — Medication 2 MILLIGRAM(S): at 20:37

## 2024-02-22 RX ADMIN — MENTHOL AND METHYL SALICYLATE 1 APPLICATION(S): 10; 30 CREAM TOPICAL at 00:01

## 2024-02-22 RX ADMIN — HYDROMORPHONE HYDROCHLORIDE 4 MILLIGRAM(S): 2 INJECTION INTRAMUSCULAR; INTRAVENOUS; SUBCUTANEOUS at 20:26

## 2024-02-22 RX ADMIN — Medication 1 TABLET(S): at 11:28

## 2024-02-22 RX ADMIN — Medication 1: at 08:14

## 2024-02-22 RX ADMIN — Medication 2 MILLIGRAM(S): at 11:58

## 2024-02-22 RX ADMIN — METHOCARBAMOL 750 MILLIGRAM(S): 500 TABLET, FILM COATED ORAL at 06:03

## 2024-02-22 RX ADMIN — Medication 2 UNIT(S): at 08:14

## 2024-02-22 RX ADMIN — Medication 500 MILLIGRAM(S): at 11:28

## 2024-02-22 RX ADMIN — HYDROMORPHONE HYDROCHLORIDE 1.5 MILLIGRAM(S): 2 INJECTION INTRAMUSCULAR; INTRAVENOUS; SUBCUTANEOUS at 12:15

## 2024-02-22 RX ADMIN — Medication 2 MILLIGRAM(S): at 06:04

## 2024-02-22 RX ADMIN — METHOCARBAMOL 750 MILLIGRAM(S): 500 TABLET, FILM COATED ORAL at 13:37

## 2024-02-22 RX ADMIN — HYDROMORPHONE HYDROCHLORIDE 1.5 MILLIGRAM(S): 2 INJECTION INTRAMUSCULAR; INTRAVENOUS; SUBCUTANEOUS at 06:02

## 2024-02-22 RX ADMIN — Medication 1 PATCH: at 00:12

## 2024-02-22 RX ADMIN — TAMSULOSIN HYDROCHLORIDE 0.4 MILLIGRAM(S): 0.4 CAPSULE ORAL at 23:09

## 2024-02-22 RX ADMIN — Medication 500 MILLIGRAM(S): at 23:10

## 2024-02-22 RX ADMIN — GABAPENTIN 600 MILLIGRAM(S): 400 CAPSULE ORAL at 13:37

## 2024-02-22 RX ADMIN — DULOXETINE HYDROCHLORIDE 60 MILLIGRAM(S): 30 CAPSULE, DELAYED RELEASE ORAL at 11:28

## 2024-02-22 RX ADMIN — Medication 25 MILLIGRAM(S): at 17:11

## 2024-02-22 RX ADMIN — Medication 2 MILLIGRAM(S): at 00:14

## 2024-02-22 RX ADMIN — Medication 1 PATCH: at 23:19

## 2024-02-22 RX ADMIN — METHOCARBAMOL 750 MILLIGRAM(S): 500 TABLET, FILM COATED ORAL at 23:09

## 2024-02-22 RX ADMIN — HYDROMORPHONE HYDROCHLORIDE 1.5 MILLIGRAM(S): 2 INJECTION INTRAMUSCULAR; INTRAVENOUS; SUBCUTANEOUS at 07:02

## 2024-02-22 RX ADMIN — Medication 5 MILLIGRAM(S): at 23:09

## 2024-02-22 RX ADMIN — GABAPENTIN 600 MILLIGRAM(S): 400 CAPSULE ORAL at 23:09

## 2024-02-22 RX ADMIN — Medication 2 MILLIGRAM(S): at 13:01

## 2024-02-22 RX ADMIN — Medication 2 MILLIGRAM(S): at 17:13

## 2024-02-22 RX ADMIN — Medication 2 UNIT(S): at 17:08

## 2024-02-22 RX ADMIN — HYDROMORPHONE HYDROCHLORIDE 4 MILLIGRAM(S): 2 INJECTION INTRAMUSCULAR; INTRAVENOUS; SUBCUTANEOUS at 21:19

## 2024-02-22 RX ADMIN — FINASTERIDE 5 MILLIGRAM(S): 5 TABLET, FILM COATED ORAL at 11:28

## 2024-02-22 RX ADMIN — HYDROMORPHONE HYDROCHLORIDE 1.5 MILLIGRAM(S): 2 INJECTION INTRAMUSCULAR; INTRAVENOUS; SUBCUTANEOUS at 02:04

## 2024-02-22 RX ADMIN — Medication 2 MILLIGRAM(S): at 23:16

## 2024-02-22 RX ADMIN — Medication 50 MILLIGRAM(S): at 23:09

## 2024-02-22 RX ADMIN — Medication 2 UNIT(S): at 11:56

## 2024-02-22 RX ADMIN — WARFARIN SODIUM 4 MILLIGRAM(S): 2.5 TABLET ORAL at 23:10

## 2024-02-22 RX ADMIN — GABAPENTIN 600 MILLIGRAM(S): 400 CAPSULE ORAL at 06:03

## 2024-02-22 RX ADMIN — PANTOPRAZOLE SODIUM 40 MILLIGRAM(S): 20 TABLET, DELAYED RELEASE ORAL at 06:09

## 2024-02-22 RX ADMIN — Medication 25 MILLIGRAM(S): at 06:03

## 2024-02-22 RX ADMIN — HYDROMORPHONE HYDROCHLORIDE 1.5 MILLIGRAM(S): 2 INJECTION INTRAMUSCULAR; INTRAVENOUS; SUBCUTANEOUS at 11:50

## 2024-02-22 RX ADMIN — Medication 1: at 17:08

## 2024-02-22 NOTE — PROGRESS NOTE ADULT - PROBLEM SELECTOR PLAN 2
Baseline Scr ~2.1  - s/p IVF  - Continue to  monitor serum creatinine & urine output  - avoid nephrotoxic medications  - UA w/ protein and elevated protein/Cr  - nephrology following, f/u outpt
Baseline Scr ~2.1  - s/p IVF  - Continue to  monitor serum creatinine & urine output  - avoid nephrotoxic medications
Baseline Scr ~2.1  - s/p IVF  - Continue to  monitor serum creatinine & urine output  - avoid nephrotoxic medications  - UA w/ protein and elevated protein/Cr  - nephrology following
Baseline Scr 2.3  - s/p IVF  - Continue to  monitor serum creatinine & urine output  - avoid nephrotoxic medications

## 2024-02-22 NOTE — PROGRESS NOTE ADULT - SUBJECTIVE AND OBJECTIVE BOX
[facility]  Bartolo Rodas PA-C  [address] [place] [address]  [phone]      INTERVAL HPI/OVERNIGHT EVENTS:  Pt s/e  Tolerating diet  No N/V/D or further GI complaints    MEDICATIONS  (STANDING):  amLODIPine   Tablet 10 milliGRAM(s) Oral daily  ascorbic acid 500 milliGRAM(s) Oral daily  cloNIDine 0.1 milliGRAM(s) Oral daily  dextrose 5%. 1000 milliLiter(s) (100 mL/Hr) IV Continuous <Continuous>  dextrose 5%. 1000 milliLiter(s) (50 mL/Hr) IV Continuous <Continuous>  dextrose 50% Injectable 12.5 Gram(s) IV Push once  dextrose 50% Injectable 25 Gram(s) IV Push once  dextrose 50% Injectable 25 Gram(s) IV Push once  DULoxetine 60 milliGRAM(s) Oral daily  ferrous    sulfate 325 milliGRAM(s) Oral daily  finasteride 5 milliGRAM(s) Oral daily  gabapentin 600 milliGRAM(s) Oral three times a day  glucagon  Injectable 1 milliGRAM(s) IntraMuscular once  insulin glargine Injectable (LANTUS) 12 Unit(s) SubCutaneous at bedtime  insulin lispro (ADMELOG) corrective regimen sliding scale   SubCutaneous three times a day before meals  insulin lispro (ADMELOG) corrective regimen sliding scale   SubCutaneous at bedtime  insulin lispro Injectable (ADMELOG) 2 Unit(s) SubCutaneous three times a day before meals  lactobacillus acidophilus 1 Tablet(s) Oral daily  melatonin 5 milliGRAM(s) Oral at bedtime  methocarbamol 750 milliGRAM(s) Oral three times a day  methyl salicylate 15%/menthol 10% Topical Cream 1 Application(s) Topical every 6 hours  metoprolol tartrate 25 milliGRAM(s) Oral two times a day  multivitamin 1 Tablet(s) Oral daily  nicotine - 21 mG/24Hr(s) Patch 1 Patch Transdermal every 24 hours  pantoprazole    Tablet 40 milliGRAM(s) Oral before breakfast  polyethylene glycol 3350 17 Gram(s) Oral daily  tamsulosin 0.4 milliGRAM(s) Oral at bedtime  traZODone 50 milliGRAM(s) Oral at bedtime  warfarin 4 milliGRAM(s) Oral once    MEDICATIONS  (PRN):  acetaminophen     Tablet .. 650 milliGRAM(s) Oral every 6 hours PRN Temp greater or equal to 38C (100.4F), Mild Pain (1 - 3)  dextrose Oral Gel 15 Gram(s) Oral once PRN Blood Glucose LESS THAN 70 milliGRAM(s)/deciliter  diphenhydrAMINE 25 milliGRAM(s) Oral every 6 hours PRN Rash and/or Itching  HYDROmorphone  Injectable 1 milliGRAM(s) IV Push every 4 hours PRN Moderate Pain (4 - 6)  HYDROmorphone  Injectable 1.5 milliGRAM(s) IV Push every 4 hours PRN Severe Pain (7 - 10)  HYDROmorphone  Injectable 0.5 milliGRAM(s) IV Push daily PRN BREAK THROUGH PAIN >10  nicotine  Polacrilex Gum 2 milliGRAM(s) Oral every 2 hours PRN Cravings      Allergies    sulfa drugs (Hives)  sulfa drugs (Rash)  sulfa drugs (Unknown)    Intolerances    Pipercillin Sodium-Tazobactam Sodium (Pruritus)      PHYSICAL EXAM:   Vital Signs:  Vital Signs Last 24 Hrs  T(C): 36.7 (2024 06:01), Max: 36.7 (2024 21:00)  T(F): 98 (2024 06:01), Max: 98.1 (2024 21:00)  HR: 60 (2024 06:01) (57 - 69)  BP: 125/77 (2024 06:01) (125/77 - 154/72)  BP(mean): --  RR: 19 (2024 06:01) (18 - 19)  SpO2: 94% (2024 06:01) (94% - 96%)    Parameters below as of 2024 06:01  Patient On (Oxygen Delivery Method): room air      Daily     Daily Weight in k.5 (2024 06:01)    GENERAL:  Appears stated age  HEENT:  NC/AT  CHEST:  Full & symmetric excursion  HEART:  Regular rhythm  ABDOMEN:  Soft, non-tender, non-distended  EXTEREMITIES:  no cyanosis  SKIN:  No rash  NEURO:  Alert      LABS:                        11.0   8.28  )-----------( 207      ( 2024 08:13 )             34.5     02    139  |  107  |  36<H>  ----------------------------<  148<H>  3.6   |  28  |  2.30<H>    Ca    9.1      2024 07:18  Phos  3.9       Mg     2.0           PT/INR - ( 2024 07:18 )   PT: 26.5 sec;   INR: 2.32 ratio         PTT - ( 2024 08:13 )  PTT:46.4 sec  Urinalysis Basic - ( 2024 07:18 )    Color: x / Appearance: x / SG: x / pH: x  Gluc: 148 mg/dL / Ketone: x  / Bili: x / Urobili: x   Blood: x / Protein: x / Nitrite: x   Leuk Esterase: x / RBC: x / WBC x   Sq Epi: x / Non Sq Epi: x / Bacteria: x

## 2024-02-22 NOTE — PROGRESS NOTE ADULT - ASSESSMENT
ANTONIO on CKD 3  HTN  Anemia    -Baseline Creatinine appears to be around 2  -ANTONIO mild, likely Decreased EABV  -Urine indices reviewed; consistent with intrinsic renal disease  -Phos normal  -Creatinine stabilized, 2.3; monitor for now. Lytes stable  -May benefit from ELIO when Hb < 10  -No emergent indication for RRT

## 2024-02-22 NOTE — PROGRESS NOTE ADULT - SUBJECTIVE AND OBJECTIVE BOX
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    Patient states that 4 days ago, he began to develop left sided flank pain. Patient describes the pain as sharp, tender, and worse with movement. He states the pain is 10/10 and has been taking oxycodone 10mg and tylenol twice a day, without much relief. Patient has a history of multiple utis, and kidney stones (s/p lithotripsy 2023).  He endorses nausea, but denies vomiting. The pain has worsened progressively over the past few days, which prompted him to visit the emergency room for further evaluation.     Of note, he recently had a uti last month, and was treated with meropenem     Patient denies F/C, HA, N/V, CP, SOB, C/D, Dysuria, Leg pain, or Leg swelling.    Patient denies any recent travel, sick contacts, or long periods of immobilization.      ED Course:  Vitals: 134/83mmHg, 60bpm, 97.5F, 96% on RA  Labs: WBc 12.5, Hgb 10.6, MCV 76.2, PT 25.6, INR 2.24, aPTT 44.5, Cl 110, BUN/Cr 39/2.2, glucose 2.2, GFR 36  UA: many bacteria, squamous cells present. mod blood. large leuk esterase, 100 protein  CT renal stone hunt: mild B/L hydroureteronephrosis w/o calcified stone identified along course of the ureter. non-obstructive B/L renal calculi are present.  Mild trabeculated bladder wall thickening.  Given - rocephin 1g, IV dilaudid 0.5mg x1, IV dilaudid 1mg x1, 1L NS (17 Feb 2024 07:12)       PAST MEDICAL & SURGICAL HISTORY:  Renal stones      H/O Guillain-Mckenna syndrome      History of neurogenic bowel      DM2 (diabetes mellitus, type 2)      HTN (hypertension)      BPH without obstruction/lower urinary tract symptoms      Degenerative arthritis      DVT of lower limb, acute      CVA (cerebrovascular accident)      H/O lithotripsy           FAMILY HISTORY:  Family history of sinus tachycardia (Father)    FH: HTN (hypertension) (Mother)    NC    Social History:Non smoker    MEDICATIONS  (STANDING):  amLODIPine   Tablet 10 milliGRAM(s) Oral daily  ampicillin  IVPB 1 Gram(s) IV Intermittent every 6 hours  ascorbic acid 500 milliGRAM(s) Oral daily  cloNIDine 0.1 milliGRAM(s) Oral daily  dextrose 5%. 1000 milliLiter(s) (50 mL/Hr) IV Continuous <Continuous>  dextrose 5%. 1000 milliLiter(s) (100 mL/Hr) IV Continuous <Continuous>  dextrose 50% Injectable 25 Gram(s) IV Push once  dextrose 50% Injectable 25 Gram(s) IV Push once  dextrose 50% Injectable 12.5 Gram(s) IV Push once  DULoxetine 60 milliGRAM(s) Oral daily  ferrous    sulfate 325 milliGRAM(s) Oral daily  finasteride 5 milliGRAM(s) Oral daily  gabapentin 600 milliGRAM(s) Oral three times a day  glucagon  Injectable 1 milliGRAM(s) IntraMuscular once  insulin glargine Injectable (LANTUS) 12 Unit(s) SubCutaneous at bedtime  insulin lispro (ADMELOG) corrective regimen sliding scale   SubCutaneous three times a day before meals  insulin lispro (ADMELOG) corrective regimen sliding scale   SubCutaneous at bedtime  insulin lispro Injectable (ADMELOG) 2 Unit(s) SubCutaneous three times a day before meals  lactobacillus acidophilus 1 Tablet(s) Oral daily  melatonin 5 milliGRAM(s) Oral at bedtime  methocarbamol 750 milliGRAM(s) Oral three times a day  metoprolol tartrate 25 milliGRAM(s) Oral two times a day  multivitamin 1 Tablet(s) Oral daily  nicotine - 21 mG/24Hr(s) Patch 1 Patch Transdermal every 24 hours  pantoprazole    Tablet 40 milliGRAM(s) Oral before breakfast  polyethylene glycol 3350 17 Gram(s) Oral daily  sodium chloride 0.9%. 1000 milliLiter(s) (60 mL/Hr) IV Continuous <Continuous>  tamsulosin 0.4 milliGRAM(s) Oral at bedtime  traZODone 50 milliGRAM(s) Oral at bedtime  warfarin 4 milliGRAM(s) Oral once    MEDICATIONS  (PRN):  acetaminophen     Tablet .. 650 milliGRAM(s) Oral every 6 hours PRN Temp greater or equal to 38C (100.4F), Mild Pain (1 - 3)  dextrose Oral Gel 15 Gram(s) Oral once PRN Blood Glucose LESS THAN 70 milliGRAM(s)/deciliter  diphenhydrAMINE 25 milliGRAM(s) Oral every 6 hours PRN Rash and/or Itching  HYDROmorphone  Injectable 1.5 milliGRAM(s) IV Push every 4 hours PRN Severe Pain (7 - 10)  HYDROmorphone  Injectable 1 milliGRAM(s) IV Push every 4 hours PRN Moderate Pain (4 - 6)  nicotine  Polacrilex Gum 2 milliGRAM(s) Oral every 2 hours PRN Cravings   Meds reviewed    Allergies    sulfa drugs (Rash)    Intolerances    Pipercillin Sodium-Tazobactam Sodium (Pruritus)       REVIEW OF SYSTEMS:    Review of Systems:   Constitutional: Denies fatigue  HEENT: Denies headaches and dizziness  Respiratory: denies SOB, cough, or wheezing  Cardiovascular: denies CP, palpitations  Gastrointestinal: Denies nausea, denies vomiting, diarrhea, constipation, abdominal pain, or bloody stools  Genitourinary: denies painful urination, increased frequency, urgency, or bloody urine  Skin: denies rashes or itching  Musculoskeletal: denies muscle aches, joint swelling  Neurologic: Denies generalized weakness, denies loss of sensation, numbness, or tingling      Vital Signs Last 24 Hrs  T(C): 36.7 (22 Feb 2024 06:01), Max: 36.7 (21 Feb 2024 21:00)  T(F): 98 (22 Feb 2024 06:01), Max: 98.1 (21 Feb 2024 21:00)  HR: 60 (22 Feb 2024 06:01) (55 - 69)  BP: 125/77 (22 Feb 2024 06:01) (125/77 - 154/72)  BP(mean): --  RR: 19 (22 Feb 2024 06:01) (18 - 19)  SpO2: 94% (22 Feb 2024 06:01) (90% - 96%)    Parameters below as of 22 Feb 2024 06:01  Patient On (Oxygen Delivery Method): room air      Daily     Daily     PHYSICAL EXAM:    GENERAL: NAD  HEAD:  Atraumatic, Normocephalic  EYES: EOMI, conjunctiva and sclera clear  ENMT: No Drainage from nares, No drainage from ears  NERVOUS SYSTEM:  Awake and Alert  CHEST/LUNG: Clear bilaterally  EXTREMITIES:  No Edema        LABS:                                                    11.0   8.28  )-----------( 207      ( 21 Feb 2024 08:13 )             34.5     02-22    139  |  107  |  36<H>  ----------------------------<  148<H>  3.6   |  28  |  2.30<H>    Ca    9.1      22 Feb 2024 07:18  Phos  3.9     02-22  Mg     2.0     02-22      PT/INR - ( 22 Feb 2024 07:18 )   PT: 26.5 sec;   INR: 2.32 ratio         PTT - ( 21 Feb 2024 08:13 )  PTT:46.4 sec  Urinalysis Basic - ( 22 Feb 2024 07:18 )    Color: x / Appearance: x / SG: x / pH: x  Gluc: 148 mg/dL / Ketone: x  / Bili: x / Urobili: x   Blood: x / Protein: x / Nitrite: x   Leuk Esterase: x / RBC: x / WBC x   Sq Epi: x / Non Sq Epi: x / Bacteria: x

## 2024-02-22 NOTE — PROGRESS NOTE ADULT - PROBLEM SELECTOR PROBLEM 9
H/O Guillain-[place] syndrome
H/O Guillain-[place] syndrome
Hypercoagulopathy
H/O Guillain-[place] syndrome
H/O Guillain-[place] syndrome
H/O Guillain-[place] syndrome

## 2024-02-22 NOTE — PROGRESS NOTE ADULT - PROBLEM SELECTOR PLAN 9
Pt reports that since then has urinary incontinence, limit mobility, lives in a nursing home.   Ambulate with cane   Need assistance with ALD's  Follows with neuro Dr. Kruger
Pt reports that since then has urinary incontinence, limit mobility, lives in a nursing home.   Ambulate with cane   Need assistance with ALD's  Follows with neuro Dr. Kruger
History of positive Lupus anticoagulant with hx of DVT on coumadin 5 mg, s/p IVC filter   - pt high risk for DVT due to habitus and limit ambulation   - Follows with Gena Noel   - Continue Coumadin 5 mg at bedtime   - Trend INR
Pt reports that since then has urinary incontinence, limit mobility, lives in a nursing home.   Ambulate with cane   Need assistance with ALD's  Follows with neuro Dr. Kruger

## 2024-02-22 NOTE — PROGRESS NOTE ADULT - PROBLEM SELECTOR PLAN 1
Hx of recurrent UTIs in the setting of urinary incontinence   - Last month urine culture grew ESBL given meropenem   - Urine culture growing Enterococcus Fecalis, f/up final   - Zosyn switched to ampicillin q6 per ID. ID Dr. Callahan following, when ready for discharge can switch to oral amoxicillin or Augmentin (total from start of zosyn, 10days)   - CT renal stone hunt - mild B/L hydroureteronephrosis w/o calcified stone identified along course of the ureter. non-obstructive B/L renal calculi are present.  Mild trabeculated bladder wall thickening.  - Trend fever and CBC with diff   - Uro consulted, no surgical intervention at this time

## 2024-02-22 NOTE — PROGRESS NOTE ADULT - ASSESSMENT
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 Found to have Cholelithiasis, surgery team following

## 2024-02-22 NOTE — PROGRESS NOTE ADULT - PROBLEM SELECTOR PLAN 11
Chronic, s/p L5-5 SPF   - switch IV Dilaudid to PO   - Narcan ordered  - Bowel regimen while on opioids  - PT consulted

## 2024-02-22 NOTE — PROGRESS NOTE ADULT - PROBLEM SELECTOR PROBLEM 6
Type 2 diabetes mellitus
BPH (benign prostatic hyperplasia)
Type 2 diabetes mellitus
Type 2 diabetes mellitus

## 2024-02-22 NOTE — PROGRESS NOTE ADULT - PROBLEM SELECTOR PROBLEM 4
Microcytic anemia
HTN (hypertension)
Microcytic anemia

## 2024-02-22 NOTE — PROGRESS NOTE ADULT - PROBLEM SELECTOR PLAN 4
Chronic  Hb at baseline   No signs or symptoms of active bleeding   Trend H/H
Chronic, normotensive   - c/w home clonidine, amlodipine, and metoprolol with holding parameters
Chronic  Hb at baseline   No signs or symptoms of active bleeding   Trend H/H
Chronic  Hb at baseline   No signs or symptoms of active bleeding   Trend H/H

## 2024-02-22 NOTE — PROGRESS NOTE ADULT - PROBLEM SELECTOR PLAN 3
Found on CT: Cholelithiasis w/o significant wall thickening or pericholecystic edema.  -Bender Sign positive, on physical exam  -RUQ US: Cholelithiasis with sludge distended gallbladder fundus. Palpable tenderness/Bender' sign concerning for acute cholecystitis.  -Surgery consulted. HIDA negative for acute cholecystitis  -GI following

## 2024-02-22 NOTE — PROGRESS NOTE ADULT - PROBLEM SELECTOR PLAN 7
Chronic   - Continue  finasteride and  alfuzosin.
Chronic   - Continue  finasteride and  alfuzosin.
Chronic, no acute issue   - Continue home trazadone and duloxetine
Chronic   - Continue  finasteride and  alfuzosin.

## 2024-02-22 NOTE — PROGRESS NOTE ADULT - PROBLEM SELECTOR PROBLEM 10
Hypercoagulopathy
Hypercoagulopathy
Chronic back pain
Hypercoagulopathy

## 2024-02-22 NOTE — PROGRESS NOTE ADULT - PROBLEM SELECTOR PLAN 12
Diet: consistent carb   DVP: warfarin 4 mg   GI ppx: none   Dispo: RMF, plan to DC in 24 hours if patient tolerates po pain medications

## 2024-02-22 NOTE — PROGRESS NOTE ADULT - PROBLEM SELECTOR PROBLEM 11
Chronic back pain
Need for prophylactic measure
Chronic back pain

## 2024-02-22 NOTE — PROGRESS NOTE ADULT - SUBJECTIVE AND OBJECTIVE BOX
PROGRESS NOTE:   Authored by Dr. Lynn Argueta MD, Available on MS Teams    Patient is a 48y old  Male who presents with a chief complaint of UTI (22 Feb 2024 12:13)      SUBJECTIVE / OVERNIGHT EVENTS: Patient has intermittent abdominal pain, L lower side today. Tolerating diet. NO chest pain or shortness of breath. Agreeable to switch from IV to po pain meds    ADDITIONAL REVIEW OF SYSTEMS:    MEDICATIONS  (STANDING):  amLODIPine   Tablet 10 milliGRAM(s) Oral daily  ascorbic acid 500 milliGRAM(s) Oral daily  cloNIDine 0.1 milliGRAM(s) Oral daily  dextrose 5%. 1000 milliLiter(s) (100 mL/Hr) IV Continuous <Continuous>  dextrose 5%. 1000 milliLiter(s) (50 mL/Hr) IV Continuous <Continuous>  dextrose 50% Injectable 12.5 Gram(s) IV Push once  dextrose 50% Injectable 25 Gram(s) IV Push once  dextrose 50% Injectable 25 Gram(s) IV Push once  DULoxetine 60 milliGRAM(s) Oral daily  ferrous    sulfate 325 milliGRAM(s) Oral daily  finasteride 5 milliGRAM(s) Oral daily  gabapentin 600 milliGRAM(s) Oral three times a day  glucagon  Injectable 1 milliGRAM(s) IntraMuscular once  insulin glargine Injectable (LANTUS) 12 Unit(s) SubCutaneous at bedtime  insulin lispro (ADMELOG) corrective regimen sliding scale   SubCutaneous three times a day before meals  insulin lispro (ADMELOG) corrective regimen sliding scale   SubCutaneous at bedtime  insulin lispro Injectable (ADMELOG) 2 Unit(s) SubCutaneous three times a day before meals  lactobacillus acidophilus 1 Tablet(s) Oral daily  melatonin 5 milliGRAM(s) Oral at bedtime  methocarbamol 750 milliGRAM(s) Oral three times a day  methyl salicylate 15%/menthol 10% Topical Cream 1 Application(s) Topical every 6 hours  metoprolol tartrate 25 milliGRAM(s) Oral two times a day  multivitamin 1 Tablet(s) Oral daily  nicotine - 21 mG/24Hr(s) Patch 1 Patch Transdermal every 24 hours  pantoprazole    Tablet 40 milliGRAM(s) Oral before breakfast  polyethylene glycol 3350 17 Gram(s) Oral daily  tamsulosin 0.4 milliGRAM(s) Oral at bedtime  traZODone 50 milliGRAM(s) Oral at bedtime  warfarin 4 milliGRAM(s) Oral once    MEDICATIONS  (PRN):  acetaminophen     Tablet .. 650 milliGRAM(s) Oral every 6 hours PRN Temp greater or equal to 38C (100.4F), Mild Pain (1 - 3)  dextrose Oral Gel 15 Gram(s) Oral once PRN Blood Glucose LESS THAN 70 milliGRAM(s)/deciliter  diphenhydrAMINE 25 milliGRAM(s) Oral every 6 hours PRN Rash and/or Itching  HYDROmorphone   Tablet 4 milliGRAM(s) Oral every 6 hours PRN Severe Pain (7 - 10)  nicotine  Polacrilex Gum 2 milliGRAM(s) Oral every 2 hours PRN Cravings      CAPILLARY BLOOD GLUCOSE      POCT Blood Glucose.: 190 mg/dL (22 Feb 2024 11:29)  POCT Blood Glucose.: 169 mg/dL (22 Feb 2024 08:08)  POCT Blood Glucose.: 177 mg/dL (21 Feb 2024 21:35)  POCT Blood Glucose.: 144 mg/dL (21 Feb 2024 16:31)    I&O's Summary    21 Feb 2024 07:01  -  22 Feb 2024 07:00  --------------------------------------------------------  IN: 0 mL / OUT: 3950 mL / NET: -3950 mL        PHYSICAL EXAM:  Vital Signs Last 24 Hrs  T(C): 36.7 (22 Feb 2024 06:01), Max: 36.7 (21 Feb 2024 21:00)  T(F): 98 (22 Feb 2024 06:01), Max: 98.1 (21 Feb 2024 21:00)  HR: 60 (22 Feb 2024 06:01) (57 - 69)  BP: 125/77 (22 Feb 2024 06:01) (125/77 - 154/72)  BP(mean): --  RR: 19 (22 Feb 2024 06:01) (18 - 19)  SpO2: 94% (22 Feb 2024 06:01) (94% - 96%)    Parameters below as of 22 Feb 2024 06:01  Patient On (Oxygen Delivery Method): room air        CONSTITUTIONAL: NAD  RESPIRATORY: Normal respiratory effort; lungs are clear to auscultation bilaterally  CARDIOVASCULAR: Regular rate and rhythm, normal S1 and S2, no murmur/rub/gallop; No lower extremity edema  ABDOMEN: Nontender to palpation, normoactive bowel sounds, no rebound/guarding  MUSCLOSKELETAL: no clubbing or cyanosis of digits; no joint swelling or tenderness to palpation  PSYCH: A+O to person, place, and time; affect appropriate    LABS:                        11.0   8.28  )-----------( 207      ( 21 Feb 2024 08:13 )             34.5     02-22    139  |  107  |  36<H>  ----------------------------<  148<H>  3.6   |  28  |  2.30<H>    Ca    9.1      22 Feb 2024 07:18  Phos  3.9     02-22  Mg     2.0     02-22      PT/INR - ( 22 Feb 2024 07:18 )   PT: 26.5 sec;   INR: 2.32 ratio         PTT - ( 21 Feb 2024 08:13 )  PTT:46.4 sec      Urinalysis Basic - ( 22 Feb 2024 07:18 )    Color: x / Appearance: x / SG: x / pH: x  Gluc: 148 mg/dL / Ketone: x  / Bili: x / Urobili: x   Blood: x / Protein: x / Nitrite: x   Leuk Esterase: x / RBC: x / WBC x   Sq Epi: x / Non Sq Epi: x / Bacteria: x

## 2024-02-22 NOTE — PROGRESS NOTE ADULT - PROBLEM SELECTOR PLAN 8
Chronic, no acute issue   - Continue home trazadone and duloxetine
Pt reports that since then has urinary incontinence, limit mobility, lives in a nursing home.   Ambulate with cane   Need some assistance with ALD's  Follows with neuro Dr. Kruger
Chronic, no acute issue   - Continue home trazadone and duloxetine
Chronic, no acute issue   - Continue home trazadone and duloxetine

## 2024-02-22 NOTE — PROGRESS NOTE ADULT - PROBLEM SELECTOR PROBLEM 7
BPH (benign prostatic hyperplasia)
BPH (benign prostatic hyperplasia)
Anxiety and depression
BPH (benign prostatic hyperplasia)

## 2024-02-22 NOTE — PROGRESS NOTE ADULT - PROBLEM SELECTOR PROBLEM 5
HTN (hypertension)
HTN (hypertension)
Type 2 diabetes mellitus
HTN (hypertension)

## 2024-02-22 NOTE — PROGRESS NOTE ADULT - PROBLEM SELECTOR PLAN 10
Chronic, s/p L5-5 SPF   - Continue home gabapentin and muscle relaxant   - Pain regimen: IV Dilaudid 0.5 q4hr prn for MP and 1 q4hrs prn for severe pain   - Bowel regimen while on opioids  - PT consulted
History of positive Lupus anticoagulant with hx of DVT on coumadin 5 mg, s/p IVC filter   - pt high risk for DVT due to habitus and limited ambulation   - Follows with Dr. Avery   - Continue Coumadin 4 mg at bedtime   - INR at goal (2/18/24), will continue to trend
History of positive Lupus anticoagulant with hx of DVT on coumadin 5 mg, s/p IVC filter   - pt high risk for DVT due to habitus and limited ambulation   - Follows with Dr. Avery   - Continue Coumadin 4 mg at bedtime   - INR at goal, will continue to trend
History of positive Lupus anticoagulant with hx of DVT on coumadin 5 mg, s/p IVC filter   - pt high risk for DVT due to habitus and limited ambulation   - Follows with Dr. Avery   - Continue Coumadin 4 mg at bedtime   - INR at goal, will continue to trend INR
History of positive Lupus anticoagulant with hx of DVT on coumadin 5 mg, s/p IVC filter   - pt high risk for DVT due to habitus and limited ambulation   - Follows with Dr. Avery   - Continue Coumadin 4 mg at bedtime   - INR at goal (2/18/24), will continue to trend
History of positive Lupus anticoagulant with hx of DVT on coumadin 5 mg, s/p IVC filter   - pt high risk for DVT due to habitus and limited ambulation   - Follows with Dr. Avery   - Continue Coumadin 4 mg at bedtime   - INR at goal, will continue to trend

## 2024-02-22 NOTE — PROGRESS NOTE ADULT - PROBLEM SELECTOR PROBLEM 8
Anxiety and depression
Anxiety and depression
H/O Guillain-[place] syndrome
Anxiety and depression

## 2024-02-22 NOTE — PROGRESS NOTE ADULT - PROBLEM SELECTOR PLAN 5
Chronic, normotensive   - c/w home clonidine, amlodipine, and metoprolol with holding parameters
Chronic, normotensive   - c/w home clonidine, amlodipine, and metoprolol with holding parameters
Insulin dependent   - At home use Lantus 20 qd and admelog 4 units pre meals   - Continue lantus 12 and ISS   - Pending HgbA1c  - Accu checks w/ hypoglycemic protocol
Chronic, normotensive   - c/w home clonidine, amlodipine, and metoprolol with holding parameters

## 2024-02-22 NOTE — PROGRESS NOTE ADULT - PROBLEM SELECTOR PLAN 6
Chronic   - Continue  finasteride and  alfuzosin.
Insulin dependent   - At home use Lantus 20 qd and admelog 4 units pre meals   - Continue lantus 12 and ISS   - Pending HgbA1c  - Accu checks w/ hypoglycemic protocol

## 2024-02-22 NOTE — PROGRESS NOTE ADULT - SUBJECTIVE AND OBJECTIVE BOX
[facility]   INFECTIOUS DISEASES   [address] [place] [address]  Tel: [phone]     Fax: [phone]  =========================================================  MD Cornel Regan Kaushal, MD Cho, Michelle, MD Sunjit, Jaspal, MD  =========================================================    JOIE BRUNO [id number]    Follow up: UTI    He has a chronic ayala due to neurogenic bladder, no symptoms. No fever. No abdominal pain, left flank pain is better.     Allergies:  sulfa drugs (Rash)    MEDICATIONS  (STANDING):  amLODIPine   Tablet 10 milliGRAM(s) Oral daily  ascorbic acid 500 milliGRAM(s) Oral daily  cloNIDine 0.1 milliGRAM(s) Oral daily  dextrose 5%. 1000 milliLiter(s) (50 mL/Hr) IV Continuous <Continuous>  dextrose 5%. 1000 milliLiter(s) (100 mL/Hr) IV Continuous <Continuous>  dextrose 50% Injectable 12.5 Gram(s) IV Push once  dextrose 50% Injectable 25 Gram(s) IV Push once  dextrose 50% Injectable 25 Gram(s) IV Push once  DULoxetine 60 milliGRAM(s) Oral daily  ferrous    sulfate 325 milliGRAM(s) Oral daily  finasteride 5 milliGRAM(s) Oral daily  gabapentin 600 milliGRAM(s) Oral three times a day  glucagon  Injectable 1 milliGRAM(s) IntraMuscular once  insulin glargine Injectable (LANTUS) 12 Unit(s) SubCutaneous at bedtime  insulin lispro (ADMELOG) corrective regimen sliding scale   SubCutaneous three times a day before meals  insulin lispro (ADMELOG) corrective regimen sliding scale   SubCutaneous at bedtime  insulin lispro Injectable (ADMELOG) 2 Unit(s) SubCutaneous three times a day before meals  lactobacillus acidophilus 1 Tablet(s) Oral daily  melatonin 5 milliGRAM(s) Oral at bedtime  methocarbamol 750 milliGRAM(s) Oral three times a day  methyl salicylate 15%/menthol 10% Topical Cream 1 Application(s) Topical every 6 hours  metoprolol tartrate 25 milliGRAM(s) Oral two times a day  multivitamin 1 Tablet(s) Oral daily  nicotine - 21 mG/24Hr(s) Patch 1 Patch Transdermal every 24 hours  pantoprazole    Tablet 40 milliGRAM(s) Oral before breakfast  polyethylene glycol 3350 17 Gram(s) Oral daily  tamsulosin 0.4 milliGRAM(s) Oral at bedtime  traZODone 50 milliGRAM(s) Oral at bedtime  warfarin 4 milliGRAM(s) Oral once      REVIEW OF SYSTEMS:  CONSTITUTIONAL:  No Fever or chills  HEENT:   No diplopia or blurred vision.  No earache, sore throat or runny nose.  CARDIOVASCULAR:  No chest pain  RESPIRATORY:  No cough, shortness of breath, PND or orthopnea.  GASTROINTESTINAL:  No nausea, vomiting or diarrhea.  GENITOURINARY:  No dysuria, frequency or urgency. No Blood in urine  MUSCULOSKELETAL:  no joint aches, no muscle pain  SKIN:  No change in skin, hair or nails.     Physical Exam:  Vital Signs Last 24 Hrs  T(C): 36.5 (22 Feb 2024 13:43), Max: 36.7 (21 Feb 2024 21:00)  T(F): 97.7 (22 Feb 2024 13:43), Max: 98.1 (21 Feb 2024 21:00)  HR: 59 (22 Feb 2024 13:43) (57 - 69)  BP: 143/88 (22 Feb 2024 13:43) (125/77 - 154/72)  BP(mean): --  RR: 18 (22 Feb 2024 13:43) (18 - 19)  SpO2: 92% (22 Feb 2024 13:43) (92% - 96%)  Parameters below as of 22 Feb 2024 13:43  Patient On (Oxygen Delivery Method): room air  GEN: NAD  HEENT: normocephalic and atraumatic. EOMI. STEVE.    NECK: Supple.  No lymphadenopathy   LUNGS: Clear to auscultation.  HEART: Regular rate and rhythm without murmur.  ABDOMEN: Soft, nontender, and nondistended.  Positive bowel sounds.    : No CVA tenderness  EXTREMITIES: Without edema.  MSK: no joint swelling  NEUROLOGIC: grossly intact.  PSYCHIATRIC: Appropriate affect .  SKIN: No rash     Labs:                        11.0   8.28  )-----------( 207      ( 21 Feb 2024 08:13 )             34.5     02-22    139  |  107  |  36<H>  ----------------------------<  148<H>  3.6   |  28  |  2.30<H>    Ca    9.1      22 Feb 2024 07:18  Phos  3.9     02-22  Mg     2.0     02-22    Culture - Urine (collected 02-17-24 @ 04:25)  Source: Clean Catch Clean Catch (Midstream)  Final Report (02-19-24 @ 12:24):    >100,000 CFU/ml Enterococcus faecalis (vancomycin resistant)  Organism: Enterococcus faecalis (vancomycin resistant) (02-19-24 @ 12:24)  Organism: Enterococcus faecalis (vancomycin resistant) (02-19-24 @ 12:24)    Sensitivities:      Method Type: JOSE RAMON      -  Ampicillin: S <=2 Predicts results to ampicillin/sulbactam, amoxacillin-clavulanate and  piperacillin-tazobactam.      -  Ciprofloxacin: R >2      -  Daptomycin: S 1      -  Levofloxacin: R >4      -  Linezolid: S 2      -  Nitrofurantoin: S <=32 Should not be used to treat pyelonephritis.      -  Tetracycline: R >8      -  Vancomycin: R >16    Culture - Urine (collected 03-12-23 @ 03:16)  Source: Catheterized None  Final Report (03-13-23 @ 08:18):    <10,000 CFU/mL Normal Urogenital Ellen    WBC Count: 8.28 K/uL (02-21-24 @ 08:13)  WBC Count: 7.85 K/uL (02-20-24 @ 06:18)  WBC Count: 8.87 K/uL (02-19-24 @ 09:05)  WBC Count: 9.04 K/uL (02-18-24 @ 06:30)    Creatinine: 2.30 mg/dL (02-22-24 @ 07:18)  Creatinine: 2.30 mg/dL (02-21-24 @ 08:13)  Creatinine: 2.40 mg/dL (02-20-24 @ 06:18)  Creatinine: 2.30 mg/dL (02-19-24 @ 09:05)  Creatinine: 2.10 mg/dL (02-18-24 @ 06:30)    All imaging and data are reviewed.   < from: CT Renal Stone Hunt (02.17.24 @ 05:02) >  IMPRESSION:  Cholelithiasis. Vague opacities at the fundal portion of the gallbladder.   Right upper quadrant ultrasound suggested. No significant wall thickening   or pericholecystic edema.  Mild bilateral hydroureteronephrosis without calcified stone identified   along the course of the ureter. Differential includes recently passed   kidney stone or infection. Additional punctate nonobstructive bilateral   renal calculi are present.  Mild trabeculated bladder wall thickening. Correlate with urinalysis and   lab values to assess for cystitis and/or ascending urinary tract   infection.    < from: US Abdomen Upper Quadrant Right (02.19.24 @ 06:52) >  IMPRESSION:  Cholelithiasis with sludge distended gallbladder fundus. 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 non-obstructive B/L renal calculi are present. Mild trabeculated bladder wall thickening.    Reported last night that he had itching on the IV site, no issue at this time, I don't believe it is related to zosyn allergy.     # Complicated UTI   # ANTONIO  # R/o Cholelithiasis/cholecystitis      - In the past h/o klebsiella pneumonia ESBL resistant to Rocephin  - UCx from this admission noted enterococcus faecalis S to ampi but R to vancomycin   - Was on zosyn but since HIDA was negative switched to Ampicillin    - Now can switch to oral amoxicillin (total from start of zosyn, 10days)   - Trend temps/WBC  - Monitor renal fxn  -  follow up for possible suprapubic catheter    Will sign off please call with any question.     Sia Callahan MD  Division of Infectious Diseases   Please call ID service at [phone] with any question.      50 minutes spent on total encounter assessing patient, examination, chart review, counseling and coordinating care by the attending physician/nurse/care manager.

## 2024-02-23 VITALS
TEMPERATURE: 98 F | RESPIRATION RATE: 18 BRPM | SYSTOLIC BLOOD PRESSURE: 142 MMHG | DIASTOLIC BLOOD PRESSURE: 84 MMHG | HEART RATE: 59 BPM

## 2024-02-23 LAB
ANION GAP SERPL CALC-SCNC: 9 MMOL/L — SIGNIFICANT CHANGE UP (ref 5–17)
BUN SERPL-MCNC: 43 MG/DL — HIGH (ref 7–23)
CALCIUM SERPL-MCNC: 9 MG/DL — SIGNIFICANT CHANGE UP (ref 8.5–10.1)
CHLORIDE SERPL-SCNC: 107 MMOL/L — SIGNIFICANT CHANGE UP (ref 96–108)
CO2 SERPL-SCNC: 26 MMOL/L — SIGNIFICANT CHANGE UP (ref 22–31)
CREAT SERPL-MCNC: 2.2 MG/DL — HIGH (ref 0.5–1.3)
EGFR: 36 ML/MIN/1.73M2 — LOW
GLUCOSE SERPL-MCNC: 114 MG/DL — HIGH (ref 70–99)
INR BLD: 2.23 RATIO — HIGH (ref 0.85–1.18)
MAGNESIUM SERPL-MCNC: 1.9 MG/DL — SIGNIFICANT CHANGE UP (ref 1.6–2.6)
PHOSPHATE SERPL-MCNC: 3.7 MG/DL — SIGNIFICANT CHANGE UP (ref 2.5–4.5)
POTASSIUM SERPL-MCNC: 3.7 MMOL/L — SIGNIFICANT CHANGE UP (ref 3.5–5.3)
POTASSIUM SERPL-SCNC: 3.7 MMOL/L — SIGNIFICANT CHANGE UP (ref 3.5–5.3)
PROTHROM AB SERPL-ACNC: 25.5 SEC — HIGH (ref 9.5–13)
SODIUM SERPL-SCNC: 142 MMOL/L — SIGNIFICANT CHANGE UP (ref 135–145)

## 2024-02-23 PROCEDURE — 85610 PROTHROMBIN TIME: CPT

## 2024-02-23 PROCEDURE — 85025 COMPLETE CBC W/AUTO DIFF WBC: CPT

## 2024-02-23 PROCEDURE — 84540 ASSAY OF URINE/UREA-N: CPT

## 2024-02-23 PROCEDURE — 87186 SC STD MICRODIL/AGAR DIL: CPT

## 2024-02-23 PROCEDURE — 84133 ASSAY OF URINE POTASSIUM: CPT

## 2024-02-23 PROCEDURE — 83935 ASSAY OF URINE OSMOLALITY: CPT

## 2024-02-23 PROCEDURE — 76705 ECHO EXAM OF ABDOMEN: CPT

## 2024-02-23 PROCEDURE — 85027 COMPLETE CBC AUTOMATED: CPT

## 2024-02-23 PROCEDURE — 85730 THROMBOPLASTIN TIME PARTIAL: CPT

## 2024-02-23 PROCEDURE — 84156 ASSAY OF PROTEIN URINE: CPT

## 2024-02-23 PROCEDURE — 83036 HEMOGLOBIN GLYCOSYLATED A1C: CPT

## 2024-02-23 PROCEDURE — 80053 COMPREHEN METABOLIC PANEL: CPT

## 2024-02-23 PROCEDURE — 97162 PT EVAL MOD COMPLEX 30 MIN: CPT

## 2024-02-23 PROCEDURE — 87086 URINE CULTURE/COLONY COUNT: CPT

## 2024-02-23 PROCEDURE — 82570 ASSAY OF URINE CREATININE: CPT

## 2024-02-23 PROCEDURE — A9537: CPT

## 2024-02-23 PROCEDURE — 96375 TX/PRO/DX INJ NEW DRUG ADDON: CPT

## 2024-02-23 PROCEDURE — 78226 HEPATOBILIARY SYSTEM IMAGING: CPT

## 2024-02-23 PROCEDURE — 80048 BASIC METABOLIC PNL TOTAL CA: CPT

## 2024-02-23 PROCEDURE — 82962 GLUCOSE BLOOD TEST: CPT

## 2024-02-23 PROCEDURE — 84100 ASSAY OF PHOSPHORUS: CPT

## 2024-02-23 PROCEDURE — 74176 CT ABD & PELVIS W/O CONTRAST: CPT | Mod: MA

## 2024-02-23 PROCEDURE — 96374 THER/PROPH/DIAG INJ IV PUSH: CPT

## 2024-02-23 PROCEDURE — 84300 ASSAY OF URINE SODIUM: CPT

## 2024-02-23 PROCEDURE — 36415 COLL VENOUS BLD VENIPUNCTURE: CPT

## 2024-02-23 PROCEDURE — 83690 ASSAY OF LIPASE: CPT

## 2024-02-23 PROCEDURE — 99239 HOSP IP/OBS DSCHRG MGMT >30: CPT

## 2024-02-23 PROCEDURE — 83735 ASSAY OF MAGNESIUM: CPT

## 2024-02-23 PROCEDURE — 99285 EMERGENCY DEPT VISIT HI MDM: CPT | Mod: 25

## 2024-02-23 PROCEDURE — 81001 URINALYSIS AUTO W/SCOPE: CPT

## 2024-02-23 PROCEDURE — 87077 CULTURE AEROBIC IDENTIFY: CPT

## 2024-02-23 PROCEDURE — 80061 LIPID PANEL: CPT

## 2024-02-23 RX ORDER — DULOXETINE HYDROCHLORIDE 30 MG/1
1 CAPSULE, DELAYED RELEASE ORAL
Qty: 0 | Refills: 0 | DISCHARGE
Start: 2024-02-23

## 2024-02-23 RX ORDER — FINASTERIDE 5 MG/1
1 TABLET, FILM COATED ORAL
Qty: 0 | Refills: 0 | DISCHARGE
Start: 2024-02-23

## 2024-02-23 RX ORDER — POLYETHYLENE GLYCOL 3350 17 G/17G
1 POWDER, FOR SOLUTION ORAL
Refills: 0 | DISCHARGE

## 2024-02-23 RX ORDER — NICOTINE POLACRILEX 2 MG
1 GUM BUCCAL
Qty: 0 | Refills: 0 | DISCHARGE

## 2024-02-23 RX ORDER — METOPROLOL TARTRATE 50 MG
1 TABLET ORAL
Qty: 0 | Refills: 0 | DISCHARGE
Start: 2024-02-23

## 2024-02-23 RX ORDER — ASCORBIC ACID 60 MG
1 TABLET,CHEWABLE ORAL
Refills: 0 | DISCHARGE

## 2024-02-23 RX ORDER — FERROUS SULFATE 325(65) MG
1 TABLET ORAL
Refills: 0 | DISCHARGE

## 2024-02-23 RX ORDER — DULOXETINE HYDROCHLORIDE 30 MG/1
1 CAPSULE, DELAYED RELEASE ORAL
Refills: 0 | DISCHARGE

## 2024-02-23 RX ORDER — FINASTERIDE 5 MG/1
1 TABLET, FILM COATED ORAL
Qty: 0 | Refills: 0 | DISCHARGE

## 2024-02-23 RX ORDER — AMOXICILLIN 250 MG/5ML
1 SUSPENSION, RECONSTITUTED, ORAL (ML) ORAL
Qty: 0 | Refills: 0 | DISCHARGE
Start: 2024-02-23

## 2024-02-23 RX ORDER — AMLODIPINE BESYLATE 2.5 MG/1
1 TABLET ORAL
Refills: 0 | DISCHARGE

## 2024-02-23 RX ORDER — ASCORBIC ACID 60 MG
1 TABLET,CHEWABLE ORAL
Qty: 0 | Refills: 0 | DISCHARGE
Start: 2024-02-23

## 2024-02-23 RX ORDER — DULOXETINE HYDROCHLORIDE 30 MG/1
1 CAPSULE, DELAYED RELEASE ORAL
Qty: 0 | Refills: 0 | DISCHARGE

## 2024-02-23 RX ORDER — FINASTERIDE 5 MG/1
1 TABLET, FILM COATED ORAL
Refills: 0 | DISCHARGE

## 2024-02-23 RX ORDER — GABAPENTIN 400 MG/1
1 CAPSULE ORAL
Refills: 0 | DISCHARGE

## 2024-02-23 RX ORDER — TRAZODONE HCL 50 MG
1 TABLET ORAL
Refills: 0 | DISCHARGE

## 2024-02-23 RX ORDER — POLYETHYLENE GLYCOL 3350 17 G/17G
17 POWDER, FOR SOLUTION ORAL
Qty: 0 | Refills: 0 | DISCHARGE
Start: 2024-02-23

## 2024-02-23 RX ORDER — METOPROLOL TARTRATE 50 MG
1 TABLET ORAL
Refills: 0 | DISCHARGE

## 2024-02-23 RX ORDER — ACETAMINOPHEN 500 MG
2 TABLET ORAL
Qty: 0 | Refills: 0 | DISCHARGE
Start: 2024-02-23

## 2024-02-23 RX ORDER — LANOLIN ALCOHOL/MO/W.PET/CERES
2 CREAM (GRAM) TOPICAL
Refills: 0 | DISCHARGE

## 2024-02-23 RX ORDER — FERROUS SULFATE 325(65) MG
1 TABLET ORAL
Qty: 0 | Refills: 0 | DISCHARGE
Start: 2024-02-23

## 2024-02-23 RX ORDER — AMLODIPINE BESYLATE 2.5 MG/1
1 TABLET ORAL
Qty: 0 | Refills: 0 | DISCHARGE
Start: 2024-02-23

## 2024-02-23 RX ORDER — GABAPENTIN 400 MG/1
1 CAPSULE ORAL
Qty: 0 | Refills: 0 | DISCHARGE
Start: 2024-02-23

## 2024-02-23 RX ORDER — AMLODIPINE BESYLATE 2.5 MG/1
1 TABLET ORAL
Qty: 0 | Refills: 0 | DISCHARGE

## 2024-02-23 RX ORDER — ALFUZOSIN HYDROCHLORIDE 10 MG/1
1 TABLET, EXTENDED RELEASE ORAL
Refills: 0 | DISCHARGE

## 2024-02-23 RX ORDER — PANTOPRAZOLE SODIUM 20 MG/1
1 TABLET, DELAYED RELEASE ORAL
Refills: 0 | DISCHARGE

## 2024-02-23 RX ORDER — GABAPENTIN 400 MG/1
2 CAPSULE ORAL
Qty: 0 | Refills: 0 | DISCHARGE

## 2024-02-23 RX ORDER — BACLOFEN 100 %
0.5 POWDER (GRAM) MISCELLANEOUS
Qty: 0 | Refills: 0 | DISCHARGE

## 2024-02-23 RX ORDER — LANOLIN ALCOHOL/MO/W.PET/CERES
1 CREAM (GRAM) TOPICAL
Qty: 0 | Refills: 0 | DISCHARGE

## 2024-02-23 RX ORDER — LANOLIN ALCOHOL/MO/W.PET/CERES
1 CREAM (GRAM) TOPICAL
Qty: 0 | Refills: 0 | DISCHARGE
Start: 2024-02-23

## 2024-02-23 RX ORDER — TRAZODONE HCL 50 MG
1 TABLET ORAL
Qty: 0 | Refills: 0 | DISCHARGE
Start: 2024-02-23

## 2024-02-23 RX ORDER — METHOCARBAMOL 500 MG/1
1 TABLET, FILM COATED ORAL
Refills: 0 | DISCHARGE

## 2024-02-23 RX ORDER — PANTOPRAZOLE SODIUM 20 MG/1
1 TABLET, DELAYED RELEASE ORAL
Qty: 0 | Refills: 0 | DISCHARGE
Start: 2024-02-23

## 2024-02-23 RX ORDER — METHOCARBAMOL 500 MG/1
1 TABLET, FILM COATED ORAL
Qty: 0 | Refills: 0 | DISCHARGE
Start: 2024-02-23

## 2024-02-23 RX ADMIN — Medication 1 PATCH: at 00:16

## 2024-02-23 RX ADMIN — Medication 1 TABLET(S): at 11:53

## 2024-02-23 RX ADMIN — HYDROMORPHONE HYDROCHLORIDE 4 MILLIGRAM(S): 2 INJECTION INTRAMUSCULAR; INTRAVENOUS; SUBCUTANEOUS at 08:21

## 2024-02-23 RX ADMIN — Medication 1: at 11:52

## 2024-02-23 RX ADMIN — Medication 25 MILLIGRAM(S): at 06:21

## 2024-02-23 RX ADMIN — Medication 500 MILLIGRAM(S): at 11:53

## 2024-02-23 RX ADMIN — HYDROMORPHONE HYDROCHLORIDE 4 MILLIGRAM(S): 2 INJECTION INTRAMUSCULAR; INTRAVENOUS; SUBCUTANEOUS at 06:22

## 2024-02-23 RX ADMIN — GABAPENTIN 600 MILLIGRAM(S): 400 CAPSULE ORAL at 06:22

## 2024-02-23 RX ADMIN — Medication 500 MILLIGRAM(S): at 06:20

## 2024-02-23 RX ADMIN — FINASTERIDE 5 MILLIGRAM(S): 5 TABLET, FILM COATED ORAL at 11:53

## 2024-02-23 RX ADMIN — Medication 2 UNIT(S): at 11:52

## 2024-02-23 RX ADMIN — Medication 325 MILLIGRAM(S): at 11:53

## 2024-02-23 RX ADMIN — Medication 0.1 MILLIGRAM(S): at 06:21

## 2024-02-23 RX ADMIN — METHOCARBAMOL 750 MILLIGRAM(S): 500 TABLET, FILM COATED ORAL at 06:21

## 2024-02-23 RX ADMIN — DULOXETINE HYDROCHLORIDE 60 MILLIGRAM(S): 30 CAPSULE, DELAYED RELEASE ORAL at 11:54

## 2024-02-23 RX ADMIN — Medication 2 UNIT(S): at 08:25

## 2024-02-23 RX ADMIN — AMLODIPINE BESYLATE 10 MILLIGRAM(S): 2.5 TABLET ORAL at 06:22

## 2024-02-23 RX ADMIN — PANTOPRAZOLE SODIUM 40 MILLIGRAM(S): 20 TABLET, DELAYED RELEASE ORAL at 06:21

## 2024-02-23 RX ADMIN — Medication 2 MILLIGRAM(S): at 06:21

## 2024-02-23 RX ADMIN — Medication 1 PATCH: at 08:16

## 2024-02-23 RX ADMIN — HYDROMORPHONE HYDROCHLORIDE 4 MILLIGRAM(S): 2 INJECTION INTRAMUSCULAR; INTRAVENOUS; SUBCUTANEOUS at 13:25

## 2024-02-23 NOTE — DISCHARGE NOTE PROVIDER - NSDCMRMEDTOKEN_GEN_ALL_CORE_FT
Acidophilus oral capsule: 1 cap(s) orally once a day  Admelog 100 units/mL injectable solution: 4 unit(s) injectable 3 times a day (before meals) 4-8 units  alfuzosin 10 mg oral tablet, extended release: 1 tab(s) orally once a day (at bedtime)  Ambien 5 mg oral tablet: 1 tab(s) orally once a day (at bedtime)  amLODIPine 10 mg oral tablet: 1 tab(s) orally once a day  amoxicillin 500 mg oral capsule: 1 cap(s) orally every 8 hours last day 2/27  ascorbic acid 500 mg oral tablet: 1 tab(s) orally once a day  cloNIDine 0.1 mg oral tablet: 1 tab(s) orally once a day  cranberry oral capsule: 1 cap(s) orally once a day  DULoxetine 60 mg oral delayed release capsule: 1 cap(s) orally once a day  ferrous sulfate 325 mg (65 mg elemental iron) oral tablet: 1 tab(s) orally once a day  finasteride 5 mg oral tablet: 1 tab(s) orally once a day  gabapentin 600 mg oral tablet: 1 tab(s) orally 3 times a day  Lantus 100 units/mL subcutaneous solution: 20 unit(s) subcutaneous once a day  melatonin 5 mg oral tablet: 1 tab(s) orally once a day (at bedtime)  methocarbamol 750 mg oral tablet: 1 tab(s) orally 3 times a day  metoprolol tartrate 25 mg oral tablet: 1 tab(s) orally 2 times a day  Multiple Vitamins oral tablet: 1 tab(s) orally once a day  nicotine 2 mg oral transmucosal gum: 1 gum chewed every 2 hours as needed for  cravings  oxyCODONE 10 mg oral tablet: 1 tab(s) orally every 6 hours as needed for  moderate pain  pantoprazole 40 mg oral delayed release tablet: 1 tab(s) orally once a day (before a meal)  polyethylene glycol 3350 oral powder for reconstitution: 17 gram(s) orally once a day  traZODone 50 mg oral tablet: 1 tab(s) orally once a day (at bedtime)  warfarin 4 mg oral tablet: 1 tab(s) orally once a day

## 2024-02-23 NOTE — PROGRESS NOTE ADULT - PROVIDER SPECIALTY LIST ADULT
Gastroenterology
Gastroenterology
Nephrology
Gastroenterology
Infectious Disease
Surgery
Infectious Disease
Nephrology
Nephrology
Internal Medicine
Internal Medicine
Hospitalist

## 2024-02-23 NOTE — DISCHARGE NOTE PROVIDER - CARE PROVIDER_API CALL
What Type Of Note Output Would You Prefer (Optional)?: Standard Output How Severe Are Your Spot(S)?: mild Have Your Spot(S) Been Treated In The Past?: has not been treated Hpi Title: Evaluation of a Skin Lesion Elio Pop  Gastroenterology  [address] [place] [id number]  Phone: [phone]  Fax: [phone]  Follow Up Time:

## 2024-02-23 NOTE — DISCHARGE NOTE PROVIDER - NSDCCPCAREPLAN_GEN_ALL_CORE_FT
PRINCIPAL DISCHARGE DIAGNOSIS  Diagnosis: Acute UTI  Assessment and Plan of Treatment: A urinary tract infection (UTI) is an infection of any part of the urinary tract, which includes the kidneys, ureters, bladder, and urethra. Continue to take Amoxicilln until 2/27. Do not stop taking the antibiotic even if you start to feel better.  SEEK IMMEDIATE MEDICAL CARE IF YOU HAVE ANY OF THE FOLLOWING SYMPTOMS: severe back or abdominal pain, fever, inability to keep fluids or medicine down, dizziness/lightheadedness, or a change in mental status.      SECONDARY DISCHARGE DIAGNOSES  Diagnosis: Type 2 diabetes mellitus  Assessment and Plan of Treatment: Resume home medication regimen.    Diagnosis: HTN (hypertension)  Assessment and Plan of Treatment: Resume home medication regimen.

## 2024-02-23 NOTE — SOCIAL WORK PROGRESS NOTE - NSSWPROGRESSNOTE_GEN_ALL_CORE
SW met with pt at bedside to discuss dc planning. Per MD, pt stable for dc. [facility] able to accept pt back today. Pt in agreement with plan. Lette via Medicaid being set up for transport. SW to follow and remain available for any needs.

## 2024-02-23 NOTE — DISCHARGE NOTE NURSING/CASE MANAGEMENT/SOCIAL WORK - PATIENT PORTAL LINK FT
You can access the FollowMyHealth Patient Portal offered by [facility] by registering at the following website: [email or web address]. By joining [employer]’s FollowMyHealth portal, you will also be able to view your health information using other applications (apps) compatible with our system.

## 2024-02-23 NOTE — PROGRESS NOTE ADULT - SUBJECTIVE AND OBJECTIVE BOX
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    Patient states that 4 days ago, he began to develop left sided flank pain. Patient describes the pain as sharp, tender, and worse with movement. He states the pain is 10/10 and has been taking oxycodone 10mg and tylenol twice a day, without much relief. Patient has a history of multiple utis, and kidney stones (s/p lithotripsy 2023).  He endorses nausea, but denies vomiting. The pain has worsened progressively over the past few days, which prompted him to visit the emergency room for further evaluation.     Of note, he recently had a uti last month, and was treated with meropenem     Patient denies F/C, HA, N/V, CP, SOB, C/D, Dysuria, Leg pain, or Leg swelling.    Patient denies any recent travel, sick contacts, or long periods of immobilization.      ED Course:  Vitals: 134/83mmHg, 60bpm, 97.5F, 96% on RA  Labs: WBc 12.5, Hgb 10.6, MCV 76.2, PT 25.6, INR 2.24, aPTT 44.5, Cl 110, BUN/Cr 39/2.2, glucose 2.2, GFR 36  UA: many bacteria, squamous cells present. mod blood. large leuk esterase, 100 protein  CT renal stone hunt: mild B/L hydroureteronephrosis w/o calcified stone identified along course of the ureter. non-obstructive B/L renal calculi are present.  Mild trabeculated bladder wall thickening.  Given - rocephin 1g, IV dilaudid 0.5mg x1, IV dilaudid 1mg x1, 1L NS (17 Feb 2024 07:12)       PAST MEDICAL & SURGICAL HISTORY:  Renal stones      H/O Guillain-[place] syndrome      History of neurogenic bowel      DM2 (diabetes mellitus, type 2)      HTN (hypertension)      BPH without obstruction/lower urinary tract symptoms      Degenerative arthritis      DVT of lower limb, acute      CVA (cerebrovascular accident)      H/O lithotripsy           FAMILY HISTORY:  Family history of sinus tachycardia (Father)    FH: HTN (hypertension) (Mother)    NC    Social History:Non smoker    MEDICATIONS  (STANDING):  amLODIPine   Tablet 10 milliGRAM(s) Oral daily  ampicillin  IVPB 1 Gram(s) IV Intermittent every 6 hours  ascorbic acid 500 milliGRAM(s) Oral daily  cloNIDine 0.1 milliGRAM(s) Oral daily  dextrose 5%. 1000 milliLiter(s) (50 mL/Hr) IV Continuous <Continuous>  dextrose 5%. 1000 milliLiter(s) (100 mL/Hr) IV Continuous <Continuous>  dextrose 50% Injectable 25 Gram(s) IV Push once  dextrose 50% Injectable 25 Gram(s) IV Push once  dextrose 50% Injectable 12.5 Gram(s) IV Push once  DULoxetine 60 milliGRAM(s) Oral daily  ferrous    sulfate 325 milliGRAM(s) Oral daily  finasteride 5 milliGRAM(s) Oral daily  gabapentin 600 milliGRAM(s) Oral three times a day  glucagon  Injectable 1 milliGRAM(s) IntraMuscular once  insulin glargine Injectable (LANTUS) 12 Unit(s) SubCutaneous at bedtime  insulin lispro (ADMELOG) corrective regimen sliding scale   SubCutaneous three times a day before meals  insulin lispro (ADMELOG) corrective regimen sliding scale   SubCutaneous at bedtime  insulin lispro Injectable (ADMELOG) 2 Unit(s) SubCutaneous three times a day before meals  lactobacillus acidophilus 1 Tablet(s) Oral daily  melatonin 5 milliGRAM(s) Oral at bedtime  methocarbamol 750 milliGRAM(s) Oral three times a day  metoprolol tartrate 25 milliGRAM(s) Oral two times a day  multivitamin 1 Tablet(s) Oral daily  nicotine - 21 mG/24Hr(s) Patch 1 Patch Transdermal every 24 hours  pantoprazole    Tablet 40 milliGRAM(s) Oral before breakfast  polyethylene glycol 3350 17 Gram(s) Oral daily  sodium chloride 0.9%. 1000 milliLiter(s) (60 mL/Hr) IV Continuous <Continuous>  tamsulosin 0.4 milliGRAM(s) Oral at bedtime  traZODone 50 milliGRAM(s) Oral at bedtime  warfarin 4 milliGRAM(s) Oral once    MEDICATIONS  (PRN):  acetaminophen     Tablet .. 650 milliGRAM(s) Oral every 6 hours PRN Temp greater or equal to 38C (100.4F), Mild Pain (1 - 3)  dextrose Oral Gel 15 Gram(s) Oral once PRN Blood Glucose LESS THAN 70 milliGRAM(s)/deciliter  diphenhydrAMINE 25 milliGRAM(s) Oral every 6 hours PRN Rash and/or Itching  HYDROmorphone  Injectable 1.5 milliGRAM(s) IV Push every 4 hours PRN Severe Pain (7 - 10)  HYDROmorphone  Injectable 1 milliGRAM(s) IV Push every 4 hours PRN Moderate Pain (4 - 6)  nicotine  Polacrilex Gum 2 milliGRAM(s) Oral every 2 hours PRN Cravings   Meds reviewed    Allergies    sulfa drugs (Rash)    Intolerances    Pipercillin Sodium-Tazobactam Sodium (Pruritus)       REVIEW OF SYSTEMS:    Review of Systems:   Constitutional: Denies fatigue  HEENT: Denies headaches and dizziness  Respiratory: denies SOB, cough, or wheezing  Cardiovascular: denies CP, palpitations  Gastrointestinal: Denies nausea, denies vomiting, diarrhea, constipation, abdominal pain, or bloody stools  Genitourinary: denies painful urination, increased frequency, urgency, or bloody urine  Skin: denies rashes or itching  Musculoskeletal: denies muscle aches, joint swelling  Neurologic: Denies generalized weakness, denies loss of sensation, numbness, or tingling      ICU Vital Signs Last 24 Hrs  T(C): 36.5 (23 Feb 2024 11:57), Max: 36.8 (22 Feb 2024 19:56)  T(F): 97.7 (23 Feb 2024 11:57), Max: 98.2 (22 Feb 2024 19:56)  HR: 59 (23 Feb 2024 11:57) (56 - 59)  BP: 142/84 (23 Feb 2024 11:57) (142/84 - 148/88)  BP(mean): --  ABP: --  ABP(mean): --  RR: 18 (23 Feb 2024 11:57) (18 - 18)  SpO2: 93% (22 Feb 2024 19:56) (93% - 93%)    O2 Parameters below as of 23 Feb 2024 11:57  Patient On (Oxygen Delivery Method): room air          Daily     PHYSICAL EXAM:    GENERAL: NAD  HEAD:  Atraumatic, Normocephalic  EYES: EOMI, conjunctiva and sclera clear  ENMT: No Drainage from nares, No drainage from ears  NERVOUS SYSTEM:  Awake and Alert  CHEST/LUNG: Clear bilaterally  EXTREMITIES:  No Edema        LABS:                                                02-23    142  |  107  |  43<H>  ----------------------------<  114<H>  3.7   |  26  |  2.20<H>    Ca    9.0      23 Feb 2024 06:05  Phos  3.7     02-23  Mg     1.9     02-23      PT/INR - ( 23 Feb 2024 06:05 )   PT: 25.5 sec;   INR: 2.23 ratio           Urinalysis Basic - ( 23 Feb 2024 06:05 )    Color: x / Appearance: x / SG: x / pH: x  Gluc: 114 mg/dL / Ketone: x  / Bili: x / Urobili: x   Blood: x / Protein: x / Nitrite: x   Leuk Esterase: x / RBC: x / WBC x   Sq Epi: x / Non Sq Epi: x / Bacteria: x

## 2024-02-23 NOTE — DISCHARGE NOTE PROVIDER - HOSPITAL COURSE
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    Patient states that 4 days ago, he began to develop left sided flank pain. Patient describes the pain as sharp, tender, and worse with movement. He states the pain is 10/10 and has been taking oxycodone 10mg and tylenol twice a day, without much relief. Patient has a history of multiple utis, and kidney stones (s/p lithotripsy 2023).  He endorses nausea, but denies vomiting. The pain has worsened progressively over the past few days, which prompted him to visit the emergency room for further evaluation.     Of note, he recently had a uti last month, and was treated with meropenem     Patient denies F/C, HA, N/V, CP, SOB, C/D, Dysuria, Leg pain, or Leg swelling.    Patient denies any recent travel, sick contacts, or long periods of immobilization.      ED Course:  Vitals: 134/83mmHg, 60bpm, 97.5F, 96% on RA  Labs: WBc 12.5, Hgb 10.6, MCV 76.2, PT 25.6, INR 2.24, aPTT 44.5, Cl 110, BUN/Cr 39/2.2, glucose 2.2, GFR 36  UA: many bacteria, squamous cells present. mod blood. large leuk esterase, 100 protein  CT renal stone hunt: mild B/L hydroureteronephrosis w/o calcified stone identified along course of the ureter. non-obstructive B/L renal calculi are present.  Mild trabeculated bladder wall thickening.  Given - rocephin 1g, IV dilaudid 0.5mg x1, IV dilaudid 1mg x1, 1L NS (17 Feb 2024 07:12)      ---  HOSPITAL COURSE: Patient admitted with UTI, ANTONIO and to r/o cholelithiasis/cholecystitis. UCx from this admission noted enterococcus faecalis S to ampi but R to vancomycin. ID (Dr. Callahan) followed patient. Patient was on Zosyn, however since HIDA negative switched to Ampicillin. Patient switched to Amoxicillin until 2/27. Given ANTONIO on CKD3, nephrology (Dr. Olea) was consulted. Cr stabilized, 2.2 on day of discharge. Baseline Cr appears to be around 2.     Patient was medically optimized and improved clinically throughout hospital course. Patient seen and examined on day of discharge.    Vital Signs  T(C): 36.7 (23 Feb 2024 05:48), Max: 36.8 (22 Feb 2024 19:56)  T(F): 98 (23 Feb 2024 05:48), Max: 98.2 (22 Feb 2024 19:56)  HR: 56 (23 Feb 2024 05:48) (56 - 59)  BP: 148/88 (23 Feb 2024 05:48) (143/79 - 148/88)  RR: 18 (23 Feb 2024 05:48) (18 - 18)  SpO2: 93% (22 Feb 2024 19:56) (92% - 93%)    Physical Exam:  General: well-developed, well-nourished, NAD  HEENT: normocephalic, atraumatic, EOMI, moist mucous membranes   Neck: supple, non-tender, no masses  Neurology: awake, alert, sensation intact  Respiratory: clear to auscultation bilaterally; no wheezes, rhonchi, or rales  CV: regular rate and rhythm, soft S1/S2, no murmurs, rubs, or gallops  Abdominal: soft, non-tender, non-distended, bowel sounds present  Extremities: no clubbing, cyanosis, or edema; palpable peripheral pulses  Musculoskeletal: no joint erythema or warmth, no joint swelling   Skin: warm, dry, normal color    Patient is medically stable for discharge with outpatient follow up.  ---  CONSULTANTS:   ID: Dr. Callahan  GI: Dr. Pop  ---  TIME SPENT:   I, the attending physician, was physically present for the key portions of the evaluation and management (E/M) service provided. The total amount of time spent reviewing the hospital course, laboratory values, imaging findings, assessing/counseling the patient, discussing with consultant physicians, social work, nursing staff was 45 minutes.     ---  FINAL DISCHARGE DIAGNOSIS LIST:  Please see last daily progress note for final discharge diagnoses

## 2024-02-27 ENCOUNTER — EMERGENCY (EMERGENCY)
Facility: HOSPITAL | Age: 49
LOS: 1 days | Discharge: HOME | End: 2024-02-27
Attending: EMERGENCY MEDICINE | Admitting: EMERGENCY MEDICINE
Payer: MEDICAID

## 2024-02-27 VITALS
WEIGHT: 285.06 LBS | TEMPERATURE: 98 F | SYSTOLIC BLOOD PRESSURE: 165 MMHG | HEART RATE: 72 BPM | HEIGHT: 72 IN | DIASTOLIC BLOOD PRESSURE: 93 MMHG | OXYGEN SATURATION: 96 % | RESPIRATION RATE: 18 BRPM

## 2024-02-27 DIAGNOSIS — Z98.890 OTHER SPECIFIED POSTPROCEDURAL STATES: Chronic | ICD-10-CM

## 2024-02-27 DIAGNOSIS — Z95.828 PRESENCE OF OTHER VASCULAR IMPLANTS AND GRAFTS: Chronic | ICD-10-CM

## 2024-02-27 LAB
ANION GAP SERPL CALC-SCNC: 7 MMOL/L — SIGNIFICANT CHANGE UP (ref 5–17)
APPEARANCE UR: CLEAR — SIGNIFICANT CHANGE UP
APTT BLD: 38.8 SEC — HIGH (ref 24.5–35.6)
BASOPHILS # BLD AUTO: 0.08 K/UL — SIGNIFICANT CHANGE UP (ref 0–0.2)
BASOPHILS NFR BLD AUTO: 0.6 % — SIGNIFICANT CHANGE UP (ref 0–2)
BILIRUB UR-MCNC: NEGATIVE — SIGNIFICANT CHANGE UP
BUN SERPL-MCNC: 39 MG/DL — HIGH (ref 7–23)
CALCIUM SERPL-MCNC: 9 MG/DL — SIGNIFICANT CHANGE UP (ref 8.5–10.1)
CHLORIDE SERPL-SCNC: 111 MMOL/L — HIGH (ref 96–108)
CO2 SERPL-SCNC: 25 MMOL/L — SIGNIFICANT CHANGE UP (ref 22–31)
COLOR SPEC: YELLOW — SIGNIFICANT CHANGE UP
CREAT SERPL-MCNC: 2.8 MG/DL — HIGH (ref 0.5–1.3)
DIFF PNL FLD: ABNORMAL
EGFR: 27 ML/MIN/1.73M2 — LOW
EOSINOPHIL # BLD AUTO: 0.18 K/UL — SIGNIFICANT CHANGE UP (ref 0–0.5)
EOSINOPHIL NFR BLD AUTO: 1.3 % — SIGNIFICANT CHANGE UP (ref 0–6)
GLUCOSE SERPL-MCNC: 105 MG/DL — HIGH (ref 70–99)
GLUCOSE UR QL: NEGATIVE MG/DL — SIGNIFICANT CHANGE UP
HCT VFR BLD CALC: 33.9 % — LOW (ref 39–50)
HGB BLD-MCNC: 10.9 G/DL — LOW (ref 13–17)
IMM GRANULOCYTES NFR BLD AUTO: 0.4 % — SIGNIFICANT CHANGE UP (ref 0–0.9)
INR BLD: 1.98 RATIO — HIGH (ref 0.85–1.18)
KETONES UR-MCNC: NEGATIVE MG/DL — SIGNIFICANT CHANGE UP
LACTATE SERPL-SCNC: 0.5 MMOL/L — LOW (ref 0.7–2)
LEUKOCYTE ESTERASE UR-ACNC: ABNORMAL
LYMPHOCYTES # BLD AUTO: 1.73 K/UL — SIGNIFICANT CHANGE UP (ref 1–3.3)
LYMPHOCYTES # BLD AUTO: 12.9 % — LOW (ref 13–44)
MCHC RBC-ENTMCNC: 23.8 PG — LOW (ref 27–34)
MCHC RBC-ENTMCNC: 32.2 GM/DL — SIGNIFICANT CHANGE UP (ref 32–36)
MCV RBC AUTO: 74 FL — LOW (ref 80–100)
MONOCYTES # BLD AUTO: 0.81 K/UL — SIGNIFICANT CHANGE UP (ref 0–0.9)
MONOCYTES NFR BLD AUTO: 6.1 % — SIGNIFICANT CHANGE UP (ref 2–14)
NEUTROPHILS # BLD AUTO: 10.5 K/UL — HIGH (ref 1.8–7.4)
NEUTROPHILS NFR BLD AUTO: 78.7 % — HIGH (ref 43–77)
NITRITE UR-MCNC: NEGATIVE — SIGNIFICANT CHANGE UP
NRBC # BLD: 0 /100 WBCS — SIGNIFICANT CHANGE UP (ref 0–0)
PH UR: 6 — SIGNIFICANT CHANGE UP (ref 5–8)
PLATELET # BLD AUTO: 237 K/UL — SIGNIFICANT CHANGE UP (ref 150–400)
POTASSIUM SERPL-MCNC: 4 MMOL/L — SIGNIFICANT CHANGE UP (ref 3.5–5.3)
POTASSIUM SERPL-SCNC: 4 MMOL/L — SIGNIFICANT CHANGE UP (ref 3.5–5.3)
PROT UR-MCNC: 300 MG/DL
PROTHROM AB SERPL-ACNC: 22.7 SEC — HIGH (ref 9.5–13)
RBC # BLD: 4.58 M/UL — SIGNIFICANT CHANGE UP (ref 4.2–5.8)
RBC # FLD: 17.2 % — HIGH (ref 10.3–14.5)
SODIUM SERPL-SCNC: 143 MMOL/L — SIGNIFICANT CHANGE UP (ref 135–145)
SP GR SPEC: 1.01 — SIGNIFICANT CHANGE UP (ref 1–1.03)
TROPONIN I, HIGH SENSITIVITY RESULT: 5.4 NG/L — SIGNIFICANT CHANGE UP
UROBILINOGEN FLD QL: 0.2 MG/DL — SIGNIFICANT CHANGE UP (ref 0.2–1)
WBC # BLD: 13.36 K/UL — HIGH (ref 3.8–10.5)
WBC # FLD AUTO: 13.36 K/UL — HIGH (ref 3.8–10.5)

## 2024-02-27 PROCEDURE — 83605 ASSAY OF LACTIC ACID: CPT

## 2024-02-27 PROCEDURE — 85025 COMPLETE CBC W/AUTO DIFF WBC: CPT

## 2024-02-27 PROCEDURE — 70450 CT HEAD/BRAIN W/O DYE: CPT | Mod: 26,MC

## 2024-02-27 PROCEDURE — 96375 TX/PRO/DX INJ NEW DRUG ADDON: CPT | Mod: XU

## 2024-02-27 PROCEDURE — 87040 BLOOD CULTURE FOR BACTERIA: CPT

## 2024-02-27 PROCEDURE — 74176 CT ABD & PELVIS W/O CONTRAST: CPT | Mod: 26,MC

## 2024-02-27 PROCEDURE — 36415 COLL VENOUS BLD VENIPUNCTURE: CPT

## 2024-02-27 PROCEDURE — 85610 PROTHROMBIN TIME: CPT

## 2024-02-27 PROCEDURE — 81001 URINALYSIS AUTO W/SCOPE: CPT

## 2024-02-27 PROCEDURE — 70450 CT HEAD/BRAIN W/O DYE: CPT | Mod: MC

## 2024-02-27 PROCEDURE — 72131 CT LUMBAR SPINE W/O DYE: CPT | Mod: MC

## 2024-02-27 PROCEDURE — 85730 THROMBOPLASTIN TIME PARTIAL: CPT

## 2024-02-27 PROCEDURE — 84484 ASSAY OF TROPONIN QUANT: CPT

## 2024-02-27 PROCEDURE — 82962 GLUCOSE BLOOD TEST: CPT

## 2024-02-27 PROCEDURE — 80048 BASIC METABOLIC PNL TOTAL CA: CPT

## 2024-02-27 PROCEDURE — 51702 INSERT TEMP BLADDER CATH: CPT

## 2024-02-27 PROCEDURE — 71250 CT THORAX DX C-: CPT | Mod: MC

## 2024-02-27 PROCEDURE — 99284 EMERGENCY DEPT VISIT MOD MDM: CPT | Mod: 25

## 2024-02-27 PROCEDURE — 71250 CT THORAX DX C-: CPT | Mod: 26,MC

## 2024-02-27 PROCEDURE — 96374 THER/PROPH/DIAG INJ IV PUSH: CPT | Mod: XU

## 2024-02-27 PROCEDURE — 72125 CT NECK SPINE W/O DYE: CPT | Mod: 26,MC

## 2024-02-27 PROCEDURE — 74176 CT ABD & PELVIS W/O CONTRAST: CPT | Mod: MC

## 2024-02-27 PROCEDURE — 87086 URINE CULTURE/COLONY COUNT: CPT

## 2024-02-27 PROCEDURE — 99285 EMERGENCY DEPT VISIT HI MDM: CPT

## 2024-02-27 PROCEDURE — 72125 CT NECK SPINE W/O DYE: CPT | Mod: MC

## 2024-02-27 PROCEDURE — 72131 CT LUMBAR SPINE W/O DYE: CPT | Mod: 26,MC

## 2024-02-27 PROCEDURE — 87077 CULTURE AEROBIC IDENTIFY: CPT

## 2024-02-27 RX ORDER — AMPICILLIN TRIHYDRATE 250 MG
1000 CAPSULE ORAL ONCE
Refills: 0 | Status: COMPLETED | OUTPATIENT
Start: 2024-02-27 | End: 2024-02-27

## 2024-02-27 RX ORDER — HYDROMORPHONE HYDROCHLORIDE 2 MG/ML
0.5 INJECTION INTRAMUSCULAR; INTRAVENOUS; SUBCUTANEOUS ONCE
Refills: 0 | Status: DISCONTINUED | OUTPATIENT
Start: 2024-02-27 | End: 2024-02-27

## 2024-02-27 RX ORDER — OXYCODONE HYDROCHLORIDE 5 MG/1
10 TABLET ORAL ONCE
Refills: 0 | Status: DISCONTINUED | OUTPATIENT
Start: 2024-02-27 | End: 2024-02-27

## 2024-02-27 RX ORDER — SODIUM CHLORIDE 9 MG/ML
1000 INJECTION INTRAMUSCULAR; INTRAVENOUS; SUBCUTANEOUS ONCE
Refills: 0 | Status: COMPLETED | OUTPATIENT
Start: 2024-02-27 | End: 2024-02-27

## 2024-02-27 RX ADMIN — HYDROMORPHONE HYDROCHLORIDE 0.5 MILLIGRAM(S): 2 INJECTION INTRAMUSCULAR; INTRAVENOUS; SUBCUTANEOUS at 20:48

## 2024-02-27 RX ADMIN — OXYCODONE HYDROCHLORIDE 10 MILLIGRAM(S): 5 TABLET ORAL at 22:05

## 2024-02-27 RX ADMIN — SODIUM CHLORIDE 1000 MILLILITER(S): 9 INJECTION INTRAMUSCULAR; INTRAVENOUS; SUBCUTANEOUS at 19:36

## 2024-02-27 RX ADMIN — OXYCODONE HYDROCHLORIDE 10 MILLIGRAM(S): 5 TABLET ORAL at 18:03

## 2024-02-27 RX ADMIN — Medication 208 MILLIGRAM(S): at 20:46

## 2024-02-27 RX ADMIN — OXYCODONE HYDROCHLORIDE 10 MILLIGRAM(S): 5 TABLET ORAL at 19:00

## 2024-02-27 RX ADMIN — HYDROMORPHONE HYDROCHLORIDE 0.5 MILLIGRAM(S): 2 INJECTION INTRAMUSCULAR; INTRAVENOUS; SUBCUTANEOUS at 21:30

## 2024-02-27 NOTE — ED ADULT NURSE NOTE - OBJECTIVE STATEMENT
the patient states he is basically wheelchair bound and today, while at his facility, his left foot got stuck and he fell out of wheelchair and "tumbled over twice'.   he states that he hit his head, no loc, but does take thinners.  he is alert / oriented x4.  he states he has generalized pain everywhere.  he is alert / oriented x4.  no respiratory distress.

## 2024-02-27 NOTE — ED ADULT TRIAGE NOTE - CHIEF COMPLAINT QUOTE
Pt is coming from [place] s/p fall out of wheelchair. does not remember if he hit his head. on coumadin. pt does have hx of HTN. Speaking clearly. No neuro deficits noted. pt does have chronic ayala. Pt is coming from [place] s/p fall out of wheelchair. does not remember if he hit his head. pt on coumadin.  Speaking clearly. No neuro deficits noted. pt does have chronic ayala. c/o b/l hip and lower back pain.

## 2024-02-27 NOTE — ED ADULT NURSE REASSESSMENT NOTE - NS ED NURSE REASSESS COMMENT FT1
Pt a/o x 3, c/o generalized pain in multiple locations. Anderson changed maintaining sterility throughout. Admin abx and dilaudid. Pt became irate as he typically gets 2 mg dilaudid and was now given 0.5 mg. Notified MD in regards. Pt resting in bed VS stable. Will continue to monitor.

## 2024-02-27 NOTE — ED PROVIDER NOTE - NSFOLLOWUPINSTRUCTIONS_ED_ALL_ED_FT
Return to the ED for any new or worsening symptoms  Take your medication as prescribed  Follow-up with your primary care physician 1 to 2 days for recheck  Advance activity as tolerated      Fall Prevention    WHAT YOU NEED TO KNOW:    What is fall prevention? Fall prevention includes ways to make your home and other areas safer. Prevention also includes ways you can move more carefully to prevent a fall.    What increases my risk for falls?    Lack of vitamin D    Not getting enough sleep each night    Trouble walking or keeping your balance, or foot problems    Health conditions that cause changes in your blood pressure, vision, or muscle strength and coordination    Medicines that make you dizzy, weak, or sleepy    Problems seeing clearly    Shoes that have high heels or are not supportive    Tripping hazards, such as items left on the floor, no handrails on the stairs, or broken steps  How can I help protect myself from falls?    Stand or sit up slowly. This may help you keep your balance and prevent falls. If you need to get up during the night, sit up first. Be sure you are fully awake before you stand. Turn on the light before you start walking. Go slowly in case you are still sleepy. Make sure you will not trip over any pets sleeping in the bedroom.    Use assistive devices as directed. Your healthcare provider may suggest that you use a cane or walker to help you keep your balance. You may need to have grab bars put in your bathroom near the toilet or in the shower.    Wear shoes that fit well and have soles that . Wear shoes both inside and outside. Use slippers with good . Do not wear shoes with high heels.    Stay active. Exercise can help strengthen your muscles and improve your balance. Your healthcare provider may recommend water aerobics or walking. He or she may also recommend physical therapy to improve your coordination. Never start an exercise program without talking to your healthcare provider first.  [facility] Walking for Exercise      Manage medical conditions. Keep all appointments with your healthcare providers. Visit your eye doctor as directed.  How can I make my home safer?  Fall Prevention for Adults    Add items to prevent falls in the bathroom. Put nonslip strips on your bath or shower floor to prevent you from slipping. Use a bath mat if you do not have carpet in the bathroom. This will prevent you from falling when you step out of the bath or shower. Use a shower seat so you do not need to stand while you shower. Sit on the toilet or a chair in your bathroom to dry yourself and put on clothing. This will prevent you from losing your balance from drying or dressing yourself while you are standing.    Keep paths clear. Remove books, shoes, and other objects from walkways and stairs. Place cords for telephones and lamps out of the way so that you do not need to walk over them. Tape them down if you cannot move them. Remove small rugs. If you cannot remove a rug, secure it with double-sided tape. This will prevent you from tripping.    Install bright lights in your home. Use night lights to help light paths to the bathroom or kitchen. Always turn on the light before you start walking.    Keep items you use often on shelves within reach. Do not use a step stool to help you reach an item.    Paint or place reflective tape on the edges of your stairs. This will help you see the stairs better.  How do I plan ahead in case I do fall? Talk with family members, friends, and neighbors to create a fall plan. Someone will need to call for emergency help if you are injured or found unconscious. If possible, keep a mobile phone with you at all times, or wear an emergency alert device. You can contact emergency services by pressing a button on the device. Ask your healthcare provider for more information.    Arrange to have someone call your local emergency number (911 in the [place]) if:    You have fallen and are found unconscious.    You have fallen and cannot move part of your body.  Call your doctor if:    You have fallen and have pain or a headache.    You have questions or concerns about your condition or care.  CARE AGREEMENT:    You have the right to help plan your care. Learn about your health condition and how it may be treated. Discuss treatment options with your healthcare providers to decide what care you want to receive. You always have the right to refuse treatment.    © [employer] 1973, 2024

## 2024-02-27 NOTE — ED PROVIDER NOTE - DIFFERENTIAL DIAGNOSIS
Patient presenting to the emergency room with pain following mechanical fall.  Differential includes but not limited to musculoskeletal strain versus fracture.  Will obtain screening labs check coags obtain CT imaging of the head cervical spine chest abdomen pelvis and monitor Differential Diagnosis

## 2024-02-27 NOTE — ED PROVIDER NOTE - OBJECTIVE STATEMENT
Patient is a 49-year-old male who presents to the emergency room with a chief complaint of pain status post mechanical fall.  Past medical history of renal stones Rojelio Barré syndrome peripheral neuropathy urinary incontinence diabetes hypertension BPH degenerative joint disease history of DVT with a unknown prothrombotic state on Coumadin history of CVA in 2016.  Patient is primarily wheelchair-bound from his [facility].  Also reports that he has a chronic indwelling Anderson.  Was last admitted to the hospital February 17 through February 23 with a urinary tract infection.  CT renal stone hot revealed mild bilateral hydroureteronephrosis without calcified stone.  Patient was treated with ampicillin transition to amoxicillin and has completed course.  Abdomen was at baseline of 2.2 on day of discharge.  Presents today with pain following a fall.  States he was wheeling down an incline to meet his girlfriend he had a ball fell forward out of the wheelchair and tumbled twice.  Unsure of head injury but denies loss of consciousness.  Reports pain "all over his body" most significant to his low back and pelvic region.  Denies any visual changes nausea vomiting shortness of breath.

## 2024-02-27 NOTE — ED PROVIDER NOTE - CARE PLAN
Principal Discharge DX:	Fall   1 Principal Discharge DX:	Fall  Secondary Diagnosis:	Back pain  Secondary Diagnosis:	Chest wall pain

## 2024-02-27 NOTE — ED PROVIDER NOTE - CLINICAL SUMMARY MEDICAL DECISION MAKING FREE TEXT BOX
Patient is a 49-year-old male who presents to the emergency room with a chief complaint of pain status post mechanical fall.  Past medical history of renal stones Rojelio Barré syndrome peripheral neuropathy urinary incontinence diabetes hypertension BPH degenerative joint disease history of DVT with a unknown prothrombotic state on Coumadin history of CVA in 2016.  Patient is primarily wheelchair-bound from his [facility].  Also reports that he has a chronic indwelling Anderson.  Was last admitted to the hospital February 17 through February 23 with a urinary tract infection.  CT renal stone hot revealed mild bilateral hydroureteronephrosis without calcified stone.  Patient was treated with ampicillin transition to amoxicillin and has completed course.  Abdomen was at baseline of 2.2 on day of discharge.  Presents today with pain following a fall.  States he was wheeling down an incline to meet his girlfriend he had a ball fell forward out of the wheelchair and tumbled twice.  Unsure of head injury but denies loss of consciousness.  Reports pain "all over his body" most significant to his low back and pelvic region.  Denies any visual changes nausea vomiting shortness of breath. Patient is a 49-year-old male who presents to the emergency room with a chief complaint of pain status post mechanical fall.  Past medical history of renal stones Laramie Barré syndrome peripheral neuropathy urinary incontinence diabetes hypertension BPH degenerative joint disease history of DVT with a unknown prothrombotic state on Coumadin history of CVA in 2016.  Patient is primarily wheelchair-bound from his Rojelio Barré.  Also reports that he has a chronic indwelling Anderson.  Was last admitted to the hospital February 17 through February 23 with a urinary tract infection.  CT renal stone hot revealed mild bilateral hydroureteronephrosis without calcified stone.  Patient was treated with ampicillin transition to amoxicillin and has completed course.  Abdomen was at baseline of 2.2 on day of discharge.  Presents today with pain following a fall.  States he was wheeling down an incline to meet his girlfriend he had a ball fell forward out of the wheelchair and tumbled twice.  Unsure of head injury but denies loss of consciousness.  Reports pain "all over his body" most significant to his low back and pelvic region.  Denies any visual changes nausea vomiting shortness of breath. Results of labs reviewed patient with a leukocytosis hemoglobin at baseline INR noted creatinine 2.8 mildly elevated IV fluids ordered troponin within normal.  CT imaging reveals no acute intracranial hemorrhage no displaced fracture CT imaging the spine reveals no acute fracture or traumatic subluxation.  CT imaging of the lumbar spine reveals no acute traumatic fracture.  Hardware intact and well-preserved.  CT imaging of the chest abdomen pelvis reveals no findings suspicious for acute intrathoracic or intra-abdominal injury.  Clear lungs no effusion or pneumothorax no pericardial effusion no lymphadenopathy punctate nonobstructive stones with no hydro Anderson catheter in place Case was reviewed with patient's primary Dr. Das.  Did examine the patient in the ER.  Patient is a chronic indwelling Anderson so UA will likely be contaminated.  Even if patient has a leukocytosis from a mild UTI he feels the patient can be discharged back to the facility as they can give IV antibiotics.  Will change Anderson send UA send blood cultures lactate.  Reviewed with the patient reports he has had continued pain.  Advised we will give low-dose Dilaudid x 1 but as there is no acute traumatic injury would not continue to treat with narcotics. Patient reports that he understands calm at this time. Patient became patient became extremely belligerent screaming out in pain demanding to speak with me again.  At the time was in a critical room.  After leaving the case spoke with patient again advised on UA results patient instructed that we will be sending home Dr. Das will follow-up urine culture.  Patient very irate stating that he was not treated well the last time he was here he received multiple doses of Dilaudid.  Explained that based on his clinical presentation today this was not necessitated.  Again reviewed CT results.  Patient began to yell and curse.  Advised that this plan was reviewed with his physician Dr. Das he then began to curse Dr. Garcia.  Results were again provided patient aware he will be discharged.  Stated just to get him out of here as he will be going to another hospital for reevaluation. Nursing supervision aware.

## 2024-02-27 NOTE — ED ADULT NURSE NOTE - CHIEF COMPLAINT QUOTE
Pt is coming from [facility] s/p fall out of wheelchair. does not remember if he hit his head. pt on coumadin.  Speaking clearly. No neuro deficits noted. pt does have chronic ayala. c/o b/l hip and lower back pain.

## 2024-02-27 NOTE — ED PROVIDER NOTE - PHYSICAL EXAMINATION
Patient sitting up in bed laughing able to give complete history.  Full range of motion of bilateral upper extremities some mild contusion to the left elbow but patient with full range of motion no obvious deformity.  Diffuse tenderness to palpation to the back with no obvious skin changes.  Patient able to bend bilateral knees no abrasions noted no obvious swelling or ecchymosis noted.  No tenderness palpation of bilateral ankles or feet.

## 2024-02-27 NOTE — ED PROVIDER NOTE - PROGRESS NOTE DETAILS
Results of labs reviewed patient with a leukocytosis hemoglobin at baseline INR noted creatinine 2.8 mildly elevated IV fluids ordered troponin within normal.  CT imaging reveals no acute intracranial hemorrhage no displaced fracture CT imaging the spine reveals no acute fracture or traumatic subluxation.  CT imaging of the lumbar spine reveals no acute traumatic fracture.  Hardware intact and well-preserved.  CT imaging of the chest abdomen pelvis reveals no findings suspicious for acute intrathoracic or intra-abdominal injury.  Clear lungs no effusion or pneumothorax no pericardial effusion no lymphadenopathy punctate nonobstructive stones with no hydro Anderson catheter in place Case was reviewed with patient's primary Dr. Das.  Did examine the patient in the ER.  Patient is a chronic indwelling Anderson so UA will likely be contaminated.  Even if patient has a leukocytosis from a mild UTI he feels the patient can be discharged back to the facility as they can give IV antibiotics.  Will change Anderson send UA send blood cultures lactate.  Reviewed with the patient reports he has had continued pain.  Advised we will give low-dose Dilaudid x 1 but as there is no acute traumatic injury would not continue to treat with narcotics. Patient reports that he understands calm at this time Patient became patient became extremely belligerent screaming out in pain demanding to speak with me again.  At the time was in a critical room.  After leaving the case spoke with patient again advised on UA results patient instructed that we will be sending home Dr. Das will follow-up urine culture.  Patient very irate stating that he was not treated well the last time he was here he received multiple doses of Dilaudid.  Explained that based on his clinical presentation today this was not necessitated.  Again reviewed CT results.  Patient began to yell and curse.  Advised that this plan was reviewed with his physician Dr. Das he then began to curse Dr. Garcia.  Results were again provided patient aware he will be discharged.  Stated just to get him out of here as he will be going to another hospital for reevaluation. Nursing supervision aware Pt appears comfortable in bed, non toxic intermittently laughing is reporting that he is in severe pain. No obvious traumatic injury but will obtain trauma scans. As pt is on chronic pain medication this was ordered.

## 2024-02-27 NOTE — ED PROVIDER NOTE - CARE PROVIDER_API CALL
Sony Das  [address] [address]  Phone: [phone]  Fax: [phone]  Follow Up Time:

## 2024-02-27 NOTE — ED PROVIDER NOTE - PATIENT PORTAL LINK FT
You can access the FollowMyHealth Patient Portal offered by [facility] by registering at the following website: [email or web address]. By joining [employer]’s FollowMyHealth portal, you will also be able to view your health information using other applications (apps) compatible with our system.

## 2024-02-27 NOTE — ED ADULT NURSE NOTE - NS ED PATIENT SAFETY CONCERN
Physical Therapy    Visit Type: initial evaluation  SUBJECTIVE  Patient agreed to participate in therapy this date.  Admit with decline in strength.    PLOF:  Lives with wife.  Patient / Family Goal: return home and return to previous functional status    Pain     Location: groin roger    At onset of session (out of 10): 2     OBJECTIVE     Cognitive Status   Orientation    - Oriented to: person, place and time  Functional Communication   - Overall Status: within functional limits  Attention Span    - Attention: intact  Following Direction   - follows all commands and directions consistently        Bed Mobility  - Side-lying to sit: minimal assist, with tactile cues, with verbal cues  - Sit to side-lying: minimal assist, with tactile cues, with verbal cues  Transfers  - Sit to stand: minimal assist  - Stand to sit: contact guard/touching/steadying assist    Ambulation / Gait  He did not want to walk today per \"feeling too weak\".             Education:   - Present and ready to learn: patient    ASSESSMENT   Patient will benefit from skilled therapy to address listed impairments and functional limitations.  Interferring components: decreased activity tolerance    Summary of function and discharge needs based on today's assessment:   - Current level of function: significantly below baseline level of function   - Therapy needs at discharge: therapy 5 or more times per week   - Activities of daily living (ADLs) requiring support at discharge: bed mobility, transfers, ambulation, stairs, dressing, toileting and personal hygiene   - Instrumental activities of daily living (IADLs) requiring support at discharge: home management and community mobility   - Impairments that require further therapy intervention: activity tolerance, strength and pain    AM-PAC  - Generalized Prior Level of Function:         Key: MOD A=moderate assistance, IND/MOD I=independent/modified independent  - Generalized Current Level of Function     -  Current Mobility Score: 16       AM-PAC Scoring Key= >21 Modified Independent; 20-21 Supervision; 18-19 Minimal assist; 13-18 Moderate assist; 9-12 Max assist; <9 Total assist       • Predicted patient presentation: Low (stable) - Patient comorbidities and complexities, as defined above, will have little effect on progress for prescribed plan of care.    PLAN   Suggestions for next session as indicated: PT Frequency: 3-5 x per week  Frequency Comments: PT sampson 6/11/23.    1 of 3-5 sessions.    Focus of PT: gain strength and endurance with transfers, stand and walking as tolerated.           Agreement to plan and goals: patient agrees with goals and treatment plan        GOALS  Long Term Goals: (to be met by time of discharge from hospital)  Sit to supine: Patient will complete sit to supine modified independent.  Supine to sit: Patient will complete supine to sit modified independent.  Sit (edge of bed): Patient will sit at edge of bed for independent.   Stand (even surface): Patient will stand on even surface for modified independent.   Sit to stand: Patient will complete sit to stand transfer with 2-wheeled walker, independent.   Ambulation (even): Patient will ambulate on even surface for 100 feet with 2-wheeled walker, independent.     Documented in the chart in the following areas: Assessment/Plan.      Patient at End of Session:   Location: in bed  Safety measures: alarm system in place/re-engaged and call light within reach      Therapy procedure time and total treatment time can be found documented on the Time Entry flowsheet   No

## 2024-02-27 NOTE — ED ADULT NURSE NOTE - NSFALLHARMRISKINTERV_ED_ALL_ED
Assistance OOB with selected safe patient handling equipment if applicable/Communicate risk of Fall with Harm to all staff, patient, and family/Monitor gait and stability/Provide patient with walking aids/Provide visual cue: red socks, yellow wristband, yellow gown, etc/Reinforce activity limits and safety measures with patient and family/Bed in lowest position, wheels locked, appropriate side rails in place/Call bell, personal items and telephone in reach/Instruct patient to call for assistance before getting out of bed/chair/stretcher/Non-slip footwear applied when patient is off stretcher/[place] to call system/Physically safe environment - no spills, clutter or unnecessary equipment/Purposeful Proactive Rounding/Room/bathroom lighting operational, light cord in reach Assistance OOB with selected safe patient handling equipment if applicable/Assistance with ambulation/Communicate risk of Fall with Harm to all staff, patient, and family/Monitor gait and stability/Provide patient with walking aids/Provide visual cue: red socks, yellow wristband, yellow gown, etc/Reinforce activity limits and safety measures with patient and family/Bed in lowest position, wheels locked, appropriate side rails in place/Call bell, personal items and telephone in reach/Instruct patient to call for assistance before getting out of bed/chair/stretcher/Non-slip footwear applied when patient is off stretcher/[place] to call system/Physically safe environment - no spills, clutter or unnecessary equipment/Purposeful Proactive Rounding/Room/bathroom lighting operational, light cord in reach

## 2024-02-28 ENCOUNTER — EMERGENCY (EMERGENCY)
Facility: HOSPITAL | Age: 49
LOS: 0 days | Discharge: HOME | End: 2024-02-28
Attending: EMERGENCY MEDICINE
Payer: MEDICAID

## 2024-02-28 VITALS
SYSTOLIC BLOOD PRESSURE: 175 MMHG | RESPIRATION RATE: 18 BRPM | WEIGHT: 285.06 LBS | HEART RATE: 62 BPM | OXYGEN SATURATION: 97 % | TEMPERATURE: 97 F | HEIGHT: 72 IN | DIASTOLIC BLOOD PRESSURE: 71 MMHG

## 2024-02-28 VITALS
HEART RATE: 59 BPM | TEMPERATURE: 98 F | DIASTOLIC BLOOD PRESSURE: 91 MMHG | SYSTOLIC BLOOD PRESSURE: 135 MMHG | RESPIRATION RATE: 18 BRPM | OXYGEN SATURATION: 95 %

## 2024-02-28 DIAGNOSIS — E11.9 TYPE 2 DIABETES MELLITUS WITHOUT COMPLICATIONS: ICD-10-CM

## 2024-02-28 DIAGNOSIS — Z95.828 PRESENCE OF OTHER VASCULAR IMPLANTS AND GRAFTS: Chronic | ICD-10-CM

## 2024-02-28 DIAGNOSIS — S00.93XA CONTUSION OF UNSPECIFIED PART OF HEAD, INITIAL ENCOUNTER: ICD-10-CM

## 2024-02-28 DIAGNOSIS — Z79.01 LONG TERM (CURRENT) USE OF ANTICOAGULANTS: ICD-10-CM

## 2024-02-28 DIAGNOSIS — Z99.3 DEPENDENCE ON WHEELCHAIR: ICD-10-CM

## 2024-02-28 DIAGNOSIS — Z86.718 PERSONAL HISTORY OF OTHER VENOUS THROMBOSIS AND EMBOLISM: ICD-10-CM

## 2024-02-28 DIAGNOSIS — Z98.890 OTHER SPECIFIED POSTPROCEDURAL STATES: Chronic | ICD-10-CM

## 2024-02-28 DIAGNOSIS — Z86.73 PERSONAL HISTORY OF TRANSIENT ISCHEMIC ATTACK (TIA), AND CEREBRAL INFARCTION WITHOUT RESIDUAL DEFICITS: ICD-10-CM

## 2024-02-28 DIAGNOSIS — M54.50 LOW BACK PAIN, UNSPECIFIED: ICD-10-CM

## 2024-02-28 DIAGNOSIS — Z88.2 ALLERGY STATUS TO SULFONAMIDES: ICD-10-CM

## 2024-02-28 DIAGNOSIS — N40.0 BENIGN PROSTATIC HYPERPLASIA WITHOUT LOWER URINARY TRACT SYMPTOMS: ICD-10-CM
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
## 2024-02-28 DIAGNOSIS — S39.012A STRAIN OF MUSCLE, FASCIA AND TENDON OF LOWER BACK, INITIAL ENCOUNTER: ICD-10-CM
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
## 2024-02-28 DIAGNOSIS — G61.0 GUILLAIN-BARRE SYNDROME: ICD-10-CM

## 2024-02-28 DIAGNOSIS — S09.90XA UNSPECIFIED INJURY OF HEAD, INITIAL ENCOUNTER: ICD-10-CM

## 2024-02-28 PROBLEM — M19.90 UNSPECIFIED OSTEOARTHRITIS, UNSPECIFIED SITE: Chronic | Status: ACTIVE | Noted: 2024-02-17

## 2024-02-28 PROBLEM — I10 ESSENTIAL (PRIMARY) HYPERTENSION: Chronic | Status: ACTIVE | Noted: 2024-02-17

## 2024-02-28 PROBLEM — Z87.19 PERSONAL HISTORY OF OTHER DISEASES OF THE DIGESTIVE SYSTEM: Chronic | Status: ACTIVE | Noted: 2024-02-17

## 2024-02-28 PROBLEM — N20.0 CALCULUS OF KIDNEY: Chronic | Status: ACTIVE | Noted: 2024-02-17

## 2024-02-28 PROBLEM — I82.409 ACUTE EMBOLISM AND THROMBOSIS OF UNSPECIFIED DEEP VEINS OF UNSPECIFIED LOWER EXTREMITY: Chronic | Status: ACTIVE | Noted: 2024-02-17

## 2024-02-28 PROBLEM — Z86.69 PERSONAL HISTORY OF OTHER DISEASES OF THE NERVOUS SYSTEM AND SENSE ORGANS: Chronic | Status: ACTIVE | Noted: 2024-02-17

## 2024-02-28 PROBLEM — I63.9 CEREBRAL INFARCTION, UNSPECIFIED: Chronic | Status: ACTIVE | Noted: 2024-02-17

## 2024-02-28 PROCEDURE — 72125 CT NECK SPINE W/O DYE: CPT | Mod: 26,MC

## 2024-02-28 PROCEDURE — 74176 CT ABD & PELVIS W/O CONTRAST: CPT | Mod: 26,MC

## 2024-02-28 PROCEDURE — 96374 THER/PROPH/DIAG INJ IV PUSH: CPT

## 2024-02-28 PROCEDURE — 96376 TX/PRO/DX INJ SAME DRUG ADON: CPT

## 2024-02-28 PROCEDURE — 99284 EMERGENCY DEPT VISIT MOD MDM: CPT | Mod: 25

## 2024-02-28 PROCEDURE — 70450 CT HEAD/BRAIN W/O DYE: CPT | Mod: 26,MC

## 2024-02-28 PROCEDURE — 74176 CT ABD & PELVIS W/O CONTRAST: CPT | Mod: MC

## 2024-02-28 PROCEDURE — 70450 CT HEAD/BRAIN W/O DYE: CPT | Mod: MC

## 2024-02-28 PROCEDURE — 72125 CT NECK SPINE W/O DYE: CPT | Mod: MC

## 2024-02-28 PROCEDURE — 99285 EMERGENCY DEPT VISIT HI MDM: CPT

## 2024-02-28 RX ORDER — HYDROMORPHONE HYDROCHLORIDE 2 MG/ML
1 INJECTION INTRAMUSCULAR; INTRAVENOUS; SUBCUTANEOUS ONCE
Refills: 0 | Status: DISCONTINUED | OUTPATIENT
Start: 2024-02-28 | End: 2024-02-28

## 2024-02-28 RX ORDER — MORPHINE SULFATE 50 MG/1
4 CAPSULE, EXTENDED RELEASE ORAL ONCE
Refills: 0 | Status: DISCONTINUED | OUTPATIENT
Start: 2024-02-28 | End: 2024-02-28

## 2024-02-28 RX ORDER — ACETAMINOPHEN 500 MG
1000 TABLET ORAL ONCE
Refills: 0 | Status: COMPLETED | OUTPATIENT
Start: 2024-02-28 | End: 2024-02-28

## 2024-02-28 RX ORDER — ONDANSETRON 8 MG/1
4 TABLET, FILM COATED ORAL ONCE
Refills: 0 | Status: COMPLETED | OUTPATIENT
Start: 2024-02-28 | End: 2024-02-28

## 2024-02-28 RX ADMIN — ONDANSETRON 4 MILLIGRAM(S): 8 TABLET, FILM COATED ORAL at 03:36

## 2024-02-28 RX ADMIN — HYDROMORPHONE HYDROCHLORIDE 1 MILLIGRAM(S): 2 INJECTION INTRAMUSCULAR; INTRAVENOUS; SUBCUTANEOUS at 04:13

## 2024-02-28 RX ADMIN — HYDROMORPHONE HYDROCHLORIDE 1 MILLIGRAM(S): 2 INJECTION INTRAMUSCULAR; INTRAVENOUS; SUBCUTANEOUS at 03:36

## 2024-02-28 NOTE — ED PROVIDER NOTE - OBJECTIVE STATEMENT
49-year-old male with history of BPH, CVA, type 2 diabetes, DVT, prior Guillain-Barré syndrome currently mostly wheelchair-bound  on anticoagulation brought in by EMS for evaluation of fall from wheelchair with head injury and bilateral lower back pain.  Patient states that his wheelchair tipped backwards causing him to fall backwards and hit the occipital region of his head.  The patient notes severe pain in his lower back and currently is already taking oxycodone chronically.  Patient denies any chest pain or shortness of breath at this time.  He was evaluated at EMS triage and designated a neuro alert due to the head injury and the fact that he is on Coumadin.  He was sent directly to CT

## 2024-02-28 NOTE — ED PROVIDER NOTE - MUSCULOSKELETAL, MLM
tenderness to palpation of bilateral lumbar paraspinal muscles and significantly decreased range of motion in the lumbar spine due to pain

## 2024-02-28 NOTE — ED ADULT NURSE NOTE - OBJECTIVE STATEMENT
Pt presents to the ED from nursing home s/p accidental fall out of wheelchair. Pt states he was in the bathroom getting ready for bed, forgot to lock his wheelchair and fell backwards. +head strike, +blood thinners (coumadin). -LOC, N/V/D. No bumps/abrasions noted to the head at this time. No acute distress noted at this time. Comfort and safety measures maintained. NA activated upon arrival and pt brought directly to CT.

## 2024-02-28 NOTE — ED PROVIDER NOTE - CARE PLAN
Principal Discharge DX:	Contusion of head, initial encounter  Secondary Diagnosis:	Lumbosacral strain, initial encounter   1

## 2024-02-28 NOTE — ED PROVIDER NOTE - PATIENT PORTAL LINK FT
You can access the FollowMyHealth Patient Portal offered by [facility] by registering at the following website: [email or web address]. By joining [facility]’s FollowMyHealth portal, you will also be able to view your health information using other applications (apps) compatible with our system.

## 2024-02-28 NOTE — ED PROVIDER NOTE - NSICDXFAMILYHX_GEN_ALL_CORE_FT
FAMILY HISTORY:  FH: heart disease  FH: HTN (hypertension)    Father  Still living? Unknown  Family history of sinus tachycardia, Age at diagnosis: Age Unknown    Mother  Still living? Unknown  FH: HTN (hypertension), Age at diagnosis: Age Unknown

## 2024-02-28 NOTE — ED ADULT TRIAGE NOTE - CHIEF COMPLAINT QUOTE
s/p slip out of wheelchair, hitting his head on the floor.  patient on coumadin.  neuro alert called by Dr. White.

## 2024-02-28 NOTE — ED ADULT NURSE NOTE - NS ED PATIENT SAFETY CONCERN
Products Recommended: Recommended www.ewg.org for sunscreen recommendations and Coolibar or Sun Protections for protective clothing options. Reminded patient to seek shade and wear sun protective clothing as primary defenses. Detail Level: Detailed No General Sunscreen Counseling: Recommended protective clothing, broad spectrum sunscreen with SPF 30 to unprotected skin, seeking shade, avoiding midday sun exposure, sunglasses to protect eyes. Also recommended Heliocare BID, Vitamin D supplementation when appropriate. Niacinamide 500 mg BID might reduce risk of actinic keratoses.

## 2024-02-28 NOTE — ED ADULT NURSE NOTE - NSFALLHARMRISKINTERV_ED_ALL_ED

## 2024-02-28 NOTE — ED PROVIDER NOTE - NSICDXPASTMEDICALHX_GEN_ALL_CORE_FT
PAST MEDICAL HISTORY:  BPH (benign prostatic hyperplasia)     BPH without obstruction/lower urinary tract symptoms     CVA (cerebrovascular accident)     DDD (degenerative disc disease), lumbar     Degenerative arthritis     DM2 (diabetes mellitus, type 2)     DVT of lower limb, acute     DVT, lower extremity     H/O Guillain-[place] syndrome     History of CVA in adulthood     History of Guillain-[place] syndrome     History of neurogenic bowel     HTN (hypertension)     HTN (hypertension)     Peripheral neuropathy     Renal stones     Type 2 diabetes mellitus

## 2024-02-28 NOTE — ED PROVIDER NOTE - CLINICAL SUMMARY MEDICAL DECISION MAKING FREE TEXT BOX
49-year-old male with history of BPH, CVA, type 2 diabetes, DVT, prior Guillain-Barré syndrome currently mostly wheelchair-bound  on anticoagulation brought in by EMS for evaluation of fall from wheelchair with head injury and bilateral lower back pain.  Patient states that his wheelchair tipped backwards causing him to fall backwards and hit the occipital region of his head.  The patient notes severe pain in his lower back and currently is already taking oxycodone chronically.  Patient denies any chest pain or shortness of breath at this time.  He was evaluated at EMS triage and designated a neuro alert due to the head injury and the fact that he is on Coumadin.  He was sent directly to CT  For CT head, cervical spine, abdomen and pelvis.  Pain control as needed and reassess.

## 2024-02-29 LAB
CULTURE RESULTS: ABNORMAL
SPECIMEN SOURCE: SIGNIFICANT CHANGE UP

## 2024-03-04 LAB
CULTURE RESULTS: SIGNIFICANT CHANGE UP
CULTURE RESULTS: SIGNIFICANT CHANGE UP
SPECIMEN SOURCE: SIGNIFICANT CHANGE UP
SPECIMEN SOURCE: SIGNIFICANT CHANGE UP

## 2024-05-14 ENCOUNTER — EMERGENCY (EMERGENCY)
Facility: HOSPITAL | Age: 49
LOS: 1 days | Discharge: HOME | End: 2024-05-14
Attending: STUDENT IN AN ORGANIZED HEALTH CARE EDUCATION/TRAINING PROGRAM | Admitting: STUDENT IN AN ORGANIZED HEALTH CARE EDUCATION/TRAINING PROGRAM
Payer: MEDICAID

## 2024-05-14 VITALS
DIASTOLIC BLOOD PRESSURE: 86 MMHG | TEMPERATURE: 98 F | WEIGHT: 283.07 LBS | RESPIRATION RATE: 16 BRPM | HEART RATE: 67 BPM | SYSTOLIC BLOOD PRESSURE: 139 MMHG | HEIGHT: 72 IN | OXYGEN SATURATION: 96 %

## 2024-05-14 DIAGNOSIS — Z95.828 PRESENCE OF OTHER VASCULAR IMPLANTS AND GRAFTS: Chronic | ICD-10-CM

## 2024-05-14 DIAGNOSIS — Z98.890 OTHER SPECIFIED POSTPROCEDURAL STATES: Chronic | ICD-10-CM

## 2024-05-14 PROCEDURE — 99284 EMERGENCY DEPT VISIT MOD MDM: CPT

## 2024-05-14 NOTE — ED ADULT TRIAGE NOTE - CHIEF COMPLAINT QUOTE
Patient brought by ambulance from [facility] as reported had left flank pain; sent for dislodged ayala catheter for neurogenic bladder  inserted at [facility] 2 weeks ago

## 2024-05-14 NOTE — ED ADULT TRIAGE NOTE - NS ED TRIAGE AVPU SCALE
negative...
Alert-The patient is alert, awake and responds to voice. The patient is oriented to time, place, and person. The triage nurse is able to obtain subjective information.

## 2024-05-15 VITALS
RESPIRATION RATE: 16 BRPM | DIASTOLIC BLOOD PRESSURE: 79 MMHG | TEMPERATURE: 98 F | OXYGEN SATURATION: 100 % | SYSTOLIC BLOOD PRESSURE: 119 MMHG | HEART RATE: 60 BPM

## 2024-05-15 LAB
ALBUMIN SERPL ELPH-MCNC: 2.7 G/DL — LOW (ref 3.3–5)
ALP SERPL-CCNC: 90 U/L — SIGNIFICANT CHANGE UP (ref 40–120)
ALT FLD-CCNC: 22 U/L — SIGNIFICANT CHANGE UP (ref 12–78)
ANION GAP SERPL CALC-SCNC: 7 MMOL/L — SIGNIFICANT CHANGE UP (ref 5–17)
APPEARANCE UR: ABNORMAL
AST SERPL-CCNC: 13 U/L — LOW (ref 15–37)
BACTERIA # UR AUTO: ABNORMAL /HPF
BASOPHILS # BLD AUTO: 0.06 K/UL — SIGNIFICANT CHANGE UP (ref 0–0.2)
BASOPHILS NFR BLD AUTO: 0.5 % — SIGNIFICANT CHANGE UP (ref 0–2)
BILIRUB SERPL-MCNC: 0.3 MG/DL — SIGNIFICANT CHANGE UP (ref 0.2–1.2)
BILIRUB UR-MCNC: NEGATIVE — SIGNIFICANT CHANGE UP
BUN SERPL-MCNC: 40 MG/DL — HIGH (ref 7–23)
CALCIUM SERPL-MCNC: 8.9 MG/DL — SIGNIFICANT CHANGE UP (ref 8.5–10.1)
CHLORIDE SERPL-SCNC: 107 MMOL/L — SIGNIFICANT CHANGE UP (ref 96–108)
CO2 SERPL-SCNC: 22 MMOL/L — SIGNIFICANT CHANGE UP (ref 22–31)
COLOR SPEC: YELLOW — SIGNIFICANT CHANGE UP
COMMENT - URINE: SIGNIFICANT CHANGE UP
CREAT SERPL-MCNC: 2.8 MG/DL — HIGH (ref 0.5–1.3)
DIFF PNL FLD: ABNORMAL
EGFR: 27 ML/MIN/1.73M2 — LOW
EOSINOPHIL # BLD AUTO: 0.34 K/UL — SIGNIFICANT CHANGE UP (ref 0–0.5)
EOSINOPHIL NFR BLD AUTO: 2.9 % — SIGNIFICANT CHANGE UP (ref 0–6)
EPI CELLS # UR: PRESENT
GLUCOSE SERPL-MCNC: 198 MG/DL — HIGH (ref 70–99)
GLUCOSE UR QL: NEGATIVE MG/DL — SIGNIFICANT CHANGE UP
HCT VFR BLD CALC: 31.7 % — LOW (ref 39–50)
HGB BLD-MCNC: 10.1 G/DL — LOW (ref 13–17)
IMM GRANULOCYTES NFR BLD AUTO: 0.3 % — SIGNIFICANT CHANGE UP (ref 0–0.9)
KETONES UR-MCNC: NEGATIVE MG/DL — SIGNIFICANT CHANGE UP
LEUKOCYTE ESTERASE UR-ACNC: ABNORMAL
LYMPHOCYTES # BLD AUTO: 19.2 % — SIGNIFICANT CHANGE UP (ref 13–44)
LYMPHOCYTES # BLD AUTO: 2.27 K/UL — SIGNIFICANT CHANGE UP (ref 1–3.3)
MCHC RBC-ENTMCNC: 24.2 PG — LOW (ref 27–34)
MCHC RBC-ENTMCNC: 31.9 GM/DL — LOW (ref 32–36)
MCV RBC AUTO: 75.8 FL — LOW (ref 80–100)
MONOCYTES # BLD AUTO: 0.68 K/UL — SIGNIFICANT CHANGE UP (ref 0–0.9)
MONOCYTES NFR BLD AUTO: 5.7 % — SIGNIFICANT CHANGE UP (ref 2–14)
NEUTROPHILS # BLD AUTO: 8.45 K/UL — HIGH (ref 1.8–7.4)
NEUTROPHILS NFR BLD AUTO: 71.4 % — SIGNIFICANT CHANGE UP (ref 43–77)
NITRITE UR-MCNC: POSITIVE
NRBC # BLD: 0 /100 WBCS — SIGNIFICANT CHANGE UP (ref 0–0)
PH UR: 6 — SIGNIFICANT CHANGE UP (ref 5–8)
PLATELET # BLD AUTO: 208 K/UL — SIGNIFICANT CHANGE UP (ref 150–400)
POTASSIUM SERPL-MCNC: 3.9 MMOL/L — SIGNIFICANT CHANGE UP (ref 3.5–5.3)
POTASSIUM SERPL-SCNC: 3.9 MMOL/L — SIGNIFICANT CHANGE UP (ref 3.5–5.3)
PROT SERPL-MCNC: 7 G/DL — SIGNIFICANT CHANGE UP (ref 6–8.3)
PROT UR-MCNC: 100 MG/DL
RBC # BLD: 4.18 M/UL — LOW (ref 4.2–5.8)
RBC # FLD: 16.2 % — HIGH (ref 10.3–14.5)
RBC CASTS # UR COMP ASSIST: 4 /HPF — SIGNIFICANT CHANGE UP (ref 0–4)
SODIUM SERPL-SCNC: 136 MMOL/L — SIGNIFICANT CHANGE UP (ref 135–145)
SP GR SPEC: 1.01 — SIGNIFICANT CHANGE UP (ref 1–1.03)
UROBILINOGEN FLD QL: 0.2 MG/DL — SIGNIFICANT CHANGE UP (ref 0.2–1)
WBC # BLD: 11.84 K/UL — HIGH (ref 3.8–10.5)
WBC # FLD AUTO: 11.84 K/UL — HIGH (ref 3.8–10.5)
WBC UR QL: 30 /HPF — HIGH (ref 0–5)

## 2024-05-15 PROCEDURE — 36415 COLL VENOUS BLD VENIPUNCTURE: CPT

## 2024-05-15 PROCEDURE — 80053 COMPREHEN METABOLIC PANEL: CPT

## 2024-05-15 PROCEDURE — 74176 CT ABD & PELVIS W/O CONTRAST: CPT | Mod: MC

## 2024-05-15 PROCEDURE — 74176 CT ABD & PELVIS W/O CONTRAST: CPT | Mod: 26,MC

## 2024-05-15 PROCEDURE — 81001 URINALYSIS AUTO W/SCOPE: CPT

## 2024-05-15 PROCEDURE — 87186 SC STD MICRODIL/AGAR DIL: CPT

## 2024-05-15 PROCEDURE — 96374 THER/PROPH/DIAG INJ IV PUSH: CPT

## 2024-05-15 PROCEDURE — 96375 TX/PRO/DX INJ NEW DRUG ADDON: CPT

## 2024-05-15 PROCEDURE — 99284 EMERGENCY DEPT VISIT MOD MDM: CPT | Mod: 25

## 2024-05-15 PROCEDURE — 85025 COMPLETE CBC W/AUTO DIFF WBC: CPT

## 2024-05-15 PROCEDURE — 87086 URINE CULTURE/COLONY COUNT: CPT

## 2024-05-15 RX ORDER — CEFUROXIME AXETIL 250 MG
1 TABLET ORAL
Qty: 14 | Refills: 0
Start: 2024-05-15 | End: 2024-05-21

## 2024-05-15 RX ORDER — ACETAMINOPHEN 500 MG
1000 TABLET ORAL ONCE
Refills: 0 | Status: COMPLETED | OUTPATIENT
Start: 2024-05-15 | End: 2024-05-15

## 2024-05-15 RX ORDER — CEFTRIAXONE 500 MG/1
1000 INJECTION, POWDER, FOR SOLUTION INTRAMUSCULAR; INTRAVENOUS ONCE
Refills: 0 | Status: COMPLETED | OUTPATIENT
Start: 2024-05-15 | End: 2024-05-15

## 2024-05-15 RX ORDER — HYDROMORPHONE HYDROCHLORIDE 2 MG/ML
2 INJECTION INTRAMUSCULAR; INTRAVENOUS; SUBCUTANEOUS ONCE
Refills: 0 | Status: DISCONTINUED | OUTPATIENT
Start: 2024-05-15 | End: 2024-05-15

## 2024-05-15 RX ADMIN — CEFTRIAXONE 100 MILLIGRAM(S): 500 INJECTION, POWDER, FOR SOLUTION INTRAMUSCULAR; INTRAVENOUS at 05:40

## 2024-05-15 RX ADMIN — Medication 400 MILLIGRAM(S): at 03:57

## 2024-05-15 RX ADMIN — HYDROMORPHONE HYDROCHLORIDE 2 MILLIGRAM(S): 2 INJECTION INTRAMUSCULAR; INTRAVENOUS; SUBCUTANEOUS at 10:33

## 2024-05-15 NOTE — ED ADULT NURSE NOTE - CHIEF COMPLAINT QUOTE
Patient brought by ambulance from [facility] as reported had left flank pain; sent for dislodged ayala catheter for neurogenic bladder  inserted at [facility] 2 weeks ago

## 2024-05-15 NOTE — ED PROVIDER NOTE - OBJECTIVE STATEMENT
49 year old male with a history of neurogenic bladder, BPH, CVA, DM, DVT, peripheral neuropathy, Guillain barre, degenerative disc disease, kidney stones p/w dislodged ayala catheter.  Patient BIBA, resides at [facility].  Patient had the catheter placed at [facility] 2 weeks ago.  It became dislodged a few hours PTA.  In addition, patient c/o left flank pain that started a few days ago.  Denies fever, vomiting, hematuria, back pain.  Patient was seen in ED Feb 2024.  CT showed b/l non-obstructing stone and Cr 2.8. PMD Dr. Sanchez

## 2024-05-15 NOTE — ED PROVIDER NOTE - CARE PLAN
1 Principal Discharge DX:	Urinary tract infection  Secondary Diagnosis:	Problem with Anderson catheter

## 2024-05-15 NOTE — ED ADULT NURSE NOTE - CAS EDP DISCH TYPE
[facility] living [facility]

## 2024-05-15 NOTE — ED PROVIDER NOTE - DIFFERENTIAL DIAGNOSIS
Ddx includes but not limited to UTI, kidney stone, pyelonephritis, neurogenic bladder/incontinence, obstructive uropathy Differential Diagnosis

## 2024-05-15 NOTE — ED PROVIDER NOTE - NSFOLLOWUPINSTRUCTIONS_ED_ALL_ED_FT
Please take the antibiotic as prescribed and follow up with your primary care doctor.  Return to the ER for persistent pain, vomiting, fever, catheter problems, bloody urine, back pain, or any other concerns.     Urinary Tract Infection, Adult       A urinary tract infection (UTI) is an infection of any part of the urinary tract. The urinary tract includes the kidneys, ureters, bladder, and urethra. These organs make, store, and get rid of urine in the body.    Your health care provider may use other names to describe the infection. An upper UTI affects the ureters and kidneys (pyelonephritis). A lower UTI affects the bladder (cystitis) and urethra (urethritis).    What are the causes?  Most urinary tract infections are caused by bacteria in your genital area, around the entrance to your urinary tract (urethra). These bacteria grow and cause inflammation of your urinary tract.    What increases the risk?  You are more likely to develop this condition if:    You have a urinary catheter that stays in place (indwelling).  You are not able to control when you urinate or have a bowel movement (you have incontinence).  You are female and you:    Use a spermicide or diaphragm for birth control.  Have low estrogen levels.  Are pregnant.  You have certain genes that increase your risk (genetics).  You are sexually active.  You take antibiotic medicines.  You have a condition that causes your flow of urine to slow down, such as:    An enlarged prostate, if you are male.  Blockage in your urethra (stricture).  A kidney stone.  A nerve condition that affects your bladder control (neurogenic bladder).  Not getting enough to drink, or not urinating often.  You have certain medical conditions, such as:    Diabetes.  A weak disease-fighting system (immunesystem).  Sickle cell disease.  Gout.  Spinal cord injury.    What are the signs or symptoms?  Symptoms of this condition include:    Needing to urinate right away (urgently).  Frequent urination or passing small amounts of urine frequently.  Pain or burning with urination.  Blood in the urine.  Urine that smells bad or unusual.  Trouble urinating.  Cloudy urine.  Vaginal discharge, if you are female.  Pain in the abdomen or the lower back.    You may also have:    Vomiting or a decreased appetite.  Confusion.  Irritability or tiredness.  A fever.  Diarrhea.    The first symptom in older adults may be confusion. In some cases, they may not have any symptoms until the infection has worsened.    How is this diagnosed?  This condition is diagnosed based on your medical history and a physical exam. You may also have other tests, including:    Urine tests.  Blood tests.  Tests for sexually transmitted infections (STIs).    If you have had more than one UTI, a cystoscopy or imaging studies may be done to determine the cause of the infections.    How is this treated?  Treatment for this condition includes:    Antibiotic medicine.  Over-the-counter medicines to treat discomfort.  Drinking enough water to stay hydrated.    If you have frequent infections or have other conditions such as a kidney stone, you may need to see a health care provider who specializes in the urinary tract (urologist).    In rare cases, urinary tract infections can cause sepsis. Sepsis is a life-threatening condition that occurs when the body responds to an infection. Sepsis is treated in the hospital with IV antibiotics, fluids, and other medicines.    Follow these instructions at home:         Medicines    Take over-the-counter and prescription medicines only as told by your health care provider.  If you were prescribed an antibiotic medicine, take it as told by your health care provider. Do not stop using the antibiotic even if you start to feel better.        General instructions    Make sure you:    Empty your bladder often and completely. Do not hold urine for long periods of time.  Empty your bladder after sex.  Wipe from front to back after a bowel movement if you are female. Use each tissue one time when you wipe.  Drink enough fluid to keep your urine pale yellow.  Keep all follow-up visits as told by your health care provider. This is important.    Contact a health care provider if:  Your symptoms do not get better after 1–2 days.  Your symptoms go away and then return.    Get help right away if you have:  Severe pain in your back or your lower abdomen.  A fever.  Nausea or vomiting.    Summary  A urinary tract infection (UTI) is an infection of any part of the urinary tract, which includes the kidneys, ureters, bladder, and urethra.  Most urinary tract infections are caused by bacteria in your genital area, around the entrance to your urinary tract (urethra).  Treatment for this condition often includes antibiotic medicines.  If you were prescribed an antibiotic medicine, take it as told by your health care provider. Do not stop using the antibiotic even if you start to feel better.  Keep all follow-up visits as told by your health care provider. This is important.    ADDITIONAL NOTES AND INSTRUCTIONS    Please follow up with your Primary MD in 24-48 hr.  Seek immediate medical care for any new/worsening signs or symptoms.

## 2024-05-15 NOTE — ED PROVIDER NOTE - CARE PROVIDER_API CALL
Janie Sanchez  Internal Medicine  [address] [facility], [address] [address]  Phone: [phone]  Fax: [phone]  Follow Up Time:

## 2024-05-15 NOTE — ED PROVIDER NOTE - CONSTITUTIONAL, MLM
normal... Well appearing, awake, alert, oriented to person, place, time/situation and in no apparent distress. Patient obese

## 2024-05-15 NOTE — ED ADULT NURSE NOTE - NSICDXPASTMEDICALHX_GEN_ALL_CORE_FT
PAST MEDICAL HISTORY:  BPH (benign prostatic hyperplasia)     BPH without obstruction/lower urinary tract symptoms     CVA (cerebrovascular accident)     DDD (degenerative disc disease), lumbar     Degenerative arthritis     DM2 (diabetes mellitus, type 2)     DVT of lower limb, acute     DVT, lower extremity     H/O Guillain-[place] syndrome     History of CVA in adulthood     History of Guillain-[place] syndrome     History of neurogenic bowel     HTN (hypertension)     HTN (hypertension)     Peripheral neuropathy     Renal stones     Type 2 diabetes mellitus

## 2024-05-15 NOTE — ED PROVIDER NOTE - CLINICAL SUMMARY MEDICAL DECISION MAKING FREE TEXT BOX
49 year old male with a history of neurogenic bladder with chronic indwelling ayala p/w left flank pain and ayala catheter dislodgement a few hours ago.   Check labs, replace ayala, send UA and Ucx, CT renal stone hunt, abx as needed

## 2024-05-15 NOTE — ED PROVIDER NOTE - RESPIRATORY, MLM
1147-Recalled patient and spoke to wife to discuss appointment and plans for possible EBUS/Bx. Patient currently takes Plavix and ASA  But hasn't seen cardiology in quite sometime. Informed her that this will be discussed with Dr. Sandra at his upcoming visit for further direction. Appt info and directions to cancer center provided and wife verbalized understanding        Received msg from Dr. Sandra regarding scheduling patient in lung clinic. APpt made and left msg on patient's VM to inform. Requested return call to navigation office. Awaiting return call.      Oncology Navigation   Intake  Cancer Type: Thoracic  Internal / External Referral: Internal  Referral Source: EPIC -Nicole Page contacted Dr. Sandra regarding EBUS/Bx  Date of Referral: 6/22/2022  Initial Nurse Navigator Contact: 7/8/2022  Referral to Initial Contact Timeline (days): 16  Date Worked: 7/8/2022  First Appointment Available: 7/12/2022  Appointment Date: 7/12/2022  First Available Date vs. Scheduled Date (days): 0     Treatment                              Acuity      Follow Up  No follow-ups on file.      Breath sounds clear and equal bilaterally.

## 2024-05-15 NOTE — ED ADULT NURSE NOTE - OBJECTIVE STATEMENT
49yr old male a&ox4 arrives to ED from facility notes to be having left sided flank pain for several days, pt also notes to have chronic ayala that has been dislodge, pt notes to have had ayala placed by urologist at [facility], pt notes to be incontinent at this time, pt denies fever chills, chest pain or sob at this time.  Anneliese MARR

## 2024-05-15 NOTE — ED PROVIDER NOTE - PATIENT PORTAL LINK FT
You can access the FollowMyHealth Patient Portal offered by [facility] by registering at the following website: [email or web address]. By joining [facility]’s FollowMyHealth portal, you will also be able to view your health information using other applications (apps) compatible with our system.

## 2024-07-25 ENCOUNTER — EMERGENCY (EMERGENCY)
Facility: HOSPITAL | Age: 49
LOS: 1 days | Discharge: NURSING, REHAB OR LONG-TERM CARE | End: 2024-07-25
Attending: INTERNAL MEDICINE | Admitting: INTERNAL MEDICINE
Payer: MEDICAID

## 2024-07-25 VITALS
OXYGEN SATURATION: 98 % | WEIGHT: 279.11 LBS | RESPIRATION RATE: 20 BRPM | SYSTOLIC BLOOD PRESSURE: 147 MMHG | HEIGHT: 72 IN | TEMPERATURE: 98 F | HEART RATE: 73 BPM | DIASTOLIC BLOOD PRESSURE: 93 MMHG

## 2024-07-25 DIAGNOSIS — Z95.828 PRESENCE OF OTHER VASCULAR IMPLANTS AND GRAFTS: Chronic | ICD-10-CM

## 2024-07-25 DIAGNOSIS — Z98.890 OTHER SPECIFIED POSTPROCEDURAL STATES: Chronic | ICD-10-CM

## 2024-07-25 LAB
ALBUMIN SERPL ELPH-MCNC: 2.8 G/DL — LOW (ref 3.3–5)
ALP SERPL-CCNC: 96 U/L — SIGNIFICANT CHANGE UP (ref 30–120)
ALT FLD-CCNC: 17 U/L — SIGNIFICANT CHANGE UP (ref 10–60)
ANION GAP SERPL CALC-SCNC: 12 MMOL/L — SIGNIFICANT CHANGE UP (ref 5–17)
AST SERPL-CCNC: 19 U/L — SIGNIFICANT CHANGE UP (ref 10–40)
BILIRUB SERPL-MCNC: 0.2 MG/DL — SIGNIFICANT CHANGE UP (ref 0.2–1.2)
BUN SERPL-MCNC: 38 MG/DL — HIGH (ref 7–23)
CALCIUM SERPL-MCNC: 9.2 MG/DL — SIGNIFICANT CHANGE UP (ref 8.4–10.5)
CHLORIDE SERPL-SCNC: 99 MMOL/L — SIGNIFICANT CHANGE UP (ref 96–108)
CO2 SERPL-SCNC: 23 MMOL/L — SIGNIFICANT CHANGE UP (ref 22–31)
CREAT SERPL-MCNC: 2.41 MG/DL — HIGH (ref 0.5–1.3)
EGFR: 32 ML/MIN/1.73M2 — LOW
GLUCOSE SERPL-MCNC: 186 MG/DL — HIGH (ref 70–99)
HCT VFR BLD CALC: 31.1 % — LOW (ref 39–50)
HGB BLD-MCNC: 10.2 G/DL — LOW (ref 13–17)
MCHC RBC-ENTMCNC: 24.2 PG — LOW (ref 27–34)
MCHC RBC-ENTMCNC: 32.8 GM/DL — SIGNIFICANT CHANGE UP (ref 32–36)
MCV RBC AUTO: 73.7 FL — LOW (ref 80–100)
NRBC # BLD: 0 /100 WBCS — SIGNIFICANT CHANGE UP (ref 0–0)
PLATELET # BLD AUTO: 242 K/UL — SIGNIFICANT CHANGE UP (ref 150–400)
POTASSIUM SERPL-MCNC: 4.5 MMOL/L — SIGNIFICANT CHANGE UP (ref 3.5–5.3)
POTASSIUM SERPL-SCNC: 4.5 MMOL/L — SIGNIFICANT CHANGE UP (ref 3.5–5.3)
PROT SERPL-MCNC: 7.7 G/DL — SIGNIFICANT CHANGE UP (ref 6–8.3)
RBC # BLD: 4.22 M/UL — SIGNIFICANT CHANGE UP (ref 4.2–5.8)
RBC # FLD: 15.3 % — HIGH (ref 10.3–14.5)
SODIUM SERPL-SCNC: 134 MMOL/L — LOW (ref 135–145)
WBC # BLD: 10.5 K/UL — SIGNIFICANT CHANGE UP (ref 3.8–10.5)
WBC # FLD AUTO: 10.5 K/UL — SIGNIFICANT CHANGE UP (ref 3.8–10.5)

## 2024-07-25 PROCEDURE — 51705 CHANGE OF BLADDER TUBE: CPT

## 2024-07-25 PROCEDURE — 99285 EMERGENCY DEPT VISIT HI MDM: CPT | Mod: 25

## 2024-07-25 RX ORDER — ONDANSETRON HYDROCHLORIDE 2 MG/ML
4 INJECTION INTRAMUSCULAR; INTRAVENOUS ONCE
Refills: 0 | Status: COMPLETED | OUTPATIENT
Start: 2024-07-25 | End: 2024-07-25

## 2024-07-25 RX ORDER — PHENAZOPYRIDINE HCL 200 MG/1
200 TABLET ORAL ONCE
Refills: 0 | Status: COMPLETED | OUTPATIENT
Start: 2024-07-25 | End: 2024-07-26

## 2024-07-25 RX ORDER — SODIUM CHLORIDE 0.9 % (FLUSH) 0.9 %
1000 SYRINGE (ML) INJECTION ONCE
Refills: 0 | Status: COMPLETED | OUTPATIENT
Start: 2024-07-25 | End: 2024-07-25

## 2024-07-25 RX ORDER — LIDOCAINE HYDROCHLORIDE 20 MG/ML
5 INJECTION, SOLUTION EPIDURAL; INFILTRATION; INTRACAUDAL; PERINEURAL ONCE
Refills: 0 | Status: COMPLETED | OUTPATIENT
Start: 2024-07-25 | End: 2024-07-25

## 2024-07-25 RX ORDER — HYDROMORPHONE HCL 0.2 MG/ML
0.5 INJECTION, SOLUTION INTRAVENOUS ONCE
Refills: 0 | Status: DISCONTINUED | OUTPATIENT
Start: 2024-07-25 | End: 2024-07-25

## 2024-07-25 RX ADMIN — LIDOCAINE HYDROCHLORIDE 5 MILLILITER(S): 20 INJECTION, SOLUTION EPIDURAL; INFILTRATION; INTRACAUDAL; PERINEURAL at 23:25

## 2024-07-25 RX ADMIN — Medication 1000 MILLILITER(S): at 23:34

## 2024-07-25 RX ADMIN — HYDROMORPHONE HCL 0.5 MILLIGRAM(S): 0.2 INJECTION, SOLUTION INTRAVENOUS at 23:30

## 2024-07-25 RX ADMIN — ONDANSETRON HYDROCHLORIDE 4 MILLIGRAM(S): 2 INJECTION INTRAMUSCULAR; INTRAVENOUS at 23:34

## 2024-07-25 NOTE — ED PROVIDER NOTE - PROVIDER TOKENS
PROVIDER:[TOKEN:[[id number]],FOLLOWUP:[1-3 Days]] PROVIDER:[TOKEN:[[id number]],FOLLOWUP:[1-3 Days]],PROVIDER:[TOKEN:[[id number]],FOLLOWUP:[1-3 Days]]

## 2024-07-25 NOTE — ED PROVIDER NOTE - OBJECTIVE STATEMENT
c/o "chronic pain" to abd. Pt called EMS for UTI.  abdominal pain 50 y/o male H/O Guillian [place] syndrome, neuro genic bladder , suprapubic cathter, UTI.   C/O Bladder spasms and dysuria.  He was admitted to  [facility] a UTI this past week. He received  meropenem x  4 days during the SB  admission, After discharged, he returned back to    ED on the following evening  with similar  urinary symptoms.   He  was treated with an additional  dose of meropenem. 48 y/o male H/O Guillian [place] syndrome, neuro genic bladder , suprapubic cathter, UTI.   C/O Bladder spasms and dysuria.  He was admitted to  [facility] for a UTI this past week. He received  meropenem x  4 days during the   admission, After discharged, he returned back to    ED on the following evening  with similar  urinary symptoms.   He  was treated with an additional  dose of meropenem. no headache, no chest pain, no SOB, no palpitations, no fever, no chills, no toxemia,  no n/v/d,  no GI, no urinary  bleeding. no neuro changes. 50 y/o male H/O DM2, ANTONIO, Guillian [place] syndrome, neuro genic bladder , suprapubic cathter, UTI.   C/O Bladder spasms and dysuria.  He was admitted to  [facility] for a UTI this past week. He received  meropenem x  4 days during the   admission, After discharged, he returned back to    ED on the following evening  with similar  urinary symptoms.   He  was treated with an additional  dose of meropenem. no headache, no chest pain, no SOB, no palpitations, no fever, no chills, no toxemia,  no n/v/d,  no GI, no urinary  bleeding. no neuro changes. 50 y/o male H/O DM2, ANTONIO, Guillian [place] syndrome, neuro genic bladder , suprapubic cathter, UTI.   C/O Bladder spasms and dysuria.  He was admitted to  [facility] for a UTI this past week. He received  meropenem x  4 days. Shortly after his discharge, he returned  to    ED   the following evening with  C/O  UTI  symptoms.   He  was treated with an additional  dose of meropenem and discharged . no headache, no chest pain, no SOB, no palpitations, no fever, no chills, no toxemia,  no n/v/d,  no GI, no urinary  bleeding. no neuro changes.  His last evaluation at [facility] was on 5/15/24. Urine Culture reported  E-Coli,  sensitive to broad range of antibiotics. 50 y/o male H/O DM2, ANTONIO, Guillian [place] syndrome, neuro genic bladder , suprapubic cathter, UTI.   C/O Bladder spasms and dysuria.  The patient reports that he  was admitted into  [facility] for a UTI last  week. He received  meropenem x  4 days. One day  after his discharge, he returned  to    ED  with persistent   UTI  symptoms and he  was treated with an additional  dose of meropenem and then  discharged . no headache, no chest pain, no SOB, no palpitations, no fever, no chills, no toxemia,  no n/v/d,  no GI, no urinary  bleeding. no neuro changes.  His last evaluation at [facility] was on 5/15/24. Urine Culture reported  E-Coli,  sensitive to broad range of antibiotics.

## 2024-07-25 NOTE — ED PROVIDER NOTE - SIGNIFICANT NEGATIVE FINDINGS
no headache, no chest pain, no SOB, no palpitations, no fever, no chills, no toxemia,  no n/v/d,  no GI, no urinary  bleeding. no neuro changes.

## 2024-07-25 NOTE — ED PROVIDER NOTE - CLINICAL SUMMARY MEDICAL DECISION MAKING FREE TEXT BOX
abdominal pain, UTI symptoms and dehydration  VSS , Exam stable, Treated with IVF,  labs and CT scan are not acute,  U/A and C/S pending abdominal pain, UTI symptoms and dehydration  VSS , Exam stable, Treated with IVF,  labs and CT scan are not acute,  U/A Positive for UTI Rx Ceftriaxone, discharge with Ceftin 500mg twice daily , urology referral provided

## 2024-07-25 NOTE — ED PROVIDER NOTE - PATIENT PORTAL LINK FT
You can access the FollowMyHealth Patient Portal offered by [facility] by registering at the following website: [email or web address]. By joining [employer]’s FollowMyHealth portal, you will also be able to view your health information using other applications (apps) compatible with our system.

## 2024-07-25 NOTE — ED PROVIDER NOTE - CARE PROVIDER_API CALL
Janie Sanchez  Internal Medicine  [address] [facility], [facility] [address]  [place] [address]  Phone: [phone]  Fax: [phone]  Follow Up Time: 1-3 Days   Janie Sanchez  Internal Medicine  [address] [facility], [address]  [place] [address]  Phone: [phone]  Fax: [phone]  Follow Up Time: 1-3 Days    Aaron Bah  Urology  [address] [facility], [address]  [place] [address]  Phone: [phone]  Fax: [phone]  Follow Up Time: 1-3 Days

## 2024-07-26 VITALS
DIASTOLIC BLOOD PRESSURE: 81 MMHG | SYSTOLIC BLOOD PRESSURE: 144 MMHG | HEART RATE: 67 BPM | OXYGEN SATURATION: 96 % | RESPIRATION RATE: 18 BRPM | TEMPERATURE: 98 F

## 2024-07-26 DIAGNOSIS — Z93.59 OTHER CYSTOSTOMY STATUS: Chronic | ICD-10-CM

## 2024-07-26 LAB
APPEARANCE UR: ABNORMAL
BACTERIA # UR AUTO: ABNORMAL /HPF
BILIRUB UR-MCNC: NEGATIVE — SIGNIFICANT CHANGE UP
COLOR SPEC: YELLOW — SIGNIFICANT CHANGE UP
COMMENT - URINE: SIGNIFICANT CHANGE UP
DIFF PNL FLD: ABNORMAL
GLUCOSE UR QL: NEGATIVE MG/DL — SIGNIFICANT CHANGE UP
HYALINE CASTS # UR AUTO: PRESENT
KETONES UR-MCNC: NEGATIVE MG/DL — SIGNIFICANT CHANGE UP
LEUKOCYTE ESTERASE UR-ACNC: ABNORMAL
NITRITE UR-MCNC: NEGATIVE — SIGNIFICANT CHANGE UP
PH UR: 6.5 — SIGNIFICANT CHANGE UP (ref 5–8)
PROT UR-MCNC: 30 MG/DL
RBC CASTS # UR COMP ASSIST: 10 /HPF — HIGH (ref 0–4)
SP GR SPEC: 1.01 — SIGNIFICANT CHANGE UP (ref 1–1.03)
UROBILINOGEN FLD QL: 0.2 MG/DL — SIGNIFICANT CHANGE UP (ref 0.2–1)
WBC UR QL: 40 /HPF — HIGH (ref 0–5)

## 2024-07-26 PROCEDURE — 85027 COMPLETE CBC AUTOMATED: CPT

## 2024-07-26 PROCEDURE — 87086 URINE CULTURE/COLONY COUNT: CPT

## 2024-07-26 PROCEDURE — 74176 CT ABD & PELVIS W/O CONTRAST: CPT | Mod: MC

## 2024-07-26 PROCEDURE — 51702 INSERT TEMP BLADDER CATH: CPT

## 2024-07-26 PROCEDURE — 71045 X-RAY EXAM CHEST 1 VIEW: CPT

## 2024-07-26 PROCEDURE — 80053 COMPREHEN METABOLIC PANEL: CPT

## 2024-07-26 PROCEDURE — 36415 COLL VENOUS BLD VENIPUNCTURE: CPT

## 2024-07-26 PROCEDURE — 96374 THER/PROPH/DIAG INJ IV PUSH: CPT | Mod: XU

## 2024-07-26 PROCEDURE — 96361 HYDRATE IV INFUSION ADD-ON: CPT | Mod: XU

## 2024-07-26 PROCEDURE — 71045 X-RAY EXAM CHEST 1 VIEW: CPT | Mod: 26

## 2024-07-26 PROCEDURE — 81001 URINALYSIS AUTO W/SCOPE: CPT

## 2024-07-26 PROCEDURE — 96375 TX/PRO/DX INJ NEW DRUG ADDON: CPT | Mod: XU

## 2024-07-26 PROCEDURE — 99284 EMERGENCY DEPT VISIT MOD MDM: CPT | Mod: 25

## 2024-07-26 PROCEDURE — 74176 CT ABD & PELVIS W/O CONTRAST: CPT | Mod: 26,MC

## 2024-07-26 RX ORDER — SODIUM CHLORIDE 0.9 % (FLUSH) 0.9 %
1000 SYRINGE (ML) INJECTION ONCE
Refills: 0 | Status: COMPLETED | OUTPATIENT
Start: 2024-07-26 | End: 2024-07-26

## 2024-07-26 RX ORDER — CEFUROXIME SODIUM 7.5 G
1 VIAL (EA) INTRAVENOUS
Qty: 20 | Refills: 0
Start: 2024-07-26 | End: 2024-08-04

## 2024-07-26 RX ORDER — CEFTRIAXONE SODIUM 500 MG
1000 VIAL (EA) INJECTION ONCE
Refills: 0 | Status: COMPLETED | OUTPATIENT
Start: 2024-07-26 | End: 2024-07-26

## 2024-07-26 RX ADMIN — HYDROMORPHONE HCL 0.5 MILLIGRAM(S): 0.2 INJECTION, SOLUTION INTRAVENOUS at 01:05

## 2024-07-26 RX ADMIN — PHENAZOPYRIDINE HCL 200 MILLIGRAM(S): 200 TABLET ORAL at 01:09

## 2024-07-26 RX ADMIN — Medication 1000 MILLILITER(S): at 01:06

## 2024-07-26 RX ADMIN — Medication 1000 MILLILITER(S): at 02:52

## 2024-07-26 RX ADMIN — Medication 100 MILLIGRAM(S): at 04:19

## 2024-07-26 NOTE — ED ADULT NURSE NOTE - NSFALLRISKINTERV_ED_ALL_ED

## 2024-07-26 NOTE — ED ADULT NURSE NOTE - OBJECTIVE STATEMENT
pt is a resident at [facility] who called an ambulance for c/o urinary symptoms. pt has been treated for same at [facility]. supra pubic tube in place upon arrival to ED. c/o bilateral kidney pain. pt reports h/o neurogenic bladder, long term urethral catheter use in the past

## 2024-07-26 NOTE — ED ADULT NURSE REASSESSMENT NOTE - NS ED NURSE REASSESS COMMENT FT1
pt picked up for transport at this time by Gabby
MD aware of all results. telephone report, message left with nurse supervisor @ [facility]; informed of pt's d/c and follow up. will arrange transport back. pt informed of results, medication and encouraged to follow up with urology

## 2024-07-26 NOTE — ED ADULT NURSE NOTE - NSICDXPASTMEDICALHX_GEN_ALL_CORE_FT
PAST MEDICAL HISTORY:  Acute embolism and thrombosis of deep vein of lower extremity     BPH (benign prostatic hyperplasia)     CVD (cerebrovascular disease)     Diabetes mellitus due to underlying condition with diabetic neuropathy     Essential (primary) hypertension     GERD (gastroesophageal reflux disease)     H/O constipation     H/O insomnia     History of muscle spasm     Iron deficiency anemia     Major depressive disorder, single episode     Muscle weakness     Obesity     Pain, unspecified     Vitamin deficiency

## 2024-07-27 LAB
CULTURE RESULTS: SIGNIFICANT CHANGE UP
SPECIMEN SOURCE: SIGNIFICANT CHANGE UP

## 2024-08-08 RX ORDER — WARFARIN SODIUM 4 MG/1
1 TABLET ORAL
Refills: 0 | DISCHARGE

## 2024-08-08 RX ORDER — AMLODIPINE BESYLATE 2.5 MG/1
1 TABLET ORAL
Refills: 0 | DISCHARGE

## 2024-08-08 RX ORDER — HYDROMORPHONE HCL 0.2 MG/ML
1 INJECTION, SOLUTION INTRAVENOUS
Refills: 0 | DISCHARGE

## 2024-08-08 RX ORDER — ENOXAPARIN SODIUM 100 MG/ML
120 INJECTION SUBCUTANEOUS
Refills: 0 | DISCHARGE

## 2024-08-08 RX ORDER — CLONIDINE HYDROCHLORIDE 0.3 MG/1
0 TABLET ORAL
Refills: 0 | DISCHARGE

## 2024-08-08 RX ORDER — GABAPENTIN
600 POWDER (GRAM) MISCELLANEOUS
Refills: 0 | DISCHARGE

## 2024-08-08 RX ORDER — PANTOPRAZOLE SODIUM 40 MG/10ML
0 INJECTION, POWDER, FOR SOLUTION INTRAVENOUS
Refills: 0 | DISCHARGE

## 2024-08-08 RX ORDER — SENNOSIDES 8.6 MG
1 TABLET ORAL
Refills: 0 | DISCHARGE

## 2024-08-08 RX ORDER — DOCUSATE SODIUM 100 MG/1
1 CAPSULE, LIQUID FILLED ORAL
Refills: 0 | DISCHARGE

## 2024-08-08 RX ORDER — FOLIC ACID
1 POWDER (GRAM) MISCELLANEOUS
Refills: 0 | DISCHARGE

## 2024-08-08 RX ORDER — TRAZODONE HYDROCHLORIDE 50 MG/1
0 TABLET, FILM COATED ORAL
Refills: 0 | DISCHARGE

## 2024-08-08 RX ORDER — FERROUS SULFATE 325(65) MG
0 TABLET ORAL
Refills: 0 | DISCHARGE

## 2024-08-08 RX ORDER — METHOCARBAMOL 500 MG
2 TABLET ORAL
Refills: 0 | DISCHARGE

## 2024-08-08 RX ORDER — DULOXETINE HYDROCHLORIDE 20 MG/1
1 CAPSULE, DELAYED RELEASE ORAL
Refills: 0 | DISCHARGE

## 2024-08-08 RX ORDER — DIAZEPAM 10 MG/1
1 TABLET ORAL
Refills: 0 | DISCHARGE

## 2024-08-08 RX ORDER — METOPROLOL TARTRATE 50 MG
1 TABLET ORAL
Refills: 0 | DISCHARGE

## 2024-10-13 NOTE — PATIENT PROFILE ADULT - NSPRONUTRITIONRISK_GEN_A_NUR
Mexiletine  Chronic anticoagulation.  Continuous cardiac monitoring.  Cards on board.   No indicators present

## 2024-10-20 ENCOUNTER — EMERGENCY (EMERGENCY)
Facility: HOSPITAL | Age: 49
LOS: 1 days | Discharge: HOME | End: 2024-10-20
Attending: EMERGENCY MEDICINE | Admitting: EMERGENCY MEDICINE
Payer: MEDICAID

## 2024-10-20 VITALS
TEMPERATURE: 98 F | SYSTOLIC BLOOD PRESSURE: 151 MMHG | HEART RATE: 72 BPM | OXYGEN SATURATION: 100 % | DIASTOLIC BLOOD PRESSURE: 81 MMHG | RESPIRATION RATE: 18 BRPM

## 2024-10-20 VITALS
OXYGEN SATURATION: 100 % | DIASTOLIC BLOOD PRESSURE: 93 MMHG | TEMPERATURE: 99 F | SYSTOLIC BLOOD PRESSURE: 162 MMHG | HEART RATE: 77 BPM | WEIGHT: 279.99 LBS | RESPIRATION RATE: 16 BRPM

## 2024-10-20 DIAGNOSIS — Z98.890 OTHER SPECIFIED POSTPROCEDURAL STATES: Chronic | ICD-10-CM

## 2024-10-20 DIAGNOSIS — Z93.59 OTHER CYSTOSTOMY STATUS: Chronic | ICD-10-CM

## 2024-10-20 DIAGNOSIS — Z95.828 PRESENCE OF OTHER VASCULAR IMPLANTS AND GRAFTS: Chronic | ICD-10-CM

## 2024-10-20 PROBLEM — K21.9 GASTRO-ESOPHAGEAL REFLUX DISEASE WITHOUT ESOPHAGITIS: Chronic | Status: ACTIVE | Noted: 2024-07-26

## 2024-10-20 PROBLEM — Z87.39 PERSONAL HISTORY OF OTHER DISEASES OF THE MUSCULOSKELETAL SYSTEM AND CONNECTIVE TISSUE: Chronic | Status: ACTIVE | Noted: 2024-07-26

## 2024-10-20 PROBLEM — E66.9 OBESITY, UNSPECIFIED: Chronic | Status: ACTIVE | Noted: 2024-07-26

## 2024-10-20 PROBLEM — M62.81 MUSCLE WEAKNESS (GENERALIZED): Chronic | Status: ACTIVE | Noted: 2024-07-26

## 2024-10-20 PROBLEM — Z87.19 PERSONAL HISTORY OF OTHER DISEASES OF THE DIGESTIVE SYSTEM: Chronic | Status: ACTIVE | Noted: 2024-07-26

## 2024-10-20 PROBLEM — I82.409 ACUTE EMBOLISM AND THROMBOSIS OF UNSPECIFIED DEEP VEINS OF UNSPECIFIED LOWER EXTREMITY: Chronic | Status: ACTIVE | Noted: 2024-07-26

## 2024-10-20 PROBLEM — I10 ESSENTIAL (PRIMARY) HYPERTENSION: Chronic | Status: ACTIVE | Noted: 2024-07-26

## 2024-10-20 PROBLEM — F32.9 MAJOR DEPRESSIVE DISORDER, SINGLE EPISODE, UNSPECIFIED: Chronic | Status: ACTIVE | Noted: 2024-07-26

## 2024-10-20 PROBLEM — Z87.898 PERSONAL HISTORY OF OTHER SPECIFIED CONDITIONS: Chronic | Status: ACTIVE | Noted: 2024-07-26

## 2024-10-20 PROBLEM — R52 PAIN, UNSPECIFIED: Chronic | Status: ACTIVE | Noted: 2024-07-26

## 2024-10-20 PROBLEM — N40.0 BENIGN PROSTATIC HYPERPLASIA WITHOUT LOWER URINARY TRACT SYMPTOMS: Chronic | Status: ACTIVE | Noted: 2024-07-26

## 2024-10-20 PROBLEM — E08.40 DIABETES MELLITUS DUE TO UNDERLYING CONDITION WITH DIABETIC NEUROPATHY, UNSPECIFIED: Chronic | Status: ACTIVE | Noted: 2024-07-26

## 2024-10-20 PROBLEM — E56.9 VITAMIN DEFICIENCY, UNSPECIFIED: Chronic | Status: ACTIVE | Noted: 2024-07-26

## 2024-10-20 PROBLEM — D50.9 IRON DEFICIENCY ANEMIA, UNSPECIFIED: Chronic | Status: ACTIVE | Noted: 2024-07-26

## 2024-10-20 PROBLEM — I67.9 CEREBROVASCULAR DISEASE, UNSPECIFIED: Chronic | Status: ACTIVE | Noted: 2024-07-26

## 2024-10-20 LAB
ALBUMIN SERPL ELPH-MCNC: 3.5 G/DL — SIGNIFICANT CHANGE UP (ref 3.3–5)
ALP SERPL-CCNC: 100 U/L — SIGNIFICANT CHANGE UP (ref 40–120)
ALT FLD-CCNC: 16 U/L — SIGNIFICANT CHANGE UP (ref 4–41)
ANION GAP SERPL CALC-SCNC: 12 MMOL/L — SIGNIFICANT CHANGE UP (ref 7–14)
ANISOCYTOSIS BLD QL: SLIGHT — SIGNIFICANT CHANGE UP
APPEARANCE UR: ABNORMAL
APTT BLD: 30.1 SEC — SIGNIFICANT CHANGE UP (ref 24.5–35.6)
AST SERPL-CCNC: 18 U/L — SIGNIFICANT CHANGE UP (ref 4–40)
BACTERIA # UR AUTO: ABNORMAL /HPF
BASOPHILS # BLD AUTO: 0 K/UL — SIGNIFICANT CHANGE UP (ref 0–0.2)
BASOPHILS NFR BLD AUTO: 0 % — SIGNIFICANT CHANGE UP (ref 0–2)
BILIRUB SERPL-MCNC: <0.2 MG/DL — SIGNIFICANT CHANGE UP (ref 0.2–1.2)
BILIRUB UR-MCNC: NEGATIVE — SIGNIFICANT CHANGE UP
BUN SERPL-MCNC: 34 MG/DL — HIGH (ref 7–23)
CALCIUM SERPL-MCNC: 8.8 MG/DL — SIGNIFICANT CHANGE UP (ref 8.4–10.5)
CAST: 0 /LPF — SIGNIFICANT CHANGE UP (ref 0–4)
CHLORIDE SERPL-SCNC: 99 MMOL/L — SIGNIFICANT CHANGE UP (ref 98–107)
CO2 SERPL-SCNC: 20 MMOL/L — LOW (ref 22–31)
COLOR SPEC: ABNORMAL
CREAT SERPL-MCNC: 2.28 MG/DL — HIGH (ref 0.5–1.3)
DIFF PNL FLD: ABNORMAL
EGFR: 35 ML/MIN/1.73M2 — LOW
EOSINOPHIL # BLD AUTO: 0.5 K/UL — SIGNIFICANT CHANGE UP (ref 0–0.5)
EOSINOPHIL NFR BLD AUTO: 4.3 % — SIGNIFICANT CHANGE UP (ref 0–6)
GLUCOSE SERPL-MCNC: 298 MG/DL — HIGH (ref 70–99)
GLUCOSE UR QL: 500 MG/DL
HCT VFR BLD CALC: 33 % — LOW (ref 39–50)
HGB BLD-MCNC: 10.5 G/DL — LOW (ref 13–17)
HYPOCHROMIA BLD QL: SLIGHT — SIGNIFICANT CHANGE UP
IANC: 8.85 K/UL — HIGH (ref 1.8–7.4)
INR BLD: 1.35 RATIO — HIGH (ref 0.85–1.16)
KETONES UR-MCNC: NEGATIVE MG/DL — SIGNIFICANT CHANGE UP
LEUKOCYTE ESTERASE UR-ACNC: ABNORMAL
LYMPHOCYTES # BLD AUTO: 1.43 K/UL — SIGNIFICANT CHANGE UP (ref 1–3.3)
LYMPHOCYTES # BLD AUTO: 12.2 % — LOW (ref 13–44)
MAGNESIUM SERPL-MCNC: 1.8 MG/DL — SIGNIFICANT CHANGE UP (ref 1.6–2.6)
MANUAL SMEAR VERIFICATION: SIGNIFICANT CHANGE UP
MCHC RBC-ENTMCNC: 23.2 PG — LOW (ref 27–34)
MCHC RBC-ENTMCNC: 31.8 GM/DL — LOW (ref 32–36)
MCV RBC AUTO: 73 FL — LOW (ref 80–100)
MICROCYTES BLD QL: SLIGHT — SIGNIFICANT CHANGE UP
MONOCYTES # BLD AUTO: 0.72 K/UL — SIGNIFICANT CHANGE UP (ref 0–0.9)
MONOCYTES NFR BLD AUTO: 6.1 % — SIGNIFICANT CHANGE UP (ref 2–14)
NEUTROPHILS # BLD AUTO: 9.09 K/UL — HIGH (ref 1.8–7.4)
NEUTROPHILS NFR BLD AUTO: 77.4 % — HIGH (ref 43–77)
NITRITE UR-MCNC: NEGATIVE — SIGNIFICANT CHANGE UP
OVALOCYTES BLD QL SMEAR: SLIGHT — SIGNIFICANT CHANGE UP
PH UR: 8.5 (ref 5–8)
PHOSPHATE SERPL-MCNC: 2.9 MG/DL — SIGNIFICANT CHANGE UP (ref 2.5–4.5)
PLAT MORPH BLD: NORMAL — SIGNIFICANT CHANGE UP
PLATELET # BLD AUTO: 273 K/UL — SIGNIFICANT CHANGE UP (ref 150–400)
PLATELET COUNT - ESTIMATE: NORMAL — SIGNIFICANT CHANGE UP
POIKILOCYTOSIS BLD QL AUTO: SLIGHT — SIGNIFICANT CHANGE UP
POLYCHROMASIA BLD QL SMEAR: SLIGHT — SIGNIFICANT CHANGE UP
POTASSIUM SERPL-MCNC: 4.2 MMOL/L — SIGNIFICANT CHANGE UP (ref 3.5–5.3)
POTASSIUM SERPL-SCNC: 4.2 MMOL/L — SIGNIFICANT CHANGE UP (ref 3.5–5.3)
PROT SERPL-MCNC: 6.6 G/DL — SIGNIFICANT CHANGE UP (ref 6–8.3)
PROT UR-MCNC: 300 MG/DL
PROTHROM AB SERPL-ACNC: 15.6 SEC — HIGH (ref 9.9–13.4)
RBC # BLD: 4.52 M/UL — SIGNIFICANT CHANGE UP (ref 4.2–5.8)
RBC # FLD: 17.5 % — HIGH (ref 10.3–14.5)
RBC BLD AUTO: ABNORMAL
RBC CASTS # UR COMP ASSIST: 164 /HPF — HIGH (ref 0–4)
SODIUM SERPL-SCNC: 131 MMOL/L — LOW (ref 135–145)
SP GR SPEC: 1.02 — SIGNIFICANT CHANGE UP (ref 1–1.03)
SQUAMOUS # UR AUTO: 0 /HPF — SIGNIFICANT CHANGE UP (ref 0–5)
UROBILINOGEN FLD QL: 0.2 MG/DL — SIGNIFICANT CHANGE UP (ref 0.2–1)
WBC # BLD: 11.74 K/UL — HIGH (ref 3.8–10.5)
WBC # FLD AUTO: 11.74 K/UL — HIGH (ref 3.8–10.5)
WBC UR QL: 27 /HPF — HIGH (ref 0–5)

## 2024-10-20 PROCEDURE — 74177 CT ABD & PELVIS W/CONTRAST: CPT | Mod: 26,MC

## 2024-10-20 PROCEDURE — 99285 EMERGENCY DEPT VISIT HI MDM: CPT

## 2024-10-20 RX ORDER — CEFTRIAXONE SODIUM 1 G
1000 VIAL (EA) INJECTION ONCE
Refills: 0 | Status: COMPLETED | OUTPATIENT
Start: 2024-10-20 | End: 2024-10-20

## 2024-10-20 RX ORDER — HYDROMORPHONE HYDROCHLORIDE 1 MG/ML
0.5 INJECTION, SOLUTION INTRAMUSCULAR; INTRAVENOUS; SUBCUTANEOUS ONCE
Refills: 0 | Status: DISCONTINUED | OUTPATIENT
Start: 2024-10-20 | End: 2024-10-20

## 2024-10-20 RX ORDER — SODIUM CHLORIDE 0.9 % (FLUSH) 0.9 %
1000 SYRINGE (ML) INJECTION ONCE
Refills: 0 | Status: COMPLETED | OUTPATIENT
Start: 2024-10-20 | End: 2024-10-20

## 2024-10-20 RX ORDER — CEFDINIR 250 MG/5ML
1 POWDER, FOR SUSPENSION ORAL
Qty: 20 | Refills: 0
Start: 2024-10-20 | End: 2024-10-29

## 2024-10-20 RX ORDER — HYDROMORPHONE HYDROCHLORIDE 1 MG/ML
1 INJECTION, SOLUTION INTRAMUSCULAR; INTRAVENOUS; SUBCUTANEOUS ONCE
Refills: 0 | Status: DISCONTINUED | OUTPATIENT
Start: 2024-10-20 | End: 2024-10-20

## 2024-10-20 RX ADMIN — HYDROMORPHONE HYDROCHLORIDE 1 MILLIGRAM(S): 1 INJECTION, SOLUTION INTRAMUSCULAR; INTRAVENOUS; SUBCUTANEOUS at 02:58

## 2024-10-20 RX ADMIN — Medication 1000 MILLILITER(S): at 09:37

## 2024-10-20 RX ADMIN — HYDROMORPHONE HYDROCHLORIDE 0.5 MILLIGRAM(S): 1 INJECTION, SOLUTION INTRAMUSCULAR; INTRAVENOUS; SUBCUTANEOUS at 13:17

## 2024-10-20 RX ADMIN — Medication 100 MILLIGRAM(S): at 11:59

## 2024-10-20 RX ADMIN — HYDROMORPHONE HYDROCHLORIDE 1 MILLIGRAM(S): 1 INJECTION, SOLUTION INTRAMUSCULAR; INTRAVENOUS; SUBCUTANEOUS at 06:20

## 2024-10-20 NOTE — ED ADULT NURSE REASSESSMENT NOTE - NS ED NURSE REASSESS COMMENT FT1
Pt received from break RN. Awake, A&Ox4, resting comfortably in stretcher. NAD noted. Respirations even and unlabored. Comfort measures provided, safety maintained. Pt pending CT scan. Plan of care ongoing.

## 2024-10-20 NOTE — ED PROVIDER NOTE - PHYSICAL EXAMINATION
Vital Signs Last 24 Hrs  T(C): 36.7 (20 Oct 2024 01:24), Max: 37 (20 Oct 2024 00:15)  T(F): 98.1 (20 Oct 2024 01:24), Max: 98.6 (20 Oct 2024 00:15)  HR: 69 (20 Oct 2024 01:24) (69 - 77)  BP: 171/89 (20 Oct 2024 01:24) (162/93 - 171/89)  BP(mean): --  RR: 17 (20 Oct 2024 01:24) (16 - 17)  SpO2: 100% (20 Oct 2024 01:24) (100% - 100%)    Parameters below as of 20 Oct 2024 01:24  Patient On (Oxygen Delivery Method): room air    GENERAL: NAD, lying in bed comfortably  HEAD:  Atraumatic, normocephalic  EYES: EOMI, PERRLA, conjunctiva and sclera clear  NECK: Supple, trachea midline, no JVD  HEART: Regular rate and rhythm, no murmurs, rubs, or gallops  LUNGS: Unlabored respirations.  Clear to auscultation bilaterally, no crackles, wheezing, or rhonchi  ABDOMEN: diffuse tender to palpation; + suprapubic catheter draining urine some residual blood but no clots in ayala bag   BACK: + mild CVA tenderness  EXTREMITIES: 2+ peripheral pulses bilaterally. No clubbing, cyanosis, or edema  NERVOUS SYSTEM:  A&Ox3, moving all extremities, no focal deficits   SKIN: No rashes or lesions

## 2024-10-20 NOTE — ED ADULT NURSE NOTE - CHIEF COMPLAINT QUOTE
Pt coming from [facility]  c/o lower abd pain, supra pubic pain x 1 week. pt states pain has been progressively getting worse. Pt has a supra pubic catheter in place. Pt states he noticed blood in his urine and has been leaking urine from his penis. No complaints of chest pain, headache, nausea, dizziness, vomiting  SOB, fever, chills verbalized..

## 2024-10-20 NOTE — ED ADULT NURSE REASSESSMENT NOTE - NS ED NURSE REASSESS COMMENT FT1
Pt awake, A&Ox4, resting comfortably in stretcher. Pt endorsing groin pain, pt medicated as per MD orders. Pt denies c/p, SOB, headache, abd pain, n/v/d, blurry vision, or fever like symptoms. NAD noted. Respirations even and unlabored. Comfort measures provided, safety maintained. Pt pending CT results. Plan of care ongoing.

## 2024-10-20 NOTE — ED ADULT NURSE NOTE - NSFALLRISKINTERV_ED_ALL_ED

## 2024-10-20 NOTE — ED PROVIDER NOTE - NSICDXPASTMEDICALHX_GEN_ALL_CORE_FT
PAST MEDICAL HISTORY:  Acute embolism and thrombosis of deep vein of lower extremity     BPH (benign prostatic hyperplasia)     BPH (benign prostatic hyperplasia)     BPH without obstruction/lower urinary tract symptoms     CVA (cerebrovascular accident)     CVD (cerebrovascular disease)     DDD (degenerative disc disease), lumbar     Degenerative arthritis     Diabetes mellitus due to underlying condition with diabetic neuropathy     DM2 (diabetes mellitus, type 2)     DVT of lower limb, acute     DVT, lower extremity     Essential (primary) hypertension     GERD (gastroesophageal reflux disease)     H/O constipation     H/O Guillain-[place] syndrome     H/O insomnia     History of CVA in adulthood     History of Guillain-[place] syndrome     History of muscle spasm     History of neurogenic bowel     HTN (hypertension)     HTN (hypertension)     Iron deficiency anemia     Major depressive disorder, single episode     Muscle weakness     Obesity     Pain, unspecified     Peripheral neuropathy     Renal stones     Type 2 diabetes mellitus     Vitamin deficiency

## 2024-10-20 NOTE — PROVIDER CONTACT NOTE (OTHER) - ASSESSMENT
SW called [phone]( [facility] and spoke with Flavio who confirmed the patient's return. JANINE called [phone]) [facility] and spoke with Flavio who confirmed the patient's return to [facility] located at [address] [place] [address], [place] [address].    JANINE called [facility] ([phone]) and arranged for [facility] transport trip #191A, MAS auth #[id number], ETA by 4:00p.m.

## 2024-10-20 NOTE — ED ADULT TRIAGE NOTE - SPO2 (%)
100 Topical Retinoid counseling:  Patient advised to apply a pea-sized amount only at bedtime and wait 30 minutes after washing their face before applying.  If too drying, patient may add a non-comedogenic moisturizer. The patient verbalized understanding of the proper use and possible adverse effects of retinoids.  All of the patient's questions and concerns were addressed.

## 2024-10-20 NOTE — ED PROVIDER NOTE - NSFOLLOWUPCLINICS_GEN_ALL_ED_FT
[place] [facility]  Urology  [address] [place] [address] [address]  [place] [address]  Phone: [phone]  Fax:     [facility]  Urology  [address] [place] [address]  Phone: [phone]  Fax:

## 2024-10-20 NOTE — ED ADULT NURSE REASSESSMENT NOTE - NS ED NURSE REASSESS COMMENT FT1
Patient refused to go home, states he feels unsafe to go home, he doesn't know how his body would react to the medication. Dr Marquez informed.

## 2024-10-20 NOTE — ED ADULT NURSE NOTE - OBJECTIVE STATEMENT
break coverage rn. pt received to room 7. A&Ox4, wheelchair bound at baseline. history of anxiety, depression, ogilivie sydnrome, cerebral palsy. pt arrives from [facility] with c/o suprapubic catheter discomfort. endorses having muscle spasms x1 week causing urinary leakage from urethral site. noted to have hematuria and concentrated urine in the catheter. Denies fevers, chills, nausea, vomiting, diarrhea, chest pain, SOB. pt refusing to change into gown initially, but resident spoke to patient and made aware need to be undressed for scans for further eval, pt okay with changing into gown. safety maintained. awaiting lab work orders break coverage rn. pt received to room 7. A&Ox4, wheelchair bound at baseline. history of MDD, DM2, BPH. pt arrives from [facility] with c/o suprapubic catheter discomfort. endorses having muscle spasms x1 week causing urinary leakage from urethral site. noted to have hematuria and concentrated urine in the catheter. Denies fevers, chills, nausea, vomiting, diarrhea, chest pain, SOB. pt refusing to change into gown initially, but resident spoke to patient and made aware need to be undressed for scans for further eval, pt okay with changing into gown. safety maintained. awaiting lab work orders

## 2024-10-20 NOTE — ED ADULT NURSE REASSESSMENT NOTE - NS ED NURSE REASSESS COMMENT FT1
Patient received into my care at 0815 hrs, from [facility] night shift. Patient is alert and oriented x 4, due blood work drawn and sent to lab. No verbal complaints at this time, bed in lowest position, side rails up.

## 2024-10-20 NOTE — PROVIDER CONTACT NOTE (OTHER) - SITUATION
SW contacted by the medical team, the patient is cleared for discharge and needs BLS transport back to [facility].

## 2024-10-20 NOTE — ED PROVIDER NOTE - OBJECTIVE STATEMENT
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 Pt reports that suprapubic catheter was exchanged 2 weeks ago and about 7-10 days developed these symptoms. Denies fever, N/V/D. Does report some flank pain.    Of note was seen 1 day ago at [place] ED where pt was given fluconazole, cefpodoxime and trospium for symptoms. Pt was unsatisfied with care and came to [facility] instead. 50yo M, PMHx of renal stones, Guillain-Bristol Syndrome with peripheral neuropathy, urinary incontinence, DM2  on insulin, BPH, HTN, DVT on coumadin presenting for diffuse abdominal pain with urinary symptoms including blood in the catheter as well as now urine coming out of urethra. Pt reports that suprapubic catheter was exchanged 2 weeks ago and about 7-10 days developed these symptoms. Denies fever, N/V/D. Does report some flank pain.    Of note was seen 1 day ago at [place] [facility] where pt was given fluconazole, cefpodoxime and trospium for bladder spasm for symptoms. Pt was unsatisfied with care as had adverse effects on fluconazole and came to [facility] instead.

## 2024-10-20 NOTE — ED ADULT TRIAGE NOTE - CHIEF COMPLAINT QUOTE
Pt coming from [facility]  c/o lower abd pain, supra pubic pain x 1 week. pt states pain has been progressively getting worse. Pt has a supra pubic catheter in place. Pt states he noticed blood in his urine and has been leaking urine from his penis. No complaints of chest pain, headache, nausea, dizziness, vomiting  SOB, fever, chills verbalized..

## 2024-10-20 NOTE — ED PROVIDER NOTE - PATIENT PORTAL LINK FT
You can access the FollowMyHealth Patient Portal offered by [facility] by registering at the following website: [email or web address]. By joining [facility]’s FollowMyHealth portal, you will also be able to view your health information using other applications (apps) compatible with our system.

## 2024-10-20 NOTE — ED PROVIDER NOTE - NS ED ROS FT
REVIEW OF SYSTEMS:    CONSTITUTIONAL:  No weakness, fevers or chills  EYES/ENT:  No visual changes;  No vertigo or throat pain   NECK:  No pain or stiffness  RESPIRATORY:  No cough, wheezing, hemoptysis; No shortness of breath  CARDIOVASCULAR:  No chest pain or palpitations  GASTROINTESTINAL: + abdominal pain  No nausea, vomiting, or hematemesis; No diarrhea or constipation. No melena or hematochezia.  GENITOURINARY: +hematuria; + flank pain   MUSCULOSKELETAL:  FROM all extremities, normal strength, No calf tenderness  NEUROLOGICAL:  No numbness or weakness  SKIN:  No itching, rashes

## 2024-10-20 NOTE — ED PROVIDER NOTE - NSFOLLOWUPINSTRUCTIONS_ED_ALL_ED_FT
You were given a dose of Ceftriaxone in the ED for your urinary tract infection. You were also sent an oral antibiotics to your pharmacy. The antibiotic is 300mg of Cefdinir every 12 hours for the next 10 days.     Follow up with the [facility] Urology office     Urinary Tract Infection    A urinary tract infection (UTI) is an infection of any part of the urinary tract, which includes the kidneys, ureters, bladder, and urethra. Risk factors include ignoring your need to urinate, wiping back to front if female, being an uncircumcised male, and having diabetes or a weak immune system. Symptoms include frequent urination, pain or burning with urination, foul smelling urine, cloudy urine, pain in the lower abdomen, blood in the urine, and fever. If you were prescribed an antibiotic medicine, take it as told by your health care provider. Do not stop taking the antibiotic even if you start to feel better.    SEEK IMMEDIATE MEDICAL CARE IF YOU HAVE ANY OF THE FOLLOWING SYMPTOMS: severe back or abdominal pain, fever, inability to keep fluids or medicine down, dizziness/lightheadedness, or a change in mental status.

## 2024-10-20 NOTE — ED PROVIDER NOTE - ATTENDING CONTRIBUTION TO CARE
Patient is a 49-year-old male with a history of Guillain-Barré syndrome with peripheral neuropathy, chronic urinary incontinence with neurogenic bladder s/p suprapubic catheter June 2024, type 2 diabetes mellitus on insulin, hypertension, BPH, DVT on Coumadin here for evaluation of diffuse abdominal pain, hematuria x 1 week.  Patient reports he has a history of bladder spasms.  He is on a bladder spasm regimen.  He states he went to [facility] as that is where his urology team was at.  He states that he was very unhappy with the options and care that was provided to him.  He reports that he has previously had similar symptoms, treated with antifungal medication and antispasmodic medications.  Patient reports that he was on fluconazole about 2 weeks ago and had very bad diarrhea and was unable to handle it.  He reports that the urology attending recommended he take fluconazole and an additional urinary bladder spasm medication.  Patient reports he has persistent pain.  At baseline he is on p.o. Dilaudid every 6 hours.  He denies any fevers, nausea or vomiting.  Patient reports that he does not wish to follow with urology at [facility] anymore.  He states that he wants to establish care with urology here at [facility].  He states that his suprapubic catheter was exchanged about 2 weeks ago.      VS noted  Gen. no acute distress, Non toxic   HEENT: EOMI, mmm  Lungs: CTAB/L no C/ W /R   CVS: RRR   Abd; Soft, mild diffuse tenderness, no rebound or guarding, suprapubic catheter intact, no skin changes surrounding catheter, Anderson with clear pink-tinged blood  Ext: no edema  Skin: no rash  Neuro:  awake, alert, cranial nerves intact  a/p:  hematuria and pain around suprapubic catheter–patient has had urinary tract infections in the past.  He has required IV antibiotics.  Plan for labs, UA, urine culture.  Will get CT abdomen pelvis.  Urology will need to be consulted for any additional recs depending on imaging and workup results.  Lencho Pascal MD Patient is a 49-year-old male with a history of Guillain-Barré syndrome with peripheral neuropathy, chronic urinary incontinence with neurogenic bladder s/p suprapubic catheter June 2024, type 2 diabetes mellitus on insulin, hypertension, BPH, DVT on Coumadin here for evaluation of diffuse abdominal pain, hematuria x 1 week.  Patient reports he has a history of bladder spasms.  He is on a bladder spasm regimen.  He states he went to [facility] as that is where his urology team was at.  He states that he was very unhappy with the options and care that was provided to him.  He reports that he has previously had similar symptoms, treated with antifungal medication and antispasmodic medications.  Patient reports that he was on fluconazole about 2 weeks ago and had very bad diarrhea and was unable to handle it.  He reports that the urology attending recommended he take fluconazole and an additional urinary bladder spasm medication.  Patient reports he has persistent pain.  At baseline he is on p.o. Dilaudid every 6 hours.  He denies any fevers, nausea or vomiting.  Patient reports that he does not wish to follow with urology at [facility] anymore.  He states that he wants to establish care with urology here at [facility].  He states that his suprapubic catheter was exchanged about 2 weeks ago.      VS noted  Gen. no acute distress, Non toxic   HEENT: EOMI, mmm  Lungs: CTAB/L no C/ W /R   CVS: RRR   Abd; Soft, mild diffuse tenderness, no rebound or guarding, suprapubic catheter intact, no skin changes surrounding catheter, Anderson with clear pink-tinged blood  Ext: no edema  Skin: no rash  Neuro:  awake, alert, cranial nerves intact  a/p:  hematuria and pain around suprapubic catheter–patient has had urinary tract infections in the past.  He has required IV antibiotics.  Plan for labs, UA, urine culture.  Will get CT abdomen pelvis.  Urology may need to be consulted for any additional recs depending on imaging and workup results.  Lencho Pascal MD

## 2024-10-20 NOTE — ED PROVIDER NOTE - CLINICAL SUMMARY MEDICAL DECISION MAKING FREE TEXT BOX
50yo M, PMHx of renal stones, Guillain-[place] Syndrome with peripheral neuropathy, urinary incontinence, DM2  on insulin, BPH, HTN, DVT on coumadin coming in for diffuse abdominal pain and urinary symptoms may be suggestive of cystitis. Of note was seen at [place] just recently. Will send labs, urinalysis, urine culture given previous cx history of ESBL and E faecalis treated with meropenem previously. CT scan ordered r/o pyelo given diffuse tenderness to palpation. will reassess afterwards if urology needs to be called

## 2024-10-20 NOTE — ED PROVIDER NOTE - PROGRESS NOTE DETAILS
Had  abdominal pain now dilaudid x2 which pt has PRN at nursing home as per paperwork. Now more comfortable s/p pain control. Pending CT. Souleymane Martinez PGY3:  Spoke with Ms. Espino who is one of the supervisors from Pt's facility. They are comfortable with plan to take Pt back.   Updated Pt about labs and imaging regarding UTI. Pt received ceftriaxone and abx sent to pharmacy and included on discharge paperwork. Pt is agreeable with plan to be dc back to facility and follow up with [facility] as he no longer want to follow with [facility].

## 2024-10-21 ENCOUNTER — INPATIENT (INPATIENT)
Facility: HOSPITAL | Age: 49
LOS: 9 days | Discharge: NURSING, REHAB OR LONG-TERM CARE | DRG: 690 | End: 2024-10-31
Attending: INTERNAL MEDICINE | Admitting: INTERNAL MEDICINE
Payer: MEDICAID

## 2024-10-21 VITALS
HEIGHT: 72 IN | HEART RATE: 76 BPM | WEIGHT: 279.99 LBS | SYSTOLIC BLOOD PRESSURE: 158 MMHG | OXYGEN SATURATION: 96 % | DIASTOLIC BLOOD PRESSURE: 93 MMHG | TEMPERATURE: 98 F | RESPIRATION RATE: 16 BRPM

## 2024-10-21 DIAGNOSIS — Z98.890 OTHER SPECIFIED POSTPROCEDURAL STATES: Chronic | ICD-10-CM

## 2024-10-21 DIAGNOSIS — Z93.59 OTHER CYSTOSTOMY STATUS: Chronic | ICD-10-CM

## 2024-10-21 DIAGNOSIS — Z95.828 PRESENCE OF OTHER VASCULAR IMPLANTS AND GRAFTS: Chronic | ICD-10-CM

## 2024-10-21 LAB
ALBUMIN SERPL ELPH-MCNC: 2.9 G/DL — LOW (ref 3.3–5)
ALP SERPL-CCNC: 101 U/L — SIGNIFICANT CHANGE UP (ref 30–120)
ALT FLD-CCNC: 23 U/L — SIGNIFICANT CHANGE UP (ref 10–60)
ANION GAP SERPL CALC-SCNC: 14 MMOL/L — SIGNIFICANT CHANGE UP (ref 5–17)
APTT BLD: 18.1 SEC — LOW (ref 24.5–35.6)
AST SERPL-CCNC: 20 U/L — SIGNIFICANT CHANGE UP (ref 10–40)
BASOPHILS # BLD AUTO: 0.08 K/UL — SIGNIFICANT CHANGE UP (ref 0–0.2)
BASOPHILS NFR BLD AUTO: 0.7 % — SIGNIFICANT CHANGE UP (ref 0–2)
BILIRUB SERPL-MCNC: 0.2 MG/DL — SIGNIFICANT CHANGE UP (ref 0.2–1.2)
BUN SERPL-MCNC: 27 MG/DL — HIGH (ref 7–23)
CALCIUM SERPL-MCNC: 8.7 MG/DL — SIGNIFICANT CHANGE UP (ref 8.4–10.5)
CHLORIDE SERPL-SCNC: 98 MMOL/L — SIGNIFICANT CHANGE UP (ref 96–108)
CO2 SERPL-SCNC: 22 MMOL/L — SIGNIFICANT CHANGE UP (ref 22–31)
CREAT SERPL-MCNC: 3.21 MG/DL — HIGH (ref 0.5–1.3)
EGFR: 23 ML/MIN/1.73M2 — LOW
EOSINOPHIL # BLD AUTO: 0.46 K/UL — SIGNIFICANT CHANGE UP (ref 0–0.5)
EOSINOPHIL NFR BLD AUTO: 4.1 % — SIGNIFICANT CHANGE UP (ref 0–6)
GLUCOSE SERPL-MCNC: 215 MG/DL — HIGH (ref 70–99)
HCT VFR BLD CALC: 30.6 % — LOW (ref 39–50)
HGB BLD-MCNC: 10.1 G/DL — LOW (ref 13–17)
IMM GRANULOCYTES NFR BLD AUTO: 0.4 % — SIGNIFICANT CHANGE UP (ref 0–0.9)
INR BLD: 1.17 RATIO — HIGH (ref 0.85–1.16)
LACTATE SERPL-SCNC: 2 MMOL/L — SIGNIFICANT CHANGE UP (ref 0.7–2)
LYMPHOCYTES # BLD AUTO: 1.95 K/UL — SIGNIFICANT CHANGE UP (ref 1–3.3)
LYMPHOCYTES # BLD AUTO: 17.4 % — SIGNIFICANT CHANGE UP (ref 13–44)
MCHC RBC-ENTMCNC: 23.9 PG — LOW (ref 27–34)
MCHC RBC-ENTMCNC: 33 G/DL — SIGNIFICANT CHANGE UP (ref 32–36)
MCV RBC AUTO: 72.5 FL — LOW (ref 80–100)
MONOCYTES # BLD AUTO: 0.82 K/UL — SIGNIFICANT CHANGE UP (ref 0–0.9)
MONOCYTES NFR BLD AUTO: 7.3 % — SIGNIFICANT CHANGE UP (ref 2–14)
NEUTROPHILS # BLD AUTO: 7.88 K/UL — HIGH (ref 1.8–7.4)
NEUTROPHILS NFR BLD AUTO: 70.1 % — SIGNIFICANT CHANGE UP (ref 43–77)
NRBC # BLD: 0 /100 WBCS — SIGNIFICANT CHANGE UP (ref 0–0)
PLATELET # BLD AUTO: 263 K/UL — SIGNIFICANT CHANGE UP (ref 150–400)
POTASSIUM SERPL-MCNC: 4.1 MMOL/L — SIGNIFICANT CHANGE UP (ref 3.5–5.3)
POTASSIUM SERPL-SCNC: 4.1 MMOL/L — SIGNIFICANT CHANGE UP (ref 3.5–5.3)
PROT SERPL-MCNC: 7.2 G/DL — SIGNIFICANT CHANGE UP (ref 6–8.3)
PROTHROM AB SERPL-ACNC: 13.5 SEC — HIGH (ref 9.9–13.4)
RBC # BLD: 4.22 M/UL — SIGNIFICANT CHANGE UP (ref 4.2–5.8)
RBC # FLD: 17.6 % — HIGH (ref 10.3–14.5)
SODIUM SERPL-SCNC: 134 MMOL/L — LOW (ref 135–145)
WBC # BLD: 11.23 K/UL — HIGH (ref 3.8–10.5)
WBC # FLD AUTO: 11.23 K/UL — HIGH (ref 3.8–10.5)

## 2024-10-21 PROCEDURE — 99285 EMERGENCY DEPT VISIT HI MDM: CPT

## 2024-10-21 RX ORDER — HYDROMORPHONE HCL/0.9% NACL/PF 6 MG/30 ML
1 PATIENT CONTROLLED ANALGESIA SYRINGE INTRAVENOUS ONCE
Refills: 0 | Status: DISCONTINUED | OUTPATIENT
Start: 2024-10-21 | End: 2024-10-21

## 2024-10-21 RX ORDER — CEFTRIAXONE SODIUM 10 G
1000 VIAL (EA) INJECTION ONCE
Refills: 0 | Status: COMPLETED | OUTPATIENT
Start: 2024-10-21 | End: 2024-10-21

## 2024-10-21 RX ADMIN — Medication 100 MILLIGRAM(S): at 22:46

## 2024-10-21 RX ADMIN — Medication 1000 MILLIGRAM(S): at 23:15

## 2024-10-21 RX ADMIN — Medication 1 MILLIGRAM(S): at 23:30

## 2024-10-21 RX ADMIN — Medication 1 MILLIGRAM(S): at 22:45

## 2024-10-21 NOTE — ED PROVIDER NOTE - OBJECTIVE STATEMENT
49-year-old male history of BPH CVA chronic suprapubic catheter IVC filter complaining about bladder pain spasms seen at [facility] yesterday had a CT that showed cystitis started on antibiotics but back at [facility] for continuous pain.  Denies any fever but admits to some chills.  Denies any flank pain.

## 2024-10-21 NOTE — ED ADULT NURSE NOTE - NSICDXPASTMEDICALHX_GEN_ALL_CORE_FT
PAST MEDICAL HISTORY:  Acute embolism and thrombosis of deep vein of lower extremity     BPH (benign prostatic hyperplasia)     BPH (benign prostatic hyperplasia)     BPH without obstruction/lower urinary tract symptoms     CVA (cerebrovascular accident)     CVD (cerebrovascular disease)     DDD (degenerative disc disease), lumbar     Degenerative arthritis     Diabetes mellitus due to underlying condition with diabetic neuropathy     DM2 (diabetes mellitus, type 2)     DVT of lower limb, acute     DVT, lower extremity     Essential (primary) hypertension     GERD (gastroesophageal reflux disease)     H/O constipation     H/O Guillain-Callaway syndrome     H/O insomnia     History of CVA in adulthood     History of Guillain-Callaway syndrome     History of muscle spasm     History of neurogenic bowel     HTN (hypertension)     HTN (hypertension)     Iron deficiency anemia     Major depressive disorder, single episode     Muscle weakness     Obesity     Pain, unspecified     Peripheral neuropathy     Renal stones     Type 2 diabetes mellitus     Vitamin deficiency

## 2024-10-21 NOTE — ED ADULT NURSE NOTE - OBJECTIVE STATEMENT
48 yo male presents to the ED with complaints of groin pain, pt reports to have chronic pain and also neurogenic bladder, pt noted to a suprapubic.

## 2024-10-21 NOTE — ED ADULT NURSE REASSESSMENT NOTE - NS ED NURSE REASSESS COMMENT FT1
After multiple attempts of IV insertion, was successful with drawing specimen as per order but unable to get IV access , pt requesting US access, made MD aware and also spoke with nursing supv to get help from ICU M[facility] Lai who refused at this time.

## 2024-10-21 NOTE — ED PROVIDER NOTE - NSICDXPASTMEDICALHX_GEN_ALL_CORE_FT
PAST MEDICAL HISTORY:  Acute embolism and thrombosis of deep vein of lower extremity     BPH (benign prostatic hyperplasia)     BPH (benign prostatic hyperplasia)     BPH without obstruction/lower urinary tract symptoms     CVA (cerebrovascular accident)     CVD (cerebrovascular disease)     DDD (degenerative disc disease), lumbar     Degenerative arthritis     Diabetes mellitus due to underlying condition with diabetic neuropathy     DM2 (diabetes mellitus, type 2)     DVT of lower limb, acute     DVT, lower extremity     Essential (primary) hypertension     GERD (gastroesophageal reflux disease)     H/O constipation     H/O Guillain-Blanchard syndrome     H/O insomnia     History of CVA in adulthood     History of Guillain-Blanchard syndrome     History of muscle spasm     History of neurogenic bowel     HTN (hypertension)     HTN (hypertension)     Iron deficiency anemia     Major depressive disorder, single episode     Muscle weakness     Obesity     Pain, unspecified     Peripheral neuropathy     Renal stones     Type 2 diabetes mellitus     Vitamin deficiency

## 2024-10-21 NOTE — ED PROVIDER NOTE - PROGRESS NOTE DETAILS
discussed case with Dr. Bell recommend uro consult, pt prefers to go back to [facility] where he sees his urologist Dr. White, discussed case with transfer center at [facility] via [facility] not accepting due to no bed availability, Dr. Peralta attempted multiple times but no answer

## 2024-10-21 NOTE — ED ADULT NURSE NOTE - NSFALLUNIVINTERV_ED_ALL_ED
Bed/Stretcher in lowest position, wheels locked, appropriate side rails in place/Call bell, personal items and telephone in reach/Instruct patient to call for assistance before getting out of bed/chair/stretcher/Non-slip footwear applied when patient is off stretcher/[place] to call system/Physically safe environment - no spills, clutter or unnecessary equipment/Purposeful proactive rounding/Room/bathroom lighting operational, light cord in reach

## 2024-10-21 NOTE — ED PROVIDER NOTE - PHYSICAL EXAMINATION
Gen: Alert, NAD  Head/eyes: NC/AT, PERRL  ENT: airway patent  Neck: supple  Pulm/lung: Bilateral clear BS  CV/heart: RRR  GI/Abd: soft, NT/ND, +BS, no guarding/rebound tenderness  : suprapubic catheter in place, minimal drainage  Musculoskeletal: no edema/erythema/cyanosis  Skin: no rash  Neuro: AAOx3, grossly intact

## 2024-10-21 NOTE — ED PROVIDER NOTE - CLINICAL SUMMARY MEDICAL DECISION MAKING FREE TEXT BOX
49-year-old male history of BPH CVA chronic suprapubic catheter IVC filter complaining about bladder pain spasms seen at [facility] yesterday had a CT that showed cystitis started on antibiotics but back at [facility] for continuous pain.  Denies any fever but admits to some chills.  Denies any flank pain.    persistent bladder pain, UTI, labs, IV analgesia, uro consult, IV abx, admits vs transfer

## 2024-10-21 NOTE — ED ADULT TRIAGE NOTE - CHIEF COMPLAINT QUOTE
PT BIB EMS from [facility]; has suprapubic catheter which has 50ml in bag since this am; pt states he is urinating from penis; pt c/o b/l groin pain

## 2024-10-21 NOTE — ED ADULT NURSE NOTE - CHIEF COMPLAINT QUOTE
PT BIB EMS from [facility]; has suprapubic catheter which has 50ml in bag since this am; pt states he is urinating from penis; pt c/o b/l groin pain

## 2024-10-21 NOTE — ED PROVIDER NOTE - CARE PLAN
Principal Discharge DX:	Acute cystitis  Secondary Diagnosis:	Bladder pain  Secondary Diagnosis:	ANTONIO (acute kidney injury)   1

## 2024-10-22 DIAGNOSIS — T83.010A BREAKDOWN (MECHANICAL) OF CYSTOSTOMY CATHETER, INITIAL ENCOUNTER: ICD-10-CM

## 2024-10-22 DIAGNOSIS — N30.00 ACUTE CYSTITIS WITHOUT HEMATURIA: ICD-10-CM

## 2024-10-22 DIAGNOSIS — N17.9 ACUTE KIDNEY FAILURE, UNSPECIFIED: ICD-10-CM

## 2024-10-22 DIAGNOSIS — E11.65 TYPE 2 DIABETES MELLITUS WITH HYPERGLYCEMIA: ICD-10-CM

## 2024-10-22 DIAGNOSIS — N39.0 URINARY TRACT INFECTION, SITE NOT SPECIFIED: ICD-10-CM

## 2024-10-22 LAB
A1C WITH ESTIMATED AVERAGE GLUCOSE RESULT: 8 % — HIGH (ref 4–5.6)
CULTURE RESULTS: SIGNIFICANT CHANGE UP
ESTIMATED AVERAGE GLUCOSE: 183 MG/DL — HIGH (ref 68–114)
GLUCOSE BLDC GLUCOMTR-MCNC: 202 MG/DL — HIGH (ref 70–99)
GLUCOSE BLDC GLUCOMTR-MCNC: 216 MG/DL — HIGH (ref 70–99)
GLUCOSE BLDC GLUCOMTR-MCNC: 252 MG/DL — HIGH (ref 70–99)
GLUCOSE BLDC GLUCOMTR-MCNC: 314 MG/DL — HIGH (ref 70–99)
SPECIMEN SOURCE: SIGNIFICANT CHANGE UP

## 2024-10-22 PROCEDURE — 76770 US EXAM ABDO BACK WALL COMP: CPT | Mod: 26

## 2024-10-22 PROCEDURE — 99221 1ST HOSP IP/OBS SF/LOW 40: CPT

## 2024-10-22 RX ORDER — ACETAMINOPHEN 500 MG
650 TABLET ORAL EVERY 6 HOURS
Refills: 0 | Status: DISCONTINUED | OUTPATIENT
Start: 2024-10-22 | End: 2024-10-31

## 2024-10-22 RX ORDER — DULOXETINE HYDROCHLORIDE 30 MG/1
60 CAPSULE, DELAYED RELEASE ORAL DAILY
Refills: 0 | Status: DISCONTINUED | OUTPATIENT
Start: 2024-10-22 | End: 2024-10-31

## 2024-10-22 RX ORDER — HYDROMORPHONE HCL/0.9% NACL/PF 6 MG/30 ML
1 PATIENT CONTROLLED ANALGESIA SYRINGE INTRAVENOUS ONCE
Refills: 0 | Status: DISCONTINUED | OUTPATIENT
Start: 2024-10-22 | End: 2024-10-22

## 2024-10-22 RX ORDER — DIAZEPAM 10 MG/1
2.5 FILM BUCCAL EVERY 8 HOURS
Refills: 0 | Status: DISCONTINUED | OUTPATIENT
Start: 2024-10-22 | End: 2024-10-27

## 2024-10-22 RX ORDER — METOPROLOL TARTRATE 50 MG
25 TABLET ORAL
Refills: 0 | Status: DISCONTINUED | OUTPATIENT
Start: 2024-10-22 | End: 2024-10-31

## 2024-10-22 RX ORDER — HYDROMORPHONE HCL/0.9% NACL/PF 6 MG/30 ML
4 PATIENT CONTROLLED ANALGESIA SYRINGE INTRAVENOUS EVERY 6 HOURS
Refills: 0 | Status: DISCONTINUED | OUTPATIENT
Start: 2024-10-22 | End: 2024-10-27

## 2024-10-22 RX ORDER — GLUCAGON INJECTION, SOLUTION 1 MG/.2ML
1 INJECTION, SOLUTION SUBCUTANEOUS ONCE
Refills: 0 | Status: DISCONTINUED | OUTPATIENT
Start: 2024-10-22 | End: 2024-10-31

## 2024-10-22 RX ORDER — INSULIN LISPRO 100/ML
VIAL (ML) SUBCUTANEOUS EVERY 6 HOURS
Refills: 0 | Status: DISCONTINUED | OUTPATIENT
Start: 2024-10-22 | End: 2024-10-22

## 2024-10-22 RX ORDER — FINASTERIDE 5 MG/1
5 TABLET, FILM COATED ORAL DAILY
Refills: 0 | Status: DISCONTINUED | OUTPATIENT
Start: 2024-10-22 | End: 2024-10-31

## 2024-10-22 RX ORDER — HYDROMORPHONE HCL/0.9% NACL/PF 6 MG/30 ML
0.5 PATIENT CONTROLLED ANALGESIA SYRINGE INTRAVENOUS EVERY 4 HOURS
Refills: 0 | Status: DISCONTINUED | OUTPATIENT
Start: 2024-10-22 | End: 2024-10-22

## 2024-10-22 RX ORDER — PANTOPRAZOLE SODIUM 40 MG/1
40 TABLET, DELAYED RELEASE ORAL
Refills: 0 | Status: DISCONTINUED | OUTPATIENT
Start: 2024-10-22 | End: 2024-10-31

## 2024-10-22 RX ORDER — GABAPENTIN 300 MG/1
600 CAPSULE ORAL THREE TIMES A DAY
Refills: 0 | Status: DISCONTINUED | OUTPATIENT
Start: 2024-10-22 | End: 2024-10-31

## 2024-10-22 RX ORDER — TRAZODONE HYDROCHLORIDE 100 MG/1
50 TABLET ORAL AT BEDTIME
Refills: 0 | Status: DISCONTINUED | OUTPATIENT
Start: 2024-10-22 | End: 2024-10-31

## 2024-10-22 RX ORDER — CLONIDINE HYDROCHLORIDE 0.2 MG/1
0.1 TABLET ORAL
Refills: 0 | Status: DISCONTINUED | OUTPATIENT
Start: 2024-10-22 | End: 2024-10-31

## 2024-10-22 RX ORDER — INSULIN LISPRO 100/ML
VIAL (ML) SUBCUTANEOUS
Refills: 0 | Status: DISCONTINUED | OUTPATIENT
Start: 2024-10-22 | End: 2024-10-31

## 2024-10-22 RX ORDER — FERROUS SULFATE 325(65) MG
325 TABLET ORAL DAILY
Refills: 0 | Status: DISCONTINUED | OUTPATIENT
Start: 2024-10-22 | End: 2024-10-31

## 2024-10-22 RX ORDER — METHOCARBAMOL 500 MG/1
750 TABLET ORAL THREE TIMES A DAY
Refills: 0 | Status: DISCONTINUED | OUTPATIENT
Start: 2024-10-22 | End: 2024-10-31

## 2024-10-22 RX ORDER — AMLODIPINE BESYLATE 10 MG
10 TABLET ORAL DAILY
Refills: 0 | Status: DISCONTINUED | OUTPATIENT
Start: 2024-10-22 | End: 2024-10-31

## 2024-10-22 RX ORDER — POLYETHYLENE GLYCOL 3350 17 G/17G
17 POWDER, FOR SOLUTION ORAL DAILY
Refills: 0 | Status: DISCONTINUED | OUTPATIENT
Start: 2024-10-22 | End: 2024-10-31

## 2024-10-22 RX ORDER — ASCORBIC ACID 500 MG
500 TABLET ORAL DAILY
Refills: 0 | Status: DISCONTINUED | OUTPATIENT
Start: 2024-10-22 | End: 2024-10-31

## 2024-10-22 RX ORDER — CEFTRIAXONE SODIUM 10 G
1000 VIAL (EA) INJECTION EVERY 24 HOURS
Refills: 0 | Status: COMPLETED | OUTPATIENT
Start: 2024-10-22 | End: 2024-10-26

## 2024-10-22 RX ADMIN — Medication 1 MILLIGRAM(S): at 16:56

## 2024-10-22 RX ADMIN — DIAZEPAM 2.5 MILLIGRAM(S): 10 FILM BUCCAL at 21:21

## 2024-10-22 RX ADMIN — Medication 4 MILLIGRAM(S): at 22:31

## 2024-10-22 RX ADMIN — GABAPENTIN 600 MILLIGRAM(S): 300 CAPSULE ORAL at 21:20

## 2024-10-22 RX ADMIN — DULOXETINE HYDROCHLORIDE 60 MILLIGRAM(S): 30 CAPSULE, DELAYED RELEASE ORAL at 12:27

## 2024-10-22 RX ADMIN — Medication 2: at 06:07

## 2024-10-22 RX ADMIN — Medication 4 MILLIGRAM(S): at 12:00

## 2024-10-22 RX ADMIN — METHOCARBAMOL 750 MILLIGRAM(S): 500 TABLET ORAL at 21:20

## 2024-10-22 RX ADMIN — Medication 100 MILLIGRAM(S): at 21:19

## 2024-10-22 RX ADMIN — Medication 325 MILLIGRAM(S): at 12:00

## 2024-10-22 RX ADMIN — Medication 1 MILLIGRAM(S): at 01:40

## 2024-10-22 RX ADMIN — Medication 4 MILLIGRAM(S): at 04:09

## 2024-10-22 RX ADMIN — FINASTERIDE 5 MILLIGRAM(S): 5 TABLET, FILM COATED ORAL at 12:27

## 2024-10-22 RX ADMIN — TRAZODONE HYDROCHLORIDE 50 MILLIGRAM(S): 100 TABLET ORAL at 21:21

## 2024-10-22 RX ADMIN — METHOCARBAMOL 750 MILLIGRAM(S): 500 TABLET ORAL at 13:30

## 2024-10-22 RX ADMIN — Medication 1 MILLIGRAM(S): at 17:30

## 2024-10-22 RX ADMIN — Medication 4 MILLIGRAM(S): at 04:34

## 2024-10-22 RX ADMIN — Medication 4 MILLIGRAM(S): at 21:31

## 2024-10-22 RX ADMIN — Medication 500 MILLIGRAM(S): at 12:27

## 2024-10-22 RX ADMIN — METHOCARBAMOL 750 MILLIGRAM(S): 500 TABLET ORAL at 05:26

## 2024-10-22 RX ADMIN — GABAPENTIN 600 MILLIGRAM(S): 300 CAPSULE ORAL at 05:25

## 2024-10-22 RX ADMIN — Medication 0.5 MILLIGRAM(S): at 03:20

## 2024-10-22 RX ADMIN — PANTOPRAZOLE SODIUM 40 MILLIGRAM(S): 40 TABLET, DELAYED RELEASE ORAL at 05:39

## 2024-10-22 RX ADMIN — Medication 4 MILLIGRAM(S): at 13:00

## 2024-10-22 RX ADMIN — GABAPENTIN 600 MILLIGRAM(S): 300 CAPSULE ORAL at 13:30

## 2024-10-22 RX ADMIN — Medication 70 MILLILITER(S): at 06:07

## 2024-10-22 RX ADMIN — Medication 3: at 21:19

## 2024-10-22 RX ADMIN — Medication 0.5 MILLIGRAM(S): at 15:52

## 2024-10-22 RX ADMIN — Medication 0.5 MILLIGRAM(S): at 14:58

## 2024-10-22 RX ADMIN — Medication 0.5 MILLIGRAM(S): at 03:50

## 2024-10-22 RX ADMIN — Medication 1 MILLIGRAM(S): at 01:10

## 2024-10-22 RX ADMIN — DIAZEPAM 2.5 MILLIGRAM(S): 10 FILM BUCCAL at 05:38

## 2024-10-22 RX ADMIN — CLONIDINE HYDROCHLORIDE 0.1 MILLIGRAM(S): 0.2 TABLET ORAL at 17:19

## 2024-10-22 RX ADMIN — DIAZEPAM 2.5 MILLIGRAM(S): 10 FILM BUCCAL at 14:18

## 2024-10-22 RX ADMIN — Medication 2: at 12:01

## 2024-10-22 RX ADMIN — Medication 10 MILLIGRAM(S): at 05:27

## 2024-10-22 RX ADMIN — Medication 4: at 17:20

## 2024-10-22 RX ADMIN — Medication 25 MILLIGRAM(S): at 17:19

## 2024-10-22 NOTE — H&P ADULT - ASSESSMENT
Initial evaluation/Admission HNP requested by  DR JONES on 10/22/2024 from Dr Manuel Bell    Patient examined chart reviewed    HOSPITAL ADMISSION   PATIENT CAME  FROM (if information available)      [id number]  VITALS/GAS EXCHANGE/DRIPS  ABGS.   .  VS/ PO/IO/ VENT/ DRIPS.   10/22/2024 afeb 63 160/60   10/22/2024 ra 97%      CHIEF COMPLAINT.   . 10/21/2024 PT BIB EMS from [facility]; has suprapubic catheter which has 50ml in bag since this am; pt states he is urinating from penis; pt c/o b/l groin pain  OVERALL PRESENTATION.  . 10/22/2024 49-year-old male history of BPH CVA chronic suprapubic catheter IVC filter complaining about bladder pain spasms seen at [facility] yesterday had a CT that showed cystitis started on antibiotics but back at [facility] for continuous pain.  Denies any fever but admits to some chills.  Denies any flank pain.  . 10/22/2024 Per Dr Jones ER he has contacted Dr Peralta and latter advised to admit pt an d he will see on consult   PMH.     PAST HOSPITAL STAYS .   .  HOME MEDS.  * Patient Currently Takes Medications as of 26-Jul-2024 04:13 documented in Structured Notes  · cefdinir 300 mg oral capsule: 1 cap(s) orally 2 times a day  · cefuroxime 500 mg oral tablet: 1 tab(s) orally 2 times a day  · cefuroxime 500 mg oral tablet: 1 tab(s) orally 2 times a day  · ascorbic acid 500 mg oral tablet: 1 tab(s) orally once a day  · Multiple Vitamins oral tablet: 1 tab(s) orally once a day  · pantoprazole 40 mg oral delayed release tablet: 1 tab(s) orally once a day (before a meal)  · amoxicillin 500 mg oral capsule: 1 cap(s) orally every 8 hours last day 2/27  · methocarbamol 750 mg oral tablet: 1 tab(s) orally 3 times a day  · polyethylene glycol 3350 oral powder for reconstitution: 17 gram(s) orally once a day  · ferrous sulfate 325 mg (65 mg elemental iron) oral tablet: 1 tab(s) orally once a day  · amLODIPine 10 mg oral tablet: 1 tab(s) orally once a day  · metoprolol tartrate 25 mg oral tablet: 1 tab(s) orally 2 times a day  · melatonin 5 mg oral tablet: 1 tab(s) orally once a day (at bedtime)  · traZODone 50 mg oral tablet: 1 tab(s) orally once a day (at bedtime)  · DULoxetine 60 mg oral delayed release capsule: 1 cap(s) orally once a day  · gabapentin 600 mg oral tablet: 1 tab(s) orally 3 times a day  · cloNIDine 0.1 mg oral tablet: 1 tab(s) orally once a day  · finasteride 5 mg oral tablet: 1 tab(s) orally once a day  · Lantus 100 units/mL subcutaneous solution: 20 unit(s) subcutaneous once a day  · Pantoprazole:   · cranberry oral capsule: 1 cap(s) orally once a day  · Admelog 100 units/mL injectable solution: 4 unit(s) injectable 3 times a day (before meals) 4-8 units  · Admelog U-100 Insulin lispro 100units/ml SQ QID:   · Cranberry extract 200mg caps Give 2 caps po BID:   · Lantus U-100 Insulin 100units/ml 5 units SQ daily:   · ferrous sulfate 324 mg (65 mg elemental iron) oral tablet: orally once a day  · Acidophilus oral capsule: 1 cap(s) orally once a day  · Gabapentin: 600 milligram(s) every 8 hours  · polyethylene glycol 3350 17GM/dose oral powder daily:   · Lovenox 120 mg/0.8 mL injectable solution: 120 milligram(s) subcutaneously q12hrs DICONTINUE WHEN INR ABOVE 2  · docusate sodium 100 mg oral capsule: 1 cap(s) orally once a day  · senna (sennosides) 8.6 mg oral tablet: 1 tab(s) orally once a day (at bedtime)  · methocarbamol 750 mg oral tablet: 2 tab(s) orally every 8 hours PRN  · Ambien 5 mg oral tablet: 1 tab(s) orally once a day (at bedtime)  · ascorbic acid 500mg po daily:   · Zinc oxide topical ointment to sacrum BID:   · Multi Vitamins po daily:   · Nicotine 21 mg/24hrs daily transdermal patch x30 days: daily  · Myrbetriq 25mg ER po daily:   · traZODone 50 mg oral tablet: orally once a day (at bedtime) PRN  · Oxybutynin chloride ER 10mg tab po daily:   · nicotine 2 mg oral transmucosal gum: 1 gum chewed every 2 hours as needed for  cravings  · HYDROmorphone 4 mg oral tablet: 1 tab(s) orally every 6 hours PRN  · folic acid: 1 milligram(s) once a day  · diazePAM 5 mg oral tablet: 1 tab(s) orally every 8 hours give half tablet (2.5mg) po q8hrs x30 days  · metoprolol tartrate 25 mg oral tablet: 1 tab(s) orally 2 times a day  · warfarin 5 mg oral tablet: 1 tab(s) orally once a day  · DULoxetine 60 mg oral delayed release capsule: 1 cap(s) orally once a day  · alfuzosin 10 mg oral tablet, extended release: 1 tab(s) orally once a day (at bedtime)  · melatonin 5 mg oral tablet: 1 tab(s) orally once a day (at bedtime)  · amLODIPine 10 mg oral tablet: 1 tab(s) orally once a day  · finasteride 5 mg oral tablet: 1 tab(s) orally once a day  · oxyCODONE 10 mg oral tablet: 1 tab(s) orally every 6 hours as needed for  moderate pain  · Clonidine: clonidine hcl 0.1mg po BID  · warfarin 4 mg oral tablet: 1 tab(s) orally once a day    MEDS ALREADY GIVEN IN ER   .... 10/21 10 p ROCEPHIN     [id number]  PROBLEM ASSESSMENT RECOMMENDATIONS.  RESP.   .... target po 90-95%    . VTE   .... Hold coumadin pending  eval     INFECTION.  . UTI  .... w 10/22/2024 w 11.2   .... ua 10/22/2024 ua w 27  .... CTAP IC 10/20 cystitis   .... Continue rocephin    HEMAT.  .... Hb 10/22/2024 Hb 10   .... Plt 10/22/2024 plt 263   .... inr 10/22/2024 inr 117   .... monitor     GI.  .... NPO pensing  eval     RENAL.  . ANTONIO   .... Na 10/22/2024 Na 134   .... K 10/22/2024 k 4.1   .... CO2 10/22/2024 co2 22   .... AG 10/22/2024 ag 14   .... Alb   .... Cr 10/22/2024 cr 3.2   .... IV fl renal eval     ENDO.  NEURO.  DISCUSSIONS.    [id number].  SUMMARY  CC.   . 10/22/2024 SPC MALFUNCTION   PROBLEMS .  . UTI 10/22/2024  . SUPRAPUBIC CATHETER MALFUNCTION 10/22/2024  . ANTONIO   PMH.  . VTE   . BPH   . CVA   . DDD  . DM   . GBS   . MDD   . OBESITY   PSH  . IVCF   . LITHOTRIPSY   . SPC        TIME SPENT.  . Over 55  minutes aggregate care time spent on encounter; activities included   direct patient care, counseling and/or coordinating care reviewing notes, lab data/ imaging , discussion with multidisciplinary team/ patient  /family and explaining in detail risks, benefits, alternatives  of the recommendations     KIANA SALTER 49 m 1975  10/22/2024 DR JIM COYLE

## 2024-10-22 NOTE — CONSULT NOTE ADULT - SUBJECTIVE AND OBJECTIVE BOX
Patient is a 49y Male whom presented to the hospital with     PAST MEDICAL & SURGICAL HISTORY:  BPH (benign prostatic hyperplasia)      HTN (hypertension)      Type 2 diabetes mellitus      Peripheral neuropathy      History of Guillain-[place] syndrome      History of CVA in adulthood      DDD (degenerative disc disease), lumbar      DVT, lower extremity      Renal stones      H/O Guillain-[place] syndrome      History of neurogenic bowel      DM2 (diabetes mellitus, type 2)      HTN (hypertension)      BPH without obstruction/lower urinary tract symptoms      Degenerative arthritis      DVT of lower limb, acute      CVA (cerebrovascular accident)      CVD (cerebrovascular disease)      Muscle weakness      Iron deficiency anemia      History of muscle spasm      Pain, unspecified      Vitamin deficiency      Obesity      GERD (gastroesophageal reflux disease)      H/O constipation      H/O insomnia      Essential (primary) hypertension      Acute embolism and thrombosis of deep vein of lower extremity      BPH (benign prostatic hyperplasia)      Diabetes mellitus due to underlying condition with diabetic neuropathy      Major depressive disorder, single episode      S/P IVC filter      H/O lithotripsy      Chronic suprapubic catheter          MEDICATIONS  (STANDING):  amLODIPine   Tablet 10 milliGRAM(s) Oral daily  ascorbic acid 500 milliGRAM(s) Oral daily  cefTRIAXone   IVPB 1000 milliGRAM(s) IV Intermittent every 24 hours  cloNIDine 0.1 milliGRAM(s) Oral two times a day  dextrose 5%. 1000 milliLiter(s) (50 mL/Hr) IV Continuous <Continuous>  dextrose 5%. 1000 milliLiter(s) (100 mL/Hr) IV Continuous <Continuous>  dextrose 50% Injectable 12.5 Gram(s) IV Push once  dextrose 50% Injectable 25 Gram(s) IV Push once  dextrose 50% Injectable 25 Gram(s) IV Push once  diazepam    Tablet 2.5 milliGRAM(s) Oral every 8 hours  DULoxetine 60 milliGRAM(s) Oral daily  ferrous    sulfate 325 milliGRAM(s) Oral daily  finasteride 5 milliGRAM(s) Oral daily  gabapentin 600 milliGRAM(s) Oral three times a day  glucagon  Injectable 1 milliGRAM(s) IntraMuscular once  insulin lispro (ADMELOG) corrective regimen sliding scale   SubCutaneous every 6 hours  lactated ringers. 1000 milliLiter(s) (70 mL/Hr) IV Continuous <Continuous>  methocarbamol 750 milliGRAM(s) Oral three times a day  metoprolol tartrate 25 milliGRAM(s) Oral two times a day  pantoprazole    Tablet 40 milliGRAM(s) Oral before breakfast  polyethylene glycol 3350 17 Gram(s) Oral daily  traZODone 50 milliGRAM(s) Oral at bedtime      Allergies    sulfa drugs (Other)  sulfa drugs (Rash)  sulfa drugs (Hives)  sulfa drugs (Unknown)    Intolerances    Pipercillin Sodium-Tazobactam Sodium (Pruritus)      SOCIAL HISTORY:  Denies ETOh,Smoking,     FAMILY HISTORY:  FH: heart disease    FH: HTN (hypertension)    Family history of sinus tachycardia (Father)    FH: HTN (hypertension) (Mother)        REVIEW OF SYSTEMS:    CONSTITUTIONAL: No weakness, fevers or chills  EYES/ENT: No visual changes;  no throat pain   NECK: No pain or stiffness  RESPIRATORY: No cough, wheezing, hemoptysis; No shortness of breath  CARDIOVASCULAR: No chest pain or palpitations  GASTROINTESTINAL: No abdominal or epigastric pain. No nausea, vomiting,     No diarrhea or constipation. No melena   GENITOURINARY: No dysuria, frequency or hematuria  NEUROLOGICAL: No numbness or weakness  SKIN: dry      VITAL:  T(C): , Max: 36.9 (10-21-24 @ 20:31)  T(F): , Max: 98.5 (10-21-24 @ 20:31)  HR: 59 (10-22-24 @ 06:21)  BP: 174/90 (10-22-24 @ 06:21)  BP(mean): --  RR: 17 (10-22-24 @ 06:21)  SpO2: 98% (10-22-24 @ 06:21)  Wt(kg): --    I and O's:    10-21 @ 07:01  -  10-22 @ 07:00  --------------------------------------------------------  IN: 140 mL / OUT: 350 mL / NET: -210 mL    10-22 @ 07:01  -  10-22 @ 09:06  --------------------------------------------------------  IN: 0 mL / OUT: 0 mL / NET: 0 mL      Height (cm): 182.9 (10-21 @ 20:31)  Weight (kg): 127 (10-21 @ 20:31)  BMI (kg/m2): 38 (10-21 @ 20:31)  BSA (m2): 2.46 (10-21 @ 20:31)    PHYSICAL EXAM:    Constitutional: NAD  HEENT: conjunctive   clear   Neck:  No JVD  Respiratory: CTAB  Cardiovascular: S1 and S2  Gastrointestinal: BS+, soft, NT/ND  Extremities: No peripheral edema  Neurological: A/O x 3, no focal deficits  Psychiatric: Normal mood, normal affect  : No Anderson  Skin: No rashes  Access: Not applicable    LABS:                        10.1   11.23 )-----------( 263      ( 21 Oct 2024 22:15 )             30.6     10-21    134[L]  |  98  |  27[H]  ----------------------------<  215[H]  4.1   |  22  |  3.21[H]    Ca    8.7      21 Oct 2024 22:15    TPro  7.2  /  Alb  2.9[L]  /  TBili  0.2  /  DBili  x   /  AST  20  /  ALT  23  /  AlkPhos  101  10-21      Urine Studies:  Urinalysis Basic - ( 21 Oct 2024 22:15 )    Color: x / Appearance: x / SG: x / pH: x  Gluc: 215 mg/dL / Ketone: x  / Bili: x / Urobili: x   Blood: x / Protein: x / Nitrite: x   Leuk Esterase: x / RBC: x / WBC x   Sq Epi: x / Non Sq Epi: x / Bacteria: x            RADIOLOGY & ADDITIONAL STUDIES:                   Patient is a 49y Male whom presented to the hospital with ckd and osmani     PAST MEDICAL & SURGICAL HISTORY:  BPH (benign prostatic hyperplasia)      HTN (hypertension)      Type 2 diabetes mellitus      Peripheral neuropathy      History of Guillain-[place] syndrome      History of CVA in adulthood      DDD (degenerative disc disease), lumbar      DVT, lower extremity      Renal stones      H/O Guillain-[place] syndrome      History of neurogenic bowel      DM2 (diabetes mellitus, type 2)      HTN (hypertension)      BPH without obstruction/lower urinary tract symptoms      Degenerative arthritis      DVT of lower limb, acute      CVA (cerebrovascular accident)      CVD (cerebrovascular disease)      Muscle weakness      Iron deficiency anemia      History of muscle spasm      Pain, unspecified      Vitamin deficiency      Obesity      GERD (gastroesophageal reflux disease)      H/O constipation      H/O insomnia      Essential (primary) hypertension      Acute embolism and thrombosis of deep vein of lower extremity      BPH (benign prostatic hyperplasia)      Diabetes mellitus due to underlying condition with diabetic neuropathy      Major depressive disorder, single episode      S/P IVC filter      H/O lithotripsy      Chronic suprapubic catheter          MEDICATIONS  (STANDING):  amLODIPine   Tablet 10 milliGRAM(s) Oral daily  ascorbic acid 500 milliGRAM(s) Oral daily  cefTRIAXone   IVPB 1000 milliGRAM(s) IV Intermittent every 24 hours  cloNIDine 0.1 milliGRAM(s) Oral two times a day  dextrose 5%. 1000 milliLiter(s) (50 mL/Hr) IV Continuous <Continuous>  dextrose 5%. 1000 milliLiter(s) (100 mL/Hr) IV Continuous <Continuous>  dextrose 50% Injectable 12.5 Gram(s) IV Push once  dextrose 50% Injectable 25 Gram(s) IV Push once  dextrose 50% Injectable 25 Gram(s) IV Push once  diazepam    Tablet 2.5 milliGRAM(s) Oral every 8 hours  DULoxetine 60 milliGRAM(s) Oral daily  ferrous    sulfate 325 milliGRAM(s) Oral daily  finasteride 5 milliGRAM(s) Oral daily  gabapentin 600 milliGRAM(s) Oral three times a day  glucagon  Injectable 1 milliGRAM(s) IntraMuscular once  insulin lispro (ADMELOG) corrective regimen sliding scale   SubCutaneous every 6 hours  lactated ringers. 1000 milliLiter(s) (70 mL/Hr) IV Continuous <Continuous>  methocarbamol 750 milliGRAM(s) Oral three times a day  metoprolol tartrate 25 milliGRAM(s) Oral two times a day  pantoprazole    Tablet 40 milliGRAM(s) Oral before breakfast  polyethylene glycol 3350 17 Gram(s) Oral daily  traZODone 50 milliGRAM(s) Oral at bedtime      Allergies    sulfa drugs (Other)  sulfa drugs (Rash)  sulfa drugs (Hives)  sulfa drugs (Unknown)    Intolerances    Pipercillin Sodium-Tazobactam Sodium (Pruritus)      SOCIAL HISTORY:  Denies ETOh,Smoking,     FAMILY HISTORY:  FH: heart disease    FH: HTN (hypertension)    Family history of sinus tachycardia (Father)    FH: HTN (hypertension) (Mother)        REVIEW OF SYSTEMS:    CONSTITUTIONAL: No weakness, fevers or chills  RESPIRATORY: No cough, wheezing, hemoptysis; No shortness of breath  CARDIOVASCULAR: No chest pain or palpitations  GASTROINTESTINAL: No abdominal or epigastric pain. No nausea, vomiting,     No diarrhea or constipation. No melena   GENITOURINARY: No dysuria, frequency or hematuria  Suprapubic catheter.    VITAL:  T(C): , Max: 36.9 (10-21-24 @ 20:31)  T(F): , Max: 98.5 (10-21-24 @ 20:31)  HR: 59 (10-22-24 @ 06:21)  BP: 174/90 (10-22-24 @ 06:21)  BP(mean): --  RR: 17 (10-22-24 @ 06:21)  SpO2: 98% (10-22-24 @ 06:21)  Wt(kg): --    I and O's:    10-21 @ 07:01  -  10-22 @ 07:00  --------------------------------------------------------  IN: 140 mL / OUT: 350 mL / NET: -210 mL    10-22 @ 07:01  -  10-22 @ 09:06  --------------------------------------------------------  IN: 0 mL / OUT: 0 mL / NET: 0 mL      Height (cm): 182.9 (10-21 @ 20:31)  Weight (kg): 127 (10-21 @ 20:31)  BMI (kg/m2): 38 (10-21 @ 20:31)  BSA (m2): 2.46 (10-21 @ 20:31)    PHYSICAL EXAM:    Constitutional: NAD  HEENT: conjunctive   clear   Neck:  No JVD  Respiratory: CTAB  Cardiovascular: S1 and S2  Gastrointestinal: BS+, soft, NT/ND  Extremities: No peripheral edema  Neurological: A/O x 3, no focal deficits  : Suprapubic catheter.  Skin: No rashes  Access: Not applicable    LABS:                        10.1   11.23 )-----------( 263      ( 21 Oct 2024 22:15 )             30.6     10-21    134[L]  |  98  |  27[H]  ----------------------------<  215[H]  4.1   |  22  |  3.21[H]    Ca    8.7      21 Oct 2024 22:15    TPro  7.2  /  Alb  2.9[L]  /  TBili  0.2  /  DBili  x   /  AST  20  /  ALT  23  /  AlkPhos  101  10-21      Urine Studies:  Urinalysis Basic - ( 21 Oct 2024 22:15 )    Color: x / Appearance: x / SG: x / pH: x  Gluc: 215 mg/dL / Ketone: x  / Bili: x / Urobili: x   Blood: x / Protein: x / Nitrite: x   Leuk Esterase: x / RBC: x / WBC x   Sq Epi: x / Non Sq Epi: x / Bacteria: x            RADIOLOGY & ADDITIONAL STUDIES:        MEDICATIONS  (STANDING):  amLODIPine   Tablet 10 milliGRAM(s) Oral daily  ascorbic acid 500 milliGRAM(s) Oral daily  cefTRIAXone   IVPB 1000 milliGRAM(s) IV Intermittent every 24 hours  cloNIDine 0.1 milliGRAM(s) Oral two times a day  dextrose 5%. 1000 milliLiter(s) (50 mL/Hr) IV Continuous <Continuous>  dextrose 5%. 1000 milliLiter(s) (100 mL/Hr) IV Continuous <Continuous>  dextrose 50% Injectable 12.5 Gram(s) IV Push once  dextrose 50% Injectable 25 Gram(s) IV Push once  dextrose 50% Injectable 25 Gram(s) IV Push once  diazepam    Tablet 2.5 milliGRAM(s) Oral every 8 hours  DULoxetine 60 milliGRAM(s) Oral daily  ferrous    sulfate 325 milliGRAM(s) Oral daily  finasteride 5 milliGRAM(s) Oral daily  gabapentin 600 milliGRAM(s) Oral three times a day  glucagon  Injectable 1 milliGRAM(s) IntraMuscular once  insulin lispro (ADMELOG) corrective regimen sliding scale   SubCutaneous every 6 hours  lactated ringers. 1000 milliLiter(s) (70 mL/Hr) IV Continuous <Continuous>  methocarbamol 750 milliGRAM(s) Oral three times a day  metoprolol tartrate 25 milliGRAM(s) Oral two times a day  pantoprazole    Tablet 40 milliGRAM(s) Oral before breakfast  polyethylene glycol 3350 17 Gram(s) Oral daily  traZODone 50 milliGRAM(s) Oral at bedtime

## 2024-10-22 NOTE — PROVIDER CONTACT NOTE (MEDICATION) - SITUATION
iv is in AC and patient keeps bending arm, causing pump to beep. educated on the need to keep arm straight, pt doesn't want fluids running

## 2024-10-22 NOTE — CARE COORDINATION ASSESSMENT. - NSPASTMEDSURGHISTORY_GEN_ALL_CORE_FT
PAST MEDICAL & SURGICAL HISTORY:  DVT, lower extremity      DDD (degenerative disc disease), lumbar      History of CVA in adulthood      History of Guillain-[place] syndrome      Peripheral neuropathy      Type 2 diabetes mellitus      HTN (hypertension)      BPH (benign prostatic hyperplasia)      S/P IVC filter      CVA (cerebrovascular accident)      DVT of lower limb, acute      Degenerative arthritis      BPH without obstruction/lower urinary tract symptoms      HTN (hypertension)      DM2 (diabetes mellitus, type 2)      History of neurogenic bowel      H/O Guillain-[place] syndrome      Renal stones      H/O lithotripsy      Major depressive disorder, single episode      Diabetes mellitus due to underlying condition with diabetic neuropathy      BPH (benign prostatic hyperplasia)      Acute embolism and thrombosis of deep vein of lower extremity      Essential (primary) hypertension      H/O insomnia      H/O constipation      GERD (gastroesophageal reflux disease)      Obesity      Vitamin deficiency      Pain, unspecified      History of muscle spasm      Iron deficiency anemia      Muscle weakness      CVD (cerebrovascular disease)      Chronic suprapubic catheter

## 2024-10-22 NOTE — H&P ADULT - NSHPPHYSICALEXAM_GEN_ALL_CORE
· Physical Examination: Gen: Alert, NAD  	Head/eyes: NC/AT, PERRL  	ENT: airway patent  	Neck: supple  	Pulm/lung: Bilateral clear BS  	CV/heart: RRR  	GI/Abd: soft, NT/ND, +BS, no guarding/rebound tenderness  	: suprapubic catheter in place, minimal drainage  	Musculoskeletal: no edema/erythema/cyanosis  	Skin: no rash  Neuro: AAOx3, grossly intact

## 2024-10-22 NOTE — DIETITIAN INITIAL EVALUATION ADULT - NS FNS DIET ORDER
Has The Growth Been Previously Biopsied?: has not been previously biopsied Diet, Consistent Carbohydrate Renal/No Snacks (10-22-24 @ 11:27)

## 2024-10-22 NOTE — CARE COORDINATION ASSESSMENT. - OTHER PERTINENT DISCHARGE PLANNING INFORMATION:
JANINE met with this a&o x 4  patient admitted from [facility], patient reports that he has been living there and is going to need to be transferred to [place] for issue with urology. JANINE explained name role availability and dc planning, He reported that his mother Sandra is [phone], he declined me calling her. PAtient anticiaptes that she will eventually return to [facility] once optimized. SW provided support and will follow

## 2024-10-22 NOTE — CONSULT NOTE ADULT - ASSESSMENT
ACUTE RENAL FAILURE: start iv fluid   Serum creatinine is  at     , approximating GFR at   ml/min.   There is no progression . No uremic symptoms  No evidence of anemia .  Fluid status stable.  Will continue to avoid nephrotoxic drugs.  Patient remains asymptomatic.   Continue current therapy.  hold  diuretic.  hold   ACE inhibitor.  hold   ARB.  Additional evaluation:   ECG,    echocardiogram,     CXR,  will obtained recent   renal ultrasound to evalaute kidney size and possible stones ,       49-year-old male history of BPH CVA chronic suprapubic catheter IVC filter complaining about bladder pain spasms seen at [facility] yesterday had a CT that showed cystitis started on antibiotics but back at [place] for continuous pain.  Denies any fever but admits to some chills.  Denies any flank pain.    ACUTE RENAL FAILURE: start iv fluid No surgicals intervention anticipated ,  Continue SP Cystostomy and monitor U/O  ,  ANTONIO, atrophic kidneys  without  hydronephrosis - fluid and medial management by   Serum creatinine is  at increased     , approximating GFR at   ml/min. lactated ringers. 1000 milliLiter(s) (70 mL/Hr) IV Continuous   There is no progression . No uremic symptoms  No evidence of anemia .  Fluid status stable.  Will continue to avoid nephrotoxic drugs.  Patient remains asymptomatic.   Continue current therapy.  hold  diuretic.  hold   ACE inhibitor.  hold   ARB.  Additional evaluation:   ECG,    echocardiogram,     CXR,  will obtained recent   renal ultrasound to evalaute kidney size and possible stones ,      BP monitoring,continue current antihypertensive meds, low salt diet,followup with PMD in 1-2 weeks  amLODIPine   Tablet 10 milliGRAM(s) Oral daily  cloNIDine 0.1 milliGRAM(s) Oral two times a day

## 2024-10-22 NOTE — DIETITIAN INITIAL EVALUATION ADULT - REASON FOR ADMISSION
Per H&P " 49-year-old male history of BPH CVA chronic suprapubic catheter IVC filter complaining about bladder pain spasms seen at [facility] yesterday had a CT that showed cystitis started on antibiotics but back at [place] for continuous pain.  Denies any fever but admits to some chills.  Denies any flank pain."

## 2024-10-22 NOTE — H&P ADULT - NSICDXPASTMEDICALHX_GEN_ALL_CORE_FT
PAST MEDICAL HISTORY:  Acute embolism and thrombosis of deep vein of lower extremity     BPH (benign prostatic hyperplasia)     BPH (benign prostatic hyperplasia)     BPH without obstruction/lower urinary tract symptoms     CVA (cerebrovascular accident)     CVD (cerebrovascular disease)     DDD (degenerative disc disease), lumbar     Degenerative arthritis     Diabetes mellitus due to underlying condition with diabetic neuropathy     DM2 (diabetes mellitus, type 2)     DVT of lower limb, acute     DVT, lower extremity     Essential (primary) hypertension     GERD (gastroesophageal reflux disease)     H/O constipation     H/O Guillain-[place] syndrome     H/O insomnia     History of CVA in adulthood     History of Guillain-[place] syndrome     History of muscle spasm     History of neurogenic bowel     HTN (hypertension)     HTN (hypertension)     Iron deficiency anemia     Major depressive disorder, single episode     Muscle weakness     Obesity     Pain, unspecified     Peripheral neuropathy     Renal stones     Type 2 diabetes mellitus     Vitamin deficiency

## 2024-10-22 NOTE — CARE COORDINATION ASSESSMENT. - NSDCPLANSERVICES_GEN_ALL_CORE
return to [facility] or transfer to another acute care hospital/Acute Care Transfer/Skilled Nursing Facility-Long Term

## 2024-10-22 NOTE — CONSULT NOTE ADULT - SUBJECTIVE AND OBJECTIVE BOX
HPI:  48YO M resident of [facility] BPH CVA  neurogenic bladder sp  suprapubic catheter at [facility]  IR, approximately 2 weeks ago seen at [facility] 10/20 sec bladder spasms and had CT that showed cystitis. Started on antibiotics and was sent back to NH. Pt now presented to the hospital with persistent bladder spasm with continuous pain.  Denies any fever but admits to some chills.  Denies any flank pain.     Infectious DIsease consult was called to evaluate pt and for antibiotic management.      Past Medical & Surgical Hx:  PAST MEDICAL & SURGICAL HISTORY:  BPH (benign prostatic hyperplasia)  HTN (hypertension)  Type 2 diabetes mellitus  Peripheral neuropathy  History of Guillain-Arnett syndrome  History of CVA in adulthood  DDD (degenerative disc disease), lumbar  DVT, lower extremity  Renal stones  H/O Guillain-Arnett syndrome  History of neurogenic bowel  DM2 (diabetes mellitus, type 2)  HTN (hypertension)  BPH without obstruction/lower urinary tract symptoms  Degenerative arthritis  DVT of lower limb, acute  CVA (cerebrovascular accident)  Muscle weaknes  Iron deficiency anemia  History of muscle spasm  Pain, unspecified  Vitamin deficiency  Obesity  GERD (gastroesophageal reflux disease)  H/O constipation  H/O insomnia  Essential (primary) hypertension  Acute embolism and thrombosis of deep vein of lower extremity  BPH (benign prostatic hyperplasia)  Diabetes mellitus due to underlying condition with diabetic neuropathy  Major depressive disorder, single episode  S/P IVC filter  H/O lithotripsy  Chronic suprapubic catheter      Social History--  EtOH: denies   Tobacco: denies   Drug Use: denies     FAMILY HISTORY:  FH: heart disease    FH: HTN (hypertension)    Family history of sinus tachycardia (Father)    FH: HTN (hypertension) (Mother)      Allergies  sulfa drugs (Hives)    Intolerances  Pipercillin Sodium-Tazobactam Sodium (Pruritus)      Home/ Out patient  Medications :    Current Inpatient Medications :    ANTIBIOTICS:   cefTRIAXone   IVPB 1000 milliGRAM(s) IV Intermittent every 24 hours      OTHER RELEVANT MEDICATIONS :  acetaminophen     Tablet .. 650 milliGRAM(s) Oral every 6 hours PRN  amLODIPine   Tablet 10 milliGRAM(s) Oral daily  ascorbic acid 500 milliGRAM(s) Oral daily  cloNIDine 0.1 milliGRAM(s) Oral two times a day  dextrose 5%. 1000 milliLiter(s) IV Continuous <Continuous>  dextrose 5%. 1000 milliLiter(s) IV Continuous <Continuous>  dextrose 50% Injectable 25 Gram(s) IV Push once  dextrose 50% Injectable 12.5 Gram(s) IV Push once  dextrose 50% Injectable 25 Gram(s) IV Push once  dextrose Oral Gel 15 Gram(s) Oral once PRN  diazepam    Tablet 2.5 milliGRAM(s) Oral every 8 hours  DULoxetine 60 milliGRAM(s) Oral daily  ferrous    sulfate 325 milliGRAM(s) Oral daily  finasteride 5 milliGRAM(s) Oral daily  gabapentin 600 milliGRAM(s) Oral three times a day  glucagon  Injectable 1 milliGRAM(s) IntraMuscular once  HYDROmorphone   Tablet 4 milliGRAM(s) Oral every 6 hours PRN  HYDROmorphone  Injectable 0.5 milliGRAM(s) IV Push every 4 hours PRN  insulin lispro (ADMELOG) corrective regimen sliding scale   SubCutaneous Before meals and at bedtime  lactated ringers. 1000 milliLiter(s) IV Continuous <Continuous>  methocarbamol 750 milliGRAM(s) Oral three times a day  metoprolol tartrate 25 milliGRAM(s) Oral two times a day  pantoprazole    Tablet 40 milliGRAM(s) Oral before breakfast  polyethylene glycol 3350 17 Gram(s) Oral daily  traZODone 50 milliGRAM(s) Oral at bedtime        ROS:  CONSTITUTIONAL:  Negative fever or chills  EYES:  Negative  blurry vision or double vision  CARDIOVASCULAR:  Negative for chest pain or palpitations  RESPIRATORY:  Negative for cough, wheezing, or SOB   GASTROINTESTINAL:  Negative for nausea, vomiting, diarrhea, constipation, or abdominal pain  GENITOURINARY:  Negative frequency, urgency , dysuria or hematuria +bladder spasm  NEUROLOGIC:  No headache, confusion, dizziness, lightheadedness  All other systems were reviewed and are negative        I&O's Detail    21 Oct 2024 07:01  -  22 Oct 2024 07:00  --------------------------------------------------------  IN:    Lactated Ringers: 140 mL  Total IN: 140 mL    OUT:    Indwelling Catheter - Suprapubic (mL): 350 mL  Total OUT: 350 mL    Total NET: -210 mL      22 Oct 2024 07:01  -  22 Oct 2024 22:00  --------------------------------------------------------  IN:    Oral Fluid: 1000 mL  Total IN: 1000 mL    OUT:    Indwelling Catheter - Suprapubic (mL): 2350 mL    Lactated Ringers: 0 mL  Total OUT: 2350 mL    Total NET: -1350 mL    Physical Exam:  Vital Signs Last 24 Hrs  T(C): 36.5 (22 Oct 2024 20:56), Max: 36.9 (22 Oct 2024 00:10)  T(F): 97.7 (22 Oct 2024 20:56), Max: 98.5 (22 Oct 2024 00:10)  HR: 57 (22 Oct 2024 20:56) (57 - 66)  BP: 150/83 (22 Oct 2024 20:56) (150/83 - 174/90)  BP(mean): 106 (22 Oct 2024 20:56) (106 - 113)  RR: 13 (22 Oct 2024 20:56) (13 - 20)  SpO2: 95% (22 Oct 2024 20:56) (95% - 98%)    Parameters below as of 22 Oct 2024 20:56  Patient On (Oxygen Delivery Method): room air      Height (cm): 182.9 (10-22 @ 10:22)  Weight (kg): 127 (10-22 @ 10:22)  BMI (kg/m2): 38 (10-22 @ 10:22)  BSA (m2): 2.46 (10-22 @ 10:22)    General: in no acute distress  Neck: supple, trachea midline  Lungs: clear, no wheeze/rhonchi  Cardiovascular: regular rate and rhythm, S1 S2  Abdomen: soft, nontender, ND, bowel sounds normal + SPC site c/c/i  Neurological:  alert and oriented x3  Skin: no rash  Extremities: no cyanosis/clubbing/edema    Labs:                         10.1   11.23 )-----------( 263      ( 21 Oct 2024 22:15 )             30.6   10-21    134[L]  |  98  |  27[H]  ----------------------------<  215[H]  4.1   |  22  |  3.21[H]    Ca    8.7      21 Oct 2024 22:15    TPro  7.2  /  Alb  2.9[L]  /  TBili  0.2  /  DBili  x   /  AST  20  /  ALT  23  /  AlkPhos  101  10-21      RECENT CULTURES:    Culture - Urine (collected 20 Oct 2024 13:00)  Source: Clean Catch Clean Catch (Midstream)  Final Report (22 Oct 2024 10:48):    >=3 organisms. Probable collection contamination.    RADIOLOGY & ADDITIONAL STUDIES:    ACC: [id number] EXAM:  CT ABDOMEN AND PELVIS IC   ORDERED BY: DANN GRIJALVA     PROCEDURE DATE:  10/20/2024          INTERPRETATION:  CLINICAL INFORMATION: Abdominal pain.    COMPARISON: CT abdomen pelvis 7/26/2024.    CONTRAST/COMPLICATIONS:  IV Contrast: Omnipaque 350  95 cc administered   5 cc discarded  Oral Contrast: NONE  Complications: None reported at time of study completion    PROCEDURE:  CT of the Abdomen and Pelvis was performed.  Sagittal and coronal reformats were performed.    FINDINGS:  LOWER CHEST: Gynecomastia. Trace dependent atelectasis.    LIVER: Within normal limits.  BILE DUCTS: Normal caliber.  GALLBLADDER: Distended with calcified and noncalcified stones and sludge.  SPLEEN: Within normal limits.  PANCREAS: Within normallimits.  ADRENALS: Stable nodular thickening of the left adrenal gland.  KIDNEYS/URETERS: Symmetric renal enhancement. No hydronephrosis.   Bilateral stable cortical atrophy. Bilateral mild nonobstructive   nephrolithiasis.    BLADDER: Suprapubic catheter. Collapsed/thickened with surrounding   inflammation similar to prior, suspect cystitis.  REPRODUCTIVE ORGANS: Prostate within normal limits.    BOWEL: No bowel obstruction. Appendix is normal.  PERITONEUM/RETROPERITONEUM: Within normal limits.  VESSELS: IVC filter. Atherosclerotic changes.  LYMPH NODES: No lymphadenopathy.  ABDOMINAL WALL: Postsurgical changes. Small bilateral fat-containing   inguinal hernias.  BONES: L4-L5 posterior fusion and surgical decompression laminectomy.   Degenerative changes. Stable benign-appearing lucent lesion with   sclerotic rim in the left proximal femur.    IMPRESSION:  Suspect cystitis, potentially chronic as similar in appearance compared   to previous. Correlate with urinalysis.  Otherwise, no acute findings within the abdomen or pelvis.    Assessment :   48YO M resident of [facility] BPH CVA  neurogenic bladder sp  suprapubic catheter at [facility]  , approximately 2 weeks ago seen at [facility] 10/20 sec bladder spasms and had CT that showed cystitis. Started on antibiotics and was sent back to NH. Pt now presented to the hospital with persistent bladder spasm with continuous pain.   Ucx 10/20   >=3 organisms. Probable collection contamination. Prior to hx of Ecoli in Ucx.  ?acute cystitis  Seen by urology - no intervention    Plan :   Cont Rocephin  Trend temps and cbc      Continue with present regiment .  Approptiate use of antibiotics and adverse effects reviewed.      I have discussed the above plan of care with patient in detail. He expressed understanding of the treatment plan . Risks, benefits and alternatives discussed in detail. I have asked if he has any questions or concerns and appropriately addressed them to the best of my ability .      > 45 minutes spent in direct patient care reviewing  the notes, lab data/ imaging , discussion with multidisciplinary team. All questions were addressed and answered to the best of my capacity .    Thank you for allowing me to participate in the care of your patient .      Yadira Blackmno MD  Infectious Disease  [phone]

## 2024-10-22 NOTE — DIETITIAN INITIAL EVALUATION ADULT - PERTINENT LABORATORY DATA
10-21    134[L]  |  98  |  27[H]  ----------------------------<  215[H]  4.1   |  22  |  3.21[H]    Ca    8.7      21 Oct 2024 22:15    TPro  7.2  /  Alb  2.9[L]  /  TBili  0.2  /  DBili  x   /  AST  20  /  ALT  23  /  AlkPhos  101  10-21  POCT Blood Glucose.: 202 mg/dL (10-22-24 @ 11:34)  A1C with Estimated Average Glucose Result: 7.8 % (02-18-24 @ 06:30)

## 2024-10-22 NOTE — CONSULT NOTE ADULT - ASSESSMENT
Recommendations - No surgicals intervention anticipated                                       Continue SP Cystostomy and monitor U/O                                      ANTONIO, atrophic kidneys  without  hydronephrosis - fluid and medial management by Nephrology                                      Cystitis - mange per ID recommendations with antibiotics                                      Nephrolithiasis,  non--obstructive left and small - observe                                        F/U with [facility] upon discharge     Dr. Sanchez aware

## 2024-10-22 NOTE — CARE COORDINATION ASSESSMENT. - PATIENT WAS INVOLVED WITH A HOMECARE AGENCY PRIOR TO ADMISSION?
Bed: RT3  Expected date: 9/9/22  Expected time: 2:49 PM  Means of arrival:   Comments:  triage  
ED MD ok with pt to go to floor prior to MRI. Pt updated on plan of care and next steps. Answered all questions at this time  
Pt to go to MRI then floor. Patient is A/Ox4. In no apparent distress with VSS.  All belongings sent with the patient. Please review patient's handover report in Epic under ED to IP Handoff   
No

## 2024-10-22 NOTE — DIETITIAN INITIAL EVALUATION ADULT - NSICDXPASTMEDICALHX_GEN_ALL_CORE_FT
PAST MEDICAL HISTORY:  Acute embolism and thrombosis of deep vein of lower extremity     BPH (benign prostatic hyperplasia)     BPH (benign prostatic hyperplasia)     BPH without obstruction/lower urinary tract symptoms     CVA (cerebrovascular accident)     CVD (cerebrovascular disease)     DDD (degenerative disc disease), lumbar     Degenerative arthritis     Diabetes mellitus due to underlying condition with diabetic neuropathy     DM2 (diabetes mellitus, type 2)     DVT of lower limb, acute     DVT, lower extremity     Essential (primary) hypertension     GERD (gastroesophageal reflux disease)     H/O constipation     H/O Guillain-[place] syndrome     H/O insomnia     History of CVA in adulthood     History of Guillain-[place] syndrome     History of muscle spasm     History of neurogenic bowel     HTN (hypertension)     HTN (hypertension)     Iron deficiency anemia     Major depressive disorder, single episode     Muscle weakness     Obesity     Pain, unspecified     Peripheral neuropathy     Renal stones     Type 2 diabetes mellitus     Vitamin deficiency

## 2024-10-22 NOTE — DIETITIAN INITIAL EVALUATION ADULT - PERTINENT MEDS FT
MEDICATIONS  (STANDING):  amLODIPine   Tablet 10 milliGRAM(s) Oral daily  ascorbic acid 500 milliGRAM(s) Oral daily  cefTRIAXone   IVPB 1000 milliGRAM(s) IV Intermittent every 24 hours  cloNIDine 0.1 milliGRAM(s) Oral two times a day  dextrose 5%. 1000 milliLiter(s) (100 mL/Hr) IV Continuous <Continuous>  dextrose 5%. 1000 milliLiter(s) (50 mL/Hr) IV Continuous <Continuous>  dextrose 50% Injectable 12.5 Gram(s) IV Push once  dextrose 50% Injectable 25 Gram(s) IV Push once  dextrose 50% Injectable 25 Gram(s) IV Push once  diazepam    Tablet 2.5 milliGRAM(s) Oral every 8 hours  DULoxetine 60 milliGRAM(s) Oral daily  ferrous    sulfate 325 milliGRAM(s) Oral daily  finasteride 5 milliGRAM(s) Oral daily  gabapentin 600 milliGRAM(s) Oral three times a day  glucagon  Injectable 1 milliGRAM(s) IntraMuscular once  insulin lispro (ADMELOG) corrective regimen sliding scale   SubCutaneous every 6 hours  lactated ringers. 1000 milliLiter(s) (70 mL/Hr) IV Continuous <Continuous>  methocarbamol 750 milliGRAM(s) Oral three times a day  metoprolol tartrate 25 milliGRAM(s) Oral two times a day  pantoprazole    Tablet 40 milliGRAM(s) Oral before breakfast  polyethylene glycol 3350 17 Gram(s) Oral daily  traZODone 50 milliGRAM(s) Oral at bedtime    MEDICATIONS  (PRN):  acetaminophen     Tablet .. 650 milliGRAM(s) Oral every 6 hours PRN Temp greater or equal to 38C (100.4F), Mild Pain (1 - 3)  dextrose Oral Gel 15 Gram(s) Oral once PRN Blood Glucose LESS THAN 70 milliGRAM(s)/deciliter  HYDROmorphone   Tablet 4 milliGRAM(s) Oral every 6 hours PRN Moderate Pain (4 - 6)  HYDROmorphone  Injectable 0.5 milliGRAM(s) IV Push every 4 hours PRN Severe Pain (7 - 10)

## 2024-10-22 NOTE — CONSULT NOTE ADULT - ASSESSMENT
Physical Exam:   Vital Signs Last 24 Hrs  T(C): 36.7 (22 Oct 2024 06:21), Max: 36.9 (21 Oct 2024 20:31)  T(F): 98 (22 Oct 2024 06:21), Max: 98.5 (21 Oct 2024 20:31)  HR: 59 (22 Oct 2024 06:21) (59 - 76)  BP: 174/90 (22 Oct 2024 06:21) (158/93 - 174/90)  BP(mean): --  RR: 17 (22 Oct 2024 06:21) (16 - 18)  SpO2: 98% (22 Oct 2024 06:21) (96% - 98%)    Parameters below as of 22 Oct 2024 06:21  Patient On (Oxygen Delivery Method): room air    CAPILLARY BLOOD GLUCOSE      POCT Blood Glucose.: 216 mg/dL (22 Oct 2024 05:35)      Cholesterol, Serum: 113 mg/dL (05.19.21 @ 08:36)     HDL Cholesterol, Serum: 22 mg/dL (05.19.21 @ 08:36)     LDL Cholesterol Calculated: 66 mg/dL (05.19.21 @ 08:36)     DIET: CC  >50%

## 2024-10-22 NOTE — CONSULT NOTE ADULT - SUBJECTIVE AND OBJECTIVE BOX
CHIEF COMPLAINT: Pain    HISTORY OF PRESENT ILLNESS:      49-year-old male history of BPH CVA chronic suprapubic catheter IVC filter complaining about bladder pain spasms seen at [facility] yesterday had a CT that showed cystitis started on antibiotics but back at [place] for continuous pain.  Denies any fever but admits to some chills.  Denies any flank pain    Patient is followed by [facility] and had SP cath placed trocar technique by IR, approximately 2 weeks ago.    Ct reviewed, small kidneys no hydro, evidence cystidis no retention    Patient adamant he wanted to go to [facility] at to titus of ED visit and wants transfer today, Dr. Sanchez made aware (by me)      PAST MEDICAL & SURGICAL HISTORY:  BPH (benign prostatic hyperplasia)    SP Trocar  Cystostomy  Nephrolithiasis - history lithoripsy      HTN (hypertension)      Type 2 diabetes mellitus      Peripheral neuropathy      History of Guillain-Clemons syndrome      History of CVA in adulthood      DDD (degenerative disc disease), lumbar      DVT, lower extremity      Renal stones      H/O Guillain-Clemons syndrome      History of neurogenic bowel      DM2 (diabetes mellitus, type 2)      HTN (hypertension)      BPH without obstruction/lower urinary tract symptoms      Degenerative arthritis      DVT of lower limb, acute      CVA (cerebrovascular accident)      CVD (cerebrovascular disease)      Muscle weakness      Iron deficiency anemia      History of muscle spasm      Pain, unspecified      Vitamin deficiency      Obesity      GERD (gastroesophageal reflux disease)      H/O constipation      H/O insomnia      Essential (primary) hypertension      Acute embolism and thrombosis of deep vein of lower extremity      BPH (benign prostatic hyperplasia)      Diabetes mellitus due to underlying condition with diabetic neuropathy      Major depressive disorder, single episode      S/P IVC filter      H/O lithotripsy      Chronic suprapubic catheter          REVIEW OF SYSTEMS:    CONSTITUTIONAL: No weakness, fevers or chills  EYES/ENT: No visual changes;  No vertigo or throat pain   NECK: No pain or stiffness  RESPIRATORY: No cough, wheezing, hemoptysis; No shortness of breath  CARDIOVASCULAR: No chest pain or palpitations  GASTROINTESTINAL: No abdominal or epigastric pain. No nausea, vomiting, or hematemesis; No diarrhea or constipation. No melena or hematochezia.  NEUROLOGICAL: weakness, sensate  SKIN: No itching, burning, rashes, or lesions       MEDICATIONS  (STANDING):  amLODIPine   Tablet 10 milliGRAM(s) Oral daily  ascorbic acid 500 milliGRAM(s) Oral daily  cefTRIAXone   IVPB 1000 milliGRAM(s) IV Intermittent every 24 hours  cloNIDine 0.1 milliGRAM(s) Oral two times a day  dextrose 5%. 1000 milliLiter(s) (100 mL/Hr) IV Continuous <Continuous>  dextrose 5%. 1000 milliLiter(s) (50 mL/Hr) IV Continuous <Continuous>  dextrose 50% Injectable 25 Gram(s) IV Push once  dextrose 50% Injectable 12.5 Gram(s) IV Push once  dextrose 50% Injectable 25 Gram(s) IV Push once  diazepam    Tablet 2.5 milliGRAM(s) Oral every 8 hours  DULoxetine 60 milliGRAM(s) Oral daily  ferrous    sulfate 325 milliGRAM(s) Oral daily  finasteride 5 milliGRAM(s) Oral daily  gabapentin 600 milliGRAM(s) Oral three times a day  glucagon  Injectable 1 milliGRAM(s) IntraMuscular once  insulin lispro (ADMELOG) corrective regimen sliding scale   SubCutaneous every 6 hours  lactated ringers. 1000 milliLiter(s) (70 mL/Hr) IV Continuous <Continuous>  methocarbamol 750 milliGRAM(s) Oral three times a day  metoprolol tartrate 25 milliGRAM(s) Oral two times a day  pantoprazole    Tablet 40 milliGRAM(s) Oral before breakfast  polyethylene glycol 3350 17 Gram(s) Oral daily  traZODone 50 milliGRAM(s) Oral at bedtime    MEDICATIONS  (PRN):  acetaminophen     Tablet .. 650 milliGRAM(s) Oral every 6 hours PRN Temp greater or equal to 38C (100.4F), Mild Pain (1 - 3)  dextrose Oral Gel 15 Gram(s) Oral once PRN Blood Glucose LESS THAN 70 milliGRAM(s)/deciliter  HYDROmorphone   Tablet 4 milliGRAM(s) Oral every 6 hours PRN Moderate Pain (4 - 6)  HYDROmorphone  Injectable 0.5 milliGRAM(s) IV Push every 4 hours PRN Severe Pain (7 - 10)      Allergies    sulfa drugs (Other)  sulfa drugs (Rash)  sulfa drugs (Hives)  sulfa drugs (Unknown)    Intolerances    Pipercillin Sodium-Tazobactam Sodium (Pruritus)      SOCIAL HISTORY:    FAMILY HISTORY:  FH: heart disease    FH: HTN (hypertension)    Family history of sinus tachycardia (Father)    FH: HTN (hypertension) (Mother)        Vital Signs Last 24 Hrs  T(C): 36.7 (22 Oct 2024 13:01), Max: 36.9 (21 Oct 2024 20:31)  T(F): 98 (22 Oct 2024 13:01), Max: 98.5 (21 Oct 2024 20:31)  HR: 66 (22 Oct 2024 13:01) (59 - 76)  BP: 152/74 (22 Oct 2024 13:01) (152/74 - 174/90)  BP(mean): --  RR: 17 (22 Oct 2024 13:01) (16 - 18)  SpO2: 97% (22 Oct 2024 13:01) (96% - 98%)    Parameters below as of 22 Oct 2024 13:01  Patient On (Oxygen Delivery Method): room air        PHYSICAL EXAM:    Constitutional: NAD, well-developed  HEENT: STEVE, EOMI, Normal Hearing, MMM  Neck: No LAD, No JVD  Back: Normal spine flexure, No CVA tenderness  Respiratory: CTAB   Cardiovascular: S1 and S2, RRR, no M/G/R  Abd: BS+, soft, NT/ND, No CVAT  : Normal circumcised  phallus, patent  meatus, bilateral descended testes, no masses  JOSIANE: Normal sphincter tone, Normal prostate, no masses  Extremities: No peripheral edema  Vascular: 2+ peripheral pulses  Neurological: A/O x 3, no focal deficits  Psychiatric: Normal mood, normal affect      LABS:                        10.1   11.23 )-----------( 263      ( 21 Oct 2024 22:15 )             30.6     10-21    134[L]  |  98  |  27[H]  ----------------------------<  215[H]  4.1   |  22  |  3.21[H]    Ca    8.7      21 Oct 2024 22:15    TPro  7.2  /  Alb  2.9[L]  /  TBili  0.2  /  DBili  x   /  AST  20  /  ALT  23  /  AlkPhos  101  10-21    PT/INR - ( 21 Oct 2024 22:15 )   PT: 13.5 sec;   INR: 1.17 ratio         PTT - ( 21 Oct 2024 22:15 )  PTT:18.1 sec  Urinalysis Basic - ( 21 Oct 2024 22:15 )    Color: x / Appearance: x / SG: x / pH: x  Gluc: 215 mg/dL / Ketone: x  / Bili: x / Urobili: x   Blood: x / Protein: x / Nitrite: x   Leuk Esterase: x / RBC: x / WBC x   Sq Epi: x / Non Sq Epi: x / Bacteria: x      Urine Culture:   Hemoglobin: 10.1 g/dL (10-21 @ 22:15)  Hematocrit: 30.6 % (10-21 @ 22:15)      RADIOLOGY & ADDITIONAL STUDIES:    < from: CT Abdomen and Pelvis w/ IV Cont (10.20.24 @ 06:16) >  ACC: [id number] EXAM:  CT ABDOMEN AND PELVIS IC   ORDERED BY: DANN GRIJALVA     PROCEDURE DATE:  10/20/2024          INTERPRETATION:  CLINICAL INFORMATION: Abdominal pain.    COMPARISON: CT abdomen pelvis 7/26/2024.    CONTRAST/COMPLICATIONS:  IV Contrast: Omnipaque 350  95 cc administered   5 cc discarded  Oral Contrast: NONE  Complications: None reported at time of study completion    PROCEDURE:  CT of the Abdomen and Pelvis was performed.  Sagittal and coronal reformats were performed.    FINDINGS:  LOWER CHEST: Gynecomastia. Trace dependent atelectasis.    LIVER: Within normal limits.  BILE DUCTS: Normal caliber.  GALLBLADDER: Distended with calcified and noncalcified stones and sludge.  SPLEEN: Within normal limits.  PANCREAS: Within normallimits.  ADRENALS: Stable nodular thickening of the left adrenal gland.  KIDNEYS/URETERS: Symmetric renal enhancement. No hydronephrosis.   Bilateral stable cortical atrophy. Bilateral mild nonobstructive   nephrolithiasis.    BLADDER: Suprapubic catheter. Collapsed/thickened with surrounding   inflammation similar to prior, suspect cystitis.  REPRODUCTIVE ORGANS: Prostate within normal limits.    BOWEL: No bowel obstruction. Appendix is normal.  PERITONEUM/RETROPERITONEUM: Within normal limits.  VESSELS: IVC filter. Atherosclerotic changes.  LYMPH NODES: No lymphadenopathy.  ABDOMINAL WALL: Postsurgical changes. Small bilateral fat-containing   inguinal hernias.  BONES: L4-L5 posterior fusion and surgical decompression laminectomy.   Degenerative changes. Stable benign-appearing lucent lesion with   sclerotic rim in the left proximal femur.    IMPRESSION:  Suspect cystitis, potentially chronic as similar in appearance compared   to previous. Correlate with urinalysis.  Otherwise, no acute findings within the abdomen or pelvis.        --- End of Report ---          JUAN RUEDA MD; Resident Radiologist  This document has been electronically signed.  ABUNDIO CHOWDHURY MD; Attending Radiologist  This document has been electronically signed. Oct 20 2024  8:26AM    < end of copied text >  < from: US Kidney and Bladder (10.22.24 @ 02:51) >  ACC: [id number] EXAM:  US KIDNEYS AND BLADDER   ORDERED BY: SAMPSON JONES     PROCEDURE DATE:  10/22/2024          INTERPRETATION:  CLINICAL INFORMATION: Cystitis. Rule out hydronephrosis.    COMPARISON: 10/20/2024 CT    TECHNIQUE: Sonography of the kidneys and bladder.    FINDINGS:  Right kidney: 11.3 cm. No renal mass, hydronephrosis or calculi.    Left kidney: 11.1 cm. No renal mass, hydronephrosis or calculi. Echogenic   parenchyma and cortical thinning.    Urinary bladder: Thickened bladder wall. Decompressed around a catheter.    IMPRESSION:  No hydronephrosis. Echogenic left kidney and cortical thinning suggestive   of chronic medical renal disease.        --- End of Report ---            BIBI LEON MD; Attending Radiologist    < end of copied text >

## 2024-10-22 NOTE — CONSULT NOTE ADULT - SUBJECTIVE AND OBJECTIVE BOX
Patient is a 49y old  Male who presents with a chief complaint of osmani (22 Oct 2024 09:04)    resides @ [facility]  Type: 2 DX a couple years no known complications, managed by PCP Dr. edna Sanchez, pending a1c. rx lantus 20 units @ HS, admelog 4-8 units premeal. all insulin and fingersticks done by staff, patient reports usually 200s. discussed pathophysiology of T2DM. currently NPO, reviewed CC diet and carb identification, pt reports eats food from rehab and gets food delivered, educated to limit carbs, priortize lean protein and nonstarchy vegetables. verbal education provided  diabetes education provided- A1c measure and BG targets  fasting, <180 2 hours postmeal. medication MOA and considerations/side effects, inhospital BGM frequency and insulin administration, s/s of hyperglycemia/hypoglycemia and management, glycemic control and preventing complications, consistent carb diet, balanced plate method, consistent meal planning. sick day management, provider f/u  Hx ASCVD, CKD, HF    HPI:      PAST MEDICAL & SURGICAL HISTORY:  BPH (benign prostatic hyperplasia)      HTN (hypertension)      Type 2 diabetes mellitus      Peripheral neuropathy      History of Guillain-Rickreall syndrome      History of CVA in adulthood      DDD (degenerative disc disease), lumbar      DVT, lower extremity      Renal stones      H/O Guillain-Rickreall syndrome      History of neurogenic bowel      DM2 (diabetes mellitus, type 2)      HTN (hypertension)      BPH without obstruction/lower urinary tract symptoms      Degenerative arthritis      DVT of lower limb, acute      CVA (cerebrovascular accident)      CVD (cerebrovascular disease)      Muscle weakness      Iron deficiency anemia      History of muscle spasm      Pain, unspecified      Vitamin deficiency      Obesity      GERD (gastroesophageal reflux disease)      H/O constipation      H/O insomnia      Essential (primary) hypertension      Acute embolism and thrombosis of deep vein of lower extremity      BPH (benign prostatic hyperplasia)      Diabetes mellitus due to underlying condition with diabetic neuropathy      Major depressive disorder, single episode      S/P IVC filter      H/O lithotripsy      Chronic suprapubic catheter      Allergies    sulfa drugs (Other)  sulfa drugs (Rash)  sulfa drugs (Hives)  sulfa drugs (Unknown)    Intolerances    Pipercillin Sodium-Tazobactam Sodium (Pruritus)      MEDICATIONS  (STANDING):  amLODIPine   Tablet 10 milliGRAM(s) Oral daily  ascorbic acid 500 milliGRAM(s) Oral daily  cefTRIAXone   IVPB 1000 milliGRAM(s) IV Intermittent every 24 hours  cloNIDine 0.1 milliGRAM(s) Oral two times a day  dextrose 5%. 1000 milliLiter(s) (50 mL/Hr) IV Continuous <Continuous>  dextrose 5%. 1000 milliLiter(s) (100 mL/Hr) IV Continuous <Continuous>  dextrose 50% Injectable 12.5 Gram(s) IV Push once  dextrose 50% Injectable 25 Gram(s) IV Push once  dextrose 50% Injectable 25 Gram(s) IV Push once  diazepam    Tablet 2.5 milliGRAM(s) Oral every 8 hours  DULoxetine 60 milliGRAM(s) Oral daily  ferrous    sulfate 325 milliGRAM(s) Oral daily  finasteride 5 milliGRAM(s) Oral daily  gabapentin 600 milliGRAM(s) Oral three times a day  glucagon  Injectable 1 milliGRAM(s) IntraMuscular once  insulin lispro (ADMELOG) corrective regimen sliding scale   SubCutaneous every 6 hours  lactated ringers. 1000 milliLiter(s) (70 mL/Hr) IV Continuous <Continuous>  methocarbamol 750 milliGRAM(s) Oral three times a day  metoprolol tartrate 25 milliGRAM(s) Oral two times a day  pantoprazole    Tablet 40 milliGRAM(s) Oral before breakfast  polyethylene glycol 3350 17 Gram(s) Oral daily  traZODone 50 milliGRAM(s) Oral at bedtime

## 2024-10-22 NOTE — DIETITIAN INITIAL EVALUATION ADULT - OTHER INFO
Visited patient in room, presents with good appetite/po intake, consuming >% of meals. Denies n/v/d/c, last BM 2 days ago. No reported difficulty chewing or swallowing. NKFA. Reported no UBW changes, current adm weight 280#, weight appears to be stable, will continue to monitor weight trends as able.     Pertinent medications/nutrition labs reviewed;  x24hr, hyponatremia, osmani; renal following; In house ordered for lispro sliding scale to aid in glucose management. Also receiving LR, antibiotic.     Provided coumadin edu. Pt is not interested in receiving DM education at this time, states NH staff managed his diet and meds, aware what to eat and what not; Food preferences obtained, will honor as able to encourage intake. Allows double protein at each meal. Provide DM edu when appropriate. RD to continue to monitor nutrition status per protocol.

## 2024-10-22 NOTE — DIETITIAN INITIAL EVALUATION ADULT - ADD RECOMMEND
1. Continue with Consistent Carbohydrate, no indication for renal restriction  2. Provide double protein at each meal  3. Encourage po intake as needed   4. Provide DM edu when appropriate

## 2024-10-22 NOTE — DIETITIAN INITIAL EVALUATION ADULT - ORAL INTAKE PTA/DIET HISTORY
Per transfer document, pt from [facility] was on a no concentrated sweets, regular texture, thin liquid consistency diet. Reported appetite/po intake was good. Receiving multivitamin, vitamin C, folic acid other nutrition supplements reported.

## 2024-10-22 NOTE — CARE COORDINATION ASSESSMENT. - NSCAREPROVIDERS_GEN_ALL_CORE_FT
CARE PROVIDERS:  Accepting Physician: Janie Sanchez  Access Services: Yara Saldivar  Access Services: Jay Ca  Administration: Sabrina Quiñones  Administration: Lola Mishra  Admitting: Janie Sanchez  Attending: Janie Sanchez  Consultant: Manuel Bell  Consultant: Cas Peralta  Consultant: Brock Walters ED Attending: Tad Lacey ED Nurse: Radha Castañeda  Infection Control: Madalyn Castro  Nurse: Radha Castañeda  Nurse: Aleksandra Lang  Nurse: Gabriela Walters  Nurse: Nahomy Aguirre  Nurse: Yara Colorado  Oncology: Arturo Jenkins  Oncology: Laci Bland  Outpatient Provider: Gus Leon  Override: Nahomy Aguirre  Primary Team: Johnathan Bender  Primary Team: Pahlavan, Mohsen  Quality Review: Maribell Small  Registered Dietitian: Emilee Wilson  Registered Dietitian: Bhavani Botello  Respiratory Therapy: Mitzy Jacobson  : Lucia Gibbs// Supp. Assoc.: Patricia Reis

## 2024-10-22 NOTE — CONSULT NOTE ADULT - PROBLEM SELECTOR RECOMMENDATION 9
Type 2 A1c pending% adm acute cystitis  add 10 units Lantus @ HS  c/w admelog AYANA  q6 hours and accucheck q6h while NPO  DSC recommendations: return to LUCINDA on home regimen lantus 20 units @ HS and admelog 4-8 units TIDAC and glucose monitoring, lifestyle and diet modifications  diabetes education provided as documented above  Diabetes support info and cell # [phone] given   Goal 100-180 mg/dL; 140-180 mg/dL in critical care areas

## 2024-10-22 NOTE — CASE MANAGEMENT PROGRESS NOTE - NSCMPROGRESSNOTE_GEN_ALL_CORE
Update Communication Note: Patient from [facility]: DX: Cystitis ? Change patient complaining of suprapubic pain , BUN and Creatine increase, pending Urine Culture, on IV Ceftriaxone, Patient alert times 4.  Patient not ready for Discharge will continue to follow patient.  Update Communication Note: Patient from [facility]: DX: Cystitis ? Change patient complaining of suprapubic pain , BUN and Creatine increase, pending Urine Culture, on IV Ceftriaxone, Patient alert times 4.  Patient not ready for Discharge will continue to follow patient. SW following case.

## 2024-10-22 NOTE — PATIENT PROFILE ADULT - FALL HARM RISK - HARM RISK INTERVENTIONS

## 2024-10-23 LAB
ALBUMIN SERPL ELPH-MCNC: 2.8 G/DL — LOW (ref 3.3–5)
ALP SERPL-CCNC: 98 U/L — SIGNIFICANT CHANGE UP (ref 30–120)
ALT FLD-CCNC: 23 U/L — SIGNIFICANT CHANGE UP (ref 10–60)
ANION GAP SERPL CALC-SCNC: 9 MMOL/L — SIGNIFICANT CHANGE UP (ref 5–17)
APTT BLD: 31.3 SEC — SIGNIFICANT CHANGE UP (ref 24.5–35.6)
APTT BLD: >200 SEC — CRITICAL HIGH (ref 24.5–35.6)
AST SERPL-CCNC: 13 U/L — SIGNIFICANT CHANGE UP (ref 10–40)
BASOPHILS # BLD AUTO: 0.08 K/UL — SIGNIFICANT CHANGE UP (ref 0–0.2)
BASOPHILS NFR BLD AUTO: 0.9 % — SIGNIFICANT CHANGE UP (ref 0–2)
BILIRUB SERPL-MCNC: 0.2 MG/DL — SIGNIFICANT CHANGE UP (ref 0.2–1.2)
BUN SERPL-MCNC: 32 MG/DL — HIGH (ref 7–23)
CALCIUM SERPL-MCNC: 8.9 MG/DL — SIGNIFICANT CHANGE UP (ref 8.4–10.5)
CHLORIDE SERPL-SCNC: 101 MMOL/L — SIGNIFICANT CHANGE UP (ref 96–108)
CO2 SERPL-SCNC: 27 MMOL/L — SIGNIFICANT CHANGE UP (ref 22–31)
CREAT SERPL-MCNC: 2.82 MG/DL — HIGH (ref 0.5–1.3)
EGFR: 27 ML/MIN/1.73M2 — LOW
EOSINOPHIL # BLD AUTO: 0.41 K/UL — SIGNIFICANT CHANGE UP (ref 0–0.5)
EOSINOPHIL NFR BLD AUTO: 4.5 % — SIGNIFICANT CHANGE UP (ref 0–6)
FERRITIN SERPL-MCNC: 91 NG/ML — SIGNIFICANT CHANGE UP (ref 30–400)
GLUCOSE BLDC GLUCOMTR-MCNC: 208 MG/DL — HIGH (ref 70–99)
GLUCOSE BLDC GLUCOMTR-MCNC: 242 MG/DL — HIGH (ref 70–99)
GLUCOSE BLDC GLUCOMTR-MCNC: 248 MG/DL — HIGH (ref 70–99)
GLUCOSE BLDC GLUCOMTR-MCNC: 260 MG/DL — HIGH (ref 70–99)
GLUCOSE BLDC GLUCOMTR-MCNC: 303 MG/DL — HIGH (ref 70–99)
GLUCOSE SERPL-MCNC: 239 MG/DL — HIGH (ref 70–99)
HCT VFR BLD CALC: 31 % — LOW (ref 39–50)
HCT VFR BLD CALC: 33.7 % — LOW (ref 39–50)
HGB BLD-MCNC: 10.6 G/DL — LOW (ref 13–17)
HGB BLD-MCNC: 9.8 G/DL — LOW (ref 13–17)
IMM GRANULOCYTES NFR BLD AUTO: 0.4 % — SIGNIFICANT CHANGE UP (ref 0–0.9)
INR BLD: 1.21 RATIO — HIGH (ref 0.85–1.16)
IRON SATN MFR SERPL: 14 % — LOW (ref 16–55)
IRON SATN MFR SERPL: 35 UG/DL — LOW (ref 45–165)
LYMPHOCYTES # BLD AUTO: 1.52 K/UL — SIGNIFICANT CHANGE UP (ref 1–3.3)
LYMPHOCYTES # BLD AUTO: 16.5 % — SIGNIFICANT CHANGE UP (ref 13–44)
MAGNESIUM SERPL-MCNC: 1.7 MG/DL — SIGNIFICANT CHANGE UP (ref 1.6–2.6)
MCHC RBC-ENTMCNC: 23.4 PG — LOW (ref 27–34)
MCHC RBC-ENTMCNC: 23.6 PG — LOW (ref 27–34)
MCHC RBC-ENTMCNC: 31.5 G/DL — LOW (ref 32–36)
MCHC RBC-ENTMCNC: 31.6 G/DL — LOW (ref 32–36)
MCV RBC AUTO: 74.4 FL — LOW (ref 80–100)
MCV RBC AUTO: 74.5 FL — LOW (ref 80–100)
MONOCYTES # BLD AUTO: 0.58 K/UL — SIGNIFICANT CHANGE UP (ref 0–0.9)
MONOCYTES NFR BLD AUTO: 6.3 % — SIGNIFICANT CHANGE UP (ref 2–14)
NEUTROPHILS # BLD AUTO: 6.58 K/UL — SIGNIFICANT CHANGE UP (ref 1.8–7.4)
NEUTROPHILS NFR BLD AUTO: 71.4 % — SIGNIFICANT CHANGE UP (ref 43–77)
NRBC # BLD: 0 /100 WBCS — SIGNIFICANT CHANGE UP (ref 0–0)
NRBC # BLD: 0 /100 WBCS — SIGNIFICANT CHANGE UP (ref 0–0)
PHOSPHATE SERPL-MCNC: 4.6 MG/DL — HIGH (ref 2.5–4.5)
PLATELET # BLD AUTO: 221 K/UL — SIGNIFICANT CHANGE UP (ref 150–400)
PLATELET # BLD AUTO: 250 K/UL — SIGNIFICANT CHANGE UP (ref 150–400)
POTASSIUM SERPL-MCNC: 4.1 MMOL/L — SIGNIFICANT CHANGE UP (ref 3.5–5.3)
POTASSIUM SERPL-SCNC: 4.1 MMOL/L — SIGNIFICANT CHANGE UP (ref 3.5–5.3)
PROT SERPL-MCNC: 6.4 G/DL — SIGNIFICANT CHANGE UP (ref 6–8.3)
PROTHROM AB SERPL-ACNC: 14.3 SEC — HIGH (ref 9.9–13.4)
RBC # BLD: 4.16 M/UL — LOW (ref 4.2–5.8)
RBC # BLD: 4.53 M/UL — SIGNIFICANT CHANGE UP (ref 4.2–5.8)
RBC # FLD: 17.2 % — HIGH (ref 10.3–14.5)
RBC # FLD: 17.4 % — HIGH (ref 10.3–14.5)
SODIUM SERPL-SCNC: 137 MMOL/L — SIGNIFICANT CHANGE UP (ref 135–145)
TIBC SERPL-MCNC: 253 UG/DL — SIGNIFICANT CHANGE UP (ref 220–430)
UIBC SERPL-MCNC: 218 UG/DL — SIGNIFICANT CHANGE UP (ref 110–370)
WBC # BLD: 9.21 K/UL — SIGNIFICANT CHANGE UP (ref 3.8–10.5)
WBC # BLD: 9.29 K/UL — SIGNIFICANT CHANGE UP (ref 3.8–10.5)
WBC # FLD AUTO: 9.21 K/UL — SIGNIFICANT CHANGE UP (ref 3.8–10.5)
WBC # FLD AUTO: 9.29 K/UL — SIGNIFICANT CHANGE UP (ref 3.8–10.5)

## 2024-10-23 PROCEDURE — 99233 SBSQ HOSP IP/OBS HIGH 50: CPT

## 2024-10-23 RX ORDER — WARFARIN SODIUM 4 MG
5 TABLET ORAL ONCE
Refills: 0 | Status: COMPLETED | OUTPATIENT
Start: 2024-10-23 | End: 2024-10-23

## 2024-10-23 RX ORDER — HEPARIN SODIUM 10000 [USP'U]/ML
10000 INJECTION INTRAVENOUS; SUBCUTANEOUS ONCE
Refills: 0 | Status: COMPLETED | OUTPATIENT
Start: 2024-10-23 | End: 2024-10-23

## 2024-10-23 RX ORDER — INSULIN GLARGINE,HUM.REC.ANLOG 100/ML
10 VIAL (ML) SUBCUTANEOUS EVERY MORNING
Refills: 0 | Status: DISCONTINUED | OUTPATIENT
Start: 2024-10-23 | End: 2024-10-23

## 2024-10-23 RX ORDER — HYDROMORPHONE HCL/0.9% NACL/PF 6 MG/30 ML
1 PATIENT CONTROLLED ANALGESIA SYRINGE INTRAVENOUS EVERY 8 HOURS
Refills: 0 | Status: DISCONTINUED | OUTPATIENT
Start: 2024-10-23 | End: 2024-10-23

## 2024-10-23 RX ORDER — INSULIN GLARGINE,HUM.REC.ANLOG 100/ML
12 VIAL (ML) SUBCUTANEOUS AT BEDTIME
Refills: 0 | Status: DISCONTINUED | OUTPATIENT
Start: 2024-10-23 | End: 2024-10-25

## 2024-10-23 RX ORDER — HEPARIN SODIUM 10000 [USP'U]/ML
5000 INJECTION INTRAVENOUS; SUBCUTANEOUS EVERY 6 HOURS
Refills: 0 | Status: DISCONTINUED | OUTPATIENT
Start: 2024-10-23 | End: 2024-10-28

## 2024-10-23 RX ORDER — IRON SUCROSE 20 MG/ML
100 INJECTION, SOLUTION INTRAVENOUS
Refills: 0 | Status: DISCONTINUED | OUTPATIENT
Start: 2024-10-24 | End: 2024-10-31

## 2024-10-23 RX ORDER — HEPARIN SODIUM 10000 [USP'U]/ML
10000 INJECTION INTRAVENOUS; SUBCUTANEOUS EVERY 6 HOURS
Refills: 0 | Status: DISCONTINUED | OUTPATIENT
Start: 2024-10-23 | End: 2024-10-28

## 2024-10-23 RX ORDER — HYDROMORPHONE HCL/0.9% NACL/PF 6 MG/30 ML
1 PATIENT CONTROLLED ANALGESIA SYRINGE INTRAVENOUS ONCE
Refills: 0 | Status: DISCONTINUED | OUTPATIENT
Start: 2024-10-23 | End: 2024-10-23

## 2024-10-23 RX ORDER — INSULIN GLARGINE,HUM.REC.ANLOG 100/ML
7 VIAL (ML) SUBCUTANEOUS ONCE
Refills: 0 | Status: COMPLETED | OUTPATIENT
Start: 2024-10-23 | End: 2024-10-23

## 2024-10-23 RX ORDER — HEPARIN SODIUM 10000 [USP'U]/ML
INJECTION INTRAVENOUS; SUBCUTANEOUS
Qty: 25000 | Refills: 0 | Status: DISCONTINUED | OUTPATIENT
Start: 2024-10-23 | End: 2024-10-28

## 2024-10-23 RX ADMIN — Medication 1 MILLIGRAM(S): at 22:30

## 2024-10-23 RX ADMIN — Medication 7 UNIT(S): at 10:05

## 2024-10-23 RX ADMIN — METHOCARBAMOL 750 MILLIGRAM(S): 500 TABLET ORAL at 16:51

## 2024-10-23 RX ADMIN — Medication 10 MILLIGRAM(S): at 05:13

## 2024-10-23 RX ADMIN — DULOXETINE HYDROCHLORIDE 60 MILLIGRAM(S): 30 CAPSULE, DELAYED RELEASE ORAL at 12:27

## 2024-10-23 RX ADMIN — Medication 4 MILLIGRAM(S): at 05:34

## 2024-10-23 RX ADMIN — HEPARIN SODIUM 1900 UNIT(S)/HR: 10000 INJECTION INTRAVENOUS; SUBCUTANEOUS at 18:55

## 2024-10-23 RX ADMIN — GABAPENTIN 600 MILLIGRAM(S): 300 CAPSULE ORAL at 21:59

## 2024-10-23 RX ADMIN — Medication 2: at 16:52

## 2024-10-23 RX ADMIN — CLONIDINE HYDROCHLORIDE 0.1 MILLIGRAM(S): 0.2 TABLET ORAL at 05:14

## 2024-10-23 RX ADMIN — Medication 25 MILLIGRAM(S): at 05:13

## 2024-10-23 RX ADMIN — Medication 4 MILLIGRAM(S): at 23:43

## 2024-10-23 RX ADMIN — GABAPENTIN 600 MILLIGRAM(S): 300 CAPSULE ORAL at 05:13

## 2024-10-23 RX ADMIN — Medication 4 MILLIGRAM(S): at 06:34

## 2024-10-23 RX ADMIN — HEPARIN SODIUM 0 UNIT(S)/HR: 10000 INJECTION INTRAVENOUS; SUBCUTANEOUS at 17:55

## 2024-10-23 RX ADMIN — Medication 100 MILLIGRAM(S): at 22:01

## 2024-10-23 RX ADMIN — Medication 4: at 12:27

## 2024-10-23 RX ADMIN — Medication 4 MILLIGRAM(S): at 17:50

## 2024-10-23 RX ADMIN — Medication 1 MILLIGRAM(S): at 22:00

## 2024-10-23 RX ADMIN — HEPARIN SODIUM 10000 UNIT(S): 10000 INJECTION INTRAVENOUS; SUBCUTANEOUS at 10:53

## 2024-10-23 RX ADMIN — HEPARIN SODIUM 1900 UNIT(S)/HR: 10000 INJECTION INTRAVENOUS; SUBCUTANEOUS at 23:37

## 2024-10-23 RX ADMIN — METHOCARBAMOL 750 MILLIGRAM(S): 500 TABLET ORAL at 05:13

## 2024-10-23 RX ADMIN — Medication 325 MILLIGRAM(S): at 12:28

## 2024-10-23 RX ADMIN — TRAZODONE HYDROCHLORIDE 50 MILLIGRAM(S): 100 TABLET ORAL at 21:59

## 2024-10-23 RX ADMIN — Medication 5 MILLIGRAM(S): at 21:59

## 2024-10-23 RX ADMIN — Medication 2: at 08:05

## 2024-10-23 RX ADMIN — FINASTERIDE 5 MILLIGRAM(S): 5 TABLET, FILM COATED ORAL at 12:27

## 2024-10-23 RX ADMIN — HEPARIN SODIUM 2300 UNIT(S)/HR: 10000 INJECTION INTRAVENOUS; SUBCUTANEOUS at 10:52

## 2024-10-23 RX ADMIN — HEPARIN SODIUM 1900 UNIT(S)/HR: 10000 INJECTION INTRAVENOUS; SUBCUTANEOUS at 19:18

## 2024-10-23 RX ADMIN — Medication 4 MILLIGRAM(S): at 16:51

## 2024-10-23 RX ADMIN — METHOCARBAMOL 750 MILLIGRAM(S): 500 TABLET ORAL at 21:59

## 2024-10-23 RX ADMIN — Medication 3: at 21:56

## 2024-10-23 RX ADMIN — PANTOPRAZOLE SODIUM 40 MILLIGRAM(S): 40 TABLET, DELAYED RELEASE ORAL at 05:13

## 2024-10-23 RX ADMIN — Medication 12 UNIT(S): at 22:00

## 2024-10-23 RX ADMIN — DIAZEPAM 2.5 MILLIGRAM(S): 10 FILM BUCCAL at 21:58

## 2024-10-23 RX ADMIN — Medication 500 MILLIGRAM(S): at 12:27

## 2024-10-23 RX ADMIN — DIAZEPAM 2.5 MILLIGRAM(S): 10 FILM BUCCAL at 05:12

## 2024-10-23 NOTE — CONSULT NOTE ADULT - CONSULT REASON
Pain mgmt
SP Cathter, ANTONIO, Cystitis
Cystitis
ckd and osmani
N17.8     ANTONIO  N30.00   cystitis  I82.409   DVT  Z95.828  IVC filter  D68.61    APLS
49y  diabetes mellitus uncontrolled type 2

## 2024-10-23 NOTE — PROGRESS NOTE ADULT - ASSESSMENT
49-year-old male history of BPH CVA chronic suprapubic catheter IVC filter complaining about bladder pain spasms seen at [facility] yesterday had a CT that showed cystitis started on antibiotics but back at [facility] for continuous pain.  Denies any fever but admits to some chills.  Denies any flank pain.    ACUTE RENAL FAILURE: start iv fluid No surgicals intervention anticipated ,  Continue SP Cystostomy and monitor U/O  ,  ANTONIO, atrophic kidneys  without  hydronephrosis - fluid and medial management by   Serum creatinine is  at increased     , approximating GFR at   ml/min. lactated ringers. 1000 milliLiter(s) (70 mL/Hr) IV Continuous   There is no progression . No uremic symptoms  No evidence of anemia .  Fluid status stable.  Will continue to avoid nephrotoxic drugs.  Patient remains asymptomatic.   Continue current therapy.  hold  diuretic.  hold   ACE inhibitor.  hold   ARB.  Additional evaluation:   ECG,    echocardiogram,     CXR,  will obtained recent   renal ultrasound to evalaute kidney size and possible stones ,      BP monitoring,continue current antihypertensive meds, low salt diet,followup with PMD in 1-2 weeks  amLODIPine   Tablet 10 milliGRAM(s) Oral daily  cloNIDine 0.1 milliGRAM(s) Oral two times a day

## 2024-10-23 NOTE — CONSULT NOTE ADULT - ASSESSMENT
[ASSESSMENT and  PLAN]  N17.8     ANTONIO  N30.00   cystitis  I82.409   DVT  Z95.828  IVC filter  D68.61    APLS  63.8        anemia due to chronic disease  D50.0     Iron deficiency anemia  D63.1      Anemia due to CKD    Acute cystitis/bladder spasm/neurogenic bladder  Recent suprapubic catheter, with history of Neurogenic bladder, admitted with acute cystitis     DVT and hx +LAC  Hematology asked to evaluate for history of DVT and antiphospholipid antibody or lupus anticoagulant.  History of DVT  History of IVC filter  History of positive lupus anticoagulant and on Coumadin for anticoagulation since event.  Initially diagnosed at [facility], [place].  No further events since on anticoagulation.  Follow-up by hematology Dr. Devon Avery, [place] [facility] in [place], [place].  INR subtherapeutic secondary to off Coumadin    Restarted IV heparin.  Restart Coumadin if no plans for procedures.    Consideration for repeat duplex lower extremity.    Anemia Multifactorial  Anemia due to chronic disease  Anemia due to CKD  Anemia due to iron deficiency      RECOMMENDATIONS    UTI  Management per urology and medicine    DVT, possible APLS  Continue heparin drip as planned  Continue Coumadin.  If possible need for procedure then hold.  Patient currently residing at [facility] long-term.  Likely if as new events may be readmitted to this hospital.  Check lupus anticoagulant  Check anticardiolipin antibody, [id number] 1 antibody, anti-phosphatidylserine antibodies  Check KAM    Anemia  Transfuse PRBC as clinically indicated.   Transfuse PRBC if Hgb <7.0 or if symptomatic.   Follow CBC  Check Anemia studies.      Ferritin, Iron studies     B12, Folate     ESR, CRP  Consideration for IV iron if ferritin suboptimal.    Discussed plan of care with patient.  t/Family expressed understanding of the treatment plan.   Risks, benefits and alternatives discussed in detail.   Opportunity given for questions and discussion.   Questions or concerns all addressed and answered to their satisfaction, and in lay terms.     Discussed with Dr Lanier    Thank you for consulting us.     > 63 minutes spent in direct patient care, examining and counseling patient,  reviewing  the notes, lab data/ imaging , discussion with multidisciplinary team.

## 2024-10-23 NOTE — CONSULT NOTE ADULT - ASSESSMENT
A/P 49y year old Male with cystitis, pain in bladder    Patients pain exacerbated due to acute cystitis. We discussed options for pain control in addition to his baseline home regimen, reasonable to provide an IV dilaudid dose for breakthrough pain -- have placed order for IV dilaudid 1mg Q8H PRN severe breakthrough pain.    Primary team notes are reviewed  Laboratory studies reviewed including those mentioned earlier/above  Discussed management/coordinated care with primary team/referring provider  High risk of morbidity from treatment with: schedule II narcotic pain medication

## 2024-10-23 NOTE — CONSULT NOTE ADULT - SUBJECTIVE AND OBJECTIVE BOX
INCOMPLETE NOTE.  Documentation in Progress  PT SEEN AND EVALUATED.   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      History of neurogenic bowel      DM2 (diabetes mellitus, type 2)      HTN (hypertension)      BPH without obstruction/lower urinary tract symptoms      Degenerative arthritis      DVT of lower limb, acute      CVA (cerebrovascular accident)      CVD (cerebrovascular disease)      Muscle weakness      Iron deficiency anemia      History of muscle spasm      Pain, unspecified      Vitamin deficiency      Obesity      GERD (gastroesophageal reflux disease)      H/O constipation      H/O insomnia      Essential (primary) hypertension      Acute embolism and thrombosis of deep vein of lower extremity      BPH (benign prostatic hyperplasia)      Diabetes mellitus due to underlying condition with diabetic neuropathy      Major depressive disorder, single episode      S/P IVC filter      H/O lithotripsy      Chronic suprapubic catheter         HEALTH ISSUES - PROBLEM Dx:  Acute UTI    Suprapubic catheter dysfunction    ANTONIO (acute kidney injury)    Type 2 diabetes mellitus with hyperglycemia          Acute cystitis without hematuria [N30.00]    BPH (benign prostatic hyperplasia) [N40.0]    HTN (hypertension) [I10]    Type 2 diabetes mellitus [E11.9]    Peripheral neuropathy [G62.9]    History of Guillain-[place] syndrome [Z86.69]    History of CVA in adulthood [Z86.73]    DDD (degenerative disc disease), lumbar [M51.36]    DVT, lower extremity [I82.409]    Renal stones [N20.0]    H/O Guillain-N[place]aus[place] syndrome [Z86.69]    History of neurogenic bowel [Z87.19]    DM2 (diabetes mellitus, type 2) [E11.9]    HTN (hypertension) [I10]    BPH without obstruction/lower urinary tract symptoms [N40.0]    Degenerative arthritis [M19.90]    DVT of lower limb, acute [I82.409]    CVA (cerebrovascular accident) [I63.9]    CVD (cerebrovascular disease) [I67.9]    Muscle weakness [M62.81]    Iron deficiency anemia [D50.9]    History of muscle spasm [Z87.39]    Pain, unspecified [R52]    Vitamin D deficiency [E55.9]    Vitamin deficiency [E56.9]    Obesity [E66.9]    GERD (gastroesophageal reflux disease) [K21.9]    H/O constipation [Z87.19]    H/O insomnia [Z87.898]    Essential (primary) hypertension [I10]    Acute embolism and thrombosis of deep vein of lower extremity [I82.409]    BPH (benign prostatic hyperplasia) [N40.0]    Diabetes mellitus due to underlying condition with diabetic neuropathy [E08.40]    Major depressive disorder, single episode [F32.9]    S/P IVC filter [Z95.828]    H/O lithotripsy [Z98.890]    Chronic suprapubic catheter [Z93.59]    Bladder pain [R39.89]    ANTONIO (acute kidney injury) [N17.9]      FAMILY HISTORY:  FH: heart disease    FH: HTN (hypertension)    Family history of sinus tachycardia (Father)    FH: HTN (hypertension) (Mother)        [SOCIAL HISTORY: ]     smoking:  none currently     EtOH:  none currently     illicit drugs:  none currently     occupation:  Retired     marital status:       Other:       [ALLERGIES/INTOLERANCES:]  Allergies    sulfa drugs (Other)  sulfa drugs (Rash)  sulfa drugs (Hives)  sulfa drugs (Unknown)    Intolerances    Pipercillin Sodium-Tazobactam Sodium (Pruritus)      [MEDICATIONS]  MEDICATIONS  (STANDING):  amLODIPine   Tablet 10 milliGRAM(s) Oral daily  ascorbic acid 500 milliGRAM(s) Oral daily  cefTRIAXone   IVPB 1000 milliGRAM(s) IV Intermittent every 24 hours  cloNIDine 0.1 milliGRAM(s) Oral two times a day  dextrose 5%. 1000 milliLiter(s) (100 mL/Hr) IV Continuous <Continuous>  dextrose 5%. 1000 milliLiter(s) (50 mL/Hr) IV Continuous <Continuous>  dextrose 50% Injectable 25 Gram(s) IV Push once  dextrose 50% Injectable 12.5 Gram(s) IV Push once  dextrose 50% Injectable 25 Gram(s) IV Push once  diazepam    Tablet 2.5 milliGRAM(s) Oral every 8 hours  DULoxetine 60 milliGRAM(s) Oral daily  ferrous    sulfate 325 milliGRAM(s) Oral daily  finasteride 5 milliGRAM(s) Oral daily  gabapentin 600 milliGRAM(s) Oral three times a day  glucagon  Injectable 1 milliGRAM(s) IntraMuscular once  heparin   Injectable 08256 Unit(s) IV Push once  heparin  Infusion.  Unit(s)/Hr (23 mL/Hr) IV Continuous <Continuous>  insulin glargine Injectable (LANTUS) 7 Unit(s) SubCutaneous once  insulin glargine Injectable (LANTUS) 12 Unit(s) SubCutaneous at bedtime  insulin lispro (ADMELOG) corrective regimen sliding scale   SubCutaneous Before meals and at bedtime  lactated ringers. 1000 milliLiter(s) (70 mL/Hr) IV Continuous <Continuous>  methocarbamol 750 milliGRAM(s) Oral three times a day  metoprolol tartrate 25 milliGRAM(s) Oral two times a day  pantoprazole    Tablet 40 milliGRAM(s) Oral before breakfast  polyethylene glycol 3350 17 Gram(s) Oral daily  traZODone 50 milliGRAM(s) Oral at bedtime  warfarin 5 milliGRAM(s) Oral once    MEDICATIONS  (PRN):  acetaminophen     Tablet .. 650 milliGRAM(s) Oral every 6 hours PRN Temp greater or equal to 38C (100.4F), Mild Pain (1 - 3)  dextrose Oral Gel 15 Gram(s) Oral once PRN Blood Glucose LESS THAN 70 milliGRAM(s)/deciliter  heparin   Injectable 68999 Unit(s) IV Push every 6 hours PRN For aPTT less than 40  heparin   Injectable 5000 Unit(s) IV Push every 6 hours PRN For aPTT between 40 - 57  HYDROmorphone   Tablet 4 milliGRAM(s) Oral every 6 hours PRN Moderate Pain (4 - 6)      [REVIEW OF SYSTEMS: ]  CONSTITUTIONAL: normal, no fever, no shakes, no chills   EYES: No eye pain, no visual disturbances, no discharge  ENMT:  no discharge  NECK: No pain, no stiffness  BREASTS: No pain, no masses, no nipple discharge  RESPIRATORY: No cough, no wheezing, no chills, no hemoptysis; No shortness of breath  CARDIOVASCULAR: No chest pain, no palpitations, no dizziness, no leg swelling  GASTROINTESTINAL: No abdominal, no epigastric pain. No nausea, no vomiting, no hematemesis; No diarrhea , no constipation. No melena, no hematochezia.  GENITOURINARY: No dysuria, no frequency, no hematuria, no incontinence  NEUROLOGICAL: No headaches, no memory loss, no loss of strength, no numbness, no tremors  SKIN: No itching, no burning, no rashes, no lesions   LYMPH NODES: No enlarged glands  ENDOCRINE: No heat or cold intolerance; No hair loss  MUSCULOSKELETAL: No joint pain or swelling; No muscle, no back, no extremity pain  PSYCHIATRIC: No depression, no anxiety, no mood swings, no difficulty sleeping  HEME/LYMPH: No easy bruising, no bleeding gums    [VITALS SIGNS 24hrs]  Vital Signs Last 24 Hrs  T(C): 36.6 (23 Oct 2024 08:15), Max: 36.7 (22 Oct 2024 13:01)  T(F): 97.9 (23 Oct 2024 08:15), Max: 98 (22 Oct 2024 13:01)  HR: 54 (23 Oct 2024 05:20) (54 - 66)  BP: 119/77 (23 Oct 2024 05:20) (119/77 - 159/97)  BP(mean): 106 (22 Oct 2024 20:56) (106 - 113)  RR: 18 (23 Oct 2024 05:20) (13 - 20)  SpO2: 95% (23 Oct 2024 05:20) (95% - 97%)    Parameters below as of 23 Oct 2024 05:20  Patient On (Oxygen Delivery Method): room air      Daily Height in cm: 182.88 (22 Oct 2024 10:22)    Daily Weight in k (23 Oct 2024 05:20)    I&O's Summary    22 Oct 2024 07:01  -  23 Oct 2024 07:00  --------------------------------------------------------  IN: 1000 mL / OUT: 3550 mL / NET: -2550 mL        [PHYSICAL EXAM]  GEN:   HEENT: normocephalic and atraumatic. EOMI. PERRL.    NECK: Supple.  No lymphadenopathy   LUNGS: Clear to auscultation.  HEART: S1S2 Regular rate and rhythm, no MRG  ABDOMEN: Soft, nontender, and nondistended.  Positive bowel sounds.    : No CVA tenderness  EXTREMITIES: Without edema.  NEUROLOGIC: grossly intact.  PSYCHIATRIC: Appropriate affect .  SKIN: No rash     [LABS: ]                        10.1   11.23 )-----------( 263      ( 21 Oct 2024 22:15 )             30.6     CBC Full  -  ( 21 Oct 2024 22:15 )  WBC Count : 11.23 K/uL  RBC Count : 4.22 M/uL  Hemoglobin : 10.1 g/dL  Hematocrit : 30.6 %  Platelet Count - Automated : 263 K/uL  Mean Cell Volume : 72.5 fL  Mean Cell Hemoglobin : 23.9 pg  Mean Cell Hemoglobin Concentration : 33.0 g/dL  Auto Neutrophil # : 7.88 K/uL  Auto Lymphocyte # : 1.95 K/uL  Auto Monocyte # : 0.82 K/uL  Auto Eosinophil # : 0.46 K/uL  Auto Basophil # : 0.08 K/uL  Auto Neutrophil % : 70.1 %  Auto Lymphocyte % : 17.4 %  Auto Monocyte % : 7.3 %  Auto Eosinophil % : 4.1 %  Auto Basophil % : 0.7 %    10    134[L]  |  98  |  27[H]  ----------------------------<  215[H]  4.1   |  22  |  3.21[H]    Ca    8.7      21 Oct 2024 22:15    TPro  7.2  /  Alb  2.9[L]  /  TBili  0.2  /  DBili  x   /  AST  20  /  ALT  23  /  AlkPhos  101  10-21    PT/INR - ( 21 Oct 2024 22:15 )   PT: 13.5 sec;   INR: 1.17 ratio         PTT - ( 21 Oct 2024 22:15 )  PTT:18.1 sec  LIVER FUNCTIONS - ( 21 Oct 2024 22:15 )  Alb: 2.9 g/dL / Pro: 7.2 g/dL / ALK PHOS: 101 U/L / ALT: 23 U/L / AST: 20 U/L / GGT: x               Urinalysis Basic - ( 21 Oct 2024 22:15 )    Color: x / Appearance: x / SG: x / pH: x  Gluc: 215 mg/dL / Ketone: x  / Bili: x / Urobili: x   Blood: x / Protein: x / Nitrite: x   Leuk Esterase: x / RBC: x / WBC x   Sq Epi: x / Non Sq Epi: x / Bacteria: x          CBC TREND (5 Days)  WBC Count: 11.23 K/uL (10-21 @ 22:15)    Hemoglobin: 10.1 g/dL (10-21 @ 22:15)    Hematocrit: 30.6 % (10-21 @ 22:15)    Platelet Count - Automated: 263 K/uL (10-21 @ 22:15)                                            [MICROBIOLOGY /  VIROLOGY:]        Culture - Urine (collected 20 Oct 2024 13:00)  Source: Clean Catch Clean Catch (Midstream)  Final Report (22 Oct 2024 10:48):    >=3 organisms. Probable collection contamination.        [PATHOLOGY]       [RADIOLOGY & ADDITIONAL STUDIES:]        Patient is a 49y old  Male who presents with a chief complaint of antonio (23 Oct 2024 08:39)      HPI:  41-year-old  male long-term resident of [facility] presents for ANTONIO.  Hematology asked to evaluate for history of DVT and antiphospholipid antibody or lupus anticoagulant.  History of DVT  History of IVC filter  History of positive lupus anticoagulant and on Coumadin for anticoagulation since event.  Initially diagnosed at [facility], [place].  No further events since on anticoagulation.  Follow-up by hematology Dr. Devon Avery, [place] [facility] in [place], [place].      PAST MEDICAL & SURGICAL HISTORY:  BPH (benign prostatic hyperplasia)  HTN (hypertension)  Type 2 diabetes mellitus  Peripheral neuropathy  History of Guillain-[place] syndrome  History of CVA in adulthood  DDD (degenerative disc disease), lumbar  DVT, lower extremity  Renal stones  H/O Guillain-Edgecomb syndrome  History of neurogenic bowel  DM2 (diabetes mellitus, type 2)  HTN (hypertension)  BPH without obstruction/lower urinary tract symptoms  Degenerative arthritis  DVT of lower limb, acute  CVA (cerebrovascular accident)  CVD (cerebrovascular disease)  Muscle weakness  Iron deficiency anemia  History of muscle spasm  Pain, unspecified  Vitamin deficiency  Obesity  GERD (gastroesophageal reflux disease)  H/O constipation  H/O insomnia  Essential (primary) hypertension  Acute embolism and thrombosis of deep vein of lower extremity  BPH (benign prostatic hyperplasia)  Diabetes mellitus due to underlying condition with diabetic neuropathy  Major depressive disorder, single episode  S/P IVC filter  H/O lithotripsy  Chronic suprapubic catheter       HEALTH ISSUES - PROBLEM Dx:  Acute UTI  Suprapubic catheter dysfunction  ANTONIO (acute kidney injury)  Type 2 diabetes mellitus with hyperglycemia    Acute cystitis without hematuria [N30.00]  BPH (benign prostatic hyperplasia) [N40.0]  HTN (hypertension) [I10]  Type 2 diabetes mellitus [E11.9]  Peripheral neuropathy [G62.9]  History of Guillain-Edgecomb syndrome [Z86.69]  History of CVA in adulthood [Z86.73]  DDD (degenerative disc disease), lumbar [M51.36]  DVT, lower extremity [I82.409]  Renal stones [N20.0]  H/O Guillain-Edgecomb syndrome [Z86.69]  History of neurogenic bowel [Z87.19]  DM2 (diabetes mellitus, type 2) [E11.9]  HTN (hypertension) [I10]  BPH without obstruction/lower urinary tract symptoms [N40.0]  Degenerative arthritis [M19.90]  DVT of lower limb, acute [I82.409]  CVA (cerebrovascular accident) [I63.9]  CVD (cerebrovascular disease) [I67.9]  Muscle weakness [M62.81]  Iron deficiency anemia [D50.9]  History of muscle spasm [Z87.39]  Pain, unspecified [R52]  Vitamin D deficiency [E55.9]  Vitamin deficiency [E56.9]  Obesity [E66.9]  GERD (gastroesophageal reflux disease) [K21.9]  H/O constipation [Z87.19]  H/O insomnia [Z87.898]  Essential (primary) hypertension [I10]  Acute embolism and thrombosis of deep vein of lower extremity [I82.409]  BPH (benign prostatic hyperplasia) [N40.0]  Diabetes mellitus due to underlying condition with diabetic neuropathy [E08.40]  Major depressive disorder, single episode [F32.9]  S/P IVC filter [Z95.828]  H/O lithotripsy [Z98.890]  Chronic suprapubic catheter [Z93.59]  Bladder pain [R39.89]  ANTONIO (acute kidney injury) [N17.9]      FAMILY HISTORY:  FH: heart disease    FH: HTN (hypertension)    Family history of sinus tachycardia (Father)    FH: HTN (hypertension) (Mother)        [SOCIAL HISTORY: ]     smoking:  none currently     EtOH:  none currently     illicit drugs:  none currently     occupation:  Retired     marital status:       Other:       [ALLERGIES/INTOLERANCES:]  Allergies    sulfa drugs (Other)  sulfa drugs (Rash)  sulfa drugs (Hives)  sulfa drugs (Unknown)    Intolerances    Pipercillin Sodium-Tazobactam Sodium (Pruritus)      [MEDICATIONS]  MEDICATIONS  (STANDING):  amLODIPine   Tablet 10 milliGRAM(s) Oral daily  ascorbic acid 500 milliGRAM(s) Oral daily  cefTRIAXone   IVPB 1000 milliGRAM(s) IV Intermittent every 24 hours  cloNIDine 0.1 milliGRAM(s) Oral two times a day  dextrose 5%. 1000 milliLiter(s) (100 mL/Hr) IV Continuous <Continuous>  dextrose 5%. 1000 milliLiter(s) (50 mL/Hr) IV Continuous <Continuous>  dextrose 50% Injectable 25 Gram(s) IV Push once  dextrose 50% Injectable 12.5 Gram(s) IV Push once  dextrose 50% Injectable 25 Gram(s) IV Push once  diazepam    Tablet 2.5 milliGRAM(s) Oral every 8 hours  DULoxetine 60 milliGRAM(s) Oral daily  ferrous    sulfate 325 milliGRAM(s) Oral daily  finasteride 5 milliGRAM(s) Oral daily  gabapentin 600 milliGRAM(s) Oral three times a day  glucagon  Injectable 1 milliGRAM(s) IntraMuscular once  heparin   Injectable 31633 Unit(s) IV Push once  heparin  Infusion.  Unit(s)/Hr (23 mL/Hr) IV Continuous <Continuous>  insulin glargine Injectable (LANTUS) 7 Unit(s) SubCutaneous once  insulin glargine Injectable (LANTUS) 12 Unit(s) SubCutaneous at bedtime  insulin lispro (ADMELOG) corrective regimen sliding scale   SubCutaneous Before meals and at bedtime  lactated ringers. 1000 milliLiter(s) (70 mL/Hr) IV Continuous <Continuous>  methocarbamol 750 milliGRAM(s) Oral three times a day  metoprolol tartrate 25 milliGRAM(s) Oral two times a day  pantoprazole    Tablet 40 milliGRAM(s) Oral before breakfast  polyethylene glycol 3350 17 Gram(s) Oral daily  traZODone 50 milliGRAM(s) Oral at bedtime  warfarin 5 milliGRAM(s) Oral once    MEDICATIONS  (PRN):  acetaminophen     Tablet .. 650 milliGRAM(s) Oral every 6 hours PRN Temp greater or equal to 38C (100.4F), Mild Pain (1 - 3)  dextrose Oral Gel 15 Gram(s) Oral once PRN Blood Glucose LESS THAN 70 milliGRAM(s)/deciliter  heparin   Injectable 05463 Unit(s) IV Push every 6 hours PRN For aPTT less than 40  heparin   Injectable 5000 Unit(s) IV Push every 6 hours PRN For aPTT between 40 - 57  HYDROmorphone   Tablet 4 milliGRAM(s) Oral every 6 hours PRN Moderate Pain (4 - 6)      [REVIEW OF SYSTEMS: ]  CONSTITUTIONAL: normal, no fever, no shakes, no chills   EYES: No eye pain, no visual disturbances, no discharge  ENMT:  no discharge  NECK: No pain, no stiffness  BREASTS: No pain, no masses, no nipple discharge  RESPIRATORY: No cough, no wheezing, no chills, no hemoptysis; No shortness of breath  CARDIOVASCULAR: No chest pain, no palpitations, no dizziness, no leg swelling  GASTROINTESTINAL: No abdominal, no epigastric pain. No nausea, no vomiting, no hematemesis; No diarrhea , no constipation. No melena, no hematochezia.  GENITOURINARY: No dysuria, no frequency, no hematuria, no incontinence  NEUROLOGICAL: No headaches, no memory loss, no loss of strength, no numbness, no tremors  SKIN: No itching, no burning, no rashes, no lesions   LYMPH NODES: No enlarged glands  ENDOCRINE: No heat or cold intolerance; No hair loss  MUSCULOSKELETAL: No joint pain or swelling; No muscle, no back, no extremity pain  PSYCHIATRIC: No depression, no anxiety, no mood swings, no difficulty sleeping  HEME/LYMPH: No easy bruising, no bleeding gums    [VITALS SIGNS 24hrs]  Vital Signs Last 24 Hrs  T(C): 36.6 (23 Oct 2024 08:15), Max: 36.7 (22 Oct 2024 13:01)  T(F): 97.9 (23 Oct 2024 08:15), Max: 98 (22 Oct 2024 13:01)  HR: 54 (23 Oct 2024 05:20) (54 - 66)  BP: 119/77 (23 Oct 2024 05:20) (119/77 - 159/97)  BP(mean): 106 (22 Oct 2024 20:56) (106 - 113)  RR: 18 (23 Oct 2024 05:20) (13 - 20)  SpO2: 95% (23 Oct 2024 05:20) (95% - 97%)    Parameters below as of 23 Oct 2024 05:20  Patient On (Oxygen Delivery Method): room air      Daily Height in cm: 182.88 (22 Oct 2024 10:22)    Daily Weight in k (23 Oct 2024 05:20)    I&O's Summary    22 Oct 2024 07:01  -  23 Oct 2024 07:00  --------------------------------------------------------  IN: 1000 mL / OUT: 3550 mL / NET: -2550 mL        [PHYSICAL EXAM]  GEN:   HEENT: normocephalic and atraumatic. EOMI. PERRL.    NECK: Supple.  No lymphadenopathy   LUNGS: Clear to auscultation.  HEART: S1S2 Regular rate and rhythm, no MRG  ABDOMEN: Soft, nontender, and nondistended.  Positive bowel sounds.    : No CVA tenderness  EXTREMITIES: Without edema.  NEUROLOGIC: grossly intact.  PSYCHIATRIC: Appropriate affect .  SKIN: No rash     [LABS: ]                        10.1   11.23 )-----------( 263      ( 21 Oct 2024 22:15 )             30.6     CBC Full  -  ( 21 Oct 2024 22:15 )  WBC Count : 11.23 K/uL  RBC Count : 4.22 M/uL  Hemoglobin : 10.1 g/dL  Hematocrit : 30.6 %  Platelet Count - Automated : 263 K/uL  Mean Cell Volume : 72.5 fL  Mean Cell Hemoglobin : 23.9 pg  Mean Cell Hemoglobin Concentration : 33.0 g/dL  Auto Neutrophil # : 7.88 K/uL  Auto Lymphocyte # : 1.95 K/uL  Auto Monocyte # : 0.82 K/uL  Auto Eosinophil # : 0.46 K/uL  Auto Basophil # : 0.08 K/uL  Auto Neutrophil % : 70.1 %  Auto Lymphocyte % : 17.4 %  Auto Monocyte % : 7.3 %  Auto Eosinophil % : 4.1 %  Auto Basophil % : 0.7 %    10    134[L]  |  98  |  27[H]  ----------------------------<  215[H]  4.1   |  22  |  3.21[H]    Ca    8.7      21 Oct 2024 22:15    TPro  7.2  /  Alb  2.9[L]  /  TBili  0.2  /  DBili  x   /  AST  20  /  ALT  23  /  AlkPhos  101  10-21    PT/INR - ( 21 Oct 2024 22:15 )   PT: 13.5 sec;   INR: 1.17 ratio         PTT - ( 21 Oct 2024 22:15 )  PTT:18.1 sec  LIVER FUNCTIONS - ( 21 Oct 2024 22:15 )  Alb: 2.9 g/dL / Pro: 7.2 g/dL / ALK PHOS: 101 U/L / ALT: 23 U/L / AST: 20 U/L / GGT: x               Urinalysis Basic - ( 21 Oct 2024 22:15 )    Color: x / Appearance: x / SG: x / pH: x  Gluc: 215 mg/dL / Ketone: x  / Bili: x / Urobili: x   Blood: x / Protein: x / Nitrite: x   Leuk Esterase: x / RBC: x / WBC x   Sq Epi: x / Non Sq Epi: x / Bacteria: x          CBC TREND (5 Days)  WBC Count: 11.23 K/uL (10-21 @ 22:15)    Hemoglobin: 10.1 g/dL (10-21 @ 22:15)    Hematocrit: 30.6 % (10-21 @ 22:15)    Platelet Count - Automated: 263 K/uL (10-21 @ 22:15)      [MICROBIOLOGY /  VIROLOGY:]        Culture - Urine (collected 20 Oct 2024 13:00)  Source: Clean Catch Clean Catch (Midstream)  Final Report (22 Oct 2024 10:48):    >=3 organisms. Probable collection contamination.      [PATHOLOGY]       [RADIOLOGY & ADDITIONAL STUDIES:]         ACC: [id number] EXAM:  CT ABDOMEN AND PELVIS IC   ORDERED BY: DANN GRIJALVA   PROCEDURE DATE:  10/20/2024    INTERPRETATION:  CLINICAL INFORMATION: Abdominal pain.    COMPARISON: CT abdomen pelvis 2024.    CONTRAST/COMPLICATIONS:  IV Contrast: Omnipaque 350  95 cc administered   5 cc discarded  Oral Contrast: NONE  Complications: None reported at time of study completion    PROCEDURE:  CT of the Abdomen and Pelvis was performed.  Sagittal and coronal reformats were performed.    FINDINGS:  LOWER CHEST: Gynecomastia. Trace dependent atelectasis.    LIVER: Within normal limits.  BILE DUCTS: Normal caliber.  GALLBLADDER: Distended with calcified and noncalcified stones and sludge.  SPLEEN: Within normal limits.  PANCREAS: Within normallimits.  ADRENALS: Stable nodular thickening of the left adrenal gland.  KIDNEYS/URETERS: Symmetric renal enhancement. No hydronephrosis. Bilateral stable cortical atrophy. Bilateral mild nonobstructive nephrolithiasis.  BLADDER: Suprapubic catheter. Collapsed/thickened with surrounding inflammation similar to prior, suspect cystitis.  REPRODUCTIVE ORGANS: Prostate within normal limits.  BOWEL: No bowel obstruction. Appendix is normal.  PERITONEUM/RETROPERITONEUM: Within normal limits.  VESSELS: IVC filter. Atherosclerotic changes.  LYMPH NODES: No lymphadenopathy.  ABDOMINAL WALL: Postsurgical changes. Small bilateral fat-containing inguinal hernias.  BONES: L4-L5 posterior fusion and surgical decompression laminectomy.   Degenerative changes. Stable benign-appearing lucent lesion with sclerotic rim in the left proximal femur.  IMPRESSION:  Suspect cystitis, potentially chronic as similar in appearance compared to previous. Correlate with urinalysis.  Otherwise, no acute findings within the abdomen or pelvis.  --- End of Report ---  JUAN RUEDA MD; Resident Radiologist  This document has been electronically signed.  ABUNDIO CHOWDHURY MD; Attending Radiologist  This document has been electronically signed. Oct 20 2024  8:26AM        ACC: [id number] EXAM:  US KIDNEYS AND BLADDER   ORDERED BY: SAMPSON JONES   PROCEDURE DATE:  10/22/2024    INTERPRETATION:  CLINICAL INFORMATION: Cystitis. Rule out hydronephrosis.  COMPARISON: 10/20/2024 CT  TECHNIQUE: Sonography of the kidneys and bladder.  FINDINGS:  Right kidney: 11.3 cm. No renal mass, hydronephrosis or calculi.  Left kidney: 11.1 cm. No renal mass, hydronephrosis or calculi. Echogenic parenchyma and cortical thinning.  Urinary bladder: Thickened bladder wall. Decompressed around a catheter.  IMPRESSION:  No hydronephrosis. Echogenic left kidney and cortical thinning suggestive of chronic medical renal disease.  --- End of Report ---  BIBI LEON MD; Attending Radiologist  This document has been electronically signed. Oct 22 2024  3:02AM

## 2024-10-23 NOTE — CONSULT NOTE ADULT - CONSULT REQUESTED DATE/TIME
22-Oct-2024
22-Oct-2024 09:04
22-Oct-2024 15:00
23-Oct-2024 08:56
23-Oct-2024 23:51
22-Oct-2024 10:33

## 2024-10-23 NOTE — CONSULT NOTE ADULT - SUBJECTIVE AND OBJECTIVE BOX
Physical Medicine and Rehabilitation Initial Evaluation    Patients acute care records reviewed and are summarized as follows:     Patient is a 49y Male who is admitted to acute care for acute cystitis, referred for pain in bladder acutely.  Istop reviewed Reference #: [id number], noted hx of hydromorphone 4mg tablets four times daily. Patient complains of pain despite use of home regimen dosing of dilaudid.  Medical studies/laboratory studies reviewed, including:    10-23-24 @ 09:24    137  |  101  |  32[H]             --------------------------< 239[H]     4.1  |  27  | 2.82[H]    Renal dysfunction is noted.     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  DDD (degenerative disc disease), lumbar  DVT, lower extremity  Renal stones  H/O Guillain-Waverly syndrome  History of neurogenic bowel  DM2 (diabetes mellitus, type 2)  HTN (hypertension)  BPH without obstruction/lower urinary tract symptoms  Degenerative arthritis  DVT of lower limb, acute  CVA (cerebrovascular accident)  CVD (cerebrovascular disease)  Muscle weakness  Iron deficiency anemia  History of muscle spasm  ain, unspecified  Vitamin deficiency  Obesity  GERD (gastroesophageal reflux disease)  H/O constipation  H/O insomnia  Essential (primary) hypertension  Acute embolism and thrombosis of deep vein of lower extremity  BPH (benign prostatic hyperplasia)  Diabetes mellitus due to underlying condition with diabetic neuropathy  Major depressive disorder, single episode  S/P IVC filter  H/O lithotripsy  Chronic suprapubic catheter    Medications:   acetaminophen     Tablet .. 650 milliGRAM(s) Oral every 6 hours PRN  amLODIPine   Tablet 10 milliGRAM(s) Oral daily  ascorbic acid 500 milliGRAM(s) Oral daily  cefTRIAXone   IVPB 1000 milliGRAM(s) IV Intermittent every 24 hours  cloNIDine 0.1 milliGRAM(s) Oral two times a day  dextrose 5%. 1000 milliLiter(s) IV Continuous <Continuous>  dextrose 5%. 1000 milliLiter(s) IV Continuous <Continuous>  dextrose 50% Injectable 25 Gram(s) IV Push once  dextrose 50% Injectable 12.5 Gram(s) IV Push once  dextrose 50% Injectable 25 Gram(s) IV Push once  dextrose Oral Gel 15 Gram(s) Oral once PRN  diazepam    Tablet 2.5 milliGRAM(s) Oral every 8 hours  DULoxetine 60 milliGRAM(s) Oral daily  ferrous    sulfate 325 milliGRAM(s) Oral daily  finasteride 5 milliGRAM(s) Oral daily  gabapentin 600 milliGRAM(s) Oral three times a day  glucagon  Injectable 1 milliGRAM(s) IntraMuscular once  heparin   Injectable 83399 Unit(s) IV Push every 6 hours PRN  heparin   Injectable 5000 Unit(s) IV Push every 6 hours PRN  heparin  Infusion.  Unit(s)/Hr IV Continuous <Continuous>  HYDROmorphone   Tablet 4 milliGRAM(s) Oral every 6 hours PRN  insulin glargine Injectable (LANTUS) 12 Unit(s) SubCutaneous at bedtime  insulin lispro (ADMELOG) corrective regimen sliding scale   SubCutaneous Before meals and at bedtime  lactated ringers. 1000 milliLiter(s) IV Continuous <Continuous>  methocarbamol 750 milliGRAM(s) Oral three times a day  metoprolol tartrate 25 milliGRAM(s) Oral two times a day  pantoprazole    Tablet 40 milliGRAM(s) Oral before breakfast  polyethylene glycol 3350 17 Gram(s) Oral daily  traZODone 50 milliGRAM(s) Oral at bedtime      Physical Exam:   Vitals: T(C): 36.6 (10-23-24 @ 21:09), Max: 36.7 (10-22-24 @ 23:52)  HR: 61 (10-23-24 @ 21:09) (54 - 61)  BP: 148/84 (10-23-24 @ 21:09) (119/77 - 148/84)  RR: 20 (10-23-24 @ 21:09) (18 - 20)  SpO2: 96% (10-23-24 @ 21:09) (95% - 96%)    Constitutional: Gen: In no acute distress, cooperative with exam and questioning   GI/ suprapubic region pain on palpation

## 2024-10-23 NOTE — PROGRESS NOTE ADULT - ASSESSMENT
49M HTN, DM2, BPH, Neurogenic Bladder with Suprapubic Catheter admitted for Acute Cystitis     Acute Cystitis / Bladder Spasm / Neurogenic Bladder  Related to Suprapubic Catheter and could be infections vs inflammatory as per CT Imaging  Urine Culture reviewed as well as documentation from Infectious Disease  Continue IV Ceftriaxone for now   Valium for Bladder Spasms   Urology Consultation obtained     History of Lupus Anticoagulant / History of DVT  Diagnosed approximately 5 years ago and has been on Coumadin since; Has IVC Filter  INR currently subtherapeutic and due to body habitus is high risk for DVT  Will initiate Heparin Infusion and while bridging with Coumadin to bring to therapeutic   Hematology Consultation     Acute Renal Failure on CKD 3   Baseline Creatinine around 2.1  Will place on IV Fluids   Monitor BMP and Electrolytes  Nephrology Consulted    Generalized Weakness / Deconditioning  History of Stroke, Guilan [place] and Disc Disease  Uses Walker   PT and OT Consultation    HTN  Controlled   Continue Clonidine + Amlodipine + Metoprolol    DM2  At Home uses Lantus 20U + Insulin Premeal 4U  Will place on Lantus 12U + ISS and titrate accordingly   Last A1C = 8.0 (10/22)    GERD  Protonix    Anxiety / Depression  Cymbalta + Trazadone    Diet  Regular    DVT Prophylaxis  Coumadin    Disposition   Explained to patient that we can try for transfer to [place] but given overall condition and diagnosis transfer will be difficult  Most likely back to Nursing Home pending hospital Course

## 2024-10-24 LAB
ALBUMIN SERPL ELPH-MCNC: 2.6 G/DL — LOW (ref 3.3–5)
ALP SERPL-CCNC: 91 U/L — SIGNIFICANT CHANGE UP (ref 30–120)
ALT FLD-CCNC: 19 U/L — SIGNIFICANT CHANGE UP (ref 10–60)
ANION GAP SERPL CALC-SCNC: 10 MMOL/L — SIGNIFICANT CHANGE UP (ref 5–17)
APTT BLD: 122.4 SEC — CRITICAL HIGH (ref 24.5–35.6)
APTT BLD: 172.2 SEC — CRITICAL HIGH (ref 24.5–35.6)
APTT BLD: 35.1 SEC — SIGNIFICANT CHANGE UP (ref 24.5–35.6)
APTT BLD: 72.7 SEC — HIGH (ref 24.5–35.6)
AST SERPL-CCNC: 14 U/L — SIGNIFICANT CHANGE UP (ref 10–40)
B2 GLYCOPROT1 IGA SER QL: <2 U/ML — SIGNIFICANT CHANGE UP
B2 GLYCOPROT1 IGG SER-ACNC: <1.4 U/ML — SIGNIFICANT CHANGE UP
B2 GLYCOPROT1 IGM SER-ACNC: 5.2 U/ML — SIGNIFICANT CHANGE UP
BILIRUB SERPL-MCNC: 0.2 MG/DL — SIGNIFICANT CHANGE UP (ref 0.2–1.2)
BUN SERPL-MCNC: 33 MG/DL — HIGH (ref 7–23)
CALCIUM SERPL-MCNC: 8.7 MG/DL — SIGNIFICANT CHANGE UP (ref 8.4–10.5)
CARDIOLIPIN IGM SER-MCNC: 10.5 MPL U/ML — SIGNIFICANT CHANGE UP
CARDIOLIPIN IGM SER-MCNC: <1.6 GPL U/ML — SIGNIFICANT CHANGE UP
CHLORIDE SERPL-SCNC: 98 MMOL/L — SIGNIFICANT CHANGE UP (ref 96–108)
CO2 SERPL-SCNC: 24 MMOL/L — SIGNIFICANT CHANGE UP (ref 22–31)
CREAT SERPL-MCNC: 2.6 MG/DL — HIGH (ref 0.5–1.3)
CRP SERPL-MCNC: 14 MG/L — HIGH
DEPRECATED CARDIOLIPIN IGA SER: <2 APL U/ML — SIGNIFICANT CHANGE UP
DRVVT RATIO: 1.15 RATIO — SIGNIFICANT CHANGE UP (ref 0–1.21)
DRVVT SCREEN TO CONFIRM RATIO: SIGNIFICANT CHANGE UP
EGFR: 30 ML/MIN/1.73M2 — LOW
ERYTHROCYTE [SEDIMENTATION RATE] IN BLOOD: 80 MM/HR — HIGH (ref 0–9)
FOLATE SERPL-MCNC: 12.9 NG/ML — SIGNIFICANT CHANGE UP
GLUCOSE BLDC GLUCOMTR-MCNC: 211 MG/DL — HIGH (ref 70–99)
GLUCOSE BLDC GLUCOMTR-MCNC: 224 MG/DL — HIGH (ref 70–99)
GLUCOSE BLDC GLUCOMTR-MCNC: 236 MG/DL — HIGH (ref 70–99)
GLUCOSE BLDC GLUCOMTR-MCNC: 292 MG/DL — HIGH (ref 70–99)
GLUCOSE SERPL-MCNC: 193 MG/DL — HIGH (ref 70–99)
HCT VFR BLD CALC: 29.6 % — LOW (ref 39–50)
HGB BLD-MCNC: 9.7 G/DL — LOW (ref 13–17)
INR BLD: 1.32 RATIO — HIGH (ref 0.85–1.16)
MAGNESIUM SERPL-MCNC: 1.4 MG/DL — LOW (ref 1.6–2.6)
MCHC RBC-ENTMCNC: 23.8 PG — LOW (ref 27–34)
MCHC RBC-ENTMCNC: 32.8 G/DL — SIGNIFICANT CHANGE UP (ref 32–36)
MCV RBC AUTO: 72.5 FL — LOW (ref 80–100)
NORMALIZED SCT PPP-RTO: 0.85 RATIO — SIGNIFICANT CHANGE UP (ref 0–1.16)
NORMALIZED SCT PPP-RTO: SIGNIFICANT CHANGE UP
NRBC # BLD: 0 /100 WBCS — SIGNIFICANT CHANGE UP (ref 0–0)
PHOSPHATE SERPL-MCNC: 4.4 MG/DL — SIGNIFICANT CHANGE UP (ref 2.5–4.5)
PLATELET # BLD AUTO: 209 K/UL — SIGNIFICANT CHANGE UP (ref 150–400)
POTASSIUM SERPL-MCNC: 3.6 MMOL/L — SIGNIFICANT CHANGE UP (ref 3.5–5.3)
POTASSIUM SERPL-SCNC: 3.6 MMOL/L — SIGNIFICANT CHANGE UP (ref 3.5–5.3)
PROT SERPL-MCNC: 6.8 G/DL — SIGNIFICANT CHANGE UP (ref 6–8.3)
PROTHROM AB SERPL-ACNC: 15.3 SEC — HIGH (ref 9.9–13.4)
RBC # BLD: 4.08 M/UL — LOW (ref 4.2–5.8)
RBC # BLD: 4.08 M/UL — LOW (ref 4.2–5.8)
RBC # FLD: 16.9 % — HIGH (ref 10.3–14.5)
RETICS #: 87.7 K/UL — SIGNIFICANT CHANGE UP (ref 25–125)
RETICS/RBC NFR: 2.2 % — SIGNIFICANT CHANGE UP (ref 0.5–2.5)
SODIUM SERPL-SCNC: 132 MMOL/L — LOW (ref 135–145)
VIT B12 SERPL-MCNC: 567 PG/ML — SIGNIFICANT CHANGE UP (ref 232–1245)
WBC # BLD: 9.49 K/UL — SIGNIFICANT CHANGE UP (ref 3.8–10.5)
WBC # FLD AUTO: 9.49 K/UL — SIGNIFICANT CHANGE UP (ref 3.8–10.5)

## 2024-10-24 PROCEDURE — 36000 PLACE NEEDLE IN VEIN: CPT

## 2024-10-24 PROCEDURE — 83020 HEMOGLOBIN ELECTROPHORESIS: CPT | Mod: 26

## 2024-10-24 PROCEDURE — 99232 SBSQ HOSP IP/OBS MODERATE 35: CPT

## 2024-10-24 PROCEDURE — 76937 US GUIDE VASCULAR ACCESS: CPT | Mod: 26

## 2024-10-24 RX ORDER — HYDROMORPHONE HCL/0.9% NACL/PF 6 MG/30 ML
1 PATIENT CONTROLLED ANALGESIA SYRINGE INTRAVENOUS EVERY 8 HOURS
Refills: 0 | Status: DISCONTINUED | OUTPATIENT
Start: 2024-10-24 | End: 2024-10-27

## 2024-10-24 RX ORDER — MAGNESIUM SULFATE IN 0.9% NACL 2 G/50 ML
1 INTRAVENOUS SOLUTION, PIGGYBACK (ML) INTRAVENOUS ONCE
Refills: 0 | Status: COMPLETED | OUTPATIENT
Start: 2024-10-24 | End: 2024-10-24

## 2024-10-24 RX ORDER — WARFARIN SODIUM 4 MG
5 TABLET ORAL ONCE
Refills: 0 | Status: COMPLETED | OUTPATIENT
Start: 2024-10-24 | End: 2024-10-24

## 2024-10-24 RX ADMIN — Medication 1 MILLIGRAM(S): at 01:30

## 2024-10-24 RX ADMIN — HEPARIN SODIUM 1700 UNIT(S)/HR: 10000 INJECTION INTRAVENOUS; SUBCUTANEOUS at 19:18

## 2024-10-24 RX ADMIN — HEPARIN SODIUM 2400 UNIT(S)/HR: 10000 INJECTION INTRAVENOUS; SUBCUTANEOUS at 07:27

## 2024-10-24 RX ADMIN — Medication 5 MILLIGRAM(S): at 22:05

## 2024-10-24 RX ADMIN — Medication 4 MILLIGRAM(S): at 05:43

## 2024-10-24 RX ADMIN — DIAZEPAM 2.5 MILLIGRAM(S): 10 FILM BUCCAL at 05:39

## 2024-10-24 RX ADMIN — Medication 25 MILLIGRAM(S): at 05:40

## 2024-10-24 RX ADMIN — Medication 325 MILLIGRAM(S): at 12:41

## 2024-10-24 RX ADMIN — Medication 2: at 18:16

## 2024-10-24 RX ADMIN — Medication 500 MILLIGRAM(S): at 12:41

## 2024-10-24 RX ADMIN — HEPARIN SODIUM 0 UNIT(S)/HR: 10000 INJECTION INTRAVENOUS; SUBCUTANEOUS at 07:47

## 2024-10-24 RX ADMIN — Medication 4 MILLIGRAM(S): at 00:58

## 2024-10-24 RX ADMIN — Medication 10 MILLIGRAM(S): at 05:41

## 2024-10-24 RX ADMIN — METHOCARBAMOL 750 MILLIGRAM(S): 500 TABLET ORAL at 05:40

## 2024-10-24 RX ADMIN — HEPARIN SODIUM 10000 UNIT(S): 10000 INJECTION INTRAVENOUS; SUBCUTANEOUS at 01:26

## 2024-10-24 RX ADMIN — PANTOPRAZOLE SODIUM 40 MILLIGRAM(S): 40 TABLET, DELAYED RELEASE ORAL at 05:40

## 2024-10-24 RX ADMIN — Medication 2: at 08:20

## 2024-10-24 RX ADMIN — DULOXETINE HYDROCHLORIDE 60 MILLIGRAM(S): 30 CAPSULE, DELAYED RELEASE ORAL at 12:41

## 2024-10-24 RX ADMIN — TRAZODONE HYDROCHLORIDE 50 MILLIGRAM(S): 100 TABLET ORAL at 21:03

## 2024-10-24 RX ADMIN — Medication 1 MILLIGRAM(S): at 08:55

## 2024-10-24 RX ADMIN — Medication 1 MILLIGRAM(S): at 00:47

## 2024-10-24 RX ADMIN — Medication 12 UNIT(S): at 22:05

## 2024-10-24 RX ADMIN — Medication 1 MILLIGRAM(S): at 09:25

## 2024-10-24 RX ADMIN — HEPARIN SODIUM 2400 UNIT(S)/HR: 10000 INJECTION INTRAVENOUS; SUBCUTANEOUS at 01:23

## 2024-10-24 RX ADMIN — FINASTERIDE 5 MILLIGRAM(S): 5 TABLET, FILM COATED ORAL at 12:41

## 2024-10-24 RX ADMIN — HEPARIN SODIUM 2000 UNIT(S)/HR: 10000 INJECTION INTRAVENOUS; SUBCUTANEOUS at 08:58

## 2024-10-24 RX ADMIN — Medication 1 MILLIGRAM(S): at 23:17

## 2024-10-24 RX ADMIN — GABAPENTIN 600 MILLIGRAM(S): 300 CAPSULE ORAL at 21:03

## 2024-10-24 RX ADMIN — METHOCARBAMOL 750 MILLIGRAM(S): 500 TABLET ORAL at 21:04

## 2024-10-24 RX ADMIN — Medication 2: at 22:05

## 2024-10-24 RX ADMIN — Medication 100 GRAM(S): at 08:15

## 2024-10-24 RX ADMIN — IRON SUCROSE 210 MILLIGRAM(S): 20 INJECTION, SOLUTION INTRAVENOUS at 14:07

## 2024-10-24 RX ADMIN — HEPARIN SODIUM 1700 UNIT(S)/HR: 10000 INJECTION INTRAVENOUS; SUBCUTANEOUS at 15:13

## 2024-10-24 RX ADMIN — CLONIDINE HYDROCHLORIDE 0.1 MILLIGRAM(S): 0.2 TABLET ORAL at 05:41

## 2024-10-24 RX ADMIN — GABAPENTIN 600 MILLIGRAM(S): 300 CAPSULE ORAL at 05:41

## 2024-10-24 RX ADMIN — DIAZEPAM 2.5 MILLIGRAM(S): 10 FILM BUCCAL at 21:03

## 2024-10-24 RX ADMIN — Medication 4 MILLIGRAM(S): at 06:45

## 2024-10-24 RX ADMIN — Medication 100 MILLIGRAM(S): at 21:03

## 2024-10-24 RX ADMIN — Medication 1 MILLIGRAM(S): at 22:47

## 2024-10-24 RX ADMIN — HEPARIN SODIUM 2000 UNIT(S)/HR: 10000 INJECTION INTRAVENOUS; SUBCUTANEOUS at 14:12

## 2024-10-24 RX ADMIN — Medication 3: at 12:33

## 2024-10-24 RX ADMIN — HEPARIN SODIUM 1700 UNIT(S)/HR: 10000 INJECTION INTRAVENOUS; SUBCUTANEOUS at 22:09

## 2024-10-24 NOTE — PROGRESS NOTE ADULT - ASSESSMENT
49-year-old male history of BPH CVA chronic suprapubic catheter IVC filter complaining about bladder pain spasms seen at [facility] yesterday had a CT that showed cystitis started on antibiotics but back at [place] for continuous pain.  Denies any fever but admits to some chills.  Denies any flank pain.    ACUTE RENAL FAILURE: start iv fluid No surgicals intervention anticipated ,  Continue SP Cystostomy and monitor U/O  ,  ANTONIO, atrophic kidneys  without  hydronephrosis - fluid and medial management by   Serum creatinine is  improving     , approximating GFR at   ml/min. lactated ringers. 1000 milliLiter(s) (70 mL/Hr) IV Continuous   There is no progression . No uremic symptoms  No evidence of anemia .  Fluid status stable.  Will continue to avoid nephrotoxic drugs.  Patient remains asymptomatic.   Continue current therapy.  hold  diuretic.  hold   ACE inhibitor.  hold   ARB.        BP monitoring,continue current antihypertensive meds, low salt diet,followup with PMD in 1-2 weeks  amLODIPine   Tablet 10 milliGRAM(s) Oral daily  cloNIDine 0.1 milliGRAM(s) Oral two times a day

## 2024-10-24 NOTE — ED PROVIDER NOTE - ASSOCIATED SYMPTOMS
Detail Level: Generalized Products Recommended: Zinc sunscreen General Sunscreen Counseling: I recommended a broad spectrum sunscreen with a SPF of 30 or higher.  I explained that SPF 30 sunscreens block approximately 97 percent of the sun's harmful rays.  Sunscreens should be applied at least 15 minutes prior to expected sun exposure and then every 2 hours after that as long as sun exposure continues. If swimming or exercising sunscreen should be reapplied every 45 minutes to an hour after getting wet or sweating.  One ounce, or the equivalent of a shot glass full of sunscreen, is adequate to protect the skin not covered by a bathing suit. I also recommended a lip balm with a sunscreen as well. Sun protective clothing can be used in lieu of sunscreen but must be worn the entire time you are exposed to the sun's rays. Bladder spasms and dysuria

## 2024-10-24 NOTE — PROGRESS NOTE ADULT - ASSESSMENT
[ASSESSMENT and  PLAN]  N17.8     ANTONIO  N30.00   cystitis  I82.409   DVT  Z95.828  IVC filter  D68.61    APLS  63.8        anemia due to chronic disease  D50.0     Iron deficiency anemia  D63.1      Anemia due to CKD    Acute cystitis/bladder spasm/neurogenic bladder  Recent suprapubic catheter, with history of Neurogenic bladder, admitted with acute cystitis     DVT and hx +LAC  Hematology asked to evaluate for history of DVT and antiphospholipid antibody or lupus anticoagulant.  History of DVT  History of IVC filter  History of positive lupus anticoagulant and on Coumadin for anticoagulation since event.  Initially diagnosed at [facility], [place].  No further events since on anticoagulation.  Follow-up by hematology Dr. Devon Avery, [place] [facility] in [place], [place].  INR subtherapeutic secondary to off Coumadin    Restarted IV heparin.  Restart Coumadin if no plans for procedures.    Consideration for repeat duplex lower extremity.    Anemia Multifactorial  Anemia due to chronic disease  Anemia due to CKD  Anemia due to iron deficiency      RECOMMENDATIONS    UTI  Management per urology and medicine    DVT, possible APLS  Continue heparin drip as planned  Continue Coumadin.  If possible need for procedure then hold.  Patient currently residing at [facility] long-term.  Likely if as new events may be readmitted to this hospital.  Check lupus anticoagulant - negative  Check anticardiolipin antibody, [id number] 1 antibody, anti-phosphatidylserine antibodies  Check KAM    Anemia  Transfuse PRBC as clinically indicated.   Transfuse PRBC if Hgb <7.0 or if symptomatic.   has been started on IV venofer    to continue bridging to coumadin  no bleeding or thrombotic issues   role of anticoagulation and need to be addressed by his outside hematologist    Discussed with Dr Lanier  please call if additional heme evaluation required

## 2024-10-24 NOTE — PROGRESS NOTE ADULT - ASSESSMENT
49M HTN, DM2, BPH, Neurogenic Bladder with Suprapubic Catheter admitted for Acute Cystitis     Acute Cystitis / Bladder Spasm / Neurogenic Bladder  Related to Suprapubic Catheter and could be infections vs inflammatory as per CT Imaging  Urine Culture reviewed as well as documentation from Infectious Disease  Continue IV Ceftriaxone for now   Valium for Bladder Spasms   Urology Consultation obtained     History of Lupus Anticoagulant / History of DVT  Diagnosed approximately 5 years ago and has been on Coumadin since; Has IVC Filter  INR currently subtherapeutic and due to body habitus is high risk for DVT  Heparin infusion held due to elevated PTT; INR still not at goal  Hematology Consultation     Acute Renal Failure on CKD 3   Baseline Creatinine around 2.1  Will place on IV Fluids   Monitor BMP and Electrolytes  Nephrology Consulted    Generalized Weakness / Deconditioning  History of Stroke, Guilan [place] and Disc Disease  Uses Walker   PT and OT Consultation    HTN  Controlled   Continue Clonidine + Amlodipine + Metoprolol    DM2  At Home uses Lantus 20U + Insulin Premeal 4U  Will place on Lantus 12U + ISS and titrate accordingly   Last A1C = 8.0 (10/22)    GERD  Protonix    Anxiety / Depression  Cymbalta + Trazadone    Diet  Regular    DVT Prophylaxis  Coumadin    Disposition   Explained to patient that we can try for transfer to [place] but given overall condition and diagnosis transfer will be difficult  Most likely back to Nursing Home pending hospital Course

## 2024-10-24 NOTE — PROCEDURE NOTE - NSPROCDETAILS_GEN_ALL_CORE
Ultrasound used to assess vein and access/location identified, draped/prepped, sterile technique used/blood seen on insertion/dressing applied/flushes easily/secured in place/sterile technique, catheter placed

## 2024-10-24 NOTE — CASE MANAGEMENT PROGRESS NOTE - NSCMPROGRESSNOTE_GEN_ALL_CORE
Per treatment team rounds pt. is still acute and not ready for transition back to [facility] long term. Pt. on Heparin infusion, monitoring INR started Coumadin last night.  SW following for safe transition planning.  CM available if needed.

## 2024-10-25 LAB
ALBUMIN SERPL ELPH-MCNC: 2.6 G/DL — LOW (ref 3.3–5)
ALP SERPL-CCNC: 93 U/L — SIGNIFICANT CHANGE UP (ref 30–120)
ALT FLD-CCNC: 13 U/L — SIGNIFICANT CHANGE UP (ref 10–60)
ANA TITR SER: NEGATIVE — SIGNIFICANT CHANGE UP
ANION GAP SERPL CALC-SCNC: 11 MMOL/L — SIGNIFICANT CHANGE UP (ref 5–17)
APTT 50/50 2HOUR INCUB: 85.9 SEC — HIGH (ref 24.5–36.6)
APTT BLD: 162.8 SEC — CRITICAL HIGH (ref 24.5–35.6)
APTT BLD: 162.8 SEC — CRITICAL HIGH (ref 24.5–35.6)
APTT BLD: 73.5 SEC — HIGH (ref 24.5–35.6)
APTT BLD: 77.5 SEC — HIGH (ref 24.5–36.6)
AST SERPL-CCNC: 12 U/L — SIGNIFICANT CHANGE UP (ref 10–40)
BASOPHILS # BLD AUTO: 0.05 K/UL — SIGNIFICANT CHANGE UP (ref 0–0.2)
BASOPHILS NFR BLD AUTO: 0.6 % — SIGNIFICANT CHANGE UP (ref 0–2)
BILIRUB SERPL-MCNC: 0.2 MG/DL — SIGNIFICANT CHANGE UP (ref 0.2–1.2)
BUN SERPL-MCNC: 34 MG/DL — HIGH (ref 7–23)
CALCIUM SERPL-MCNC: 8.8 MG/DL — SIGNIFICANT CHANGE UP (ref 8.4–10.5)
CHLORIDE SERPL-SCNC: 97 MMOL/L — SIGNIFICANT CHANGE UP (ref 96–108)
CO2 SERPL-SCNC: 22 MMOL/L — SIGNIFICANT CHANGE UP (ref 22–31)
CONFIRM APTT STACLOT: NEGATIVE — SIGNIFICANT CHANGE UP
CREAT SERPL-MCNC: 2.7 MG/DL — HIGH (ref 0.5–1.3)
EGFR: 28 ML/MIN/1.73M2 — LOW
EOSINOPHIL # BLD AUTO: 0.33 K/UL — SIGNIFICANT CHANGE UP (ref 0–0.5)
EOSINOPHIL NFR BLD AUTO: 3.9 % — SIGNIFICANT CHANGE UP (ref 0–6)
GLUCOSE BLDC GLUCOMTR-MCNC: 225 MG/DL — HIGH (ref 70–99)
GLUCOSE BLDC GLUCOMTR-MCNC: 241 MG/DL — HIGH (ref 70–99)
GLUCOSE BLDC GLUCOMTR-MCNC: 248 MG/DL — HIGH (ref 70–99)
GLUCOSE BLDC GLUCOMTR-MCNC: 270 MG/DL — HIGH (ref 70–99)
GLUCOSE SERPL-MCNC: 275 MG/DL — HIGH (ref 70–99)
HCT VFR BLD CALC: 33.6 % — LOW (ref 39–50)
HEMOGLOBIN INTERPRETATION: SIGNIFICANT CHANGE UP
HGB A MFR BLD: 97.5 % — SIGNIFICANT CHANGE UP (ref 95.8–98)
HGB A2 MFR BLD: 2.5 % — SIGNIFICANT CHANGE UP (ref 2–3.2)
HGB BLD-MCNC: 10.5 G/DL — LOW (ref 13–17)
IMM GRANULOCYTES NFR BLD AUTO: 0.7 % — SIGNIFICANT CHANGE UP (ref 0–0.9)
INR BLD: 1.36 RATIO — HIGH (ref 0.85–1.16)
LYMPHOCYTES # BLD AUTO: 1.48 K/UL — SIGNIFICANT CHANGE UP (ref 1–3.3)
LYMPHOCYTES # BLD AUTO: 17.4 % — SIGNIFICANT CHANGE UP (ref 13–44)
MCHC RBC-ENTMCNC: 23.3 PG — LOW (ref 27–34)
MCHC RBC-ENTMCNC: 31.3 G/DL — LOW (ref 32–36)
MCV RBC AUTO: 74.7 FL — LOW (ref 80–100)
MONOCYTES # BLD AUTO: 0.48 K/UL — SIGNIFICANT CHANGE UP (ref 0–0.9)
MONOCYTES NFR BLD AUTO: 5.6 % — SIGNIFICANT CHANGE UP (ref 2–14)
NEUTROPHILS # BLD AUTO: 6.13 K/UL — SIGNIFICANT CHANGE UP (ref 1.8–7.4)
NEUTROPHILS NFR BLD AUTO: 71.8 % — SIGNIFICANT CHANGE UP (ref 43–77)
NRBC # BLD: 0 /100 WBCS — SIGNIFICANT CHANGE UP (ref 0–0)
PAT CTL 2H: 87.1 SEC — HIGH (ref 24.5–36.6)
PLATELET # BLD AUTO: 220 K/UL — SIGNIFICANT CHANGE UP (ref 150–400)
POTASSIUM SERPL-MCNC: 3.7 MMOL/L — SIGNIFICANT CHANGE UP (ref 3.5–5.3)
POTASSIUM SERPL-SCNC: 3.7 MMOL/L — SIGNIFICANT CHANGE UP (ref 3.5–5.3)
PROT SERPL-MCNC: 6.8 G/DL — SIGNIFICANT CHANGE UP (ref 6–8.3)
PROTHROM AB SERPL-ACNC: 15.7 SEC — HIGH (ref 9.9–13.4)
RBC # BLD: 4.5 M/UL — SIGNIFICANT CHANGE UP (ref 4.2–5.8)
RBC # FLD: 17.1 % — HIGH (ref 10.3–14.5)
SODIUM SERPL-SCNC: 130 MMOL/L — LOW (ref 135–145)
WBC # BLD: 8.53 K/UL — SIGNIFICANT CHANGE UP (ref 3.8–10.5)
WBC # FLD AUTO: 8.53 K/UL — SIGNIFICANT CHANGE UP (ref 3.8–10.5)

## 2024-10-25 PROCEDURE — 99233 SBSQ HOSP IP/OBS HIGH 50: CPT

## 2024-10-25 RX ORDER — ONDANSETRON HYDROCHLORIDE 2 MG/ML
4 INJECTION, SOLUTION INTRAMUSCULAR; INTRAVENOUS ONCE
Refills: 0 | Status: COMPLETED | OUTPATIENT
Start: 2024-10-25 | End: 2024-10-25

## 2024-10-25 RX ORDER — HYDROMORPHONE HCL/0.9% NACL/PF 6 MG/30 ML
0.5 PATIENT CONTROLLED ANALGESIA SYRINGE INTRAVENOUS ONCE
Refills: 0 | Status: DISCONTINUED | OUTPATIENT
Start: 2024-10-25 | End: 2024-10-25

## 2024-10-25 RX ORDER — WARFARIN SODIUM 4 MG
5 TABLET ORAL AT BEDTIME
Refills: 0 | Status: DISCONTINUED | OUTPATIENT
Start: 2024-10-25 | End: 2024-10-26

## 2024-10-25 RX ORDER — OXYBUTYNIN CHLORIDE 5 MG/1
10 TABLET ORAL DAILY
Refills: 0 | Status: DISCONTINUED | OUTPATIENT
Start: 2024-10-25 | End: 2024-10-31

## 2024-10-25 RX ORDER — INSULIN GLARGINE,HUM.REC.ANLOG 100/ML
16 VIAL (ML) SUBCUTANEOUS AT BEDTIME
Refills: 0 | Status: DISCONTINUED | OUTPATIENT
Start: 2024-10-25 | End: 2024-10-26

## 2024-10-25 RX ORDER — ONDANSETRON HYDROCHLORIDE 2 MG/ML
4 INJECTION, SOLUTION INTRAMUSCULAR; INTRAVENOUS EVERY 6 HOURS
Refills: 0 | Status: DISCONTINUED | OUTPATIENT
Start: 2024-10-25 | End: 2024-10-25

## 2024-10-25 RX ADMIN — HEPARIN SODIUM 1700 UNIT(S)/HR: 10000 INJECTION INTRAVENOUS; SUBCUTANEOUS at 05:23

## 2024-10-25 RX ADMIN — GABAPENTIN 600 MILLIGRAM(S): 300 CAPSULE ORAL at 23:23

## 2024-10-25 RX ADMIN — Medication 2: at 12:26

## 2024-10-25 RX ADMIN — METHOCARBAMOL 750 MILLIGRAM(S): 500 TABLET ORAL at 13:35

## 2024-10-25 RX ADMIN — Medication 4 MILLIGRAM(S): at 02:16

## 2024-10-25 RX ADMIN — HEPARIN SODIUM 1700 UNIT(S)/HR: 10000 INJECTION INTRAVENOUS; SUBCUTANEOUS at 19:28

## 2024-10-25 RX ADMIN — Medication 3: at 08:00

## 2024-10-25 RX ADMIN — HEPARIN SODIUM 1700 UNIT(S)/HR: 10000 INJECTION INTRAVENOUS; SUBCUTANEOUS at 07:14

## 2024-10-25 RX ADMIN — ONDANSETRON HYDROCHLORIDE 4 MILLIGRAM(S): 2 INJECTION, SOLUTION INTRAMUSCULAR; INTRAVENOUS at 13:34

## 2024-10-25 RX ADMIN — Medication 2: at 17:51

## 2024-10-25 RX ADMIN — Medication 1 MILLIGRAM(S): at 12:46

## 2024-10-25 RX ADMIN — Medication 1 MILLIGRAM(S): at 22:00

## 2024-10-25 RX ADMIN — GABAPENTIN 600 MILLIGRAM(S): 300 CAPSULE ORAL at 13:35

## 2024-10-25 RX ADMIN — HEPARIN SODIUM 1700 UNIT(S)/HR: 10000 INJECTION INTRAVENOUS; SUBCUTANEOUS at 19:32

## 2024-10-25 RX ADMIN — Medication 25 MILLIGRAM(S): at 05:30

## 2024-10-25 RX ADMIN — Medication 325 MILLIGRAM(S): at 12:25

## 2024-10-25 RX ADMIN — HEPARIN SODIUM 1700 UNIT(S)/HR: 10000 INJECTION INTRAVENOUS; SUBCUTANEOUS at 05:31

## 2024-10-25 RX ADMIN — Medication 100 MILLIGRAM(S): at 21:32

## 2024-10-25 RX ADMIN — Medication 0.5 MILLIGRAM(S): at 04:08

## 2024-10-25 RX ADMIN — DIAZEPAM 2.5 MILLIGRAM(S): 10 FILM BUCCAL at 21:32

## 2024-10-25 RX ADMIN — DIAZEPAM 2.5 MILLIGRAM(S): 10 FILM BUCCAL at 13:52

## 2024-10-25 RX ADMIN — TRAZODONE HYDROCHLORIDE 50 MILLIGRAM(S): 100 TABLET ORAL at 21:33

## 2024-10-25 RX ADMIN — DULOXETINE HYDROCHLORIDE 60 MILLIGRAM(S): 30 CAPSULE, DELAYED RELEASE ORAL at 12:25

## 2024-10-25 RX ADMIN — CLONIDINE HYDROCHLORIDE 0.1 MILLIGRAM(S): 0.2 TABLET ORAL at 05:29

## 2024-10-25 RX ADMIN — PANTOPRAZOLE SODIUM 40 MILLIGRAM(S): 40 TABLET, DELAYED RELEASE ORAL at 06:11

## 2024-10-25 RX ADMIN — Medication 16 UNIT(S): at 21:31

## 2024-10-25 RX ADMIN — Medication 4 MILLIGRAM(S): at 18:38

## 2024-10-25 RX ADMIN — METHOCARBAMOL 750 MILLIGRAM(S): 500 TABLET ORAL at 21:31

## 2024-10-25 RX ADMIN — Medication 4 MILLIGRAM(S): at 09:45

## 2024-10-25 RX ADMIN — DIAZEPAM 2.5 MILLIGRAM(S): 10 FILM BUCCAL at 05:29

## 2024-10-25 RX ADMIN — Medication 25 MILLIGRAM(S): at 17:51

## 2024-10-25 RX ADMIN — Medication 0.5 MILLIGRAM(S): at 03:38

## 2024-10-25 RX ADMIN — FINASTERIDE 5 MILLIGRAM(S): 5 TABLET, FILM COATED ORAL at 12:25

## 2024-10-25 RX ADMIN — Medication 4 MILLIGRAM(S): at 19:38

## 2024-10-25 RX ADMIN — GABAPENTIN 600 MILLIGRAM(S): 300 CAPSULE ORAL at 05:29

## 2024-10-25 RX ADMIN — Medication 1 MILLIGRAM(S): at 13:30

## 2024-10-25 RX ADMIN — METHOCARBAMOL 750 MILLIGRAM(S): 500 TABLET ORAL at 05:30

## 2024-10-25 RX ADMIN — Medication 10 MILLIGRAM(S): at 05:30

## 2024-10-25 RX ADMIN — OXYBUTYNIN CHLORIDE 10 MILLIGRAM(S): 5 TABLET ORAL at 17:51

## 2024-10-25 RX ADMIN — Medication 500 MILLIGRAM(S): at 12:25

## 2024-10-25 RX ADMIN — Medication 1 MILLIGRAM(S): at 21:47

## 2024-10-25 RX ADMIN — Medication 4 MILLIGRAM(S): at 01:17

## 2024-10-25 RX ADMIN — Medication 5 MILLIGRAM(S): at 21:47

## 2024-10-25 RX ADMIN — CLONIDINE HYDROCHLORIDE 0.1 MILLIGRAM(S): 0.2 TABLET ORAL at 17:50

## 2024-10-25 RX ADMIN — Medication 4 MILLIGRAM(S): at 09:00

## 2024-10-25 RX ADMIN — Medication 2: at 21:31

## 2024-10-25 NOTE — PROGRESS NOTE ADULT - ASSESSMENT
49-year-old male history of BPH CVA chronic suprapubic catheter IVC filter complaining about bladder pain spasms seen at [facility] yesterday had a CT that showed cystitis started on antibiotics but back at [place] for continuous pain.  Denies any fever but admits to some chills.  Denies any flank pain.    ACUTE RENAL FAILURE: start iv fluid No surgicals intervention anticipated ,  Continue SP Cystostomy and monitor U/O  ,  ANTONIO, atrophic kidneys  without  hydronephrosis - fluid and medial management by   Serum creatinine is  improving     , approximating GFR at   ml/min. lactated ringers. 1000 milliLiter(s) (70 mL/Hr) IV Continuous   There is no progression . No uremic symptoms  No evidence of anemia .  Fluid status stable.  Will continue to avoid nephrotoxic drugs.  Patient remains asymptomatic.   Continue current therapy.  hold  diuretic.  hold   ACE inhibitor.  hold   ARB.        BP monitoring,continue current antihypertensive meds, low salt diet,followup with PMD in 1-2 weeks  amLODIPine   Tablet 10 milliGRAM(s) Oral daily  cloNIDine 0.1 milliGRAM(s) Oral two times a day

## 2024-10-25 NOTE — PROGRESS NOTE ADULT - ASSESSMENT
49M HTN, DM2, BPH, Neurogenic Bladder with Suprapubic Catheter admitted for Acute Cystitis     Acute Cystitis / Bladder Spasm / Neurogenic Bladder  Related to Suprapubic Catheter and could be infections vs inflammatory as per CT Imaging  Urine Culture reviewed as well as documentation from Infectious Disease  Continue IV Ceftriaxone for now   Valium for Bladder Spasms   Urology Consultation obtained     History of Lupus Anticoagulant / History of DVT  Diagnosed approximately 5 years ago and has been on Coumadin since; Has IVC Filter  Unclear for the full role of full dose anticoagulation but will continue for now and defer to Hematology  INR currently subtherapeutic and due to body habitus is high risk for DVT  Bridging with Heparin Infusion and Warfarin for Goal INR 2.0-3.0  Unwilling to take Lovenox Injections   Hematology Consultation     Acute Renal Failure on CKD 3   Baseline Creatinine around 2.1  Will place on IV Fluids   Monitor BMP and Electrolytes  Nephrology Consulted    Generalized Weakness / Deconditioning  History of Stroke, Guilan [place] and Disc Disease  Uses Walker   PT and OT Consultation    HTN  Controlled   Continue Clonidine + Amlodipine + Metoprolol    DM2  At Home uses Lantus 20U + Insulin Premeal 4U  Will place on Lantus 16U + ISS and titrate accordingly   Last A1C = 8.0 (10/22)    GERD  Protonix    Anxiety / Depression  Cymbalta + Trazadone    Diet  Regular    DVT Prophylaxis  Coumadin    Disposition   Explained to patient that we can try for transfer to [place] but given overall condition and diagnosis transfer will be difficult  Most likely back to Nursing Home pending hospital Course

## 2024-10-25 NOTE — SOCIAL WORK PROGRESS NOTE - NSSWPROGRESSNOTE_GEN_ALL_CORE
SW discussed patients case in IDR.  Pt is from [facility] and will return, however he is not medically stable at this time.  SW to follow.

## 2024-10-25 NOTE — CASE MANAGEMENT PROGRESS NOTE - NSCMPROGRESSNOTE_GEN_ALL_CORE
Update Communication Note: As per morning rounds, DX: acute cystitis. Patient from [facility].  Patient has a hx of DVT on Heparin gtt. Bun and Creatinine trending up  repeat labs, Bridge to Coumadin, monitoring INR.  Patient not ready for Discharge, will continue to follow.

## 2024-10-26 LAB
ANION GAP SERPL CALC-SCNC: 10 MMOL/L — SIGNIFICANT CHANGE UP (ref 5–17)
APTT BLD: 108.2 SEC — HIGH (ref 24.5–35.6)
APTT BLD: 75 SEC — HIGH (ref 24.5–35.6)
APTT BLD: 83.6 SEC — HIGH (ref 24.5–35.6)
BUN SERPL-MCNC: 35 MG/DL — HIGH (ref 7–23)
CALCIUM SERPL-MCNC: 8.9 MG/DL — SIGNIFICANT CHANGE UP (ref 8.4–10.5)
CHLORIDE SERPL-SCNC: 95 MMOL/L — LOW (ref 96–108)
CO2 SERPL-SCNC: 24 MMOL/L — SIGNIFICANT CHANGE UP (ref 22–31)
CREAT SERPL-MCNC: 2.72 MG/DL — HIGH (ref 0.5–1.3)
EGFR: 28 ML/MIN/1.73M2 — LOW
GLUCOSE BLDC GLUCOMTR-MCNC: 223 MG/DL — HIGH (ref 70–99)
GLUCOSE BLDC GLUCOMTR-MCNC: 229 MG/DL — HIGH (ref 70–99)
GLUCOSE BLDC GLUCOMTR-MCNC: 234 MG/DL — HIGH (ref 70–99)
GLUCOSE BLDC GLUCOMTR-MCNC: 252 MG/DL — HIGH (ref 70–99)
GLUCOSE SERPL-MCNC: 213 MG/DL — HIGH (ref 70–99)
HCT VFR BLD CALC: 32 % — LOW (ref 39–50)
HGB BLD-MCNC: 10.2 G/DL — LOW (ref 13–17)
INR BLD: 1.61 RATIO — HIGH (ref 0.85–1.16)
MCHC RBC-ENTMCNC: 23.8 PG — LOW (ref 27–34)
MCHC RBC-ENTMCNC: 31.9 G/DL — LOW (ref 32–36)
MCV RBC AUTO: 74.8 FL — LOW (ref 80–100)
NRBC # BLD: 0 /100 WBCS — SIGNIFICANT CHANGE UP (ref 0–0)
PLATELET # BLD AUTO: 203 K/UL — SIGNIFICANT CHANGE UP (ref 150–400)
POTASSIUM SERPL-MCNC: 3.9 MMOL/L — SIGNIFICANT CHANGE UP (ref 3.5–5.3)
POTASSIUM SERPL-SCNC: 3.9 MMOL/L — SIGNIFICANT CHANGE UP (ref 3.5–5.3)
PROTHROM AB SERPL-ACNC: 18.9 SEC — HIGH (ref 9.9–13.4)
RBC # BLD: 4.28 M/UL — SIGNIFICANT CHANGE UP (ref 4.2–5.8)
RBC # FLD: 17.1 % — HIGH (ref 10.3–14.5)
SODIUM SERPL-SCNC: 129 MMOL/L — LOW (ref 135–145)
WBC # BLD: 9.35 K/UL — SIGNIFICANT CHANGE UP (ref 3.8–10.5)
WBC # FLD AUTO: 9.35 K/UL — SIGNIFICANT CHANGE UP (ref 3.8–10.5)

## 2024-10-26 PROCEDURE — 99233 SBSQ HOSP IP/OBS HIGH 50: CPT

## 2024-10-26 RX ORDER — SODIUM CHLORIDE 9 MG/ML
1000 INJECTION, SOLUTION INTRAMUSCULAR; INTRAVENOUS; SUBCUTANEOUS
Refills: 0 | Status: DISCONTINUED | OUTPATIENT
Start: 2024-10-26 | End: 2024-10-30

## 2024-10-26 RX ORDER — MIRABEGRON 50 MG/1
25 TABLET, EXTENDED RELEASE ORAL DAILY
Refills: 0 | Status: DISCONTINUED | OUTPATIENT
Start: 2024-10-26 | End: 2024-10-31

## 2024-10-26 RX ORDER — WARFARIN SODIUM 4 MG
2.5 TABLET ORAL AT BEDTIME
Refills: 0 | Status: DISCONTINUED | OUTPATIENT
Start: 2024-10-26 | End: 2024-10-28

## 2024-10-26 RX ORDER — UREA 15 G
15 POWDER IN PACKET (EA) ORAL DAILY
Refills: 0 | Status: DISCONTINUED | OUTPATIENT
Start: 2024-10-26 | End: 2024-10-29

## 2024-10-26 RX ORDER — HYDROMORPHONE HCL/0.9% NACL/PF 6 MG/30 ML
1 PATIENT CONTROLLED ANALGESIA SYRINGE INTRAVENOUS ONCE
Refills: 0 | Status: DISCONTINUED | OUTPATIENT
Start: 2024-10-26 | End: 2024-10-26

## 2024-10-26 RX ORDER — INSULIN GLARGINE,HUM.REC.ANLOG 100/ML
20 VIAL (ML) SUBCUTANEOUS AT BEDTIME
Refills: 0 | Status: DISCONTINUED | OUTPATIENT
Start: 2024-10-26 | End: 2024-10-31

## 2024-10-26 RX ORDER — SODIUM CHLORIDE 9 MG/ML
1 INJECTION, SOLUTION INTRAMUSCULAR; INTRAVENOUS; SUBCUTANEOUS THREE TIMES A DAY
Refills: 0 | Status: COMPLETED | OUTPATIENT
Start: 2024-10-26 | End: 2024-10-28

## 2024-10-26 RX ADMIN — Medication 1 MILLIGRAM(S): at 02:30

## 2024-10-26 RX ADMIN — HEPARIN SODIUM 1400 UNIT(S)/HR: 10000 INJECTION INTRAVENOUS; SUBCUTANEOUS at 23:07

## 2024-10-26 RX ADMIN — HEPARIN SODIUM 1700 UNIT(S)/HR: 10000 INJECTION INTRAVENOUS; SUBCUTANEOUS at 07:22

## 2024-10-26 RX ADMIN — SODIUM CHLORIDE 1 GRAM(S): 9 INJECTION, SOLUTION INTRAMUSCULAR; INTRAVENOUS; SUBCUTANEOUS at 14:15

## 2024-10-26 RX ADMIN — CLONIDINE HYDROCHLORIDE 0.1 MILLIGRAM(S): 0.2 TABLET ORAL at 06:19

## 2024-10-26 RX ADMIN — DIAZEPAM 2.5 MILLIGRAM(S): 10 FILM BUCCAL at 13:20

## 2024-10-26 RX ADMIN — Medication 10 MILLIGRAM(S): at 05:19

## 2024-10-26 RX ADMIN — GABAPENTIN 600 MILLIGRAM(S): 300 CAPSULE ORAL at 21:15

## 2024-10-26 RX ADMIN — Medication 1 MILLIGRAM(S): at 09:15

## 2024-10-26 RX ADMIN — Medication 2: at 17:20

## 2024-10-26 RX ADMIN — Medication 1 MILLIGRAM(S): at 08:46

## 2024-10-26 RX ADMIN — HEPARIN SODIUM 1400 UNIT(S)/HR: 10000 INJECTION INTRAVENOUS; SUBCUTANEOUS at 16:42

## 2024-10-26 RX ADMIN — GABAPENTIN 600 MILLIGRAM(S): 300 CAPSULE ORAL at 05:18

## 2024-10-26 RX ADMIN — Medication 4 MILLIGRAM(S): at 01:26

## 2024-10-26 RX ADMIN — Medication 4 MILLIGRAM(S): at 00:26

## 2024-10-26 RX ADMIN — PANTOPRAZOLE SODIUM 40 MILLIGRAM(S): 40 TABLET, DELAYED RELEASE ORAL at 05:19

## 2024-10-26 RX ADMIN — Medication 20 UNIT(S): at 21:16

## 2024-10-26 RX ADMIN — DULOXETINE HYDROCHLORIDE 60 MILLIGRAM(S): 30 CAPSULE, DELAYED RELEASE ORAL at 11:38

## 2024-10-26 RX ADMIN — MIRABEGRON 25 MILLIGRAM(S): 50 TABLET, EXTENDED RELEASE ORAL at 11:37

## 2024-10-26 RX ADMIN — Medication 2: at 21:17

## 2024-10-26 RX ADMIN — Medication 4 MILLIGRAM(S): at 23:00

## 2024-10-26 RX ADMIN — TRAZODONE HYDROCHLORIDE 50 MILLIGRAM(S): 100 TABLET ORAL at 21:16

## 2024-10-26 RX ADMIN — Medication 100 MILLIGRAM(S): at 21:18

## 2024-10-26 RX ADMIN — Medication 4 MILLIGRAM(S): at 21:16

## 2024-10-26 RX ADMIN — HEPARIN SODIUM 1400 UNIT(S)/HR: 10000 INJECTION INTRAVENOUS; SUBCUTANEOUS at 19:53

## 2024-10-26 RX ADMIN — Medication 1 MILLIGRAM(S): at 02:03

## 2024-10-26 RX ADMIN — Medication 4 MILLIGRAM(S): at 07:00

## 2024-10-26 RX ADMIN — SODIUM CHLORIDE 1 GRAM(S): 9 INJECTION, SOLUTION INTRAMUSCULAR; INTRAVENOUS; SUBCUTANEOUS at 21:16

## 2024-10-26 RX ADMIN — Medication 500 MILLIGRAM(S): at 11:38

## 2024-10-26 RX ADMIN — Medication 3: at 13:30

## 2024-10-26 RX ADMIN — Medication 1 MILLIGRAM(S): at 22:50

## 2024-10-26 RX ADMIN — Medication 15 GRAM(S): at 14:15

## 2024-10-26 RX ADMIN — Medication 25 MILLIGRAM(S): at 17:19

## 2024-10-26 RX ADMIN — DIAZEPAM 2.5 MILLIGRAM(S): 10 FILM BUCCAL at 05:19

## 2024-10-26 RX ADMIN — Medication 4 MILLIGRAM(S): at 06:01

## 2024-10-26 RX ADMIN — SODIUM CHLORIDE 50 MILLILITER(S): 9 INJECTION, SOLUTION INTRAMUSCULAR; INTRAVENOUS; SUBCUTANEOUS at 13:21

## 2024-10-26 RX ADMIN — METHOCARBAMOL 750 MILLIGRAM(S): 500 TABLET ORAL at 13:21

## 2024-10-26 RX ADMIN — Medication 1 MILLIGRAM(S): at 23:30

## 2024-10-26 RX ADMIN — Medication 25 MILLIGRAM(S): at 05:19

## 2024-10-26 RX ADMIN — Medication 2: at 08:06

## 2024-10-26 RX ADMIN — CLONIDINE HYDROCHLORIDE 0.1 MILLIGRAM(S): 0.2 TABLET ORAL at 17:19

## 2024-10-26 RX ADMIN — Medication 2.5 MILLIGRAM(S): at 21:16

## 2024-10-26 RX ADMIN — FINASTERIDE 5 MILLIGRAM(S): 5 TABLET, FILM COATED ORAL at 11:38

## 2024-10-26 RX ADMIN — METHOCARBAMOL 750 MILLIGRAM(S): 500 TABLET ORAL at 21:16

## 2024-10-26 RX ADMIN — METHOCARBAMOL 750 MILLIGRAM(S): 500 TABLET ORAL at 05:18

## 2024-10-26 RX ADMIN — HEPARIN SODIUM 1400 UNIT(S)/HR: 10000 INJECTION INTRAVENOUS; SUBCUTANEOUS at 10:03

## 2024-10-26 RX ADMIN — GABAPENTIN 600 MILLIGRAM(S): 300 CAPSULE ORAL at 13:20

## 2024-10-26 RX ADMIN — Medication 325 MILLIGRAM(S): at 11:38

## 2024-10-26 RX ADMIN — DIAZEPAM 2.5 MILLIGRAM(S): 10 FILM BUCCAL at 21:15

## 2024-10-26 RX ADMIN — OXYBUTYNIN CHLORIDE 10 MILLIGRAM(S): 5 TABLET ORAL at 11:37

## 2024-10-26 NOTE — PROGRESS NOTE ADULT - ASSESSMENT
49M HTN, DM2, BPH, Neurogenic Bladder with Suprapubic Catheter admitted for Acute Cystitis     Acute Cystitis / Bladder Spasm / Neurogenic Bladder  Related to Suprapubic Catheter and could be infections vs inflammatory as per CT Imaging  Urine Culture reviewed as well as documentation from Infectious Disease  Continue IV Ceftriaxone for now   Valium and Myrbetriq for Bladder Spasms   Urology Consultation obtained     History of Lupus Anticoagulant / History of DVT  Diagnosed approximately 5 years ago and has been on Coumadin since; Has IVC Filter  Unclear for the full role of full dose anticoagulation but will continue for now and defer to Hematology  INR currently subtherapeutic and due to body habitus is high risk for DVT  Bridging with Heparin Infusion and Warfarin for Goal INR 2.0-3.0  Unwilling to take Lovenox Injections   Hematology Consultation     Acute Renal Failure on CKD 3   Baseline Creatinine around 2.1 - 2.3  Will place on IV Fluids   Monitor BMP and Electrolytes  Nephrology Consulted    Generalized Weakness / Deconditioning  History of Stroke, Guilan [place] and Disc Disease  Uses Walker   PT and OT Consultation    HTN  Controlled   Continue Clonidine + Amlodipine + Metoprolol    DM2  At Home uses Lantus 20U + Insulin Premeal 4U  Will place on Lantus 16U + ISS and titrate accordingly   Last A1C = 8.0 (10/22)    GERD  Protonix    Anxiety / Depression  Cymbalta + Trazadone    Diet  Regular    DVT Prophylaxis  Coumadin    Disposition   Explained to patient that we can try for transfer to [place] but given overall condition and diagnosis transfer will be difficult  Most likely back to Nursing Home pending hospital Course

## 2024-10-26 NOTE — PROGRESS NOTE ADULT - ASSESSMENT
49-year-old male history of BPH CVA chronic suprapubic catheter IVC filter complaining about bladder pain spasms seen at [facility] yesterday had a CT that showed cystitis started on antibiotics but back at [place] for continuous pain.  Denies any fever but admits to some chills.  Denies any flank pain.    ACUTE RENAL FAILURE: start iv fluid No surgicals intervention anticipated ,  Continue SP Cystostomy and monitor U/O  ,  ANTONIO, atrophic kidneys  without  hydronephrosis - fluid and medial management by   Serum creatinine is  improving     , approximating GFR at   ml/min. lactated ringers. 1000 milliLiter(s) (70 mL/Hr) IV Continuous   There is no progression . No uremic symptoms    hyponatremia  will check ua , urine osmolality , urine sodium , urine uric acid , serum sodium , serum osmolality , serum uric acid , f/u with hyponatremia work up , f/u with bmp , monitor i and o  start urea powder , ns 50 cc per hr       No evidence of anemia .  Fluid status stable.  Will continue to avoid nephrotoxic drugs.  Patient remains asymptomatic.   Continue current therapy.  hold  diuretic.  hold   ACE inhibitor.  hold   ARB.        BP monitoring,continue current antihypertensive meds, low salt diet,followup with PMD in 1-2 weeks  amLODIPine   Tablet 10 milliGRAM(s) Oral daily  cloNIDine 0.1 milliGRAM(s) Oral two times a day

## 2024-10-27 LAB
ANION GAP SERPL CALC-SCNC: 8 MMOL/L — SIGNIFICANT CHANGE UP (ref 5–17)
APTT BLD: 87.1 SEC — HIGH (ref 24.5–35.6)
BUN SERPL-MCNC: 52 MG/DL — HIGH (ref 7–23)
CALCIUM SERPL-MCNC: 8.9 MG/DL — SIGNIFICANT CHANGE UP (ref 8.4–10.5)
CHLORIDE SERPL-SCNC: 96 MMOL/L — SIGNIFICANT CHANGE UP (ref 96–108)
CO2 SERPL-SCNC: 25 MMOL/L — SIGNIFICANT CHANGE UP (ref 22–31)
CREAT SERPL-MCNC: 2.78 MG/DL — HIGH (ref 0.5–1.3)
CULTURE RESULTS: SIGNIFICANT CHANGE UP
CULTURE RESULTS: SIGNIFICANT CHANGE UP
EGFR: 27 ML/MIN/1.73M2 — LOW
GLUCOSE BLDC GLUCOMTR-MCNC: 199 MG/DL — HIGH (ref 70–99)
GLUCOSE BLDC GLUCOMTR-MCNC: 203 MG/DL — HIGH (ref 70–99)
GLUCOSE BLDC GLUCOMTR-MCNC: 237 MG/DL — HIGH (ref 70–99)
GLUCOSE BLDC GLUCOMTR-MCNC: 248 MG/DL — HIGH (ref 70–99)
GLUCOSE SERPL-MCNC: 214 MG/DL — HIGH (ref 70–99)
HCT VFR BLD CALC: 31 % — LOW (ref 39–50)
HGB BLD-MCNC: 9.8 G/DL — LOW (ref 13–17)
INR BLD: 1.97 RATIO — HIGH (ref 0.85–1.16)
MCHC RBC-ENTMCNC: 23.7 PG — LOW (ref 27–34)
MCHC RBC-ENTMCNC: 31.6 G/DL — LOW (ref 32–36)
MCV RBC AUTO: 74.9 FL — LOW (ref 80–100)
NRBC # BLD: 0 /100 WBCS — SIGNIFICANT CHANGE UP (ref 0–0)
PLATELET # BLD AUTO: 218 K/UL — SIGNIFICANT CHANGE UP (ref 150–400)
POTASSIUM SERPL-MCNC: 4 MMOL/L — SIGNIFICANT CHANGE UP (ref 3.5–5.3)
POTASSIUM SERPL-SCNC: 4 MMOL/L — SIGNIFICANT CHANGE UP (ref 3.5–5.3)
PROTHROM AB SERPL-ACNC: 22.7 SEC — HIGH (ref 9.9–13.4)
RBC # BLD: 4.14 M/UL — LOW (ref 4.2–5.8)
RBC # FLD: 17.1 % — HIGH (ref 10.3–14.5)
SODIUM SERPL-SCNC: 129 MMOL/L — LOW (ref 135–145)
SPECIMEN SOURCE: SIGNIFICANT CHANGE UP
SPECIMEN SOURCE: SIGNIFICANT CHANGE UP
WBC # BLD: 9.56 K/UL — SIGNIFICANT CHANGE UP (ref 3.8–10.5)
WBC # FLD AUTO: 9.56 K/UL — SIGNIFICANT CHANGE UP (ref 3.8–10.5)

## 2024-10-27 PROCEDURE — 99233 SBSQ HOSP IP/OBS HIGH 50: CPT

## 2024-10-27 RX ORDER — HYDROMORPHONE HCL/0.9% NACL/PF 6 MG/30 ML
1 PATIENT CONTROLLED ANALGESIA SYRINGE INTRAVENOUS ONCE
Refills: 0 | Status: DISCONTINUED | OUTPATIENT
Start: 2024-10-27 | End: 2024-10-27

## 2024-10-27 RX ORDER — HYDROMORPHONE HCL/0.9% NACL/PF 6 MG/30 ML
6 PATIENT CONTROLLED ANALGESIA SYRINGE INTRAVENOUS EVERY 4 HOURS
Refills: 0 | Status: DISCONTINUED | OUTPATIENT
Start: 2024-10-27 | End: 2024-10-31

## 2024-10-27 RX ORDER — HYDROMORPHONE HCL/0.9% NACL/PF 6 MG/30 ML
4 PATIENT CONTROLLED ANALGESIA SYRINGE INTRAVENOUS EVERY 4 HOURS
Refills: 0 | Status: DISCONTINUED | OUTPATIENT
Start: 2024-10-27 | End: 2024-10-31

## 2024-10-27 RX ORDER — DIAZEPAM 10 MG/1
5 FILM BUCCAL EVERY 8 HOURS
Refills: 0 | Status: DISCONTINUED | OUTPATIENT
Start: 2024-10-27 | End: 2024-10-31

## 2024-10-27 RX ORDER — ONDANSETRON HYDROCHLORIDE 2 MG/ML
4 INJECTION, SOLUTION INTRAMUSCULAR; INTRAVENOUS ONCE
Refills: 0 | Status: COMPLETED | OUTPATIENT
Start: 2024-10-27 | End: 2024-10-27

## 2024-10-27 RX ADMIN — Medication 4 MILLIGRAM(S): at 03:51

## 2024-10-27 RX ADMIN — GABAPENTIN 600 MILLIGRAM(S): 300 CAPSULE ORAL at 13:48

## 2024-10-27 RX ADMIN — Medication 1 MILLIGRAM(S): at 03:00

## 2024-10-27 RX ADMIN — Medication 25 MILLIGRAM(S): at 05:53

## 2024-10-27 RX ADMIN — FINASTERIDE 5 MILLIGRAM(S): 5 TABLET, FILM COATED ORAL at 12:22

## 2024-10-27 RX ADMIN — Medication 4 MILLIGRAM(S): at 04:30

## 2024-10-27 RX ADMIN — Medication 20 UNIT(S): at 21:18

## 2024-10-27 RX ADMIN — Medication 4 MILLIGRAM(S): at 12:25

## 2024-10-27 RX ADMIN — Medication 1 MILLIGRAM(S): at 17:21

## 2024-10-27 RX ADMIN — SODIUM CHLORIDE 1 GRAM(S): 9 INJECTION, SOLUTION INTRAMUSCULAR; INTRAVENOUS; SUBCUTANEOUS at 21:18

## 2024-10-27 RX ADMIN — CLONIDINE HYDROCHLORIDE 0.1 MILLIGRAM(S): 0.2 TABLET ORAL at 17:21

## 2024-10-27 RX ADMIN — ONDANSETRON HYDROCHLORIDE 4 MILLIGRAM(S): 2 INJECTION, SOLUTION INTRAMUSCULAR; INTRAVENOUS at 18:03

## 2024-10-27 RX ADMIN — Medication 1 MILLIGRAM(S): at 21:32

## 2024-10-27 RX ADMIN — METHOCARBAMOL 750 MILLIGRAM(S): 500 TABLET ORAL at 21:18

## 2024-10-27 RX ADMIN — PANTOPRAZOLE SODIUM 40 MILLIGRAM(S): 40 TABLET, DELAYED RELEASE ORAL at 05:53

## 2024-10-27 RX ADMIN — DIAZEPAM 2.5 MILLIGRAM(S): 10 FILM BUCCAL at 05:47

## 2024-10-27 RX ADMIN — MIRABEGRON 25 MILLIGRAM(S): 50 TABLET, EXTENDED RELEASE ORAL at 12:21

## 2024-10-27 RX ADMIN — HEPARIN SODIUM 1400 UNIT(S)/HR: 10000 INJECTION INTRAVENOUS; SUBCUTANEOUS at 07:27

## 2024-10-27 RX ADMIN — DIAZEPAM 5 MILLIGRAM(S): 10 FILM BUCCAL at 21:17

## 2024-10-27 RX ADMIN — POLYETHYLENE GLYCOL 3350 17 GRAM(S): 17 POWDER, FOR SOLUTION ORAL at 12:23

## 2024-10-27 RX ADMIN — METHOCARBAMOL 750 MILLIGRAM(S): 500 TABLET ORAL at 05:52

## 2024-10-27 RX ADMIN — GABAPENTIN 600 MILLIGRAM(S): 300 CAPSULE ORAL at 05:53

## 2024-10-27 RX ADMIN — DULOXETINE HYDROCHLORIDE 60 MILLIGRAM(S): 30 CAPSULE, DELAYED RELEASE ORAL at 12:22

## 2024-10-27 RX ADMIN — Medication 325 MILLIGRAM(S): at 12:22

## 2024-10-27 RX ADMIN — HEPARIN SODIUM 1400 UNIT(S)/HR: 10000 INJECTION INTRAVENOUS; SUBCUTANEOUS at 04:05

## 2024-10-27 RX ADMIN — Medication 1 MILLIGRAM(S): at 17:37

## 2024-10-27 RX ADMIN — Medication 2.5 MILLIGRAM(S): at 21:17

## 2024-10-27 RX ADMIN — Medication 4 MILLIGRAM(S): at 13:00

## 2024-10-27 RX ADMIN — Medication 2: at 16:36

## 2024-10-27 RX ADMIN — DIAZEPAM 2.5 MILLIGRAM(S): 10 FILM BUCCAL at 13:49

## 2024-10-27 RX ADMIN — Medication 2: at 12:21

## 2024-10-27 RX ADMIN — Medication 1 MILLIGRAM(S): at 07:17

## 2024-10-27 RX ADMIN — HEPARIN SODIUM 1400 UNIT(S)/HR: 10000 INJECTION INTRAVENOUS; SUBCUTANEOUS at 19:14

## 2024-10-27 RX ADMIN — OXYBUTYNIN CHLORIDE 10 MILLIGRAM(S): 5 TABLET ORAL at 12:23

## 2024-10-27 RX ADMIN — Medication 1: at 07:29

## 2024-10-27 RX ADMIN — Medication 6 MILLIGRAM(S): at 19:27

## 2024-10-27 RX ADMIN — GABAPENTIN 600 MILLIGRAM(S): 300 CAPSULE ORAL at 21:18

## 2024-10-27 RX ADMIN — Medication 1 MILLIGRAM(S): at 22:01

## 2024-10-27 RX ADMIN — CLONIDINE HYDROCHLORIDE 0.1 MILLIGRAM(S): 0.2 TABLET ORAL at 05:54

## 2024-10-27 RX ADMIN — Medication 1 MILLIGRAM(S): at 07:02

## 2024-10-27 RX ADMIN — Medication 10 MILLIGRAM(S): at 05:52

## 2024-10-27 RX ADMIN — HEPARIN SODIUM 1400 UNIT(S)/HR: 10000 INJECTION INTRAVENOUS; SUBCUTANEOUS at 20:36

## 2024-10-27 RX ADMIN — SODIUM CHLORIDE 1 GRAM(S): 9 INJECTION, SOLUTION INTRAMUSCULAR; INTRAVENOUS; SUBCUTANEOUS at 05:52

## 2024-10-27 RX ADMIN — ONDANSETRON HYDROCHLORIDE 4 MILLIGRAM(S): 2 INJECTION, SOLUTION INTRAMUSCULAR; INTRAVENOUS at 22:05

## 2024-10-27 RX ADMIN — Medication 15 GRAM(S): at 12:23

## 2024-10-27 RX ADMIN — TRAZODONE HYDROCHLORIDE 50 MILLIGRAM(S): 100 TABLET ORAL at 21:18

## 2024-10-27 RX ADMIN — SODIUM CHLORIDE 50 MILLILITER(S): 9 INJECTION, SOLUTION INTRAMUSCULAR; INTRAVENOUS; SUBCUTANEOUS at 07:29

## 2024-10-27 RX ADMIN — Medication 1 MILLIGRAM(S): at 02:35

## 2024-10-27 RX ADMIN — METHOCARBAMOL 750 MILLIGRAM(S): 500 TABLET ORAL at 13:48

## 2024-10-27 RX ADMIN — Medication 500 MILLIGRAM(S): at 12:22

## 2024-10-27 RX ADMIN — Medication 6 MILLIGRAM(S): at 20:27

## 2024-10-27 RX ADMIN — Medication 2: at 21:26

## 2024-10-27 RX ADMIN — SODIUM CHLORIDE 1 GRAM(S): 9 INJECTION, SOLUTION INTRAMUSCULAR; INTRAVENOUS; SUBCUTANEOUS at 13:48

## 2024-10-27 NOTE — PROGRESS NOTE ADULT - ASSESSMENT
49-year-old male history of BPH CVA chronic suprapubic catheter IVC filter complaining about bladder pain spasms seen at [facility] yesterday had a CT that showed cystitis started on antibiotics but back at [place] for continuous pain.  Denies any fever but admits to some chills.  Denies any flank pain.    ACUTE RENAL FAILURE: start iv fluid No surgicals intervention anticipated ,  Continue SP Cystostomy and monitor U/O  ,  ANTONIO, atrophic kidneys  without  hydronephrosis - fluid and medial management by   Serum creatinine is  improving     , approximating GFR at   ml/min. lactated ringers. 1000 milliLiter(s) (70 mL/Hr) IV Continuous   There is no progression . No uremic symptoms    hyponatremia  will check ua , urine osmolality , urine sodium , urine uric acid , serum sodium , serum osmolality , serum uric acid , f/u with hyponatremia work up , f/u with bmp , monitor i and o  start urea powder , ns 50 cc per hr       No evidence of anemia .  Fluid status stable.  Will continue to avoid nephrotoxic drugs.  Patient remains asymptomatic.   Continue current therapy.  hold  diuretic.  hold   ACE inhibitor.  hold   ARB.        BP monitoring,continue current antihypertensive meds, low salt diet,followup with PMD in 1-2 weeks  amLODIPine   Tablet 10 milliGRAM(s) Oral daily  cloNIDine 0.1 milliGRAM(s) Oral two times a day

## 2024-10-27 NOTE — PROGRESS NOTE ADULT - ASSESSMENT
49M HTN, DM2, BPH, Neurogenic Bladder with Suprapubic Catheter admitted for Acute Cystitis     Acute Cystitis / Bladder Spasm / Neurogenic Bladder  Related to Suprapubic Catheter and could be infections vs inflammatory as per CT Imaging  Urine Culture reviewed as well as documentation from Infectious Disease  Completed IV Ceftriaxone Course  Valium and Myrbetriq for Bladder Spasms   Urology Consultation obtained     History of Lupus Anticoagulant / History of DVT  Diagnosed approximately 5 years ago and has been on Coumadin since; Has IVC Filter  Unclear for the full role of full dose anticoagulation but will continue for now and defer to Hematology  INR currently subtherapeutic and due to body habitus is high risk for DVT  Bridging with Heparin Infusion and Warfarin for Goal INR 2.0-3.0  Unwilling to take Lovenox Injections   Hematology Consultation     Acute Renal Failure on CKD 3   Baseline Creatinine around 2.1 - 2.3  Will place on IV Fluids   Monitor BMP and Electrolytes  Nephrology Consulted    Generalized Weakness / Deconditioning  History of Stroke, Guilan [place] and Disc Disease  Uses Walker   PT and OT Consultation    HTN  Controlled   Continue Clonidine + Amlodipine + Metoprolol    DM2  At Home uses Lantus 20U + Insulin Premeal 4U  Will place on Lantus 16U + ISS and titrate accordingly   Last A1C = 8.0 (10/22)    GERD  Protonix    Anxiety / Depression  Cymbalta + Trazadone    Diet  Regular    DVT Prophylaxis  Coumadin    Disposition   Explained to patient that we can try for transfer to [place] but given overall condition and diagnosis transfer will be difficult  Discharge back to NH once INR Therapeutic x 24 hours

## 2024-10-28 LAB
ANION GAP SERPL CALC-SCNC: 8 MMOL/L — SIGNIFICANT CHANGE UP (ref 5–17)
APTT BLD: 92.4 SEC — HIGH (ref 24.5–35.6)
BUN SERPL-MCNC: 54 MG/DL — HIGH (ref 7–23)
CALCIUM SERPL-MCNC: 9 MG/DL — SIGNIFICANT CHANGE UP (ref 8.4–10.5)
CHLORIDE SERPL-SCNC: 102 MMOL/L — SIGNIFICANT CHANGE UP (ref 96–108)
CO2 SERPL-SCNC: 25 MMOL/L — SIGNIFICANT CHANGE UP (ref 22–31)
CREAT SERPL-MCNC: 2.69 MG/DL — HIGH (ref 0.5–1.3)
EGFR: 28 ML/MIN/1.73M2 — LOW
GLUCOSE BLDC GLUCOMTR-MCNC: 186 MG/DL — HIGH (ref 70–99)
GLUCOSE BLDC GLUCOMTR-MCNC: 202 MG/DL — HIGH (ref 70–99)
GLUCOSE BLDC GLUCOMTR-MCNC: 216 MG/DL — HIGH (ref 70–99)
GLUCOSE BLDC GLUCOMTR-MCNC: 236 MG/DL — HIGH (ref 70–99)
GLUCOSE SERPL-MCNC: 189 MG/DL — HIGH (ref 70–99)
HCT VFR BLD CALC: 31.5 % — LOW (ref 39–50)
HGB BLD-MCNC: 9.8 G/DL — LOW (ref 13–17)
INR BLD: 2.03 RATIO — HIGH (ref 0.85–1.16)
MCHC RBC-ENTMCNC: 23.3 PG — LOW (ref 27–34)
MCHC RBC-ENTMCNC: 31.1 G/DL — LOW (ref 32–36)
MCV RBC AUTO: 75 FL — LOW (ref 80–100)
NRBC # BLD: 0 /100 WBCS — SIGNIFICANT CHANGE UP (ref 0–0)
PLATELET # BLD AUTO: 188 K/UL — SIGNIFICANT CHANGE UP (ref 150–400)
POTASSIUM SERPL-MCNC: 4.1 MMOL/L — SIGNIFICANT CHANGE UP (ref 3.5–5.3)
POTASSIUM SERPL-SCNC: 4.1 MMOL/L — SIGNIFICANT CHANGE UP (ref 3.5–5.3)
PROTHROM AB SERPL-ACNC: 23.8 SEC — HIGH (ref 9.9–13.4)
PS IGA SER-ACNC: <1 — SIGNIFICANT CHANGE UP (ref 0–19)
PS IGG SER-ACNC: 17 UNITS — SIGNIFICANT CHANGE UP (ref 0–30)
PS IGM SER-ACNC: 20 UNITS — SIGNIFICANT CHANGE UP (ref 0–30)
RBC # BLD: 4.2 M/UL — SIGNIFICANT CHANGE UP (ref 4.2–5.8)
RBC # FLD: 17.2 % — HIGH (ref 10.3–14.5)
SODIUM SERPL-SCNC: 135 MMOL/L — SIGNIFICANT CHANGE UP (ref 135–145)
WBC # BLD: 8.57 K/UL — SIGNIFICANT CHANGE UP (ref 3.8–10.5)
WBC # FLD AUTO: 8.57 K/UL — SIGNIFICANT CHANGE UP (ref 3.8–10.5)

## 2024-10-28 PROCEDURE — 99233 SBSQ HOSP IP/OBS HIGH 50: CPT

## 2024-10-28 RX ORDER — WARFARIN SODIUM 4 MG
5 TABLET ORAL ONCE
Refills: 0 | Status: COMPLETED | OUTPATIENT
Start: 2024-10-28 | End: 2024-10-28

## 2024-10-28 RX ORDER — INSULIN LISPRO 100/ML
3 VIAL (ML) SUBCUTANEOUS
Refills: 0 | Status: DISCONTINUED | OUTPATIENT
Start: 2024-10-28 | End: 2024-10-31

## 2024-10-28 RX ADMIN — Medication 25 MILLIGRAM(S): at 05:27

## 2024-10-28 RX ADMIN — CLONIDINE HYDROCHLORIDE 0.1 MILLIGRAM(S): 0.2 TABLET ORAL at 05:26

## 2024-10-28 RX ADMIN — Medication 5 MILLIGRAM(S): at 22:00

## 2024-10-28 RX ADMIN — Medication 10 MILLIGRAM(S): at 05:27

## 2024-10-28 RX ADMIN — Medication 6 MILLIGRAM(S): at 02:30

## 2024-10-28 RX ADMIN — CLONIDINE HYDROCHLORIDE 0.1 MILLIGRAM(S): 0.2 TABLET ORAL at 17:11

## 2024-10-28 RX ADMIN — Medication 6 MILLIGRAM(S): at 01:46

## 2024-10-28 RX ADMIN — DIAZEPAM 5 MILLIGRAM(S): 10 FILM BUCCAL at 05:27

## 2024-10-28 RX ADMIN — Medication 325 MILLIGRAM(S): at 12:42

## 2024-10-28 RX ADMIN — GABAPENTIN 600 MILLIGRAM(S): 300 CAPSULE ORAL at 15:12

## 2024-10-28 RX ADMIN — HEPARIN SODIUM 1400 UNIT(S)/HR: 10000 INJECTION INTRAVENOUS; SUBCUTANEOUS at 07:29

## 2024-10-28 RX ADMIN — Medication 3 UNIT(S): at 17:13

## 2024-10-28 RX ADMIN — METHOCARBAMOL 750 MILLIGRAM(S): 500 TABLET ORAL at 05:27

## 2024-10-28 RX ADMIN — Medication 6 MILLIGRAM(S): at 22:10

## 2024-10-28 RX ADMIN — Medication 2: at 12:43

## 2024-10-28 RX ADMIN — Medication 6 MILLIGRAM(S): at 16:54

## 2024-10-28 RX ADMIN — DIAZEPAM 5 MILLIGRAM(S): 10 FILM BUCCAL at 22:00

## 2024-10-28 RX ADMIN — Medication 1: at 07:59

## 2024-10-28 RX ADMIN — Medication 500 MILLIGRAM(S): at 12:42

## 2024-10-28 RX ADMIN — PANTOPRAZOLE SODIUM 40 MILLIGRAM(S): 40 TABLET, DELAYED RELEASE ORAL at 05:30

## 2024-10-28 RX ADMIN — SODIUM CHLORIDE 50 MILLILITER(S): 9 INJECTION, SOLUTION INTRAMUSCULAR; INTRAVENOUS; SUBCUTANEOUS at 22:01

## 2024-10-28 RX ADMIN — Medication 15 GRAM(S): at 12:42

## 2024-10-28 RX ADMIN — Medication 2: at 21:59

## 2024-10-28 RX ADMIN — GABAPENTIN 600 MILLIGRAM(S): 300 CAPSULE ORAL at 22:00

## 2024-10-28 RX ADMIN — MIRABEGRON 25 MILLIGRAM(S): 50 TABLET, EXTENDED RELEASE ORAL at 12:42

## 2024-10-28 RX ADMIN — DULOXETINE HYDROCHLORIDE 60 MILLIGRAM(S): 30 CAPSULE, DELAYED RELEASE ORAL at 12:41

## 2024-10-28 RX ADMIN — Medication 20 UNIT(S): at 21:59

## 2024-10-28 RX ADMIN — GABAPENTIN 600 MILLIGRAM(S): 300 CAPSULE ORAL at 05:27

## 2024-10-28 RX ADMIN — DIAZEPAM 5 MILLIGRAM(S): 10 FILM BUCCAL at 15:12

## 2024-10-28 RX ADMIN — Medication 2: at 17:12

## 2024-10-28 RX ADMIN — Medication 6 MILLIGRAM(S): at 17:54

## 2024-10-28 RX ADMIN — OXYBUTYNIN CHLORIDE 10 MILLIGRAM(S): 5 TABLET ORAL at 12:41

## 2024-10-28 RX ADMIN — METHOCARBAMOL 750 MILLIGRAM(S): 500 TABLET ORAL at 15:12

## 2024-10-28 RX ADMIN — METHOCARBAMOL 750 MILLIGRAM(S): 500 TABLET ORAL at 22:00

## 2024-10-28 RX ADMIN — Medication 3 UNIT(S): at 12:43

## 2024-10-28 RX ADMIN — Medication 6 MILLIGRAM(S): at 22:39

## 2024-10-28 RX ADMIN — TRAZODONE HYDROCHLORIDE 50 MILLIGRAM(S): 100 TABLET ORAL at 22:00

## 2024-10-28 RX ADMIN — FINASTERIDE 5 MILLIGRAM(S): 5 TABLET, FILM COATED ORAL at 12:41

## 2024-10-28 RX ADMIN — Medication 25 MILLIGRAM(S): at 17:12

## 2024-10-28 RX ADMIN — SODIUM CHLORIDE 1 GRAM(S): 9 INJECTION, SOLUTION INTRAMUSCULAR; INTRAVENOUS; SUBCUTANEOUS at 05:27

## 2024-10-28 NOTE — PROGRESS NOTE ADULT - ASSESSMENT
49-year-old male history of BPH CVA chronic suprapubic catheter IVC filter complaining about bladder pain spasms seen at [facility] yesterday had a CT that showed cystitis started on antibiotics but back at [place] for continuous pain.  Denies any fever but admits to some chills.  Denies any flank pain.    ACUTE RENAL FAILURE: start iv fluid No surgicals intervention anticipated ,  Continue SP Cystostomy and monitor U/O  ,  ANTONIO, atrophic kidneys  without  hydronephrosis - fluid and medial management by   Serum creatinine is  improving     , approximating GFR at   ml/min. lactated ringers. 1000 milliLiter(s) (70 mL/Hr) IV Continuous   There is no progression . No uremic symptoms    hyponatremia improved urea Oral Powder 15 Gram(s) Oral daily  will check ua , urine osmolality , urine sodium , urine uric acid , serum sodium , serum osmolality , serum uric acid , f/u with hyponatremia work up , f/u with bmp , monitor i and o  start urea powder , ns 50 cc per hr       No evidence of anemia .  Fluid status stable.  Will continue to avoid nephrotoxic drugs.  Patient remains asymptomatic.   Continue current therapy.  hold  diuretic.  hold   ACE inhibitor.  hold   ARB.        BP monitoring,continue current antihypertensive meds, low salt diet,followup with PMD in 1-2 weeks  amLODIPine   Tablet 10 milliGRAM(s) Oral daily  cloNIDine 0.1 milliGRAM(s) Oral two times a day

## 2024-10-28 NOTE — PROGRESS NOTE ADULT - ASSESSMENT
49M HTN, DM2, BPH, Neurogenic Bladder with Suprapubic Catheter admitted for Acute Cystitis     Acute Cystitis / Bladder Spasm / Neurogenic Bladder  Related to Suprapubic Catheter and could be infections vs inflammatory as per CT Imaging  Urine Culture reviewed as well as documentation from Infectious Disease  Completed IV Ceftriaxone Course  Valium and Myrbetriq for Bladder Spasms currently but still painful   Will revisit conversation with Urology regarding Leaking urine through urethra and bladder spams options    History of Lupus Anticoagulant / History of DVT  Diagnosed approximately 5 years ago and has been on Coumadin since; Has IVC Filter  Unclear for the full role of full dose anticoagulation but will continue for now and defer to Hematology  INR currently subtherapeutic and due to body habitus is high risk for DVT  Bridging with Heparin Infusion and Warfarin for Goal INR 2.0-3.0  Unwilling to take Lovenox Injections   Hematology Consultation     Acute Renal Failure on CKD 3   Baseline Creatinine around 2.1 - 2.3  Will place on IV Fluids   Monitor BMP and Electrolytes  Nephrology Consulted    Generalized Weakness / Deconditioning  History of Stroke, Guilan [place] and Disc Disease  Uses Walker   PT and OT Consultation    HTN  Controlled   Continue Clonidine + Amlodipine + Metoprolol    DM2  At Home uses Lantus 20U + Insulin Premeal 4U  Will place on Lantus 16U + ISS and titrate accordingly   Last A1C = 8.0 (10/22)    GERD  Protonix    Anxiety / Depression  Cymbalta + Trazadone    Diet  Regular    DVT Prophylaxis  Coumadin    Disposition   Explained to patient that we can try for transfer to [place] but given overall condition and diagnosis transfer will be difficult  INR therapeutic today and will continue Heparin Infusion for 24 hours while bridging and possible discharge in next 24 hours       49M HTN, DM2, BPH, Neurogenic Bladder with Suprapubic Catheter admitted for Acute Cystitis     Acute Cystitis / Bladder Spasm / Neurogenic Bladder  Related to Suprapubic Catheter and could be infections vs inflammatory as per CT Imaging  Urine Culture reviewed as well as documentation from Infectious Disease  Completed IV Ceftriaxone Course  Valium and Myrbetriq for Bladder Spasms currently but still painful   Spoke to Dr. Peralta who wants Cystogram to be done to evaluate for Anderson integrity and positioning  Patient is willing to have this done including procedure to replace it with [place] and no longer requesting transfer to [place]    History of Lupus Anticoagulant / History of DVT  Diagnosed approximately 5 years ago and has been on Coumadin since; Has IVC Filter  Unclear for the full role of full dose anticoagulation but will continue for now and defer to Hematology  INR currently subtherapeutic and due to body habitus is high risk for DVT  Bridging with Heparin Infusion and Warfarin for Goal INR 2.0-3.0  Unwilling to take Lovenox Injections   Hematology Consultation     Acute Renal Failure on CKD 3   Baseline Creatinine around 2.1 - 2.3  Will place on IV Fluids   Monitor BMP and Electrolytes  Nephrology Consulted    Generalized Weakness / Deconditioning  History of Stroke, Guilan [employer] and Disc Disease  Uses Walker   PT and OT Consultation    HTN  Controlled   Continue Clonidine + Amlodipine + Metoprolol    DM2  At Home uses Lantus 20U + Insulin Premeal 4U  Will place on Lantus 16U + ISS and titrate accordingly   Last A1C = 8.0 (10/22)    GERD  Protonix    Anxiety / Depression  Cymbalta + Trazadone    Diet  Regular    DVT Prophylaxis  Coumadin    Disposition   Explained to patient that we can try for transfer to [place] but given overall condition and diagnosis transfer will be difficult  INR therapeutic today and will continue Heparin Infusion for 24 hours while bridging and possible discharge in next 24 hours

## 2024-10-28 NOTE — PHARMACOTHERAPY INTERVENTION NOTE - COMMENTS
Discontinued heparin infusion since patient INR today 2.03 (being bridged with warfarin for lupus/DVT history)

## 2024-10-28 NOTE — CASE MANAGEMENT PROGRESS NOTE - NSCMPROGRESSNOTE_GEN_ALL_CORE
Per treatment team rounds :  pt. not ready for return back to [facility] today. per Uro- needs Cystogram and CT to check SP tube placement.  Cr 2.6, I getting VF and  Na Tabs, Na better.  Heparin drip for one more day bridging to Coumadin.    SW following for safe transition planning.  CM available if needed.

## 2024-10-28 NOTE — SOCIAL WORK PROGRESS NOTE - NSSWPROGRESSNOTE_GEN_ALL_CORE
SW reviewed patients case in IDR and patient will be ready for discharge back to [facility] SNF Tuesday 10/29.  SW made referral.  Sw to follow.

## 2024-10-29 LAB
ANION GAP SERPL CALC-SCNC: 11 MMOL/L — SIGNIFICANT CHANGE UP (ref 5–17)
APTT BLD: 43.4 SEC — HIGH (ref 24.5–35.6)
BUN SERPL-MCNC: 59 MG/DL — HIGH (ref 7–23)
CALCIUM SERPL-MCNC: 8.9 MG/DL — SIGNIFICANT CHANGE UP (ref 8.4–10.5)
CHLORIDE SERPL-SCNC: 100 MMOL/L — SIGNIFICANT CHANGE UP (ref 96–108)
CO2 SERPL-SCNC: 24 MMOL/L — SIGNIFICANT CHANGE UP (ref 22–31)
CREAT SERPL-MCNC: 2.7 MG/DL — HIGH (ref 0.5–1.3)
EGFR: 28 ML/MIN/1.73M2 — LOW
GLUCOSE BLDC GLUCOMTR-MCNC: 166 MG/DL — HIGH (ref 70–99)
GLUCOSE BLDC GLUCOMTR-MCNC: 199 MG/DL — HIGH (ref 70–99)
GLUCOSE BLDC GLUCOMTR-MCNC: 205 MG/DL — HIGH (ref 70–99)
GLUCOSE BLDC GLUCOMTR-MCNC: 212 MG/DL — HIGH (ref 70–99)
GLUCOSE SERPL-MCNC: 208 MG/DL — HIGH (ref 70–99)
HCT VFR BLD CALC: 32.4 % — LOW (ref 39–50)
HGB BLD-MCNC: 10.2 G/DL — LOW (ref 13–17)
INR BLD: 2.22 RATIO — HIGH (ref 0.85–1.16)
MCHC RBC-ENTMCNC: 23.7 PG — LOW (ref 27–34)
MCHC RBC-ENTMCNC: 31.5 G/DL — LOW (ref 32–36)
MCV RBC AUTO: 75.3 FL — LOW (ref 80–100)
NRBC # BLD: 0 /100 WBCS — SIGNIFICANT CHANGE UP (ref 0–0)
PLATELET # BLD AUTO: 189 K/UL — SIGNIFICANT CHANGE UP (ref 150–400)
POTASSIUM SERPL-MCNC: 4.2 MMOL/L — SIGNIFICANT CHANGE UP (ref 3.5–5.3)
POTASSIUM SERPL-SCNC: 4.2 MMOL/L — SIGNIFICANT CHANGE UP (ref 3.5–5.3)
PROTHROM AB SERPL-ACNC: 25.6 SEC — HIGH (ref 9.9–13.4)
RBC # BLD: 4.3 M/UL — SIGNIFICANT CHANGE UP (ref 4.2–5.8)
RBC # FLD: 17.3 % — HIGH (ref 10.3–14.5)
SODIUM SERPL-SCNC: 135 MMOL/L — SIGNIFICANT CHANGE UP (ref 135–145)
WBC # BLD: 8.32 K/UL — SIGNIFICANT CHANGE UP (ref 3.8–10.5)
WBC # FLD AUTO: 8.32 K/UL — SIGNIFICANT CHANGE UP (ref 3.8–10.5)

## 2024-10-29 PROCEDURE — 74176 CT ABD & PELVIS W/O CONTRAST: CPT | Mod: 26

## 2024-10-29 PROCEDURE — 99233 SBSQ HOSP IP/OBS HIGH 50: CPT

## 2024-10-29 RX ORDER — HYDROMORPHONE HCL/0.9% NACL/PF 6 MG/30 ML
1 PATIENT CONTROLLED ANALGESIA SYRINGE INTRAVENOUS ONCE
Refills: 0 | Status: DISCONTINUED | OUTPATIENT
Start: 2024-10-29 | End: 2024-10-29

## 2024-10-29 RX ORDER — UREA 15 G
15 POWDER IN PACKET (EA) ORAL
Refills: 0 | Status: DISCONTINUED | OUTPATIENT
Start: 2024-10-29 | End: 2024-10-31

## 2024-10-29 RX ADMIN — Medication 6 MILLIGRAM(S): at 08:01

## 2024-10-29 RX ADMIN — Medication 6 MILLIGRAM(S): at 02:54

## 2024-10-29 RX ADMIN — METHOCARBAMOL 750 MILLIGRAM(S): 500 TABLET ORAL at 05:39

## 2024-10-29 RX ADMIN — FINASTERIDE 5 MILLIGRAM(S): 5 TABLET, FILM COATED ORAL at 14:31

## 2024-10-29 RX ADMIN — SODIUM CHLORIDE 50 MILLILITER(S): 9 INJECTION, SOLUTION INTRAMUSCULAR; INTRAVENOUS; SUBCUTANEOUS at 08:01

## 2024-10-29 RX ADMIN — POLYETHYLENE GLYCOL 3350 17 GRAM(S): 17 POWDER, FOR SOLUTION ORAL at 14:32

## 2024-10-29 RX ADMIN — METHOCARBAMOL 750 MILLIGRAM(S): 500 TABLET ORAL at 15:20

## 2024-10-29 RX ADMIN — Medication 6 MILLIGRAM(S): at 16:20

## 2024-10-29 RX ADMIN — GABAPENTIN 600 MILLIGRAM(S): 300 CAPSULE ORAL at 05:39

## 2024-10-29 RX ADMIN — Medication 25 MILLIGRAM(S): at 18:15

## 2024-10-29 RX ADMIN — DIAZEPAM 5 MILLIGRAM(S): 10 FILM BUCCAL at 05:40

## 2024-10-29 RX ADMIN — CLONIDINE HYDROCHLORIDE 0.1 MILLIGRAM(S): 0.2 TABLET ORAL at 18:15

## 2024-10-29 RX ADMIN — Medication 3 UNIT(S): at 14:31

## 2024-10-29 RX ADMIN — Medication 6 MILLIGRAM(S): at 15:20

## 2024-10-29 RX ADMIN — SODIUM CHLORIDE 50 MILLILITER(S): 9 INJECTION, SOLUTION INTRAMUSCULAR; INTRAVENOUS; SUBCUTANEOUS at 23:21

## 2024-10-29 RX ADMIN — GABAPENTIN 600 MILLIGRAM(S): 300 CAPSULE ORAL at 14:31

## 2024-10-29 RX ADMIN — GABAPENTIN 600 MILLIGRAM(S): 300 CAPSULE ORAL at 21:29

## 2024-10-29 RX ADMIN — Medication 1 MILLIGRAM(S): at 00:33

## 2024-10-29 RX ADMIN — Medication 4 MILLIGRAM(S): at 11:55

## 2024-10-29 RX ADMIN — Medication 4 MILLIGRAM(S): at 20:30

## 2024-10-29 RX ADMIN — Medication 2: at 14:30

## 2024-10-29 RX ADMIN — Medication 15 GRAM(S): at 18:16

## 2024-10-29 RX ADMIN — Medication 1: at 08:00

## 2024-10-29 RX ADMIN — TRAZODONE HYDROCHLORIDE 50 MILLIGRAM(S): 100 TABLET ORAL at 21:29

## 2024-10-29 RX ADMIN — CLONIDINE HYDROCHLORIDE 0.1 MILLIGRAM(S): 0.2 TABLET ORAL at 05:40

## 2024-10-29 RX ADMIN — MIRABEGRON 25 MILLIGRAM(S): 50 TABLET, EXTENDED RELEASE ORAL at 10:56

## 2024-10-29 RX ADMIN — PANTOPRAZOLE SODIUM 40 MILLIGRAM(S): 40 TABLET, DELAYED RELEASE ORAL at 05:41

## 2024-10-29 RX ADMIN — Medication 6 MILLIGRAM(S): at 09:00

## 2024-10-29 RX ADMIN — Medication 500 MILLIGRAM(S): at 14:31

## 2024-10-29 RX ADMIN — Medication 3 UNIT(S): at 18:15

## 2024-10-29 RX ADMIN — DIAZEPAM 5 MILLIGRAM(S): 10 FILM BUCCAL at 14:32

## 2024-10-29 RX ADMIN — OXYBUTYNIN CHLORIDE 10 MILLIGRAM(S): 5 TABLET ORAL at 15:20

## 2024-10-29 RX ADMIN — Medication 1: at 21:30

## 2024-10-29 RX ADMIN — Medication 325 MILLIGRAM(S): at 14:31

## 2024-10-29 RX ADMIN — DULOXETINE HYDROCHLORIDE 60 MILLIGRAM(S): 30 CAPSULE, DELAYED RELEASE ORAL at 15:20

## 2024-10-29 RX ADMIN — Medication 1 MILLIGRAM(S): at 00:11

## 2024-10-29 RX ADMIN — Medication 10 MILLIGRAM(S): at 05:40

## 2024-10-29 RX ADMIN — METHOCARBAMOL 750 MILLIGRAM(S): 500 TABLET ORAL at 21:30

## 2024-10-29 RX ADMIN — DIAZEPAM 5 MILLIGRAM(S): 10 FILM BUCCAL at 21:29

## 2024-10-29 RX ADMIN — Medication 2: at 18:15

## 2024-10-29 RX ADMIN — Medication 4 MILLIGRAM(S): at 19:38

## 2024-10-29 RX ADMIN — Medication 3 UNIT(S): at 08:00

## 2024-10-29 RX ADMIN — Medication 4 MILLIGRAM(S): at 10:57

## 2024-10-29 RX ADMIN — Medication 25 MILLIGRAM(S): at 05:40

## 2024-10-29 RX ADMIN — Medication 6 MILLIGRAM(S): at 03:41

## 2024-10-29 RX ADMIN — Medication 20 UNIT(S): at 21:30

## 2024-10-29 NOTE — SOCIAL WORK PROGRESS NOTE - NSSWPROGRESSNOTE_GEN_ALL_CORE
Pt went for a cystogram and is awaiting medical clearance. Will likely return to [facility] tomorrow. [facility] notified. Sw following.

## 2024-10-29 NOTE — PROGRESS NOTE ADULT - ASSESSMENT
49-year-old male history of BPH CVA chronic suprapubic catheter IVC filter complaining about bladder pain spasms seen at [facility] yesterday had a CT that showed cystitis started on antibiotics but back at [place] for continuous pain.  Denies any fever but admits to some chills.  Denies any flank pain.    ACUTE RENAL FAILURE: start iv fluid No surgicals intervention anticipated ,  Continue SP Cystostomy and monitor U/O  ,  ANTONIO, atrophic kidneys  without  hydronephrosis - fluid and medial management by   Serum creatinine is  improving     , approximating GFR at   ml/min. lactated ringers. 1000 milliLiter(s) (70 mL/Hr) IV Continuous   There is no progression . No uremic symptoms    hyponatremia improved increase urea Oral Powder 15 Gram(s) Oral bid   will check ua , urine osmolality , urine sodium , urine uric acid , serum sodium , serum osmolality , serum uric acid , f/u with hyponatremia work up , f/u with bmp , monitor i and o  start urea powder , ns 50 cc per hr       No evidence of anemia .  Fluid status stable.  Will continue to avoid nephrotoxic drugs.  Patient remains asymptomatic.   Continue current therapy.  hold  diuretic.  hold   ACE inhibitor.  hold   ARB.        BP monitoring,continue current antihypertensive meds, low salt diet,followup with PMD in 1-2 weeks  amLODIPine   Tablet 10 milliGRAM(s) Oral daily  cloNIDine 0.1 milliGRAM(s) Oral two times a day

## 2024-10-29 NOTE — PROGRESS NOTE ADULT - ASSESSMENT
49M HTN, DM2, BPH, Neurogenic Bladder with Suprapubic Catheter admitted for Acute Cystitis     Acute Cystitis / Bladder Spasm / Neurogenic Bladder  Related to Suprapubic Catheter and could be infections vs inflammatory as per CT Imaging  Urine Culture reviewed as well as documentation from Infectious Disease  Completed IV Ceftriaxone Course  Valium and Myrbetriq for Bladder Spasms currently but still painful   Spoke to Dr. Peralta who wants Cystogram to be done to evaluate for Anderson integrity and positioning  Patient is willing to have this done including procedure to replace it with [place] and no longer requesting transfer to [place]    History of Lupus Anticoagulant / History of DVT  Diagnosed approximately 5 years ago and has been on Coumadin since; Has IVC Filter  Unclear for the full role of full dose anticoagulation but will continue for now and defer to Hematology  INR currently subtherapeutic and due to body habitus is high risk for DVT  Bridging with Heparin Infusion and Warfarin for Goal INR 2.0-3.0  Unwilling to take Lovenox Injections   Hematology Consultation     Acute Renal Failure on CKD 3   Baseline Creatinine around 2.1 - 2.3  Will place on IV Fluids   Monitor BMP and Electrolytes  Nephrology Consulted    Generalized Weakness / Deconditioning  History of Stroke, Guilan [place] and Disc Disease  Uses Walker   PT and OT Consultation    HTN  Controlled   Continue Clonidine + Amlodipine + Metoprolol    DM2  At Home uses Lantus 20U + Insulin Premeal 4U  Will place on Lantus 16U + ISS and titrate accordingly   Last A1C = 8.0 (10/22)    GERD  Protonix    Anxiety / Depression  Cymbalta + Trazadone    Diet  Regular    DVT Prophylaxis  Coumadin    Disposition   Explained to patient that we can try for transfer to [place] but given overall condition and diagnosis transfer will be difficult  INR therapeutic today and will continue Heparin Infusion for 24 hours while bridging and possible discharge in next 24 hours

## 2024-10-30 LAB
ALBUMIN SERPL ELPH-MCNC: 2.6 G/DL — LOW (ref 3.3–5)
ALP SERPL-CCNC: 94 U/L — SIGNIFICANT CHANGE UP (ref 30–120)
ALT FLD-CCNC: 18 U/L — SIGNIFICANT CHANGE UP (ref 10–60)
ANION GAP SERPL CALC-SCNC: 9 MMOL/L — SIGNIFICANT CHANGE UP (ref 5–17)
APTT BLD: 45.8 SEC — HIGH (ref 24.5–35.6)
AST SERPL-CCNC: 13 U/L — SIGNIFICANT CHANGE UP (ref 10–40)
BILIRUB SERPL-MCNC: 0.2 MG/DL — SIGNIFICANT CHANGE UP (ref 0.2–1.2)
BUN SERPL-MCNC: 54 MG/DL — HIGH (ref 7–23)
CALCIUM SERPL-MCNC: 9 MG/DL — SIGNIFICANT CHANGE UP (ref 8.4–10.5)
CHLORIDE SERPL-SCNC: 101 MMOL/L — SIGNIFICANT CHANGE UP (ref 96–108)
CO2 SERPL-SCNC: 25 MMOL/L — SIGNIFICANT CHANGE UP (ref 22–31)
CREAT SERPL-MCNC: 2.91 MG/DL — HIGH (ref 0.5–1.3)
EGFR: 26 ML/MIN/1.73M2 — LOW
GLUCOSE BLDC GLUCOMTR-MCNC: 134 MG/DL — HIGH (ref 70–99)
GLUCOSE BLDC GLUCOMTR-MCNC: 158 MG/DL — HIGH (ref 70–99)
GLUCOSE BLDC GLUCOMTR-MCNC: 185 MG/DL — HIGH (ref 70–99)
GLUCOSE BLDC GLUCOMTR-MCNC: 220 MG/DL — HIGH (ref 70–99)
GLUCOSE SERPL-MCNC: 175 MG/DL — HIGH (ref 70–99)
HCT VFR BLD CALC: 34.3 % — LOW (ref 39–50)
HGB BLD-MCNC: 10.8 G/DL — LOW (ref 13–17)
INR BLD: 2.39 RATIO — HIGH (ref 0.85–1.16)
MAGNESIUM SERPL-MCNC: 1.4 MG/DL — LOW (ref 1.6–2.6)
MCHC RBC-ENTMCNC: 23.7 PG — LOW (ref 27–34)
MCHC RBC-ENTMCNC: 31.5 G/DL — LOW (ref 32–36)
MCV RBC AUTO: 75.4 FL — LOW (ref 80–100)
NRBC # BLD: 0 /100 WBCS — SIGNIFICANT CHANGE UP (ref 0–0)
PHOSPHATE SERPL-MCNC: 4.2 MG/DL — SIGNIFICANT CHANGE UP (ref 2.5–4.5)
PLATELET # BLD AUTO: 184 K/UL — SIGNIFICANT CHANGE UP (ref 150–400)
POTASSIUM SERPL-MCNC: 4.1 MMOL/L — SIGNIFICANT CHANGE UP (ref 3.5–5.3)
POTASSIUM SERPL-SCNC: 4.1 MMOL/L — SIGNIFICANT CHANGE UP (ref 3.5–5.3)
PROT SERPL-MCNC: 6.9 G/DL — SIGNIFICANT CHANGE UP (ref 6–8.3)
PROTHROM AB SERPL-ACNC: 27.5 SEC — HIGH (ref 9.9–13.4)
RBC # BLD: 4.55 M/UL — SIGNIFICANT CHANGE UP (ref 4.2–5.8)
RBC # FLD: 17.6 % — HIGH (ref 10.3–14.5)
SODIUM SERPL-SCNC: 135 MMOL/L — SIGNIFICANT CHANGE UP (ref 135–145)
WBC # BLD: 9.41 K/UL — SIGNIFICANT CHANGE UP (ref 3.8–10.5)
WBC # FLD AUTO: 9.41 K/UL — SIGNIFICANT CHANGE UP (ref 3.8–10.5)

## 2024-10-30 PROCEDURE — 99232 SBSQ HOSP IP/OBS MODERATE 35: CPT

## 2024-10-30 RX ORDER — MIRABEGRON 50 MG/1
1 TABLET, EXTENDED RELEASE ORAL
Qty: 0 | Refills: 0 | DISCHARGE
Start: 2024-10-30

## 2024-10-30 RX ORDER — AMLODIPINE BESYLATE 10 MG
1 TABLET ORAL
Qty: 0 | Refills: 0 | DISCHARGE
Start: 2024-10-30

## 2024-10-30 RX ORDER — METOPROLOL TARTRATE 50 MG
1 TABLET ORAL
Qty: 0 | Refills: 0 | DISCHARGE
Start: 2024-10-30

## 2024-10-30 RX ORDER — SODIUM CHLORIDE 9 MG/ML
1000 INJECTION, SOLUTION INTRAMUSCULAR; INTRAVENOUS; SUBCUTANEOUS
Refills: 0 | Status: DISCONTINUED | OUTPATIENT
Start: 2024-10-30 | End: 2024-10-31

## 2024-10-30 RX ORDER — CLONIDINE HYDROCHLORIDE 0.2 MG/1
1 TABLET ORAL
Qty: 0 | Refills: 0 | DISCHARGE
Start: 2024-10-30

## 2024-10-30 RX ORDER — DULOXETINE HYDROCHLORIDE 30 MG/1
1 CAPSULE, DELAYED RELEASE ORAL
Qty: 0 | Refills: 0 | DISCHARGE
Start: 2024-10-30

## 2024-10-30 RX ORDER — FERROUS SULFATE 325(65) MG
1 TABLET ORAL
Qty: 0 | Refills: 0 | DISCHARGE
Start: 2024-10-30

## 2024-10-30 RX ORDER — UREA 15 G
1 POWDER IN PACKET (EA) ORAL
Qty: 0 | Refills: 0 | DISCHARGE
Start: 2024-10-30

## 2024-10-30 RX ORDER — FINASTERIDE 5 MG/1
1 TABLET, FILM COATED ORAL
Qty: 0 | Refills: 0 | DISCHARGE
Start: 2024-10-30

## 2024-10-30 RX ORDER — METHOCARBAMOL 500 MG/1
2 TABLET ORAL
Refills: 0 | DISCHARGE
Start: 2024-10-30

## 2024-10-30 RX ORDER — ASCORBIC ACID 500 MG
1 TABLET ORAL
Qty: 0 | Refills: 0 | DISCHARGE
Start: 2024-10-30

## 2024-10-30 RX ORDER — MAGNESIUM SULFATE IN 0.9% NACL 2 G/50 ML
2 INTRAVENOUS SOLUTION, PIGGYBACK (ML) INTRAVENOUS ONCE
Refills: 0 | Status: COMPLETED | OUTPATIENT
Start: 2024-10-30 | End: 2024-10-30

## 2024-10-30 RX ORDER — OXYBUTYNIN CHLORIDE 5 MG/1
2 TABLET ORAL
Qty: 0 | Refills: 0 | DISCHARGE
Start: 2024-10-30

## 2024-10-30 RX ORDER — DIAZEPAM 10 MG/1
1 FILM BUCCAL
Qty: 0 | Refills: 0 | DISCHARGE
Start: 2024-10-30

## 2024-10-30 RX ORDER — GABAPENTIN 300 MG/1
1 CAPSULE ORAL
Refills: 0 | DISCHARGE
Start: 2024-10-30

## 2024-10-30 RX ADMIN — Medication 6 MILLIGRAM(S): at 17:06

## 2024-10-30 RX ADMIN — Medication 2: at 09:25

## 2024-10-30 RX ADMIN — Medication 6 MILLIGRAM(S): at 13:20

## 2024-10-30 RX ADMIN — CLONIDINE HYDROCHLORIDE 0.1 MILLIGRAM(S): 0.2 TABLET ORAL at 17:06

## 2024-10-30 RX ADMIN — Medication 325 MILLIGRAM(S): at 12:15

## 2024-10-30 RX ADMIN — Medication 3 UNIT(S): at 12:14

## 2024-10-30 RX ADMIN — FINASTERIDE 5 MILLIGRAM(S): 5 TABLET, FILM COATED ORAL at 12:15

## 2024-10-30 RX ADMIN — METHOCARBAMOL 750 MILLIGRAM(S): 500 TABLET ORAL at 05:23

## 2024-10-30 RX ADMIN — OXYBUTYNIN CHLORIDE 10 MILLIGRAM(S): 5 TABLET ORAL at 12:14

## 2024-10-30 RX ADMIN — TRAZODONE HYDROCHLORIDE 50 MILLIGRAM(S): 100 TABLET ORAL at 22:01

## 2024-10-30 RX ADMIN — GABAPENTIN 600 MILLIGRAM(S): 300 CAPSULE ORAL at 05:23

## 2024-10-30 RX ADMIN — GABAPENTIN 600 MILLIGRAM(S): 300 CAPSULE ORAL at 14:50

## 2024-10-30 RX ADMIN — MIRABEGRON 25 MILLIGRAM(S): 50 TABLET, EXTENDED RELEASE ORAL at 09:25

## 2024-10-30 RX ADMIN — GABAPENTIN 600 MILLIGRAM(S): 300 CAPSULE ORAL at 22:02

## 2024-10-30 RX ADMIN — Medication 1: at 12:13

## 2024-10-30 RX ADMIN — Medication 25 MILLIGRAM(S): at 17:06

## 2024-10-30 RX ADMIN — SODIUM CHLORIDE 50 MILLILITER(S): 9 INJECTION, SOLUTION INTRAMUSCULAR; INTRAVENOUS; SUBCUTANEOUS at 09:26

## 2024-10-30 RX ADMIN — Medication 10 MILLIGRAM(S): at 05:24

## 2024-10-30 RX ADMIN — METHOCARBAMOL 750 MILLIGRAM(S): 500 TABLET ORAL at 22:02

## 2024-10-30 RX ADMIN — DIAZEPAM 5 MILLIGRAM(S): 10 FILM BUCCAL at 05:23

## 2024-10-30 RX ADMIN — Medication 15 GRAM(S): at 09:25

## 2024-10-30 RX ADMIN — METHOCARBAMOL 750 MILLIGRAM(S): 500 TABLET ORAL at 14:50

## 2024-10-30 RX ADMIN — CLONIDINE HYDROCHLORIDE 0.1 MILLIGRAM(S): 0.2 TABLET ORAL at 05:24

## 2024-10-30 RX ADMIN — Medication 1: at 22:02

## 2024-10-30 RX ADMIN — POLYETHYLENE GLYCOL 3350 17 GRAM(S): 17 POWDER, FOR SOLUTION ORAL at 12:13

## 2024-10-30 RX ADMIN — Medication 6 MILLIGRAM(S): at 18:00

## 2024-10-30 RX ADMIN — Medication 25 GRAM(S): at 14:50

## 2024-10-30 RX ADMIN — SODIUM CHLORIDE 75 MILLILITER(S): 9 INJECTION, SOLUTION INTRAMUSCULAR; INTRAVENOUS; SUBCUTANEOUS at 17:01

## 2024-10-30 RX ADMIN — DIAZEPAM 5 MILLIGRAM(S): 10 FILM BUCCAL at 22:01

## 2024-10-30 RX ADMIN — Medication 3 UNIT(S): at 17:02

## 2024-10-30 RX ADMIN — DULOXETINE HYDROCHLORIDE 60 MILLIGRAM(S): 30 CAPSULE, DELAYED RELEASE ORAL at 12:14

## 2024-10-30 RX ADMIN — PANTOPRAZOLE SODIUM 40 MILLIGRAM(S): 40 TABLET, DELAYED RELEASE ORAL at 05:47

## 2024-10-30 RX ADMIN — DIAZEPAM 5 MILLIGRAM(S): 10 FILM BUCCAL at 14:50

## 2024-10-30 RX ADMIN — Medication 3 UNIT(S): at 09:26

## 2024-10-30 RX ADMIN — Medication 6 MILLIGRAM(S): at 06:23

## 2024-10-30 RX ADMIN — Medication 6 MILLIGRAM(S): at 12:20

## 2024-10-30 RX ADMIN — Medication 20 UNIT(S): at 22:01

## 2024-10-30 RX ADMIN — Medication 15 GRAM(S): at 17:06

## 2024-10-30 RX ADMIN — Medication 6 MILLIGRAM(S): at 05:23

## 2024-10-30 RX ADMIN — Medication 25 MILLIGRAM(S): at 05:24

## 2024-10-30 RX ADMIN — Medication 500 MILLIGRAM(S): at 12:15

## 2024-10-30 NOTE — DISCHARGE NOTE PROVIDER - CARE PROVIDERS DIRECT ADDRESSES
[email or web address]

## 2024-10-30 NOTE — DISCHARGE NOTE PROVIDER - HOSPITAL COURSE
Patient was initially seen on 10/19 at [facility] for abdominal discomfort and was given fluconazole, cefpodoxime and trospium for bladder spasms.  However, he was not satisfied for with the care and went to [facility] the next day for abdominal pain.  Was determited to have a cystitis on CT, received ceftriaxone and was sent back to the facility.  However, patient still with persistent pain and decided to come to [place] where he was admitted for possible cystitis.  Urology was consulted and recommended no surgical intervention.  ID was consulted and patient was continued on ceftriaxone.   Patent continued to have pain and thought it was possibly 2/2 acute cystitis vs bladder spasm vs neurogenic bladder. Patient had a CT urogram that showed "in situ suprapubic catheter with thick-walled, trabeculated urinary bladder.  Probable diverticulum at the superior dome.  No extravasation of contrast noted."  Of note, excretion of contrast into the penile urethra was noted. Urology reiterated that there were no urgent surgical procedure needed and therefore can be discharged with outpatient follow up with his urologist at [place].  Did recommend oxybutynin and/or myrbetriq and that the patient needs to follow up with his outpatient urologist.  Patient finished his course of ceftriaxone while inpatient.  Hematology was also consulted 2/2 hx of DVT and hx of antiphospholipid/lupus anticoagulant.  Patient was started on heparin and then transitioned to coumadin.  Patient also noted to ANTONIO (Cr 3.2 on admission) on CKD 3 (baseline is about 2.10) but eventually stablilzed.   Patient is medically stabled to be discharged back to his facility Patient was initially seen on 10/19 at [facility] for abdominal discomfort and was given fluconazole, cefpodoxime and trospium for bladder spasms.  However, he was not satisfied for with the care and went to [facility] the next day for abdominal pain.  Was determited to have a cystitis on CT, received ceftriaxone and was sent back to the facility.  However, patient still with persistent pain and decided to come to [place] where he was admitted for possible cystitis.  Urology was consulted and recommended no surgical intervention.  ID was consulted and patient was continued on ceftriaxone.   Patent continued to have pain and thought it was possibly 2/2 acute cystitis vs bladder spasm vs neurogenic bladder. Patient had a CT urogram that showed "in situ suprapubic catheter with thick-walled, trabeculated urinary bladder.  Probable diverticulum at the superior dome.  No extravasation of contrast noted."  Of note, excretion of contrast into the penile urethra was noted. Urology reiterated that there were no urgent surgical procedure needed and therefore can be discharged with outpatient follow up with his urologist at [place].  Did recommend oxybutynin and/or myrbetriq and that the patient needs to follow up with his outpatient urologist.  Patient finished his course of ceftriaxone while inpatient.  Hematology was also consulted 2/2 hx of DVT and hx of antiphospholipid/lupus anticoagulant.  Patient was started on heparin and then transitioned to coumadin.  Patient also noted to ANTONIO (Cr 3.2 on admission) on CKD 3 but eventually stablilzed.   Patient is medically stabled to be discharged back to his facility Patient was initially seen on 10/19 at [facility] for abdominal discomfort and was given fluconazole, cefpodoxime and trospium for bladder spasms.  However, he was not satisfied for with the care and went to [facility] the next day for abdominal pain.  Was determined to have a cystitis on CT, received ceftriaxone and was sent back to the facility.  However, patient still with persistent pain and decided to come to [place] where he was admitted for possible cystitis.  Urology was consulted and recommended no surgical intervention.  ID was consulted and patient was continued on ceftriaxone.   Patent continued to have pain and thought it was possibly 2/2 acute cystitis vs bladder spasm vs neurogenic bladder. Patient had a CT urogram that showed "in situ suprapubic catheter with thick-walled, trabeculated urinary bladder.  Probable diverticulum at the superior dome.  No extravasation of contrast noted."  Of note, excretion of contrast into the penile urethra was noted. Urology reiterated that there were no urgent surgical procedure needed and therefore can be discharged with outpatient follow up with his urologist at [place].  Did recommend oxybutynin and/or myrbetriq and that the patient needs to follow up with his outpatient urologist.  Patient finished his course of ceftriaxone while inpatient.  Hematology was also consulted 2/2 hx of DVT and hx of antiphospholipid/lupus anticoagulant.  Patient was started on heparin and then bridged to coumadin.  Patient also noted to ANTONIO (Cr 3.2 on admission) on CKD 3 but eventually stabilized.   Patient is medically stabled to be discharged back to his facility Patient was initially seen on 10/19 at [facility] for abdominal discomfort and was given fluconazole, cefpodoxime and trospium for bladder spasms.  However, he was not satisfied for with the care and went to [facility] the next day for abdominal pain.  Was determined to have a cystitis on CT, received ceftriaxone and was sent back to the facility.  However, patient still with persistent pain and decided to come to [place] where he was admitted for possible cystitis.  Urology was consulted and recommended no surgical intervention.  ID was consulted and patient was continued on ceftriaxone.   Patent continued to have pain and thought it was possibly 2/2 acute cystitis vs bladder spasm vs neurogenic bladder. Patient had a CT urogram that showed "in situ suprapubic catheter with thick-walled, trabeculated urinary bladder.  Probable diverticulum at the superior dome.  No extravasation of contrast noted."  Of note, excretion of contrast into the penile urethra was noted. Urology reiterated that there were no urgent surgical procedure needed and therefore can be discharged with outpatient follow up with his urologist at [place].  Did recommend oxybutynin and/or myrbetriq and that the patient needs to follow up with his outpatient urologist.  Patient finished his course of ceftriaxone while inpatient.  Hematology was also consulted 2/2 hx of DVT and hx of antiphospholipid/lupus anticoagulant.  Patient was started on heparin and then bridged to coumadin.  Patient also noted to ANTONIO (Cr 3.2 on admission) on CKD 3 but eventually stabilized.   [facility] [facility] denied transfer as they report patient being able to be managed outpatient.  Patient is medically stabled to be discharged back to his facility

## 2024-10-30 NOTE — SOCIAL WORK PROGRESS NOTE - NSSWPROGRESSNOTE_GEN_ALL_CORE
pt has been cleared for transition to [facility] SNF today. at this time, pt refusing dc. sw reviewed & provided pt with copy of dc notice. pt instructed to call [facility] tmrw bt 12 noon. pt verbalized understanding of above.  pt has been cleared for transition to [facility] SNF today. at this time, pt refusing dc. sw reviewed & provided pt with copy of dc notice. pt instructed to call [facility] tmrw by 12 noon should he choose to appeal. pt verbalized understanding of above.

## 2024-10-30 NOTE — DISCHARGE NOTE PROVIDER - NSDCCPCAREPLAN_GEN_ALL_CORE_FT
PRINCIPAL DISCHARGE DIAGNOSIS  Diagnosis: Acute cystitis  Assessment and Plan of Treatment: You were having discomfort in your lower abdomen.  It was initially suspected due to cystiits - infection/inflammation of your bladder.  You were treated with antibiotics and finished the course while you were inpatient.  PLEASE return to the ED/hospital if you have any worsening pain and symptoms but consider going to [place] or another tertiary hospital if you have urological symptoms and issues that may require urological services.      SECONDARY DISCHARGE DIAGNOSES  Diagnosis: Bladder pain  Assessment and Plan of Treatment: PLEASE continue taking myrbetriq and oxybutynin as directed.  PLEASE follow up with your urologist for further evaluation.    Diagnosis: ANTONIO (acute kidney injury)  Assessment and Plan of Treatment: Your kidney function worsened but eventually stabilized.  PLEASE follow up with your primary doctor or nephrologist for further management.     PRINCIPAL DISCHARGE DIAGNOSIS  Diagnosis: Acute cystitis  Assessment and Plan of Treatment: You were having discomfort in your lower abdomen.  It was initially suspected due to cystiits - infection/inflammation of your bladder.  You were treated with antibiotics and finished the course while you were inpatient.  PLEASE return to the ED/hospital if you have any worsening pain and symptoms but consider going to [place] or another tertiary hospital if you have urological symptoms and issues that may require urological services.      SECONDARY DISCHARGE DIAGNOSES  Diagnosis: Bladder pain  Assessment and Plan of Treatment: PLEASE continue taking myrbetriq and oxybutynin as directed.  PLEASE follow up with your urologist for further evaluation.    Diagnosis: ANTONIO (acute kidney injury)  Assessment and Plan of Treatment: Your kidney function worsened but eventually stabilized.  PLEASE follow up with your primary doctor or nephrologist (Dr. Cespedes) for further management.    Diagnosis: Deep vein thrombosis (DVT)  Assessment and Plan of Treatment: PLEASE monitor your INR and continue taking couamdin as prescribed.  Monitor for any excessive bleeding.    Diagnosis: Low serum sodium  Assessment and Plan of Treatment: PLEASE follow up with Dr. Cespedes in a couple of days to manage your sodium levels.

## 2024-10-30 NOTE — PROGRESS NOTE ADULT - ASSESSMENT
Gu stable fro discharge and patient understands to schedule f/u with his Urologist at [facility] for management of SP Cathter.   Bladder spasms wiith incontinenti episodes' and be managed with Oxybutynin and/or Myrbetriq.  Patient expressed understanding    Hospitalist  aware

## 2024-10-30 NOTE — DISCHARGE NOTE PROVIDER - NSDCMRMEDTOKEN_GEN_ALL_CORE_FT
Acidophilus oral capsule: 1 cap(s) orally once a day  Admelog 100 units/mL injectable solution: 4 unit(s) injectable 3 times a day (before meals) 4-8 units  Admelog U-100 Insulin lispro 100units/ml SQ QID:   alfuzosin 10 mg oral tablet, extended release: 1 tab(s) orally once a day (at bedtime)  Ambien 5 mg oral tablet: 1 tab(s) orally once a day (at bedtime)  amLODIPine 10 mg oral tablet: 1 tab(s) orally once a day  amLODIPine 10 mg oral tablet: 1 tab(s) orally once a day  amoxicillin 500 mg oral capsule: 1 cap(s) orally every 8 hours last day 2/27  ascorbic acid 500 mg oral tablet: 1 tab(s) orally once a day  ascorbic acid 500mg po daily:   cefdinir 300 mg oral capsule: 1 cap(s) orally 2 times a day  cefuroxime 500 mg oral tablet: 1 tab(s) orally 2 times a day  cefuroxime 500 mg oral tablet: 1 tab(s) orally 2 times a day  Clonidine: clonidine hcl 0.1mg po BID  cloNIDine 0.1 mg oral tablet: 1 tab(s) orally once a day  DULoxetine 60 mg oral delayed release capsule: 1 cap(s) orally once a day  ferrous sulfate 324 mg (65 mg elemental iron) oral tablet: orally once a day  finasteride 5 mg oral tablet: 1 tab(s) orally once a day  folic acid: 1 milligram(s) once a day  Gabapentin: 600 milligram(s) every 8 hours  HYDROmorphone 4 mg oral tablet: 1 tab(s) orally every 6 hours PRN  Lantus 100 units/mL subcutaneous solution: 20 unit(s) subcutaneous once a day  Lovenox 120 mg/0.8 mL injectable solution: 120 milligram(s) subcutaneously q12hrs DICONTINUE WHEN INR ABOVE 2  melatonin 5 mg oral tablet: 1 tab(s) orally once a day (at bedtime)  methocarbamol 750 mg oral tablet: 2 tab(s) orally every 8 hours PRN  metoprolol tartrate 25 mg oral tablet: 1 tab(s) orally 2 times a day  Multiple Vitamins oral tablet: 1 tab(s) orally once a day  Myrbetriq 25mg ER po daily:   Oxybutynin chloride ER 10mg tab po daily:   pantoprazole 40 mg oral delayed release tablet: 1 tab(s) orally once a day (before a meal)  warfarin 3 mg oral tablet: 1 tab(s) orally  Zinc oxide topical ointment to sacrum BID:    Acidophilus oral capsule: 1 cap(s) orally once a day  Admelog 100 units/mL injectable solution: 4 unit(s) injectable 4 times a day (before meals and at bedtime) 2-8 units (201-250=2 units, 251-300=4units, 301-350=6 units, 351-400=8units, Call MD &lt;60 or &gt;400)  alfuzosin 10 mg oral tablet, extended release: 1 tab(s) orally once a day (at bedtime)  amLODIPine 10 mg oral tablet: 1 tab(s) orally once a day  ascorbic acid 500 mg oral tablet: 1 tab(s) orally once a day  cloNIDine 0.1 mg oral tablet: 1 tab(s) orally 2 times a day  diazePAM 5 mg oral tablet: 1 tab(s) orally every 8 hours  DULoxetine 60 mg oral delayed release capsule: 1 cap(s) orally once a day  ferrous sulfate 325 mg (65 mg elemental iron) oral tablet: 1 tab(s) orally once a day  finasteride 5 mg oral tablet: 1 tab(s) orally once a day  folic acid: 1 milligram(s) once a day  gabapentin 600 mg oral tablet: 1 tab(s) orally 3 times a day  HYDROmorphone 4 mg oral tablet: 1 tab(s) orally every 6 hours PRN  Lantus 100 units/mL subcutaneous solution: 10 unit(s) subcutaneous once a day  melatonin 5 mg oral tablet: 1 tab(s) orally once a day (at bedtime)  methocarbamol 750 mg oral tablet: 2 tab(s) orally 3 times a day as needed for  muscle spasm  metoprolol tartrate 25 mg oral tablet: 1 tab(s) orally 2 times a day  Multiple Vitamins oral tablet: 1 tab(s) orally once a day  Myrbetriq 25 mg oral tablet, extended release: 1 tab(s) orally once a day  oxyBUTYnin 5 mg oral tablet: 2 tab(s) orally once a day  pantoprazole 40 mg oral delayed release tablet: 1 tab(s) orally once a day (before a meal)  ure-Na 15 g oral powder for reconstitution: 1 packet(s) orally 2 times a day  warfarin 3 mg oral tablet: 1 tab(s) orally  Zinc oxide topical ointment to sacrum BID:    Acidophilus oral capsule: 1 cap(s) orally once a day  Admelog 100 units/mL injectable solution: 4 unit(s) injectable 4 times a day (before meals and at bedtime) 2-8 units (201-250=2 units, 251-300=4units, 301-350=6 units, 351-400=8units, Call MD &lt;60 or &gt;400)  alfuzosin 10 mg oral tablet, extended release: 1 tab(s) orally once a day (at bedtime)  amLODIPine 10 mg oral tablet: 1 tab(s) orally once a day  ascorbic acid 500 mg oral tablet: 1 tab(s) orally once a day  cloNIDine 0.1 mg oral tablet: 1 tab(s) orally 2 times a day  diazePAM 5 mg oral tablet: 1 tab(s) orally every 8 hours  DULoxetine 60 mg oral delayed release capsule: 1 cap(s) orally once a day  ferrous sulfate 325 mg (65 mg elemental iron) oral tablet: 1 tab(s) orally once a day  finasteride 5 mg oral tablet: 1 tab(s) orally once a day  folic acid: 1 milligram(s) once a day  gabapentin 600 mg oral tablet: 1 tab(s) orally 3 times a day  HYDROmorphone 4 mg oral tablet: 1 tab(s) orally every 6 hours PRN  Lantus 100 units/mL subcutaneous solution: 10 unit(s) subcutaneous once a day  melatonin 5 mg oral tablet: 1 tab(s) orally once a day (at bedtime)  methocarbamol 750 mg oral tablet: 2 tab(s) orally 3 times a day as needed for  muscle spasm  metoprolol tartrate 25 mg oral tablet: 1 tab(s) orally 2 times a day  Multiple Vitamins oral tablet: 1 tab(s) orally once a day  Myrbetriq 25 mg oral tablet, extended release: 1 tab(s) orally once a day  oxyBUTYnin 5 mg oral tablet: 2 tab(s) orally once a day  pantoprazole 40 mg oral delayed release tablet: 1 tab(s) orally once a day (before a meal)  warfarin 3 mg oral tablet: 1 tab(s) orally  Zinc oxide topical ointment to sacrum BID:

## 2024-10-30 NOTE — PROGRESS NOTE ADULT - ASSESSMENT
49M HTN, DM2, BPH, Neurogenic Bladder with Suprapubic Catheter admitted for Acute Cystitis     Acute Cystitis / Bladder Spasm / Neurogenic Bladder - related to Suprapubic Catheter and could be infections vs inflammatory as per CT Imaging.  Urine Culture reviewed as well as documentation from Infectious Disease  - Completed IV Ceftriaxone Course  - f/u UCX  - Valium and Myrbetriq for Bladder Spasms currently but still painful   - Cystogram done (see results)  - will try to reach out to Dr. White (patient's urologist at [facility] re: any further interventions)    History of Lupus Anticoagulant / History of DVT  Diagnosed approximately 5 years ago and has been on Coumadin since; Has IVC Filter  Unclear for the full role of full dose anticoagulation but will continue for now and defer to Hematology  INR currently subtherapeutic and due to body habitus is high risk for DVT  Bridging with Heparin Infusion and Warfarin for Goal INR 2.0-3.0  Unwilling to take Lovenox Injections   - Hematology Consultation   - INR yesterday at 2.2   - cont coumadin dosing    Acute Renal Failure on CKD 3 - Baseline Creatinine around 2.1 - 2.3, yesterday was 2.7  - Monitor BMP and Electrolytes  - Nephrology Consulted ->  "hold  diuretic.  hold   ACE inhibitor.  hold   ARB.  On urea powder."    Generalized Weakness / Deconditioning  History of Stroke, [place] and Disc Disease  Uses [facility]   - PT and OT Consultation    HTN  Controlled   - Continue Clonidine + Amlodipine + Metoprolol    DM2  - At Home uses Lantus 20U + Insulin Premeal 4U  - Will place on Lantus 16U + ISS and titrate accordingly   - Last A1C = 8.0 (10/22)    GERD  - Protonix    Anxiety / Depression  - Cymbalta + Trazadone    Diet  - Regular    DVT Prophylaxis  - Coumadin    Disposition   Possibly discharge back to facility

## 2024-10-30 NOTE — PROGRESS NOTE ADULT - ASSESSMENT
49-year-old male history of BPH CVA chronic suprapubic catheter IVC filter complaining about bladder pain spasms seen at [facility] yesterday had a CT that showed cystitis started on antibiotics but back at [place] for continuous pain.  Denies any fever but admits to some chills.  Denies any flank pain.    ACUTE RENAL FAILURE: start iv fluid No surgicals intervention anticipated ,  Continue SP Cystostomy and monitor U/O  ,  ANTONIO, atrophic kidneys  without  hydronephrosis - fluid and medial management by   Serum creatinine is  improving     , approximating GFR at   ml/min. lactated ringers. 1000 milliLiter(s) (70 mL/Hr) IV Continuous   There is no progression . No uremic symptoms    hyponatremia improved increase urea Oral Powder 15 Gram(s) Oral bid   will check ua , urine osmolality , urine sodium , urine uric acid , serum sodium , serum osmolality , serum uric acid , f/u with hyponatremia work up , f/u with bmp , monitor i and o  start urea powder , ns 50 cc per hr       No evidence of anemia .  Fluid status stable.  Will continue to avoid nephrotoxic drugs.  Patient remains asymptomatic.   Continue current therapy.  hold  diuretic.  hold   ACE inhibitor.  hold   ARB.        BP monitoring,continue current antihypertensive meds, low salt diet,followup with PMD in 1-2 weeks  amLODIPine   Tablet 10 milliGRAM(s) Oral daily  cloNIDine 0.1 milliGRAM(s) Oral two times a day

## 2024-10-30 NOTE — DISCHARGE NOTE PROVIDER - CARE PROVIDER_API CALL
Candido White.  Urology  [address]  [place] [address]  Phone: [phone]  Fax: [phone]  Follow Up Time:    Candido White  Urology  [address]  [place] [address]  Phone: [phone]  Fax: [phone]  Follow Up Time:     Pahlavan, Mohsen  Nephrology  [address] [facility], [address] [address]  Phone: [phone]  Fax: [phone]  Follow Up Time:

## 2024-10-31 VITALS
OXYGEN SATURATION: 95 % | DIASTOLIC BLOOD PRESSURE: 74 MMHG | SYSTOLIC BLOOD PRESSURE: 126 MMHG | RESPIRATION RATE: 14 BRPM | TEMPERATURE: 98 F | HEART RATE: 69 BPM

## 2024-10-31 LAB
ALBUMIN SERPL ELPH-MCNC: 2.7 G/DL — LOW (ref 3.3–5)
ALP SERPL-CCNC: 97 U/L — SIGNIFICANT CHANGE UP (ref 30–120)
ALT FLD-CCNC: 28 U/L — SIGNIFICANT CHANGE UP (ref 10–60)
ANION GAP SERPL CALC-SCNC: 10 MMOL/L — SIGNIFICANT CHANGE UP (ref 5–17)
APTT BLD: 45.3 SEC — HIGH (ref 24.5–35.6)
AST SERPL-CCNC: 18 U/L — SIGNIFICANT CHANGE UP (ref 10–40)
BASOPHILS # BLD AUTO: 0.05 K/UL — SIGNIFICANT CHANGE UP (ref 0–0.2)
BASOPHILS NFR BLD AUTO: 0.5 % — SIGNIFICANT CHANGE UP (ref 0–2)
BILIRUB SERPL-MCNC: 0.3 MG/DL — SIGNIFICANT CHANGE UP (ref 0.2–1.2)
BUN SERPL-MCNC: 74 MG/DL — HIGH (ref 7–23)
CALCIUM SERPL-MCNC: 8.6 MG/DL — SIGNIFICANT CHANGE UP (ref 8.4–10.5)
CHLORIDE SERPL-SCNC: 102 MMOL/L — SIGNIFICANT CHANGE UP (ref 96–108)
CO2 SERPL-SCNC: 25 MMOL/L — SIGNIFICANT CHANGE UP (ref 22–31)
CREAT SERPL-MCNC: 2.65 MG/DL — HIGH (ref 0.5–1.3)
EGFR: 29 ML/MIN/1.73M2 — LOW
EOSINOPHIL # BLD AUTO: 0.33 K/UL — SIGNIFICANT CHANGE UP (ref 0–0.5)
EOSINOPHIL NFR BLD AUTO: 3.5 % — SIGNIFICANT CHANGE UP (ref 0–6)
GLUCOSE BLDC GLUCOMTR-MCNC: 164 MG/DL — HIGH (ref 70–99)
GLUCOSE BLDC GLUCOMTR-MCNC: 178 MG/DL — HIGH (ref 70–99)
GLUCOSE BLDC GLUCOMTR-MCNC: 215 MG/DL — HIGH (ref 70–99)
GLUCOSE SERPL-MCNC: 166 MG/DL — HIGH (ref 70–99)
HCT VFR BLD CALC: 31.2 % — LOW (ref 39–50)
HGB BLD-MCNC: 9.9 G/DL — LOW (ref 13–17)
IMM GRANULOCYTES NFR BLD AUTO: 0.7 % — SIGNIFICANT CHANGE UP (ref 0–0.9)
INR BLD: 2.15 RATIO — HIGH (ref 0.85–1.16)
LYMPHOCYTES # BLD AUTO: 1.49 K/UL — SIGNIFICANT CHANGE UP (ref 1–3.3)
LYMPHOCYTES # BLD AUTO: 15.8 % — SIGNIFICANT CHANGE UP (ref 13–44)
MAGNESIUM SERPL-MCNC: 1.9 MG/DL — SIGNIFICANT CHANGE UP (ref 1.6–2.6)
MCHC RBC-ENTMCNC: 24.1 PG — LOW (ref 27–34)
MCHC RBC-ENTMCNC: 31.7 G/DL — LOW (ref 32–36)
MCV RBC AUTO: 76.1 FL — LOW (ref 80–100)
MONOCYTES # BLD AUTO: 0.57 K/UL — SIGNIFICANT CHANGE UP (ref 0–0.9)
MONOCYTES NFR BLD AUTO: 6.1 % — SIGNIFICANT CHANGE UP (ref 2–14)
NEUTROPHILS # BLD AUTO: 6.9 K/UL — SIGNIFICANT CHANGE UP (ref 1.8–7.4)
NEUTROPHILS NFR BLD AUTO: 73.4 % — SIGNIFICANT CHANGE UP (ref 43–77)
NRBC # BLD: 0 /100 WBCS — SIGNIFICANT CHANGE UP (ref 0–0)
PHOSPHATE SERPL-MCNC: 4.7 MG/DL — HIGH (ref 2.5–4.5)
PLATELET # BLD AUTO: 187 K/UL — SIGNIFICANT CHANGE UP (ref 150–400)
POTASSIUM SERPL-MCNC: 4.2 MMOL/L — SIGNIFICANT CHANGE UP (ref 3.5–5.3)
POTASSIUM SERPL-SCNC: 4.2 MMOL/L — SIGNIFICANT CHANGE UP (ref 3.5–5.3)
PROT SERPL-MCNC: 6.1 G/DL — SIGNIFICANT CHANGE UP (ref 6–8.3)
PROTHROM AB SERPL-ACNC: 24.7 SEC — HIGH (ref 9.9–13.4)
RBC # BLD: 4.1 M/UL — LOW (ref 4.2–5.8)
RBC # FLD: 17.6 % — HIGH (ref 10.3–14.5)
SODIUM SERPL-SCNC: 137 MMOL/L — SIGNIFICANT CHANGE UP (ref 135–145)
WBC # BLD: 9.41 K/UL — SIGNIFICANT CHANGE UP (ref 3.8–10.5)
WBC # FLD AUTO: 9.41 K/UL — SIGNIFICANT CHANGE UP (ref 3.8–10.5)

## 2024-10-31 PROCEDURE — 86147 CARDIOLIPIN ANTIBODY EA IG: CPT

## 2024-10-31 PROCEDURE — 85598 HEXAGNAL PHOSPH PLTLT NEUTRL: CPT

## 2024-10-31 PROCEDURE — 85027 COMPLETE CBC AUTOMATED: CPT

## 2024-10-31 PROCEDURE — 85610 PROTHROMBIN TIME: CPT

## 2024-10-31 PROCEDURE — 99285 EMERGENCY DEPT VISIT HI MDM: CPT | Mod: 25

## 2024-10-31 PROCEDURE — 86038 ANTINUCLEAR ANTIBODIES: CPT

## 2024-10-31 PROCEDURE — 83020 HEMOGLOBIN ELECTROPHORESIS: CPT

## 2024-10-31 PROCEDURE — 86146 BETA-2 GLYCOPROTEIN ANTIBODY: CPT

## 2024-10-31 PROCEDURE — 82607 VITAMIN B-12: CPT

## 2024-10-31 PROCEDURE — 82746 ASSAY OF FOLIC ACID SERUM: CPT

## 2024-10-31 PROCEDURE — 74176 CT ABD & PELVIS W/O CONTRAST: CPT | Mod: MC

## 2024-10-31 PROCEDURE — 36415 COLL VENOUS BLD VENIPUNCTURE: CPT

## 2024-10-31 PROCEDURE — 85730 THROMBOPLASTIN TIME PARTIAL: CPT

## 2024-10-31 PROCEDURE — 87040 BLOOD CULTURE FOR BACTERIA: CPT

## 2024-10-31 PROCEDURE — 80048 BASIC METABOLIC PNL TOTAL CA: CPT

## 2024-10-31 PROCEDURE — 76770 US EXAM ABDO BACK WALL COMP: CPT

## 2024-10-31 PROCEDURE — 96375 TX/PRO/DX INJ NEW DRUG ADDON: CPT

## 2024-10-31 PROCEDURE — 85025 COMPLETE CBC W/AUTO DIFF WBC: CPT

## 2024-10-31 PROCEDURE — 86148 ANTI-PHOSPHOLIPID ANTIBODY: CPT

## 2024-10-31 PROCEDURE — 83735 ASSAY OF MAGNESIUM: CPT

## 2024-10-31 PROCEDURE — 85732 THROMBOPLASTIN TIME PARTIAL: CPT

## 2024-10-31 PROCEDURE — 84100 ASSAY OF PHOSPHORUS: CPT

## 2024-10-31 PROCEDURE — 83605 ASSAY OF LACTIC ACID: CPT

## 2024-10-31 PROCEDURE — 83550 IRON BINDING TEST: CPT

## 2024-10-31 PROCEDURE — 80053 COMPREHEN METABOLIC PANEL: CPT

## 2024-10-31 PROCEDURE — 85652 RBC SED RATE AUTOMATED: CPT

## 2024-10-31 PROCEDURE — 85045 AUTOMATED RETICULOCYTE COUNT: CPT

## 2024-10-31 PROCEDURE — 85613 RUSSELL VIPER VENOM DILUTED: CPT

## 2024-10-31 PROCEDURE — 99233 SBSQ HOSP IP/OBS HIGH 50: CPT

## 2024-10-31 PROCEDURE — 82728 ASSAY OF FERRITIN: CPT

## 2024-10-31 PROCEDURE — 86140 C-REACTIVE PROTEIN: CPT

## 2024-10-31 PROCEDURE — 96365 THER/PROPH/DIAG IV INF INIT: CPT

## 2024-10-31 PROCEDURE — 83540 ASSAY OF IRON: CPT

## 2024-10-31 PROCEDURE — 83036 HEMOGLOBIN GLYCOSYLATED A1C: CPT

## 2024-10-31 PROCEDURE — 96376 TX/PRO/DX INJ SAME DRUG ADON: CPT

## 2024-10-31 PROCEDURE — 82962 GLUCOSE BLOOD TEST: CPT

## 2024-10-31 RX ORDER — WARFARIN SODIUM 4 MG
5 TABLET ORAL ONCE
Refills: 0 | Status: DISCONTINUED | OUTPATIENT
Start: 2024-10-31 | End: 2024-10-31

## 2024-10-31 RX ADMIN — Medication 6 MILLIGRAM(S): at 11:45

## 2024-10-31 RX ADMIN — CLONIDINE HYDROCHLORIDE 0.1 MILLIGRAM(S): 0.2 TABLET ORAL at 05:37

## 2024-10-31 RX ADMIN — GABAPENTIN 600 MILLIGRAM(S): 300 CAPSULE ORAL at 05:37

## 2024-10-31 RX ADMIN — METHOCARBAMOL 750 MILLIGRAM(S): 500 TABLET ORAL at 05:37

## 2024-10-31 RX ADMIN — Medication 1: at 15:59

## 2024-10-31 RX ADMIN — DIAZEPAM 5 MILLIGRAM(S): 10 FILM BUCCAL at 05:37

## 2024-10-31 RX ADMIN — DULOXETINE HYDROCHLORIDE 60 MILLIGRAM(S): 30 CAPSULE, DELAYED RELEASE ORAL at 11:33

## 2024-10-31 RX ADMIN — Medication 500 MILLIGRAM(S): at 11:33

## 2024-10-31 RX ADMIN — Medication 325 MILLIGRAM(S): at 11:34

## 2024-10-31 RX ADMIN — GABAPENTIN 600 MILLIGRAM(S): 300 CAPSULE ORAL at 15:59

## 2024-10-31 RX ADMIN — Medication 2: at 11:45

## 2024-10-31 RX ADMIN — PANTOPRAZOLE SODIUM 40 MILLIGRAM(S): 40 TABLET, DELAYED RELEASE ORAL at 05:37

## 2024-10-31 RX ADMIN — FINASTERIDE 5 MILLIGRAM(S): 5 TABLET, FILM COATED ORAL at 11:34

## 2024-10-31 RX ADMIN — Medication 1: at 07:50

## 2024-10-31 RX ADMIN — Medication 3 UNIT(S): at 16:00

## 2024-10-31 RX ADMIN — Medication 3 UNIT(S): at 11:45

## 2024-10-31 RX ADMIN — DIAZEPAM 5 MILLIGRAM(S): 10 FILM BUCCAL at 15:58

## 2024-10-31 RX ADMIN — Medication 6 MILLIGRAM(S): at 06:27

## 2024-10-31 RX ADMIN — Medication 10 MILLIGRAM(S): at 05:39

## 2024-10-31 RX ADMIN — Medication 25 MILLIGRAM(S): at 05:38

## 2024-10-31 RX ADMIN — Medication 15 GRAM(S): at 17:48

## 2024-10-31 RX ADMIN — SODIUM CHLORIDE 75 MILLILITER(S): 9 INJECTION, SOLUTION INTRAMUSCULAR; INTRAVENOUS; SUBCUTANEOUS at 08:44

## 2024-10-31 RX ADMIN — Medication 3 UNIT(S): at 07:50

## 2024-10-31 RX ADMIN — MIRABEGRON 25 MILLIGRAM(S): 50 TABLET, EXTENDED RELEASE ORAL at 11:34

## 2024-10-31 RX ADMIN — IRON SUCROSE 210 MILLIGRAM(S): 20 INJECTION, SOLUTION INTRAVENOUS at 11:33

## 2024-10-31 RX ADMIN — OXYBUTYNIN CHLORIDE 10 MILLIGRAM(S): 5 TABLET ORAL at 11:34

## 2024-10-31 RX ADMIN — POLYETHYLENE GLYCOL 3350 17 GRAM(S): 17 POWDER, FOR SOLUTION ORAL at 11:33

## 2024-10-31 RX ADMIN — METHOCARBAMOL 750 MILLIGRAM(S): 500 TABLET ORAL at 15:58

## 2024-10-31 RX ADMIN — Medication 6 MILLIGRAM(S): at 05:42

## 2024-10-31 NOTE — DISCHARGE NOTE NURSING/CASE MANAGEMENT/SOCIAL WORK - PATIENT PORTAL LINK FT
You can access the FollowMyHealth Patient Portal offered by [facility] by registering at the following website: [email or web address]. By joining [email or web address]’s FollowMyHealth portal, you will also be able to view your health information using other applications (apps) compatible with our system.

## 2024-10-31 NOTE — PROGRESS NOTE ADULT - ASSESSMENT
49-year-old male history of BPH CVA chronic suprapubic catheter IVC filter complaining about bladder pain spasms seen at [facility] yesterday had a CT that showed cystitis started on antibiotics but back at [place] for continuous pain.  Denies any fever but admits to some chills.  Denies any flank pain.    ACUTE RENAL FAILURE: start iv fluid No surgicals intervention anticipated ,  Continue SP Cystostomy and monitor U/O  ,  ANTONIO, atrophic kidneys  without  hydronephrosis - fluid and medial management by   Serum creatinine is  improving     , approximating GFR at   ml/min. lactated ringers. 1000 milliLiter(s) (70 mL/Hr) IV Continuous   There is no progression . No uremic symptoms    hyponatremia improved increase urea Oral Powder 15 Gram(s) Oral bid   will check ua , urine osmolality , urine sodium , urine uric acid , serum sodium , serum osmolality , serum uric acid , f/u with hyponatremia work up , f/u with bmp , monitor i and o  start urea powder , ns 50 cc per hr       No evidence of anemia .  Fluid status stable.  Will continue to avoid nephrotoxic drugs.  Patient remains asymptomatic.   Continue current therapy.  hold  diuretic.  hold   ACE inhibitor.  hold   ARB.        BP monitoring,continue current antihypertensive meds, low salt diet,followup with PMD in 1-2 weeks  amLODIPine   Tablet 10 milliGRAM(s) Oral daily  cloNIDine 0.1 milliGRAM(s) Oral two times a day

## 2024-10-31 NOTE — PROGRESS NOTE ADULT - NUTRITIONAL ASSESSMENT
MEDICATIONS  (STANDING):  amLODIPine   Tablet 10 milliGRAM(s) Oral daily  ascorbic acid 500 milliGRAM(s) Oral daily  cefTRIAXone   IVPB 1000 milliGRAM(s) IV Intermittent every 24 hours  cloNIDine 0.1 milliGRAM(s) Oral two times a day  dextrose 5%. 1000 milliLiter(s) (50 mL/Hr) IV Continuous <Continuous>  dextrose 5%. 1000 milliLiter(s) (100 mL/Hr) IV Continuous <Continuous>  dextrose 50% Injectable 25 Gram(s) IV Push once  dextrose 50% Injectable 12.5 Gram(s) IV Push once  dextrose 50% Injectable 25 Gram(s) IV Push once  diazepam    Tablet 2.5 milliGRAM(s) Oral every 8 hours  DULoxetine 60 milliGRAM(s) Oral daily  ferrous    sulfate 325 milliGRAM(s) Oral daily  finasteride 5 milliGRAM(s) Oral daily  gabapentin 600 milliGRAM(s) Oral three times a day  glucagon  Injectable 1 milliGRAM(s) IntraMuscular once  heparin   Injectable 58949 Unit(s) IV Push once  heparin  Infusion.  Unit(s)/Hr (23 mL/Hr) IV Continuous <Continuous>  insulin glargine Injectable (LANTUS) 7 Unit(s) SubCutaneous once  insulin glargine Injectable (LANTUS) 12 Unit(s) SubCutaneous at bedtime  insulin glargine Injectable (LANTUS) 10 Unit(s) SubCutaneous every morning  insulin lispro (ADMELOG) corrective regimen sliding scale   SubCutaneous Before meals and at bedtime  lactated ringers. 1000 milliLiter(s) (70 mL/Hr) IV Continuous <Continuous>  methocarbamol 750 milliGRAM(s) Oral three times a day  metoprolol tartrate 25 milliGRAM(s) Oral two times a day  pantoprazole    Tablet 40 milliGRAM(s) Oral before breakfast  polyethylene glycol 3350 17 Gram(s) Oral daily  traZODone 50 milliGRAM(s) Oral at bedtime  warfarin 5 milliGRAM(s) Oral once
MEDICATIONS  (STANDING):  amLODIPine   Tablet 10 milliGRAM(s) Oral daily  ascorbic acid 500 milliGRAM(s) Oral daily  cefTRIAXone   IVPB 1000 milliGRAM(s) IV Intermittent every 24 hours  cloNIDine 0.1 milliGRAM(s) Oral two times a day  dextrose 5%. 1000 milliLiter(s) (50 mL/Hr) IV Continuous <Continuous>  dextrose 5%. 1000 milliLiter(s) (100 mL/Hr) IV Continuous <Continuous>  dextrose 50% Injectable 25 Gram(s) IV Push once  dextrose 50% Injectable 12.5 Gram(s) IV Push once  dextrose 50% Injectable 25 Gram(s) IV Push once  diazepam    Tablet 2.5 milliGRAM(s) Oral every 8 hours  DULoxetine 60 milliGRAM(s) Oral daily  ferrous    sulfate 325 milliGRAM(s) Oral daily  finasteride 5 milliGRAM(s) Oral daily  gabapentin 600 milliGRAM(s) Oral three times a day  glucagon  Injectable 1 milliGRAM(s) IntraMuscular once  heparin   Injectable 93678 Unit(s) IV Push once  heparin  Infusion.  Unit(s)/Hr (23 mL/Hr) IV Continuous <Continuous>  insulin glargine Injectable (LANTUS) 7 Unit(s) SubCutaneous once  insulin glargine Injectable (LANTUS) 12 Unit(s) SubCutaneous at bedtime  insulin glargine Injectable (LANTUS) 10 Unit(s) SubCutaneous every morning  insulin lispro (ADMELOG) corrective regimen sliding scale   SubCutaneous Before meals and at bedtime  lactated ringers. 1000 milliLiter(s) (70 mL/Hr) IV Continuous <Continuous>  methocarbamol 750 milliGRAM(s) Oral three times a day  metoprolol tartrate 25 milliGRAM(s) Oral two times a day  pantoprazole    Tablet 40 milliGRAM(s) Oral before breakfast  polyethylene glycol 3350 17 Gram(s) Oral daily  traZODone 50 milliGRAM(s) Oral at bedtime  warfarin 5 milliGRAM(s) Oral once
MEDICATIONS  (STANDING):  amLODIPine   Tablet 10 milliGRAM(s) Oral daily  ascorbic acid 500 milliGRAM(s) Oral daily  cloNIDine 0.1 milliGRAM(s) Oral two times a day  dextrose 5%. 1000 milliLiter(s) (50 mL/Hr) IV Continuous <Continuous>  dextrose 5%. 1000 milliLiter(s) (100 mL/Hr) IV Continuous <Continuous>  dextrose 50% Injectable 12.5 Gram(s) IV Push once  dextrose 50% Injectable 25 Gram(s) IV Push once  dextrose 50% Injectable 25 Gram(s) IV Push once  diazepam    Tablet 5 milliGRAM(s) Oral every 8 hours  DULoxetine 60 milliGRAM(s) Oral daily  ferrous    sulfate 325 milliGRAM(s) Oral daily  finasteride 5 milliGRAM(s) Oral daily  gabapentin 600 milliGRAM(s) Oral three times a day  glucagon  Injectable 1 milliGRAM(s) IntraMuscular once  heparin  Infusion.  Unit(s)/Hr (23 mL/Hr) IV Continuous <Continuous>  insulin glargine Injectable (LANTUS) 20 Unit(s) SubCutaneous at bedtime  insulin lispro (ADMELOG) corrective regimen sliding scale   SubCutaneous Before meals and at bedtime  insulin lispro Injectable (ADMELOG) 3 Unit(s) SubCutaneous three times a day before meals  iron sucrose IVPB 100 milliGRAM(s) IV Intermittent every 7 days  methocarbamol 750 milliGRAM(s) Oral three times a day  metoprolol tartrate 25 milliGRAM(s) Oral two times a day  mirabegron ER 25 milliGRAM(s) Oral daily  oxybutynin 10 milliGRAM(s) Oral daily  pantoprazole    Tablet 40 milliGRAM(s) Oral before breakfast  polyethylene glycol 3350 17 Gram(s) Oral daily  sodium chloride 0.9%. 1000 milliLiter(s) (50 mL/Hr) IV Continuous <Continuous>  traZODone 50 milliGRAM(s) Oral at bedtime  urea Oral Powder 15 Gram(s) Oral daily  warfarin 2.5 milliGRAM(s) Oral at bedtime
MEDICATIONS  (STANDING):  amLODIPine   Tablet 10 milliGRAM(s) Oral daily  ascorbic acid 500 milliGRAM(s) Oral daily  cefTRIAXone   IVPB 1000 milliGRAM(s) IV Intermittent every 24 hours  cloNIDine 0.1 milliGRAM(s) Oral two times a day  dextrose 5%. 1000 milliLiter(s) (50 mL/Hr) IV Continuous <Continuous>  dextrose 5%. 1000 milliLiter(s) (100 mL/Hr) IV Continuous <Continuous>  dextrose 50% Injectable 25 Gram(s) IV Push once  dextrose 50% Injectable 12.5 Gram(s) IV Push once  dextrose 50% Injectable 25 Gram(s) IV Push once  diazepam    Tablet 2.5 milliGRAM(s) Oral every 8 hours  DULoxetine 60 milliGRAM(s) Oral daily  ferrous    sulfate 325 milliGRAM(s) Oral daily  finasteride 5 milliGRAM(s) Oral daily  gabapentin 600 milliGRAM(s) Oral three times a day  glucagon  Injectable 1 milliGRAM(s) IntraMuscular once  heparin   Injectable 87030 Unit(s) IV Push once  heparin  Infusion.  Unit(s)/Hr (23 mL/Hr) IV Continuous <Continuous>  insulin glargine Injectable (LANTUS) 7 Unit(s) SubCutaneous once  insulin glargine Injectable (LANTUS) 12 Unit(s) SubCutaneous at bedtime  insulin glargine Injectable (LANTUS) 10 Unit(s) SubCutaneous every morning  insulin lispro (ADMELOG) corrective regimen sliding scale   SubCutaneous Before meals and at bedtime  lactated ringers. 1000 milliLiter(s) (70 mL/Hr) IV Continuous <Continuous>  methocarbamol 750 milliGRAM(s) Oral three times a day  metoprolol tartrate 25 milliGRAM(s) Oral two times a day  pantoprazole    Tablet 40 milliGRAM(s) Oral before breakfast  polyethylene glycol 3350 17 Gram(s) Oral daily  traZODone 50 milliGRAM(s) Oral at bedtime  warfarin 5 milliGRAM(s) Oral once
MEDICATIONS  (STANDING):  amLODIPine   Tablet 10 milliGRAM(s) Oral daily  ascorbic acid 500 milliGRAM(s) Oral daily  cloNIDine 0.1 milliGRAM(s) Oral two times a day  dextrose 5%. 1000 milliLiter(s) (50 mL/Hr) IV Continuous <Continuous>  dextrose 5%. 1000 milliLiter(s) (100 mL/Hr) IV Continuous <Continuous>  dextrose 50% Injectable 12.5 Gram(s) IV Push once  dextrose 50% Injectable 25 Gram(s) IV Push once  dextrose 50% Injectable 25 Gram(s) IV Push once  diazepam    Tablet 5 milliGRAM(s) Oral every 8 hours  DULoxetine 60 milliGRAM(s) Oral daily  ferrous    sulfate 325 milliGRAM(s) Oral daily  finasteride 5 milliGRAM(s) Oral daily  gabapentin 600 milliGRAM(s) Oral three times a day  glucagon  Injectable 1 milliGRAM(s) IntraMuscular once  heparin  Infusion.  Unit(s)/Hr (23 mL/Hr) IV Continuous <Continuous>  insulin glargine Injectable (LANTUS) 20 Unit(s) SubCutaneous at bedtime  insulin lispro (ADMELOG) corrective regimen sliding scale   SubCutaneous Before meals and at bedtime  insulin lispro Injectable (ADMELOG) 3 Unit(s) SubCutaneous three times a day before meals  iron sucrose IVPB 100 milliGRAM(s) IV Intermittent every 7 days  methocarbamol 750 milliGRAM(s) Oral three times a day  metoprolol tartrate 25 milliGRAM(s) Oral two times a day  mirabegron ER 25 milliGRAM(s) Oral daily  oxybutynin 10 milliGRAM(s) Oral daily  pantoprazole    Tablet 40 milliGRAM(s) Oral before breakfast  polyethylene glycol 3350 17 Gram(s) Oral daily  sodium chloride 0.9%. 1000 milliLiter(s) (50 mL/Hr) IV Continuous <Continuous>  traZODone 50 milliGRAM(s) Oral at bedtime  urea Oral Powder 15 Gram(s) Oral daily  warfarin 2.5 milliGRAM(s) Oral at bedtime
MEDICATIONS  (STANDING):  amLODIPine   Tablet 10 milliGRAM(s) Oral daily  ascorbic acid 500 milliGRAM(s) Oral daily  cefTRIAXone   IVPB 1000 milliGRAM(s) IV Intermittent every 24 hours  cloNIDine 0.1 milliGRAM(s) Oral two times a day  dextrose 5%. 1000 milliLiter(s) (50 mL/Hr) IV Continuous <Continuous>  dextrose 5%. 1000 milliLiter(s) (100 mL/Hr) IV Continuous <Continuous>  dextrose 50% Injectable 25 Gram(s) IV Push once  dextrose 50% Injectable 12.5 Gram(s) IV Push once  dextrose 50% Injectable 25 Gram(s) IV Push once  diazepam    Tablet 2.5 milliGRAM(s) Oral every 8 hours  DULoxetine 60 milliGRAM(s) Oral daily  ferrous    sulfate 325 milliGRAM(s) Oral daily  finasteride 5 milliGRAM(s) Oral daily  gabapentin 600 milliGRAM(s) Oral three times a day  glucagon  Injectable 1 milliGRAM(s) IntraMuscular once  heparin   Injectable 78490 Unit(s) IV Push once  heparin  Infusion.  Unit(s)/Hr (23 mL/Hr) IV Continuous <Continuous>  insulin glargine Injectable (LANTUS) 7 Unit(s) SubCutaneous once  insulin glargine Injectable (LANTUS) 12 Unit(s) SubCutaneous at bedtime  insulin glargine Injectable (LANTUS) 10 Unit(s) SubCutaneous every morning  insulin lispro (ADMELOG) corrective regimen sliding scale   SubCutaneous Before meals and at bedtime  lactated ringers. 1000 milliLiter(s) (70 mL/Hr) IV Continuous <Continuous>  methocarbamol 750 milliGRAM(s) Oral three times a day  metoprolol tartrate 25 milliGRAM(s) Oral two times a day  pantoprazole    Tablet 40 milliGRAM(s) Oral before breakfast  polyethylene glycol 3350 17 Gram(s) Oral daily  traZODone 50 milliGRAM(s) Oral at bedtime  warfarin 5 milliGRAM(s) Oral once
MEDICATIONS  (STANDING):  amLODIPine   Tablet 10 milliGRAM(s) Oral daily  ascorbic acid 500 milliGRAM(s) Oral daily  cloNIDine 0.1 milliGRAM(s) Oral two times a day  dextrose 5%. 1000 milliLiter(s) (50 mL/Hr) IV Continuous <Continuous>  dextrose 5%. 1000 milliLiter(s) (100 mL/Hr) IV Continuous <Continuous>  dextrose 50% Injectable 12.5 Gram(s) IV Push once  dextrose 50% Injectable 25 Gram(s) IV Push once  dextrose 50% Injectable 25 Gram(s) IV Push once  diazepam    Tablet 5 milliGRAM(s) Oral every 8 hours  DULoxetine 60 milliGRAM(s) Oral daily  ferrous    sulfate 325 milliGRAM(s) Oral daily  finasteride 5 milliGRAM(s) Oral daily  gabapentin 600 milliGRAM(s) Oral three times a day  glucagon  Injectable 1 milliGRAM(s) IntraMuscular once  heparin  Infusion.  Unit(s)/Hr (23 mL/Hr) IV Continuous <Continuous>  insulin glargine Injectable (LANTUS) 20 Unit(s) SubCutaneous at bedtime  insulin lispro (ADMELOG) corrective regimen sliding scale   SubCutaneous Before meals and at bedtime  insulin lispro Injectable (ADMELOG) 3 Unit(s) SubCutaneous three times a day before meals  iron sucrose IVPB 100 milliGRAM(s) IV Intermittent every 7 days  methocarbamol 750 milliGRAM(s) Oral three times a day  metoprolol tartrate 25 milliGRAM(s) Oral two times a day  mirabegron ER 25 milliGRAM(s) Oral daily  oxybutynin 10 milliGRAM(s) Oral daily  pantoprazole    Tablet 40 milliGRAM(s) Oral before breakfast  polyethylene glycol 3350 17 Gram(s) Oral daily  sodium chloride 0.9%. 1000 milliLiter(s) (50 mL/Hr) IV Continuous <Continuous>  traZODone 50 milliGRAM(s) Oral at bedtime  urea Oral Powder 15 Gram(s) Oral daily  warfarin 2.5 milliGRAM(s) Oral at bedtime
MEDICATIONS  (STANDING):  amLODIPine   Tablet 10 milliGRAM(s) Oral daily  ascorbic acid 500 milliGRAM(s) Oral daily  cefTRIAXone   IVPB 1000 milliGRAM(s) IV Intermittent every 24 hours  cloNIDine 0.1 milliGRAM(s) Oral two times a day  dextrose 5%. 1000 milliLiter(s) (50 mL/Hr) IV Continuous <Continuous>  dextrose 5%. 1000 milliLiter(s) (100 mL/Hr) IV Continuous <Continuous>  dextrose 50% Injectable 25 Gram(s) IV Push once  dextrose 50% Injectable 12.5 Gram(s) IV Push once  dextrose 50% Injectable 25 Gram(s) IV Push once  diazepam    Tablet 2.5 milliGRAM(s) Oral every 8 hours  DULoxetine 60 milliGRAM(s) Oral daily  ferrous    sulfate 325 milliGRAM(s) Oral daily  finasteride 5 milliGRAM(s) Oral daily  gabapentin 600 milliGRAM(s) Oral three times a day  glucagon  Injectable 1 milliGRAM(s) IntraMuscular once  heparin   Injectable 63738 Unit(s) IV Push once  heparin  Infusion.  Unit(s)/Hr (23 mL/Hr) IV Continuous <Continuous>  insulin glargine Injectable (LANTUS) 7 Unit(s) SubCutaneous once  insulin glargine Injectable (LANTUS) 12 Unit(s) SubCutaneous at bedtime  insulin glargine Injectable (LANTUS) 10 Unit(s) SubCutaneous every morning  insulin lispro (ADMELOG) corrective regimen sliding scale   SubCutaneous Before meals and at bedtime  lactated ringers. 1000 milliLiter(s) (70 mL/Hr) IV Continuous <Continuous>  methocarbamol 750 milliGRAM(s) Oral three times a day  metoprolol tartrate 25 milliGRAM(s) Oral two times a day  pantoprazole    Tablet 40 milliGRAM(s) Oral before breakfast  polyethylene glycol 3350 17 Gram(s) Oral daily  traZODone 50 milliGRAM(s) Oral at bedtime  warfarin 5 milliGRAM(s) Oral once
MEDICATIONS  (STANDING):  amLODIPine   Tablet 10 milliGRAM(s) Oral daily  ascorbic acid 500 milliGRAM(s) Oral daily  cloNIDine 0.1 milliGRAM(s) Oral two times a day  dextrose 5%. 1000 milliLiter(s) (50 mL/Hr) IV Continuous <Continuous>  dextrose 5%. 1000 milliLiter(s) (100 mL/Hr) IV Continuous <Continuous>  dextrose 50% Injectable 12.5 Gram(s) IV Push once  dextrose 50% Injectable 25 Gram(s) IV Push once  dextrose 50% Injectable 25 Gram(s) IV Push once  diazepam    Tablet 5 milliGRAM(s) Oral every 8 hours  DULoxetine 60 milliGRAM(s) Oral daily  ferrous    sulfate 325 milliGRAM(s) Oral daily  finasteride 5 milliGRAM(s) Oral daily  gabapentin 600 milliGRAM(s) Oral three times a day  glucagon  Injectable 1 milliGRAM(s) IntraMuscular once  heparin  Infusion.  Unit(s)/Hr (23 mL/Hr) IV Continuous <Continuous>  insulin glargine Injectable (LANTUS) 20 Unit(s) SubCutaneous at bedtime  insulin lispro (ADMELOG) corrective regimen sliding scale   SubCutaneous Before meals and at bedtime  insulin lispro Injectable (ADMELOG) 3 Unit(s) SubCutaneous three times a day before meals  iron sucrose IVPB 100 milliGRAM(s) IV Intermittent every 7 days  methocarbamol 750 milliGRAM(s) Oral three times a day  metoprolol tartrate 25 milliGRAM(s) Oral two times a day  mirabegron ER 25 milliGRAM(s) Oral daily  oxybutynin 10 milliGRAM(s) Oral daily  pantoprazole    Tablet 40 milliGRAM(s) Oral before breakfast  polyethylene glycol 3350 17 Gram(s) Oral daily  sodium chloride 0.9%. 1000 milliLiter(s) (50 mL/Hr) IV Continuous <Continuous>  traZODone 50 milliGRAM(s) Oral at bedtime  urea Oral Powder 15 Gram(s) Oral daily  warfarin 2.5 milliGRAM(s) Oral at bedtime

## 2024-10-31 NOTE — PROGRESS NOTE ADULT - SUBJECTIVE AND OBJECTIVE BOX
JOIE BRUNO is a 49yMale , patient examined and chart reviewed.    INTERVAL HPI/ OVERNIGHT EVENTS:   No events. Afebrile.    PAST MEDICAL & SURGICAL HISTORY:  BPH (benign prostatic hyperplasia)  HTN (hypertension)  Type 2 diabetes mellitus  Peripheral neuropathy  DDD (degenerative disc disease), lumbar  DVT, lower extremity  Renal stones  H/O Guillain-[place] syndrome  History of neurogenic bowel  DM2 (diabetes mellitus, type 2)  HTN (hypertension)  BPH without obstruction/lower urinary tract symptoms  Degenerative arthritis  DVT of lower limb, acute  CVA (cerebrovascular accident)  CVD (cerebrovascular disease)  Muscle weakness  Iron deficiency anemia  History of muscle spasm  Pain, unspecified  Vitamin deficiency  Obesity  GERD (gastroesophageal reflux disease)  H/O constipation  H/O insomnia  Essential (primary) hypertension  Acute embolism and thrombosis of deep vein of lower extremity  BPH (benign prostatic hyperplasia)  Diabetes mellitus due to underlying condition with diabetic neuropathy  Major depressive disorder, single episode  S/P IVC filter  H/O lithotripsy  Chronic suprapubic catheter    For details regarding the patient's social history, family history, and other miscellaneous elements, please refer the initial infectious diseases consultation and/or the admitting history and physical examination for this admission.    ROS:  CONSTITUTIONAL:  Negative fever or chills  EYES:  Negative  blurry vision or double vision  CARDIOVASCULAR:  Negative for chest pain or palpitations  RESPIRATORY:  Negative for cough, wheezing, or SOB   GASTROINTESTINAL:  Negative for nausea, vomiting, diarrhea, constipation, or abdominal pain  GENITOURINARY:  Negative frequency, urgency or dysuria  NEUROLOGIC:  No headache, confusion, dizziness, lightheadedness  All other systems were reviewed and are negative     ALLERGIES:  sulfa drugs (Hives)  Pipercillin Sodium-Tazobactam Sodium (Pruritus)      Current inpatient medications :    ANTIBIOTICS/RELEVANT:  cefTRIAXone   IVPB 1000 milliGRAM(s) IV Intermittent every 24 hours    MEDICATIONS  (STANDING):  amLODIPine   Tablet 10 milliGRAM(s) Oral daily  ascorbic acid 500 milliGRAM(s) Oral daily  cloNIDine 0.1 milliGRAM(s) Oral two times a day  dextrose 5%. 1000 milliLiter(s) (100 mL/Hr) IV Continuous <Continuous>  dextrose 5%. 1000 milliLiter(s) (50 mL/Hr) IV Continuous <Continuous>  dextrose 50% Injectable 12.5 Gram(s) IV Push once  dextrose 50% Injectable 25 Gram(s) IV Push once  dextrose 50% Injectable 25 Gram(s) IV Push once  diazepam    Tablet 2.5 milliGRAM(s) Oral every 8 hours  DULoxetine 60 milliGRAM(s) Oral daily  ferrous    sulfate 325 milliGRAM(s) Oral daily  finasteride 5 milliGRAM(s) Oral daily  gabapentin 600 milliGRAM(s) Oral three times a day  glucagon  Injectable 1 milliGRAM(s) IntraMuscular once  heparin  Infusion.  Unit(s)/Hr (23 mL/Hr) IV Continuous <Continuous>  insulin glargine Injectable (LANTUS) 12 Unit(s) SubCutaneous at bedtime  insulin lispro (ADMELOG) corrective regimen sliding scale   SubCutaneous Before meals and at bedtime  iron sucrose IVPB 100 milliGRAM(s) IV Intermittent every 7 days  lactated ringers. 1000 milliLiter(s) (70 mL/Hr) IV Continuous <Continuous>  methocarbamol 750 milliGRAM(s) Oral three times a day  metoprolol tartrate 25 milliGRAM(s) Oral two times a day  pantoprazole    Tablet 40 milliGRAM(s) Oral before breakfast  polyethylene glycol 3350 17 Gram(s) Oral daily  traZODone 50 milliGRAM(s) Oral at bedtime  warfarin 5 milliGRAM(s) Oral once    MEDICATIONS  (PRN):  acetaminophen     Tablet .. 650 milliGRAM(s) Oral every 6 hours PRN Temp greater or equal to 38C (100.4F), Mild Pain (1 - 3)  dextrose Oral Gel 15 Gram(s) Oral once PRN Blood Glucose LESS THAN 70 milliGRAM(s)/deciliter  heparin   Injectable 5000 Unit(s) IV Push every 6 hours PRN For aPTT between 40 - 57  heparin   Injectable 87141 Unit(s) IV Push every 6 hours PRN For aPTT less than 40  HYDROmorphone   Tablet 4 milliGRAM(s) Oral every 6 hours PRN Moderate Pain (4 - 6)  HYDROmorphone  Injectable 1 milliGRAM(s) IV Push every 8 hours PRN Severe Pain (7 - 10)        Objective:  Vital Signs Last 24 Hrs  T(C): 36.4 (24 Oct 2024 15:33), Max: 36.8 (24 Oct 2024 05:08)  T(F): 97.5 (24 Oct 2024 15:33), Max: 98.3 (24 Oct 2024 05:08)  HR: 56 (24 Oct 2024 15:33) (56 - 65)  BP: 118/74 (24 Oct 2024 15:33) (118/74 - 148/84)  BP(mean): 89 (24 Oct 2024 15:33) (89 - 101)  RR: 14 (24 Oct 2024 15:33) (14 - 20)  SpO2: 96% (24 Oct 2024 15:33) (96% - 98%)    Parameters below as of 24 Oct 2024 15:33  Patient On (Oxygen Delivery Method): room air    Physical Exam:  General:  no acute distress  Neck: supple, trachea midline  Lungs: clear, no wheeze/rhonchi  Cardiovascular: regular rate and rhythm, S1 S2  Abdomen: soft, nontender,  bowel sounds normal  Neurological: alert and oriented x3  Skin: no rash  Extremities: no edema      LABS:                        9.7    9.49  )-----------( 209      ( 24 Oct 2024 06:00 )             29.6   10-24    132[L]  |  98  |  33[H]  ----------------------------<  193[H]  3.6   |  24  |  2.60[H]    Ca    8.7      24 Oct 2024 06:00  Phos  4.4     10-24  Mg     1.4     10-24    TPro  6.8  /  Alb  2.6[L]  /  TBili  0.2  /  DBili  x   /  AST  14  /  ALT  19  /  AlkPhos  91  10-24    MICROBIOLOGY:  Culture - Urine (collected 20 Oct 2024 13:00)  Source: Clean Catch Clean Catch (Midstream)  Final Report (22 Oct 2024 10:48):    >=3 organisms. Probable collection contamination.    RADIOLOGY & ADDITIONAL STUDIES:    ACC: [id number] EXAM:  CT ABDOMEN AND PELVIS IC   ORDERED BY: DANN GRIJALVA     PROCEDURE DATE:  10/20/2024          INTERPRETATION:  CLINICAL INFORMATION: Abdominal pain.    COMPARISON: CT abdomen pelvis 7/26/2024.    CONTRAST/COMPLICATIONS:  IV Contrast: Omnipaque 350  95 cc administered   5 cc discarded  Oral Contrast: NONE  Complications: None reported at time of study completion    PROCEDURE:  CT of the Abdomen and Pelvis was performed.  Sagittal and coronal reformats were performed.    FINDINGS:  LOWER CHEST: Gynecomastia. Trace dependent atelectasis.    LIVER: Within normal limits.  BILE DUCTS: Normal caliber.  GALLBLADDER: Distended with calcified and noncalcified stones and sludge.  SPLEEN: Within normal limits.  PANCREAS: Within normallimits.  ADRENALS: Stable nodular thickening of the left adrenal gland.  KIDNEYS/URETERS: Symmetric renal enhancement. No hydronephrosis.   Bilateral stable cortical atrophy. Bilateral mild nonobstructive   nephrolithiasis.    BLADDER: Suprapubic catheter. Collapsed/thickened with surrounding   inflammation similar to prior, suspect cystitis.  REPRODUCTIVE ORGANS: Prostate within normal limits.    BOWEL: No bowel obstruction. Appendix is normal.  PERITONEUM/RETROPERITONEUM: Within normal limits.  VESSELS: IVC filter. Atherosclerotic changes.  LYMPH NODES: No lymphadenopathy.  ABDOMINAL WALL: Postsurgical changes. Small bilateral fat-containing   inguinal hernias.  BONES: L4-L5 posterior fusion and surgical decompression laminectomy.   Degenerative changes. Stable benign-appearing lucent lesion with   sclerotic rim in the left proximal femur.    IMPRESSION:  Suspect cystitis, potentially chronic as similar in appearance compared   to previous. Correlate with urinalysis.  Otherwise, no acute findings within the abdomen or pelvis.    Assessment :   48YO M resident of [facility] PM BPH CVA  neurogenic bladder sp  suprapubic catheter at [facility]  IR, approximately 2 weeks ago seen at [facility] 10/20 sec bladder spasms and had CT that showed cystitis. Started on antibiotics and was sent back to NH. Pt now presented to the hospital with persistent bladder spasm with continuous pain.   Ucx 10/20   >=3 organisms. Probable collection contamination. Prior to hx of Ecoli in Ucx.  ?acute cystitis doubt   Seen by urology - no intervention  Still with bladder spasms  Clinically stable    Plan :   Trial of Rocephin x 5 days  Trend temps and cbc  Urology follow up  Pulm toileting  Stable from ID standpoint    Continue with present regiment.  Appropriate use of antibiotics and adverse effects reviewed.      > 35 minutes were spent in direct patient care reviewing notes, medications ,labs data/ imaging , discussion with multidisciplinary team.    Thank you for allowing me to participate in care of your patient .    Yadira Blackmon MD  Infectious Disease  [phone]
     JOIE BRUNO is a 49yMale , patient examined and chart reviewed.    INTERVAL HPI/ OVERNIGHT EVENTS:   No events. Afebrile.    PAST MEDICAL & SURGICAL HISTORY:  BPH (benign prostatic hyperplasia)  HTN (hypertension)  Type 2 diabetes mellitus  Peripheral neuropathy  DDD (degenerative disc disease), lumbar  DVT, lower extremity  Renal stones  H/O Guillain-[place] syndrome  History of neurogenic bowel  DM2 (diabetes mellitus, type 2)  HTN (hypertension)  BPH without obstruction/lower urinary tract symptoms  Degenerative arthritis  DVT of lower limb, acute  CVA (cerebrovascular accident)  CVD (cerebrovascular disease)  Muscle weakness  Iron deficiency anemia  History of muscle spasm  Pain, unspecified  Vitamin deficiency  Obesity  GERD (gastroesophageal reflux disease)  H/O constipation  H/O insomnia  Essential (primary) hypertension  Acute embolism and thrombosis of deep vein of lower extremity  BPH (benign prostatic hyperplasia)  Diabetes mellitus due to underlying condition with diabetic neuropathy  Major depressive disorder, single episode  S/P IVC filter  H/O lithotripsy  Chronic suprapubic catheter    For details regarding the patient's social history, family history, and other miscellaneous elements, please refer the initial infectious diseases consultation and/or the admitting history and physical examination for this admission.    ROS:  CONSTITUTIONAL:  Negative fever or chills  EYES:  Negative  blurry vision or double vision  CARDIOVASCULAR:  Negative for chest pain or palpitations  RESPIRATORY:  Negative for cough, wheezing, or SOB   GASTROINTESTINAL:  Negative for nausea, vomiting, diarrhea, constipation, or abdominal pain  GENITOURINARY:  Negative frequency, urgency or dysuria  NEUROLOGIC:  No headache, confusion, dizziness, lightheadedness  All other systems were reviewed and are negative     ALLERGIES:  sulfa drugs (Hives)  Pipercillin Sodium-Tazobactam Sodium (Pruritus)      Current inpatient medications :    ANTIBIOTICS/RELEVANT:  cefTRIAXone   IVPB 1000 milliGRAM(s) IV Intermittent every 24 hours      acetaminophen     Tablet .. 650 milliGRAM(s) Oral every 6 hours PRN  amLODIPine   Tablet 10 milliGRAM(s) Oral daily  ascorbic acid 500 milliGRAM(s) Oral daily  cloNIDine 0.1 milliGRAM(s) Oral two times a day  dextrose 5%. 1000 milliLiter(s) IV Continuous <Continuous>  dextrose 5%. 1000 milliLiter(s) IV Continuous <Continuous>  dextrose 50% Injectable 25 Gram(s) IV Push once  dextrose 50% Injectable 12.5 Gram(s) IV Push once  dextrose 50% Injectable 25 Gram(s) IV Push once  dextrose Oral Gel 15 Gram(s) Oral once PRN  diazepam    Tablet 2.5 milliGRAM(s) Oral every 8 hours  DULoxetine 60 milliGRAM(s) Oral daily  ferrous    sulfate 325 milliGRAM(s) Oral daily  finasteride 5 milliGRAM(s) Oral daily  gabapentin 600 milliGRAM(s) Oral three times a day  glucagon  Injectable 1 milliGRAM(s) IntraMuscular once  heparin   Injectable 46148 Unit(s) IV Push every 6 hours PRN  heparin   Injectable 5000 Unit(s) IV Push every 6 hours PRN  heparin  Infusion.  Unit(s)/Hr IV Continuous <Continuous>  HYDROmorphone   Tablet 4 milliGRAM(s) Oral every 6 hours PRN  insulin glargine Injectable (LANTUS) 12 Unit(s) SubCutaneous at bedtime  insulin lispro (ADMELOG) corrective regimen sliding scale   SubCutaneous Before meals and at bedtime  lactated ringers. 1000 milliLiter(s) IV Continuous <Continuous>  methocarbamol 750 milliGRAM(s) Oral three times a day  metoprolol tartrate 25 milliGRAM(s) Oral two times a day  pantoprazole    Tablet 40 milliGRAM(s) Oral before breakfast  polyethylene glycol 3350 17 Gram(s) Oral daily  traZODone 50 milliGRAM(s) Oral at bedtime  warfarin 5 milliGRAM(s) Oral once      Objective:    10-22 @ 07:01  -  10-23 @ 07:00  --------------------------------------------------------  IN: 1000 mL / OUT: 3550 mL / NET: -2550 mL    10-23 @ 07:01  -  10-23 @ 15:49  --------------------------------------------------------  IN: 92 mL / OUT: 1300 mL / NET: -1208 mL      T(C): 36.6 (10-23-24 @ 08:15), Max: 36.7 (10-22-24 @ 17:41)  HR: 54 (10-23-24 @ 05:20) (54 - 62)  BP: 119/77 (10-23-24 @ 05:20) (119/77 - 159/97)  RR: 18 (10-23-24 @ 05:20) (13 - 20)  SpO2: 95% (10-23-24 @ 05:20) (95% - 97%)  Wt(kg): --      Physical Exam:  General:  no acute distress  Neck: supple, trachea midline  Lungs: clear, no wheeze/rhonchi  Cardiovascular: regular rate and rhythm, S1 S2  Abdomen: soft, nontender,  bowel sounds normal  Neurological: alert and oriented x3  Skin: no rash  Extremities: no edema      LABS:                        9.8    9.21  )-----------( 250      ( 23 Oct 2024 09:24 )             31.0       10-23    137  |  101  |  32[H]  ----------------------------<  239[H]  4.1   |  27  |  2.82[H]    Ca    8.9      23 Oct 2024 09:24  Phos  4.6     10-23  Mg     1.7     10-23    TPro  6.4  /  Alb  2.8[L]  /  TBili  0.2  /  DBili  x   /  AST  13  /  ALT  23  /  AlkPhos  98  10-23      PT/INR - ( 23 Oct 2024 09:28 )   PT: 14.3 sec;   INR: 1.21 ratio         PTT - ( 23 Oct 2024 09:28 )  PTT:31.3 sec    MICROBIOLOGY:  Culture - Urine (collected 20 Oct 2024 13:00)  Source: Clean Catch Clean Catch (Midstream)  Final Report (22 Oct 2024 10:48):    >=3 organisms. Probable collection contamination.    RADIOLOGY & ADDITIONAL STUDIES:    ACC: [id number] EXAM:  CT ABDOMEN AND PELVIS IC   ORDERED BY: DANN GRIJALVA     PROCEDURE DATE:  10/20/2024          INTERPRETATION:  CLINICAL INFORMATION: Abdominal pain.    COMPARISON: CT abdomen pelvis 7/26/2024.    CONTRAST/COMPLICATIONS:  IV Contrast: Omnipaque 350  95 cc administered   5 cc discarded  Oral Contrast: NONE  Complications: None reported at time of study completion    PROCEDURE:  CT of the Abdomen and Pelvis was performed.  Sagittal and coronal reformats were performed.    FINDINGS:  LOWER CHEST: Gynecomastia. Trace dependent atelectasis.    LIVER: Within normal limits.  BILE DUCTS: Normal caliber.  GALLBLADDER: Distended with calcified and noncalcified stones and sludge.  SPLEEN: Within normal limits.  PANCREAS: Within normallimits.  ADRENALS: Stable nodular thickening of the left adrenal gland.  KIDNEYS/URETERS: Symmetric renal enhancement. No hydronephrosis.   Bilateral stable cortical atrophy. Bilateral mild nonobstructive   nephrolithiasis.    BLADDER: Suprapubic catheter. Collapsed/thickened with surrounding   inflammation similar to prior, suspect cystitis.  REPRODUCTIVE ORGANS: Prostate within normal limits.    BOWEL: No bowel obstruction. Appendix is normal.  PERITONEUM/RETROPERITONEUM: Within normal limits.  VESSELS: IVC filter. Atherosclerotic changes.  LYMPH NODES: No lymphadenopathy.  ABDOMINAL WALL: Postsurgical changes. Small bilateral fat-containing   inguinal hernias.  BONES: L4-L5 posterior fusion and surgical decompression laminectomy.   Degenerative changes. Stable benign-appearing lucent lesion with   sclerotic rim in the left proximal femur.    IMPRESSION:  Suspect cystitis, potentially chronic as similar in appearance compared   to previous. Correlate with urinalysis.  Otherwise, no acute findings within the abdomen or pelvis.    Assessment :   50YO M resident of [facility] BPH CVA  neurogenic bladder sp  suprapubic catheter at [facility]  IR, approximately 2 weeks ago seen at [facility] 10/20 sec bladder spasms and had CT that showed cystitis. Started on antibiotics and was sent back to NH. Pt now presented to the hospital with persistent bladder spasm with continuous pain.   Ucx 10/20   >=3 organisms. Probable collection contamination. Prior to hx of Ecoli in Ucx.  ?acute cystitis doubt   Seen by urology - no intervention  Still with bladder spasms    Plan :   Trial of Rocephin x 5 days  Trend temps and cbc  Pulm toileting  Stable from ID standpoint    Continue with present regiment.  Appropriate use of antibiotics and adverse effects reviewed.      > 35 minutes were spent in direct patient care reviewing notes, medications ,labs data/ imaging , discussion with multidisciplinary team.    Thank you for allowing me to participate in care of your patient .    Yadira Blackmon MD  Infectious Disease  [phone]
     JOIE BRUNO is a 49yMale , patient examined and chart reviewed.    INTERVAL HPI/ OVERNIGHT EVENTS:   No events. Afebrile.    PAST MEDICAL & SURGICAL HISTORY:  BPH (benign prostatic hyperplasia)  HTN (hypertension)  Type 2 diabetes mellitus  Peripheral neuropathy  DDD (degenerative disc disease), lumbar  DVT, lower extremity  Renal stones  H/O Guillain-[place] syndrome  History of neurogenic bowel  DM2 (diabetes mellitus, type 2)  HTN (hypertension)  BPH without obstruction/lower urinary tract symptoms  Degenerative arthritis  DVT of lower limb, acute  CVA (cerebrovascular accident)  CVD (cerebrovascular disease)  Muscle weakness  Iron deficiency anemia  History of muscle spasm  Pain, unspecified  Vitamin deficiency  Obesity  GERD (gastroesophageal reflux disease)  H/O constipation  H/O insomnia  Essential (primary) hypertension  Acute embolism and thrombosis of deep vein of lower extremity  BPH (benign prostatic hyperplasia)  Diabetes mellitus due to underlying condition with diabetic neuropathy  Major depressive disorder, single episode  S/P IVC filter  H/O lithotripsy  Chronic suprapubic catheter    For details regarding the patient's social history, family history, and other miscellaneous elements, please refer the initial infectious diseases consultation and/or the admitting history and physical examination for this admission.    ROS:  CONSTITUTIONAL:  Negative fever or chills  EYES:  Negative  blurry vision or double vision  CARDIOVASCULAR:  Negative for chest pain or palpitations  RESPIRATORY:  Negative for cough, wheezing, or SOB   GASTROINTESTINAL:  Negative for nausea, vomiting, diarrhea, constipation, or abdominal pain  GENITOURINARY:  Negative frequency, urgency or dysuria  NEUROLOGIC:  No headache, confusion, dizziness, lightheadedness  All other systems were reviewed and are negative     ALLERGIES:  sulfa drugs (Hives)  Pipercillin Sodium-Tazobactam Sodium (Pruritus)      Current inpatient medications :    ANTIBIOTICS/RELEVANT:  cefTRIAXone   IVPB 1000 milliGRAM(s) IV Intermittent every 24 hours    MEDICATIONS  (STANDING):  amLODIPine   Tablet 10 milliGRAM(s) Oral daily  ascorbic acid 500 milliGRAM(s) Oral daily  cloNIDine 0.1 milliGRAM(s) Oral two times a day  dextrose 5%. 1000 milliLiter(s) (100 mL/Hr) IV Continuous <Continuous>  dextrose 5%. 1000 milliLiter(s) (50 mL/Hr) IV Continuous <Continuous>  dextrose 50% Injectable 12.5 Gram(s) IV Push once  dextrose 50% Injectable 25 Gram(s) IV Push once  dextrose 50% Injectable 25 Gram(s) IV Push once  diazepam    Tablet 2.5 milliGRAM(s) Oral every 8 hours  DULoxetine 60 milliGRAM(s) Oral daily  ferrous    sulfate 325 milliGRAM(s) Oral daily  finasteride 5 milliGRAM(s) Oral daily  gabapentin 600 milliGRAM(s) Oral three times a day  glucagon  Injectable 1 milliGRAM(s) IntraMuscular once  heparin  Infusion.  Unit(s)/Hr (23 mL/Hr) IV Continuous <Continuous>  insulin glargine Injectable (LANTUS) 16 Unit(s) SubCutaneous at bedtime  insulin lispro (ADMELOG) corrective regimen sliding scale   SubCutaneous Before meals and at bedtime  iron sucrose IVPB 100 milliGRAM(s) IV Intermittent every 7 days  lactated ringers. 1000 milliLiter(s) (70 mL/Hr) IV Continuous <Continuous>  methocarbamol 750 milliGRAM(s) Oral three times a day  metoprolol tartrate 25 milliGRAM(s) Oral two times a day  oxybutynin 10 milliGRAM(s) Oral daily  pantoprazole    Tablet 40 milliGRAM(s) Oral before breakfast  polyethylene glycol 3350 17 Gram(s) Oral daily  traZODone 50 milliGRAM(s) Oral at bedtime  warfarin 5 milliGRAM(s) Oral at bedtime    MEDICATIONS  (PRN):  acetaminophen     Tablet .. 650 milliGRAM(s) Oral every 6 hours PRN Temp greater or equal to 38C (100.4F), Mild Pain (1 - 3)  dextrose Oral Gel 15 Gram(s) Oral once PRN Blood Glucose LESS THAN 70 milliGRAM(s)/deciliter  heparin   Injectable 5000 Unit(s) IV Push every 6 hours PRN For aPTT between 40 - 57  heparin   Injectable 49198 Unit(s) IV Push every 6 hours PRN For aPTT less than 40  HYDROmorphone   Tablet 4 milliGRAM(s) Oral every 6 hours PRN Moderate Pain (4 - 6)  HYDROmorphone  Injectable 1 milliGRAM(s) IV Push every 8 hours PRN Severe Pain (7 - 10)        Objective:  Vital Signs Last 24 Hrs  T(C): 36.6 (25 Oct 2024 12:02), Max: 36.7 (25 Oct 2024 04:59)  T(F): 97.9 (25 Oct 2024 12:02), Max: 98.1 (25 Oct 2024 04:59)  HR: 58 (25 Oct 2024 07:39) (56 - 62)  BP: 114/76 (25 Oct 2024 07:39) (114/76 - 156/65)  BP(mean): 89 (25 Oct 2024 07:39) (89 - 97)  RR: 16 (25 Oct 2024 07:39) (14 - 20)  SpO2: 96% (25 Oct 2024 07:39) (96% - 97%)    Parameters below as of 25 Oct 2024 07:39  Patient On (Oxygen Delivery Method): room air    Physical Exam:  General:  no acute distress  Neck: supple, trachea midline  Lungs: clear, no wheeze/rhonchi  Cardiovascular: regular rate and rhythm, S1 S2  Abdomen: soft, nontender,  bowel sounds normal  Neurological: alert and oriented x3  Skin: no rash  Extremities: no edema      LABS:                        10.5   8.53  )-----------( 220      ( 25 Oct 2024 05:45 )             33.6   10-25    130[L]  |  97  |  34[H]  ----------------------------<  275[H]  3.7   |  22  |  2.70[H]    Ca    8.8      25 Oct 2024 05:45  Phos  4.4     10-24  Mg     1.4     10-24    TPro  6.8  /  Alb  2.6[L]  /  TBili  0.2  /  DBili  x   /  AST  12  /  ALT  13  /  AlkPhos  93  10-25    MICROBIOLOGY:  Culture - Urine (collected 20 Oct 2024 13:00)  Source: Clean Catch Clean Catch (Midstream)  Final Report (22 Oct 2024 10:48):    >=3 organisms. Probable collection contamination.    RADIOLOGY & ADDITIONAL STUDIES:    ACC: [id number] EXAM:  CT ABDOMEN AND PELVIS IC   ORDERED BY: DANN GRIJALVA     PROCEDURE DATE:  10/20/2024          INTERPRETATION:  CLINICAL INFORMATION: Abdominal pain.    COMPARISON: CT abdomen pelvis 7/26/2024.    CONTRAST/COMPLICATIONS:  IV Contrast: Omnipaque 350  95 cc administered   5 cc discarded  Oral Contrast: NONE  Complications: None reported at time of study completion    PROCEDURE:  CT of the Abdomen and Pelvis was performed.  Sagittal and coronal reformats were performed.    FINDINGS:  LOWER CHEST: Gynecomastia. Trace dependent atelectasis.    LIVER: Within normal limits.  BILE DUCTS: Normal caliber.  GALLBLADDER: Distended with calcified and noncalcified stones and sludge.  SPLEEN: Within normal limits.  PANCREAS: Within normallimits.  ADRENALS: Stable nodular thickening of the left adrenal gland.  KIDNEYS/URETERS: Symmetric renal enhancement. No hydronephrosis.   Bilateral stable cortical atrophy. Bilateral mild nonobstructive   nephrolithiasis.    BLADDER: Suprapubic catheter. Collapsed/thickened with surrounding   inflammation similar to prior, suspect cystitis.  REPRODUCTIVE ORGANS: Prostate within normal limits.    BOWEL: No bowel obstruction. Appendix is normal.  PERITONEUM/RETROPERITONEUM: Within normal limits.  VESSELS: IVC filter. Atherosclerotic changes.  LYMPH NODES: No lymphadenopathy.  ABDOMINAL WALL: Postsurgical changes. Small bilateral fat-containing   inguinal hernias.  BONES: L4-L5 posterior fusion and surgical decompression laminectomy.   Degenerative changes. Stable benign-appearing lucent lesion with   sclerotic rim in the left proximal femur.    IMPRESSION:  Suspect cystitis, potentially chronic as similar in appearance compared   to previous. Correlate with urinalysis.  Otherwise, no acute findings within the abdomen or pelvis.    Assessment :   50YO M resident of [facility] BPH CVA  neurogenic bladder sp  suprapubic catheter at [facility]  IR, approximately 2 weeks ago seen at [facility] 10/20 sec bladder spasms and had CT that showed cystitis. Started on antibiotics and was sent back to NH. Pt now presented to the hospital with persistent bladder spasm with continuous pain.   Ucx 10/20   >=3 organisms. Probable collection contamination. Prior to hx of Ecoli in Ucx.  ?acute cystitis doubt   Seen by urology - no intervention  Still with bladder spasms  Clinically stable    Plan :   Trial of Rocephin x 5 days till 10/27  Trend temps and cbc  Urology follow up  Pulm toileting  Stable from ID standpoint    Continue with present regiment.  Appropriate use of antibiotics and adverse effects reviewed.      > 35 minutes were spent in direct patient care reviewing notes, medications ,labs data/ imaging , discussion with multidisciplinary team.    Thank you for allowing me to participate in care of your patient .    Yadira Blackmon MD  Infectious Disease  [phone]
     JOIE BRUNO is a 49yMale , patient examined and chart reviewed.    INTERVAL HPI/ OVERNIGHT EVENTS:   No events. Afebrile.  NAD.     PAST MEDICAL & SURGICAL HISTORY:  BPH (benign prostatic hyperplasia)  HTN (hypertension)  Type 2 diabetes mellitus  Peripheral neuropathy  DDD (degenerative disc disease), lumbar  DVT, lower extremity  Renal stones  H/O Guillain-Wadley syndrome  History of neurogenic bowel  DM2 (diabetes mellitus, type 2)  HTN (hypertension)  BPH without obstruction/lower urinary tract symptoms  Degenerative arthritis  DVT of lower limb, acute  CVA (cerebrovascular accident)  CVD (cerebrovascular disease)  Muscle weakness  Iron deficiency anemia  History of muscle spasm  Pain, unspecified  Vitamin deficiency  Obesity  GERD (gastroesophageal reflux disease)  H/O constipation  H/O insomnia  Essential (primary) hypertension  Acute embolism and thrombosis of deep vein of lower extremity  BPH (benign prostatic hyperplasia)  Diabetes mellitus due to underlying condition with diabetic neuropathy  Major depressive disorder, single episode  S/P IVC filter  H/O lithotripsy  Chronic suprapubic catheter    For details regarding the patient's social history, family history, and other miscellaneous elements, please refer the initial infectious diseases consultation and/or the admitting history and physical examination for this admission.    ROS:  CONSTITUTIONAL:  Negative fever or chills  EYES:  Negative  blurry vision or double vision  CARDIOVASCULAR:  Negative for chest pain or palpitations  RESPIRATORY:  Negative for cough, wheezing, or SOB   GASTROINTESTINAL:  Negative for nausea, vomiting, diarrhea, constipation, or abdominal pain  GENITOURINARY:  Negative frequency, urgency or dysuria  NEUROLOGIC:  No headache, confusion, dizziness, lightheadedness  All other systems were reviewed and are negative     ALLERGIES:  sulfa drugs (Hives)  Pipercillin Sodium-Tazobactam Sodium (Pruritus)      Current inpatient medications :    ANTIBIOTICS/RELEVANT:    MEDICATIONS  (STANDING):  amLODIPine   Tablet 10 milliGRAM(s) Oral daily  ascorbic acid 500 milliGRAM(s) Oral daily  cloNIDine 0.1 milliGRAM(s) Oral two times a day  dextrose 5%. 1000 milliLiter(s) (100 mL/Hr) IV Continuous <Continuous>  dextrose 5%. 1000 milliLiter(s) (50 mL/Hr) IV Continuous <Continuous>  dextrose 50% Injectable 12.5 Gram(s) IV Push once  dextrose 50% Injectable 25 Gram(s) IV Push once  dextrose 50% Injectable 25 Gram(s) IV Push once  diazepam    Tablet 5 milliGRAM(s) Oral every 8 hours  DULoxetine 60 milliGRAM(s) Oral daily  ferrous    sulfate 325 milliGRAM(s) Oral daily  finasteride 5 milliGRAM(s) Oral daily  gabapentin 600 milliGRAM(s) Oral three times a day  glucagon  Injectable 1 milliGRAM(s) IntraMuscular once  insulin glargine Injectable (LANTUS) 20 Unit(s) SubCutaneous at bedtime  insulin lispro (ADMELOG) corrective regimen sliding scale   SubCutaneous Before meals and at bedtime  insulin lispro Injectable (ADMELOG) 3 Unit(s) SubCutaneous three times a day before meals  iron sucrose IVPB 100 milliGRAM(s) IV Intermittent every 7 days  methocarbamol 750 milliGRAM(s) Oral three times a day  metoprolol tartrate 25 milliGRAM(s) Oral two times a day  mirabegron ER 25 milliGRAM(s) Oral daily  oxybutynin 10 milliGRAM(s) Oral daily  pantoprazole    Tablet 40 milliGRAM(s) Oral before breakfast  polyethylene glycol 3350 17 Gram(s) Oral daily  sodium chloride 0.9%. 1000 milliLiter(s) (50 mL/Hr) IV Continuous <Continuous>  traZODone 50 milliGRAM(s) Oral at bedtime  urea Oral Powder 15 Gram(s) Oral two times a day    MEDICATIONS  (PRN):  acetaminophen     Tablet .. 650 milliGRAM(s) Oral every 6 hours PRN Temp greater or equal to 38C (100.4F), Mild Pain (1 - 3)  dextrose Oral Gel 15 Gram(s) Oral once PRN Blood Glucose LESS THAN 70 milliGRAM(s)/deciliter  HYDROmorphone   Tablet 4 milliGRAM(s) Oral every 4 hours PRN Moderate Pain (4 - 6)  HYDROmorphone   Tablet 6 milliGRAM(s) Oral every 4 hours PRN Severe Pain (7 - 10)      Objective:  Vital Signs Last 24 Hrs  T(C): 36.5 (29 Oct 2024 16:01), Max: 36.8 (28 Oct 2024 23:57)  T(F): 97.7 (29 Oct 2024 16:01), Max: 98.3 (28 Oct 2024 23:57)  HR: 64 (29 Oct 2024 16:01) (54 - 64)  BP: 115/69 (29 Oct 2024 16:01) (113/71 - 120/73)  BP(mean): 84 (29 Oct 2024 07:47) (84 - 84)  RR: 18 (29 Oct 2024 16:01) (18 - 18)  SpO2: 98% (29 Oct 2024 16:01) (95% - 98%)    Parameters below as of 29 Oct 2024 07:47  Patient On (Oxygen Delivery Method): room air    Physical Exam:  General:  no acute distress  Neck: supple, trachea midline  Lungs: clear, no wheeze/rhonchi  Cardiovascular: regular rate and rhythm, S1 S2  Abdomen: soft, nontender,  bowel sounds normal  Neurological: alert and oriented x3  Skin: no rash  Extremities: no edema      LABS:                        10.2   8.32  )-----------( 189      ( 29 Oct 2024 06:00 )             32.4   10-29    135  |  100  |  59[H]  ----------------------------<  208[H]  4.2   |  24  |  2.70[H]    Ca    8.9      29 Oct 2024 06:00      MICROBIOLOGY:  Culture - Urine (collected 20 Oct 2024 13:00)  Source: Clean Catch Clean Catch (Midstream)  Final Report (22 Oct 2024 10:48):    >=3 organisms. Probable collection contamination.    RADIOLOGY & ADDITIONAL STUDIES:      Assessment :   50[facility]  neurogenic bladder sp  suprapubic catheter at [facility]  IR, approximately 2 weeks ago seen at [facility] 10/20 sec bladder spasms and had CT that showed cystitis. Started on antibiotics and was sent back to NH. Pt now presented to the hospital with persistent bladder spasm with continuous pain.   Ucx 10/20   >=3 organisms. Probable collection contamination. Prior to hx of Ecoli in Ucx.  ?acute cystitis doubt   Seen by urology - for cystogram   Clinically stable    Plan :   For cystogram   Monitor off antibiotics  Sp Rocephin x 5 days ended 10/27  Trend temps and cbc  Pulm toileting  Stable from ID standpoint  Dc planning per primary team    Continue with present regiment.  Appropriate use of antibiotics and adverse effects reviewed.      > 35 minutes were spent in direct patient care reviewing notes, medications ,labs data/ imaging , discussion with multidisciplinary team.    Thank you for allowing me to participate in care of your patient .    Yadira Blackmon MD  Infectious Disease  [phone]
  Patient is a 49y Male whom presented to the hospital with ckd and osmani     PAST MEDICAL & SURGICAL HISTORY:  BPH (benign prostatic hyperplasia)      HTN (hypertension)      Type 2 diabetes mellitus      Peripheral neuropathy      History of Guillain-Dragoon syndrome      History of CVA in adulthood      DDD (degenerative disc disease), lumbar      DVT, lower extremity      Renal stones      H/O Guillain-Dragoon syndrome      History of neurogenic bowel      DM2 (diabetes mellitus, type 2)      HTN (hypertension)      BPH without obstruction/lower urinary tract symptoms      Degenerative arthritis      DVT of lower limb, acute      CVA (cerebrovascular accident)      CVD (cerebrovascular disease)      Muscle weakness      Iron deficiency anemia      History of muscle spasm      Pain, unspecified      Vitamin deficiency      Obesity      GERD (gastroesophageal reflux disease)      H/O constipation      H/O insomnia      Essential (primary) hypertension      Acute embolism and thrombosis of deep vein of lower extremity      BPH (benign prostatic hyperplasia)      Diabetes mellitus due to underlying condition with diabetic neuropathy      Major depressive disorder, single episode      S/P IVC filter      H/O lithotripsy      Chronic suprapubic catheter          MEDICATIONS  (STANDING):  amLODIPine   Tablet 10 milliGRAM(s) Oral daily  ascorbic acid 500 milliGRAM(s) Oral daily  cefTRIAXone   IVPB 1000 milliGRAM(s) IV Intermittent every 24 hours  cloNIDine 0.1 milliGRAM(s) Oral two times a day  dextrose 5%. 1000 milliLiter(s) (50 mL/Hr) IV Continuous <Continuous>  dextrose 5%. 1000 milliLiter(s) (100 mL/Hr) IV Continuous <Continuous>  dextrose 50% Injectable 12.5 Gram(s) IV Push once  dextrose 50% Injectable 25 Gram(s) IV Push once  dextrose 50% Injectable 25 Gram(s) IV Push once  diazepam    Tablet 2.5 milliGRAM(s) Oral every 8 hours  DULoxetine 60 milliGRAM(s) Oral daily  ferrous    sulfate 325 milliGRAM(s) Oral daily  finasteride 5 milliGRAM(s) Oral daily  gabapentin 600 milliGRAM(s) Oral three times a day  glucagon  Injectable 1 milliGRAM(s) IntraMuscular once  insulin lispro (ADMELOG) corrective regimen sliding scale   SubCutaneous every 6 hours  lactated ringers. 1000 milliLiter(s) (70 mL/Hr) IV Continuous <Continuous>  methocarbamol 750 milliGRAM(s) Oral three times a day  metoprolol tartrate 25 milliGRAM(s) Oral two times a day  pantoprazole    Tablet 40 milliGRAM(s) Oral before breakfast  polyethylene glycol 3350 17 Gram(s) Oral daily  traZODone 50 milliGRAM(s) Oral at bedtime      Allergies    sulfa drugs (Other)  sulfa drugs (Rash)  sulfa drugs (Hives)  sulfa drugs (Unknown)    Intolerances    Pipercillin Sodium-Tazobactam Sodium (Pruritus)      SOCIAL HISTORY:  Denies ETOh,Smoking,     FAMILY HISTORY:  FH: heart disease    FH: HTN (hypertension)    Family history of sinus tachycardia (Father)    FH: HTN (hypertension) (Mother)        REVIEW OF SYSTEMS:    CONSTITUTIONAL: No weakness, fevers or chills  RESPIRATORY: No cough, wheezing, hemoptysis; No shortness of breath  CARDIOVASCULAR: No chest pain or palpitations  GASTROINTESTINAL: No abdominal or epigastric pain. No nausea, vomiting,                                                  9.8    9.56  )-----------( 218      ( 27 Oct 2024 05:30 )             31.0       CBC Full  -  ( 27 Oct 2024 05:30 )  WBC Count : 9.56 K/uL  RBC Count : 4.14 M/uL  Hemoglobin : 9.8 g/dL  Hematocrit : 31.0 %  Platelet Count - Automated : 218 K/uL  Mean Cell Volume : 74.9 fL  Mean Cell Hemoglobin : 23.7 pg  Mean Cell Hemoglobin Concentration : 31.6 g/dL  Auto Neutrophil # : x  Auto Lymphocyte # : x  Auto Monocyte # : x  Auto Eosinophil # : x  Auto Basophil # : x  Auto Neutrophil % : x  Auto Lymphocyte % : x  Auto Monocyte % : x  Auto Eosinophil % : x  Auto Basophil % : x      10-27    129[L]  |  96  |  52[H]  ----------------------------<  214[H]  4.0   |  25  |  2.78[H]    Ca    8.9      27 Oct 2024 05:30        CAPILLARY BLOOD GLUCOSE      POCT Blood Glucose.: 248 mg/dL (27 Oct 2024 11:34)  POCT Blood Glucose.: 199 mg/dL (27 Oct 2024 07:27)  POCT Blood Glucose.: 229 mg/dL (26 Oct 2024 21:14)  POCT Blood Glucose.: 234 mg/dL (26 Oct 2024 16:46)  POCT Blood Glucose.: 252 mg/dL (26 Oct 2024 13:26)      Vital Signs Last 24 Hrs  T(C): 36.5 (27 Oct 2024 06:00), Max: 36.8 (26 Oct 2024 15:30)  T(F): 97.7 (27 Oct 2024 06:00), Max: 98.2 (26 Oct 2024 15:30)  HR: 62 (27 Oct 2024 06:00) (61 - 66)  BP: 135/81 (27 Oct 2024 06:00) (113/76 - 135/81)  BP(mean): 99 (27 Oct 2024 06:00) (92 - 99)  RR: 16 (27 Oct 2024 06:00) (13 - 17)  SpO2: 94% (27 Oct 2024 06:00) (94% - 99%)    Parameters below as of 27 Oct 2024 06:00  Patient On (Oxygen Delivery Method): room air        Urinalysis Basic - ( 27 Oct 2024 05:30 )    Color: x / Appearance: x / SG: x / pH: x  Gluc: 214 mg/dL / Ketone: x  / Bili: x / Urobili: x   Blood: x / Protein: x / Nitrite: x   Leuk Esterase: x / RBC: x / WBC x   Sq Epi: x / Non Sq Epi: x / Bacteria: x        PT/INR - ( 27 Oct 2024 05:30 )   PT: 22.7 sec;   INR: 1.97 ratio         PTT - ( 27 Oct 2024 05:30 )  PTT:87.1 sec  PHYSICAL EXAM:    Constitutional: NAD  HEENT: conjunctive   clear   Neck:  No JVD  Respiratory: CTAB  Cardiovascular: S1 and S2  Gastrointestinal: BS+, soft, NT/ND  Extremities: No peripheral edema  Neurological: A/O x 3, no focal deficits  : Suprapubic catheter.  Skin: No rashes               MEDICATIONS  (STANDING):  amLODIPine   Tablet 10 milliGRAM(s) Oral daily  ascorbic acid 500 milliGRAM(s) Oral daily  cefTRIAXone   IVPB 1000 milliGRAM(s) IV Intermittent every 24 hours  cloNIDine 0.1 milliGRAM(s) Oral two times a day  dextrose 5%. 1000 milliLiter(s) (50 mL/Hr) IV Continuous <Continuous>  dextrose 5%. 1000 milliLiter(s) (100 mL/Hr) IV Continuous <Continuous>  dextrose 50% Injectable 12.5 Gram(s) IV Push once  dextrose 50% Injectable 25 Gram(s) IV Push once  dextrose 50% Injectable 25 Gram(s) IV Push once  diazepam    Tablet 2.5 milliGRAM(s) Oral every 8 hours  DULoxetine 60 milliGRAM(s) Oral daily  ferrous    sulfate 325 milliGRAM(s) Oral daily  finasteride 5 milliGRAM(s) Oral daily  gabapentin 600 milliGRAM(s) Oral three times a day  glucagon  Injectable 1 milliGRAM(s) IntraMuscular once  insulin lispro (ADMELOG) corrective regimen sliding scale   SubCutaneous every 6 hours  lactated ringers. 1000 milliLiter(s) (70 mL/Hr) IV Continuous <Continuous>  methocarbamol 750 milliGRAM(s) Oral three times a day  metoprolol tartrate 25 milliGRAM(s) Oral two times a day  pantoprazole    Tablet 40 milliGRAM(s) Oral before breakfast  polyethylene glycol 3350 17 Gram(s) Oral daily  traZODone 50 milliGRAM(s) Oral at bedtime          
  Patient is a 49y Male whom presented to the hospital with ckd and osmani     PAST MEDICAL & SURGICAL HISTORY:  BPH (benign prostatic hyperplasia)      HTN (hypertension)      Type 2 diabetes mellitus      Peripheral neuropathy      History of Guillain-Pomona syndrome      History of CVA in adulthood      DDD (degenerative disc disease), lumbar      DVT, lower extremity      Renal stones      H/O Guillain-Pomona syndrome      History of neurogenic bowel      DM2 (diabetes mellitus, type 2)      HTN (hypertension)      BPH without obstruction/lower urinary tract symptoms      Degenerative arthritis      DVT of lower limb, acute      CVA (cerebrovascular accident)      CVD (cerebrovascular disease)      Muscle weakness      Iron deficiency anemia      History of muscle spasm      Pain, unspecified      Vitamin deficiency      Obesity      GERD (gastroesophageal reflux disease)      H/O constipation      H/O insomnia      Essential (primary) hypertension      Acute embolism and thrombosis of deep vein of lower extremity      BPH (benign prostatic hyperplasia)      Diabetes mellitus due to underlying condition with diabetic neuropathy      Major depressive disorder, single episode      S/P IVC filter      H/O lithotripsy      Chronic suprapubic catheter          MEDICATIONS  (STANDING):  amLODIPine   Tablet 10 milliGRAM(s) Oral daily  ascorbic acid 500 milliGRAM(s) Oral daily  cefTRIAXone   IVPB 1000 milliGRAM(s) IV Intermittent every 24 hours  cloNIDine 0.1 milliGRAM(s) Oral two times a day  dextrose 5%. 1000 milliLiter(s) (50 mL/Hr) IV Continuous <Continuous>  dextrose 5%. 1000 milliLiter(s) (100 mL/Hr) IV Continuous <Continuous>  dextrose 50% Injectable 12.5 Gram(s) IV Push once  dextrose 50% Injectable 25 Gram(s) IV Push once  dextrose 50% Injectable 25 Gram(s) IV Push once  diazepam    Tablet 2.5 milliGRAM(s) Oral every 8 hours  DULoxetine 60 milliGRAM(s) Oral daily  ferrous    sulfate 325 milliGRAM(s) Oral daily  finasteride 5 milliGRAM(s) Oral daily  gabapentin 600 milliGRAM(s) Oral three times a day  glucagon  Injectable 1 milliGRAM(s) IntraMuscular once  insulin lispro (ADMELOG) corrective regimen sliding scale   SubCutaneous every 6 hours  lactated ringers. 1000 milliLiter(s) (70 mL/Hr) IV Continuous <Continuous>  methocarbamol 750 milliGRAM(s) Oral three times a day  metoprolol tartrate 25 milliGRAM(s) Oral two times a day  pantoprazole    Tablet 40 milliGRAM(s) Oral before breakfast  polyethylene glycol 3350 17 Gram(s) Oral daily  traZODone 50 milliGRAM(s) Oral at bedtime      Allergies    sulfa drugs (Other)  sulfa drugs (Rash)  sulfa drugs (Hives)  sulfa drugs (Unknown)    Intolerances    Pipercillin Sodium-Tazobactam Sodium (Pruritus)      SOCIAL HISTORY:  Denies ETOh,Smoking,     FAMILY HISTORY:  FH: heart disease    FH: HTN (hypertension)    Family history of sinus tachycardia (Father)    FH: HTN (hypertension) (Mother)        REVIEW OF SYSTEMS:    CONSTITUTIONAL: No weakness, fevers or chills  RESPIRATORY: No cough, wheezing, hemoptysis; No shortness of breath  CARDIOVASCULAR: No chest pain or palpitations  GASTROINTESTINAL: No abdominal or epigastric pain. No nausea, vomiting,                                   9.9    9.41  )-----------( 187      ( 31 Oct 2024 06:00 )             31.2       CBC Full  -  ( 31 Oct 2024 06:00 )  WBC Count : 9.41 K/uL  RBC Count : 4.10 M/uL  Hemoglobin : 9.9 g/dL  Hematocrit : 31.2 %  Platelet Count - Automated : 187 K/uL  Mean Cell Volume : 76.1 fL  Mean Cell Hemoglobin : 24.1 pg  Mean Cell Hemoglobin Concentration : 31.7 g/dL  Auto Neutrophil # : 6.90 K/uL  Auto Lymphocyte # : 1.49 K/uL  Auto Monocyte # : 0.57 K/uL  Auto Eosinophil # : 0.33 K/uL  Auto Basophil # : 0.05 K/uL  Auto Neutrophil % : 73.4 %  Auto Lymphocyte % : 15.8 %  Auto Monocyte % : 6.1 %  Auto Eosinophil % : 3.5 %  Auto Basophil % : 0.5 %      10-31    137  |  102  |  74[H]  ----------------------------<  166[H]  4.2   |  25  |  2.65[H]    Ca    8.6      31 Oct 2024 06:00  Phos  4.7     10-31  Mg     1.9     10-31    TPro  6.1  /  Alb  2.7[L]  /  TBili  0.3  /  DBili  x   /  AST  18  /  ALT  28  /  AlkPhos  97  10-31      CAPILLARY BLOOD GLUCOSE      POCT Blood Glucose.: 215 mg/dL (31 Oct 2024 11:32)  POCT Blood Glucose.: 164 mg/dL (31 Oct 2024 07:42)  POCT Blood Glucose.: 158 mg/dL (30 Oct 2024 22:00)  POCT Blood Glucose.: 134 mg/dL (30 Oct 2024 16:58)      Vital Signs Last 24 Hrs  T(C): 36.6 (31 Oct 2024 14:10), Max: 36.7 (31 Oct 2024 04:49)  T(F): 97.8 (31 Oct 2024 14:10), Max: 98.1 (31 Oct 2024 04:49)  HR: 57 (31 Oct 2024 14:10) (57 - 64)  BP: 103/67 (31 Oct 2024 14:10) (103/67 - 123/72)  BP(mean): 84 (30 Oct 2024 20:24) (84 - 84)  RR: 18 (31 Oct 2024 14:10) (14 - 18)  SpO2: 96% (31 Oct 2024 14:10) (93% - 96%)    Parameters below as of 31 Oct 2024 14:10  Patient On (Oxygen Delivery Method): room air        Urinalysis Basic - ( 31 Oct 2024 06:00 )    Color: x / Appearance: x / SG: x / pH: x  Gluc: 166 mg/dL / Ketone: x  / Bili: x / Urobili: x   Blood: x / Protein: x / Nitrite: x   Leuk Esterase: x / RBC: x / WBC x   Sq Epi: x / Non Sq Epi: x / Bacteria: x        PT/INR - ( 31 Oct 2024 06:00 )   PT: 24.7 sec;   INR: 2.15 ratio         PTT - ( 31 Oct 2024 06:00 )  PTT:45.3 sec    PHYSICAL EXAM:    Constitutional: NAD  HEENT: conjunctive   clear   Neck:  No JVD  Respiratory: CTAB  Cardiovascular: S1 and S2  Gastrointestinal: BS+, soft, NT/ND  Extremities: No peripheral edema  Neurological: A/O x 3, no focal deficits  : Suprapubic catheter.  Skin: No rashes               MEDICATIONS  (STANDING):  amLODIPine   Tablet 10 milliGRAM(s) Oral daily  ascorbic acid 500 milliGRAM(s) Oral daily  cefTRIAXone   IVPB 1000 milliGRAM(s) IV Intermittent every 24 hours  cloNIDine 0.1 milliGRAM(s) Oral two times a day  dextrose 5%. 1000 milliLiter(s) (50 mL/Hr) IV Continuous <Continuous>  dextrose 5%. 1000 milliLiter(s) (100 mL/Hr) IV Continuous <Continuous>  dextrose 50% Injectable 12.5 Gram(s) IV Push once  dextrose 50% Injectable 25 Gram(s) IV Push once  dextrose 50% Injectable 25 Gram(s) IV Push once  diazepam    Tablet 2.5 milliGRAM(s) Oral every 8 hours  DULoxetine 60 milliGRAM(s) Oral daily  ferrous    sulfate 325 milliGRAM(s) Oral daily  finasteride 5 milliGRAM(s) Oral daily  gabapentin 600 milliGRAM(s) Oral three times a day  glucagon  Injectable 1 milliGRAM(s) IntraMuscular once  insulin lispro (ADMELOG) corrective regimen sliding scale   SubCutaneous every 6 hours  lactated ringers. 1000 milliLiter(s) (70 mL/Hr) IV Continuous <Continuous>  methocarbamol 750 milliGRAM(s) Oral three times a day  metoprolol tartrate 25 milliGRAM(s) Oral two times a day  pantoprazole    Tablet 40 milliGRAM(s) Oral before breakfast  polyethylene glycol 3350 17 Gram(s) Oral daily  traZODone 50 milliGRAM(s) Oral at bedtime          
Interval History:  remains weak and immobile  urine is clear with no evidence of bleeding  continues on heparin drip being bridged to coumadin    Chart reviewed and events noted;   Overnight events:    MEDICATIONS  (STANDING):  amLODIPine   Tablet 10 milliGRAM(s) Oral daily  ascorbic acid 500 milliGRAM(s) Oral daily  cefTRIAXone   IVPB 1000 milliGRAM(s) IV Intermittent every 24 hours  cloNIDine 0.1 milliGRAM(s) Oral two times a day  dextrose 5%. 1000 milliLiter(s) (100 mL/Hr) IV Continuous <Continuous>  dextrose 5%. 1000 milliLiter(s) (50 mL/Hr) IV Continuous <Continuous>  dextrose 50% Injectable 25 Gram(s) IV Push once  dextrose 50% Injectable 25 Gram(s) IV Push once  dextrose 50% Injectable 12.5 Gram(s) IV Push once  diazepam    Tablet 2.5 milliGRAM(s) Oral every 8 hours  DULoxetine 60 milliGRAM(s) Oral daily  ferrous    sulfate 325 milliGRAM(s) Oral daily  finasteride 5 milliGRAM(s) Oral daily  gabapentin 600 milliGRAM(s) Oral three times a day  glucagon  Injectable 1 milliGRAM(s) IntraMuscular once  heparin  Infusion.  Unit(s)/Hr (23 mL/Hr) IV Continuous <Continuous>  insulin glargine Injectable (LANTUS) 12 Unit(s) SubCutaneous at bedtime  insulin lispro (ADMELOG) corrective regimen sliding scale   SubCutaneous Before meals and at bedtime  iron sucrose IVPB 100 milliGRAM(s) IV Intermittent every 7 days  lactated ringers. 1000 milliLiter(s) (70 mL/Hr) IV Continuous <Continuous>  methocarbamol 750 milliGRAM(s) Oral three times a day  metoprolol tartrate 25 milliGRAM(s) Oral two times a day  pantoprazole    Tablet 40 milliGRAM(s) Oral before breakfast  polyethylene glycol 3350 17 Gram(s) Oral daily  traZODone 50 milliGRAM(s) Oral at bedtime  warfarin 5 milliGRAM(s) Oral once    MEDICATIONS  (PRN):  acetaminophen     Tablet .. 650 milliGRAM(s) Oral every 6 hours PRN Temp greater or equal to 38C (100.4F), Mild Pain (1 - 3)  dextrose Oral Gel 15 Gram(s) Oral once PRN Blood Glucose LESS THAN 70 milliGRAM(s)/deciliter  heparin   Injectable 36569 Unit(s) IV Push every 6 hours PRN For aPTT less than 40  heparin   Injectable 5000 Unit(s) IV Push every 6 hours PRN For aPTT between 40 - 57  HYDROmorphone   Tablet 4 milliGRAM(s) Oral every 6 hours PRN Moderate Pain (4 - 6)  HYDROmorphone  Injectable 1 milliGRAM(s) IV Push every 8 hours PRN Severe Pain (7 - 10)      Vital Signs Last 24 Hrs  T(C): 36.4 (24 Oct 2024 15:33), Max: 36.8 (24 Oct 2024 05:08)  T(F): 97.5 (24 Oct 2024 15:33), Max: 98.3 (24 Oct 2024 05:08)  HR: 56 (24 Oct 2024 15:33) (56 - 65)  BP: 118/74 (24 Oct 2024 15:33) (118/74 - 148/84)  BP(mean): 89 (24 Oct 2024 15:33) (89 - 101)  RR: 14 (24 Oct 2024 15:33) (14 - 20)  SpO2: 96% (24 Oct 2024 15:33) (96% - 98%)    Parameters below as of 24 Oct 2024 15:33  Patient On (Oxygen Delivery Method): room air        PHYSICAL EXAM  General: adult in NAD  HEENT: clear oropharynx, anicteric sclera, pink conjunctivae  Neck: supple  CV: normal S1S2 with no murmur rubs or gallops  Lungs: clear to auscultation, no wheezes, no rhales  Abdomen: soft non-tender non-distended, no hepato/splenomegaly  Ext: no clubbing cyanosis or edema  Skin: no rashes and no petichiae  Neuro: alert and oriented X3 no focal deficits      LABS:  CBC Full  -  ( 24 Oct 2024 06:00 )  WBC Count : 9.49 K/uL  RBC Count : 4.08 M/uL  Hemoglobin : 9.7 g/dL  Hematocrit : 29.6 %  Platelet Count - Automated : 209 K/uL  Mean Cell Volume : 72.5 fL  Mean Cell Hemoglobin : 23.8 pg  Mean Cell Hemoglobin Concentration : 32.8 g/dL  Auto Neutrophil # : x  Auto Lymphocyte # : x  Auto Monocyte # : x  Auto Eosinophil # : x  Auto Basophil # : x  Auto Neutrophil % : x  Auto Lymphocyte % : x  Auto Monocyte % : x  Auto Eosinophil % : x  Auto Basophil % : x    10-24    132[L]  |  98  |  33[H]  ----------------------------<  193[H]  3.6   |  24  |  2.60[H]    Ca    8.7      24 Oct 2024 06:00  Phos  4.4     10-24  Mg     1.4     10-24    TPro  6.8  /  Alb  2.6[L]  /  TBili  0.2  /  DBili  x   /  AST  14  /  ALT  19  /  AlkPhos  91  10-24    PT/INR - ( 24 Oct 2024 06:50 )   PT: 15.3 sec;   INR: 1.32 ratio         PTT - ( 24 Oct 2024 14:30 )  PTT:122.4 sec    fe studies  Iron - Total Binding Capacity.: 253 ug/dL (10-23 @ 06:00)  Ferritin: 91 ng/mL (10-23 @ 06:00)      WBC trend  9.49 K/uL (10-24-24 @ 06:00)  9.29 K/uL (10-23-24 @ 17:10)  9.21 K/uL (10-23-24 @ 09:24)  11.23 K/uL (10-21-24 @ 22:15)      Hgb trend  9.7 g/dL (10-24-24 @ 06:00)  10.6 g/dL (10-23-24 @ 17:10)  9.8 g/dL (10-23-24 @ 09:24)  10.1 g/dL (10-21-24 @ 22:15)      plt trend  209 K/uL (10-24-24 @ 06:00)  221 K/uL (10-23-24 @ 17:10)  250 K/uL (10-23-24 @ 09:24)  263 K/uL (10-21-24 @ 22:15)        RADIOLOGY & ADDITIONAL STUDIES:    
Subjective: Patient seen and examined. No overnight events. Still complaining of bladder spasms. Requesting Myrbetriq.     MEDICATIONS  (STANDING):  amLODIPine   Tablet 10 milliGRAM(s) Oral daily  ascorbic acid 500 milliGRAM(s) Oral daily  cefTRIAXone   IVPB 1000 milliGRAM(s) IV Intermittent every 24 hours  cloNIDine 0.1 milliGRAM(s) Oral two times a day  dextrose 5%. 1000 milliLiter(s) (100 mL/Hr) IV Continuous <Continuous>  dextrose 5%. 1000 milliLiter(s) (50 mL/Hr) IV Continuous <Continuous>  dextrose 50% Injectable 25 Gram(s) IV Push once  dextrose 50% Injectable 12.5 Gram(s) IV Push once  dextrose 50% Injectable 25 Gram(s) IV Push once  diazepam    Tablet 2.5 milliGRAM(s) Oral every 8 hours  DULoxetine 60 milliGRAM(s) Oral daily  ferrous    sulfate 325 milliGRAM(s) Oral daily  finasteride 5 milliGRAM(s) Oral daily  gabapentin 600 milliGRAM(s) Oral three times a day  glucagon  Injectable 1 milliGRAM(s) IntraMuscular once  heparin  Infusion.  Unit(s)/Hr (23 mL/Hr) IV Continuous <Continuous>  insulin glargine Injectable (LANTUS) 16 Unit(s) SubCutaneous at bedtime  insulin lispro (ADMELOG) corrective regimen sliding scale   SubCutaneous Before meals and at bedtime  iron sucrose IVPB 100 milliGRAM(s) IV Intermittent every 7 days  methocarbamol 750 milliGRAM(s) Oral three times a day  metoprolol tartrate 25 milliGRAM(s) Oral two times a day  oxybutynin 10 milliGRAM(s) Oral daily  pantoprazole    Tablet 40 milliGRAM(s) Oral before breakfast  polyethylene glycol 3350 17 Gram(s) Oral daily  traZODone 50 milliGRAM(s) Oral at bedtime  warfarin 5 milliGRAM(s) Oral at bedtime    MEDICATIONS  (PRN):  acetaminophen     Tablet .. 650 milliGRAM(s) Oral every 6 hours PRN Temp greater or equal to 38C (100.4F), Mild Pain (1 - 3)  dextrose Oral Gel 15 Gram(s) Oral once PRN Blood Glucose LESS THAN 70 milliGRAM(s)/deciliter  heparin   Injectable 5000 Unit(s) IV Push every 6 hours PRN For aPTT between 40 - 57  heparin   Injectable 43331 Unit(s) IV Push every 6 hours PRN For aPTT less than 40  HYDROmorphone   Tablet 4 milliGRAM(s) Oral every 6 hours PRN Moderate Pain (4 - 6)  HYDROmorphone  Injectable 1 milliGRAM(s) IV Push every 8 hours PRN Severe Pain (7 - 10)      Vital Signs Last 24 Hrs  T(C): 37.1 (26 Oct 2024 05:22), Max: 37.1 (26 Oct 2024 05:22)  T(F): 98.7 (26 Oct 2024 05:22), Max: 98.7 (26 Oct 2024 05:22)  HR: 65 (26 Oct 2024 05:22) (58 - 67)  BP: 126/75 (26 Oct 2024 05:22) (115/72 - 132/76)  BP(mean): 92 (26 Oct 2024 05:22) (92 - 92)  RR: 18 (26 Oct 2024 05:22) (16 - 18)  SpO2: 95% (26 Oct 2024 05:22) (93% - 97%)    Parameters below as of 26 Oct 2024 05:22  Patient On (Oxygen Delivery Method): room air        PHYSICAL EXAM:  GENERAL: NAD  HEAD:  Atraumatic, Normocephalic  ENMT: Moist mucous membranes  NECK: Supple, No JVD, Normal thyroid  CHEST/LUNG: Clear to auscultation bilaterally  HEART: Regular rate and rhythm  ABDOMEN: Soft, Nontender, Nondistended  EXTREMITIES:  2+ Peripheral Pulses      LABS:                        10.2   9.35  )-----------( 203      ( 26 Oct 2024 05:30 )             32.0     26 Oct 2024 05:30    129    |  95     |  35     ----------------------------<  213    3.9     |  24     |  2.72     Ca    8.9        26 Oct 2024 05:30      PT/INR - ( 26 Oct 2024 05:30 )   PT: 18.9 sec;   INR: 1.61 ratio         PTT - ( 26 Oct 2024 05:30 )  PTT:108.2 sec  Urinalysis Basic - ( 26 Oct 2024 05:30 )    Color: x / Appearance: x / SG: x / pH: x  Gluc: 213 mg/dL / Ketone: x  / Bili: x / Urobili: x   Blood: x / Protein: x / Nitrite: x   Leuk Esterase: x / RBC: x / WBC x   Sq Epi: x / Non Sq Epi: x / Bacteria: x      CAPILLARY BLOOD GLUCOSE      POCT Blood Glucose.: 223 mg/dL (26 Oct 2024 07:58)  POCT Blood Glucose.: 225 mg/dL (25 Oct 2024 21:28)  POCT Blood Glucose.: 241 mg/dL (25 Oct 2024 17:22)  POCT Blood Glucose.: 248 mg/dL (25 Oct 2024 12:19)        
Subjective: Resting and no complaints.     MEDICATIONS  (STANDING):  amLODIPine   Tablet 10 milliGRAM(s) Oral daily  ascorbic acid 500 milliGRAM(s) Oral daily  cefTRIAXone   IVPB 1000 milliGRAM(s) IV Intermittent every 24 hours  cloNIDine 0.1 milliGRAM(s) Oral two times a day  dextrose 5%. 1000 milliLiter(s) (50 mL/Hr) IV Continuous <Continuous>  dextrose 5%. 1000 milliLiter(s) (100 mL/Hr) IV Continuous <Continuous>  dextrose 50% Injectable 12.5 Gram(s) IV Push once  dextrose 50% Injectable 25 Gram(s) IV Push once  dextrose 50% Injectable 25 Gram(s) IV Push once  diazepam    Tablet 2.5 milliGRAM(s) Oral every 8 hours  DULoxetine 60 milliGRAM(s) Oral daily  ferrous    sulfate 325 milliGRAM(s) Oral daily  finasteride 5 milliGRAM(s) Oral daily  gabapentin 600 milliGRAM(s) Oral three times a day  glucagon  Injectable 1 milliGRAM(s) IntraMuscular once  heparin  Infusion.  Unit(s)/Hr (23 mL/Hr) IV Continuous <Continuous>  insulin glargine Injectable (LANTUS) 16 Unit(s) SubCutaneous at bedtime  insulin lispro (ADMELOG) corrective regimen sliding scale   SubCutaneous Before meals and at bedtime  iron sucrose IVPB 100 milliGRAM(s) IV Intermittent every 7 days  lactated ringers. 1000 milliLiter(s) (70 mL/Hr) IV Continuous <Continuous>  methocarbamol 750 milliGRAM(s) Oral three times a day  metoprolol tartrate 25 milliGRAM(s) Oral two times a day  pantoprazole    Tablet 40 milliGRAM(s) Oral before breakfast  polyethylene glycol 3350 17 Gram(s) Oral daily  traZODone 50 milliGRAM(s) Oral at bedtime    MEDICATIONS  (PRN):  acetaminophen     Tablet .. 650 milliGRAM(s) Oral every 6 hours PRN Temp greater or equal to 38C (100.4F), Mild Pain (1 - 3)  dextrose Oral Gel 15 Gram(s) Oral once PRN Blood Glucose LESS THAN 70 milliGRAM(s)/deciliter  heparin   Injectable 92495 Unit(s) IV Push every 6 hours PRN For aPTT less than 40  heparin   Injectable 5000 Unit(s) IV Push every 6 hours PRN For aPTT between 40 - 57  HYDROmorphone   Tablet 4 milliGRAM(s) Oral every 6 hours PRN Moderate Pain (4 - 6)  HYDROmorphone  Injectable 1 milliGRAM(s) IV Push every 8 hours PRN Severe Pain (7 - 10)      Vital Signs Last 24 Hrs  T(C): 36.4 (25 Oct 2024 07:39), Max: 36.7 (25 Oct 2024 04:59)  T(F): 97.5 (25 Oct 2024 07:39), Max: 98.1 (25 Oct 2024 04:59)  HR: 58 (25 Oct 2024 07:39) (56 - 62)  BP: 114/76 (25 Oct 2024 07:39) (114/76 - 156/65)  BP(mean): 89 (25 Oct 2024 07:39) (89 - 97)  RR: 16 (25 Oct 2024 07:39) (14 - 20)  SpO2: 96% (25 Oct 2024 07:39) (96% - 97%)    Parameters below as of 25 Oct 2024 07:39  Patient On (Oxygen Delivery Method): room air        PHYSICAL EXAM:  GENERAL: NAD  HEAD:  Atraumatic, Normocephalic  ENMT: Moist mucous membranes  NECK: Supple, No JVD, Normal thyroid  CHEST/LUNG: Clear to auscultation bilaterally  HEART: Regular rate and rhythm  ABDOMEN: Soft, Nontender, Nondistended  EXTREMITIES:  2+ Peripheral Pulses      LABS:                        10.5   8.53  )-----------( 220      ( 25 Oct 2024 05:45 )             33.6     25 Oct 2024 05:45    130    |  97     |  34     ----------------------------<  275    3.7     |  22     |  2.70     Ca    8.8        25 Oct 2024 05:45    TPro  6.8    /  Alb  2.6    /  TBili  0.2    /  DBili  x      /  AST  12     /  ALT  13     /  AlkPhos  93     25 Oct 2024 05:45    PT/INR - ( 25 Oct 2024 05:45 )   PT: 15.7 sec;   INR: 1.36 ratio         PTT - ( 25 Oct 2024 03:55 )  PTT:73.5 sec  Urinalysis Basic - ( 25 Oct 2024 05:45 )    Color: x / Appearance: x / SG: x / pH: x  Gluc: 275 mg/dL / Ketone: x  / Bili: x / Urobili: x   Blood: x / Protein: x / Nitrite: x   Leuk Esterase: x / RBC: x / WBC x   Sq Epi: x / Non Sq Epi: x / Bacteria: x      CAPILLARY BLOOD GLUCOSE      POCT Blood Glucose.: 270 mg/dL (25 Oct 2024 07:56)  POCT Blood Glucose.: 224 mg/dL (24 Oct 2024 21:19)  POCT Blood Glucose.: 236 mg/dL (24 Oct 2024 18:01)  POCT Blood Glucose.: 292 mg/dL (24 Oct 2024 12:28)        
     JOIE BRUNO is a 49yMale , patient examined and chart reviewed.    INTERVAL HPI/ OVERNIGHT EVENTS:   No events. Afebrile.  NAD. Still with bladder discomfort.    PAST MEDICAL & SURGICAL HISTORY:  BPH (benign prostatic hyperplasia)  HTN (hypertension)  Type 2 diabetes mellitus  Peripheral neuropathy  DDD (degenerative disc disease), lumbar  DVT, lower extremity  Renal stones  H/O Guillain-[place] syndrome  History of neurogenic bowel  DM2 (diabetes mellitus, type 2)  HTN (hypertension)  BPH without obstruction/lower urinary tract symptoms  Degenerative arthritis  DVT of lower limb, acute  CVA (cerebrovascular accident)  CVD (cerebrovascular disease)  Muscle weakness  Iron deficiency anemia  History of muscle spasm  Pain, unspecified  Vitamin deficiency  Obesity  GERD (gastroesophageal reflux disease)  H/O constipation  H/O insomnia  Essential (primary) hypertension  Acute embolism and thrombosis of deep vein of lower extremity  BPH (benign prostatic hyperplasia)  Diabetes mellitus due to underlying condition with diabetic neuropathy  Major depressive disorder, single episode  S/P IVC filter  H/O lithotripsy  Chronic suprapubic catheter    For details regarding the patient's social history, family history, and other miscellaneous elements, please refer the initial infectious diseases consultation and/or the admitting history and physical examination for this admission.    ROS:  CONSTITUTIONAL:  Negative fever or chills  EYES:  Negative  blurry vision or double vision  CARDIOVASCULAR:  Negative for chest pain or palpitations  RESPIRATORY:  Negative for cough, wheezing, or SOB   GASTROINTESTINAL:  Negative for nausea, vomiting, diarrhea, constipation, or abdominal pain  GENITOURINARY:  Negative frequency, urgency or dysuria  NEUROLOGIC:  No headache, confusion, dizziness, lightheadedness  All other systems were reviewed and are negative     ALLERGIES:  sulfa drugs (Hives)  Pipercillin Sodium-Tazobactam Sodium (Pruritus)      Current inpatient medications :    ANTIBIOTICS/RELEVANT:    MEDICATIONS  (STANDING):  amLODIPine   Tablet 10 milliGRAM(s) Oral daily  ascorbic acid 500 milliGRAM(s) Oral daily  cloNIDine 0.1 milliGRAM(s) Oral two times a day  dextrose 5%. 1000 milliLiter(s) (50 mL/Hr) IV Continuous <Continuous>  dextrose 5%. 1000 milliLiter(s) (100 mL/Hr) IV Continuous <Continuous>  dextrose 50% Injectable 12.5 Gram(s) IV Push once  dextrose 50% Injectable 25 Gram(s) IV Push once  dextrose 50% Injectable 25 Gram(s) IV Push once  diazepam    Tablet 5 milliGRAM(s) Oral every 8 hours  DULoxetine 60 milliGRAM(s) Oral daily  ferrous    sulfate 325 milliGRAM(s) Oral daily  finasteride 5 milliGRAM(s) Oral daily  gabapentin 600 milliGRAM(s) Oral three times a day  glucagon  Injectable 1 milliGRAM(s) IntraMuscular once  insulin glargine Injectable (LANTUS) 20 Unit(s) SubCutaneous at bedtime  insulin lispro (ADMELOG) corrective regimen sliding scale   SubCutaneous Before meals and at bedtime  insulin lispro Injectable (ADMELOG) 3 Unit(s) SubCutaneous three times a day before meals  iron sucrose IVPB 100 milliGRAM(s) IV Intermittent every 7 days  methocarbamol 750 milliGRAM(s) Oral three times a day  metoprolol tartrate 25 milliGRAM(s) Oral two times a day  mirabegron ER 25 milliGRAM(s) Oral daily  oxybutynin 10 milliGRAM(s) Oral daily  pantoprazole    Tablet 40 milliGRAM(s) Oral before breakfast  polyethylene glycol 3350 17 Gram(s) Oral daily  sodium chloride 0.9%. 1000 milliLiter(s) (75 mL/Hr) IV Continuous <Continuous>  traZODone 50 milliGRAM(s) Oral at bedtime  urea Oral Powder 15 Gram(s) Oral two times a day  warfarin 5 milliGRAM(s) Oral once    MEDICATIONS  (PRN):  acetaminophen     Tablet .. 650 milliGRAM(s) Oral every 6 hours PRN Temp greater or equal to 38C (100.4F), Mild Pain (1 - 3)  dextrose Oral Gel 15 Gram(s) Oral once PRN Blood Glucose LESS THAN 70 milliGRAM(s)/deciliter  HYDROmorphone   Tablet 4 milliGRAM(s) Oral every 4 hours PRN Moderate Pain (4 - 6)  HYDROmorphone   Tablet 6 milliGRAM(s) Oral every 4 hours PRN Severe Pain (7 - 10)    Objective:  Vital Signs Last 24 Hrs  T(C): 36.7 (31 Oct 2024 04:49), Max: 36.8 (30 Oct 2024 12:42)  T(F): 98.1 (31 Oct 2024 04:49), Max: 98.2 (30 Oct 2024 12:42)  HR: 57 (31 Oct 2024 04:49) (57 - 64)  BP: 110/69 (31 Oct 2024 04:49) (110/69 - 123/72)  BP(mean): 84 (30 Oct 2024 20:24) (84 - 84)  RR: 18 (31 Oct 2024 04:49) (14 - 18)  SpO2: 95% (31 Oct 2024 04:49) (93% - 95%)    Parameters below as of 31 Oct 2024 04:49  Patient On (Oxygen Delivery Method): room air    Physical Exam:  General:  no acute distress  Neck: supple, trachea midline  Lungs: clear, no wheeze/rhonchi  Cardiovascular: regular rate and rhythm, S1 S2  Abdomen: soft, nontender,  bowel sounds normal  Neurological: alert and oriented x3  Skin: no rash  Extremities: no edema      LABS:                        9.9    9.41  )-----------( 187      ( 31 Oct 2024 06:00 )             31.2   10-31    137  |  102  |  74[H]  ----------------------------<  166[H]  4.2   |  25  |  2.65[H]    Ca    8.6      31 Oct 2024 06:00  Phos  4.7     10-31  Mg     1.9     10-31    TPro  6.1  /  Alb  2.7[L]  /  TBili  0.3  /  DBili  x   /  AST  18  /  ALT  28  /  AlkPhos  97  10-31    MICROBIOLOGY:  Culture - Urine (collected 20 Oct 2024 13:00)  Source: Clean Catch Clean Catch (Midstream)  Final Report (22 Oct 2024 10:48):    >=3 organisms. Probable collection contamination.    RADIOLOGY & ADDITIONAL STUDIES:    ACC: [id number] EXAM:  CT ABD PELV CYSTOGRAMEXISTCATH   ORDERED BY: PRETTY COLMENARES     PROCEDURE DATE:  10/29/2024          INTERPRETATION:  CLINICAL INFORMATION: Evaluate suprapubic catheter   placement.    COMPARISON: CT scan abdomen and pelvis 10/20/2024.    CONTRAST/COMPLICATIONS:  IV Contrast: NONE  Oral Contrast: None  Complications: None reported at time of study completion    PROCEDURE:  CT of the Pelvis was performed (CT Cystogram).  Precontrast images were obtained through thepelvis followed by imaging   after the instillation of a dilute mixture of [id number] via a   suprapubic catheter.  Sagittal and coronal reformats were performed.    FINDINGS:    BLADDER:  Suprapubic catheter within a circumferentially thickened, trabeculated   bladder wall.  There is a more focal trabeculation or bladder diverticulum at the   superior dome.  No extravasation of contrast is noted.  Excretion of contrast into the penile urethra is noted.    REPRODUCTIVE ORGANS: Prostate is not enlarged.    LYMPH NODES: No pelvic lymphadenopathy.    VISUALIZED PORTIONS:    ABDOMINAL ORGANS:  Not evaluated.  BOWEL: Within normal limits.  PERITONEUM/RETROPERITONEUM: Within normal limits.    VESSELS: Within normal limits.    ABDOMINAL WALL:  Small bilateral fat-containing inguinal hernias.    BONES:  Erosion/remodeling of the coccygeal bone associated with soft tissue   density; correlate for chronic osteomyelitis.    IMPRESSION:    In situ suprapubic catheter with thick-walled, trabeculatedurinary   bladder.  Probable diverticulum at the superior dome.  No extravasation of contrast noted.    Assessment :   50YO M resident of [facility] PMH BPH CVA  neurogenic bladder sp  suprapubic catheter at [facility]  IR, approximately 2 weeks ago seen at [facility] 10/20 sec bladder spasms and had CT that showed cystitis. Started on antibiotics and was sent back to NH. Pt now presented to the hospital with persistent bladder spasm with continuous pain.   Ucx 10/20   >=3 organisms. Probable collection contamination. Prior to hx of Ecoli in Ucx.  ?acute cystitis doubt   Seen by urology - sp cystogram   Still with bladder spasms  Urology follow up noted  Chronic sacral decub with chronic osteo  Clinically stable    Plan :   Monitor off antibiotics  Sp Rocephin x 5 days ended 10/27  Cultures ngtd  Trend temps and cbc  Cont local wound care for decub ulcer  Pulm toileting  Stable from ID standpoint  Dc planning per primary team    Continue with present regiment.  Appropriate use of antibiotics and adverse effects reviewed.      > 35 minutes were spent in direct patient care reviewing notes, medications ,labs data/ imaging , discussion with multidisciplinary team.    Thank you for allowing me to participate in care of your patient .    Yadira Blackmon MD  Infectious Disease  [phone]
Gu Progress Note:  resting, comfortable at present has leakage per urethra ,, SP Cath functioning and position confirmed with CT    PAST MEDICAL & SURGICAL HISTORY:  BPH (benign prostatic hyperplasia)      HTN (hypertension)      Type 2 diabetes mellitus      Peripheral neuropathy      History of Guillain-[place] syndrome      History of CVA in adulthood      DDD (degenerative disc disease), lumbar      DVT, lower extremity      Renal stones      H/O Guillain-[place] syndrome      History of neurogenic bowel      DM2 (diabetes mellitus, type 2)      HTN (hypertension)      BPH without obstruction/lower urinary tract symptoms      Degenerative arthritis      DVT of lower limb, acute      CVA (cerebrovascular accident)      CVD (cerebrovascular disease)      Muscle weakness      Iron deficiency anemia      History of muscle spasm      Pain, unspecified      Vitamin deficiency      Obesity      GERD (gastroesophageal reflux disease)      H/O constipation      H/O insomnia      Essential (primary) hypertension      Acute embolism and thrombosis of deep vein of lower extremity      BPH (benign prostatic hyperplasia)      Diabetes mellitus due to underlying condition with diabetic neuropathy      Major depressive disorder, single episode      S/P IVC filter      H/O lithotripsy      Chronic suprapubic catheter            MEDICATIONS  (STANDING):  amLODIPine   Tablet 10 milliGRAM(s) Oral daily  ascorbic acid 500 milliGRAM(s) Oral daily  cloNIDine 0.1 milliGRAM(s) Oral two times a day  dextrose 5%. 1000 milliLiter(s) (100 mL/Hr) IV Continuous <Continuous>  dextrose 5%. 1000 milliLiter(s) (50 mL/Hr) IV Continuous <Continuous>  dextrose 50% Injectable 12.5 Gram(s) IV Push once  dextrose 50% Injectable 25 Gram(s) IV Push once  dextrose 50% Injectable 25 Gram(s) IV Push once  diazepam    Tablet 5 milliGRAM(s) Oral every 8 hours  DULoxetine 60 milliGRAM(s) Oral daily  ferrous    sulfate 325 milliGRAM(s) Oral daily  finasteride 5 milliGRAM(s) Oral daily  gabapentin 600 milliGRAM(s) Oral three times a day  glucagon  Injectable 1 milliGRAM(s) IntraMuscular once  insulin glargine Injectable (LANTUS) 20 Unit(s) SubCutaneous at bedtime  insulin lispro (ADMELOG) corrective regimen sliding scale   SubCutaneous Before meals and at bedtime  insulin lispro Injectable (ADMELOG) 3 Unit(s) SubCutaneous three times a day before meals  iron sucrose IVPB 100 milliGRAM(s) IV Intermittent every 7 days  methocarbamol 750 milliGRAM(s) Oral three times a day  metoprolol tartrate 25 milliGRAM(s) Oral two times a day  mirabegron ER 25 milliGRAM(s) Oral daily  oxybutynin 10 milliGRAM(s) Oral daily  pantoprazole    Tablet 40 milliGRAM(s) Oral before breakfast  polyethylene glycol 3350 17 Gram(s) Oral daily  sodium chloride 0.9%. 1000 milliLiter(s) (50 mL/Hr) IV Continuous <Continuous>  traZODone 50 milliGRAM(s) Oral at bedtime  urea Oral Powder 15 Gram(s) Oral two times a day    MEDICATIONS  (PRN):  acetaminophen     Tablet .. 650 milliGRAM(s) Oral every 6 hours PRN Temp greater or equal to 38C (100.4F), Mild Pain (1 - 3)  dextrose Oral Gel 15 Gram(s) Oral once PRN Blood Glucose LESS THAN 70 milliGRAM(s)/deciliter  HYDROmorphone   Tablet 4 milliGRAM(s) Oral every 4 hours PRN Moderate Pain (4 - 6)  HYDROmorphone   Tablet 6 milliGRAM(s) Oral every 4 hours PRN Severe Pain (7 - 10)      Allergies    sulfa drugs (Other)  sulfa drugs (Rash)  sulfa drugs (Hives)  sulfa drugs (Unknown)    Intolerances    Pipercillin Sodium-Tazobactam Sodium (Pruritus)          FAMILY HISTORY:  FH: heart disease    FH: HTN (hypertension)    Family history of sinus tachycardia (Father)    FH: HTN (hypertension) (Mother)        Vital Signs Last 24 Hrs  T(C): 36.8 (30 Oct 2024 12:42), Max: 37 (30 Oct 2024 05:23)  T(F): 98.2 (30 Oct 2024 12:42), Max: 98.6 (30 Oct 2024 05:23)  HR: 60 (30 Oct 2024 12:42) (60 - 68)  BP: 113/72 (30 Oct 2024 12:42) (113/72 - 127/85)  BP(mean): 99 (29 Oct 2024 22:02) (99 - 99)  RR: 17 (30 Oct 2024 12:42) (15 - 18)  SpO2: 95% (30 Oct 2024 12:42) (95% - 98%)    Parameters below as of 30 Oct 2024 05:23  Patient On (Oxygen Delivery Method): room air        PHYSICAL EXAM:    Constitutional: NAD, obese, asymptomatic, AO  Abd: BS+, soft, NT/ND, No CVAT, SP cath draining without problem  Extremities: No peripheral edema    LABS:                        10.8   9.41  )-----------( 184      ( 30 Oct 2024 06:00 )             34.3     10-30    135  |  101  |  54[H]  ----------------------------<  175[H]  4.1   |  25  |  2.91[H]    Ca    9.0      30 Oct 2024 12:23  Phos  4.2     10-30  Mg     1.4     10-30    TPro  6.9  /  Alb  2.6[L]  /  TBili  0.2  /  DBili  x   /  AST  13  /  ALT  18  /  AlkPhos  94  10-30    PT/INR - ( 30 Oct 2024 12:23 )   PT: 27.5 sec;   INR: 2.39 ratio         PTT - ( 30 Oct 2024 12:23 )  PTT:45.8 sec  Urinalysis Basic - ( 30 Oct 2024 12:23 )    Color: x / Appearance: x / SG: x / pH: x  Gluc: 175 mg/dL / Ketone: x  / Bili: x / Urobili: x   Blood: x / Protein: x / Nitrite: x   Leuk Esterase: x / RBC: x / WBC x   Sq Epi: x / Non Sq Epi: x / Bacteria: x      Urine Culture:   Hematocrit: 34.3 % (10-30 @ 06:00)  Hemoglobin: 10.8 g/dL (10-30 @ 06:00)  Hemoglobin: 10.2 g/dL (10-29 @ 06:00)  Hematocrit: 32.4 % (10-29 @ 06:00)      RADIOLOGY & ADDITIONAL STUDIES:    < from: CT Abdomen and Pelvis Cystogram w/ Catheter (10.29.24 @ 13:40) >  ACC: [id number] EXAM:  CT ABD PELV CYSTOGRAMEXISTCATH   ORDERED BY: PRETTY COLMENARES     PROCEDURE DATE:  10/29/2024          INTERPRETATION:  CLINICAL INFORMATION: Evaluate suprapubic catheter   placement.    COMPARISON: CT scan abdomen and pelvis 10/20/2024.    CONTRAST/COMPLICATIONS:  IV Contrast: NONE  Oral Contrast: None  Complications: None reported at time of study completion    PROCEDURE:  CT of the Pelvis was performed (CT Cystogram).  Precontrast images were obtained through thepelvis followed by imaging   after the instillation of a dilute mixture of [id number] via a   suprapubic catheter.  Sagittal and coronal reformats were performed.    FINDINGS:    BLADDER:  Suprapubic catheter within a circumferentially thickened, trabeculated   bladder wall.  There is a more focal trabeculation or bladder diverticulum at the   superior dome.  No extravasation of contrast is noted.  Excretion of contrast into the penile urethra is noted.    REPRODUCTIVE ORGANS: Prostate is not enlarged.    LYMPH NODES: No pelvic lymphadenopathy.    VISUALIZED PORTIONS:    ABDOMINAL ORGANS:  Not evaluated.  BOWEL: Within normal limits.  PERITONEUM/RETROPERITONEUM: Within normal limits.    VESSELS: Within normal limits.    ABDOMINAL WALL:  Small bilateral fat-containing inguinal hernias.    BONES:  Erosion/remodeling of the coccygeal bone associated with soft tissue   density; correlate for chronic osteomyelitis.    IMPRESSION:    In situ suprapubic catheter with thick-walled, trabeculatedurinary   bladder.  Probable diverticulum at the superior dome.  No extravasation of contrast noted.    Other findings as discussed above.    --- End of Report ---            RUSLAN BUNCH MD; Attending Radiologist  This document has been electronically signed. Oct 29 2024  2:20PM    < end of copied text >  
Patient is a 49y old  Male who presents with a chief complaint of osmani (30 Oct 2024 08:53)    INTERVAL HPI/OVERNIGHT EVENTS:  No major acute events overnight.  This AM, patient reports feeling "lousy" and is still having lower abdominal and back pain.  Some relief with pain medications.  No other complaints voiced.      MEDICATIONS  (STANDING):  amLODIPine   Tablet 10 milliGRAM(s) Oral daily  ascorbic acid 500 milliGRAM(s) Oral daily  cloNIDine 0.1 milliGRAM(s) Oral two times a day  dextrose 5%. 1000 milliLiter(s) (100 mL/Hr) IV Continuous <Continuous>  dextrose 5%. 1000 milliLiter(s) (50 mL/Hr) IV Continuous <Continuous>  dextrose 50% Injectable 12.5 Gram(s) IV Push once  dextrose 50% Injectable 25 Gram(s) IV Push once  dextrose 50% Injectable 25 Gram(s) IV Push once  diazepam    Tablet 5 milliGRAM(s) Oral every 8 hours  DULoxetine 60 milliGRAM(s) Oral daily  ferrous    sulfate 325 milliGRAM(s) Oral daily  finasteride 5 milliGRAM(s) Oral daily  gabapentin 600 milliGRAM(s) Oral three times a day  glucagon  Injectable 1 milliGRAM(s) IntraMuscular once  insulin glargine Injectable (LANTUS) 20 Unit(s) SubCutaneous at bedtime  insulin lispro (ADMELOG) corrective regimen sliding scale   SubCutaneous Before meals and at bedtime  insulin lispro Injectable (ADMELOG) 3 Unit(s) SubCutaneous three times a day before meals  iron sucrose IVPB 100 milliGRAM(s) IV Intermittent every 7 days  methocarbamol 750 milliGRAM(s) Oral three times a day  metoprolol tartrate 25 milliGRAM(s) Oral two times a day  mirabegron ER 25 milliGRAM(s) Oral daily  oxybutynin 10 milliGRAM(s) Oral daily  pantoprazole    Tablet 40 milliGRAM(s) Oral before breakfast  polyethylene glycol 3350 17 Gram(s) Oral daily  sodium chloride 0.9%. 1000 milliLiter(s) (50 mL/Hr) IV Continuous <Continuous>  traZODone 50 milliGRAM(s) Oral at bedtime  urea Oral Powder 15 Gram(s) Oral two times a day    MEDICATIONS  (PRN):  acetaminophen     Tablet .. 650 milliGRAM(s) Oral every 6 hours PRN Temp greater or equal to 38C (100.4F), Mild Pain (1 - 3)  dextrose Oral Gel 15 Gram(s) Oral once PRN Blood Glucose LESS THAN 70 milliGRAM(s)/deciliter  HYDROmorphone   Tablet 4 milliGRAM(s) Oral every 4 hours PRN Moderate Pain (4 - 6)  HYDROmorphone   Tablet 6 milliGRAM(s) Oral every 4 hours PRN Severe Pain (7 - 10)      Allergies  sulfa drugs (Other)  sulfa drugs (Rash)  sulfa drugs (Hives)  sulfa drugs (Unknown)    Intolerances  Pipercillin Sodium-Tazobactam Sodium (Pruritus)    ROS as above and reviewed and is otherwise negative    PHYSICAL EXAM:  Vital Signs Last 24 Hrs  T(C): 37 (30 Oct 2024 05:23), Max: 37 (30 Oct 2024 05:23)  T(F): 98.6 (30 Oct 2024 05:23), Max: 98.6 (30 Oct 2024 05:23)  HR: 68 (30 Oct 2024 05:23) (64 - 68)  BP: 117/76 (30 Oct 2024 05:23) (115/69 - 127/85)  BP(mean): 99 (29 Oct 2024 22:02) (99 - 99)  RR: 18 (30 Oct 2024 05:23) (15 - 18)  SpO2: 95% (30 Oct 2024 05:23) (95% - 98%)    Parameters below as of 30 Oct 2024 05:23  Patient On (Oxygen Delivery Method): room air        GENERAL:     obese male in NAD   HEAD:     atraumatic, normocephalic  EYES:     EOMI, conjunctiva and sclera clear  RESPIRATORY:     grossly clear to auscultation bilaterally  CARDIOVASCULAR:     regular rate and rhythm, no murmurs or rubs or gallops  GASTROINTESTINAL:     soft, slightly tender to palpation in suprapubic area, has +suprapubic catheter in place, dressings seems dry, no guarding or rebound, bowel sounds present  EXTREMITIES:     no clubbing or cyanosis or edema  MUSCULOSKELETAL:     no joint pain or swelling or deformities  PSYCH:     appropriate    LABS:                        10.8   9.41  )-----------( 184      ( 30 Oct 2024 06:00 )             34.3       Ca    8.9        29 Oct 2024 06:00      PT/INR - ( 29 Oct 2024 06:00 )   PT: 25.6 sec;   INR: 2.22 ratio         PTT - ( 29 Oct 2024 06:00 )  PTT:43.4 sec  Urinalysis Basic - ( 29 Oct 2024 06:00 )    Color: x / Appearance: x / SG: x / pH: x  Gluc: 208 mg/dL / Ketone: x  / Bili: x / Urobili: x   Blood: x / Protein: x / Nitrite: x   Leuk Esterase: x / RBC: x / WBC x   Sq Epi: x / Non Sq Epi: x / Bacteria: x      CAPILLARY BLOOD GLUCOSE      POCT Blood Glucose.: 220 mg/dL (30 Oct 2024 09:24)  POCT Blood Glucose.: 166 mg/dL (29 Oct 2024 21:28)  POCT Blood Glucose.: 212 mg/dL (29 Oct 2024 18:07)  POCT Blood Glucose.: 205 mg/dL (29 Oct 2024 14:22)    
Subjective: Patient seen and examined.  Still reporting bladder spasms and pain in groin.  Also leaking urine through Urethra and becoming wet despite having suprapubic Catheter.     MEDICATIONS  (STANDING):  amLODIPine   Tablet 10 milliGRAM(s) Oral daily  ascorbic acid 500 milliGRAM(s) Oral daily  cloNIDine 0.1 milliGRAM(s) Oral two times a day  dextrose 5%. 1000 milliLiter(s) (50 mL/Hr) IV Continuous <Continuous>  dextrose 5%. 1000 milliLiter(s) (100 mL/Hr) IV Continuous <Continuous>  dextrose 50% Injectable 12.5 Gram(s) IV Push once  dextrose 50% Injectable 25 Gram(s) IV Push once  dextrose 50% Injectable 25 Gram(s) IV Push once  diazepam    Tablet 5 milliGRAM(s) Oral every 8 hours  DULoxetine 60 milliGRAM(s) Oral daily  ferrous    sulfate 325 milliGRAM(s) Oral daily  finasteride 5 milliGRAM(s) Oral daily  gabapentin 600 milliGRAM(s) Oral three times a day  glucagon  Injectable 1 milliGRAM(s) IntraMuscular once  heparin  Infusion.  Unit(s)/Hr (23 mL/Hr) IV Continuous <Continuous>  insulin glargine Injectable (LANTUS) 20 Unit(s) SubCutaneous at bedtime  insulin lispro (ADMELOG) corrective regimen sliding scale   SubCutaneous Before meals and at bedtime  iron sucrose IVPB 100 milliGRAM(s) IV Intermittent every 7 days  methocarbamol 750 milliGRAM(s) Oral three times a day  metoprolol tartrate 25 milliGRAM(s) Oral two times a day  mirabegron ER 25 milliGRAM(s) Oral daily  oxybutynin 10 milliGRAM(s) Oral daily  pantoprazole    Tablet 40 milliGRAM(s) Oral before breakfast  polyethylene glycol 3350 17 Gram(s) Oral daily  sodium chloride 0.9%. 1000 milliLiter(s) (50 mL/Hr) IV Continuous <Continuous>  traZODone 50 milliGRAM(s) Oral at bedtime  urea Oral Powder 15 Gram(s) Oral daily  warfarin 2.5 milliGRAM(s) Oral at bedtime    MEDICATIONS  (PRN):  acetaminophen     Tablet .. 650 milliGRAM(s) Oral every 6 hours PRN Temp greater or equal to 38C (100.4F), Mild Pain (1 - 3)  dextrose Oral Gel 15 Gram(s) Oral once PRN Blood Glucose LESS THAN 70 milliGRAM(s)/deciliter  heparin   Injectable 55388 Unit(s) IV Push every 6 hours PRN For aPTT less than 40  heparin   Injectable 5000 Unit(s) IV Push every 6 hours PRN For aPTT between 40 - 57  HYDROmorphone   Tablet 4 milliGRAM(s) Oral every 4 hours PRN Moderate Pain (4 - 6)  HYDROmorphone   Tablet 6 milliGRAM(s) Oral every 4 hours PRN Severe Pain (7 - 10)      Vital Signs Last 24 Hrs  T(C): 36.6 (28 Oct 2024 07:35), Max: 36.9 (27 Oct 2024 15:42)  T(F): 97.8 (28 Oct 2024 07:35), Max: 98.4 (27 Oct 2024 15:42)  HR: 57 (28 Oct 2024 07:35) (57 - 63)  BP: 109/71 (28 Oct 2024 07:35) (109/71 - 123/78)  BP(mean): 83 (28 Oct 2024 07:35) (83 - 93)  RR: 16 (28 Oct 2024 07:35) (11 - 18)  SpO2: 96% (28 Oct 2024 07:35) (95% - 96%)    Parameters below as of 28 Oct 2024 07:35  Patient On (Oxygen Delivery Method): room air        PHYSICAL EXAM:  GENERAL: NAD  HEAD:  Atraumatic, Normocephalic  ENMT: Moist mucous membranes  NECK: Supple, No JVD, Normal thyroid  CHEST/LUNG: Clear to auscultation bilaterally  HEART: Regular rate and rhythm  ABDOMEN: Soft, Nontender, Nondistended  EXTREMITIES:  2+ Peripheral Pulses      LABS:                        9.8    8.57  )-----------( 188      ( 28 Oct 2024 06:00 )             31.5     28 Oct 2024 06:00    135    |  102    |  54     ----------------------------<  189    4.1     |  25     |  2.69     Ca    9.0        28 Oct 2024 06:00      PT/INR - ( 28 Oct 2024 06:00 )   PT: 23.8 sec;   INR: 2.03 ratio         PTT - ( 28 Oct 2024 06:00 )  PTT:92.4 sec  Urinalysis Basic - ( 28 Oct 2024 06:00 )    Color: x / Appearance: x / SG: x / pH: x  Gluc: 189 mg/dL / Ketone: x  / Bili: x / Urobili: x   Blood: x / Protein: x / Nitrite: x   Leuk Esterase: x / RBC: x / WBC x   Sq Epi: x / Non Sq Epi: x / Bacteria: x      CAPILLARY BLOOD GLUCOSE      POCT Blood Glucose.: 186 mg/dL (28 Oct 2024 07:52)  POCT Blood Glucose.: 203 mg/dL (27 Oct 2024 21:00)  POCT Blood Glucose.: 237 mg/dL (27 Oct 2024 16:34)  POCT Blood Glucose.: 248 mg/dL (27 Oct 2024 11:34)        
Subjective: Patient seen and examined. No overnight events. Doing well.     MEDICATIONS  (STANDING):  amLODIPine   Tablet 10 milliGRAM(s) Oral daily  ascorbic acid 500 milliGRAM(s) Oral daily  cefTRIAXone   IVPB 1000 milliGRAM(s) IV Intermittent every 24 hours  cloNIDine 0.1 milliGRAM(s) Oral two times a day  dextrose 5%. 1000 milliLiter(s) (50 mL/Hr) IV Continuous <Continuous>  dextrose 5%. 1000 milliLiter(s) (100 mL/Hr) IV Continuous <Continuous>  dextrose 50% Injectable 25 Gram(s) IV Push once  dextrose 50% Injectable 12.5 Gram(s) IV Push once  dextrose 50% Injectable 25 Gram(s) IV Push once  diazepam    Tablet 2.5 milliGRAM(s) Oral every 8 hours  DULoxetine 60 milliGRAM(s) Oral daily  ferrous    sulfate 325 milliGRAM(s) Oral daily  finasteride 5 milliGRAM(s) Oral daily  gabapentin 600 milliGRAM(s) Oral three times a day  glucagon  Injectable 1 milliGRAM(s) IntraMuscular once  heparin  Infusion.  Unit(s)/Hr (23 mL/Hr) IV Continuous <Continuous>  insulin glargine Injectable (LANTUS) 12 Unit(s) SubCutaneous at bedtime  insulin lispro (ADMELOG) corrective regimen sliding scale   SubCutaneous Before meals and at bedtime  iron sucrose IVPB 100 milliGRAM(s) IV Intermittent every 7 days  lactated ringers. 1000 milliLiter(s) (70 mL/Hr) IV Continuous <Continuous>  methocarbamol 750 milliGRAM(s) Oral three times a day  metoprolol tartrate 25 milliGRAM(s) Oral two times a day  pantoprazole    Tablet 40 milliGRAM(s) Oral before breakfast  polyethylene glycol 3350 17 Gram(s) Oral daily  traZODone 50 milliGRAM(s) Oral at bedtime    MEDICATIONS  (PRN):  acetaminophen     Tablet .. 650 milliGRAM(s) Oral every 6 hours PRN Temp greater or equal to 38C (100.4F), Mild Pain (1 - 3)  dextrose Oral Gel 15 Gram(s) Oral once PRN Blood Glucose LESS THAN 70 milliGRAM(s)/deciliter  heparin   Injectable 74223 Unit(s) IV Push every 6 hours PRN For aPTT less than 40  heparin   Injectable 5000 Unit(s) IV Push every 6 hours PRN For aPTT between 40 - 57  HYDROmorphone   Tablet 4 milliGRAM(s) Oral every 6 hours PRN Moderate Pain (4 - 6)  HYDROmorphone  Injectable 1 milliGRAM(s) IV Push every 8 hours PRN Severe Pain (7 - 10)      Vital Signs Last 24 Hrs  T(C): 36.7 (24 Oct 2024 07:36), Max: 36.8 (24 Oct 2024 05:08)  T(F): 98 (24 Oct 2024 07:36), Max: 98.3 (24 Oct 2024 05:08)  HR: 57 (24 Oct 2024 07:36) (54 - 65)  BP: 138/83 (24 Oct 2024 07:36) (135/82 - 148/84)  BP(mean): 101 (24 Oct 2024 07:36) (10 - 101)  RR: 18 (24 Oct 2024 07:36) (18 - 20)  SpO2: 97% (24 Oct 2024 07:36) (96% - 98%)    Parameters below as of 24 Oct 2024 07:36  Patient On (Oxygen Delivery Method): room air        PHYSICAL EXAM:  GENERAL: NAD  HEAD:  Atraumatic, Normocephalic  ENMT: Moist mucous membranes  NECK: Supple, No JVD, Normal thyroid  CHEST/LUNG: Clear to auscultation bilaterally  HEART: Regular rate and rhythm  ABDOMEN: Soft, Nontender, Nondistended  EXTREMITIES:  2+ Peripheral Pulses      LABS:                        9.7    9.49  )-----------( 209      ( 24 Oct 2024 06:00 )             29.6     24 Oct 2024 06:00    132    |  98     |  33     ----------------------------<  193    3.6     |  24     |  2.60     Ca    8.7        24 Oct 2024 06:00  Phos  4.4       24 Oct 2024 06:00  Mg     1.4       24 Oct 2024 06:00    TPro  6.8    /  Alb  2.6    /  TBili  0.2    /  DBili  x      /  AST  14     /  ALT  19     /  AlkPhos  91     24 Oct 2024 06:00    PT/INR - ( 24 Oct 2024 06:50 )   PT: 15.3 sec;   INR: 1.32 ratio         PTT - ( 24 Oct 2024 06:50 )  PTT:172.2 sec  Urinalysis Basic - ( 24 Oct 2024 06:00 )    Color: x / Appearance: x / SG: x / pH: x  Gluc: 193 mg/dL / Ketone: x  / Bili: x / Urobili: x   Blood: x / Protein: x / Nitrite: x   Leuk Esterase: x / RBC: x / WBC x   Sq Epi: x / Non Sq Epi: x / Bacteria: x      CAPILLARY BLOOD GLUCOSE      POCT Blood Glucose.: 292 mg/dL (24 Oct 2024 12:28)  POCT Blood Glucose.: 211 mg/dL (24 Oct 2024 08:12)  POCT Blood Glucose.: 248 mg/dL (23 Oct 2024 23:40)  POCT Blood Glucose.: 260 mg/dL (23 Oct 2024 21:54)  POCT Blood Glucose.: 208 mg/dL (23 Oct 2024 16:40)        
Subjective: Patient seen and examined. No overnight events. INR still sub therapeutic.     MEDICATIONS  (STANDING):  amLODIPine   Tablet 10 milliGRAM(s) Oral daily  ascorbic acid 500 milliGRAM(s) Oral daily  cloNIDine 0.1 milliGRAM(s) Oral two times a day  dextrose 5%. 1000 milliLiter(s) (100 mL/Hr) IV Continuous <Continuous>  dextrose 5%. 1000 milliLiter(s) (50 mL/Hr) IV Continuous <Continuous>  dextrose 50% Injectable 25 Gram(s) IV Push once  dextrose 50% Injectable 12.5 Gram(s) IV Push once  dextrose 50% Injectable 25 Gram(s) IV Push once  diazepam    Tablet 2.5 milliGRAM(s) Oral every 8 hours  DULoxetine 60 milliGRAM(s) Oral daily  ferrous    sulfate 325 milliGRAM(s) Oral daily  finasteride 5 milliGRAM(s) Oral daily  gabapentin 600 milliGRAM(s) Oral three times a day  glucagon  Injectable 1 milliGRAM(s) IntraMuscular once  heparin  Infusion.  Unit(s)/Hr (23 mL/Hr) IV Continuous <Continuous>  insulin glargine Injectable (LANTUS) 20 Unit(s) SubCutaneous at bedtime  insulin lispro (ADMELOG) corrective regimen sliding scale   SubCutaneous Before meals and at bedtime  iron sucrose IVPB 100 milliGRAM(s) IV Intermittent every 7 days  methocarbamol 750 milliGRAM(s) Oral three times a day  metoprolol tartrate 25 milliGRAM(s) Oral two times a day  mirabegron ER 25 milliGRAM(s) Oral daily  oxybutynin 10 milliGRAM(s) Oral daily  pantoprazole    Tablet 40 milliGRAM(s) Oral before breakfast  polyethylene glycol 3350 17 Gram(s) Oral daily  sodium chloride 1 Gram(s) Oral three times a day  sodium chloride 0.9%. 1000 milliLiter(s) (50 mL/Hr) IV Continuous <Continuous>  traZODone 50 milliGRAM(s) Oral at bedtime  urea Oral Powder 15 Gram(s) Oral daily  warfarin 2.5 milliGRAM(s) Oral at bedtime    MEDICATIONS  (PRN):  acetaminophen     Tablet .. 650 milliGRAM(s) Oral every 6 hours PRN Temp greater or equal to 38C (100.4F), Mild Pain (1 - 3)  dextrose Oral Gel 15 Gram(s) Oral once PRN Blood Glucose LESS THAN 70 milliGRAM(s)/deciliter  heparin   Injectable 38613 Unit(s) IV Push every 6 hours PRN For aPTT less than 40  heparin   Injectable 5000 Unit(s) IV Push every 6 hours PRN For aPTT between 40 - 57  HYDROmorphone   Tablet 4 milliGRAM(s) Oral every 6 hours PRN Moderate Pain (4 - 6)  HYDROmorphone  Injectable 1 milliGRAM(s) IV Push every 8 hours PRN Severe Pain (7 - 10)      Vital Signs Last 24 Hrs  T(C): 36.5 (27 Oct 2024 06:00), Max: 36.8 (26 Oct 2024 15:30)  T(F): 97.7 (27 Oct 2024 06:00), Max: 98.2 (26 Oct 2024 15:30)  HR: 62 (27 Oct 2024 06:00) (61 - 66)  BP: 135/81 (27 Oct 2024 06:00) (113/76 - 135/81)  BP(mean): 99 (27 Oct 2024 06:00) (92 - 99)  RR: 16 (27 Oct 2024 06:00) (13 - 17)  SpO2: 94% (27 Oct 2024 06:00) (94% - 99%)    Parameters below as of 27 Oct 2024 06:00  Patient On (Oxygen Delivery Method): room air        PHYSICAL EXAM:  GENERAL: NAD  HEAD:  Atraumatic, Normocephalic  ENMT: Moist mucous membranes  NECK: Supple, No JVD, Normal thyroid  CHEST/LUNG: Clear to auscultation bilaterally  HEART: Regular rate and rhythm  ABDOMEN: Soft, Nontender, Nondistended  EXTREMITIES:  2+ Peripheral Pulses      LABS:                        9.8    9.56  )-----------( 218      ( 27 Oct 2024 05:30 )             31.0     27 Oct 2024 05:30    129    |  96     |  52     ----------------------------<  214    4.0     |  25     |  2.78     Ca    8.9        27 Oct 2024 05:30      PT/INR - ( 27 Oct 2024 05:30 )   PT: 22.7 sec;   INR: 1.97 ratio         PTT - ( 27 Oct 2024 05:30 )  PTT:87.1 sec  Urinalysis Basic - ( 27 Oct 2024 05:30 )    Color: x / Appearance: x / SG: x / pH: x  Gluc: 214 mg/dL / Ketone: x  / Bili: x / Urobili: x   Blood: x / Protein: x / Nitrite: x   Leuk Esterase: x / RBC: x / WBC x   Sq Epi: x / Non Sq Epi: x / Bacteria: x      CAPILLARY BLOOD GLUCOSE      POCT Blood Glucose.: 248 mg/dL (27 Oct 2024 11:34)  POCT Blood Glucose.: 199 mg/dL (27 Oct 2024 07:27)  POCT Blood Glucose.: 229 mg/dL (26 Oct 2024 21:14)  POCT Blood Glucose.: 234 mg/dL (26 Oct 2024 16:46)  POCT Blood Glucose.: 252 mg/dL (26 Oct 2024 13:26)        
Subjective: Still requesting transfer. Ongoing bladder spasms and states that ever since catheter was placed in June 2024 it has always been a problem.    MEDICATIONS  (STANDING):  amLODIPine   Tablet 10 milliGRAM(s) Oral daily  ascorbic acid 500 milliGRAM(s) Oral daily  cefTRIAXone   IVPB 1000 milliGRAM(s) IV Intermittent every 24 hours  cloNIDine 0.1 milliGRAM(s) Oral two times a day  dextrose 5%. 1000 milliLiter(s) (100 mL/Hr) IV Continuous <Continuous>  dextrose 5%. 1000 milliLiter(s) (50 mL/Hr) IV Continuous <Continuous>  dextrose 50% Injectable 25 Gram(s) IV Push once  dextrose 50% Injectable 12.5 Gram(s) IV Push once  dextrose 50% Injectable 25 Gram(s) IV Push once  diazepam    Tablet 2.5 milliGRAM(s) Oral every 8 hours  DULoxetine 60 milliGRAM(s) Oral daily  ferrous    sulfate 325 milliGRAM(s) Oral daily  finasteride 5 milliGRAM(s) Oral daily  gabapentin 600 milliGRAM(s) Oral three times a day  glucagon  Injectable 1 milliGRAM(s) IntraMuscular once  insulin lispro (ADMELOG) corrective regimen sliding scale   SubCutaneous Before meals and at bedtime  lactated ringers. 1000 milliLiter(s) (70 mL/Hr) IV Continuous <Continuous>  methocarbamol 750 milliGRAM(s) Oral three times a day  metoprolol tartrate 25 milliGRAM(s) Oral two times a day  pantoprazole    Tablet 40 milliGRAM(s) Oral before breakfast  polyethylene glycol 3350 17 Gram(s) Oral daily  traZODone 50 milliGRAM(s) Oral at bedtime    MEDICATIONS  (PRN):  acetaminophen     Tablet .. 650 milliGRAM(s) Oral every 6 hours PRN Temp greater or equal to 38C (100.4F), Mild Pain (1 - 3)  dextrose Oral Gel 15 Gram(s) Oral once PRN Blood Glucose LESS THAN 70 milliGRAM(s)/deciliter  HYDROmorphone   Tablet 4 milliGRAM(s) Oral every 6 hours PRN Moderate Pain (4 - 6)      Vital Signs Last 24 Hrs  T(C): 36.6 (23 Oct 2024 05:20), Max: 36.7 (22 Oct 2024 13:01)  T(F): 97.9 (23 Oct 2024 05:20), Max: 98 (22 Oct 2024 13:01)  HR: 54 (23 Oct 2024 05:20) (54 - 66)  BP: 119/77 (23 Oct 2024 05:20) (119/77 - 159/97)  BP(mean): 106 (22 Oct 2024 20:56) (106 - 113)  RR: 18 (23 Oct 2024 05:20) (13 - 20)  SpO2: 95% (23 Oct 2024 05:20) (95% - 97%)    Parameters below as of 23 Oct 2024 05:20  Patient On (Oxygen Delivery Method): room air        PHYSICAL EXAM:  GENERAL: NAD  HEAD:  Atraumatic, Normocephalic  ENMT: Moist mucous membranes  NECK: Supple, No JVD, Normal thyroid  CHEST/LUNG: Clear to auscultation bilaterally  HEART: Regular rate and rhythm  ABDOMEN: Soft, Nontender, Nondistended  EXTREMITIES:  Overall Right Side global weakness in comparison to Left Side.       LABS:      Ca    8.7        21 Oct 2024 22:15      PT/INR - ( 21 Oct 2024 22:15 )   PT: 13.5 sec;   INR: 1.17 ratio         PTT - ( 21 Oct 2024 22:15 )  PTT:18.1 sec  Urinalysis Basic - ( 21 Oct 2024 22:15 )    Color: x / Appearance: x / SG: x / pH: x  Gluc: 215 mg/dL / Ketone: x  / Bili: x / Urobili: x   Blood: x / Protein: x / Nitrite: x   Leuk Esterase: x / RBC: x / WBC x   Sq Epi: x / Non Sq Epi: x / Bacteria: x      CAPILLARY BLOOD GLUCOSE      POCT Blood Glucose.: 252 mg/dL (22 Oct 2024 21:17)  POCT Blood Glucose.: 314 mg/dL (22 Oct 2024 16:55)  POCT Blood Glucose.: 202 mg/dL (22 Oct 2024 11:34)        
     JOIE BRUNO is a 49yMale , patient examined and chart reviewed.    INTERVAL HPI/ OVERNIGHT EVENTS:   No events. Afebrile.    PAST MEDICAL & SURGICAL HISTORY:  BPH (benign prostatic hyperplasia)  HTN (hypertension)  Type 2 diabetes mellitus  Peripheral neuropathy  DDD (degenerative disc disease), lumbar  DVT, lower extremity  Renal stones  H/O Guillain-Kathleen syndrome  History of neurogenic bowel  DM2 (diabetes mellitus, type 2)  HTN (hypertension)  BPH without obstruction/lower urinary tract symptoms  Degenerative arthritis  DVT of lower limb, acute  CVA (cerebrovascular accident)  CVD (cerebrovascular disease)  Muscle weakness  Iron deficiency anemia  History of muscle spasm  Pain, unspecified  Vitamin deficiency  Obesity  GERD (gastroesophageal reflux disease)  H/O constipation  H/O insomnia  Essential (primary) hypertension  Acute embolism and thrombosis of deep vein of lower extremity  BPH (benign prostatic hyperplasia)  Diabetes mellitus due to underlying condition with diabetic neuropathy  Major depressive disorder, single episode  S/P IVC filter  H/O lithotripsy  Chronic suprapubic catheter    For details regarding the patient's social history, family history, and other miscellaneous elements, please refer the initial infectious diseases consultation and/or the admitting history and physical examination for this admission.    ROS:  CONSTITUTIONAL:  Negative fever or chills  EYES:  Negative  blurry vision or double vision  CARDIOVASCULAR:  Negative for chest pain or palpitations  RESPIRATORY:  Negative for cough, wheezing, or SOB   GASTROINTESTINAL:  Negative for nausea, vomiting, diarrhea, constipation, or abdominal pain  GENITOURINARY:  Negative frequency, urgency or dysuria  NEUROLOGIC:  No headache, confusion, dizziness, lightheadedness  All other systems were reviewed and are negative     ALLERGIES:  sulfa drugs (Hives)  Pipercillin Sodium-Tazobactam Sodium (Pruritus)      Current inpatient medications :    ANTIBIOTICS/RELEVANT:    MEDICATIONS  (STANDING):  amLODIPine   Tablet 10 milliGRAM(s) Oral daily  ascorbic acid 500 milliGRAM(s) Oral daily  cloNIDine 0.1 milliGRAM(s) Oral two times a day  dextrose 5%. 1000 milliLiter(s) (50 mL/Hr) IV Continuous <Continuous>  dextrose 5%. 1000 milliLiter(s) (100 mL/Hr) IV Continuous <Continuous>  dextrose 50% Injectable 12.5 Gram(s) IV Push once  dextrose 50% Injectable 25 Gram(s) IV Push once  dextrose 50% Injectable 25 Gram(s) IV Push once  diazepam    Tablet 5 milliGRAM(s) Oral every 8 hours  DULoxetine 60 milliGRAM(s) Oral daily  ferrous    sulfate 325 milliGRAM(s) Oral daily  finasteride 5 milliGRAM(s) Oral daily  gabapentin 600 milliGRAM(s) Oral three times a day  glucagon  Injectable 1 milliGRAM(s) IntraMuscular once  insulin glargine Injectable (LANTUS) 20 Unit(s) SubCutaneous at bedtime  insulin lispro (ADMELOG) corrective regimen sliding scale   SubCutaneous Before meals and at bedtime  insulin lispro Injectable (ADMELOG) 3 Unit(s) SubCutaneous three times a day before meals  iron sucrose IVPB 100 milliGRAM(s) IV Intermittent every 7 days  methocarbamol 750 milliGRAM(s) Oral three times a day  metoprolol tartrate 25 milliGRAM(s) Oral two times a day  mirabegron ER 25 milliGRAM(s) Oral daily  oxybutynin 10 milliGRAM(s) Oral daily  pantoprazole    Tablet 40 milliGRAM(s) Oral before breakfast  polyethylene glycol 3350 17 Gram(s) Oral daily  sodium chloride 0.9%. 1000 milliLiter(s) (50 mL/Hr) IV Continuous <Continuous>  traZODone 50 milliGRAM(s) Oral at bedtime  urea Oral Powder 15 Gram(s) Oral daily  warfarin 5 milliGRAM(s) Oral once    MEDICATIONS  (PRN):  acetaminophen     Tablet .. 650 milliGRAM(s) Oral every 6 hours PRN Temp greater or equal to 38C (100.4F), Mild Pain (1 - 3)  dextrose Oral Gel 15 Gram(s) Oral once PRN Blood Glucose LESS THAN 70 milliGRAM(s)/deciliter  HYDROmorphone   Tablet 4 milliGRAM(s) Oral every 4 hours PRN Moderate Pain (4 - 6)  HYDROmorphone   Tablet 6 milliGRAM(s) Oral every 4 hours PRN Severe Pain (7 - 10)      Objective:  Vital Signs Last 24 Hrs  T(C): 36.4 (28 Oct 2024 16:30), Max: 36.6 (27 Oct 2024 20:44)  T(F): 97.5 (28 Oct 2024 16:30), Max: 97.9 (27 Oct 2024 20:44)  HR: 57 (28 Oct 2024 16:30) (54 - 63)  BP: 120/70 (28 Oct 2024 16:30) (107/702 - 123/78)  BP(mean): 83 (28 Oct 2024 07:35) (83 - 93)  RR: 18 (28 Oct 2024 16:30) (15 - 18)  SpO2: 97% (28 Oct 2024 16:30) (95% - 97%)    Parameters below as of 28 Oct 2024 16:30  Patient On (Oxygen Delivery Method): room air      Physical Exam:  General:  no acute distress  Neck: supple, trachea midline  Lungs: clear, no wheeze/rhonchi  Cardiovascular: regular rate and rhythm, S1 S2  Abdomen: soft, nontender,  bowel sounds normal  Neurological: alert and oriented x3  Skin: no rash  Extremities: no edema      LABS:                        9.8    8.57  )-----------( 188      ( 28 Oct 2024 06:00 )             31.5   10-28    135  |  102  |  54[H]  ----------------------------<  189[H]  4.1   |  25  |  2.69[H]    Ca    9.0      28 Oct 2024 06:00    MICROBIOLOGY:  Culture - Urine (collected 20 Oct 2024 13:00)  Source: Clean Catch Clean Catch (Midstream)  Final Report (22 Oct 2024 10:48):    >=3 organisms. Probable collection contamination.    RADIOLOGY & ADDITIONAL STUDIES:      Assessment :   48YO M resident of [facility] PMH BPH CVA  neurogenic bladder sp  suprapubic catheter at [facility]  IR, approximately 2 weeks ago seen at [facility] 10/20 sec bladder spasms and had CT that showed cystitis. Started on antibiotics and was sent back to NH. Pt now presented to the hospital with persistent bladder spasm with continuous pain.   Ucx 10/20   >=3 organisms. Probable collection contamination. Prior to hx of Ecoli in Ucx.  ?acute cystitis doubt   Seen by urology - no intervention  Clinically stable    Plan :   Monitor off antibiotics  Sp Rocephin x 5 days ended 10/27  Trend temps and cbc  Pulm toileting  Stable from ID standpoint  Dc planning per primary team    Continue with present regiment.  Appropriate use of antibiotics and adverse effects reviewed.      > 35 minutes were spent in direct patient care reviewing notes, medications ,labs data/ imaging , discussion with multidisciplinary team.    Thank you for allowing me to participate in care of your patient .    Yadira Blackmon MD  Infectious Disease  [phone]
     JOIE BRUNO is a 49yMale , patient examined and chart reviewed.    INTERVAL HPI/ OVERNIGHT EVENTS:   No events. Afebrile.  NAD.     PAST MEDICAL & SURGICAL HISTORY:  BPH (benign prostatic hyperplasia)  HTN (hypertension)  Type 2 diabetes mellitus  Peripheral neuropathy  DDD (degenerative disc disease), lumbar  DVT, lower extremity  Renal stones  H/O Guillain-[place] syndrome  History of neurogenic bowel  DM2 (diabetes mellitus, type 2)  HTN (hypertension)  BPH without obstruction/lower urinary tract symptoms  Degenerative arthritis  DVT of lower limb, acute  CVA (cerebrovascular accident)  CVD (cerebrovascular disease)  Muscle weakness  Iron deficiency anemia  History of muscle spasm  Pain, unspecified  Vitamin deficiency  Obesity  GERD (gastroesophageal reflux disease)  H/O constipation  H/O insomnia  Essential (primary) hypertension  Acute embolism and thrombosis of deep vein of lower extremity  BPH (benign prostatic hyperplasia)  Diabetes mellitus due to underlying condition with diabetic neuropathy  Major depressive disorder, single episode  S/P IVC filter  H/O lithotripsy  Chronic suprapubic catheter    For details regarding the patient's social history, family history, and other miscellaneous elements, please refer the initial infectious diseases consultation and/or the admitting history and physical examination for this admission.    ROS:  CONSTITUTIONAL:  Negative fever or chills  EYES:  Negative  blurry vision or double vision  CARDIOVASCULAR:  Negative for chest pain or palpitations  RESPIRATORY:  Negative for cough, wheezing, or SOB   GASTROINTESTINAL:  Negative for nausea, vomiting, diarrhea, constipation, or abdominal pain  GENITOURINARY:  Negative frequency, urgency or dysuria  NEUROLOGIC:  No headache, confusion, dizziness, lightheadedness  All other systems were reviewed and are negative     ALLERGIES:  sulfa drugs (Hives)  Pipercillin Sodium-Tazobactam Sodium (Pruritus)      Current inpatient medications :    ANTIBIOTICS/RELEVANT:    MEDICATIONS  (STANDING):  amLODIPine   Tablet 10 milliGRAM(s) Oral daily  ascorbic acid 500 milliGRAM(s) Oral daily  cloNIDine 0.1 milliGRAM(s) Oral two times a day  dextrose 5%. 1000 milliLiter(s) (50 mL/Hr) IV Continuous <Continuous>  dextrose 5%. 1000 milliLiter(s) (100 mL/Hr) IV Continuous <Continuous>  dextrose 50% Injectable 12.5 Gram(s) IV Push once  dextrose 50% Injectable 25 Gram(s) IV Push once  dextrose 50% Injectable 25 Gram(s) IV Push once  diazepam    Tablet 5 milliGRAM(s) Oral every 8 hours  DULoxetine 60 milliGRAM(s) Oral daily  ferrous    sulfate 325 milliGRAM(s) Oral daily  finasteride 5 milliGRAM(s) Oral daily  gabapentin 600 milliGRAM(s) Oral three times a day  glucagon  Injectable 1 milliGRAM(s) IntraMuscular once  insulin glargine Injectable (LANTUS) 20 Unit(s) SubCutaneous at bedtime  insulin lispro (ADMELOG) corrective regimen sliding scale   SubCutaneous Before meals and at bedtime  insulin lispro Injectable (ADMELOG) 3 Unit(s) SubCutaneous three times a day before meals  iron sucrose IVPB 100 milliGRAM(s) IV Intermittent every 7 days  methocarbamol 750 milliGRAM(s) Oral three times a day  metoprolol tartrate 25 milliGRAM(s) Oral two times a day  mirabegron ER 25 milliGRAM(s) Oral daily  oxybutynin 10 milliGRAM(s) Oral daily  pantoprazole    Tablet 40 milliGRAM(s) Oral before breakfast  polyethylene glycol 3350 17 Gram(s) Oral daily  sodium chloride 0.9%. 1000 milliLiter(s) (75 mL/Hr) IV Continuous <Continuous>  traZODone 50 milliGRAM(s) Oral at bedtime  urea Oral Powder 15 Gram(s) Oral two times a day    MEDICATIONS  (PRN):  acetaminophen     Tablet .. 650 milliGRAM(s) Oral every 6 hours PRN Temp greater or equal to 38C (100.4F), Mild Pain (1 - 3)  dextrose Oral Gel 15 Gram(s) Oral once PRN Blood Glucose LESS THAN 70 milliGRAM(s)/deciliter  HYDROmorphone   Tablet 4 milliGRAM(s) Oral every 4 hours PRN Moderate Pain (4 - 6)  HYDROmorphone   Tablet 6 milliGRAM(s) Oral every 4 hours PRN Severe Pain (7 - 10)        Objective:  Vital Signs Last 24 Hrs  T(C): 36.4 (30 Oct 2024 20:24), Max: 37 (30 Oct 2024 05:23)  T(F): 97.5 (30 Oct 2024 20:24), Max: 98.6 (30 Oct 2024 05:23)  HR: 58 (30 Oct 2024 20:24) (58 - 68)  BP: 114/68 (30 Oct 2024 20:24) (113/72 - 123/72)  BP(mean): 84 (30 Oct 2024 20:24) (84 - 84)  RR: 14 (30 Oct 2024 20:24) (14 - 18)  SpO2: 93% (30 Oct 2024 20:24) (93% - 95%)    Parameters below as of 30 Oct 2024 20:24  Patient On (Oxygen Delivery Method): room air    Physical Exam:  General:  no acute distress  Neck: supple, trachea midline  Lungs: clear, no wheeze/rhonchi  Cardiovascular: regular rate and rhythm, S1 S2  Abdomen: soft, nontender,  bowel sounds normal  Neurological: alert and oriented x3  Skin: no rash  Extremities: no edema      LABS:                        10.8   9.41  )-----------( 184      ( 30 Oct 2024 06:00 )             34.3   10-30    135  |  101  |  54[H]  ----------------------------<  175[H]  4.1   |  25  |  2.91[H]    Ca    9.0      30 Oct 2024 12:23  Phos  4.2     10-30  Mg     1.4     10-30    TPro  6.9  /  Alb  2.6[L]  /  TBili  0.2  /  DBili  x   /  AST  13  /  ALT  18  /  AlkPhos  94  10-30      MICROBIOLOGY:  Culture - Urine (collected 20 Oct 2024 13:00)  Source: Clean Catch Clean Catch (Midstream)  Final Report (22 Oct 2024 10:48):    >=3 organisms. Probable collection contamination.    RADIOLOGY & ADDITIONAL STUDIES:    ACC: [id number] EXAM:  CT ABD PELV CYSTOGRAMEXISTCATH   ORDERED BY: PRETTY COLMENARES     PROCEDURE DATE:  10/29/2024          INTERPRETATION:  CLINICAL INFORMATION: Evaluate suprapubic catheter   placement.    COMPARISON: CT scan abdomen and pelvis 10/20/2024.    CONTRAST/COMPLICATIONS:  IV Contrast: NONE  Oral Contrast: None  Complications: None reported at time of study completion    PROCEDURE:  CT of the Pelvis was performed (CT Cystogram).  Precontrast images were obtained through thepelvis followed by imaging   after the instillation of a dilute mixture of [id number] via a   suprapubic catheter.  Sagittal and coronal reformats were performed.    FINDINGS:    BLADDER:  Suprapubic catheter within a circumferentially thickened, trabeculated   bladder wall.  There is a more focal trabeculation or bladder diverticulum at the   superior dome.  No extravasation of contrast is noted.  Excretion of contrast into the penile urethra is noted.    REPRODUCTIVE ORGANS: Prostate is not enlarged.    LYMPH NODES: No pelvic lymphadenopathy.    VISUALIZED PORTIONS:    ABDOMINAL ORGANS:  Not evaluated.  BOWEL: Within normal limits.  PERITONEUM/RETROPERITONEUM: Within normal limits.    VESSELS: Within normal limits.    ABDOMINAL WALL:  Small bilateral fat-containing inguinal hernias.    BONES:  Erosion/remodeling of the coccygeal bone associated with soft tissue   density; correlate for chronic osteomyelitis.    IMPRESSION:    In situ suprapubic catheter with thick-walled, trabeculatedurinary   bladder.  Probable diverticulum at the superior dome.  No extravasation of contrast noted.    Assessment :   50YO M resident of [facility] PMH BPH CVA  neurogenic bladder sp  suprapubic catheter at [facility]  IR, approximately 2 weeks ago seen at [facility] 10/20 sec bladder spasms and had CT that showed cystitis. Started on antibiotics and was sent back to NH. Pt now presented to the hospital with persistent bladder spasm with continuous pain.   Ucx 10/20   >=3 organisms. Probable collection contamination. Prior to hx of Ecoli in Ucx.  ?acute cystitis doubt   Seen by urology - sp cystogram   Still with bladder spasms  Urology follow up noted  Chronic sacral decub with chronic osteo  Clinically stable    Plan :   Monitor off antibiotics  Sp Rocephin x 5 days ended 10/27  Cultures ngtd  Trend temps and cbc  Cont local wound care for decub ulcer  Pulm toileting  Stable from ID standpoint  Dc planning per primary team    Continue with present regiment.  Appropriate use of antibiotics and adverse effects reviewed.      > 35 minutes were spent in direct patient care reviewing notes, medications ,labs data/ imaging , discussion with multidisciplinary team.    Thank you for allowing me to participate in care of your patient .    Yadira Blackmon MD  Infectious Disease  [phone]
     JOIE BRUNO is a 49yMale , patient examined and chart reviewed.    INTERVAL HPI/ OVERNIGHT EVENTS:   No events. Afebrile.  NAD.    PAST MEDICAL & SURGICAL HISTORY:  BPH (benign prostatic hyperplasia)  HTN (hypertension)  Type 2 diabetes mellitus  Peripheral neuropathy  DDD (degenerative disc disease), lumbar  DVT, lower extremity  Renal stones  H/O Guillain-[place] syndrome  History of neurogenic bowel  DM2 (diabetes mellitus, type 2)  HTN (hypertension)  BPH without obstruction/lower urinary tract symptoms  Degenerative arthritis  DVT of lower limb, acute  CVA (cerebrovascular accident)  CVD (cerebrovascular disease)  Muscle weakness  Iron deficiency anemia  History of muscle spasm  Pain, unspecified  Vitamin deficiency  Obesity  GERD (gastroesophageal reflux disease)  H/O constipation  H/O insomnia  Essential (primary) hypertension  Acute embolism and thrombosis of deep vein of lower extremity  BPH (benign prostatic hyperplasia)  Diabetes mellitus due to underlying condition with diabetic neuropathy  Major depressive disorder, single episode  S/P IVC filter  H/O lithotripsy  Chronic suprapubic catheter    For details regarding the patient's social history, family history, and other miscellaneous elements, please refer the initial infectious diseases consultation and/or the admitting history and physical examination for this admission.    ROS:  CONSTITUTIONAL:  Negative fever or chills  EYES:  Negative  blurry vision or double vision  CARDIOVASCULAR:  Negative for chest pain or palpitations  RESPIRATORY:  Negative for cough, wheezing, or SOB   GASTROINTESTINAL:  Negative for nausea, vomiting, diarrhea, constipation, or abdominal pain  GENITOURINARY:  Negative frequency, urgency or dysuria  NEUROLOGIC:  No headache, confusion, dizziness, lightheadedness  All other systems were reviewed and are negative     ALLERGIES:  sulfa drugs (Hives)  Pipercillin Sodium-Tazobactam Sodium (Pruritus)      Current inpatient medications :    ANTIBIOTICS/RELEVANT:  cefTRIAXone   IVPB 1000 milliGRAM(s) IV Intermittent every 24 hours    MEDICATIONS  (STANDING):  amLODIPine   Tablet 10 milliGRAM(s) Oral daily  ascorbic acid 500 milliGRAM(s) Oral daily  cloNIDine 0.1 milliGRAM(s) Oral two times a day  dextrose 5%. 1000 milliLiter(s) (50 mL/Hr) IV Continuous <Continuous>  dextrose 5%. 1000 milliLiter(s) (100 mL/Hr) IV Continuous <Continuous>  dextrose 50% Injectable 25 Gram(s) IV Push once  dextrose 50% Injectable 12.5 Gram(s) IV Push once  dextrose 50% Injectable 25 Gram(s) IV Push once  diazepam    Tablet 5 milliGRAM(s) Oral every 8 hours  DULoxetine 60 milliGRAM(s) Oral daily  ferrous    sulfate 325 milliGRAM(s) Oral daily  finasteride 5 milliGRAM(s) Oral daily  gabapentin 600 milliGRAM(s) Oral three times a day  glucagon  Injectable 1 milliGRAM(s) IntraMuscular once  heparin  Infusion.  Unit(s)/Hr (23 mL/Hr) IV Continuous <Continuous>  insulin glargine Injectable (LANTUS) 20 Unit(s) SubCutaneous at bedtime  insulin lispro (ADMELOG) corrective regimen sliding scale   SubCutaneous Before meals and at bedtime  iron sucrose IVPB 100 milliGRAM(s) IV Intermittent every 7 days  methocarbamol 750 milliGRAM(s) Oral three times a day  metoprolol tartrate 25 milliGRAM(s) Oral two times a day  mirabegron ER 25 milliGRAM(s) Oral daily  oxybutynin 10 milliGRAM(s) Oral daily  pantoprazole    Tablet 40 milliGRAM(s) Oral before breakfast  polyethylene glycol 3350 17 Gram(s) Oral daily  sodium chloride 1 Gram(s) Oral three times a day  sodium chloride 0.9%. 1000 milliLiter(s) (50 mL/Hr) IV Continuous <Continuous>  traZODone 50 milliGRAM(s) Oral at bedtime  urea Oral Powder 15 Gram(s) Oral daily  warfarin 2.5 milliGRAM(s) Oral at bedtime    MEDICATIONS  (PRN):  acetaminophen     Tablet .. 650 milliGRAM(s) Oral every 6 hours PRN Temp greater or equal to 38C (100.4F), Mild Pain (1 - 3)  dextrose Oral Gel 15 Gram(s) Oral once PRN Blood Glucose LESS THAN 70 milliGRAM(s)/deciliter  heparin   Injectable 96982 Unit(s) IV Push every 6 hours PRN For aPTT less than 40  heparin   Injectable 5000 Unit(s) IV Push every 6 hours PRN For aPTT between 40 - 57  HYDROmorphone   Tablet 4 milliGRAM(s) Oral every 4 hours PRN Moderate Pain (4 - 6)  HYDROmorphone   Tablet 6 milliGRAM(s) Oral every 4 hours PRN Severe Pain (7 - 10)        Objective:  Vital Signs Last 24 Hrs  T(C): 36.6 (27 Oct 2024 20:44), Max: 36.9 (27 Oct 2024 15:42)  T(F): 97.9 (27 Oct 2024 20:44), Max: 98.4 (27 Oct 2024 15:42)  HR: 62 (27 Oct 2024 20:44) (59 - 66)  BP: 123/78 (27 Oct 2024 20:44) (116/74 - 135/81)  BP(mean): 93 (27 Oct 2024 20:44) (88 - 99)  RR: 16 (27 Oct 2024 20:44) (11 - 16)  SpO2: 95% (27 Oct 2024 20:44) (94% - 99%)    Parameters below as of 27 Oct 2024 20:44  Patient On (Oxygen Delivery Method): room air        Physical Exam:  General:  no acute distress  Neck: supple, trachea midline  Lungs: clear, no wheeze/rhonchi  Cardiovascular: regular rate and rhythm, S1 S2  Abdomen: soft, nontender,  bowel sounds normal  Neurological: alert and oriented x3  Skin: no rash  Extremities: no edema      LABS:                        9.8    9.56  )-----------( 218      ( 27 Oct 2024 05:30 )             31.0   10-27    129[L]  |  96  |  52[H]  ----------------------------<  214[H]  4.0   |  25  |  2.78[H]    Ca    8.9      27 Oct 2024 05:30    MICROBIOLOGY:  Culture - Urine (collected 20 Oct 2024 13:00)  Source: Clean Catch Clean Catch (Midstream)  Final Report (22 Oct 2024 10:48):    >=3 organisms. Probable collection contamination.    RADIOLOGY & ADDITIONAL STUDIES:      Assessment :   50YO M resident of [facility] BPH CVA  neurogenic bladder sp  suprapubic catheter at [facility]  IR, approximately 2 weeks ago seen at [facility] 10/20 sec bladder spasms and had CT that showed cystitis. Started on antibiotics and was sent back to NH. Pt now presented to the hospital with persistent bladder spasm with continuous pain.   Ucx 10/20   >=3 organisms. Probable collection contamination. Prior to hx of Ecoli in Ucx.  ?acute cystitis doubt   Seen by urology - no intervention  Clinically stable    Plan :   Trial of Rocephin x 5 days till 10/27- last dose today  Trend temps and cbc  Pulm toileting  Stable from ID standpoint    Continue with present regiment.  Appropriate use of antibiotics and adverse effects reviewed.      > 35 minutes were spent in direct patient care reviewing notes, medications ,labs data/ imaging , discussion with multidisciplinary team.    Thank you for allowing me to participate in care of your patient .    Yadira Blackmon MD  Infectious Disease  [phone]
  Patient is a 49y Male whom presented to the hospital with ckd and osmani     PAST MEDICAL & SURGICAL HISTORY:  BPH (benign prostatic hyperplasia)      HTN (hypertension)      Type 2 diabetes mellitus      Peripheral neuropathy      History of Guillain-[place] syndrome      History of CVA in adulthood      DDD (degenerative disc disease), lumbar      DVT, lower extremity      Renal stones      H/O Guillain-[place] syndrome      History of neurogenic bowel      DM2 (diabetes mellitus, type 2)      HTN (hypertension)      BPH without obstruction/lower urinary tract symptoms      Degenerative arthritis      DVT of lower limb, acute      CVA (cerebrovascular accident)      CVD (cerebrovascular disease)      Muscle weakness      Iron deficiency anemia      History of muscle spasm      Pain, unspecified      Vitamin deficiency      Obesity      GERD (gastroesophageal reflux disease)      H/O constipation      H/O insomnia      Essential (primary) hypertension      Acute embolism and thrombosis of deep vein of lower extremity      BPH (benign prostatic hyperplasia)      Diabetes mellitus due to underlying condition with diabetic neuropathy      Major depressive disorder, single episode      S/P IVC filter      H/O lithotripsy      Chronic suprapubic catheter          MEDICATIONS  (STANDING):  amLODIPine   Tablet 10 milliGRAM(s) Oral daily  ascorbic acid 500 milliGRAM(s) Oral daily  cefTRIAXone   IVPB 1000 milliGRAM(s) IV Intermittent every 24 hours  cloNIDine 0.1 milliGRAM(s) Oral two times a day  dextrose 5%. 1000 milliLiter(s) (50 mL/Hr) IV Continuous <Continuous>  dextrose 5%. 1000 milliLiter(s) (100 mL/Hr) IV Continuous <Continuous>  dextrose 50% Injectable 12.5 Gram(s) IV Push once  dextrose 50% Injectable 25 Gram(s) IV Push once  dextrose 50% Injectable 25 Gram(s) IV Push once  diazepam    Tablet 2.5 milliGRAM(s) Oral every 8 hours  DULoxetine 60 milliGRAM(s) Oral daily  ferrous    sulfate 325 milliGRAM(s) Oral daily  finasteride 5 milliGRAM(s) Oral daily  gabapentin 600 milliGRAM(s) Oral three times a day  glucagon  Injectable 1 milliGRAM(s) IntraMuscular once  insulin lispro (ADMELOG) corrective regimen sliding scale   SubCutaneous every 6 hours  lactated ringers. 1000 milliLiter(s) (70 mL/Hr) IV Continuous <Continuous>  methocarbamol 750 milliGRAM(s) Oral three times a day  metoprolol tartrate 25 milliGRAM(s) Oral two times a day  pantoprazole    Tablet 40 milliGRAM(s) Oral before breakfast  polyethylene glycol 3350 17 Gram(s) Oral daily  traZODone 50 milliGRAM(s) Oral at bedtime      Allergies    sulfa drugs (Other)  sulfa drugs (Rash)  sulfa drugs (Hives)  sulfa drugs (Unknown)    Intolerances    Pipercillin Sodium-Tazobactam Sodium (Pruritus)      SOCIAL HISTORY:  Denies ETOh,Smoking,     FAMILY HISTORY:  FH: heart disease    FH: HTN (hypertension)    Family history of sinus tachycardia (Father)    FH: HTN (hypertension) (Mother)        REVIEW OF SYSTEMS:    CONSTITUTIONAL: No weakness, fevers or chills  RESPIRATORY: No cough, wheezing, hemoptysis; No shortness of breath  CARDIOVASCULAR: No chest pain or palpitations  GASTROINTESTINAL: No abdominal or epigastric pain. No nausea, vomiting,                                                                                       10.2   8.32  )-----------( 189      ( 29 Oct 2024 06:00 )             32.4       CBC Full  -  ( 29 Oct 2024 06:00 )  WBC Count : 8.32 K/uL  RBC Count : 4.30 M/uL  Hemoglobin : 10.2 g/dL  Hematocrit : 32.4 %  Platelet Count - Automated : 189 K/uL  Mean Cell Volume : 75.3 fL  Mean Cell Hemoglobin : 23.7 pg  Mean Cell Hemoglobin Concentration : 31.5 g/dL  Auto Neutrophil # : x  Auto Lymphocyte # : x  Auto Monocyte # : x  Auto Eosinophil # : x  Auto Basophil # : x  Auto Neutrophil % : x  Auto Lymphocyte % : x  Auto Monocyte % : x  Auto Eosinophil % : x  Auto Basophil % : x      10-29    135  |  100  |  59[H]  ----------------------------<  208[H]  4.2   |  24  |  2.70[H]    Ca    8.9      29 Oct 2024 06:00        CAPILLARY BLOOD GLUCOSE      POCT Blood Glucose.: 199 mg/dL (29 Oct 2024 07:56)  POCT Blood Glucose.: 236 mg/dL (28 Oct 2024 21:46)  POCT Blood Glucose.: 216 mg/dL (28 Oct 2024 16:59)  POCT Blood Glucose.: 202 mg/dL (28 Oct 2024 12:22)      Vital Signs Last 24 Hrs  T(C): 36.6 (29 Oct 2024 07:47), Max: 36.8 (28 Oct 2024 23:57)  T(F): 97.9 (29 Oct 2024 07:47), Max: 98.3 (28 Oct 2024 23:57)  HR: 54 (29 Oct 2024 07:47) (54 - 58)  BP: 114/69 (29 Oct 2024 07:47) (107/702 - 120/73)  BP(mean): 84 (29 Oct 2024 07:47) (84 - 84)  RR: 18 (29 Oct 2024 07:47) (15 - 18)  SpO2: 95% (29 Oct 2024 07:47) (95% - 98%)    Parameters below as of 29 Oct 2024 07:47  Patient On (Oxygen Delivery Method): room air        Urinalysis Basic - ( 29 Oct 2024 06:00 )    Color: x / Appearance: x / SG: x / pH: x  Gluc: 208 mg/dL / Ketone: x  / Bili: x / Urobili: x   Blood: x / Protein: x / Nitrite: x   Leuk Esterase: x / RBC: x / WBC x   Sq Epi: x / Non Sq Epi: x / Bacteria: x        PT/INR - ( 29 Oct 2024 06:00 )   PT: 25.6 sec;   INR: 2.22 ratio         PTT - ( 29 Oct 2024 06:00 )  PTT:43.4 sec        PHYSICAL EXAM:    Constitutional: NAD  HEENT: conjunctive   clear   Neck:  No JVD  Respiratory: CTAB  Cardiovascular: S1 and S2  Gastrointestinal: BS+, soft, NT/ND  Extremities: No peripheral edema  Neurological: A/O x 3, no focal deficits  : Suprapubic catheter.  Skin: No rashes               MEDICATIONS  (STANDING):  amLODIPine   Tablet 10 milliGRAM(s) Oral daily  ascorbic acid 500 milliGRAM(s) Oral daily  cefTRIAXone   IVPB 1000 milliGRAM(s) IV Intermittent every 24 hours  cloNIDine 0.1 milliGRAM(s) Oral two times a day  dextrose 5%. 1000 milliLiter(s) (50 mL/Hr) IV Continuous <Continuous>  dextrose 5%. 1000 milliLiter(s) (100 mL/Hr) IV Continuous <Continuous>  dextrose 50% Injectable 12.5 Gram(s) IV Push once  dextrose 50% Injectable 25 Gram(s) IV Push once  dextrose 50% Injectable 25 Gram(s) IV Push once  diazepam    Tablet 2.5 milliGRAM(s) Oral every 8 hours  DULoxetine 60 milliGRAM(s) Oral daily  ferrous    sulfate 325 milliGRAM(s) Oral daily  finasteride 5 milliGRAM(s) Oral daily  gabapentin 600 milliGRAM(s) Oral three times a day  glucagon  Injectable 1 milliGRAM(s) IntraMuscular once  insulin lispro (ADMELOG) corrective regimen sliding scale   SubCutaneous every 6 hours  lactated ringers. 1000 milliLiter(s) (70 mL/Hr) IV Continuous <Continuous>  methocarbamol 750 milliGRAM(s) Oral three times a day  metoprolol tartrate 25 milliGRAM(s) Oral two times a day  pantoprazole    Tablet 40 milliGRAM(s) Oral before breakfast  polyethylene glycol 3350 17 Gram(s) Oral daily  traZODone 50 milliGRAM(s) Oral at bedtime          
  Patient is a 49y Male whom presented to the hospital with ckd and osmani     PAST MEDICAL & SURGICAL HISTORY:  BPH (benign prostatic hyperplasia)      HTN (hypertension)      Type 2 diabetes mellitus      Peripheral neuropathy      History of Guillain-[place] syndrome      History of CVA in adulthood      DDD (degenerative disc disease), lumbar      DVT, lower extremity      Renal stones      H/O Guillain-[place] syndrome      History of neurogenic bowel      DM2 (diabetes mellitus, type 2)      HTN (hypertension)      BPH without obstruction/lower urinary tract symptoms      Degenerative arthritis      DVT of lower limb, acute      CVA (cerebrovascular accident)      CVD (cerebrovascular disease)      Muscle weakness      Iron deficiency anemia      History of muscle spasm      Pain, unspecified      Vitamin deficiency      Obesity      GERD (gastroesophageal reflux disease)      H/O constipation      H/O insomnia      Essential (primary) hypertension      Acute embolism and thrombosis of deep vein of lower extremity      BPH (benign prostatic hyperplasia)      Diabetes mellitus due to underlying condition with diabetic neuropathy      Major depressive disorder, single episode      S/P IVC filter      H/O lithotripsy      Chronic suprapubic catheter          MEDICATIONS  (STANDING):  amLODIPine   Tablet 10 milliGRAM(s) Oral daily  ascorbic acid 500 milliGRAM(s) Oral daily  cefTRIAXone   IVPB 1000 milliGRAM(s) IV Intermittent every 24 hours  cloNIDine 0.1 milliGRAM(s) Oral two times a day  dextrose 5%. 1000 milliLiter(s) (50 mL/Hr) IV Continuous <Continuous>  dextrose 5%. 1000 milliLiter(s) (100 mL/Hr) IV Continuous <Continuous>  dextrose 50% Injectable 12.5 Gram(s) IV Push once  dextrose 50% Injectable 25 Gram(s) IV Push once  dextrose 50% Injectable 25 Gram(s) IV Push once  diazepam    Tablet 2.5 milliGRAM(s) Oral every 8 hours  DULoxetine 60 milliGRAM(s) Oral daily  ferrous    sulfate 325 milliGRAM(s) Oral daily  finasteride 5 milliGRAM(s) Oral daily  gabapentin 600 milliGRAM(s) Oral three times a day  glucagon  Injectable 1 milliGRAM(s) IntraMuscular once  insulin lispro (ADMELOG) corrective regimen sliding scale   SubCutaneous every 6 hours  lactated ringers. 1000 milliLiter(s) (70 mL/Hr) IV Continuous <Continuous>  methocarbamol 750 milliGRAM(s) Oral three times a day  metoprolol tartrate 25 milliGRAM(s) Oral two times a day  pantoprazole    Tablet 40 milliGRAM(s) Oral before breakfast  polyethylene glycol 3350 17 Gram(s) Oral daily  traZODone 50 milliGRAM(s) Oral at bedtime      Allergies    sulfa drugs (Other)  sulfa drugs (Rash)  sulfa drugs (Hives)  sulfa drugs (Unknown)    Intolerances    Pipercillin Sodium-Tazobactam Sodium (Pruritus)      SOCIAL HISTORY:  Denies ETOh,Smoking,     FAMILY HISTORY:  FH: heart disease    FH: HTN (hypertension)    Family history of sinus tachycardia (Father)    FH: HTN (hypertension) (Mother)        REVIEW OF SYSTEMS:    CONSTITUTIONAL: No weakness, fevers or chills  RESPIRATORY: No cough, wheezing, hemoptysis; No shortness of breath  CARDIOVASCULAR: No chest pain or palpitations  GASTROINTESTINAL: No abdominal or epigastric pain. No nausea, vomiting,                                                           10.8   9.41  )-----------( 184      ( 30 Oct 2024 06:00 )             34.3       CBC Full  -  ( 30 Oct 2024 06:00 )  WBC Count : 9.41 K/uL  RBC Count : 4.55 M/uL  Hemoglobin : 10.8 g/dL  Hematocrit : 34.3 %  Platelet Count - Automated : 184 K/uL  Mean Cell Volume : 75.4 fL  Mean Cell Hemoglobin : 23.7 pg  Mean Cell Hemoglobin Concentration : 31.5 g/dL  Auto Neutrophil # : x  Auto Lymphocyte # : x  Auto Monocyte # : x  Auto Eosinophil # : x  Auto Basophil # : x  Auto Neutrophil % : x  Auto Lymphocyte % : x  Auto Monocyte % : x  Auto Eosinophil % : x  Auto Basophil % : x      10-29    135  |  100  |  59[H]  ----------------------------<  208[H]  4.2   |  24  |  2.70[H]    Ca    8.9      29 Oct 2024 06:00        CAPILLARY BLOOD GLUCOSE      POCT Blood Glucose.: 166 mg/dL (29 Oct 2024 21:28)  POCT Blood Glucose.: 212 mg/dL (29 Oct 2024 18:07)  POCT Blood Glucose.: 205 mg/dL (29 Oct 2024 14:22)      Vital Signs Last 24 Hrs  T(C): 37 (30 Oct 2024 05:23), Max: 37 (30 Oct 2024 05:23)  T(F): 98.6 (30 Oct 2024 05:23), Max: 98.6 (30 Oct 2024 05:23)  HR: 68 (30 Oct 2024 05:23) (64 - 68)  BP: 117/76 (30 Oct 2024 05:23) (115/69 - 127/85)  BP(mean): 99 (29 Oct 2024 22:02) (99 - 99)  RR: 18 (30 Oct 2024 05:23) (15 - 18)  SpO2: 95% (30 Oct 2024 05:23) (95% - 98%)    Parameters below as of 30 Oct 2024 05:23  Patient On (Oxygen Delivery Method): room air        Urinalysis Basic - ( 29 Oct 2024 06:00 )    Color: x / Appearance: x / SG: x / pH: x  Gluc: 208 mg/dL / Ketone: x  / Bili: x / Urobili: x   Blood: x / Protein: x / Nitrite: x   Leuk Esterase: x / RBC: x / WBC x   Sq Epi: x / Non Sq Epi: x / Bacteria: x        PT/INR - ( 29 Oct 2024 06:00 )   PT: 25.6 sec;   INR: 2.22 ratio         PTT - ( 29 Oct 2024 06:00 )  PTT:43.4 sec                                                  10.2   8.32  )-----------( 189      ( 29 Oct 2024 06:00 )             32.4       CBC Full  -  ( 29 Oct 2024 06:00 )  WBC Count : 8.32 K/uL  RBC Count : 4.30 M/uL  Hemoglobin : 10.2 g/dL  Hematocrit : 32.4 %  Platelet Count - Automated : 189 K/uL  Mean Cell Volume : 75.3 fL  Mean Cell Hemoglobin : 23.7 pg  Mean Cell Hemoglobin Concentration : 31.5 g/dL  Auto Neutrophil # : x  Auto Lymphocyte # : x  Auto Monocyte # : x  Auto Eosinophil # : x  Auto Basophil # : x  Auto Neutrophil % : x  Auto Lymphocyte % : x  Auto Monocyte % : x  Auto Eosinophil % : x  Auto Basophil % : x      10-29    135  |  100  |  59[H]  ----------------------------<  208[H]  4.2   |  24  |  2.70[H]    Ca    8.9      29 Oct 2024 06:00        CAPILLARY BLOOD GLUCOSE      POCT Blood Glucose.: 199 mg/dL (29 Oct 2024 07:56)  POCT Blood Glucose.: 236 mg/dL (28 Oct 2024 21:46)  POCT Blood Glucose.: 216 mg/dL (28 Oct 2024 16:59)  POCT Blood Glucose.: 202 mg/dL (28 Oct 2024 12:22)      Vital Signs Last 24 Hrs  T(C): 36.6 (29 Oct 2024 07:47), Max: 36.8 (28 Oct 2024 23:57)  T(F): 97.9 (29 Oct 2024 07:47), Max: 98.3 (28 Oct 2024 23:57)  HR: 54 (29 Oct 2024 07:47) (54 - 58)  BP: 114/69 (29 Oct 2024 07:47) (107/702 - 120/73)  BP(mean): 84 (29 Oct 2024 07:47) (84 - 84)  RR: 18 (29 Oct 2024 07:47) (15 - 18)  SpO2: 95% (29 Oct 2024 07:47) (95% - 98%)    Parameters below as of 29 Oct 2024 07:47  Patient On (Oxygen Delivery Method): room air        Urinalysis Basic - ( 29 Oct 2024 06:00 )    Color: x / Appearance: x / SG: x / pH: x  Gluc: 208 mg/dL / Ketone: x  / Bili: x / Urobili: x   Blood: x / Protein: x / Nitrite: x   Leuk Esterase: x / RBC: x / WBC x   Sq Epi: x / Non Sq Epi: x / Bacteria: x        PT/INR - ( 29 Oct 2024 06:00 )   PT: 25.6 sec;   INR: 2.22 ratio         PTT - ( 29 Oct 2024 06:00 )  PTT:43.4 sec        PHYSICAL EXAM:    Constitutional: NAD  HEENT: conjunctive   clear   Neck:  No JVD  Respiratory: CTAB  Cardiovascular: S1 and S2  Gastrointestinal: BS+, soft, NT/ND  Extremities: No peripheral edema  Neurological: A/O x 3, no focal deficits  : Suprapubic catheter.  Skin: No rashes               MEDICATIONS  (STANDING):  amLODIPine   Tablet 10 milliGRAM(s) Oral daily  ascorbic acid 500 milliGRAM(s) Oral daily  cefTRIAXone   IVPB 1000 milliGRAM(s) IV Intermittent every 24 hours  cloNIDine 0.1 milliGRAM(s) Oral two times a day  dextrose 5%. 1000 milliLiter(s) (50 mL/Hr) IV Continuous <Continuous>  dextrose 5%. 1000 milliLiter(s) (100 mL/Hr) IV Continuous <Continuous>  dextrose 50% Injectable 12.5 Gram(s) IV Push once  dextrose 50% Injectable 25 Gram(s) IV Push once  dextrose 50% Injectable 25 Gram(s) IV Push once  diazepam    Tablet 2.5 milliGRAM(s) Oral every 8 hours  DULoxetine 60 milliGRAM(s) Oral daily  ferrous    sulfate 325 milliGRAM(s) Oral daily  finasteride 5 milliGRAM(s) Oral daily  gabapentin 600 milliGRAM(s) Oral three times a day  glucagon  Injectable 1 milliGRAM(s) IntraMuscular once  insulin lispro (ADMELOG) corrective regimen sliding scale   SubCutaneous every 6 hours  lactated ringers. 1000 milliLiter(s) (70 mL/Hr) IV Continuous <Continuous>  methocarbamol 750 milliGRAM(s) Oral three times a day  metoprolol tartrate 25 milliGRAM(s) Oral two times a day  pantoprazole    Tablet 40 milliGRAM(s) Oral before breakfast  polyethylene glycol 3350 17 Gram(s) Oral daily  traZODone 50 milliGRAM(s) Oral at bedtime          
  Patient is a 49y Male whom presented to the hospital with ckd and osmani     PAST MEDICAL & SURGICAL HISTORY:  BPH (benign prostatic hyperplasia)      HTN (hypertension)      Type 2 diabetes mellitus      Peripheral neuropathy      History of Guillain-[place] syndrome      History of CVA in adulthood      DDD (degenerative disc disease), lumbar      DVT, lower extremity      Renal stones      H/O Guillain-[place] syndrome      History of neurogenic bowel      DM2 (diabetes mellitus, type 2)      HTN (hypertension)      BPH without obstruction/lower urinary tract symptoms      Degenerative arthritis      DVT of lower limb, acute      CVA (cerebrovascular accident)      CVD (cerebrovascular disease)      Muscle weakness      Iron deficiency anemia      History of muscle spasm      Pain, unspecified      Vitamin deficiency      Obesity      GERD (gastroesophageal reflux disease)      H/O constipation      H/O insomnia      Essential (primary) hypertension      Acute embolism and thrombosis of deep vein of lower extremity      BPH (benign prostatic hyperplasia)      Diabetes mellitus due to underlying condition with diabetic neuropathy      Major depressive disorder, single episode      S/P IVC filter      H/O lithotripsy      Chronic suprapubic catheter          MEDICATIONS  (STANDING):  amLODIPine   Tablet 10 milliGRAM(s) Oral daily  ascorbic acid 500 milliGRAM(s) Oral daily  cefTRIAXone   IVPB 1000 milliGRAM(s) IV Intermittent every 24 hours  cloNIDine 0.1 milliGRAM(s) Oral two times a day  dextrose 5%. 1000 milliLiter(s) (50 mL/Hr) IV Continuous <Continuous>  dextrose 5%. 1000 milliLiter(s) (100 mL/Hr) IV Continuous <Continuous>  dextrose 50% Injectable 12.5 Gram(s) IV Push once  dextrose 50% Injectable 25 Gram(s) IV Push once  dextrose 50% Injectable 25 Gram(s) IV Push once  diazepam    Tablet 2.5 milliGRAM(s) Oral every 8 hours  DULoxetine 60 milliGRAM(s) Oral daily  ferrous    sulfate 325 milliGRAM(s) Oral daily  finasteride 5 milliGRAM(s) Oral daily  gabapentin 600 milliGRAM(s) Oral three times a day  glucagon  Injectable 1 milliGRAM(s) IntraMuscular once  insulin lispro (ADMELOG) corrective regimen sliding scale   SubCutaneous every 6 hours  lactated ringers. 1000 milliLiter(s) (70 mL/Hr) IV Continuous <Continuous>  methocarbamol 750 milliGRAM(s) Oral three times a day  metoprolol tartrate 25 milliGRAM(s) Oral two times a day  pantoprazole    Tablet 40 milliGRAM(s) Oral before breakfast  polyethylene glycol 3350 17 Gram(s) Oral daily  traZODone 50 milliGRAM(s) Oral at bedtime      Allergies    sulfa drugs (Other)  sulfa drugs (Rash)  sulfa drugs (Hives)  sulfa drugs (Unknown)    Intolerances    Pipercillin Sodium-Tazobactam Sodium (Pruritus)      SOCIAL HISTORY:  Denies ETOh,Smoking,     FAMILY HISTORY:  FH: heart disease    FH: HTN (hypertension)    Family history of sinus tachycardia (Father)    FH: HTN (hypertension) (Mother)        REVIEW OF SYSTEMS:    CONSTITUTIONAL: No weakness, fevers or chills  RESPIRATORY: No cough, wheezing, hemoptysis; No shortness of breath  CARDIOVASCULAR: No chest pain or palpitations  GASTROINTESTINAL: No abdominal or epigastric pain. No nausea, vomiting,       PHYSICAL EXAM:    Constitutional: NAD  HEENT: conjunctive   clear   Neck:  No JVD  Respiratory: CTAB  Cardiovascular: S1 and S2  Gastrointestinal: BS+, soft, NT/ND  Extremities: No peripheral edema  Neurological: A/O x 3, no focal deficits  : Suprapubic catheter.  Skin: No rashes                        9.7    9.49  )-----------( 209      ( 24 Oct 2024 06:00 )             29.6       CBC Full  -  ( 24 Oct 2024 06:00 )  WBC Count : 9.49 K/uL  RBC Count : 4.08 M/uL  Hemoglobin : 9.7 g/dL  Hematocrit : 29.6 %  Platelet Count - Automated : 209 K/uL  Mean Cell Volume : 72.5 fL  Mean Cell Hemoglobin : 23.8 pg  Mean Cell Hemoglobin Concentration : 32.8 g/dL  Auto Neutrophil # : x  Auto Lymphocyte # : x  Auto Monocyte # : x  Auto Eosinophil # : x  Auto Basophil # : x  Auto Neutrophil % : x  Auto Lymphocyte % : x  Auto Monocyte % : x  Auto Eosinophil % : x  Auto Basophil % : x      10-24    132[L]  |  98  |  33[H]  ----------------------------<  193[H]  3.6   |  24  |  2.60[H]    Ca    8.7      24 Oct 2024 06:00  Phos  4.4     10-24  Mg     1.4     10-24    TPro  6.8  /  Alb  2.6[L]  /  TBili  0.2  /  DBili  x   /  AST  14  /  ALT  19  /  AlkPhos  91  10-24      CAPILLARY BLOOD GLUCOSE      POCT Blood Glucose.: 236 mg/dL (24 Oct 2024 18:01)  POCT Blood Glucose.: 292 mg/dL (24 Oct 2024 12:28)  POCT Blood Glucose.: 211 mg/dL (24 Oct 2024 08:12)  POCT Blood Glucose.: 248 mg/dL (23 Oct 2024 23:40)  POCT Blood Glucose.: 260 mg/dL (23 Oct 2024 21:54)      Vital Signs Last 24 Hrs  T(C): 36.4 (24 Oct 2024 15:33), Max: 36.8 (24 Oct 2024 05:08)  T(F): 97.5 (24 Oct 2024 15:33), Max: 98.3 (24 Oct 2024 05:08)  HR: 56 (24 Oct 2024 15:33) (56 - 65)  BP: 118/74 (24 Oct 2024 15:33) (118/74 - 148/84)  BP(mean): 89 (24 Oct 2024 15:33) (89 - 101)  RR: 14 (24 Oct 2024 15:33) (14 - 20)  SpO2: 96% (24 Oct 2024 15:33) (96% - 98%)    Parameters below as of 24 Oct 2024 15:33  Patient On (Oxygen Delivery Method): room air        Urinalysis Basic - ( 24 Oct 2024 06:00 )    Color: x / Appearance: x / SG: x / pH: x  Gluc: 193 mg/dL / Ketone: x  / Bili: x / Urobili: x   Blood: x / Protein: x / Nitrite: x   Leuk Esterase: x / RBC: x / WBC x   Sq Epi: x / Non Sq Epi: x / Bacteria: x        PT/INR - ( 24 Oct 2024 06:50 )   PT: 15.3 sec;   INR: 1.32 ratio         PTT - ( 24 Oct 2024 14:30 )  PTT:122.4 secAccess: Not applicable    LABS:                        10.1   11.23 )-----------( 263      ( 21 Oct 2024 22:15 )             30.6     10-21    134[L]  |  98  |  27[H]  ----------------------------<  215[H]  4.1   |  22  |  3.21[H]    Ca    8.7      21 Oct 2024 22:15    TPro  7.2  /  Alb  2.9[L]  /  TBili  0.2  /  DBili  x   /  AST  20  /  ALT  23  /  AlkPhos  101  10-21      Urine Studies:  Urinalysis Basic - ( 21 Oct 2024 22:15 )    Color: x / Appearance: x / SG: x / pH: x  Gluc: 215 mg/dL / Ketone: x  / Bili: x / Urobili: x   Blood: x / Protein: x / Nitrite: x   Leuk Esterase: x / RBC: x / WBC x   Sq Epi: x / Non Sq Epi: x / Bacteria: x            RADIOLOGY & ADDITIONAL STUDIES:        MEDICATIONS  (STANDING):  amLODIPine   Tablet 10 milliGRAM(s) Oral daily  ascorbic acid 500 milliGRAM(s) Oral daily  cefTRIAXone   IVPB 1000 milliGRAM(s) IV Intermittent every 24 hours  cloNIDine 0.1 milliGRAM(s) Oral two times a day  dextrose 5%. 1000 milliLiter(s) (50 mL/Hr) IV Continuous <Continuous>  dextrose 5%. 1000 milliLiter(s) (100 mL/Hr) IV Continuous <Continuous>  dextrose 50% Injectable 12.5 Gram(s) IV Push once  dextrose 50% Injectable 25 Gram(s) IV Push once  dextrose 50% Injectable 25 Gram(s) IV Push once  diazepam    Tablet 2.5 milliGRAM(s) Oral every 8 hours  DULoxetine 60 milliGRAM(s) Oral daily  ferrous    sulfate 325 milliGRAM(s) Oral daily  finasteride 5 milliGRAM(s) Oral daily  gabapentin 600 milliGRAM(s) Oral three times a day  glucagon  Injectable 1 milliGRAM(s) IntraMuscular once  insulin lispro (ADMELOG) corrective regimen sliding scale   SubCutaneous every 6 hours  lactated ringers. 1000 milliLiter(s) (70 mL/Hr) IV Continuous <Continuous>  methocarbamol 750 milliGRAM(s) Oral three times a day  metoprolol tartrate 25 milliGRAM(s) Oral two times a day  pantoprazole    Tablet 40 milliGRAM(s) Oral before breakfast  polyethylene glycol 3350 17 Gram(s) Oral daily  traZODone 50 milliGRAM(s) Oral at bedtime          
  Patient is a 49y Male whom presented to the hospital with ckd and osmani     PAST MEDICAL & SURGICAL HISTORY:  BPH (benign prostatic hyperplasia)      HTN (hypertension)      Type 2 diabetes mellitus      Peripheral neuropathy      History of Guillain-[place] syndrome      History of CVA in adulthood      DDD (degenerative disc disease), lumbar      DVT, lower extremity      Renal stones      H/O Guillain-[place] syndrome      History of neurogenic bowel      DM2 (diabetes mellitus, type 2)      HTN (hypertension)      BPH without obstruction/lower urinary tract symptoms      Degenerative arthritis      DVT of lower limb, acute      CVA (cerebrovascular accident)      CVD (cerebrovascular disease)      Muscle weakness      Iron deficiency anemia      History of muscle spasm      Pain, unspecified      Vitamin deficiency      Obesity      GERD (gastroesophageal reflux disease)      H/O constipation      H/O insomnia      Essential (primary) hypertension      Acute embolism and thrombosis of deep vein of lower extremity      BPH (benign prostatic hyperplasia)      Diabetes mellitus due to underlying condition with diabetic neuropathy      Major depressive disorder, single episode      S/P IVC filter      H/O lithotripsy      Chronic suprapubic catheter          MEDICATIONS  (STANDING):  amLODIPine   Tablet 10 milliGRAM(s) Oral daily  ascorbic acid 500 milliGRAM(s) Oral daily  cefTRIAXone   IVPB 1000 milliGRAM(s) IV Intermittent every 24 hours  cloNIDine 0.1 milliGRAM(s) Oral two times a day  dextrose 5%. 1000 milliLiter(s) (50 mL/Hr) IV Continuous <Continuous>  dextrose 5%. 1000 milliLiter(s) (100 mL/Hr) IV Continuous <Continuous>  dextrose 50% Injectable 12.5 Gram(s) IV Push once  dextrose 50% Injectable 25 Gram(s) IV Push once  dextrose 50% Injectable 25 Gram(s) IV Push once  diazepam    Tablet 2.5 milliGRAM(s) Oral every 8 hours  DULoxetine 60 milliGRAM(s) Oral daily  ferrous    sulfate 325 milliGRAM(s) Oral daily  finasteride 5 milliGRAM(s) Oral daily  gabapentin 600 milliGRAM(s) Oral three times a day  glucagon  Injectable 1 milliGRAM(s) IntraMuscular once  insulin lispro (ADMELOG) corrective regimen sliding scale   SubCutaneous every 6 hours  lactated ringers. 1000 milliLiter(s) (70 mL/Hr) IV Continuous <Continuous>  methocarbamol 750 milliGRAM(s) Oral three times a day  metoprolol tartrate 25 milliGRAM(s) Oral two times a day  pantoprazole    Tablet 40 milliGRAM(s) Oral before breakfast  polyethylene glycol 3350 17 Gram(s) Oral daily  traZODone 50 milliGRAM(s) Oral at bedtime      Allergies    sulfa drugs (Other)  sulfa drugs (Rash)  sulfa drugs (Hives)  sulfa drugs (Unknown)    Intolerances    Pipercillin Sodium-Tazobactam Sodium (Pruritus)      SOCIAL HISTORY:  Denies ETOh,Smoking,     FAMILY HISTORY:  FH: heart disease    FH: HTN (hypertension)    Family history of sinus tachycardia (Father)    FH: HTN (hypertension) (Mother)        REVIEW OF SYSTEMS:    CONSTITUTIONAL: No weakness, fevers or chills  RESPIRATORY: No cough, wheezing, hemoptysis; No shortness of breath  CARDIOVASCULAR: No chest pain or palpitations  GASTROINTESTINAL: No abdominal or epigastric pain. No nausea, vomiting,                           10.2   9.35  )-----------( 203      ( 26 Oct 2024 05:30 )             32.0       CBC Full  -  ( 26 Oct 2024 05:30 )  WBC Count : 9.35 K/uL  RBC Count : 4.28 M/uL  Hemoglobin : 10.2 g/dL  Hematocrit : 32.0 %  Platelet Count - Automated : 203 K/uL  Mean Cell Volume : 74.8 fL  Mean Cell Hemoglobin : 23.8 pg  Mean Cell Hemoglobin Concentration : 31.9 g/dL  Auto Neutrophil # : x  Auto Lymphocyte # : x  Auto Monocyte # : x  Auto Eosinophil # : x  Auto Basophil # : x  Auto Neutrophil % : x  Auto Lymphocyte % : x  Auto Monocyte % : x  Auto Eosinophil % : x  Auto Basophil % : x      10-26    129[L]  |  95[L]  |  35[H]  ----------------------------<  213[H]  3.9   |  24  |  2.72[H]    Ca    8.9      26 Oct 2024 05:30    TPro  6.8  /  Alb  2.6[L]  /  TBili  0.2  /  DBili  x   /  AST  12  /  ALT  13  /  AlkPhos  93  10-25      CAPILLARY BLOOD GLUCOSE      POCT Blood Glucose.: 223 mg/dL (26 Oct 2024 07:58)  POCT Blood Glucose.: 225 mg/dL (25 Oct 2024 21:28)  POCT Blood Glucose.: 241 mg/dL (25 Oct 2024 17:22)  POCT Blood Glucose.: 248 mg/dL (25 Oct 2024 12:19)      Vital Signs Last 24 Hrs  T(C): 36.5 (26 Oct 2024 10:29), Max: 37.1 (26 Oct 2024 05:22)  T(F): 97.7 (26 Oct 2024 10:29), Max: 98.7 (26 Oct 2024 05:22)  HR: 64 (26 Oct 2024 10:29) (58 - 67)  BP: 112/72 (26 Oct 2024 10:29) (112/72 - 132/76)  BP(mean): 92 (26 Oct 2024 05:22) (92 - 92)  RR: 18 (26 Oct 2024 10:29) (16 - 18)  SpO2: 93% (26 Oct 2024 10:29) (93% - 97%)    Parameters below as of 26 Oct 2024 05:22  Patient On (Oxygen Delivery Method): room air        Urinalysis Basic - ( 26 Oct 2024 05:30 )    Color: x / Appearance: x / SG: x / pH: x  Gluc: 213 mg/dL / Ketone: x  / Bili: x / Urobili: x   Blood: x / Protein: x / Nitrite: x   Leuk Esterase: x / RBC: x / WBC x   Sq Epi: x / Non Sq Epi: x / Bacteria: x        PT/INR - ( 26 Oct 2024 05:30 )   PT: 18.9 sec;   INR: 1.61 ratio         PTT - ( 26 Oct 2024 05:30 )  PTT:108.2 sec            PHYSICAL EXAM:    Constitutional: NAD  HEENT: conjunctive   clear   Neck:  No JVD  Respiratory: CTAB  Cardiovascular: S1 and S2  Gastrointestinal: BS+, soft, NT/ND  Extremities: No peripheral edema  Neurological: A/O x 3, no focal deficits  : Suprapubic catheter.  Skin: No rashes               MEDICATIONS  (STANDING):  amLODIPine   Tablet 10 milliGRAM(s) Oral daily  ascorbic acid 500 milliGRAM(s) Oral daily  cefTRIAXone   IVPB 1000 milliGRAM(s) IV Intermittent every 24 hours  cloNIDine 0.1 milliGRAM(s) Oral two times a day  dextrose 5%. 1000 milliLiter(s) (50 mL/Hr) IV Continuous <Continuous>  dextrose 5%. 1000 milliLiter(s) (100 mL/Hr) IV Continuous <Continuous>  dextrose 50% Injectable 12.5 Gram(s) IV Push once  dextrose 50% Injectable 25 Gram(s) IV Push once  dextrose 50% Injectable 25 Gram(s) IV Push once  diazepam    Tablet 2.5 milliGRAM(s) Oral every 8 hours  DULoxetine 60 milliGRAM(s) Oral daily  ferrous    sulfate 325 milliGRAM(s) Oral daily  finasteride 5 milliGRAM(s) Oral daily  gabapentin 600 milliGRAM(s) Oral three times a day  glucagon  Injectable 1 milliGRAM(s) IntraMuscular once  insulin lispro (ADMELOG) corrective regimen sliding scale   SubCutaneous every 6 hours  lactated ringers. 1000 milliLiter(s) (70 mL/Hr) IV Continuous <Continuous>  methocarbamol 750 milliGRAM(s) Oral three times a day  metoprolol tartrate 25 milliGRAM(s) Oral two times a day  pantoprazole    Tablet 40 milliGRAM(s) Oral before breakfast  polyethylene glycol 3350 17 Gram(s) Oral daily  traZODone 50 milliGRAM(s) Oral at bedtime          
Patient is a 49y old  Male who presents with a chief complaint of osmani (30 Oct 2024 18:09)    INTERVAL HPI/OVERNIGHT EVENTS:  No major acute events overnight.  This AM, patient feels about the same.  Still has intermittent pain in the suprapubic area.  Occurs when he feels like when he has to urinate.  Also still having urinary incontinence.  Otherwise no other complaints.      MEDICATIONS  (STANDING):  amLODIPine   Tablet 10 milliGRAM(s) Oral daily  ascorbic acid 500 milliGRAM(s) Oral daily  cloNIDine 0.1 milliGRAM(s) Oral two times a day  dextrose 5%. 1000 milliLiter(s) (50 mL/Hr) IV Continuous <Continuous>  dextrose 5%. 1000 milliLiter(s) (100 mL/Hr) IV Continuous <Continuous>  dextrose 50% Injectable 25 Gram(s) IV Push once  dextrose 50% Injectable 25 Gram(s) IV Push once  dextrose 50% Injectable 12.5 Gram(s) IV Push once  diazepam    Tablet 5 milliGRAM(s) Oral every 8 hours  DULoxetine 60 milliGRAM(s) Oral daily  ferrous    sulfate 325 milliGRAM(s) Oral daily  finasteride 5 milliGRAM(s) Oral daily  gabapentin 600 milliGRAM(s) Oral three times a day  glucagon  Injectable 1 milliGRAM(s) IntraMuscular once  insulin glargine Injectable (LANTUS) 20 Unit(s) SubCutaneous at bedtime  insulin lispro (ADMELOG) corrective regimen sliding scale   SubCutaneous Before meals and at bedtime  insulin lispro Injectable (ADMELOG) 3 Unit(s) SubCutaneous three times a day before meals  iron sucrose IVPB 100 milliGRAM(s) IV Intermittent every 7 days  methocarbamol 750 milliGRAM(s) Oral three times a day  metoprolol tartrate 25 milliGRAM(s) Oral two times a day  mirabegron ER 25 milliGRAM(s) Oral daily  oxybutynin 10 milliGRAM(s) Oral daily  pantoprazole    Tablet 40 milliGRAM(s) Oral before breakfast  polyethylene glycol 3350 17 Gram(s) Oral daily  sodium chloride 0.9%. 1000 milliLiter(s) (75 mL/Hr) IV Continuous <Continuous>  traZODone 50 milliGRAM(s) Oral at bedtime  urea Oral Powder 15 Gram(s) Oral two times a day  warfarin 5 milliGRAM(s) Oral once    MEDICATIONS  (PRN):  acetaminophen     Tablet .. 650 milliGRAM(s) Oral every 6 hours PRN Temp greater or equal to 38C (100.4F), Mild Pain (1 - 3)  dextrose Oral Gel 15 Gram(s) Oral once PRN Blood Glucose LESS THAN 70 milliGRAM(s)/deciliter  HYDROmorphone   Tablet 4 milliGRAM(s) Oral every 4 hours PRN Moderate Pain (4 - 6)  HYDROmorphone   Tablet 6 milliGRAM(s) Oral every 4 hours PRN Severe Pain (7 - 10)      Allergies  sulfa drugs (Other)  sulfa drugs (Rash)  sulfa drugs (Hives)  sulfa drugs (Unknown)    Intolerances  Pipercillin Sodium-Tazobactam Sodium (Pruritus)    ROS as above and reviewed and is otherwise negative    PHYSICAL EXAM:  Vital Signs Last 24 Hrs  T(C): 36.7 (31 Oct 2024 04:49), Max: 36.8 (30 Oct 2024 12:42)  T(F): 98.1 (31 Oct 2024 04:49), Max: 98.2 (30 Oct 2024 12:42)  HR: 57 (31 Oct 2024 04:49) (57 - 64)  BP: 110/69 (31 Oct 2024 04:49) (110/69 - 123/72)  BP(mean): 84 (30 Oct 2024 20:24) (84 - 84)  RR: 18 (31 Oct 2024 04:49) (14 - 18)  SpO2: 95% (31 Oct 2024 04:49) (93% - 95%)    Parameters below as of 31 Oct 2024 04:49  Patient On (Oxygen Delivery Method): room air        GENERAL:     obese male in NAD   HEAD:     atraumatic, normocephalic  EYES:     EOMI, conjunctiva and sclera clear  RESPIRATORY:     grossly clear to auscultation bilaterally  CARDIOVASCULAR:     regular rate and rhythm, no murmurs or rubs or gallops  GASTROINTESTINAL:     soft, slightly tender to palpation in suprapubic area, has +suprapubic catheter in place, dressings seems dry, no guarding or rebound, bowel sounds present  EXTREMITIES:     no clubbing or cyanosis or edema  MUSCULOSKELETAL:     no joint pain or swelling or deformities  PSYCH:     appropriate      LABS:                        9.9    9.41  )-----------( 187      ( 31 Oct 2024 06:00 )             31.2     31 Oct 2024 06:00    137    |  102    |  74     ----------------------------<  166    4.2     |  25     |  2.65     Ca    8.6        31 Oct 2024 06:00  Phos  4.7       31 Oct 2024 06:00  Mg     1.9       31 Oct 2024 06:00    TPro  6.1    /  Alb  2.7    /  TBili  0.3    /  DBili  x      /  AST  18     /  ALT  28     /  AlkPhos  97     31 Oct 2024 06:00    PT/INR - ( 31 Oct 2024 06:00 )   PT: 24.7 sec;   INR: 2.15 ratio         PTT - ( 31 Oct 2024 06:00 )  PTT:45.3 sec  Urinalysis Basic - ( 31 Oct 2024 06:00 )    Color: x / Appearance: x / SG: x / pH: x  Gluc: 166 mg/dL / Ketone: x  / Bili: x / Urobili: x   Blood: x / Protein: x / Nitrite: x   Leuk Esterase: x / RBC: x / WBC x   Sq Epi: x / Non Sq Epi: x / Bacteria: x      CAPILLARY BLOOD GLUCOSE      POCT Blood Glucose.: 164 mg/dL (31 Oct 2024 07:42)  POCT Blood Glucose.: 158 mg/dL (30 Oct 2024 22:00)  POCT Blood Glucose.: 134 mg/dL (30 Oct 2024 16:58)  POCT Blood Glucose.: 185 mg/dL (30 Oct 2024 12:01)  POCT Blood Glucose.: 220 mg/dL (30 Oct 2024 09:24)    
Subjective: Patient seen and examined. Resting comfortable. Patient go for Cystogram or be transferred to [place].     MEDICATIONS  (STANDING):  amLODIPine   Tablet 10 milliGRAM(s) Oral daily  ascorbic acid 500 milliGRAM(s) Oral daily  cloNIDine 0.1 milliGRAM(s) Oral two times a day  dextrose 5%. 1000 milliLiter(s) (100 mL/Hr) IV Continuous <Continuous>  dextrose 5%. 1000 milliLiter(s) (50 mL/Hr) IV Continuous <Continuous>  dextrose 50% Injectable 25 Gram(s) IV Push once  dextrose 50% Injectable 12.5 Gram(s) IV Push once  dextrose 50% Injectable 25 Gram(s) IV Push once  diazepam    Tablet 5 milliGRAM(s) Oral every 8 hours  DULoxetine 60 milliGRAM(s) Oral daily  ferrous    sulfate 325 milliGRAM(s) Oral daily  finasteride 5 milliGRAM(s) Oral daily  gabapentin 600 milliGRAM(s) Oral three times a day  glucagon  Injectable 1 milliGRAM(s) IntraMuscular once  insulin glargine Injectable (LANTUS) 20 Unit(s) SubCutaneous at bedtime  insulin lispro (ADMELOG) corrective regimen sliding scale   SubCutaneous Before meals and at bedtime  insulin lispro Injectable (ADMELOG) 3 Unit(s) SubCutaneous three times a day before meals  iron sucrose IVPB 100 milliGRAM(s) IV Intermittent every 7 days  methocarbamol 750 milliGRAM(s) Oral three times a day  metoprolol tartrate 25 milliGRAM(s) Oral two times a day  mirabegron ER 25 milliGRAM(s) Oral daily  oxybutynin 10 milliGRAM(s) Oral daily  pantoprazole    Tablet 40 milliGRAM(s) Oral before breakfast  polyethylene glycol 3350 17 Gram(s) Oral daily  sodium chloride 0.9%. 1000 milliLiter(s) (50 mL/Hr) IV Continuous <Continuous>  traZODone 50 milliGRAM(s) Oral at bedtime  urea Oral Powder 15 Gram(s) Oral daily    MEDICATIONS  (PRN):  acetaminophen     Tablet .. 650 milliGRAM(s) Oral every 6 hours PRN Temp greater or equal to 38C (100.4F), Mild Pain (1 - 3)  dextrose Oral Gel 15 Gram(s) Oral once PRN Blood Glucose LESS THAN 70 milliGRAM(s)/deciliter  HYDROmorphone   Tablet 4 milliGRAM(s) Oral every 4 hours PRN Moderate Pain (4 - 6)  HYDROmorphone   Tablet 6 milliGRAM(s) Oral every 4 hours PRN Severe Pain (7 - 10)      Vital Signs Last 24 Hrs  T(C): 36.6 (29 Oct 2024 07:47), Max: 36.8 (28 Oct 2024 23:57)  T(F): 97.9 (29 Oct 2024 07:47), Max: 98.3 (28 Oct 2024 23:57)  HR: 54 (29 Oct 2024 07:47) (54 - 58)  BP: 114/69 (29 Oct 2024 07:47) (107/702 - 120/73)  BP(mean): 84 (29 Oct 2024 07:47) (84 - 84)  RR: 18 (29 Oct 2024 07:47) (15 - 18)  SpO2: 95% (29 Oct 2024 07:47) (95% - 98%)    Parameters below as of 29 Oct 2024 07:47  Patient On (Oxygen Delivery Method): room air        PHYSICAL EXAM:  GENERAL: NAD  HEAD:  Atraumatic, Normocephalic  ENMT: Moist mucous membranes  NECK: Supple, No JVD, Normal thyroid  CHEST/LUNG: Clear to auscultation bilaterally  HEART: Regular rate and rhythm  ABDOMEN: Soft, Nontender, Nondistended  EXTREMITIES:  2+ Peripheral Pulses      LABS:                        10.2   8.32  )-----------( 189      ( 29 Oct 2024 06:00 )             32.4     29 Oct 2024 06:00    135    |  100    |  59     ----------------------------<  208    4.2     |  24     |  2.70     Ca    8.9        29 Oct 2024 06:00      PT/INR - ( 29 Oct 2024 06:00 )   PT: 25.6 sec;   INR: 2.22 ratio         PTT - ( 29 Oct 2024 06:00 )  PTT:43.4 sec  Urinalysis Basic - ( 29 Oct 2024 06:00 )    Color: x / Appearance: x / SG: x / pH: x  Gluc: 208 mg/dL / Ketone: x  / Bili: x / Urobili: x   Blood: x / Protein: x / Nitrite: x   Leuk Esterase: x / RBC: x / WBC x   Sq Epi: x / Non Sq Epi: x / Bacteria: x      CAPILLARY BLOOD GLUCOSE      POCT Blood Glucose.: 236 mg/dL (28 Oct 2024 21:46)  POCT Blood Glucose.: 216 mg/dL (28 Oct 2024 16:59)  POCT Blood Glucose.: 202 mg/dL (28 Oct 2024 12:22)        
  Patient is a 49y Male whom presented to the hospital with ckd and osmani     PAST MEDICAL & SURGICAL HISTORY:  BPH (benign prostatic hyperplasia)      HTN (hypertension)      Type 2 diabetes mellitus      Peripheral neuropathy      History of Guillain-[place] syndrome      History of CVA in adulthood      DDD (degenerative disc disease), lumbar      DVT, lower extremity      Renal stones      H/O Guillain-[place] syndrome      History of neurogenic bowel      DM2 (diabetes mellitus, type 2)      HTN (hypertension)      BPH without obstruction/lower urinary tract symptoms      Degenerative arthritis      DVT of lower limb, acute      CVA (cerebrovascular accident)      CVD (cerebrovascular disease)      Muscle weakness      Iron deficiency anemia      History of muscle spasm      Pain, unspecified      Vitamin deficiency      Obesity      GERD (gastroesophageal reflux disease)      H/O constipation      H/O insomnia      Essential (primary) hypertension      Acute embolism and thrombosis of deep vein of lower extremity      BPH (benign prostatic hyperplasia)      Diabetes mellitus due to underlying condition with diabetic neuropathy      Major depressive disorder, single episode      S/P IVC filter      H/O lithotripsy      Chronic suprapubic catheter          MEDICATIONS  (STANDING):  amLODIPine   Tablet 10 milliGRAM(s) Oral daily  ascorbic acid 500 milliGRAM(s) Oral daily  cefTRIAXone   IVPB 1000 milliGRAM(s) IV Intermittent every 24 hours  cloNIDine 0.1 milliGRAM(s) Oral two times a day  dextrose 5%. 1000 milliLiter(s) (50 mL/Hr) IV Continuous <Continuous>  dextrose 5%. 1000 milliLiter(s) (100 mL/Hr) IV Continuous <Continuous>  dextrose 50% Injectable 12.5 Gram(s) IV Push once  dextrose 50% Injectable 25 Gram(s) IV Push once  dextrose 50% Injectable 25 Gram(s) IV Push once  diazepam    Tablet 2.5 milliGRAM(s) Oral every 8 hours  DULoxetine 60 milliGRAM(s) Oral daily  ferrous    sulfate 325 milliGRAM(s) Oral daily  finasteride 5 milliGRAM(s) Oral daily  gabapentin 600 milliGRAM(s) Oral three times a day  glucagon  Injectable 1 milliGRAM(s) IntraMuscular once  insulin lispro (ADMELOG) corrective regimen sliding scale   SubCutaneous every 6 hours  lactated ringers. 1000 milliLiter(s) (70 mL/Hr) IV Continuous <Continuous>  methocarbamol 750 milliGRAM(s) Oral three times a day  metoprolol tartrate 25 milliGRAM(s) Oral two times a day  pantoprazole    Tablet 40 milliGRAM(s) Oral before breakfast  polyethylene glycol 3350 17 Gram(s) Oral daily  traZODone 50 milliGRAM(s) Oral at bedtime      Allergies    sulfa drugs (Other)  sulfa drugs (Rash)  sulfa drugs (Hives)  sulfa drugs (Unknown)    Intolerances    Pipercillin Sodium-Tazobactam Sodium (Pruritus)      SOCIAL HISTORY:  Denies ETOh,Smoking,     FAMILY HISTORY:  FH: heart disease    FH: HTN (hypertension)    Family history of sinus tachycardia (Father)    FH: HTN (hypertension) (Mother)        REVIEW OF SYSTEMS:    CONSTITUTIONAL: No weakness, fevers or chills  RESPIRATORY: No cough, wheezing, hemoptysis; No shortness of breath  CARDIOVASCULAR: No chest pain or palpitations  GASTROINTESTINAL: No abdominal or epigastric pain. No nausea, vomiting,                                                                       9.8    8.57  )-----------( 188      ( 28 Oct 2024 06:00 )             31.5       CBC Full  -  ( 28 Oct 2024 06:00 )  WBC Count : 8.57 K/uL  RBC Count : 4.20 M/uL  Hemoglobin : 9.8 g/dL  Hematocrit : 31.5 %  Platelet Count - Automated : 188 K/uL  Mean Cell Volume : 75.0 fL  Mean Cell Hemoglobin : 23.3 pg  Mean Cell Hemoglobin Concentration : 31.1 g/dL  Auto Neutrophil # : x  Auto Lymphocyte # : x  Auto Monocyte # : x  Auto Eosinophil # : x  Auto Basophil # : x  Auto Neutrophil % : x  Auto Lymphocyte % : x  Auto Monocyte % : x  Auto Eosinophil % : x  Auto Basophil % : x      10-28    135  |  102  |  54[H]  ----------------------------<  189[H]  4.1   |  25  |  2.69[H]    Ca    9.0      28 Oct 2024 06:00        CAPILLARY BLOOD GLUCOSE      POCT Blood Glucose.: 186 mg/dL (28 Oct 2024 07:52)  POCT Blood Glucose.: 203 mg/dL (27 Oct 2024 21:00)  POCT Blood Glucose.: 237 mg/dL (27 Oct 2024 16:34)  POCT Blood Glucose.: 248 mg/dL (27 Oct 2024 11:34)      Vital Signs Last 24 Hrs  T(C): 36.6 (28 Oct 2024 07:35), Max: 36.9 (27 Oct 2024 15:42)  T(F): 97.8 (28 Oct 2024 07:35), Max: 98.4 (27 Oct 2024 15:42)  HR: 57 (28 Oct 2024 07:35) (57 - 63)  BP: 109/71 (28 Oct 2024 07:35) (109/71 - 123/78)  BP(mean): 83 (28 Oct 2024 07:35) (83 - 93)  RR: 16 (28 Oct 2024 07:35) (11 - 18)  SpO2: 96% (28 Oct 2024 07:35) (95% - 96%)    Parameters below as of 28 Oct 2024 07:35  Patient On (Oxygen Delivery Method): room air        Urinalysis Basic - ( 28 Oct 2024 06:00 )    Color: x / Appearance: x / SG: x / pH: x  Gluc: 189 mg/dL / Ketone: x  / Bili: x / Urobili: x   Blood: x / Protein: x / Nitrite: x   Leuk Esterase: x / RBC: x / WBC x   Sq Epi: x / Non Sq Epi: x / Bacteria: x        PT/INR - ( 28 Oct 2024 06:00 )   PT: 23.8 sec;   INR: 2.03 ratio         PTT - ( 28 Oct 2024 06:00 )  PTT:92.4 sec              PHYSICAL EXAM:    Constitutional: NAD  HEENT: conjunctive   clear   Neck:  No JVD  Respiratory: CTAB  Cardiovascular: S1 and S2  Gastrointestinal: BS+, soft, NT/ND  Extremities: No peripheral edema  Neurological: A/O x 3, no focal deficits  : Suprapubic catheter.  Skin: No rashes               MEDICATIONS  (STANDING):  amLODIPine   Tablet 10 milliGRAM(s) Oral daily  ascorbic acid 500 milliGRAM(s) Oral daily  cefTRIAXone   IVPB 1000 milliGRAM(s) IV Intermittent every 24 hours  cloNIDine 0.1 milliGRAM(s) Oral two times a day  dextrose 5%. 1000 milliLiter(s) (50 mL/Hr) IV Continuous <Continuous>  dextrose 5%. 1000 milliLiter(s) (100 mL/Hr) IV Continuous <Continuous>  dextrose 50% Injectable 12.5 Gram(s) IV Push once  dextrose 50% Injectable 25 Gram(s) IV Push once  dextrose 50% Injectable 25 Gram(s) IV Push once  diazepam    Tablet 2.5 milliGRAM(s) Oral every 8 hours  DULoxetine 60 milliGRAM(s) Oral daily  ferrous    sulfate 325 milliGRAM(s) Oral daily  finasteride 5 milliGRAM(s) Oral daily  gabapentin 600 milliGRAM(s) Oral three times a day  glucagon  Injectable 1 milliGRAM(s) IntraMuscular once  insulin lispro (ADMELOG) corrective regimen sliding scale   SubCutaneous every 6 hours  lactated ringers. 1000 milliLiter(s) (70 mL/Hr) IV Continuous <Continuous>  methocarbamol 750 milliGRAM(s) Oral three times a day  metoprolol tartrate 25 milliGRAM(s) Oral two times a day  pantoprazole    Tablet 40 milliGRAM(s) Oral before breakfast  polyethylene glycol 3350 17 Gram(s) Oral daily  traZODone 50 milliGRAM(s) Oral at bedtime          
  Patient is a 49y Male whom presented to the hospital with ckd and osmani     PAST MEDICAL & SURGICAL HISTORY:  BPH (benign prostatic hyperplasia)      HTN (hypertension)      Type 2 diabetes mellitus      Peripheral neuropathy      History of Guillain-[place] syndrome      History of CVA in adulthood      DDD (degenerative disc disease), lumbar      DVT, lower extremity      Renal stones      H/O Guillain-[place] syndrome      History of neurogenic bowel      DM2 (diabetes mellitus, type 2)      HTN (hypertension)      BPH without obstruction/lower urinary tract symptoms      Degenerative arthritis      DVT of lower limb, acute      CVA (cerebrovascular accident)      CVD (cerebrovascular disease)      Muscle weakness      Iron deficiency anemia      History of muscle spasm      Pain, unspecified      Vitamin deficiency      Obesity      GERD (gastroesophageal reflux disease)      H/O constipation      H/O insomnia      Essential (primary) hypertension      Acute embolism and thrombosis of deep vein of lower extremity      BPH (benign prostatic hyperplasia)      Diabetes mellitus due to underlying condition with diabetic neuropathy      Major depressive disorder, single episode      S/P IVC filter      H/O lithotripsy      Chronic suprapubic catheter          MEDICATIONS  (STANDING):  amLODIPine   Tablet 10 milliGRAM(s) Oral daily  ascorbic acid 500 milliGRAM(s) Oral daily  cefTRIAXone   IVPB 1000 milliGRAM(s) IV Intermittent every 24 hours  cloNIDine 0.1 milliGRAM(s) Oral two times a day  dextrose 5%. 1000 milliLiter(s) (50 mL/Hr) IV Continuous <Continuous>  dextrose 5%. 1000 milliLiter(s) (100 mL/Hr) IV Continuous <Continuous>  dextrose 50% Injectable 12.5 Gram(s) IV Push once  dextrose 50% Injectable 25 Gram(s) IV Push once  dextrose 50% Injectable 25 Gram(s) IV Push once  diazepam    Tablet 2.5 milliGRAM(s) Oral every 8 hours  DULoxetine 60 milliGRAM(s) Oral daily  ferrous    sulfate 325 milliGRAM(s) Oral daily  finasteride 5 milliGRAM(s) Oral daily  gabapentin 600 milliGRAM(s) Oral three times a day  glucagon  Injectable 1 milliGRAM(s) IntraMuscular once  insulin lispro (ADMELOG) corrective regimen sliding scale   SubCutaneous every 6 hours  lactated ringers. 1000 milliLiter(s) (70 mL/Hr) IV Continuous <Continuous>  methocarbamol 750 milliGRAM(s) Oral three times a day  metoprolol tartrate 25 milliGRAM(s) Oral two times a day  pantoprazole    Tablet 40 milliGRAM(s) Oral before breakfast  polyethylene glycol 3350 17 Gram(s) Oral daily  traZODone 50 milliGRAM(s) Oral at bedtime      Allergies    sulfa drugs (Other)  sulfa drugs (Rash)  sulfa drugs (Hives)  sulfa drugs (Unknown)    Intolerances    Pipercillin Sodium-Tazobactam Sodium (Pruritus)      SOCIAL HISTORY:  Denies ETOh,Smoking,     FAMILY HISTORY:  FH: heart disease    FH: HTN (hypertension)    Family history of sinus tachycardia (Father)    FH: HTN (hypertension) (Mother)        REVIEW OF SYSTEMS:    CONSTITUTIONAL: No weakness, fevers or chills  RESPIRATORY: No cough, wheezing, hemoptysis; No shortness of breath  CARDIOVASCULAR: No chest pain or palpitations  GASTROINTESTINAL: No abdominal or epigastric pain. No nausea, vomiting,     No diarrhea or constipation. No melena   GENITOURINARY: No dysuria, frequency or hematuria  Suprapubic catheter.                              10.1   11.23 )-----------( 263      ( 21 Oct 2024 22:15 )             30.6       CBC Full  -  ( 21 Oct 2024 22:15 )  WBC Count : 11.23 K/uL  RBC Count : 4.22 M/uL  Hemoglobin : 10.1 g/dL  Hematocrit : 30.6 %  Platelet Count - Automated : 263 K/uL  Mean Cell Volume : 72.5 fL  Mean Cell Hemoglobin : 23.9 pg  Mean Cell Hemoglobin Concentration : 33.0 g/dL  Auto Neutrophil # : 7.88 K/uL  Auto Lymphocyte # : 1.95 K/uL  Auto Monocyte # : 0.82 K/uL  Auto Eosinophil # : 0.46 K/uL  Auto Basophil # : 0.08 K/uL  Auto Neutrophil % : 70.1 %  Auto Lymphocyte % : 17.4 %  Auto Monocyte % : 7.3 %  Auto Eosinophil % : 4.1 %  Auto Basophil % : 0.7 %      10-21    134[L]  |  98  |  27[H]  ----------------------------<  215[H]  4.1   |  22  |  3.21[H]    Ca    8.7      21 Oct 2024 22:15    TPro  7.2  /  Alb  2.9[L]  /  TBili  0.2  /  DBili  x   /  AST  20  /  ALT  23  /  AlkPhos  101  10-21      CAPILLARY BLOOD GLUCOSE      POCT Blood Glucose.: 242 mg/dL (23 Oct 2024 08:02)  POCT Blood Glucose.: 252 mg/dL (22 Oct 2024 21:17)  POCT Blood Glucose.: 314 mg/dL (22 Oct 2024 16:55)  POCT Blood Glucose.: 202 mg/dL (22 Oct 2024 11:34)      Vital Signs Last 24 Hrs  T(C): 36.6 (23 Oct 2024 08:15), Max: 36.7 (22 Oct 2024 13:01)  T(F): 97.9 (23 Oct 2024 08:15), Max: 98 (22 Oct 2024 13:01)  HR: 54 (23 Oct 2024 05:20) (54 - 66)  BP: 119/77 (23 Oct 2024 05:20) (119/77 - 159/97)  BP(mean): 106 (22 Oct 2024 20:56) (106 - 113)  RR: 18 (23 Oct 2024 05:20) (13 - 20)  SpO2: 95% (23 Oct 2024 05:20) (95% - 97%)    Parameters below as of 23 Oct 2024 05:20  Patient On (Oxygen Delivery Method): room air        Urinalysis Basic - ( 21 Oct 2024 22:15 )    Color: x / Appearance: x / SG: x / pH: x  Gluc: 215 mg/dL / Ketone: x  / Bili: x / Urobili: x   Blood: x / Protein: x / Nitrite: x   Leuk Esterase: x / RBC: x / WBC x   Sq Epi: x / Non Sq Epi: x / Bacteria: x        PT/INR - ( 21 Oct 2024 22:15 )   PT: 13.5 sec;   INR: 1.17 ratio         PTT - ( 21 Oct 2024 22:15 )  PTT:18.1 sec          PHYSICAL EXAM:    Constitutional: NAD  HEENT: conjunctive   clear   Neck:  No JVD  Respiratory: CTAB  Cardiovascular: S1 and S2  Gastrointestinal: BS+, soft, NT/ND  Extremities: No peripheral edema  Neurological: A/O x 3, no focal deficits  : Suprapubic catheter.  Skin: No rashes  Access: Not applicable    LABS:                        10.1   11.23 )-----------( 263      ( 21 Oct 2024 22:15 )             30.6     10-21    134[L]  |  98  |  27[H]  ----------------------------<  215[H]  4.1   |  22  |  3.21[H]    Ca    8.7      21 Oct 2024 22:15    TPro  7.2  /  Alb  2.9[L]  /  TBili  0.2  /  DBili  x   /  AST  20  /  ALT  23  /  AlkPhos  101  10-21      Urine Studies:  Urinalysis Basic - ( 21 Oct 2024 22:15 )    Color: x / Appearance: x / SG: x / pH: x  Gluc: 215 mg/dL / Ketone: x  / Bili: x / Urobili: x   Blood: x / Protein: x / Nitrite: x   Leuk Esterase: x / RBC: x / WBC x   Sq Epi: x / Non Sq Epi: x / Bacteria: x            RADIOLOGY & ADDITIONAL STUDIES:        MEDICATIONS  (STANDING):  amLODIPine   Tablet 10 milliGRAM(s) Oral daily  ascorbic acid 500 milliGRAM(s) Oral daily  cefTRIAXone   IVPB 1000 milliGRAM(s) IV Intermittent every 24 hours  cloNIDine 0.1 milliGRAM(s) Oral two times a day  dextrose 5%. 1000 milliLiter(s) (50 mL/Hr) IV Continuous <Continuous>  dextrose 5%. 1000 milliLiter(s) (100 mL/Hr) IV Continuous <Continuous>  dextrose 50% Injectable 12.5 Gram(s) IV Push once  dextrose 50% Injectable 25 Gram(s) IV Push once  dextrose 50% Injectable 25 Gram(s) IV Push once  diazepam    Tablet 2.5 milliGRAM(s) Oral every 8 hours  DULoxetine 60 milliGRAM(s) Oral daily  ferrous    sulfate 325 milliGRAM(s) Oral daily  finasteride 5 milliGRAM(s) Oral daily  gabapentin 600 milliGRAM(s) Oral three times a day  glucagon  Injectable 1 milliGRAM(s) IntraMuscular once  insulin lispro (ADMELOG) corrective regimen sliding scale   SubCutaneous every 6 hours  lactated ringers. 1000 milliLiter(s) (70 mL/Hr) IV Continuous <Continuous>  methocarbamol 750 milliGRAM(s) Oral three times a day  metoprolol tartrate 25 milliGRAM(s) Oral two times a day  pantoprazole    Tablet 40 milliGRAM(s) Oral before breakfast  polyethylene glycol 3350 17 Gram(s) Oral daily  traZODone 50 milliGRAM(s) Oral at bedtime          
  Patient is a 49y Male whom presented to the hospital with ckd and osmani     PAST MEDICAL & SURGICAL HISTORY:  BPH (benign prostatic hyperplasia)      HTN (hypertension)      Type 2 diabetes mellitus      Peripheral neuropathy      History of Guillain-[place] syndrome      History of CVA in adulthood      DDD (degenerative disc disease), lumbar      DVT, lower extremity      Renal stones      H/O Guillain-[place] syndrome      History of neurogenic bowel      DM2 (diabetes mellitus, type 2)      HTN (hypertension)      BPH without obstruction/lower urinary tract symptoms      Degenerative arthritis      DVT of lower limb, acute      CVA (cerebrovascular accident)      CVD (cerebrovascular disease)      Muscle weakness      Iron deficiency anemia      History of muscle spasm      Pain, unspecified      Vitamin deficiency      Obesity      GERD (gastroesophageal reflux disease)      H/O constipation      H/O insomnia      Essential (primary) hypertension      Acute embolism and thrombosis of deep vein of lower extremity      BPH (benign prostatic hyperplasia)      Diabetes mellitus due to underlying condition with diabetic neuropathy      Major depressive disorder, single episode      S/P IVC filter      H/O lithotripsy      Chronic suprapubic catheter          MEDICATIONS  (STANDING):  amLODIPine   Tablet 10 milliGRAM(s) Oral daily  ascorbic acid 500 milliGRAM(s) Oral daily  cefTRIAXone   IVPB 1000 milliGRAM(s) IV Intermittent every 24 hours  cloNIDine 0.1 milliGRAM(s) Oral two times a day  dextrose 5%. 1000 milliLiter(s) (50 mL/Hr) IV Continuous <Continuous>  dextrose 5%. 1000 milliLiter(s) (100 mL/Hr) IV Continuous <Continuous>  dextrose 50% Injectable 12.5 Gram(s) IV Push once  dextrose 50% Injectable 25 Gram(s) IV Push once  dextrose 50% Injectable 25 Gram(s) IV Push once  diazepam    Tablet 2.5 milliGRAM(s) Oral every 8 hours  DULoxetine 60 milliGRAM(s) Oral daily  ferrous    sulfate 325 milliGRAM(s) Oral daily  finasteride 5 milliGRAM(s) Oral daily  gabapentin 600 milliGRAM(s) Oral three times a day  glucagon  Injectable 1 milliGRAM(s) IntraMuscular once  insulin lispro (ADMELOG) corrective regimen sliding scale   SubCutaneous every 6 hours  lactated ringers. 1000 milliLiter(s) (70 mL/Hr) IV Continuous <Continuous>  methocarbamol 750 milliGRAM(s) Oral three times a day  metoprolol tartrate 25 milliGRAM(s) Oral two times a day  pantoprazole    Tablet 40 milliGRAM(s) Oral before breakfast  polyethylene glycol 3350 17 Gram(s) Oral daily  traZODone 50 milliGRAM(s) Oral at bedtime      Allergies    sulfa drugs (Other)  sulfa drugs (Rash)  sulfa drugs (Hives)  sulfa drugs (Unknown)    Intolerances    Pipercillin Sodium-Tazobactam Sodium (Pruritus)      SOCIAL HISTORY:  Denies ETOh,Smoking,     FAMILY HISTORY:  FH: heart disease    FH: HTN (hypertension)    Family history of sinus tachycardia (Father)    FH: HTN (hypertension) (Mother)        REVIEW OF SYSTEMS:    CONSTITUTIONAL: No weakness, fevers or chills  RESPIRATORY: No cough, wheezing, hemoptysis; No shortness of breath  CARDIOVASCULAR: No chest pain or palpitations  GASTROINTESTINAL: No abdominal or epigastric pain. No nausea, vomiting,                             10.5   8.53  )-----------( 220      ( 25 Oct 2024 05:45 )             33.6       CBC Full  -  ( 25 Oct 2024 05:45 )  WBC Count : 8.53 K/uL  RBC Count : 4.50 M/uL  Hemoglobin : 10.5 g/dL  Hematocrit : 33.6 %  Platelet Count - Automated : 220 K/uL  Mean Cell Volume : 74.7 fL  Mean Cell Hemoglobin : 23.3 pg  Mean Cell Hemoglobin Concentration : 31.3 g/dL  Auto Neutrophil # : 6.13 K/uL  Auto Lymphocyte # : 1.48 K/uL  Auto Monocyte # : 0.48 K/uL  Auto Eosinophil # : 0.33 K/uL  Auto Basophil # : 0.05 K/uL  Auto Neutrophil % : 71.8 %  Auto Lymphocyte % : 17.4 %  Auto Monocyte % : 5.6 %  Auto Eosinophil % : 3.9 %  Auto Basophil % : 0.6 %      10-25    130[L]  |  97  |  34[H]  ----------------------------<  275[H]  3.7   |  22  |  2.70[H]    Ca    8.8      25 Oct 2024 05:45  Phos  4.4     10-24  Mg     1.4     10-24    TPro  6.8  /  Alb  2.6[L]  /  TBili  0.2  /  DBili  x   /  AST  12  /  ALT  13  /  AlkPhos  93  10-25      CAPILLARY BLOOD GLUCOSE      POCT Blood Glucose.: 248 mg/dL (25 Oct 2024 12:19)  POCT Blood Glucose.: 270 mg/dL (25 Oct 2024 07:56)  POCT Blood Glucose.: 224 mg/dL (24 Oct 2024 21:19)  POCT Blood Glucose.: 236 mg/dL (24 Oct 2024 18:01)      Vital Signs Last 24 Hrs  T(C): 36.6 (25 Oct 2024 12:02), Max: 36.7 (25 Oct 2024 04:59)  T(F): 97.9 (25 Oct 2024 12:02), Max: 98.1 (25 Oct 2024 04:59)  HR: 58 (25 Oct 2024 07:39) (56 - 62)  BP: 114/76 (25 Oct 2024 07:39) (114/76 - 156/65)  BP(mean): 89 (25 Oct 2024 07:39) (89 - 97)  RR: 16 (25 Oct 2024 07:39) (15 - 20)  SpO2: 96% (25 Oct 2024 07:39) (96% - 97%)    Parameters below as of 25 Oct 2024 07:39  Patient On (Oxygen Delivery Method): room air        Urinalysis Basic - ( 25 Oct 2024 05:45 )    Color: x / Appearance: x / SG: x / pH: x  Gluc: 275 mg/dL / Ketone: x  / Bili: x / Urobili: x   Blood: x / Protein: x / Nitrite: x   Leuk Esterase: x / RBC: x / WBC x   Sq Epi: x / Non Sq Epi: x / Bacteria: x        PT/INR - ( 25 Oct 2024 05:45 )   PT: 15.7 sec;   INR: 1.36 ratio         PTT - ( 25 Oct 2024 03:55 )  PTT:73.5 sec    PHYSICAL EXAM:    Constitutional: NAD  HEENT: conjunctive   clear   Neck:  No JVD  Respiratory: CTAB  Cardiovascular: S1 and S2  Gastrointestinal: BS+, soft, NT/ND  Extremities: No peripheral edema  Neurological: A/O x 3, no focal deficits  : Suprapubic catheter.  Skin: No rashes               MEDICATIONS  (STANDING):  amLODIPine   Tablet 10 milliGRAM(s) Oral daily  ascorbic acid 500 milliGRAM(s) Oral daily  cefTRIAXone   IVPB 1000 milliGRAM(s) IV Intermittent every 24 hours  cloNIDine 0.1 milliGRAM(s) Oral two times a day  dextrose 5%. 1000 milliLiter(s) (50 mL/Hr) IV Continuous <Continuous>  dextrose 5%. 1000 milliLiter(s) (100 mL/Hr) IV Continuous <Continuous>  dextrose 50% Injectable 12.5 Gram(s) IV Push once  dextrose 50% Injectable 25 Gram(s) IV Push once  dextrose 50% Injectable 25 Gram(s) IV Push once  diazepam    Tablet 2.5 milliGRAM(s) Oral every 8 hours  DULoxetine 60 milliGRAM(s) Oral daily  ferrous    sulfate 325 milliGRAM(s) Oral daily  finasteride 5 milliGRAM(s) Oral daily  gabapentin 600 milliGRAM(s) Oral three times a day  glucagon  Injectable 1 milliGRAM(s) IntraMuscular once  insulin lispro (ADMELOG) corrective regimen sliding scale   SubCutaneous every 6 hours  lactated ringers. 1000 milliLiter(s) (70 mL/Hr) IV Continuous <Continuous>  methocarbamol 750 milliGRAM(s) Oral three times a day  metoprolol tartrate 25 milliGRAM(s) Oral two times a day  pantoprazole    Tablet 40 milliGRAM(s) Oral before breakfast  polyethylene glycol 3350 17 Gram(s) Oral daily  traZODone 50 milliGRAM(s) Oral at bedtime

## 2024-10-31 NOTE — SOCIAL WORK PROGRESS NOTE - NSSWPROGRESSNOTE_GEN_ALL_CORE
Per Renan [facility] PA patient was on cooling blanket running temperature and is not medically optimized to transition to rehab today. SW sent updated message to [facility] to advise  Per Renan [facility] PA patient wanted to be transferred to [place] and paperwork was sent over. If they cannot accept patient willing to return to [facility]. sW sent message to [facility] that he maybe able to return today

## 2024-10-31 NOTE — CHART NOTE - NSCHARTNOTEFT_GEN_A_CORE
Spoke with [facility] urology team Dr. Le and was informed that they will not admit given his w/u done on this admission and can have pt follow-up outpatient. Patient informed and is agreeable to outpatient f/u with urology team and agreeable to transfer back to [facility].
Assessment: Per H&P " 49-year-old male history of BPH CVA chronic suprapubic catheter IVC filter complaining about bladder pain spasms seen at [facility] yesterday had a CT that showed cystitis started on antibiotics but back at [place] for continuous pain.  Denies any fever but admits to some chills.  Denies any flank pain."    10/25  Pt due for nutrition follow-up.  Pt admitted from [facility] with admitting dx acute cystitis.  PTA pt on NCS, regular consistently diet at [facility].  Pt presently on Con CHO diet (no indication for renal restriction, was d/c from diet order).  Intake variable, however, usually very good; pt often calls diet office for additional protein; double protein provided at meals.  Hx DM2, A1c 8.0%; Pertinent medications/nutrition labs reviewed; -220 mg/dl x24hr (slightly downtrended), hyponatremia, osmani; renal following; In house ordered for lispro sliding scale to aid in glucose management. Also receiving LR, antibiotic. Pt denies nutrition related questions or concerns at time of visit.  RD to follow up and will continue to monitor pt's nutrition status.       Factors impacting intake: [ ] none [ ] nausea  [ ] vomiting [ ] diarrhea [ ] constipation  [ ]chewing problems [ ] swallowing issues  [ ] other:     Diet Prescription: Diet, Regular:   Consistent Carbohydrate {Evening Snack} (10-29-24 @ 10:23)    Intake:  good    Daily Weight in k (29 Oct 2024 06:42)  Weight in k (27 Oct 2024 06:00)  Weight in k.7 (25 Oct 2024 04:59)  Weight in k.6 (24 Oct 2024 05:08)  adm weight 280#/127kg, weight appears to be stable, will continue to monitor weight trends as able  % Weight Change    Pertinent Medications: MEDICATIONS  (STANDING):  amLODIPine   Tablet 10 milliGRAM(s) Oral daily  ascorbic acid 500 milliGRAM(s) Oral daily  cloNIDine 0.1 milliGRAM(s) Oral two times a day  dextrose 5%. 1000 milliLiter(s) (50 mL/Hr) IV Continuous <Continuous>  dextrose 5%. 1000 milliLiter(s) (100 mL/Hr) IV Continuous <Continuous>  dextrose 50% Injectable 12.5 Gram(s) IV Push once  dextrose 50% Injectable 25 Gram(s) IV Push once  dextrose 50% Injectable 25 Gram(s) IV Push once  diazepam    Tablet 5 milliGRAM(s) Oral every 8 hours  DULoxetine 60 milliGRAM(s) Oral daily  ferrous    sulfate 325 milliGRAM(s) Oral daily  finasteride 5 milliGRAM(s) Oral daily  gabapentin 600 milliGRAM(s) Oral three times a day  glucagon  Injectable 1 milliGRAM(s) IntraMuscular once  insulin glargine Injectable (LANTUS) 20 Unit(s) SubCutaneous at bedtime  insulin lispro (ADMELOG) corrective regimen sliding scale   SubCutaneous Before meals and at bedtime  insulin lispro Injectable (ADMELOG) 3 Unit(s) SubCutaneous three times a day before meals  iron sucrose IVPB 100 milliGRAM(s) IV Intermittent every 7 days  methocarbamol 750 milliGRAM(s) Oral three times a day  metoprolol tartrate 25 milliGRAM(s) Oral two times a day  mirabegron ER 25 milliGRAM(s) Oral daily  oxybutynin 10 milliGRAM(s) Oral daily  pantoprazole    Tablet 40 milliGRAM(s) Oral before breakfast  polyethylene glycol 3350 17 Gram(s) Oral daily  sodium chloride 0.9%. 1000 milliLiter(s) (50 mL/Hr) IV Continuous <Continuous>  traZODone 50 milliGRAM(s) Oral at bedtime  urea Oral Powder 15 Gram(s) Oral two times a day    MEDICATIONS  (PRN):  acetaminophen     Tablet .. 650 milliGRAM(s) Oral every 6 hours PRN Temp greater or equal to 38C (100.4F), Mild Pain (1 - 3)  dextrose Oral Gel 15 Gram(s) Oral once PRN Blood Glucose LESS THAN 70 milliGRAM(s)/deciliter  HYDROmorphone   Tablet 6 milliGRAM(s) Oral every 4 hours PRN Severe Pain (7 - 10)  HYDROmorphone   Tablet 4 milliGRAM(s) Oral every 4 hours PRN Moderate Pain (4 - 6)    Pertinent Labs: 10- Na135 mmol/L Glu 208 mg/dL[H] K+ 4.2 mmol/L Cr  2.70 mg/dL[H] BUN 59 mg/dL[H] 10-24 Phos 4.4 mg/dL 10- Alb 2.6 g/dL[L]     CAPILLARY BLOOD GLUCOSE      POCT Blood Glucose.: 220 mg/dL (30 Oct 2024 09:24)  POCT Blood Glucose.: 166 mg/dL (29 Oct 2024 21:28)  POCT Blood Glucose.: 212 mg/dL (29 Oct 2024 18:07)  POCT Blood Glucose.: 205 mg/dL (29 Oct 2024 14:22)      Skin: no pressure ulcers    Estimated Needs:   [ x] no change since previous assessment  [ ] recalculated:     Previous Nutrition Diagnosis:   [ ] Inadequate Energy Intake [ ]Inadequate Oral Intake [ ] Excessive Energy Intake   [ ] Underweight [ ] Increased Nutrient Needs [x ] Overweight/Obesity   [x ] Altered related labs [ ] Unintended Weight Loss [ ] Food & Nutrition Related Knowledge Deficit [ ] Malnutrition     Nutrition Diagnosis is [ x] ongoing  [ ] resolved [ ] not applicable     New Nutrition Diagnosis: [ ] not applicable       Interventions: CHO modified diet, diet reinforcement, double protein   Recommend  [ x] Change Diet To:  [ ] Nutrition Supplement  [ ] Nutrition Support  [ ] Other:       Monitoring and Evaluation:   [X ] Intake [ x ] Tolerance to diet prescription [ x ] weights [ x ] labs[ x ] follow up per protocol  [ ] other:
Assessment: Per H&P " 49-year-old male history of BPH CVA chronic suprapubic catheter IVC filter complaining about bladder pain spasms seen at [facility] yesterday had a CT that showed cystitis started on antibiotics but back at [place] for continuous pain.  Denies any fever but admits to some chills.  Denies any flank pain."    10/25  Pt due for nutrition follow-up.  Pt admitted from [facility] with admitting dx acute cystitis.  PTA pt on NCS, regular consistently diet at [facility].  Pt presently on Con CHO diet (no indication for renal restriction, recommend d/c from diet order).  Intake variable, however, usually very good; pt often calls diet office for additional protein; double protein provided at meals.  Hx DM2, A1c 8.0%; Pertinent medications/nutrition labs reviewed; -292mg/dl x24hr, hyponatremia, osmani; renal following; In house ordered for lispro sliding scale to aid in glucose management. Also receiving LR, antibiotic.  Modified CHO portioned diet reinforced at time of visit. Pt denies nutrition related questions or concerns at time of visit.  RD to follow up and will continue to monitor pt's nutrition status.    Factors impacting intake: [ ] none [ ] nausea  [ ] vomiting [ ] diarrhea [ ] constipation  [ ]chewing problems [ ] swallowing issues  [ ] other:     Diet Prescription: Diet, Consistent Carbohydrate Renal/No Snacks (10-22-24 @ 11:27)    Intake: overall good    Current Weight: Weight (kg): 127 (10-22 @ 10:22), 127 (10-20 @ 00:15)  % Weight Change  bed scale:  10/25 281.5#  10/24 287.9#  adm weight 280#, weight appears to be stable, will continue to monitor weight trends as able    Pertinent Medications: MEDICATIONS  (STANDING):  amLODIPine   Tablet 10 milliGRAM(s) Oral daily  ascorbic acid 500 milliGRAM(s) Oral daily  cefTRIAXone   IVPB 1000 milliGRAM(s) IV Intermittent every 24 hours  cloNIDine 0.1 milliGRAM(s) Oral two times a day  dextrose 5%. 1000 milliLiter(s) (100 mL/Hr) IV Continuous <Continuous>  dextrose 5%. 1000 milliLiter(s) (50 mL/Hr) IV Continuous <Continuous>  dextrose 50% Injectable 12.5 Gram(s) IV Push once  dextrose 50% Injectable 25 Gram(s) IV Push once  dextrose 50% Injectable 25 Gram(s) IV Push once  diazepam    Tablet 2.5 milliGRAM(s) Oral every 8 hours  DULoxetine 60 milliGRAM(s) Oral daily  ferrous    sulfate 325 milliGRAM(s) Oral daily  finasteride 5 milliGRAM(s) Oral daily  gabapentin 600 milliGRAM(s) Oral three times a day  glucagon  Injectable 1 milliGRAM(s) IntraMuscular once  heparin  Infusion.  Unit(s)/Hr (23 mL/Hr) IV Continuous <Continuous>  insulin glargine Injectable (LANTUS) 16 Unit(s) SubCutaneous at bedtime  insulin lispro (ADMELOG) corrective regimen sliding scale   SubCutaneous Before meals and at bedtime  iron sucrose IVPB 100 milliGRAM(s) IV Intermittent every 7 days  lactated ringers. 1000 milliLiter(s) (70 mL/Hr) IV Continuous <Continuous>  methocarbamol 750 milliGRAM(s) Oral three times a day  metoprolol tartrate 25 milliGRAM(s) Oral two times a day  ondansetron Injectable 4 milliGRAM(s) IV Push once  oxybutynin 10 milliGRAM(s) Oral daily  pantoprazole    Tablet 40 milliGRAM(s) Oral before breakfast  polyethylene glycol 3350 17 Gram(s) Oral daily  traZODone 50 milliGRAM(s) Oral at bedtime  warfarin 5 milliGRAM(s) Oral at bedtime    MEDICATIONS  (PRN):  acetaminophen     Tablet .. 650 milliGRAM(s) Oral every 6 hours PRN Temp greater or equal to 38C (100.4F), Mild Pain (1 - 3)  dextrose Oral Gel 15 Gram(s) Oral once PRN Blood Glucose LESS THAN 70 milliGRAM(s)/deciliter  heparin   Injectable 64378 Unit(s) IV Push every 6 hours PRN For aPTT less than 40  heparin   Injectable 5000 Unit(s) IV Push every 6 hours PRN For aPTT between 40 - 57  HYDROmorphone   Tablet 4 milliGRAM(s) Oral every 6 hours PRN Moderate Pain (4 - 6)  HYDROmorphone  Injectable 1 milliGRAM(s) IV Push every 8 hours PRN Severe Pain (7 - 10)    Pertinent Labs: 10-25 Na130 mmol/L[L] Glu 275 mg/dL[H] K+ 3.7 mmol/L Cr  2.70 mg/dL[H] BUN 34 mg/dL[H] 10-24 Phos 4.4 mg/dL 10-25 Alb 2.6 g/dL[L]     CAPILLARY BLOOD GLUCOSE      POCT Blood Glucose.: 248 mg/dL (25 Oct 2024 12:19)  POCT Blood Glucose.: 270 mg/dL (25 Oct 2024 07:56)  POCT Blood Glucose.: 224 mg/dL (24 Oct 2024 21:19)  POCT Blood Glucose.: 236 mg/dL (24 Oct 2024 18:01)    Skin: no pressure ulcers    Estimated Needs:   [ x] no change since previous assessment  [ ] recalculated:     Previous Nutrition Diagnosis:   [ ] Inadequate Energy Intake [ ]Inadequate Oral Intake [ ] Excessive Energy Intake   [ ] Underweight [ ] Increased Nutrient Needs [x ] Overweight/Obesity   [x ] Altered related labs [ ] Unintended Weight Loss [ ] Food & Nutrition Related Knowledge Deficit [ ] Malnutrition     Nutrition Diagnosis is [ x] ongoing  [ ] resolved [ ] not applicable     New Nutrition Diagnosis: [ ] not applicable       Interventions: CHO modified diet, diet reinforcement, double protein   Recommend  [ x] Change Diet To: d/c renal restriction- md notified via [employer]  [ ] Nutrition Supplement  [ ] Nutrition Support  [ ] Other:     Monitoring and Evaluation:   [x ] PO intake [ x ] Tolerance to diet prescription [ x ] weights [ x ] labs[ x ] follow up per protocol  [ ] other:
Came to [facility] to see the patient but he refused to be seen by me and stated " I don't want to talk to you " He stated that he is upset that last night he was supposed to call 911 and was not sent to [facility] ER by ambulance  as per his request .During last weekend patient had 2 ER visits - [facility] and [facility] and was discharged from both ER back to [facility] , came back on Sunday from [facility] and on Monday  requested to go back to [facility] . Patient is aware that as per my conversation with  , who saw the patient earlier today no urological interventions required. I asked hospitalists team to take over the case since patient is refusing to be seen .
Patient continue to report having pain in the suprapubic area and that he is still having urinary incontinence.  Since there are no further interventions that can be done at [place], will see if [facility] can accept patient as a transfer for further inpatient workup vs outpatient workup.
Spoke to patient in regards to discharge plan, explained that his infection has been treated and has completed antibiotic course, urology has cleared him based on the cystogram and no further medical management requiring hospitalization is necessary and can follow-up on an outpatient basis. Patient states he is not ready for discharge despite explanation of care noted that this is his 24h notice and has a right to an appeal, pt notes that he does not need PT at this time stating "I will do that at [place]". All questions addressed.

## 2024-10-31 NOTE — DISCHARGE NOTE NURSING/CASE MANAGEMENT/SOCIAL WORK - NSDPFAC_GEN_ALL_CORE
[facility] @ [address] [place] [id number], phone [phone]

## 2024-10-31 NOTE — DISCHARGE NOTE NURSING/CASE MANAGEMENT/SOCIAL WORK - FINANCIAL ASSISTANCE
[facility] provides services at a reduced cost to those who are determined to be eligible through [facility]’s financial assistance program. Information regarding [facility]’s financial assistance program can be found by going to [email or web address] or by calling [phone].

## 2024-10-31 NOTE — DISCHARGE NOTE NURSING/CASE MANAGEMENT/SOCIAL WORK - NSDCPEFALRISK_GEN_ALL_CORE
For information on Fall & Injury Prevention, visit: [email or web address] OR  [email or web address] OR  https://www.cdc.gov/steadi/patient.html

## 2024-10-31 NOTE — PROGRESS NOTE ADULT - REASON FOR ADMISSION
osmani

## 2024-10-31 NOTE — PROGRESS NOTE ADULT - ASSESSMENT
49M HTN, DM2, BPH, Neurogenic Bladder with Suprapubic Catheter admitted for Acute Cystitis     Acute Cystitis / Bladder Spasm / Neurogenic Bladder - related to Suprapubic Catheter and could be infections vs inflammatory as per CT Imaging.  Urine Culture reviewed as well as documentation from Infectious Disease  - Completed IV Ceftriaxone Course  - f/u UCX  - Valium and Myrbetriq for Bladder Spasms currently but still painful   - Cystogram done (see results)  - as per urology, no further interventions needed here  - attempting to see if [facility] will accept patient as inpatient transfer  - if denied, patient will be medically cleared for discharge    History of Lupus Anticoagulant / History of DVT  Diagnosed approximately 5 years ago and has been on Coumadin since; Has IVC Filter  Unclear for the full role of full dose anticoagulation but will continue for now and defer to Hematology  INR currently subtherapeutic and due to body habitus is high risk for DVT  Bridging with Heparin Infusion and Warfarin for Goal INR 2.0-3.0  Unwilling to take Lovenox Injections   - hematology was consulted   - INR this AM 2.15  - cont coumadin dosing    Acute Renal Failure on CKD 3 - Baseline Creatinine around 2.1 - 2.3, yesterday was 2.7  - stable today at 2.7  - Monitor BMP and Electrolytes  - Nephrology Consulted ->  "hold  diuretic.  hold   ACE inhibitor.  hold   ARB.  On urea powder."    Generalized Weakness / Deconditioning  History of Stroke, Guilan Terral and Disc Disease  Uses Walker   - PT and OT Consultation -> patient was noted to be refusing PT    HTN  Controlled   - Continue Clonidine + Amlodipine + Metoprolol    DM2  - At Home uses Lantus 20U + Insulin Premeal 4U  - currently on 20units of lantus at bedtime and 3units of lispro before meals  - Last A1C = 8.0 (10/22/2024)    GERD  - Protonix    Anxiety / Depression  - Cymbalta + Trazadone    Diet  - consistent carbs    DVT Prophylaxis  - Coumadin    Disposition   Possibly discharge back to facility

## 2024-11-14 ENCOUNTER — EMERGENCY (EMERGENCY)
Facility: HOSPITAL | Age: 49
LOS: 1 days | Discharge: NURSING, REHAB OR LONG-TERM CARE | End: 2024-11-14
Attending: EMERGENCY MEDICINE | Admitting: EMERGENCY MEDICINE
Payer: MEDICAID

## 2024-11-14 VITALS
TEMPERATURE: 98 F | DIASTOLIC BLOOD PRESSURE: 88 MMHG | RESPIRATION RATE: 16 BRPM | OXYGEN SATURATION: 98 % | HEART RATE: 75 BPM | SYSTOLIC BLOOD PRESSURE: 135 MMHG

## 2024-11-14 VITALS
WEIGHT: 279.99 LBS | OXYGEN SATURATION: 98 % | DIASTOLIC BLOOD PRESSURE: 83 MMHG | TEMPERATURE: 98 F | RESPIRATION RATE: 16 BRPM | HEART RATE: 74 BPM | HEIGHT: 72 IN | SYSTOLIC BLOOD PRESSURE: 148 MMHG

## 2024-11-14 DIAGNOSIS — Z95.828 PRESENCE OF OTHER VASCULAR IMPLANTS AND GRAFTS: Chronic | ICD-10-CM

## 2024-11-14 DIAGNOSIS — Z93.59 OTHER CYSTOSTOMY STATUS: Chronic | ICD-10-CM

## 2024-11-14 DIAGNOSIS — Z98.890 OTHER SPECIFIED POSTPROCEDURAL STATES: Chronic | ICD-10-CM

## 2024-11-14 LAB
ANION GAP SERPL CALC-SCNC: 11 MMOL/L — SIGNIFICANT CHANGE UP (ref 5–17)
APPEARANCE UR: ABNORMAL
BASOPHILS # BLD AUTO: 0.05 K/UL — SIGNIFICANT CHANGE UP (ref 0–0.2)
BASOPHILS NFR BLD AUTO: 0.4 % — SIGNIFICANT CHANGE UP (ref 0–2)
BILIRUB UR-MCNC: NEGATIVE — SIGNIFICANT CHANGE UP
BUN SERPL-MCNC: 47 MG/DL — HIGH (ref 7–23)
CALCIUM SERPL-MCNC: 9.1 MG/DL — SIGNIFICANT CHANGE UP (ref 8.4–10.5)
CHLORIDE SERPL-SCNC: 98 MMOL/L — SIGNIFICANT CHANGE UP (ref 96–108)
CO2 SERPL-SCNC: 24 MMOL/L — SIGNIFICANT CHANGE UP (ref 22–31)
COLOR SPEC: YELLOW — SIGNIFICANT CHANGE UP
CREAT SERPL-MCNC: 2.42 MG/DL — HIGH (ref 0.5–1.3)
DIFF PNL FLD: ABNORMAL
EGFR: 32 ML/MIN/1.73M2 — LOW
EGFR: 32 ML/MIN/1.73M2 — LOW
EOSINOPHIL # BLD AUTO: 0.32 K/UL — SIGNIFICANT CHANGE UP (ref 0–0.5)
EOSINOPHIL NFR BLD AUTO: 2.5 % — SIGNIFICANT CHANGE UP (ref 0–6)
GLUCOSE SERPL-MCNC: 177 MG/DL — HIGH (ref 70–99)
GLUCOSE UR QL: NEGATIVE MG/DL — SIGNIFICANT CHANGE UP
HCT VFR BLD CALC: 33.8 % — LOW (ref 39–50)
HGB BLD-MCNC: 10.8 G/DL — LOW (ref 13–17)
IMM GRANULOCYTES NFR BLD AUTO: 0.5 % — SIGNIFICANT CHANGE UP (ref 0–0.9)
KETONES UR-MCNC: NEGATIVE MG/DL — SIGNIFICANT CHANGE UP
LEUKOCYTE ESTERASE UR-ACNC: ABNORMAL
LYMPHOCYTES # BLD AUTO: 1.13 K/UL — SIGNIFICANT CHANGE UP (ref 1–3.3)
LYMPHOCYTES # BLD AUTO: 8.9 % — LOW (ref 13–44)
MCHC RBC-ENTMCNC: 24.3 PG — LOW (ref 27–34)
MCHC RBC-ENTMCNC: 32 G/DL — SIGNIFICANT CHANGE UP (ref 32–36)
MCV RBC AUTO: 76.1 FL — LOW (ref 80–100)
MONOCYTES # BLD AUTO: 0.73 K/UL — SIGNIFICANT CHANGE UP (ref 0–0.9)
MONOCYTES NFR BLD AUTO: 5.8 % — SIGNIFICANT CHANGE UP (ref 2–14)
NEUTROPHILS # BLD AUTO: 10.34 K/UL — HIGH (ref 1.8–7.4)
NEUTROPHILS NFR BLD AUTO: 81.9 % — HIGH (ref 43–77)
NITRITE UR-MCNC: NEGATIVE — SIGNIFICANT CHANGE UP
NRBC # BLD: 0 /100 WBCS — SIGNIFICANT CHANGE UP (ref 0–0)
NRBC BLD-RTO: 0 /100 WBCS — SIGNIFICANT CHANGE UP (ref 0–0)
PH UR: 5.5 — SIGNIFICANT CHANGE UP (ref 5–8)
PLATELET # BLD AUTO: 161 K/UL — SIGNIFICANT CHANGE UP (ref 150–400)
POTASSIUM SERPL-MCNC: 5 MMOL/L — SIGNIFICANT CHANGE UP (ref 3.5–5.3)
POTASSIUM SERPL-SCNC: 5 MMOL/L — SIGNIFICANT CHANGE UP (ref 3.5–5.3)
PROT UR-MCNC: 100 MG/DL
RBC # BLD: 4.44 M/UL — SIGNIFICANT CHANGE UP (ref 4.2–5.8)
RBC # FLD: 18 % — HIGH (ref 10.3–14.5)
SODIUM SERPL-SCNC: 133 MMOL/L — LOW (ref 135–145)
SP GR SPEC: 1.01 — SIGNIFICANT CHANGE UP (ref 1–1.03)
UROBILINOGEN FLD QL: 0.2 MG/DL — SIGNIFICANT CHANGE UP (ref 0.2–1)
WBC # BLD: 12.63 K/UL — HIGH (ref 3.8–10.5)
WBC # FLD AUTO: 12.63 K/UL — HIGH (ref 3.8–10.5)

## 2024-11-14 PROCEDURE — 80048 BASIC METABOLIC PNL TOTAL CA: CPT

## 2024-11-14 PROCEDURE — 87086 URINE CULTURE/COLONY COUNT: CPT

## 2024-11-14 PROCEDURE — 99284 EMERGENCY DEPT VISIT MOD MDM: CPT

## 2024-11-14 PROCEDURE — 99285 EMERGENCY DEPT VISIT HI MDM: CPT

## 2024-11-14 PROCEDURE — 81001 URINALYSIS AUTO W/SCOPE: CPT

## 2024-11-14 PROCEDURE — 36415 COLL VENOUS BLD VENIPUNCTURE: CPT

## 2024-11-14 PROCEDURE — 85025 COMPLETE CBC W/AUTO DIFF WBC: CPT

## 2024-11-14 RX ORDER — HYDROMORPHONE/SOD CHLOR,ISO/PF 2 MG/10 ML
4 SYRINGE (ML) INJECTION ONCE
Refills: 0 | Status: DISCONTINUED | OUTPATIENT
Start: 2024-11-14 | End: 2024-11-14

## 2024-11-14 RX ORDER — HYDROMORPHONE/SOD CHLOR,ISO/PF 2 MG/10 ML
2 SYRINGE (ML) INJECTION ONCE
Refills: 0 | Status: DISCONTINUED | OUTPATIENT
Start: 2024-11-14 | End: 2024-11-14

## 2024-11-14 RX ORDER — LEVOFLOXACIN 25 MG/ML
1 SOLUTION ORAL
Qty: 6 | Refills: 0
Start: 2024-11-14 | End: 2024-11-19

## 2024-11-14 RX ADMIN — Medication 4 MILLIGRAM(S): at 08:16

## 2024-11-14 RX ADMIN — Medication 2 MILLIGRAM(S): at 13:00

## 2024-11-14 RX ADMIN — Medication 4 MILLIGRAM(S): at 09:00

## 2024-11-14 RX ADMIN — Medication 2 MILLIGRAM(S): at 12:40

## 2024-11-20 ENCOUNTER — EMERGENCY (EMERGENCY)
Facility: HOSPITAL | Age: 49
LOS: 1 days | Discharge: NURSING, REHAB OR LONG-TERM CARE | End: 2024-11-20
Attending: EMERGENCY MEDICINE | Admitting: EMERGENCY MEDICINE
Payer: MEDICAID

## 2024-11-20 VITALS
SYSTOLIC BLOOD PRESSURE: 131 MMHG | TEMPERATURE: 98 F | HEART RATE: 61 BPM | RESPIRATION RATE: 16 BRPM | OXYGEN SATURATION: 98 % | DIASTOLIC BLOOD PRESSURE: 79 MMHG

## 2024-11-20 VITALS
RESPIRATION RATE: 18 BRPM | OXYGEN SATURATION: 98 % | SYSTOLIC BLOOD PRESSURE: 132 MMHG | TEMPERATURE: 98 F | HEIGHT: 72 IN | DIASTOLIC BLOOD PRESSURE: 80 MMHG | HEART RATE: 60 BPM | WEIGHT: 279.99 LBS

## 2024-11-20 DIAGNOSIS — Z93.59 OTHER CYSTOSTOMY STATUS: Chronic | ICD-10-CM

## 2024-11-20 DIAGNOSIS — Z98.890 OTHER SPECIFIED POSTPROCEDURAL STATES: Chronic | ICD-10-CM

## 2024-11-20 DIAGNOSIS — Z95.828 PRESENCE OF OTHER VASCULAR IMPLANTS AND GRAFTS: Chronic | ICD-10-CM

## 2024-11-20 PROCEDURE — 99284 EMERGENCY DEPT VISIT MOD MDM: CPT

## 2024-11-20 PROCEDURE — 99283 EMERGENCY DEPT VISIT LOW MDM: CPT

## 2024-11-20 RX ORDER — HYDROMORPHONE HCL/0.9% NACL/PF 6 MG/30 ML
4 PATIENT CONTROLLED ANALGESIA SYRINGE INTRAVENOUS ONCE
Refills: 0 | Status: DISCONTINUED | OUTPATIENT
Start: 2024-11-20 | End: 2024-11-20

## 2024-11-20 RX ORDER — WARFARIN SODIUM 4 MG
1 TABLET ORAL
Refills: 0 | DISCHARGE

## 2024-11-20 RX ORDER — SENNA 187 MG
2 TABLET ORAL
Refills: 0 | DISCHARGE

## 2024-11-20 RX ORDER — ACETAMINOPHEN 500 MG
2 TABLET ORAL
Refills: 0 | DISCHARGE

## 2024-11-20 RX ORDER — NICOTINE POLACRILEX 4 MG/1
1 GUM, CHEWING ORAL
Refills: 0 | DISCHARGE

## 2024-11-20 RX ADMIN — Medication 4 MILLIGRAM(S): at 10:35

## 2024-11-20 RX ADMIN — Medication 4 MILLIGRAM(S): at 10:25

## 2024-11-20 NOTE — ED ADULT NURSE NOTE - NSFALLRISKINTERV_ED_ALL_ED

## 2024-11-20 NOTE — ED PROVIDER NOTE - NSICDXPASTMEDICALHX_GEN_ALL_CORE_FT
PAST MEDICAL HISTORY:  Acute embolism and thrombosis of deep vein of lower extremity     BPH (benign prostatic hyperplasia)     BPH (benign prostatic hyperplasia)     BPH without obstruction/lower urinary tract symptoms     CVA (cerebrovascular accident)     CVD (cerebrovascular disease)     DDD (degenerative disc disease), lumbar     Degenerative arthritis     Diabetes mellitus due to underlying condition with diabetic neuropathy     DM2 (diabetes mellitus, type 2)     DVT of lower limb, acute     DVT, lower extremity     Essential (primary) hypertension     GERD (gastroesophageal reflux disease)     H/O constipation     H/O Guillain-[place] syndrome     H/O insomnia     History of CVA in adulthood     History of Guillain-[place] syndrome     History of muscle spasm     History of neurogenic bowel     HTN (hypertension)     HTN (hypertension)     Iron deficiency anemia     Major depressive disorder, single episode     Muscle weakness     Obesity     Pain, unspecified     Peripheral neuropathy     Renal stones     Type 2 diabetes mellitus     Vitamin deficiency
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
## 2024-11-20 NOTE — ED ADULT NURSE NOTE - NSICDXPASTMEDICALHX_GEN_ALL_CORE_FT
PAST MEDICAL HISTORY:  Acute embolism and thrombosis of deep vein of lower extremity     BPH (benign prostatic hyperplasia)     BPH (benign prostatic hyperplasia)     BPH without obstruction/lower urinary tract symptoms     CVA (cerebrovascular accident)     CVD (cerebrovascular disease)     DDD (degenerative disc disease), lumbar     Degenerative arthritis     Diabetes mellitus due to underlying condition with diabetic neuropathy     DM2 (diabetes mellitus, type 2)     DVT of lower limb, acute     DVT, lower extremity     Essential (primary) hypertension     GERD (gastroesophageal reflux disease)     H/O constipation     H/O Guillain-[place] syndrome     H/O insomnia     History of CVA in adulthood     History of Guillain-[place] syndrome     History of muscle spasm     History of neurogenic bowel     HTN (hypertension)     HTN (hypertension)     Iron deficiency anemia     Major depressive disorder, single episode     Muscle weakness     Obesity     Pain, unspecified     Peripheral neuropathy     Renal stones     Type 2 diabetes mellitus     Vitamin deficiency

## 2024-11-20 NOTE — ED ADULT NURSE NOTE - NS ED PATIENT SAFETY CONERN FT
pt feels not safe in the unit where he's d/c to at [facility] since [place] hospitalization

## 2024-11-20 NOTE — ED PROVIDER NOTE - PROGRESS NOTE DETAILS
I spoke with Tasneem Prieto, the [facility] nursing supervisor, and I also spoke with the  on that unit.  I told him both multiple times that they should move our patient to a different unit, as the constant interactions of the other patient walking in and out of this liu's room could end up causing another problem at a later time, and that to protect both the residents, they should separate them/move them into different units.  The patient is feeling well, he is calm, not showing any signs of malice or thoughts of hurting anyone.  We will send the patient back to the facility. Patient is doing well, no acute changes.  Patient is in agreement with discharge back to facility. Discussed with Dr. Sanchez, regarding the reasons he is here, and to assist in possibly relocating the patient at the facility.
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
## 2024-11-20 NOTE — ED PROVIDER NOTE - PATIENT PORTAL LINK FT
You can access the FollowMyHealth Patient Portal offered by [facility] by registering at the following website: [email or web address]. By joining [facility]’s FollowMyHealth portal, you will also be able to view your health information using other applications (apps) compatible with our system.

## 2024-12-01 ENCOUNTER — EMERGENCY (EMERGENCY)
Facility: HOSPITAL | Age: 49
LOS: 1 days | End: 2024-12-01
Attending: EMERGENCY MEDICINE | Admitting: EMERGENCY MEDICINE
Payer: SELF-PAY

## 2024-12-01 VITALS
DIASTOLIC BLOOD PRESSURE: 81 MMHG | HEART RATE: 71 BPM | OXYGEN SATURATION: 99 % | RESPIRATION RATE: 16 BRPM | TEMPERATURE: 98 F | WEIGHT: 279.99 LBS | HEIGHT: 72 IN | SYSTOLIC BLOOD PRESSURE: 145 MMHG

## 2024-12-01 DIAGNOSIS — Z95.828 PRESENCE OF OTHER VASCULAR IMPLANTS AND GRAFTS: Chronic | ICD-10-CM

## 2024-12-01 DIAGNOSIS — Z93.59 OTHER CYSTOSTOMY STATUS: Chronic | ICD-10-CM

## 2024-12-01 DIAGNOSIS — Z98.890 OTHER SPECIFIED POSTPROCEDURAL STATES: Chronic | ICD-10-CM

## 2024-12-01 LAB
APPEARANCE UR: ABNORMAL
BACTERIA # UR AUTO: ABNORMAL /HPF
BILIRUB UR-MCNC: NEGATIVE — SIGNIFICANT CHANGE UP
COLOR SPEC: YELLOW — SIGNIFICANT CHANGE UP
DIFF PNL FLD: ABNORMAL
EPI CELLS # UR: PRESENT
GLUCOSE UR QL: NEGATIVE MG/DL — SIGNIFICANT CHANGE UP
GRAN CASTS # UR COMP ASSIST: PRESENT
KETONES UR-MCNC: NEGATIVE MG/DL — SIGNIFICANT CHANGE UP
LEUKOCYTE ESTERASE UR-ACNC: ABNORMAL
NITRITE UR-MCNC: NEGATIVE — SIGNIFICANT CHANGE UP
PH UR: 7 — SIGNIFICANT CHANGE UP (ref 5–8)
PROT UR-MCNC: 100 MG/DL
RBC CASTS # UR COMP ASSIST: 4 /HPF — SIGNIFICANT CHANGE UP (ref 0–4)
SP GR SPEC: 1 — SIGNIFICANT CHANGE UP (ref 1–1.03)
UROBILINOGEN FLD QL: 0.2 MG/DL — SIGNIFICANT CHANGE UP (ref 0.2–1)
WBC UR QL: 18 /HPF — HIGH (ref 0–5)

## 2024-12-01 PROCEDURE — 99284 EMERGENCY DEPT VISIT MOD MDM: CPT | Mod: 25

## 2024-12-01 PROCEDURE — 51705 CHANGE OF BLADDER TUBE: CPT

## 2024-12-01 PROCEDURE — 87077 CULTURE AEROBIC IDENTIFY: CPT

## 2024-12-01 PROCEDURE — 51702 INSERT TEMP BLADDER CATH: CPT

## 2024-12-01 PROCEDURE — 81001 URINALYSIS AUTO W/SCOPE: CPT

## 2024-12-01 PROCEDURE — 87186 SC STD MICRODIL/AGAR DIL: CPT

## 2024-12-01 PROCEDURE — 87086 URINE CULTURE/COLONY COUNT: CPT

## 2024-12-01 RX ORDER — HYDROMORPHONE HYDROCHLORIDE 2 MG/1
4 TABLET ORAL ONCE
Refills: 0 | Status: DISCONTINUED | OUTPATIENT
Start: 2024-12-01 | End: 2024-12-01

## 2024-12-01 RX ADMIN — HYDROMORPHONE HYDROCHLORIDE 4 MILLIGRAM(S): 2 TABLET ORAL at 22:00

## 2024-12-01 RX ADMIN — Medication 500 MILLIGRAM(S): at 23:07

## 2024-12-01 RX ADMIN — HYDROMORPHONE HYDROCHLORIDE 4 MILLIGRAM(S): 2 TABLET ORAL at 23:00

## 2024-12-01 NOTE — ED ADULT NURSE NOTE - NS ED NURSE DC PT EDUCATION RESOURCES
October 16, 2018      Yaya Ni MD  [address]  [facility] [address]           [place]  Neurology  [address]  [facility] [address]  Phone: [phone]  Fax: [phone]          Patient: Phi Sainz   MR Number: [id number]   YOB: 1928   Date of Visit: 10/16/2018       Dear Dr. Yaya Ni:    Thank you for referring Phi Sainz to me for evaluation. Attached you will find relevant portions of my assessment and plan of care.    If you have questions, please do not hesitate to call me. I look forward to following Phi Sainz along with you.    Sincerely,    Juanpablo Olivera MD    Enclosure  CC:  No Recipients    If you would like to receive this communication electronically, please contact [email or web address] or [phone] to request more information on [employer] access.    For providers and/or their staff who would like to refer a patient to [facility], please contact us through our one-stop-shop provider referral line, [facility], at [phone].    If you feel you have received this communication in error or would no longer like to receive these types of communications, please e-mail [email or web address]         
Yes

## 2024-12-01 NOTE — ED ADULT NURSE NOTE - NSFALLHARMRISKINTERV_ED_ALL_ED

## 2024-12-01 NOTE — ED PROVIDER NOTE - PATIENT PORTAL LINK FT
You can access the FollowMyHealth Patient Portal offered by [facility] by registering at the following website: [email or web address]. By joining [facility]’s FollowMyHealth portal, you will also be able to view your health information using other applications (apps) compatible with our system.
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
## 2024-12-01 NOTE — ED PROVIDER NOTE - NSFOLLOWUPINSTRUCTIONS_ED_ALL_ED_FT
How to Care for Your Suprapubic Catheter    WHAT YOU NEED TO KNOW:    What is a suprapubic catheter? A suprapubic catheter is a tube that drains urine from your bladder. It is inserted through a small hole in your lower abdomen and into your bladder. Suprapubic means that the catheter is inserted above your pubic bone. You may need a catheter because you have problems urinating because of a medical condition or surgery. A suprapubic catheter is also called an indwelling urinary catheter.    Why is catheter care important? An infection can develop when bacteria get inside the catheter or drainage system. This can happen when the urine bag is changed or when a urine sample is collected. You can get an infection if you do not wash your hands. You can also get an infection if the catheter equipment is not cleaned properly. Urinary catheter-based infections can lead to serious illness. The following can help prevent infection:    Care for your catheter as directed. Follow directions on how to clean and care for the catheter, insertion site, and drainage bag. Keep the catheter drainage system closed. Keep the catheter tube secured to your leg so it will drain well.    Drink liquids as directed. Ask how much liquid to drink each day and which liquids are best for you. Good choices for most people include water, juice, and milk. Coffee, soup, and fruit may be counted in your daily liquid amount.    Wash your hands often. Always wash with soap and water before and after you touch your catheter, tubing, or drainage bag. Wear clean medical gloves when you care for your catheter or disconnect the drainage bag. This will help stop germs from getting into your catheter. Remind anyone who cares for your catheter or drainage system to wash his or her hands.  Handwashing  How do I care for my drainage bag?    Always wash your hands before and after you touch the catheter or the insertion site. Wear clean medical gloves when you care for your catheter.    Position the drainage bag and tubing:  Allow gravity drainage. Do not loop or kink the tubing so urine can flow into the bag.    Position the drainage bag properly. Keep the drainage bag below the level of your waist. This helps stop urine from moving back up the tubing and into your bladder.    Keep the bag off the floor. This will help prevent contamination.    Empty the drainage bag when needed. The weight of a full drainage bag can pull on the catheter and cause pain. Empty the drainage bag every 3 to 6 hours or when it is ½ to ? full.  Place a large container on the floor next to your chair. You may also hold the urine bag over the toilet.    Remove the drain spout from its sleeve at the bottom of the urine bag. Do not touch the tip. Open the slide valve on the spout.    Let the urine flow out of the urine bag into the container or toilet. Do not let the drainage tube touch anything.    Clean the end of the drain spout when the bag is empty. Ask your healthcare provider which cleaning solution is best to use.    Close the slide valve and put the drain spout into its sleeve at the bottom of the urine bag. Write down how much urine was in your bag if your healthcare provider has asked you to keep a record.    Clean and change the drainage bag as directed. Ask your healthcare provider how often you should change the drainage bag. You may buy a solution to clean the drainage bag. You may also make a solution with tap water and household bleach or vinegar. Wear medical gloves if you need to disconnect the tubing. Do not allow the end of the catheter or tubing to touch anything. Clean the ends with a new alcohol pad or as directed by your healthcare provider before you reconnect them.  What else do I need to know?    Wash your genital area and the insertion site with soap and water 2 times a day. Wash your anal area with soap and water after each bowel movement.    You may need to use the larger drainage bag at night. A leg bag can be used during the day. A leg bag usually fits under clothing.  When should I call my doctor?    You have a fever.    You have changes in how your urine looks or smells, or you have blood in your urine.    You have an overgrowth of skin at the insertion site that is getting larger.    The closed drainage system has accidently come open or apart.    Your catheter keeps getting blocked.    There is less urine than usual or no urine draining into the drainage bag.    The catheter comes out.    Your insertion site is red, smells bad, or has green or yellow discharge.    You have pain in your hip, back, pelvis, or lower abdomen.    You are confused or have other changes in the way that you think.    You have questions or concerns about how to care for your catheter.  CARE AGREEMENT:    You have the right to help plan your care. Learn about your health condition and how it may be treated. Discuss treatment options with your healthcare providers to decide what care you want to receive. You always have the right to refuse treatment.

## 2024-12-01 NOTE — ED ADULT NURSE NOTE - NSICDXPASTMEDICALHX_GEN_ALL_CORE_FT
PAST MEDICAL HISTORY:  Acute embolism and thrombosis of deep vein of lower extremity     BPH (benign prostatic hyperplasia)     BPH (benign prostatic hyperplasia)     BPH without obstruction/lower urinary tract symptoms     CVA (cerebrovascular accident)     CVD (cerebrovascular disease)     DDD (degenerative disc disease), lumbar     Degenerative arthritis     Diabetes mellitus due to underlying condition with diabetic neuropathy     DM2 (diabetes mellitus, type 2)     DVT of lower limb, acute     DVT, lower extremity     Essential (primary) hypertension     GERD (gastroesophageal reflux disease)     H/O constipation     H/O Guillain-[place] syndrome     H/O insomnia     History of CVA in adulthood     History of Guillain-[place] syndrome     History of muscle spasm     History of neurogenic bowel     HTN (hypertension)     HTN (hypertension)     Iron deficiency anemia     Major depressive disorder, single episode     Muscle weakness     Obesity     Pain, unspecified     Peripheral neuropathy     Renal stones     Type 2 diabetes mellitus     Vitamin deficiency

## 2024-12-01 NOTE — ED ADULT NURSE NOTE - HIV OFFER
Previously Declined (within the last year)
Pt lives at home with family, mostly bedbound, denies any tobacco, alcohol, drug use.

## 2024-12-01 NOTE — ED PROVIDER NOTE - CARE PROVIDER_API CALL
[facility]  Urology  [address] [place] [address]  Phone: [phone]  Fax: [phone]  Follow Up Time:

## 2024-12-01 NOTE — ED PROVIDER NOTE - CLINICAL SUMMARY MEDICAL DECISION MAKING FREE TEXT BOX
Open 22 patient complaining of leaking around his urinary catheter.  Patient was seen at another hospital yesterday and had workup which was reported as negative.  Will replace catheter with larger size and patient can follow-up with urologist through [facility].

## 2024-12-01 NOTE — ED ADULT NURSE NOTE - OBJECTIVE STATEMENT
patient BIBA from [facility] for "leaking suprapubic tube." +suprapubic pain. per patient it was changed at [facility] today. arrives to ED catheter in place and draining clear yellow urine. patient BIBA from [facility] for "leaking suprapubic tube." +suprapubic pain. per patient it was changed at [facility] last. arrives to ED catheter in place and draining clear yellow urine.

## 2024-12-01 NOTE — ED PROVIDER NOTE - ABDOMINAL EXAM
Suprapubic catheter in place. Area damp. No extravasation of urine visible at this time/soft/nontender.../no organomegaly

## 2024-12-01 NOTE — ED ADULT TRIAGE NOTE - CHIEF COMPLAINT QUOTE
PT BIB EMS from [facility]; c/o suprapubic catheter  leaking urine; pt was seen at [facility] yesterday and discharged today; pt had CT scan done and blood work done
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
## 2024-12-02 VITALS
HEART RATE: 59 BPM | SYSTOLIC BLOOD PRESSURE: 134 MMHG | RESPIRATION RATE: 16 BRPM | OXYGEN SATURATION: 96 % | DIASTOLIC BLOOD PRESSURE: 75 MMHG | TEMPERATURE: 98 F

## 2024-12-03 NOTE — ED PROVIDER NOTE - NSICDXPASTMEDICALHX_GEN_ALL_CORE_FT
PAST MEDICAL HISTORY:  BPH (benign prostatic hyperplasia)     BPH without obstruction/lower urinary tract symptoms     CVA (cerebrovascular accident)     DDD (degenerative disc disease), lumbar     Degenerative arthritis     DM2 (diabetes mellitus, type 2)     DVT of lower limb, acute     DVT, lower extremity     H/O Guillain-[place] syndrome     History of CVA in adulthood     History of Guillain-[place] syndrome     History of neurogenic bowel     HTN (hypertension)     HTN (hypertension)     Peripheral neuropathy     Renal stones     Type 2 diabetes mellitus      02-Dec-2024 23:00

## 2024-12-04 LAB
-  AMPICILLIN: SIGNIFICANT CHANGE UP
-  CIPROFLOXACIN: SIGNIFICANT CHANGE UP
-  LEVOFLOXACIN: SIGNIFICANT CHANGE UP
-  NITROFURANTOIN: SIGNIFICANT CHANGE UP
-  TETRACYCLINE: SIGNIFICANT CHANGE UP
-  VANCOMYCIN: SIGNIFICANT CHANGE UP
CULTURE RESULTS: ABNORMAL
METHOD TYPE: SIGNIFICANT CHANGE UP
ORGANISM # SPEC MICROSCOPIC CNT: ABNORMAL
ORGANISM # SPEC MICROSCOPIC CNT: ABNORMAL
SPECIMEN SOURCE: SIGNIFICANT CHANGE UP

## 2024-12-24 NOTE — PROGRESS NOTE ADULT - PROVIDER SPECIALTY LIST ADULT
Hospitalist
Hospitalist
Infectious Disease
Infectious Disease
Nephrology
Nephrology
Heme/Onc
Hospitalist
Infectious Disease
Nephrology
Nephrology
Hospitalist
Infectious Disease
Nephrology
Urology
Hospitalist
Nephrology
Statement Selected

## 2025-04-05 ENCOUNTER — EMERGENCY (EMERGENCY)
Facility: HOSPITAL | Age: 50
LOS: 1 days | End: 2025-04-05
Attending: EMERGENCY MEDICINE | Admitting: EMERGENCY MEDICINE
Payer: MEDICAID

## 2025-04-05 VITALS
OXYGEN SATURATION: 97 % | RESPIRATION RATE: 18 BRPM | TEMPERATURE: 98 F | SYSTOLIC BLOOD PRESSURE: 171 MMHG | DIASTOLIC BLOOD PRESSURE: 94 MMHG | HEART RATE: 69 BPM

## 2025-04-05 VITALS
SYSTOLIC BLOOD PRESSURE: 138 MMHG | RESPIRATION RATE: 15 BRPM | DIASTOLIC BLOOD PRESSURE: 90 MMHG | HEART RATE: 72 BPM | WEIGHT: 279.99 LBS | TEMPERATURE: 98 F | OXYGEN SATURATION: 99 % | HEIGHT: 72 IN

## 2025-04-05 DIAGNOSIS — Z98.890 OTHER SPECIFIED POSTPROCEDURAL STATES: Chronic | ICD-10-CM

## 2025-04-05 DIAGNOSIS — Z93.59 OTHER CYSTOSTOMY STATUS: Chronic | ICD-10-CM

## 2025-04-05 DIAGNOSIS — Z95.828 PRESENCE OF OTHER VASCULAR IMPLANTS AND GRAFTS: Chronic | ICD-10-CM

## 2025-04-05 LAB
ALBUMIN SERPL ELPH-MCNC: 3.2 G/DL — LOW (ref 3.3–5)
ALP SERPL-CCNC: 118 U/L — SIGNIFICANT CHANGE UP (ref 30–120)
ALT FLD-CCNC: 22 U/L — SIGNIFICANT CHANGE UP (ref 10–60)
ANION GAP SERPL CALC-SCNC: 15 MMOL/L — SIGNIFICANT CHANGE UP (ref 5–17)
APPEARANCE UR: CLEAR — SIGNIFICANT CHANGE UP
AST SERPL-CCNC: 13 U/L — SIGNIFICANT CHANGE UP (ref 10–40)
BASOPHILS # BLD AUTO: 0.09 K/UL — SIGNIFICANT CHANGE UP (ref 0–0.2)
BASOPHILS NFR BLD AUTO: 0.6 % — SIGNIFICANT CHANGE UP (ref 0–2)
BILIRUB SERPL-MCNC: 0.4 MG/DL — SIGNIFICANT CHANGE UP (ref 0.2–1.2)
BILIRUB UR-MCNC: NEGATIVE — SIGNIFICANT CHANGE UP
BUN SERPL-MCNC: 54 MG/DL — HIGH (ref 7–23)
CALCIUM SERPL-MCNC: 9.5 MG/DL — SIGNIFICANT CHANGE UP (ref 8.4–10.5)
CHLORIDE SERPL-SCNC: 100 MMOL/L — SIGNIFICANT CHANGE UP (ref 96–108)
CO2 SERPL-SCNC: 23 MMOL/L — SIGNIFICANT CHANGE UP (ref 22–31)
COLOR SPEC: YELLOW — SIGNIFICANT CHANGE UP
CREAT SERPL-MCNC: 3.02 MG/DL — HIGH (ref 0.5–1.3)
DIFF PNL FLD: ABNORMAL
EGFR: 24 ML/MIN/1.73M2 — LOW
EGFR: 24 ML/MIN/1.73M2 — LOW
EOSINOPHIL # BLD AUTO: 0.19 K/UL — SIGNIFICANT CHANGE UP (ref 0–0.5)
EOSINOPHIL NFR BLD AUTO: 1.3 % — SIGNIFICANT CHANGE UP (ref 0–6)
GLUCOSE SERPL-MCNC: 111 MG/DL — HIGH (ref 70–99)
GLUCOSE UR QL: NEGATIVE MG/DL — SIGNIFICANT CHANGE UP
HCT VFR BLD CALC: 35 % — LOW (ref 39–50)
HGB BLD-MCNC: 11.3 G/DL — LOW (ref 13–17)
IMM GRANULOCYTES NFR BLD AUTO: 0.6 % — SIGNIFICANT CHANGE UP (ref 0–0.9)
KETONES UR-MCNC: NEGATIVE MG/DL — SIGNIFICANT CHANGE UP
LACTATE SERPL-SCNC: 1.5 MMOL/L — SIGNIFICANT CHANGE UP (ref 0.7–2)
LACTATE SERPL-SCNC: 2.1 MMOL/L — HIGH (ref 0.7–2)
LEUKOCYTE ESTERASE UR-ACNC: ABNORMAL
LYMPHOCYTES # BLD AUTO: 1.99 K/UL — SIGNIFICANT CHANGE UP (ref 1–3.3)
LYMPHOCYTES # BLD AUTO: 13.8 % — SIGNIFICANT CHANGE UP (ref 13–44)
MCHC RBC-ENTMCNC: 22.8 PG — LOW (ref 27–34)
MCHC RBC-ENTMCNC: 32.3 G/DL — SIGNIFICANT CHANGE UP (ref 32–36)
MCV RBC AUTO: 70.7 FL — LOW (ref 80–100)
MONOCYTES # BLD AUTO: 1.06 K/UL — HIGH (ref 0–0.9)
MONOCYTES NFR BLD AUTO: 7.4 % — SIGNIFICANT CHANGE UP (ref 2–14)
NEUTROPHILS # BLD AUTO: 11 K/UL — HIGH (ref 1.8–7.4)
NEUTROPHILS NFR BLD AUTO: 76.3 % — SIGNIFICANT CHANGE UP (ref 43–77)
NITRITE UR-MCNC: NEGATIVE — SIGNIFICANT CHANGE UP
NRBC BLD AUTO-RTO: 0 /100 WBCS — SIGNIFICANT CHANGE UP (ref 0–0)
PH UR: 6.5 — SIGNIFICANT CHANGE UP (ref 5–8)
PLATELET # BLD AUTO: 271 K/UL — SIGNIFICANT CHANGE UP (ref 150–400)
POTASSIUM SERPL-MCNC: 4.3 MMOL/L — SIGNIFICANT CHANGE UP (ref 3.5–5.3)
POTASSIUM SERPL-SCNC: 4.3 MMOL/L — SIGNIFICANT CHANGE UP (ref 3.5–5.3)
PROT SERPL-MCNC: 8.2 G/DL — SIGNIFICANT CHANGE UP (ref 6–8.3)
PROT UR-MCNC: >=1000 MG/DL
RBC # BLD: 4.95 M/UL — SIGNIFICANT CHANGE UP (ref 4.2–5.8)
RBC # FLD: 16.6 % — HIGH (ref 10.3–14.5)
SODIUM SERPL-SCNC: 138 MMOL/L — SIGNIFICANT CHANGE UP (ref 135–145)
SP GR SPEC: 1.02 — SIGNIFICANT CHANGE UP (ref 1–1.03)
UROBILINOGEN FLD QL: 0.2 MG/DL — SIGNIFICANT CHANGE UP (ref 0.2–1)
WBC # BLD: 14.41 K/UL — HIGH (ref 3.8–10.5)
WBC # FLD AUTO: 14.41 K/UL — HIGH (ref 3.8–10.5)

## 2025-04-05 PROCEDURE — 74176 CT ABD & PELVIS W/O CONTRAST: CPT | Mod: 26

## 2025-04-05 PROCEDURE — 71045 X-RAY EXAM CHEST 1 VIEW: CPT | Mod: 26

## 2025-04-05 PROCEDURE — 93010 ELECTROCARDIOGRAM REPORT: CPT

## 2025-04-05 PROCEDURE — 99285 EMERGENCY DEPT VISIT HI MDM: CPT

## 2025-04-05 RX ORDER — CEFTRIAXONE 500 MG/1
1000 INJECTION, POWDER, FOR SOLUTION INTRAMUSCULAR; INTRAVENOUS ONCE
Refills: 0 | Status: COMPLETED | OUTPATIENT
Start: 2025-04-05 | End: 2025-04-05

## 2025-04-05 RX ORDER — HYDROMORPHONE/SOD CHLOR,ISO/PF 2 MG/10 ML
0.5 SYRINGE (ML) INJECTION ONCE
Refills: 0 | Status: DISCONTINUED | OUTPATIENT
Start: 2025-04-05 | End: 2025-04-05

## 2025-04-05 RX ORDER — ACETAMINOPHEN 500 MG/5ML
1000 LIQUID (ML) ORAL ONCE
Refills: 0 | Status: COMPLETED | OUTPATIENT
Start: 2025-04-05 | End: 2025-04-05

## 2025-04-05 RX ORDER — HYDROMORPHONE/SOD CHLOR,ISO/PF 2 MG/10 ML
1 SYRINGE (ML) INJECTION ONCE
Refills: 0 | Status: DISCONTINUED | OUTPATIENT
Start: 2025-04-05 | End: 2025-04-05

## 2025-04-05 RX ORDER — ONDANSETRON HCL/PF 4 MG/2 ML
4 VIAL (ML) INJECTION ONCE
Refills: 0 | Status: COMPLETED | OUTPATIENT
Start: 2025-04-05 | End: 2025-04-05

## 2025-04-05 RX ADMIN — Medication 400 MILLIGRAM(S): at 18:52

## 2025-04-05 RX ADMIN — Medication 0.5 MILLIGRAM(S): at 20:40

## 2025-04-05 RX ADMIN — Medication 1000 MILLILITER(S): at 20:30

## 2025-04-05 RX ADMIN — CEFTRIAXONE 1000 MILLIGRAM(S): 500 INJECTION, POWDER, FOR SOLUTION INTRAMUSCULAR; INTRAVENOUS at 21:56

## 2025-04-05 RX ADMIN — Medication 4 MILLIGRAM(S): at 20:10

## 2025-04-05 RX ADMIN — Medication 0.5 MILLIGRAM(S): at 20:55

## 2025-04-05 RX ADMIN — Medication 0.5 MILLIGRAM(S): at 19:17

## 2025-04-05 RX ADMIN — Medication 1 MILLIGRAM(S): at 22:51

## 2025-04-05 RX ADMIN — Medication 1000 MILLIGRAM(S): at 19:10

## 2025-04-05 RX ADMIN — Medication 1000 MILLILITER(S): at 18:53

## 2025-04-05 RX ADMIN — Medication 0.5 MILLIGRAM(S): at 19:02

## 2025-04-05 RX ADMIN — CEFTRIAXONE 100 MILLIGRAM(S): 500 INJECTION, POWDER, FOR SOLUTION INTRAMUSCULAR; INTRAVENOUS at 21:20

## 2025-04-05 NOTE — ED ADULT NURSE REASSESSMENT NOTE - NS ED NURSE REASSESS COMMENT FT1
pt resting comfortably, continues to c/o bladder pain / "spasms" MORELIA Dye made aware. Respirations are even and unlabored. plan of care ongoing, pt waiting transfer to [facility]. nno acute changes noted, needs attended to, safety maintained, call bell within reach.
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
## 2025-04-05 NOTE — ED PROVIDER NOTE - GASTROINTESTINAL, MLM
Abdomen soft, obese, non-tender, no guarding, suprapubic catheter in place draining urine Abdomen soft, obese, non-tender, no guarding, suprapubic catheter in place draining yellow urine

## 2025-04-05 NOTE — ED PROVIDER NOTE - PROGRESS NOTE DETAILS
Patient states that he is not willing to have his suprapubic catheter changed in the ER at this time as it was changed 1 week ago at [place] and he is only willing to have it changed by interventional radiology. Pt requesting transfer to [facility] as his urologist is there and was recently admitted there. Spoke to transfer center, pt accepted to [facility] ER by Dr. Mcfarlane.

## 2025-04-05 NOTE — ED PROVIDER NOTE - CARE PLAN
1 Principal Discharge DX:	UTI (urinary tract infection)   Principal Discharge DX:	UTI (urinary tract infection)  Secondary Diagnosis:	Bladder spasms

## 2025-04-05 NOTE — ED PROVIDER NOTE - NSPREEXISTRELATION_ED_A_ED_FT
pt urologist, Dr. White is at [place] and was recently admitted twice at [place] over the past 2 weeks, discharged yesterday.

## 2025-04-05 NOTE — ED ADULT NURSE NOTE - NSICDXPASTMEDICALHX_GEN_ALL_CORE_FT
PAST MEDICAL HISTORY:  Acute embolism and thrombosis of deep vein of lower extremity     BPH (benign prostatic hyperplasia)     BPH (benign prostatic hyperplasia)     BPH without obstruction/lower urinary tract symptoms     CVA (cerebrovascular accident)     CVD (cerebrovascular disease)     DDD (degenerative disc disease), lumbar     Degenerative arthritis     Diabetes mellitus due to underlying condition with diabetic neuropathy     DM2 (diabetes mellitus, type 2)     DVT of lower limb, acute     DVT, lower extremity     Essential (primary) hypertension     GERD (gastroesophageal reflux disease)     H/O constipation     H/O Guillain-[place] syndrome     H/O insomnia     History of CVA in adulthood     History of Guillain-[place] syndrome     History of muscle spasm     History of neurogenic bowel     HTN (hypertension)     HTN (hypertension)     Iron deficiency anemia     Major depressive disorder, single episode     Muscle weakness     Obesity     Pain, unspecified     Peripheral neuropathy     Renal stones     Type 2 diabetes mellitus     Vitamin deficiency

## 2025-04-05 NOTE — ED PROVIDER NOTE - ATTENDING APP SHARED VISIT CONTRIBUTION OF CARE
Patient brought in by EMS from [facility] for bladder spasms.  Per EMS report patient has suprapubic catheter was just discharged from [facility] yesterday for similar symptoms.  Patient relates approximately 3 weeks ago he developed bladder spasms and flank pain which worsened so approximately 2 weeks ago he went to [facility] where his urologist is affiliated was diagnosed with UTI was given meropenem for 1 week and discharged however symptoms recurred so the next day he went back to [place] his catheter was changed again he was given antibiotics for another week then discharged from [place] yesterday however his symptoms have continued so EMS was called and brought him to this ER today.  Patient relates he is not willing to have his suprapubic catheter changed in the ER as it was changed 1 week ago at [place] and he is only willing to have it changed by interventional radiology.  No fevers nausea vomiting diarrhea.  Patient relates he did have a CT at [place] however he not sure of the results.      Plan labs CT [place] IV fluids Ofirmev

## 2025-04-05 NOTE — ED ADULT NURSE NOTE - OBJECTIVE STATEMENT
pt comes to ed from [facility] c/o poss UTI. Pt w/ hx of neurogenic bladder with suprapubic catheter c/o frequent  bladder spasms.  Patient states that he was seen at [facility] for UTI 2 weeks, was given 1 week of IV meropenem and discharged. States that he went back to [place] a day later for bladder spasms and was admitted from 4/26-5/4 and discharged home. Suprapubic catheter was last changed 1 week ago and finished course of antibiotics yesterday. Denies fever, N/V or other symptoms. states urine in ayala bag look worse now than before

## 2025-04-05 NOTE — ED ADULT NURSE NOTE - NSFALLHARMRISKINTERV_ED_ALL_ED

## 2025-04-05 NOTE — ED PROVIDER NOTE - OBJECTIVE STATEMENT
51 y/o M with hx of HTN, CKD, GBS, DVT, CVA, on warfarin, DM, HLD, neurogenic bladder with suprapubic catheter in the ambulance from [facility] with complaint of bladder spasms.  Patient states that he was seen at [facility] for UTI 2 weeks, was given 1 week of IV meropenem and discharged. States that he went back to [place] a day later for bladder spasms and was admitted from 4/26-5/4 and discharged home. Suprapubic catheter was last changed 1 week ago and finished course of antibiotics yesterday. Denies fever, N/V or other symptoms.   urologist: Dr. Bush 49 y/o M with hx of HTN, CKD, GBS, DVT, CVA, on warfarin, DM, HLD, neurogenic bladder with suprapubic catheter brought in by ambulance from [facility] with complaint of bladder spasms.  Patient states that he was seen at [facility] for UTI 2 weeks, was given 1 week of IV meropenem and discharged. States that he went back to [place] a day later for bladder spasms and was admitted from 4/26-5/4 and discharged home. Suprapubic catheter was last changed 1 week ago and finished course of antibiotics yesterday however states that his symptoms have continued and hence 911 called and brought to the ER. Pt takes dilaudid 4mg po Q6H as needed for pain. Denies fever, N/V, diarrhea.  urologist: Dr. Candido White

## 2025-04-05 NOTE — ED PROVIDER NOTE - CLINICAL SUMMARY MEDICAL DECISION MAKING FREE TEXT BOX
Patient brought in by EMS from [facility] for bladder spasms.  Per EMS report patient has suprapubic catheter was just discharged from [facility] yesterday for similar symptoms.  Patient relates approximately 3 weeks ago he developed bladder spasms and flank pain which worsened so approximately 2 weeks ago he went to [facility] where his urologist is affiliated was diagnosed with UTI was given meropenem for 1 week and discharged however symptoms recurred so the next day he went back to [place] his catheter was changed again he was given antibiotics for another week then discharged from [place] yesterday however his symptoms have continued so EMS was called and brought him to this ER today.  Patient relates he is not willing to have his suprapubic catheter changed in the ER as it was changed 1 week ago at [place] and he is only willing to have it changed by interventional radiology.  No fevers nausea vomiting diarrhea.  Patient relates he did have a CT at [place] however he not sure of the results.      Plan labs CT [place] IV fluids Ofirmev

## 2025-04-06 LAB
CULTURE RESULTS: SIGNIFICANT CHANGE UP
SPECIMEN SOURCE: SIGNIFICANT CHANGE UP

## 2025-04-06 PROCEDURE — 80053 COMPREHEN METABOLIC PANEL: CPT

## 2025-04-06 PROCEDURE — 87040 BLOOD CULTURE FOR BACTERIA: CPT

## 2025-04-06 PROCEDURE — 96361 HYDRATE IV INFUSION ADD-ON: CPT

## 2025-04-06 PROCEDURE — 96367 TX/PROPH/DG ADDL SEQ IV INF: CPT

## 2025-04-06 PROCEDURE — 85025 COMPLETE CBC W/AUTO DIFF WBC: CPT

## 2025-04-06 PROCEDURE — 83605 ASSAY OF LACTIC ACID: CPT

## 2025-04-06 PROCEDURE — 96365 THER/PROPH/DIAG IV INF INIT: CPT

## 2025-04-06 PROCEDURE — 36415 COLL VENOUS BLD VENIPUNCTURE: CPT

## 2025-04-06 PROCEDURE — 93005 ELECTROCARDIOGRAM TRACING: CPT

## 2025-04-06 PROCEDURE — 81001 URINALYSIS AUTO W/SCOPE: CPT

## 2025-04-06 PROCEDURE — 99285 EMERGENCY DEPT VISIT HI MDM: CPT | Mod: 25

## 2025-04-06 PROCEDURE — 87086 URINE CULTURE/COLONY COUNT: CPT

## 2025-04-06 PROCEDURE — 96375 TX/PRO/DX INJ NEW DRUG ADDON: CPT

## 2025-04-06 PROCEDURE — 96376 TX/PRO/DX INJ SAME DRUG ADON: CPT

## 2025-04-06 PROCEDURE — 74176 CT ABD & PELVIS W/O CONTRAST: CPT | Mod: MC

## 2025-04-06 PROCEDURE — 71045 X-RAY EXAM CHEST 1 VIEW: CPT

## 2025-04-06 RX ORDER — HYDROMORPHONE/SOD CHLOR,ISO/PF 2 MG/10 ML
1 SYRINGE (ML) INJECTION ONCE
Refills: 0 | Status: DISCONTINUED | OUTPATIENT
Start: 2025-04-06 | End: 2025-04-06

## 2025-04-06 RX ADMIN — Medication 1 MILLIGRAM(S): at 01:07

## 2025-04-06 RX ADMIN — Medication 1 MILLIGRAM(S): at 01:10

## 2025-04-09 ENCOUNTER — INPATIENT (INPATIENT)
Facility: HOSPITAL | Age: 50
LOS: 6 days | Discharge: NURSING, REHAB OR LONG-TERM CARE | DRG: 690 | End: 2025-04-16
Attending: INTERNAL MEDICINE | Admitting: INTERNAL MEDICINE
Payer: MEDICAID

## 2025-04-09 VITALS
DIASTOLIC BLOOD PRESSURE: 100 MMHG | HEART RATE: 103 BPM | OXYGEN SATURATION: 97 % | WEIGHT: 279.99 LBS | RESPIRATION RATE: 17 BRPM | TEMPERATURE: 98 F | HEIGHT: 72 IN | SYSTOLIC BLOOD PRESSURE: 161 MMHG

## 2025-04-09 DIAGNOSIS — Z95.828 PRESENCE OF OTHER VASCULAR IMPLANTS AND GRAFTS: Chronic | ICD-10-CM

## 2025-04-09 DIAGNOSIS — Z98.890 OTHER SPECIFIED POSTPROCEDURAL STATES: Chronic | ICD-10-CM

## 2025-04-09 DIAGNOSIS — Z93.59 OTHER CYSTOSTOMY STATUS: Chronic | ICD-10-CM

## 2025-04-09 PROCEDURE — 99285 EMERGENCY DEPT VISIT HI MDM: CPT

## 2025-04-09 NOTE — ED ADULT TRIAGE NOTE - CHIEF COMPLAINT QUOTE
51 y/o male presents aox4 via stretcher, bibems from [facility] c/o leaking suprapubic catheter.

## 2025-04-10 DIAGNOSIS — N40.0 BENIGN PROSTATIC HYPERPLASIA WITHOUT LOWER URINARY TRACT SYMPTOMS: ICD-10-CM

## 2025-04-10 DIAGNOSIS — E11.9 TYPE 2 DIABETES MELLITUS WITHOUT COMPLICATIONS: ICD-10-CM

## 2025-04-10 DIAGNOSIS — Z29.9 ENCOUNTER FOR PROPHYLACTIC MEASURES, UNSPECIFIED: ICD-10-CM

## 2025-04-10 DIAGNOSIS — I10 ESSENTIAL (PRIMARY) HYPERTENSION: ICD-10-CM

## 2025-04-10 DIAGNOSIS — D72.829 ELEVATED WHITE BLOOD CELL COUNT, UNSPECIFIED: ICD-10-CM

## 2025-04-10 DIAGNOSIS — D68.61 ANTIPHOSPHOLIPID SYNDROME: ICD-10-CM

## 2025-04-10 DIAGNOSIS — N39.0 URINARY TRACT INFECTION, SITE NOT SPECIFIED: ICD-10-CM

## 2025-04-10 DIAGNOSIS — G62.9 POLYNEUROPATHY, UNSPECIFIED: ICD-10-CM

## 2025-04-10 DIAGNOSIS — N32.89 OTHER SPECIFIED DISORDERS OF BLADDER: ICD-10-CM

## 2025-04-10 DIAGNOSIS — K21.9 GASTRO-ESOPHAGEAL REFLUX DISEASE WITHOUT ESOPHAGITIS: ICD-10-CM

## 2025-04-10 LAB
ANION GAP SERPL CALC-SCNC: 12 MMOL/L — SIGNIFICANT CHANGE UP (ref 5–17)
APPEARANCE UR: ABNORMAL
BASOPHILS # BLD AUTO: 0.08 K/UL — SIGNIFICANT CHANGE UP (ref 0–0.2)
BASOPHILS NFR BLD AUTO: 0.5 % — SIGNIFICANT CHANGE UP (ref 0–2)
BILIRUB UR-MCNC: NEGATIVE — SIGNIFICANT CHANGE UP
BUN SERPL-MCNC: 36 MG/DL — HIGH (ref 7–23)
CALCIUM SERPL-MCNC: 9.1 MG/DL — SIGNIFICANT CHANGE UP (ref 8.4–10.5)
CHLORIDE SERPL-SCNC: 101 MMOL/L — SIGNIFICANT CHANGE UP (ref 96–108)
CO2 SERPL-SCNC: 23 MMOL/L — SIGNIFICANT CHANGE UP (ref 22–31)
COLOR SPEC: YELLOW — SIGNIFICANT CHANGE UP
CREAT SERPL-MCNC: 2.63 MG/DL — HIGH (ref 0.5–1.3)
DIFF PNL FLD: ABNORMAL
EGFR: 29 ML/MIN/1.73M2 — LOW
EGFR: 29 ML/MIN/1.73M2 — LOW
EOSINOPHIL # BLD AUTO: 0.34 K/UL — SIGNIFICANT CHANGE UP (ref 0–0.5)
EOSINOPHIL NFR BLD AUTO: 2.2 % — SIGNIFICANT CHANGE UP (ref 0–6)
GLUCOSE BLDC GLUCOMTR-MCNC: 126 MG/DL — HIGH (ref 70–99)
GLUCOSE BLDC GLUCOMTR-MCNC: 164 MG/DL — HIGH (ref 70–99)
GLUCOSE BLDC GLUCOMTR-MCNC: 171 MG/DL — HIGH (ref 70–99)
GLUCOSE SERPL-MCNC: 139 MG/DL — HIGH (ref 70–99)
GLUCOSE UR QL: 100 MG/DL
HCT VFR BLD CALC: 33.3 % — LOW (ref 39–50)
HGB BLD-MCNC: 10.7 G/DL — LOW (ref 13–17)
IMM GRANULOCYTES NFR BLD AUTO: 0.4 % — SIGNIFICANT CHANGE UP (ref 0–0.9)
INR BLD: 2.95 RATIO — HIGH (ref 0.85–1.16)
KETONES UR-MCNC: NEGATIVE MG/DL — SIGNIFICANT CHANGE UP
LEUKOCYTE ESTERASE UR-ACNC: ABNORMAL
LYMPHOCYTES # BLD AUTO: 14.6 % — SIGNIFICANT CHANGE UP (ref 13–44)
LYMPHOCYTES # BLD AUTO: 2.29 K/UL — SIGNIFICANT CHANGE UP (ref 1–3.3)
MCHC RBC-ENTMCNC: 22.9 PG — LOW (ref 27–34)
MCHC RBC-ENTMCNC: 32.1 G/DL — SIGNIFICANT CHANGE UP (ref 32–36)
MCV RBC AUTO: 71.3 FL — LOW (ref 80–100)
MONOCYTES # BLD AUTO: 0.82 K/UL — SIGNIFICANT CHANGE UP (ref 0–0.9)
MONOCYTES NFR BLD AUTO: 5.2 % — SIGNIFICANT CHANGE UP (ref 2–14)
NEUTROPHILS # BLD AUTO: 12.09 K/UL — HIGH (ref 1.8–7.4)
NEUTROPHILS NFR BLD AUTO: 77.1 % — HIGH (ref 43–77)
NITRITE UR-MCNC: NEGATIVE — SIGNIFICANT CHANGE UP
NRBC BLD AUTO-RTO: 0 /100 WBCS — SIGNIFICANT CHANGE UP (ref 0–0)
PH UR: 6 — SIGNIFICANT CHANGE UP (ref 5–8)
PLATELET # BLD AUTO: 259 K/UL — SIGNIFICANT CHANGE UP (ref 150–400)
POTASSIUM SERPL-MCNC: 4.1 MMOL/L — SIGNIFICANT CHANGE UP (ref 3.5–5.3)
POTASSIUM SERPL-SCNC: 4.1 MMOL/L — SIGNIFICANT CHANGE UP (ref 3.5–5.3)
PROT UR-MCNC: 300 MG/DL
PROTHROM AB SERPL-ACNC: 33.8 SEC — HIGH (ref 9.9–13.4)
RBC # BLD: 4.67 M/UL — SIGNIFICANT CHANGE UP (ref 4.2–5.8)
RBC # FLD: 16 % — HIGH (ref 10.3–14.5)
SODIUM SERPL-SCNC: 136 MMOL/L — SIGNIFICANT CHANGE UP (ref 135–145)
SP GR SPEC: 1.01 — SIGNIFICANT CHANGE UP (ref 1–1.03)
UROBILINOGEN FLD QL: 0.2 MG/DL — SIGNIFICANT CHANGE UP (ref 0.2–1)
WBC # BLD: 15.69 K/UL — HIGH (ref 3.8–10.5)
WBC # FLD AUTO: 15.69 K/UL — HIGH (ref 3.8–10.5)

## 2025-04-10 RX ORDER — B1/B2/B3/B5/B6/B12/VIT C/FOLIC 500-0.5 MG
1 TABLET ORAL DAILY
Refills: 0 | Status: DISCONTINUED | OUTPATIENT
Start: 2025-04-10 | End: 2025-04-16

## 2025-04-10 RX ORDER — DEXTROSE 50 % IN WATER 50 %
25 SYRINGE (ML) INTRAVENOUS ONCE
Refills: 0 | Status: DISCONTINUED | OUTPATIENT
Start: 2025-04-10 | End: 2025-04-16

## 2025-04-10 RX ORDER — HYDROMORPHONE/SOD CHLOR,ISO/PF 2 MG/10 ML
4 SYRINGE (ML) INJECTION EVERY 6 HOURS
Refills: 0 | Status: DISCONTINUED | OUTPATIENT
Start: 2025-04-10 | End: 2025-04-16

## 2025-04-10 RX ORDER — METHOCARBAMOL 500 MG/1
750 TABLET, FILM COATED ORAL EVERY 8 HOURS
Refills: 0 | Status: DISCONTINUED | OUTPATIENT
Start: 2025-04-10 | End: 2025-04-16

## 2025-04-10 RX ORDER — DEXTROSE 50 % IN WATER 50 %
15 SYRINGE (ML) INTRAVENOUS ONCE
Refills: 0 | Status: DISCONTINUED | OUTPATIENT
Start: 2025-04-10 | End: 2025-04-16

## 2025-04-10 RX ORDER — MELATONIN 5 MG
3 TABLET ORAL AT BEDTIME
Refills: 0 | Status: DISCONTINUED | OUTPATIENT
Start: 2025-04-10 | End: 2025-04-16

## 2025-04-10 RX ORDER — MAGNESIUM, ALUMINUM HYDROXIDE 200-200 MG
30 TABLET,CHEWABLE ORAL EVERY 4 HOURS
Refills: 0 | Status: DISCONTINUED | OUTPATIENT
Start: 2025-04-10 | End: 2025-04-16

## 2025-04-10 RX ORDER — GLUCAGON 3 MG/1
1 POWDER NASAL ONCE
Refills: 0 | Status: DISCONTINUED | OUTPATIENT
Start: 2025-04-10 | End: 2025-04-16

## 2025-04-10 RX ORDER — FINASTERIDE 1 MG/1
5 TABLET, FILM COATED ORAL DAILY
Refills: 0 | Status: DISCONTINUED | OUTPATIENT
Start: 2025-04-10 | End: 2025-04-16

## 2025-04-10 RX ORDER — OXYBUTYNIN CHLORIDE 5 MG/1
5 TABLET, FILM COATED, EXTENDED RELEASE ORAL DAILY
Refills: 0 | Status: DISCONTINUED | OUTPATIENT
Start: 2025-04-10 | End: 2025-04-10

## 2025-04-10 RX ORDER — AMLODIPINE BESYLATE 10 MG/1
10 TABLET ORAL DAILY
Refills: 0 | Status: DISCONTINUED | OUTPATIENT
Start: 2025-04-10 | End: 2025-04-16

## 2025-04-10 RX ORDER — ACETAMINOPHEN 500 MG/5ML
1000 LIQUID (ML) ORAL ONCE
Refills: 0 | Status: COMPLETED | OUTPATIENT
Start: 2025-04-10 | End: 2025-04-10

## 2025-04-10 RX ORDER — ONDANSETRON HCL/PF 4 MG/2 ML
4 VIAL (ML) INJECTION EVERY 8 HOURS
Refills: 0 | Status: DISCONTINUED | OUTPATIENT
Start: 2025-04-10 | End: 2025-04-16

## 2025-04-10 RX ORDER — SODIUM CHLORIDE 9 G/1000ML
1000 INJECTION, SOLUTION INTRAVENOUS
Refills: 0 | Status: DISCONTINUED | OUTPATIENT
Start: 2025-04-10 | End: 2025-04-14

## 2025-04-10 RX ORDER — SODIUM CHLORIDE 9 G/1000ML
1000 INJECTION, SOLUTION INTRAVENOUS
Refills: 0 | Status: DISCONTINUED | OUTPATIENT
Start: 2025-04-10 | End: 2025-04-16

## 2025-04-10 RX ORDER — FOLIC ACID 1 MG/1
1 TABLET ORAL DAILY
Refills: 0 | Status: DISCONTINUED | OUTPATIENT
Start: 2025-04-10 | End: 2025-04-16

## 2025-04-10 RX ORDER — ACETAMINOPHEN 500 MG/5ML
650 LIQUID (ML) ORAL EVERY 6 HOURS
Refills: 0 | Status: DISCONTINUED | OUTPATIENT
Start: 2025-04-10 | End: 2025-04-16

## 2025-04-10 RX ORDER — OXYBUTYNIN CHLORIDE 5 MG/1
10 TABLET, FILM COATED, EXTENDED RELEASE ORAL
Refills: 0 | Status: DISCONTINUED | OUTPATIENT
Start: 2025-04-10 | End: 2025-04-16

## 2025-04-10 RX ORDER — HYDROMORPHONE/SOD CHLOR,ISO/PF 2 MG/10 ML
1 SYRINGE (ML) INJECTION EVERY 8 HOURS
Refills: 0 | Status: DISCONTINUED | OUTPATIENT
Start: 2025-04-10 | End: 2025-04-11

## 2025-04-10 RX ORDER — DULOXETINE 20 MG/1
60 CAPSULE, DELAYED RELEASE ORAL DAILY
Refills: 0 | Status: DISCONTINUED | OUTPATIENT
Start: 2025-04-10 | End: 2025-04-16

## 2025-04-10 RX ORDER — SENNA 187 MG
2 TABLET ORAL AT BEDTIME
Refills: 0 | Status: DISCONTINUED | OUTPATIENT
Start: 2025-04-10 | End: 2025-04-16

## 2025-04-10 RX ORDER — METOPROLOL SUCCINATE 50 MG/1
25 TABLET, EXTENDED RELEASE ORAL
Refills: 0 | Status: DISCONTINUED | OUTPATIENT
Start: 2025-04-10 | End: 2025-04-16

## 2025-04-10 RX ORDER — DEXTROSE 50 % IN WATER 50 %
12.5 SYRINGE (ML) INTRAVENOUS ONCE
Refills: 0 | Status: DISCONTINUED | OUTPATIENT
Start: 2025-04-10 | End: 2025-04-16

## 2025-04-10 RX ORDER — GABAPENTIN 400 MG/1
600 CAPSULE ORAL EVERY 8 HOURS
Refills: 0 | Status: DISCONTINUED | OUTPATIENT
Start: 2025-04-10 | End: 2025-04-16

## 2025-04-10 RX ORDER — INSULIN LISPRO 100 U/ML
INJECTION, SOLUTION INTRAVENOUS; SUBCUTANEOUS
Refills: 0 | Status: DISCONTINUED | OUTPATIENT
Start: 2025-04-10 | End: 2025-04-16

## 2025-04-10 RX ORDER — NICOTINE POLACRILEX 4 MG/1
1 GUM, CHEWING ORAL DAILY
Refills: 0 | Status: DISCONTINUED | OUTPATIENT
Start: 2025-04-10 | End: 2025-04-16

## 2025-04-10 RX ORDER — INSULIN GLARGINE-YFGN 100 [IU]/ML
10 INJECTION, SOLUTION SUBCUTANEOUS AT BEDTIME
Refills: 0 | Status: DISCONTINUED | OUTPATIENT
Start: 2025-04-10 | End: 2025-04-16

## 2025-04-10 RX ORDER — HYDROMORPHONE/SOD CHLOR,ISO/PF 2 MG/10 ML
4 SYRINGE (ML) INJECTION ONCE
Refills: 0 | Status: DISCONTINUED | OUTPATIENT
Start: 2025-04-10 | End: 2025-04-10

## 2025-04-10 RX ORDER — FERROUS SULFATE 137(45) MG
325 TABLET, EXTENDED RELEASE ORAL DAILY
Refills: 0 | Status: DISCONTINUED | OUTPATIENT
Start: 2025-04-10 | End: 2025-04-16

## 2025-04-10 RX ADMIN — Medication 4 MILLIGRAM(S): at 18:51

## 2025-04-10 RX ADMIN — METOPROLOL SUCCINATE 25 MILLIGRAM(S): 50 TABLET, EXTENDED RELEASE ORAL at 17:33

## 2025-04-10 RX ADMIN — AMLODIPINE BESYLATE 10 MILLIGRAM(S): 10 TABLET ORAL at 11:57

## 2025-04-10 RX ADMIN — Medication 500 MILLIGRAM(S): at 11:55

## 2025-04-10 RX ADMIN — DULOXETINE 60 MILLIGRAM(S): 20 CAPSULE, DELAYED RELEASE ORAL at 11:57

## 2025-04-10 RX ADMIN — GABAPENTIN 600 MILLIGRAM(S): 400 CAPSULE ORAL at 13:31

## 2025-04-10 RX ADMIN — OXYBUTYNIN CHLORIDE 10 MILLIGRAM(S): 5 TABLET, FILM COATED, EXTENDED RELEASE ORAL at 17:33

## 2025-04-10 RX ADMIN — Medication 4 MILLIGRAM(S): at 12:09

## 2025-04-10 RX ADMIN — Medication 4 MILLIGRAM(S): at 04:16

## 2025-04-10 RX ADMIN — FINASTERIDE 5 MILLIGRAM(S): 1 TABLET, FILM COATED ORAL at 11:57

## 2025-04-10 RX ADMIN — Medication 650 MILLIGRAM(S): at 16:28

## 2025-04-10 RX ADMIN — Medication 1 MILLIGRAM(S): at 21:18

## 2025-04-10 RX ADMIN — SODIUM CHLORIDE 50 MILLILITER(S): 9 INJECTION, SOLUTION INTRAVENOUS at 11:59

## 2025-04-10 RX ADMIN — GABAPENTIN 600 MILLIGRAM(S): 400 CAPSULE ORAL at 21:18

## 2025-04-10 RX ADMIN — Medication 4 MILLIGRAM(S): at 07:30

## 2025-04-10 RX ADMIN — INSULIN GLARGINE-YFGN 10 UNIT(S): 100 INJECTION, SOLUTION SUBCUTANEOUS at 21:48

## 2025-04-10 RX ADMIN — NICOTINE POLACRILEX 1 PATCH: 4 GUM, CHEWING ORAL at 11:58

## 2025-04-10 RX ADMIN — Medication 0.1 MILLIGRAM(S): at 17:33

## 2025-04-10 RX ADMIN — METHOCARBAMOL 750 MILLIGRAM(S): 500 TABLET, FILM COATED ORAL at 16:27

## 2025-04-10 RX ADMIN — Medication 2 TABLET(S): at 21:18

## 2025-04-10 RX ADMIN — OXYBUTYNIN CHLORIDE 5 MILLIGRAM(S): 5 TABLET, FILM COATED, EXTENDED RELEASE ORAL at 11:57

## 2025-04-10 RX ADMIN — Medication 650 MILLIGRAM(S): at 17:28

## 2025-04-10 RX ADMIN — INSULIN LISPRO 1: 100 INJECTION, SOLUTION INTRAVENOUS; SUBCUTANEOUS at 13:31

## 2025-04-10 RX ADMIN — Medication 1 MILLIGRAM(S): at 22:00

## 2025-04-10 RX ADMIN — Medication 40 MILLIGRAM(S): at 11:56

## 2025-04-10 RX ADMIN — Medication 4 MILLIGRAM(S): at 21:48

## 2025-04-10 RX ADMIN — Medication 325 MILLIGRAM(S): at 11:57

## 2025-04-10 RX ADMIN — FOLIC ACID 1 MILLIGRAM(S): 1 TABLET ORAL at 11:58

## 2025-04-10 RX ADMIN — Medication 400 MILLIGRAM(S): at 18:52

## 2025-04-10 RX ADMIN — Medication 4 MILLIGRAM(S): at 11:09

## 2025-04-10 RX ADMIN — Medication 1 TABLET(S): at 11:58

## 2025-04-10 NOTE — PATIENT PROFILE ADULT - FALL HARM RISK - HARM RISK INTERVENTIONS

## 2025-04-10 NOTE — ED ADULT NURSE REASSESSMENT NOTE - NS ED NURSE REASSESS COMMENT FT1
attempt to call [place] for colateral- no answer x 3
Pt seen and assessed. Pt for admission to [facility] floor - awaiting for Dr Cook

## 2025-04-10 NOTE — PATIENT PROFILE ADULT - NSPROHMDIABETBLDGLCTST_GEN_A_NUR
pt does not know his usual numbers; states the staff at [facility] manage his diabetes for him/before meals and at bedtime

## 2025-04-10 NOTE — PROVIDER CONTACT NOTE (OTHER) - ASSESSMENT
The patient states he is in 9/10 pain and the 4mg PO Dilaudid is not enough to control his pain. He is due for his next PO Dilaudid dose but is declining it at this time so he can get the IV Dilaudid.

## 2025-04-10 NOTE — H&P ADULT - PROBLEM SELECTOR PLAN 1
septic workup , ID eval , patient just completed 2 courses of meropenem ID consult fro possible Funguria and chronic management of  recurrent UTI.seen by urologist -spasms/ ryan-cath  leakage  (recent multiple admissions for same problem at [place]), catheter was just exchanged 3 days prior to [place]  er visit   History chronic bladder colonization and n which should not be treated, and history recurrent UTI which require treatment,  recent C&S was negative.

## 2025-04-10 NOTE — CARE COORDINATION ASSESSMENT. - FACILITY OF RESIDENCE YN
[facility] @ [address] [place] [id number], phone [phone]/Yes

## 2025-04-10 NOTE — PROVIDER CONTACT NOTE (OTHER) - BACKGROUND
The patient is admitted for chronic bladder spasm and UTI. The patient received 2 doses of his PO Dilaudid 4mg and 650mg tylenol PO without relief.

## 2025-04-10 NOTE — ED PROVIDER NOTE - NSICDXPASTMEDICALHX_GEN_ALL_CORE_FT
PAST MEDICAL HISTORY:  Acute embolism and thrombosis of deep vein of lower extremity     BPH (benign prostatic hyperplasia)     BPH (benign prostatic hyperplasia)     BPH without obstruction/lower urinary tract symptoms     CVA (cerebrovascular accident)     CVD (cerebrovascular disease)     DDD (degenerative disc disease), lumbar     Degenerative arthritis     Diabetes mellitus due to underlying condition with diabetic neuropathy     DM2 (diabetes mellitus, type 2)     DVT of lower limb, acute     DVT, lower extremity     Essential (primary) hypertension     GERD (gastroesophageal reflux disease)     H/O constipation     H/O Guillain-[place] syndrome     H/O insomnia     History of CVA in adulthood     History of Guillain-[place] syndrome     History of muscle spasm     History of neurogenic bowel     HTN (hypertension)     HTN (hypertension)     Iron deficiency anemia     Major depressive disorder, single episode     Muscle weakness     Obesity     Pain, unspecified     Peripheral neuropathy     Renal stones     Type 2 diabetes mellitus     Vitamin deficiency

## 2025-04-10 NOTE — H&P ADULT - HISTORY OF PRESENT ILLNESS
50y M from [facility] nursing home JESSENIA with suprapubic catheter as pt concerned for urine infection and bladder spasms, pt has a urologist at [facility], has been admitted there 3 times in the past 3 weeks, just discharged and arrived back at [facility] less than 4hr PTA to [place], states he was sent there for infection and received 2 doses of IV antibiotics and then they were stopped and he was sent home today, states his previous 2 recent admissions he petitioned for longer stays, and won, but eventually was discharged, pt reports he refused to have IV antibiotics at the nursing home previously as he believes they will be dislodged and he wants to be in the hospital until antibiotics are complete, reports he was not discharged on antibiotics this time, urine culture from 4/5 done here was negative (which pt states he was not aware of), pt also states his bladder spasms are painful and wants pain medication, states he does have chronic spasms, but they recently stopped his myrbitriq and thinks that may have made them worse

## 2025-04-10 NOTE — ED PROVIDER NOTE - CLINICAL SUMMARY MEDICAL DECISION MAKING FREE TEXT BOX
pt with suprapubic catheter, c/o bladder spasms and urine leaking from urethra, these are chronic complaints for the pt, has had multiple recent admissions/transfers to [facility] for this (where his  is), discharged only a few hours ago, pt does not want to go back to [place], wants to stay here with Dr. Sanchez, his PCP, asking for IV narcotics for pain, advise pt we will give his oral dose as he is able to take PO, will send basic labs and d/w his PCP in the morning

## 2025-04-10 NOTE — CARE COORDINATION ASSESSMENT. - NSADDITIONAL INFORMATION_FT
Mr. Hardik Colindres is a 51y/o single, domiciled white male, w/ PMHx as indicated in H&P, who presents to [facility] secondary to recurrent UTIs.  met w/ patient @ bedside in emergency dept for completion of care coordination assessment, @ which time patient appeared alert & oriented x4 and fully able to engage in discussion. Mr. Colindres is a long-term care resident of [facility] in [place] to which he will return on hospital discharge.  provided verbal 24hr notice for discharge and reviewed Discharge Notice (for Non-Medicaid Recipients) regarding patient's right to appeal hospital discharge, @ which time patient identified that he likely intends to appeal his discharge w/in the next 24hr. Mr. Hardik Colindres is a 51y/o single, domiciled white male, w/ PMHx as indicated in H&P, who presents to [facility] secondary to recurrent UTIs.  met w/ patient @ bedside in emergency dept for completion of care coordination assessment, @ which time patient appeared alert & oriented x4 and fully able to engage in discussion. Mr. Colindres is a long-term care resident of [facility] in [place] to which he will return on hospital discharge.  provided verbal 24hr notice for discharge and reviewed Discharge Notice (for Non-Medicare Recipients) regarding patient's right to appeal hospital discharge, @ which time patient identified that he likely intends to appeal his discharge w/in the next 24hr.

## 2025-04-10 NOTE — CARE COORDINATION ASSESSMENT. - REASON FOR CONSULT
discharge planning/admitted from facility/patient meets high risk criteria (i.e.: STARS admit., readmit w/in 30 days)

## 2025-04-10 NOTE — H&P ADULT - NSICDXPASTMEDICALHX_GEN_ALL_CORE_FT
PAST MEDICAL HISTORY:  Acute embolism and thrombosis of deep vein of lower extremity     BPH (benign prostatic hyperplasia)     BPH (benign prostatic hyperplasia)     BPH without obstruction/lower urinary tract symptoms     CVA (cerebrovascular accident)     CVD (cerebrovascular disease)     DDD (degenerative disc disease), lumbar     Degenerative arthritis     Diabetes mellitus due to underlying condition with diabetic neuropathy     DM2 (diabetes mellitus, type 2)     DVT of lower limb, acute     DVT, lower extremity     Essential (primary) hypertension     GERD (gastroesophageal reflux disease)     H/O constipation     H/O Guillain-[place] syndrome     H/O insomnia     History of CVA in adulthood     History of Guillain-[place] syndrome     History of muscle spasm     History of neurogenic bowel     HTN (hypertension)     HTN (hypertension)     Iron deficiency anemia     Major depressive disorder, single episode     Muscle weakness     Obesity     Pain, unspecified     Peripheral neuropathy     Renal stones     Type 2 diabetes mellitus     Vitamin deficiency

## 2025-04-10 NOTE — PROVIDER CONTACT NOTE (OTHER) - ACTION/TREATMENT ORDERED:
The answering service will reach out to Dr. Sanchez who will call back. The answering service will reach out to Dr. Sanchez who will call back.  1740: Per Dr. Sanchez, pt always seeking IV pain medicine. Give PO Dilaudid and IV tylenol. Pain MD Dr. Kimball will see the pt.

## 2025-04-10 NOTE — ED ADULT NURSE NOTE - NSICDXPASTMEDICALHX_GEN_ALL_CORE_FT
PAST MEDICAL HISTORY:  Acute embolism and thrombosis of deep vein of lower extremity     BPH (benign prostatic hyperplasia)     BPH (benign prostatic hyperplasia)     BPH without obstruction/lower urinary tract symptoms     CVA (cerebrovascular accident)     CVD (cerebrovascular disease)     DDD (degenerative disc disease), lumbar     Degenerative arthritis     Diabetes mellitus due to underlying condition with diabetic neuropathy     DM2 (diabetes mellitus, type 2)     DVT of lower limb, acute     DVT, lower extremity     Essential (primary) hypertension     GERD (gastroesophageal reflux disease)     H/O constipation     H/O Guillain-[place] syndrome     H/O insomnia     History of CVA in adulthood     History of Guillain-[place] syndrome     History of muscle spasm     History of neurogenic bowel     HTN (hypertension)     HTN (hypertension)     Iron deficiency anemia     Major depressive disorder, single episode     Muscle weakness     Obesity     Pain, unspecified     Peripheral neuropathy     Renal stones     Type 2 diabetes mellitus     Vitamin deficiency

## 2025-04-10 NOTE — H&P ADULT - ASSESSMENT
50y M from [facility] nursing home JESSENIA with suprapubic catheter as pt concerned for urine infection and bladder spasms, pt has a urologist at [facility], has been admitted there 3 times in the past 3 weeks, just discharged and arrived back at [facility] less than 4hr PTA to [place], states he was sent there for infection and received 2 doses of IV antibiotics and then they were stopped and he was sent home today, states his previous 2 recent admissions he petitioned for longer stays, and won, but eventually was discharged, pt reports he refused to have IV antibiotics at the nursing home previously as he believes they will be dislodged and he wants to be in the hospital until antibiotics are complete, reports he was not discharged on antibiotics this time, urine culture from 4/5 done here was negative (which pt states he was not aware of), pt also states his bladder spasms are painful and wants pain medication, states he does have chronic spasms, but they recently stopped his myrbitriq and thinks that may have made them worse

## 2025-04-10 NOTE — CARE COORDINATION ASSESSMENT. - NSCAREPROVIDERS_GEN_ALL_CORE_FT
CARE PROVIDERS:  Accepting Physician: Janie Sanchez  Access Services: Francy Serra  Administration: Marva Rodriguez  Administration: Garima Osborne  Admitting: Janie Sanchez  Attending: Janie Sanchez  Consultant: Yadira Blackmon  Consultant: Cas Peralta ED Attending: Katie Rice  ED Nurse: Irma Schroeder  Nurse: Irma Schroeder  Nurse: Casi Agarwal  Nurse: Prince Bethea  Nurse: Osiris Frias  Nurse: Aleksandra Lang  Oncology: Arturo Jenkins  Oncology: Laci Bland  Ordered: Doctor, Unknown  Outpatient Provider: Gus Leon  Outpatient Provider: Pahlavan, Mohsen  PCA/Nursing Assistant: Cordelia Winters  Physical Therapy: Vidya Barron  Registered Dietitian: Gabriela Srinivasan  Respiratory Therapy: Elier Venegas  : Jcarlos Villafana  : Elyssa Yan

## 2025-04-10 NOTE — H&P ADULT - PROBLEM SELECTOR PLAN 2
continue home medications ,   Recommendations  Treat bladder spasms with Myrbetriq 50 MG and Oxybutynin  10 mg  BID, monitor for constipations and start aggressive routine to prevent constipation with active hydration and, stool softeners and bulking agents (only if hydrating)  Cranberry extract  Referral upon discharge o Dr. JAY Overton ([phone]) who is expert in chronic bladder pain and neurogenic disorders

## 2025-04-10 NOTE — ED ADULT NURSE NOTE - CHIEF COMPLAINT QUOTE
49 y/o male presents aox4 via stretcher, bibems from [facility] c/o leaking suprapubic catheter.

## 2025-04-10 NOTE — H&P ADULT - TIME BILLING
75minutes spent on this visit, 50% visit time spent in care co-ordination with other attendings and counselling patient ,writing admission orders ( see complete and current orders and order section) ,requesting necessary consults ,informing family about status & plan of care .I have discussed care plan with [facility] wellness/admitting /nursing   department ,outpatient PCP , hospital consultants , ER physician & med staff .

## 2025-04-10 NOTE — ED PROVIDER NOTE - OBJECTIVE STATEMENT
50y M from [facility] nursing home JESSENIA with suprapubic catheter as pt concerned for urine infection and bladder spasms, pt has a urologist at [facility], has been admitted there 3 times in the past 3 weeks, just discharged and arrived back at [facility] less than 4hr PTA to [place], states he was sent there for infection and received 2 doses of IV antibiotics and then they were stopped and he was sent home today, states his previous 2 recent admissions he petitioned for longer stays, and won, but eventually was discharged, pt reports he refused to have IV antibiotics at the nursing home previously as he believes they will be dislodged and he wants to be in the hospital until antibiotics are complete, reports he was not discharged on antibiotics this time, urine culture from 4/5 done here was negative (which pt states he was not aware of), pt also states his bladder spasms are painful and wants pain medication, states he does have chronic spasms, but they recently stopped his myrbitriq and thinks that may have made them worse, states he wants to be admitted here at [place] to his pcp (Dr. Sanchez) for further care, does not want to go back to [place] this time,

## 2025-04-10 NOTE — H&P ADULT - PROBLEM SELECTOR PLAN 3
continue home medications ,   Recommendations  Treat bladder spasms with Myrbetriq 50 MG and Oxybutynin  10 mg  BID, monitor for constipations and start aggressive routine to prevent constipation with active hydration and, stool softeners and bulking agents (only if hydrating)  Cranberry extract  Referral upon discharge o Dr. JAY Overton ([phone]) who is expert in chronic bladder pain and neurogenic disorders

## 2025-04-10 NOTE — ED ADULT NURSE NOTE - NSFALLRISKASMT_ED_ALL_ED_DT
[Time Spent: ___ minutes] : I have spent [unfilled] minutes of time on the encounter which excludes teaching and separately reported services. 10-Apr-2025 03:08

## 2025-04-10 NOTE — PATIENT CHOICE NOTE. - NSPTCHOICESTATE_GEN_ALL_CORE

## 2025-04-10 NOTE — CAREGIVER ENGAGEMENT NOTE - CAREGIVER EDUCATION NOTES - FREE TEXT
Mr. Hardik Colindres is a 49y/o single, domiciled white male, w/ PMHx as indicated in H&P, who presents to [facility] secondary to recurrent UTIs.  met w/ patient @ bedside in emergency dept for completion of care coordination assessment, @ which time patient appeared alert & oriented x4 and fully able to engage in discussion. Mr. Colindres is a long-term care resident of [facility] in [place] to which he will return on hospital discharge.  provided verbal 24hr notice for discharge and reviewed Discharge Notice (for Non-Medicaid Recipients) regarding patient's right to appeal hospital discharge, @ which time patient identified that he likely intends to appeal his discharge w/in the next 24hr. Mr. Hardik Colindres is a 51y/o single, domiciled white male, w/ PMHx as indicated in H&P, who presents to [facility] secondary to recurrent UTIs.  met w/ patient @ bedside in emergency dept for completion of care coordination assessment, @ which time patient appeared alert & oriented x4 and fully able to engage in discussion. Mr. Colindres is a long-term care resident of [facility] in [place] to which he will return on hospital discharge.  provided verbal 24hr notice for discharge and reviewed Discharge Notice (for Non-Medicare Recipients) regarding patient's right to appeal hospital discharge, @ which time patient identified that he likely intends to appeal his discharge w/in the next 24hr.

## 2025-04-10 NOTE — CARE COORDINATION ASSESSMENT. - NSPASTMEDSURGHISTORY_GEN_ALL_CORE_FT
PAST MEDICAL & SURGICAL HISTORY:  DVT, lower extremity      DDD (degenerative disc disease), lumbar      History of CVA in adulthood      History of Guillain-[place] syndrome      Peripheral neuropathy      Type 2 diabetes mellitus      HTN (hypertension)      BPH (benign prostatic hyperplasia)      S/P IVC filter      CVA (cerebrovascular accident)      DVT of lower limb, acute      Degenerative arthritis      BPH without obstruction/lower urinary tract symptoms      HTN (hypertension)      DM2 (diabetes mellitus, type 2)      History of neurogenic bowel      H/O Guillain-[place] syndrome      Renal stones      H/O lithotripsy      Major depressive disorder, single episode      Diabetes mellitus due to underlying condition with diabetic neuropathy      BPH (benign prostatic hyperplasia)      Acute embolism and thrombosis of deep vein of lower extremity      Essential (primary) hypertension      H/O insomnia      H/O constipation      GERD (gastroesophageal reflux disease)      Obesity      Vitamin deficiency      Pain, unspecified      History of muscle spasm      Iron deficiency anemia      Muscle weakness      CVD (cerebrovascular disease)      Chronic suprapubic catheter

## 2025-04-10 NOTE — ED PROVIDER NOTE - PROGRESS NOTE DETAILS
spoke with pt's PCP, Dr. Sanchez, will admit the patient, will speak with ID today, pt reportedly received Meropenem yesterday, will hold treatment until ID sees patient

## 2025-04-10 NOTE — H&P ADULT - NSHPLABSRESULTS_GEN_ALL_CORE
< from: Xray Chest 1 View AP/PA (04.05.25 @ 21:47) >      COMPARISON: 7/26/2024    The cardiomediastinal silhouette is normal and the ayana are not enlarged.   The trachea is midline. There is no focal lung consolidation or sizable   pleural effusion. No significant osseous abnormality.    IMPRESSION:    Unremarkable frontal chest x ray    < end of copied text >    < from: CT Renal Stone Hunt (04.05.25 @ 19:27) >    LIVER: Within normal limits.  BILE DUCTS: Normal caliber.  GALLBLADDER: Large 2 cm calcified gallstone in the gallbladder.   Additional smaller gallstones are couple containing nitrogen gas. There   is sludge. No acute inflammatory findings. Overall the gallbladder   appears similar compared to 10/20/2024.  SPLEEN: Within normal limits.  PANCREAS: Within normal limits.  ADRENALS: Nodular hyperplasia again noted probably bilaterally more so on   the left, unchanged.  KIDNEYS/URETERS: Multiple small less than 3 mm nonobstructing calculi in   both kidneys. No hydronephrosis.    BLADDER: Suprapubic catheter completely decompresses the bladder. Small   diverticulum at the dome as before.  REPRODUCTIVE ORGANS: Nonenlarged prostate.    BOWEL: No bowel obstruction. Appendix is not visualized. No evidence of   inflammation inthe pericecal region. Sigmoid colon is tortuous coursing   through the right lower quadrant. No definitive diverticular disease.  PERITONEUM/RETROPERITONEUM: Within normal limits.  VESSELS: IVC filter.  LYMPH NODES: No lymphadenopathy.  ABDOMINAL WALL: Within normal limits.  BONES: Degenerative changes. Spinal fusion hardware as before.    IMPRESSION:    Suprapubic catheter completely decompresses the bladder. Multiple very   small nonobstructing renal calculi bilaterally as before. No ureteral   calculi or hydronephrosis.    < end of copied text >

## 2025-04-10 NOTE — ED ADULT NURSE NOTE - OBJECTIVE STATEMENT
51 y/o male presents aox4 via stretcher, bibems from [facility] c/o leaking suprapubic catheter.

## 2025-04-11 DIAGNOSIS — N17.9 ACUTE KIDNEY FAILURE, UNSPECIFIED: ICD-10-CM

## 2025-04-11 LAB
A1C WITH ESTIMATED AVERAGE GLUCOSE RESULT: 9 % — HIGH (ref 4–5.6)
ALBUMIN SERPL ELPH-MCNC: 2.5 G/DL — LOW (ref 3.3–5)
ALP SERPL-CCNC: 114 U/L — SIGNIFICANT CHANGE UP (ref 30–120)
ALT FLD-CCNC: 18 U/L — SIGNIFICANT CHANGE UP (ref 10–60)
ANION GAP SERPL CALC-SCNC: 9 MMOL/L — SIGNIFICANT CHANGE UP (ref 5–17)
AST SERPL-CCNC: 12 U/L — SIGNIFICANT CHANGE UP (ref 10–40)
BASOPHILS # BLD AUTO: 0.06 K/UL — SIGNIFICANT CHANGE UP (ref 0–0.2)
BASOPHILS NFR BLD AUTO: 0.6 % — SIGNIFICANT CHANGE UP (ref 0–2)
BILIRUB SERPL-MCNC: 0.3 MG/DL — SIGNIFICANT CHANGE UP (ref 0.2–1.2)
BUN SERPL-MCNC: 40 MG/DL — HIGH (ref 7–23)
CALCIUM SERPL-MCNC: 8.6 MG/DL — SIGNIFICANT CHANGE UP (ref 8.4–10.5)
CHLORIDE SERPL-SCNC: 101 MMOL/L — SIGNIFICANT CHANGE UP (ref 96–108)
CO2 SERPL-SCNC: 25 MMOL/L — SIGNIFICANT CHANGE UP (ref 22–31)
CREAT SERPL-MCNC: 3.14 MG/DL — HIGH (ref 0.5–1.3)
CULTURE RESULTS: SIGNIFICANT CHANGE UP
CULTURE RESULTS: SIGNIFICANT CHANGE UP
EGFR: 23 ML/MIN/1.73M2 — LOW
EGFR: 23 ML/MIN/1.73M2 — LOW
EOSINOPHIL # BLD AUTO: 0.41 K/UL — SIGNIFICANT CHANGE UP (ref 0–0.5)
EOSINOPHIL NFR BLD AUTO: 4.2 % — SIGNIFICANT CHANGE UP (ref 0–6)
ESTIMATED AVERAGE GLUCOSE: 212 MG/DL — HIGH (ref 68–114)
GLUCOSE BLDC GLUCOMTR-MCNC: 134 MG/DL — HIGH (ref 70–99)
GLUCOSE BLDC GLUCOMTR-MCNC: 191 MG/DL — HIGH (ref 70–99)
GLUCOSE BLDC GLUCOMTR-MCNC: 196 MG/DL — HIGH (ref 70–99)
GLUCOSE BLDC GLUCOMTR-MCNC: 254 MG/DL — HIGH (ref 70–99)
GLUCOSE SERPL-MCNC: 211 MG/DL — HIGH (ref 70–99)
HCT VFR BLD CALC: 30 % — LOW (ref 39–50)
HGB BLD-MCNC: 9.7 G/DL — LOW (ref 13–17)
IMM GRANULOCYTES NFR BLD AUTO: 0.6 % — SIGNIFICANT CHANGE UP (ref 0–0.9)
INR BLD: 2.62 RATIO — HIGH (ref 0.85–1.16)
LYMPHOCYTES # BLD AUTO: 1.9 K/UL — SIGNIFICANT CHANGE UP (ref 1–3.3)
LYMPHOCYTES # BLD AUTO: 19.6 % — SIGNIFICANT CHANGE UP (ref 13–44)
MCHC RBC-ENTMCNC: 23.3 PG — LOW (ref 27–34)
MCHC RBC-ENTMCNC: 32.3 G/DL — SIGNIFICANT CHANGE UP (ref 32–36)
MCV RBC AUTO: 72.1 FL — LOW (ref 80–100)
MONOCYTES # BLD AUTO: 0.69 K/UL — SIGNIFICANT CHANGE UP (ref 0–0.9)
MONOCYTES NFR BLD AUTO: 7.1 % — SIGNIFICANT CHANGE UP (ref 2–14)
NEUTROPHILS # BLD AUTO: 6.59 K/UL — SIGNIFICANT CHANGE UP (ref 1.8–7.4)
NEUTROPHILS NFR BLD AUTO: 67.9 % — SIGNIFICANT CHANGE UP (ref 43–77)
NRBC BLD AUTO-RTO: 0 /100 WBCS — SIGNIFICANT CHANGE UP (ref 0–0)
PLATELET # BLD AUTO: 223 K/UL — SIGNIFICANT CHANGE UP (ref 150–400)
POTASSIUM SERPL-MCNC: 3.7 MMOL/L — SIGNIFICANT CHANGE UP (ref 3.5–5.3)
POTASSIUM SERPL-SCNC: 3.7 MMOL/L — SIGNIFICANT CHANGE UP (ref 3.5–5.3)
PROT SERPL-MCNC: 6.8 G/DL — SIGNIFICANT CHANGE UP (ref 6–8.3)
PROTHROM AB SERPL-ACNC: 30.6 SEC — HIGH (ref 9.9–13.4)
RBC # BLD: 4.16 M/UL — LOW (ref 4.2–5.8)
RBC # FLD: 16.1 % — HIGH (ref 10.3–14.5)
SODIUM SERPL-SCNC: 135 MMOL/L — SIGNIFICANT CHANGE UP (ref 135–145)
SPECIMEN SOURCE: SIGNIFICANT CHANGE UP
SPECIMEN SOURCE: SIGNIFICANT CHANGE UP
WBC # BLD: 9.71 K/UL — SIGNIFICANT CHANGE UP (ref 3.8–10.5)
WBC # FLD AUTO: 9.71 K/UL — SIGNIFICANT CHANGE UP (ref 3.8–10.5)

## 2025-04-11 RX ORDER — HYDROMORPHONE/SOD CHLOR,ISO/PF 2 MG/10 ML
0.5 SYRINGE (ML) INJECTION ONCE
Refills: 0 | Status: DISCONTINUED | OUTPATIENT
Start: 2025-04-11 | End: 2025-04-11

## 2025-04-11 RX ORDER — HYDROMORPHONE/SOD CHLOR,ISO/PF 2 MG/10 ML
1 SYRINGE (ML) INJECTION EVERY 6 HOURS
Refills: 0 | Status: DISCONTINUED | OUTPATIENT
Start: 2025-04-11 | End: 2025-04-14

## 2025-04-11 RX ADMIN — DULOXETINE 60 MILLIGRAM(S): 20 CAPSULE, DELAYED RELEASE ORAL at 12:11

## 2025-04-11 RX ADMIN — METOPROLOL SUCCINATE 25 MILLIGRAM(S): 50 TABLET, EXTENDED RELEASE ORAL at 05:34

## 2025-04-11 RX ADMIN — FOLIC ACID 1 MILLIGRAM(S): 1 TABLET ORAL at 12:11

## 2025-04-11 RX ADMIN — Medication 500 MILLIGRAM(S): at 12:11

## 2025-04-11 RX ADMIN — OXYBUTYNIN CHLORIDE 10 MILLIGRAM(S): 5 TABLET, FILM COATED, EXTENDED RELEASE ORAL at 05:33

## 2025-04-11 RX ADMIN — GABAPENTIN 600 MILLIGRAM(S): 400 CAPSULE ORAL at 13:02

## 2025-04-11 RX ADMIN — FINASTERIDE 5 MILLIGRAM(S): 1 TABLET, FILM COATED ORAL at 12:10

## 2025-04-11 RX ADMIN — NICOTINE POLACRILEX 1 PATCH: 4 GUM, CHEWING ORAL at 11:00

## 2025-04-11 RX ADMIN — METOPROLOL SUCCINATE 25 MILLIGRAM(S): 50 TABLET, EXTENDED RELEASE ORAL at 17:35

## 2025-04-11 RX ADMIN — Medication 325 MILLIGRAM(S): at 12:11

## 2025-04-11 RX ADMIN — NICOTINE POLACRILEX 1 PATCH: 4 GUM, CHEWING ORAL at 19:00

## 2025-04-11 RX ADMIN — Medication 4 MILLIGRAM(S): at 22:04

## 2025-04-11 RX ADMIN — Medication 40 MILLIGRAM(S): at 05:37

## 2025-04-11 RX ADMIN — NICOTINE POLACRILEX 1 PATCH: 4 GUM, CHEWING ORAL at 06:47

## 2025-04-11 RX ADMIN — GABAPENTIN 600 MILLIGRAM(S): 400 CAPSULE ORAL at 05:34

## 2025-04-11 RX ADMIN — INSULIN LISPRO 3: 100 INJECTION, SOLUTION INTRAVENOUS; SUBCUTANEOUS at 17:01

## 2025-04-11 RX ADMIN — Medication 1 MILLIGRAM(S): at 22:04

## 2025-04-11 RX ADMIN — INSULIN LISPRO 1: 100 INJECTION, SOLUTION INTRAVENOUS; SUBCUTANEOUS at 13:02

## 2025-04-11 RX ADMIN — Medication 0.1 MILLIGRAM(S): at 17:35

## 2025-04-11 RX ADMIN — Medication 1 MILLIGRAM(S): at 14:43

## 2025-04-11 RX ADMIN — Medication 0.1 MILLIGRAM(S): at 05:35

## 2025-04-11 RX ADMIN — Medication 1 TABLET(S): at 12:10

## 2025-04-11 RX ADMIN — Medication 1 MILLIGRAM(S): at 07:06

## 2025-04-11 RX ADMIN — Medication 1 MILLIGRAM(S): at 22:20

## 2025-04-11 RX ADMIN — Medication 0.5 MILLIGRAM(S): at 01:35

## 2025-04-11 RX ADMIN — INSULIN GLARGINE-YFGN 10 UNIT(S): 100 INJECTION, SOLUTION SUBCUTANEOUS at 22:07

## 2025-04-11 RX ADMIN — Medication 0.5 MILLIGRAM(S): at 02:46

## 2025-04-11 RX ADMIN — INSULIN LISPRO 1: 100 INJECTION, SOLUTION INTRAVENOUS; SUBCUTANEOUS at 08:25

## 2025-04-11 RX ADMIN — AMLODIPINE BESYLATE 10 MILLIGRAM(S): 10 TABLET ORAL at 05:34

## 2025-04-11 RX ADMIN — NICOTINE POLACRILEX 1 PATCH: 4 GUM, CHEWING ORAL at 12:12

## 2025-04-11 RX ADMIN — Medication 1 MILLIGRAM(S): at 15:30

## 2025-04-11 RX ADMIN — OXYBUTYNIN CHLORIDE 10 MILLIGRAM(S): 5 TABLET, FILM COATED, EXTENDED RELEASE ORAL at 17:34

## 2025-04-11 RX ADMIN — Medication 650 MILLIGRAM(S): at 17:39

## 2025-04-11 RX ADMIN — SODIUM CHLORIDE 75 MILLILITER(S): 9 INJECTION, SOLUTION INTRAVENOUS at 13:08

## 2025-04-11 RX ADMIN — Medication 1 MILLIGRAM(S): at 07:47

## 2025-04-11 NOTE — DIETITIAN INITIAL EVALUATION ADULT - OTHER INFO
Per H&P, "50y M from [facility] nursing home BIBEMS with suprapubic catheter as pt concerned for urine infection and bladder spasms, pt has a urologist at [facility], has been admitted there 3 times in the past 3 weeks, just discharged and arrived back at [facility] less than 4hr PTA to [place], states he was sent there for infection and received 2 doses of IV antibiotics and then they were stopped and he was sent home today, states his previous 2 recent admissions he petitioned for longer stays, and won, but eventually was discharged, pt reports he refused to have IV antibiotics at the nursing home previously as he believes they will be dislodged and he wants to be in the hospital until antibiotics are complete, reports he was not discharged on antibiotics this time, urine culture from 4/5 done here was negative (which pt states he was not aware of), pt also states his bladder spasms are painful and wants pain medication, states he does have chronic spasms, but they recently stopped his myrbitriq and thinks that may have made them worse."    Nutrition consult ordered for assessment.  Pt admitted from [facility] with elevated WBC and bladder spasm (hx catheter).  Pt currently on Con CHO DASH diet with reported good appetite and intake.  Pt with hx DM2, current A1c pending, A1c 8.0% Oct 2024; pt is not interested in receiving DM education at this time, [facility] staff manage his diet and meds, aware what to eat and what not; food preferences obtained daily to optimize po intake and glycemic control. Per med review, pt on coumadin- reinforced importance of keeping intake of vit k containing foods consistent.  Current adm wt 280#, per comprehensive chart review, pt weighed 280# during Nov 2024 admission, suggest weight stability x ~5 months.  Skin intact.  RD to follow up and will continue to monitor pt's nutrition status. Per H&P, "50y M from [facility] nursing home BIBEMS with suprapubic catheter as pt concerned for urine infection and bladder spasms, pt has a urologist at [facility], has been admitted there 3 times in the past 3 weeks, just discharged and arrived back at [facility] less than 4hr PTA to [place], states he was sent there for infection and received 2 doses of IV antibiotics and then they were stopped and he was sent home today, states his previous 2 recent admissions he petitioned for longer stays, and won, but eventually was discharged, pt reports he refused to have IV antibiotics at the nursing home previously as he believes they will be dislodged and he wants to be in the hospital until antibiotics are complete, reports he was not discharged on antibiotics this time, urine culture from 4/5 done here was negative (which pt states he was not aware of), pt also states his bladder spasms are painful and wants pain medication, states he does have chronic spasms, but they recently stopped his myrbitriq and thinks that may have made them worse."    Nutrition consult ordered for assessment.  Pt admitted from [facility] with elevated WBC and bladder spasm (hx catheter).  Pt currently on Con CHO DASH diet with reported good appetite and intake.  Pt with hx DM2, current A1c 9% (increased from A1c 8.0% Oct 2024).  Reinforced basic nutrition strategies for management of diabetes; pt is not interested in receiving written DM education at this time.  Pt endorses good appetite PTA, expresses some dissatisfaction with meals provided at NH; consumes snacks throughout day (blake bonilla, pudding, wil - pt unsure whether or not sugar free).  Meal preferences obtained daily to optimize po intake and glycemic control.  Current adm wt 280#, per comprehensive chart review, pt weighed 280# during Nov 2024 admission, suggest weight stability x ~5 months.  Skin intact.  RD to follow up and will continue to monitor pt's nutrition status.

## 2025-04-11 NOTE — DIETITIAN INITIAL EVALUATION ADULT - PERSON TAUGHT/METHOD
basic nutrition strategies for management of diabetes  -RD to assess interest in diet education upon follow-up/verbal instruction/patient instructed

## 2025-04-11 NOTE — DIETITIAN INITIAL EVALUATION ADULT - PERTINENT MEDS FT
MEDICATIONS  (STANDING):  amLODIPine   Tablet 10 milliGRAM(s) Oral daily  ascorbic acid 500 milliGRAM(s) Oral daily  cloNIDine 0.1 milliGRAM(s) Oral two times a day  dextrose 5%. 1000 milliLiter(s) (100 mL/Hr) IV Continuous <Continuous>  dextrose 5%. 1000 milliLiter(s) (50 mL/Hr) IV Continuous <Continuous>  dextrose 50% Injectable 25 Gram(s) IV Push once  dextrose 50% Injectable 12.5 Gram(s) IV Push once  dextrose 50% Injectable 25 Gram(s) IV Push once  DULoxetine 60 milliGRAM(s) Oral daily  ferrous    sulfate 325 milliGRAM(s) Oral daily  finasteride 5 milliGRAM(s) Oral daily  folic acid 1 milliGRAM(s) Oral daily  gabapentin 600 milliGRAM(s) Oral every 8 hours  glucagon  Injectable 1 milliGRAM(s) IntraMuscular once  insulin glargine Injectable (LANTUS) 10 Unit(s) SubCutaneous at bedtime  insulin lispro (ADMELOG) corrective regimen sliding scale   SubCutaneous three times a day before meals  metoprolol tartrate 25 milliGRAM(s) Oral two times a day  multivitamin 1 Tablet(s) Oral daily  nicotine -  14 mG/24Hr(s) Patch 1 Patch Transdermal daily  oxybutynin 10 milliGRAM(s) Oral two times a day  pantoprazole    Tablet 40 milliGRAM(s) Oral before breakfast  senna 2 Tablet(s) Oral at bedtime  sodium chloride 0.45%. 1000 milliLiter(s) (75 mL/Hr) IV Continuous <Continuous>  warfarin 4 milliGRAM(s) Oral at bedtime    MEDICATIONS  (PRN):  acetaminophen     Tablet .. 650 milliGRAM(s) Oral every 6 hours PRN Temp greater or equal to 38C (100.4F), Mild Pain (1 - 3)  aluminum hydroxide/magnesium hydroxide/simethicone Suspension 30 milliLiter(s) Oral every 4 hours PRN Dyspepsia  dextrose Oral Gel 15 Gram(s) Oral once PRN Blood Glucose LESS THAN 70 milliGRAM(s)/deciliter  HYDROmorphone   Tablet 4 milliGRAM(s) Oral every 6 hours PRN Moderate Pain (4 - 6)  HYDROmorphone  Injectable 1 milliGRAM(s) IV Push every 8 hours PRN Severe Pain (7 - 10)  melatonin 3 milliGRAM(s) Oral at bedtime PRN Insomnia  methocarbamol 750 milliGRAM(s) Oral every 8 hours PRN for muscle spasm  ondansetron Injectable 4 milliGRAM(s) IV Push every 8 hours PRN Nausea and/or Vomiting

## 2025-04-11 NOTE — DIETITIAN INITIAL EVALUATION ADULT - NSICDXPASTMEDICALHX_GEN_ALL_CORE_FT
PAST MEDICAL HISTORY:  Acute embolism and thrombosis of deep vein of lower extremity     BPH (benign prostatic hyperplasia)     BPH (benign prostatic hyperplasia)     BPH without obstruction/lower urinary tract symptoms     CVA (cerebrovascular accident)     CVD (cerebrovascular disease)     DDD (degenerative disc disease), lumbar     Degenerative arthritis     Diabetes mellitus due to underlying condition with diabetic neuropathy     DM2 (diabetes mellitus, type 2)     DVT of lower limb, acute     DVT, lower extremity     Essential (primary) hypertension     GERD (gastroesophageal reflux disease)     H/O constipation     H/O Guillain-[place] syndrome     H/O insomnia     History of CVA in adulthood     History of Guillain-[place] syndrome     History of muscle spasm     History of neurogenic bowel     HTN (hypertension)     HTN (hypertension)     Iron deficiency anemia     Major depressive disorder, single episode     Muscle weakness     Obesity     Pain, unspecified     Peripheral neuropathy     Renal stones     Type 2 diabetes mellitus     Vitamin deficiency

## 2025-04-11 NOTE — PROGRESS NOTE ADULT - SUBJECTIVE AND OBJECTIVE BOX
JOIE BRUNO is a 50yMale , patient examined and chart reviewed.     INTERVAL HPI/ OVERNIGHT EVENTS:   No events. Chronic pains- requesting pain meds.  Afebrile.    PAST MEDICAL & SURGICAL HISTORY:  BPH (benign prostatic hyperplasia)  HTN (hypertension)  Type 2 diabetes mellitus  Peripheral neuropathy  History of Guillain-[place] syndrome  History of CVA in adulthood  DDD (degenerative disc disease), lumbar  DVT, lower extremity  Renal stones  H/O Guillain-[place] syndrome  History of neurogenic bowel  DM2 (diabetes mellitus, type 2)  HTN (hypertension)  BPH without obstruction/lower urinary tract symptoms  Degenerative arthritis  DVT of lower limb, acute  CVA (cerebrovascular accident)  CVD (cerebrovascular disease)  Muscle weakness  Iron deficiency anemia  History of muscle spasm  Pain, unspecified  Vitamin deficiency  Obesity  GERD (gastroesophageal reflux disease)  H/O constipation  H/O insomnia  Essential (primary) hypertension  Acute embolism and thrombosis of deep vein of lower extremity  BPH (benign prostatic hyperplasia)  Diabetes mellitus due to underlying condition with diabetic neuropathy  Major depressive disorder, single episode  S/P IVC filter  H/O lithotripsy  Chronic suprapubic catheter      For details regarding the patient's social history, family history, and other miscellaneous elements, please refer the initial infectious diseases consultation and/or the admitting history and physical examination for this admission.      ROS:  CONSTITUTIONAL:  Negative fever or chills + chronic pains  EYES:  Negative  blurry vision or double vision  CARDIOVASCULAR:  Negative for chest pain or palpitations  RESPIRATORY:  Negative for cough, wheezing, or SOB   GASTROINTESTINAL:  Negative for nausea, vomiting, diarrhea, constipation, or abdominal pain  GENITOURINARY:  Negative frequency, urgency or dysuria + bladder spasms  NEUROLOGIC:  No headache, confusion, dizziness, lightheadedness  All other systems were reviewed and are negative     ALLERGIES:  sulfa drugs (Rash)  Pipercillin Sodium-Tazobactam Sodium (Pruritus)      Current inpatient medications :    ANTIBIOTICS/RELEVANT:  nicotine -  14 mG/24Hr(s) Patch 1 Patch Transdermal daily      acetaminophen     Tablet .. 650 milliGRAM(s) Oral every 6 hours PRN  aluminum hydroxide/magnesium hydroxide/simethicone Suspension 30 milliLiter(s) Oral every 4 hours PRN  amLODIPine   Tablet 10 milliGRAM(s) Oral daily  ascorbic acid 500 milliGRAM(s) Oral daily  cloNIDine 0.1 milliGRAM(s) Oral two times a day  dextrose 5%. 1000 milliLiter(s) IV Continuous <Continuous>  dextrose 5%. 1000 milliLiter(s) IV Continuous <Continuous>  dextrose 50% Injectable 25 Gram(s) IV Push once  dextrose 50% Injectable 12.5 Gram(s) IV Push once  dextrose 50% Injectable 25 Gram(s) IV Push once  dextrose Oral Gel 15 Gram(s) Oral once PRN  DULoxetine 60 milliGRAM(s) Oral daily  ferrous    sulfate 325 milliGRAM(s) Oral daily  finasteride 5 milliGRAM(s) Oral daily  folic acid 1 milliGRAM(s) Oral daily  gabapentin 600 milliGRAM(s) Oral every 8 hours  glucagon  Injectable 1 milliGRAM(s) IntraMuscular once  HYDROmorphone   Tablet 4 milliGRAM(s) Oral every 6 hours PRN  HYDROmorphone  Injectable 1 milliGRAM(s) IV Push every 6 hours PRN  insulin glargine Injectable (LANTUS) 10 Unit(s) SubCutaneous at bedtime  insulin lispro (ADMELOG) corrective regimen sliding scale   SubCutaneous three times a day before meals  melatonin 3 milliGRAM(s) Oral at bedtime PRN  methocarbamol 750 milliGRAM(s) Oral every 8 hours PRN  metoprolol tartrate 25 milliGRAM(s) Oral two times a day  multivitamin 1 Tablet(s) Oral daily  ondansetron Injectable 4 milliGRAM(s) IV Push every 8 hours PRN  oxybutynin 10 milliGRAM(s) Oral two times a day  pantoprazole    Tablet 40 milliGRAM(s) Oral before breakfast  senna 2 Tablet(s) Oral at bedtime  sodium chloride 0.45%. 1000 milliLiter(s) IV Continuous <Continuous>      Objective:    04-11 @ 07:01  -  04-11 @ 22:35  --------------------------------------------------------  IN: 0 mL / OUT: 2300 mL / NET: -2300 mL      T(C): 36.8 (04-11-25 @ 21:35), Max: 37 (04-11-25 @ 00:03)  HR: 68 (04-11-25 @ 21:35) (59 - 76)  BP: 125/85 (04-11-25 @ 21:35) (125/78 - 150/93)  RR: 18 (04-11-25 @ 21:35) (18 - 19)  SpO2: 98% (04-11-25 @ 21:35) (95% - 98%)      Physical Exam:  General: no acute distress  Neck: supple, trachea midline  Lungs: clear, no wheeze/rhonchi  Cardiovascular: regular rate and rhythm, S1 S2  Abdomen: soft, nontender,  bowel sounds normal   Neurological: alert and oriented x3  Skin: no rash  Extremities: no edema      LABS:                        9.7    9.71  )-----------( 223      ( 11 Apr 2025 05:30 )             30.0       04-11    135  |  101  |  40[H]  ----------------------------<  211[H]  3.7   |  25  |  3.14[H]    Ca    8.6      11 Apr 2025 05:30    TPro  6.8  /  Alb  2.5[L]  /  TBili  0.3  /  DBili  x   /  AST  12  /  ALT  18  /  AlkPhos  114  04-11      PT/INR - ( 11 Apr 2025 16:11 )   PT: 30.6 sec;   INR: 2.62 ratio      Urine Microscopic-Add On (NC) (04.10.25 @ 03:04)    White Blood Cell - Urine: 11 /HPF   Red Blood Cell - Urine: 4 /HPF   Bacteria: Few /HPF   Granular Cast: Present   Squamous Epithelial Cells: Present   Comment - Urine: Many Yeast budding and hyphae  Urinalysis with Rflx Culture (04.10.25 @ 03:04)    Urine Appearance: Cloudy   Color: Yellow   Specific Gravity: 1.015   pH Urine: 6.0   Protein, Urine: 300 mg/dL   Glucose Qualitative, Urine: 100 mg/dL   Ketone - Urine: Negative mg/dL   Blood, Urine: Moderate   Bilirubin: Negative   Urobilinogen: 0.2 mg/dL   Leukocyte Esterase Concentration: Moderate   Nitrite: Negative    MICROBIOLOGY:    Culture - Urine (collected 10 Apr 2025 03:04)  Source: Catheterized  Final Report (11 Apr 2025 15:40):    <10,000 CFU/mL Normal Urogenital Ellen    RADIOLOGY & ADDITIONAL STUDIES:    Assessment :  49YO M resident of [facility] BPH CVA  neurogenic bladder sp suprapubic catheter at [facility] chronic bladder spasms who presented to the hospital with c/o bladder spasms, pt has a urologist at [facility], has been admitted there 3 times in the past 3 weeks, just discharged and arrived back at [facility] less than 4hr PTA to [place], states he was sent there for infection and received 2 doses of IV antibiotics and then they were stopped and he was sent home. In ED no fever chills n/v/d CP SOB. In ED Wbc 15K UA with minimal wbcs. Afebrile  Bladder spasms has been a chronic issue  Ucx ngts CAUTI ruled out    Plan :   Monitor off antibiotics  Trend temps and cbc  Chronic pain per pain management  Stable from ID standpoint  Dc planning per primary team    Advance Directives- Full code  Current Medications are documented.   Drug-drug interactions reviewed.    Continue with present regiment.  Appropriate use of antibiotics and adverse effects reviewed.    > 35 minutes were spent in direct patient care reviewing notes, medications ,labs data/ imaging , discussion with multidisciplinary team.    Thank you for allowing me to participate in care of your patient .    Yadira Blackmon MD  Infectious Disease  [phone]    Individualized infection control protocols for an individual patient based on their diagnosis and risks in order to reduce risk of disease transmission followed. Coordinating with  infection prevention and control team members to enable healthcare facility staff to safely care for patient.  Managing infection prevention and treatment protocols associated with transitions of care for complex patients.  In-depth patient chart review that entails going back farther in time and assessing the complete breadth of all health care interactions, with higher-level synthesis for complex diagnoses.  Engaging in complex medical decision-making associated with antimicrobial prescribing including considerations such as antimicrobial resistance patterns, emergence of new variants/strains, recent antibiotic exposure, interactions/complications from comorbidities including concurrent infections, public health considerations to minimize development of antimicrobial resistance, and emerging and re-emerging infections.

## 2025-04-11 NOTE — PROGRESS NOTE ADULT - SUBJECTIVE AND OBJECTIVE BOX
Patient is a 50y Male whom presented to the hospital with     PAST MEDICAL & SURGICAL HISTORY:  BPH (benign prostatic hyperplasia)      HTN (hypertension)      Type 2 diabetes mellitus      Peripheral neuropathy      History of Guillain-[place] syndrome      History of CVA in adulthood      DDD (degenerative disc disease), lumbar      DVT, lower extremity      Renal stones      H/O Guillain-[place] syndrome      History of neurogenic bowel      DM2 (diabetes mellitus, type 2)      HTN (hypertension)      BPH without obstruction/lower urinary tract symptoms      Degenerative arthritis      DVT of lower limb, acute      CVA (cerebrovascular accident)      CVD (cerebrovascular disease)      Muscle weakness      Iron deficiency anemia      History of muscle spasm      Pain, unspecified      Vitamin deficiency      Obesity      GERD (gastroesophageal reflux disease)      H/O constipation      H/O insomnia      Essential (primary) hypertension      Acute embolism and thrombosis of deep vein of lower extremity      BPH (benign prostatic hyperplasia)      Diabetes mellitus due to underlying condition with diabetic neuropathy      Major depressive disorder, single episode      S/P IVC filter      H/O lithotripsy      Chronic suprapubic catheter          MEDICATIONS  (STANDING):  amLODIPine   Tablet 10 milliGRAM(s) Oral daily  ascorbic acid 500 milliGRAM(s) Oral daily  cloNIDine 0.1 milliGRAM(s) Oral two times a day  dextrose 5%. 1000 milliLiter(s) (100 mL/Hr) IV Continuous <Continuous>  dextrose 5%. 1000 milliLiter(s) (50 mL/Hr) IV Continuous <Continuous>  dextrose 50% Injectable 25 Gram(s) IV Push once  dextrose 50% Injectable 12.5 Gram(s) IV Push once  dextrose 50% Injectable 25 Gram(s) IV Push once  DULoxetine 60 milliGRAM(s) Oral daily  ferrous    sulfate 325 milliGRAM(s) Oral daily  finasteride 5 milliGRAM(s) Oral daily  folic acid 1 milliGRAM(s) Oral daily  gabapentin 600 milliGRAM(s) Oral every 8 hours  glucagon  Injectable 1 milliGRAM(s) IntraMuscular once  insulin glargine Injectable (LANTUS) 10 Unit(s) SubCutaneous at bedtime  insulin lispro (ADMELOG) corrective regimen sliding scale   SubCutaneous three times a day before meals  metoprolol tartrate 25 milliGRAM(s) Oral two times a day  multivitamin 1 Tablet(s) Oral daily  nicotine -  14 mG/24Hr(s) Patch 1 Patch Transdermal daily  oxybutynin 10 milliGRAM(s) Oral two times a day  pantoprazole    Tablet 40 milliGRAM(s) Oral before breakfast  senna 2 Tablet(s) Oral at bedtime  sodium chloride 0.45%. 1000 milliLiter(s) (50 mL/Hr) IV Continuous <Continuous>  warfarin 4 milliGRAM(s) Oral at bedtime      Allergies    sulfa drugs (Rash)  sulfa drugs (Other)  sulfa drugs (Unknown)  sulfa drugs (Hives)    Intolerances    Pipercillin Sodium-Tazobactam Sodium (Pruritus)      SOCIAL HISTORY:  Denies ETOh,Smoking,     FAMILY HISTORY:  FH: heart disease    FH: HTN (hypertension)    Family history of sinus tachycardia (Father)    FH: HTN (hypertension) (Mother)        REVIEW OF SYSTEMS:    CONSTITUTIONAL: No weakness, fevers or chills  RESPIRATORY: No cough, wheezing, hemoptysis; No shortness of breath  CARDIOVASCULAR: No chest pain or palpitations  GASTROINTESTINAL: No abdominal or epigastric pain. No nausea, vomiting,     No diarrhea or constipation. No melena   GENITOURINARY: No dysuria, frequency or hematuria  bladder infections; s-p cath , bladder spasms ,  SKIN: dry                                9.7    9.71  )-----------( 223      ( 2025 05:30 )             30.0       CBC Full  -  ( 2025 05:30 )  WBC Count : 9.71 K/uL  RBC Count : 4.16 M/uL  Hemoglobin : 9.7 g/dL  Hematocrit : 30.0 %  Platelet Count - Automated : 223 K/uL  Mean Cell Volume : 72.1 fL  Mean Cell Hemoglobin : 23.3 pg  Mean Cell Hemoglobin Concentration : 32.3 g/dL  Auto Neutrophil # : x  Auto Lymphocyte # : x  Auto Monocyte # : x  Auto Eosinophil # : x  Auto Basophil # : x  Auto Neutrophil % : x  Auto Lymphocyte % : x  Auto Monocyte % : x  Auto Eosinophil % : x  Auto Basophil % : x          135  |  101  |  40[H]  ----------------------------<  211[H]  3.7   |  25  |  3.14[H]    Ca    8.6      2025 05:30    TPro  6.8  /  Alb  2.5[L]  /  TBili  0.3  /  DBili  x   /  AST  12  /  ALT  18  /  AlkPhos  114        CAPILLARY BLOOD GLUCOSE      POCT Blood Glucose.: 191 mg/dL (2025 08:03)  POCT Blood Glucose.: 164 mg/dL (10 Apr 2025 21:43)  POCT Blood Glucose.: 126 mg/dL (10 Apr 2025 17:14)  POCT Blood Glucose.: 171 mg/dL (10 Apr 2025 12:43)      Vital Signs Last 24 Hrs  T(C): 36.7 (2025 05:46), Max: 37.1 (10 Apr 2025 17:28)  T(F): 98.1 (2025 05:46), Max: 98.8 (10 Apr 2025 17:28)  HR: 76 (2025 05:46) (63 - 76)  BP: 150/93 (2025 05:46) (125/78 - 169/99)  BP(mean): 115 (10 Apr 2025 17:28) (115 - 115)  RR: 18 (2025 05:46) (18 - 18)  SpO2: 98% (2025 05:46) (96% - 98%)    Parameters below as of 10 Apr 2025 17:28  Patient On (Oxygen Delivery Method): room air        Urinalysis Basic - ( 2025 05:30 )    Color: x / Appearance: x / SG: x / pH: x  Gluc: 211 mg/dL / Ketone: x  / Bili: x / Urobili: x   Blood: x / Protein: x / Nitrite: x   Leuk Esterase: x / RBC: x / WBC x   Sq Epi: x / Non Sq Epi: x / Bacteria: x        PT/INR - ( 10 Apr 2025 12:00 )   PT: 33.8 sec;   INR: 2.95 ratio                   PHYSICAL EXAM:    Constitutional: NAD  HEENT: conjunctive   clear   Neck:  No JVD  Respiratory: CTAB  Cardiovascular: S1 and S2  Gastrointestinal: BS+, soft, NT/ND  Extremities: No peripheral edema  Neurological: A/O x 3, no focal deficits  bladder infections; s-p cath , bladder spasms ,    LABS:                        10.7   15.69 )-----------( 259      ( 10 Apr 2025 03:04 )             33.3     04-10    136  |  101  |  36[H]  ----------------------------<  139[H]  4.1   |  23  |  2.63[H]    Ca    9.1      10 Apr 2025 03:04        Urine Studies:  Urinalysis Basic - ( 10 Apr 2025 03:04 )    Color: Yellow / Appearance: Cloudy / S.015 / pH: x  Gluc: 139 mg/dL / Ketone: Negative mg/dL  / Bili: Negative / Urobili: 0.2 mg/dL   Blood: x / Protein: 300 mg/dL / Nitrite: Negative   Leuk Esterase: Moderate / RBC: 4 /HPF / WBC 11 /HPF   Sq Epi: x / Non Sq Epi: x / Bacteria: Few /HPF            RADIOLOGY & ADDITIONAL STUDIES:          MEDICATIONS  (STANDING):  amLODIPine   Tablet 10 milliGRAM(s) Oral daily  ascorbic acid 500 milliGRAM(s) Oral daily  cloNIDine 0.1 milliGRAM(s) Oral two times a day  dextrose 5%. 1000 milliLiter(s) (100 mL/Hr) IV Continuous <Continuous>  dextrose 5%. 1000 milliLiter(s) (50 mL/Hr) IV Continuous <Continuous>  dextrose 50% Injectable 25 Gram(s) IV Push once  dextrose 50% Injectable 12.5 Gram(s) IV Push once  dextrose 50% Injectable 25 Gram(s) IV Push once  DULoxetine 60 milliGRAM(s) Oral daily  ferrous    sulfate 325 milliGRAM(s) Oral daily  finasteride 5 milliGRAM(s) Oral daily  folic acid 1 milliGRAM(s) Oral daily  gabapentin 600 milliGRAM(s) Oral every 8 hours  glucagon  Injectable 1 milliGRAM(s) IntraMuscular once  insulin glargine Injectable (LANTUS) 10 Unit(s) SubCutaneous at bedtime  insulin lispro (ADMELOG) corrective regimen sliding scale   SubCutaneous three times a day before meals  metoprolol tartrate 25 milliGRAM(s) Oral two times a day  multivitamin 1 Tablet(s) Oral daily  nicotine -  14 mG/24Hr(s) Patch 1 Patch Transdermal daily  oxybutynin 10 milliGRAM(s) Oral two times a day  pantoprazole    Tablet 40 milliGRAM(s) Oral before breakfast  senna 2 Tablet(s) Oral at bedtime  sodium chloride 0.45%. 1000 milliLiter(s) (50 mL/Hr) IV Continuous <Continuous>  warfarin 4 milliGRAM(s) Oral at bedtime

## 2025-04-11 NOTE — DIETITIAN INITIAL EVALUATION ADULT - PROBLEM SELECTOR PLAN 1
septic workup , ID eval , patient just completed 2 courses of meropenem ID consult fro possible Funguria and chronic management of  recurrent UTI.seen by urologist -spasms/ ryan-cath  leakage  (recent multiple admissions for same problem at [place]), catheter was just exchanged 3 days prior to [place]  er visit   History chronic bladder colonization and n which should not be treated, and history recurrent UTI which require treatment,  recent C&S was negative.

## 2025-04-11 NOTE — DIETITIAN INITIAL EVALUATION ADULT - PERTINENT LABORATORY DATA
04-11    135  |  101  |  40[H]  ----------------------------<  211[H]  3.7   |  25  |  3.14[H]    Ca    8.6      11 Apr 2025 05:30    TPro  6.8  /  Alb  2.5[L]  /  TBili  0.3  /  DBili  x   /  AST  12  /  ALT  18  /  AlkPhos  114  04-11  POCT Blood Glucose.: 196 mg/dL (04-11-25 @ 12:48)  A1C with Estimated Average Glucose Result: 8.0 % (10-22-24 @ 10:07)

## 2025-04-11 NOTE — PROGRESS NOTE ADULT - SUBJECTIVE AND OBJECTIVE BOX
PROGRESS NOTE  Patient is a 50y old  Male who presents with a chief complaint of bladder spasms (11 Apr 2025 09:13)  Chart and available morning labs /imaging are reviewed electronically , urgent issues addressed . More information  is being added upon completion of rounds , when more information is collected and management discussed with consultants , medical staff and social service/case management on the floor   OVERNIGHT  Worsening renal indices  reported by medical staff , increase iv fluids recommended by nephrologist Dr Cespedes and to monitor inpatient for another 24 hrs  . All above noted Patient is resting in a bed comfortably . .No distress noted   HPI:  50y M from [facility] nursing home BIBEMS with suprapubic catheter as pt concerned for urine infection and bladder spasms, pt has a urologist at [facility], has been admitted there 3 times in the past 3 weeks, just discharged and arrived back at [facility] less than 4hr PTA to [place], states he was sent there for infection and received 2 doses of IV antibiotics and then they were stopped and he was sent home today, states his previous 2 recent admissions he petitioned for longer stays, and won, but eventually was discharged, pt reports he refused to have IV antibiotics at the nursing home previously as he believes they will be dislodged and he wants to be in the hospital until antibiotics are complete, reports he was not discharged on antibiotics this time, urine culture from 4/5 done here was negative (which pt states he was not aware of), pt also states his bladder spasms are painful and wants pain medication, states he does have chronic spasms, but they recently stopped his myrbitriq and thinks that may have made them worse (10 Apr 2025 10:13)    PAST MEDICAL & SURGICAL HISTORY:  BPH (benign prostatic hyperplasia)      HTN (hypertension)      Type 2 diabetes mellitus      Peripheral neuropathy      History of Guillain-[place] syndrome      History of CVA in adulthood      DDD (degenerative disc disease), lumbar      DVT, lower extremity      Renal stones      H/O Guillain-[place] syndrome      History of neurogenic bowel      DM2 (diabetes mellitus, type 2)      HTN (hypertension)      BPH without obstruction/lower urinary tract symptoms      Degenerative arthritis      DVT of lower limb, acute      CVA (cerebrovascular accident)      CVD (cerebrovascular disease)      Muscle weakness      Iron deficiency anemia      History of muscle spasm      Pain, unspecified      Vitamin deficiency      Obesity      GERD (gastroesophageal reflux disease)      H/O constipation      H/O insomnia      Essential (primary) hypertension      Acute embolism and thrombosis of deep vein of lower extremity      BPH (benign prostatic hyperplasia)      Diabetes mellitus due to underlying condition with diabetic neuropathy      Major depressive disorder, single episode      S/P IVC filter      H/O lithotripsy      Chronic suprapubic catheter          MEDICATIONS  (STANDING):  amLODIPine   Tablet 10 milliGRAM(s) Oral daily  ascorbic acid 500 milliGRAM(s) Oral daily  cloNIDine 0.1 milliGRAM(s) Oral two times a day  dextrose 5%. 1000 milliLiter(s) (100 mL/Hr) IV Continuous <Continuous>  dextrose 5%. 1000 milliLiter(s) (50 mL/Hr) IV Continuous <Continuous>  dextrose 50% Injectable 25 Gram(s) IV Push once  dextrose 50% Injectable 12.5 Gram(s) IV Push once  dextrose 50% Injectable 25 Gram(s) IV Push once  DULoxetine 60 milliGRAM(s) Oral daily  ferrous    sulfate 325 milliGRAM(s) Oral daily  finasteride 5 milliGRAM(s) Oral daily  folic acid 1 milliGRAM(s) Oral daily  gabapentin 600 milliGRAM(s) Oral every 8 hours  glucagon  Injectable 1 milliGRAM(s) IntraMuscular once  insulin glargine Injectable (LANTUS) 10 Unit(s) SubCutaneous at bedtime  insulin lispro (ADMELOG) corrective regimen sliding scale   SubCutaneous three times a day before meals  metoprolol tartrate 25 milliGRAM(s) Oral two times a day  multivitamin 1 Tablet(s) Oral daily  nicotine -  14 mG/24Hr(s) Patch 1 Patch Transdermal daily  oxybutynin 10 milliGRAM(s) Oral two times a day  pantoprazole    Tablet 40 milliGRAM(s) Oral before breakfast  senna 2 Tablet(s) Oral at bedtime  sodium chloride 0.45%. 1000 milliLiter(s) (75 mL/Hr) IV Continuous <Continuous>  warfarin 4 milliGRAM(s) Oral at bedtime    MEDICATIONS  (PRN):  acetaminophen     Tablet .. 650 milliGRAM(s) Oral every 6 hours PRN Temp greater or equal to 38C (100.4F), Mild Pain (1 - 3)  aluminum hydroxide/magnesium hydroxide/simethicone Suspension 30 milliLiter(s) Oral every 4 hours PRN Dyspepsia  dextrose Oral Gel 15 Gram(s) Oral once PRN Blood Glucose LESS THAN 70 milliGRAM(s)/deciliter  HYDROmorphone   Tablet 4 milliGRAM(s) Oral every 6 hours PRN Moderate Pain (4 - 6)  HYDROmorphone  Injectable 1 milliGRAM(s) IV Push every 8 hours PRN Severe Pain (7 - 10)  melatonin 3 milliGRAM(s) Oral at bedtime PRN Insomnia  methocarbamol 750 milliGRAM(s) Oral every 8 hours PRN for muscle spasm  ondansetron Injectable 4 milliGRAM(s) IV Push every 8 hours PRN Nausea and/or Vomiting      OBJECTIVE    T(C): 36.3 (04-11-25 @ 11:58), Max: 37.1 (04-10-25 @ 17:28)  HR: 59 (04-11-25 @ 11:58) (59 - 76)  BP: 147/66 (04-11-25 @ 11:58) (125/78 - 159/93)  RR: 18 (04-11-25 @ 11:58) (18 - 18)  SpO2: 97% (04-11-25 @ 11:58) (96% - 98%)  Wt(kg): --  I&O's Summary        REVIEW OF SYSTEMS:  CONSTITUTIONAL: No fever, weight loss, or fatigue  EYES: No eye pain, visual disturbances, or discharge  ENMT:   No sinus or throat pain  NECK: No pain or stiffness  RESPIRATORY: No cough, wheezing, chills or hemoptysis; No shortness of breath  CARDIOVASCULAR: No chest pain, palpitations, dizziness, or leg swelling  GASTROINTESTINAL: No abdominal pain. No nausea, vomiting; No diarrhea or constipation. No melena or hematochezia.  GENITOURINARY: No dysuria, frequency, hematuria, or incontinence  NEUROLOGICAL: No headaches, memory loss, loss of strength, numbness, or tremors  SKIN: No itching, burning, rashes, or lesions   MUSCULOSKELETAL: No joint pain or swelling; No muscle, back, or extremity pain    PHYSICAL EXAM:  Appearance: NAD. VS past 24 hrs -as above   HEENT:   Moist oral mucosa. Conjunctiva clear b/l.   Neck : supple  Respiratory: Lungs CTAB.  Gastrointestinal:  Soft, nontender. No rebound. No rigidity. BS present	  Cardiovascular: RRR ,S1S2 present  Neurologic: Non-focal. Moving all extremities.  Extremities: No edema. No erythema. No calf tenderness.  Skin: No rashes, No ecchymoses, No cyanosis.	  wounds ,skin lesions-See skin assesment flow sheet   LABS:                        9.7    9.71  )-----------( 223      ( 11 Apr 2025 05:30 )             30.0     04-11    135  |  101  |  40[H]  ----------------------------<  211[H]  3.7   |  25  |  3.14[H]    Ca    8.6      11 Apr 2025 05:30    TPro  6.8  /  Alb  2.5[L]  /  TBili  0.3  /  DBili  x   /  AST  12  /  ALT  18  /  AlkPhos  114  04-11    CAPILLARY BLOOD GLUCOSE      POCT Blood Glucose.: 196 mg/dL (11 Apr 2025 12:48)  POCT Blood Glucose.: 191 mg/dL (11 Apr 2025 08:03)  POCT Blood Glucose.: 164 mg/dL (10 Apr 2025 21:43)  POCT Blood Glucose.: 126 mg/dL (10 Apr 2025 17:14)    PT/INR - ( 10 Apr 2025 12:00 )   PT: 33.8 sec;   INR: 2.95 ratio           Urinalysis Basic - ( 11 Apr 2025 05:30 )    Color: x / Appearance: x / SG: x / pH: x  Gluc: 211 mg/dL / Ketone: x  / Bili: x / Urobili: x   Blood: x / Protein: x / Nitrite: x   Leuk Esterase: x / RBC: x / WBC x   Sq Epi: x / Non Sq Epi: x / Bacteria: x        Urinalysis with Rflx Culture (collected 10 Apr 2025 03:04)    Culture - Urine (collected 05 Apr 2025 20:01)  Source: Catheterized  Final Report (06 Apr 2025 19:16):    <10,000 CFU/mL Normal Urogenital Ellen    Urinalysis with Rflx Culture (collected 05 Apr 2025 20:01)    Culture - Blood (collected 05 Apr 2025 18:37)  Source: Blood Blood  Final Report (11 Apr 2025 02:01):    No growth at 5 days    Culture - Blood (collected 05 Apr 2025 18:37)  Source: Blood Blood  Final Report (11 Apr 2025 02:01):    No growth at 5 days      RADIOLOGY & ADDITIONAL TESTS:   reviewed elctronically  ASSESSMENT/PLAN: 	    25 minutes aggregate time was spent on this visit, 50% visit time spent in care co-ordination with other attendings and counselling patient .I have discussed care plan with patient / HCP/family member ,who expressed understanding of problems treatment and their effect and side effects, alternatives in details. I have asked if they have any questions and concerns and appropriately addressed them to best of my ability.

## 2025-04-11 NOTE — DIETITIAN INITIAL EVALUATION ADULT - PROBLEM SELECTOR PLAN 3
continue home medications ,   Recommendations  Treat bladder spasms with Myrbetriq 50 MG and Oxybutynin  10 mg  BID, monitor for constipations and start aggressive routine to prevent constipation with active hydration and, stool softeners and bulking agents (only if hydrating)  Cranberry extract  Referral upon discharge o Dr. JAY Overton ([phone]) who is expert in chronic bladder pain and neurogenic disorders

## 2025-04-11 NOTE — DIETITIAN INITIAL EVALUATION ADULT - NUTRITION DIAGNOSIS
Plan of care reviewed with pt. Pt shows no acute distress. NSR on monitor when, pt removes. Bed alarm on for pt safety.     yes...

## 2025-04-11 NOTE — SOCIAL WORK PROGRESS NOTE - NSSWPROGRESSNOTE_GEN_ALL_CORE
SW met briefly with patient in ED.  As per Dr Sanchez patient is not ready for discharge today due to creatnin going up and patient needs hydration.  [facility] notified that patient is not ready today to return but may be ready tomorrow.  SW to follow for needs and support.

## 2025-04-11 NOTE — DIETITIAN INITIAL EVALUATION ADULT - PROBLEM SELECTOR PLAN 2
continue home medications ,   Recommendations  Treat bladder spasms with Myrbetriq 50 MG and Oxybutynin  10 mg  BID, monitor for constipations and start aggressive routine to prevent constipation with active hydration and, stool softeners and bulking agents (only if hydrating)  Cranberry extract  Referral upon discharge o Dr. JAY Overton ([phone]) who is expert in chronic bladder pain and neurogenic disorders
25-Mar-2018

## 2025-04-11 NOTE — DIETITIAN INITIAL EVALUATION ADULT - ORAL INTAKE PTA/DIET HISTORY
patient admitted from [facility] - diet PTA not indicated on transfer documentation, likely no concentrated sweets diet

## 2025-04-12 LAB
ALBUMIN SERPL ELPH-MCNC: 2.6 G/DL — LOW (ref 3.3–5)
ALP SERPL-CCNC: 109 U/L — SIGNIFICANT CHANGE UP (ref 30–120)
ALT FLD-CCNC: 23 U/L — SIGNIFICANT CHANGE UP (ref 10–60)
ANION GAP SERPL CALC-SCNC: 12 MMOL/L — SIGNIFICANT CHANGE UP (ref 5–17)
AST SERPL-CCNC: 14 U/L — SIGNIFICANT CHANGE UP (ref 10–40)
BILIRUB SERPL-MCNC: 0.2 MG/DL — SIGNIFICANT CHANGE UP (ref 0.2–1.2)
BUN SERPL-MCNC: 39 MG/DL — HIGH (ref 7–23)
CALCIUM SERPL-MCNC: 8 MG/DL — LOW (ref 8.4–10.5)
CHLORIDE SERPL-SCNC: 102 MMOL/L — SIGNIFICANT CHANGE UP (ref 96–108)
CO2 SERPL-SCNC: 23 MMOL/L — SIGNIFICANT CHANGE UP (ref 22–31)
CREAT SERPL-MCNC: 3.04 MG/DL — HIGH (ref 0.5–1.3)
EGFR: 24 ML/MIN/1.73M2 — LOW
EGFR: 24 ML/MIN/1.73M2 — LOW
GLUCOSE BLDC GLUCOMTR-MCNC: 161 MG/DL — HIGH (ref 70–99)
GLUCOSE BLDC GLUCOMTR-MCNC: 163 MG/DL — HIGH (ref 70–99)
GLUCOSE BLDC GLUCOMTR-MCNC: 175 MG/DL — HIGH (ref 70–99)
GLUCOSE BLDC GLUCOMTR-MCNC: 246 MG/DL — HIGH (ref 70–99)
GLUCOSE SERPL-MCNC: 265 MG/DL — HIGH (ref 70–99)
HCT VFR BLD CALC: 29.8 % — LOW (ref 39–50)
HGB BLD-MCNC: 9.4 G/DL — LOW (ref 13–17)
INR BLD: 2.87 RATIO — HIGH (ref 0.85–1.16)
MCHC RBC-ENTMCNC: 22.8 PG — LOW (ref 27–34)
MCHC RBC-ENTMCNC: 31.5 G/DL — LOW (ref 32–36)
MCV RBC AUTO: 72.3 FL — LOW (ref 80–100)
NRBC BLD AUTO-RTO: 0 /100 WBCS — SIGNIFICANT CHANGE UP (ref 0–0)
PLATELET # BLD AUTO: 217 K/UL — SIGNIFICANT CHANGE UP (ref 150–400)
POTASSIUM SERPL-MCNC: 3.8 MMOL/L — SIGNIFICANT CHANGE UP (ref 3.5–5.3)
POTASSIUM SERPL-SCNC: 3.8 MMOL/L — SIGNIFICANT CHANGE UP (ref 3.5–5.3)
PROT SERPL-MCNC: 6.1 G/DL — SIGNIFICANT CHANGE UP (ref 6–8.3)
PROTHROM AB SERPL-ACNC: 32.9 SEC — HIGH (ref 9.9–13.4)
RBC # BLD: 4.12 M/UL — LOW (ref 4.2–5.8)
RBC # FLD: 15.9 % — HIGH (ref 10.3–14.5)
SODIUM SERPL-SCNC: 137 MMOL/L — SIGNIFICANT CHANGE UP (ref 135–145)
WBC # BLD: 9.3 K/UL — SIGNIFICANT CHANGE UP (ref 3.8–10.5)
WBC # FLD AUTO: 9.3 K/UL — SIGNIFICANT CHANGE UP (ref 3.8–10.5)

## 2025-04-12 RX ORDER — POLYETHYLENE GLYCOL 3350 17 G/17G
17 POWDER, FOR SOLUTION ORAL ONCE
Refills: 0 | Status: COMPLETED | OUTPATIENT
Start: 2025-04-12 | End: 2025-04-13

## 2025-04-12 RX ADMIN — GABAPENTIN 600 MILLIGRAM(S): 400 CAPSULE ORAL at 16:10

## 2025-04-12 RX ADMIN — Medication 0.1 MILLIGRAM(S): at 17:47

## 2025-04-12 RX ADMIN — FOLIC ACID 1 MILLIGRAM(S): 1 TABLET ORAL at 12:44

## 2025-04-12 RX ADMIN — OXYBUTYNIN CHLORIDE 10 MILLIGRAM(S): 5 TABLET, FILM COATED, EXTENDED RELEASE ORAL at 17:47

## 2025-04-12 RX ADMIN — Medication 1 MILLIGRAM(S): at 19:11

## 2025-04-12 RX ADMIN — INSULIN LISPRO 2: 100 INJECTION, SOLUTION INTRAVENOUS; SUBCUTANEOUS at 12:43

## 2025-04-12 RX ADMIN — METHOCARBAMOL 750 MILLIGRAM(S): 500 TABLET, FILM COATED ORAL at 13:00

## 2025-04-12 RX ADMIN — Medication 500 MILLIGRAM(S): at 12:44

## 2025-04-12 RX ADMIN — GABAPENTIN 600 MILLIGRAM(S): 400 CAPSULE ORAL at 05:47

## 2025-04-12 RX ADMIN — Medication 4 MILLIGRAM(S): at 20:56

## 2025-04-12 RX ADMIN — AMLODIPINE BESYLATE 10 MILLIGRAM(S): 10 TABLET ORAL at 05:48

## 2025-04-12 RX ADMIN — OXYBUTYNIN CHLORIDE 10 MILLIGRAM(S): 5 TABLET, FILM COATED, EXTENDED RELEASE ORAL at 05:48

## 2025-04-12 RX ADMIN — METOPROLOL SUCCINATE 25 MILLIGRAM(S): 50 TABLET, EXTENDED RELEASE ORAL at 17:47

## 2025-04-12 RX ADMIN — INSULIN GLARGINE-YFGN 10 UNIT(S): 100 INJECTION, SOLUTION SUBCUTANEOUS at 20:58

## 2025-04-12 RX ADMIN — INSULIN LISPRO 1: 100 INJECTION, SOLUTION INTRAVENOUS; SUBCUTANEOUS at 17:41

## 2025-04-12 RX ADMIN — NICOTINE POLACRILEX 1 PATCH: 4 GUM, CHEWING ORAL at 18:42

## 2025-04-12 RX ADMIN — Medication 1 MILLIGRAM(S): at 04:28

## 2025-04-12 RX ADMIN — DULOXETINE 60 MILLIGRAM(S): 20 CAPSULE, DELAYED RELEASE ORAL at 12:45

## 2025-04-12 RX ADMIN — GABAPENTIN 600 MILLIGRAM(S): 400 CAPSULE ORAL at 20:55

## 2025-04-12 RX ADMIN — Medication 1 MILLIGRAM(S): at 12:55

## 2025-04-12 RX ADMIN — FINASTERIDE 5 MILLIGRAM(S): 1 TABLET, FILM COATED ORAL at 12:45

## 2025-04-12 RX ADMIN — SODIUM CHLORIDE 75 MILLILITER(S): 9 INJECTION, SOLUTION INTRAVENOUS at 18:11

## 2025-04-12 RX ADMIN — NICOTINE POLACRILEX 1 PATCH: 4 GUM, CHEWING ORAL at 12:12

## 2025-04-12 RX ADMIN — Medication 1 MILLIGRAM(S): at 04:13

## 2025-04-12 RX ADMIN — INSULIN LISPRO 1: 100 INJECTION, SOLUTION INTRAVENOUS; SUBCUTANEOUS at 08:09

## 2025-04-12 RX ADMIN — Medication 650 MILLIGRAM(S): at 16:42

## 2025-04-12 RX ADMIN — NICOTINE POLACRILEX 1 PATCH: 4 GUM, CHEWING ORAL at 07:31

## 2025-04-12 RX ADMIN — Medication 650 MILLIGRAM(S): at 16:10

## 2025-04-12 RX ADMIN — METOPROLOL SUCCINATE 25 MILLIGRAM(S): 50 TABLET, EXTENDED RELEASE ORAL at 05:48

## 2025-04-12 RX ADMIN — NICOTINE POLACRILEX 1 PATCH: 4 GUM, CHEWING ORAL at 16:09

## 2025-04-12 RX ADMIN — Medication 40 MILLIGRAM(S): at 05:48

## 2025-04-12 RX ADMIN — Medication 1 TABLET(S): at 12:44

## 2025-04-12 RX ADMIN — Medication 1 MILLIGRAM(S): at 13:30

## 2025-04-12 RX ADMIN — Medication 325 MILLIGRAM(S): at 12:44

## 2025-04-12 NOTE — PROGRESS NOTE ADULT - SUBJECTIVE AND OBJECTIVE BOX
Patient is a 50y Male whom presented to the hospital with ckd and osmani     PAST MEDICAL & SURGICAL HISTORY:  BPH (benign prostatic hyperplasia)      HTN (hypertension)      Type 2 diabetes mellitus      Peripheral neuropathy      History of Guillain-[place] syndrome      History of CVA in adulthood      DDD (degenerative disc disease), lumbar      DVT, lower extremity      Renal stones      H/O Guillain-[place] syndrome      History of neurogenic bowel      DM2 (diabetes mellitus, type 2)      HTN (hypertension)      BPH without obstruction/lower urinary tract symptoms      Degenerative arthritis      DVT of lower limb, acute      CVA (cerebrovascular accident)      CVD (cerebrovascular disease)      Muscle weakness      Iron deficiency anemia      History of muscle spasm      Pain, unspecified      Vitamin deficiency      Obesity      GERD (gastroesophageal reflux disease)      H/O constipation      H/O insomnia      Essential (primary) hypertension      Acute embolism and thrombosis of deep vein of lower extremity      BPH (benign prostatic hyperplasia)      Diabetes mellitus due to underlying condition with diabetic neuropathy      Major depressive disorder, single episode      S/P IVC filter      H/O lithotripsy      Chronic suprapubic catheter          MEDICATIONS  (STANDING):  amLODIPine   Tablet 10 milliGRAM(s) Oral daily  ascorbic acid 500 milliGRAM(s) Oral daily  cloNIDine 0.1 milliGRAM(s) Oral two times a day  dextrose 5%. 1000 milliLiter(s) (100 mL/Hr) IV Continuous <Continuous>  dextrose 5%. 1000 milliLiter(s) (50 mL/Hr) IV Continuous <Continuous>  dextrose 50% Injectable 25 Gram(s) IV Push once  dextrose 50% Injectable 12.5 Gram(s) IV Push once  dextrose 50% Injectable 25 Gram(s) IV Push once  DULoxetine 60 milliGRAM(s) Oral daily  ferrous    sulfate 325 milliGRAM(s) Oral daily  finasteride 5 milliGRAM(s) Oral daily  folic acid 1 milliGRAM(s) Oral daily  gabapentin 600 milliGRAM(s) Oral every 8 hours  glucagon  Injectable 1 milliGRAM(s) IntraMuscular once  insulin glargine Injectable (LANTUS) 10 Unit(s) SubCutaneous at bedtime  insulin lispro (ADMELOG) corrective regimen sliding scale   SubCutaneous three times a day before meals  metoprolol tartrate 25 milliGRAM(s) Oral two times a day  multivitamin 1 Tablet(s) Oral daily  nicotine -  14 mG/24Hr(s) Patch 1 Patch Transdermal daily  oxybutynin 10 milliGRAM(s) Oral two times a day  pantoprazole    Tablet 40 milliGRAM(s) Oral before breakfast  senna 2 Tablet(s) Oral at bedtime  sodium chloride 0.45%. 1000 milliLiter(s) (50 mL/Hr) IV Continuous <Continuous>  warfarin 4 milliGRAM(s) Oral at bedtime      Allergies    sulfa drugs (Rash)  sulfa drugs (Other)  sulfa drugs (Unknown)  sulfa drugs (Hives)    Intolerances    Pipercillin Sodium-Tazobactam Sodium (Pruritus)      SOCIAL HISTORY:  Denies ETOh,Smoking,     FAMILY HISTORY:  FH: heart disease    FH: HTN (hypertension)    Family history of sinus tachycardia (Father)    FH: HTN (hypertension) (Mother)        REVIEW OF SYSTEMS:    CONSTITUTIONAL: No weakness, fevers or chills  RESPIRATORY: No cough, wheezing, hemoptysis; No shortness of breath  CARDIOVASCULAR: No chest pain or palpitations  GASTROINTESTINAL: No abdominal or epigastric pain. No nausea, vomiting,     No diarrhea or constipation. No melena                             9.7    9.71  )-----------( 223      ( 11 Apr 2025 05:30 )             30.0       CBC Full  -  ( 11 Apr 2025 05:30 )  WBC Count : 9.71 K/uL  RBC Count : 4.16 M/uL  Hemoglobin : 9.7 g/dL  Hematocrit : 30.0 %  Platelet Count - Automated : 223 K/uL  Mean Cell Volume : 72.1 fL  Mean Cell Hemoglobin : 23.3 pg  Mean Cell Hemoglobin Concentration : 32.3 g/dL  Auto Neutrophil # : x  Auto Lymphocyte # : x  Auto Monocyte # : x  Auto Eosinophil # : x  Auto Basophil # : x  Auto Neutrophil % : x  Auto Lymphocyte % : x  Auto Monocyte % : x  Auto Eosinophil % : x  Auto Basophil % : x      04-11    135  |  101  |  40[H]  ----------------------------<  211[H]  3.7   |  25  |  3.14[H]    Ca    8.6      11 Apr 2025 05:30    TPro  6.8  /  Alb  2.5[L]  /  TBili  0.3  /  DBili  x   /  AST  12  /  ALT  18  /  AlkPhos  114  04-11      CAPILLARY BLOOD GLUCOSE      POCT Blood Glucose.: 163 mg/dL (12 Apr 2025 07:59)  POCT Blood Glucose.: 134 mg/dL (11 Apr 2025 22:06)  POCT Blood Glucose.: 254 mg/dL (11 Apr 2025 16:35)  POCT Blood Glucose.: 196 mg/dL (11 Apr 2025 12:48)      Vital Signs Last 24 Hrs  T(C): 36.6 (12 Apr 2025 05:43), Max: 36.8 (11 Apr 2025 21:35)  T(F): 97.9 (12 Apr 2025 05:43), Max: 98.3 (11 Apr 2025 21:35)  HR: 62 (12 Apr 2025 05:43) (59 - 68)  BP: 119/72 (12 Apr 2025 05:43) (119/72 - 147/66)  BP(mean): --  RR: 16 (12 Apr 2025 05:43) (16 - 19)  SpO2: 95% (12 Apr 2025 05:43) (95% - 98%)    Parameters below as of 12 Apr 2025 05:43  Patient On (Oxygen Delivery Method): room air        Urinalysis Basic - ( 11 Apr 2025 05:30 )    Color: x / Appearance: x / SG: x / pH: x  Gluc: 211 mg/dL / Ketone: x  / Bili: x / Urobili: x   Blood: x / Protein: x / Nitrite: x   Leuk Esterase: x / RBC: x / WBC x   Sq Epi: x / Non Sq Epi: x / Bacteria: x        PT/INR - ( 11 Apr 2025 16:11 )   PT: 30.6 sec;   INR: 2.62 ratio               PHYSICAL EXAM:    Constitutional: NAD  HEENT: conjunctive   clear   Neck:  No JVD  Respiratory: CTAB  Cardiovascular: S1 and S2  Gastrointestinal: BS+, soft, NT/ND  Extremities: No peripheral edema  Neurological: A/O x 3, no focal deficits  bladder infections; s-p cath , bladder spasms ,

## 2025-04-12 NOTE — SOCIAL WORK PROGRESS NOTE - NSSWPROGRESSNOTE_GEN_ALL_CORE
Per discussion w/ inpatient interdisciplinary treatment team this AM, patient is anticipated to be medically cleared for transition to next level of care tomorrow, 04/13/2025.  met w/ patient @ bedside on [address] and provided verbal 24hr notice for discharge and reviewed Discharge Notice (for Non-Medicare Recipients) regarding patient's right to appeal hospital discharge, @ which time patient identified that he likely intends to appeal his discharge w/in the next 24hr. He asked that staff come back to his room tomorrow (Sunday, 04/12/2025) to hand him the copy of the Discharge Notice as he did not want to be given the copy today. Handoff left for weekend team.  to continue to follow.

## 2025-04-12 NOTE — PROGRESS NOTE ADULT - SUBJECTIVE AND OBJECTIVE BOX
JOIE BRUNO is a 50yMale , patient examined and chart reviewed.     INTERVAL HPI/ OVERNIGHT EVENTS:   No events.   Afebrile.    PAST MEDICAL & SURGICAL HISTORY:  BPH (benign prostatic hyperplasia)  HTN (hypertension)  Type 2 diabetes mellitus  Peripheral neuropathy  History of Guillain-[place] syndrome  History of CVA in adulthood  DDD (degenerative disc disease), lumbar  DVT, lower extremity  Renal stones  H/O Guillain-[place] syndrome  History of neurogenic bowel  DM2 (diabetes mellitus, type 2)  HTN (hypertension)  BPH without obstruction/lower urinary tract symptoms  Degenerative arthritis  DVT of lower limb, acute  CVA (cerebrovascular accident)  CVD (cerebrovascular disease)  Muscle weakness  Iron deficiency anemia  History of muscle spasm  Pain, unspecified  Vitamin deficiency  Obesity  GERD (gastroesophageal reflux disease)  H/O constipation  H/O insomnia  Essential (primary) hypertension  Acute embolism and thrombosis of deep vein of lower extremity  BPH (benign prostatic hyperplasia)  Diabetes mellitus due to underlying condition with diabetic neuropathy  Major depressive disorder, single episode  S/P IVC filter  H/O lithotripsy  Chronic suprapubic catheter      For details regarding the patient's social history, family history, and other miscellaneous elements, please refer the initial infectious diseases consultation and/or the admitting history and physical examination for this admission.      ROS:  CONSTITUTIONAL:  Negative fever or chills + chronic pains  EYES:  Negative  blurry vision or double vision  CARDIOVASCULAR:  Negative for chest pain or palpitations  RESPIRATORY:  Negative for cough, wheezing, or SOB   GASTROINTESTINAL:  Negative for nausea, vomiting, diarrhea, constipation, or abdominal pain  GENITOURINARY:  Negative frequency, urgency or dysuria + bladder spasms  NEUROLOGIC:  No headache, confusion, dizziness, lightheadedness  All other systems were reviewed and are negative     ALLERGIES:  sulfa drugs (Rash)  Pipercillin Sodium-Tazobactam Sodium (Pruritus)      Current inpatient medications :    ANTIBIOTICS/RELEVANT:  nicotine -  14 mG/24Hr(s) Patch 1 Patch Transdermal daily      acetaminophen     Tablet .. 650 milliGRAM(s) Oral every 6 hours PRN  aluminum hydroxide/magnesium hydroxide/simethicone Suspension 30 milliLiter(s) Oral every 4 hours PRN  amLODIPine   Tablet 10 milliGRAM(s) Oral daily  ascorbic acid 500 milliGRAM(s) Oral daily  cloNIDine 0.1 milliGRAM(s) Oral two times a day  dextrose 5%. 1000 milliLiter(s) IV Continuous <Continuous>  dextrose 5%. 1000 milliLiter(s) IV Continuous <Continuous>  dextrose 50% Injectable 25 Gram(s) IV Push once  dextrose 50% Injectable 12.5 Gram(s) IV Push once  dextrose 50% Injectable 25 Gram(s) IV Push once  dextrose Oral Gel 15 Gram(s) Oral once PRN  DULoxetine 60 milliGRAM(s) Oral daily  ferrous    sulfate 325 milliGRAM(s) Oral daily  finasteride 5 milliGRAM(s) Oral daily  folic acid 1 milliGRAM(s) Oral daily  gabapentin 600 milliGRAM(s) Oral every 8 hours  glucagon  Injectable 1 milliGRAM(s) IntraMuscular once  HYDROmorphone   Tablet 4 milliGRAM(s) Oral every 6 hours PRN  HYDROmorphone  Injectable 1 milliGRAM(s) IV Push every 6 hours PRN  insulin glargine Injectable (LANTUS) 10 Unit(s) SubCutaneous at bedtime  insulin lispro (ADMELOG) corrective regimen sliding scale   SubCutaneous three times a day before meals  melatonin 3 milliGRAM(s) Oral at bedtime PRN  methocarbamol 750 milliGRAM(s) Oral every 8 hours PRN  metoprolol tartrate 25 milliGRAM(s) Oral two times a day  multivitamin 1 Tablet(s) Oral daily  ondansetron Injectable 4 milliGRAM(s) IV Push every 8 hours PRN  oxybutynin 10 milliGRAM(s) Oral two times a day  pantoprazole    Tablet 40 milliGRAM(s) Oral before breakfast  senna 2 Tablet(s) Oral at bedtime  sodium chloride 0.45%. 1000 milliLiter(s) IV Continuous <Continuous>      Objective:  Vital Signs Last 24 Hrs  T(C): 36.9 (04-12-25 @ 20:46), Max: 37 (04-12-25 @ 14:25)  T(F): 98.4 (04-12-25 @ 20:46), Max: 98.6 (04-12-25 @ 14:25)  HR: 62 (04-12-25 @ 20:46) (62 - 85)  BP: 131/77 (04-12-25 @ 20:46) (119/72 - 177/80)  RR: 18 (04-12-25 @ 20:46) (16 - 18)  SpO2: 95% (04-12-25 @ 20:46) (95% - 100%)    Physical Exam:  General: no acute distress  Neck: supple, trachea midline  Lungs: clear, no wheeze/rhonchi  Cardiovascular: regular rate and rhythm, S1 S2  Abdomen: soft, nontender,  bowel sounds normal   Neurological: alert and oriented x3  Skin: no rash  Extremities: no edema      LABS:                        9.4    9.30  )-----------( 217      ( 12 Apr 2025 11:55 )             29.8   04-12    137  |  102  |  39[H]  ----------------------------<  265[H]  3.8   |  23  |  3.04[H]    Ca    8.0[L]      12 Apr 2025 11:55    TPro  6.1  /  Alb  2.6[L]  /  TBili  0.2  /  DBili  x   /  AST  14  /  ALT  23  /  AlkPhos  109  04-12       Urine Microscopic-Add On (NC) (04.10.25 @ 03:04)    White Blood Cell - Urine: 11 /HPF   Red Blood Cell - Urine: 4 /HPF   Bacteria: Few /HPF   Granular Cast: Present   Squamous Epithelial Cells: Present   Comment - Urine: Many Yeast budding and hyphae  Urinalysis with Rflx Culture (04.10.25 @ 03:04)    Urine Appearance: Cloudy   Color: Yellow   Specific Gravity: 1.015   pH Urine: 6.0   Protein, Urine: 300 mg/dL   Glucose Qualitative, Urine: 100 mg/dL   Ketone - Urine: Negative mg/dL   Blood, Urine: Moderate   Bilirubin: Negative   Urobilinogen: 0.2 mg/dL   Leukocyte Esterase Concentration: Moderate   Nitrite: Negative    MICROBIOLOGY:    Culture - Urine (collected 10 Apr 2025 03:04)  Source: Catheterized  Final Report (11 Apr 2025 15:40):    <10,000 CFU/mL Normal Urogenital Ellen    RADIOLOGY & ADDITIONAL STUDIES:    Assessment :  49[facility]  neurogenic bladder sp suprapubic catheter at [facility] chronic bladder spasms who presented to the hospital with c/o bladder spasms, pt has a urologist at [facility], has been admitted there 3 times in the past 3 weeks, just discharged and arrived back at [facility] less than 4hr PTA to [place], states he was sent there for infection and received 2 doses of IV antibiotics and then they were stopped and he was sent home. In ED no fever chills n/v/d CP SOB. In ED Wbc 15K UA with minimal wbcs. Afebrile  Bladder spasms has been a chronic issue  Ucx ngts CAUTI ruled out  Clinically stable    Plan :   Monitor off antibiotics  Trend temps and cbc  Chronic pain per pain management  Stable from ID standpoint  Dc planning per primary team    Advance Directives- Full code  Current Medications are documented.   Drug-drug interactions reviewed.    Continue with present regiment.  Appropriate use of antibiotics and adverse effects reviewed.    > 35 minutes were spent in direct patient care reviewing notes, medications ,labs data/ imaging , discussion with multidisciplinary team.    Thank you for allowing me to participate in care of your patient .    Yadiar Blackmon MD  Infectious Disease  [phone]    Individualized infection control protocols for an individual patient based on their diagnosis and risks in order to reduce risk of disease transmission followed. Coordinating with  infection prevention and control team members to enable healthcare facility staff to safely care for patient.  Managing infection prevention and treatment protocols associated with transitions of care for complex patients.  In-depth patient chart review that entails going back farther in time and assessing the complete breadth of all health care interactions, with higher-level synthesis for complex diagnoses.  Engaging in complex medical decision-making associated with antimicrobial prescribing including considerations such as antimicrobial resistance patterns, emergence of new variants/strains, recent antibiotic exposure, interactions/complications from comorbidities including concurrent infections, public health considerations to minimize development of antimicrobial resistance, and emerging and re-emerging infections.

## 2025-04-12 NOTE — PROGRESS NOTE ADULT - SUBJECTIVE AND OBJECTIVE BOX
PROGRESS NOTE  Patient is a 50y old  Male who presents with a chief complaint of bladder spasms (12 Apr 2025 11:08)  Chart and available morning labs /imaging are reviewed electronically , urgent issues addressed . More information  is being added upon completion of rounds , when more information is collected and management discussed with consultants , medical staff and social service/case management on the floor   OVERNIGHT  No new issues reported by medical staff . All above noted Patient is resting in a bed comfortably .Confused ,poor mentation .No distress noted   HPI:  50y M from [facility] nursing home [facility] with suprapubic catheter as pt concerned for urine infection and bladder spasms, pt has a urologist at [facility], has been admitted there 3 times in the past 3 weeks, just discharged and arrived back at [facility] less than 4hr PTA to [place], states he was sent there for infection and received 2 doses of IV antibiotics and then they were stopped and he was sent home today, states his previous 2 recent admissions he petitioned for longer stays, and won, but eventually was discharged, pt reports he refused to have IV antibiotics at the nursing home previously as he believes they will be dislodged and he wants to be in the hospital until antibiotics are complete, reports he was not discharged on antibiotics this time, urine culture from 4/5 done here was negative (which pt states he was not aware of), pt also states his bladder spasms are painful and wants pain medication, states he does have chronic spasms, but they recently stopped his myrbitriq and thinks that may have made them worse (10 Apr 2025 10:13)    PAST MEDICAL & SURGICAL HISTORY:  BPH (benign prostatic hyperplasia)      HTN (hypertension)      Type 2 diabetes mellitus      Peripheral neuropathy      History of Guillain-[place] syndrome      History of CVA in adulthood      DDD (degenerative disc disease), lumbar      DVT, lower extremity      Renal stones      H/O Guillain-[place] syndrome      History of neurogenic bowel      DM2 (diabetes mellitus, type 2)      HTN (hypertension)      BPH without obstruction/lower urinary tract symptoms      Degenerative arthritis      DVT of lower limb, acute      CVA (cerebrovascular accident)      CVD (cerebrovascular disease)      Muscle weakness      Iron deficiency anemia      History of muscle spasm      Pain, unspecified      Vitamin deficiency      Obesity      GERD (gastroesophageal reflux disease)      H/O constipation      H/O insomnia      Essential (primary) hypertension      Acute embolism and thrombosis of deep vein of lower extremity      BPH (benign prostatic hyperplasia)      Diabetes mellitus due to underlying condition with diabetic neuropathy      Major depressive disorder, single episode      S/P IVC filter      H/O lithotripsy      Chronic suprapubic catheter          MEDICATIONS  (STANDING):  amLODIPine   Tablet 10 milliGRAM(s) Oral daily  ascorbic acid 500 milliGRAM(s) Oral daily  cloNIDine 0.1 milliGRAM(s) Oral two times a day  dextrose 5%. 1000 milliLiter(s) (50 mL/Hr) IV Continuous <Continuous>  dextrose 5%. 1000 milliLiter(s) (100 mL/Hr) IV Continuous <Continuous>  dextrose 50% Injectable 25 Gram(s) IV Push once  dextrose 50% Injectable 12.5 Gram(s) IV Push once  dextrose 50% Injectable 25 Gram(s) IV Push once  DULoxetine 60 milliGRAM(s) Oral daily  ferrous    sulfate 325 milliGRAM(s) Oral daily  finasteride 5 milliGRAM(s) Oral daily  folic acid 1 milliGRAM(s) Oral daily  gabapentin 600 milliGRAM(s) Oral every 8 hours  glucagon  Injectable 1 milliGRAM(s) IntraMuscular once  insulin glargine Injectable (LANTUS) 10 Unit(s) SubCutaneous at bedtime  insulin lispro (ADMELOG) corrective regimen sliding scale   SubCutaneous three times a day before meals  metoprolol tartrate 25 milliGRAM(s) Oral two times a day  multivitamin 1 Tablet(s) Oral daily  nicotine -  14 mG/24Hr(s) Patch 1 Patch Transdermal daily  oxybutynin 10 milliGRAM(s) Oral two times a day  pantoprazole    Tablet 40 milliGRAM(s) Oral before breakfast  senna 2 Tablet(s) Oral at bedtime  sodium chloride 0.45%. 1000 milliLiter(s) (75 mL/Hr) IV Continuous <Continuous>  warfarin 4 milliGRAM(s) Oral at bedtime    MEDICATIONS  (PRN):  acetaminophen     Tablet .. 650 milliGRAM(s) Oral every 6 hours PRN Temp greater or equal to 38C (100.4F), Mild Pain (1 - 3)  aluminum hydroxide/magnesium hydroxide/simethicone Suspension 30 milliLiter(s) Oral every 4 hours PRN Dyspepsia  dextrose Oral Gel 15 Gram(s) Oral once PRN Blood Glucose LESS THAN 70 milliGRAM(s)/deciliter  HYDROmorphone   Tablet 4 milliGRAM(s) Oral every 6 hours PRN Moderate Pain (4 - 6)  HYDROmorphone  Injectable 1 milliGRAM(s) IV Push every 6 hours PRN Severe Pain (7 - 10)  melatonin 3 milliGRAM(s) Oral at bedtime PRN Insomnia  methocarbamol 750 milliGRAM(s) Oral every 8 hours PRN for muscle spasm  ondansetron Injectable 4 milliGRAM(s) IV Push every 8 hours PRN Nausea and/or Vomiting      OBJECTIVE    T(C): 37 (04-12-25 @ 14:25), Max: 37 (04-12-25 @ 14:25)  HR: 69 (04-12-25 @ 14:25) (62 - 69)  BP: 149/78 (04-12-25 @ 14:25) (119/72 - 149/78)  RR: 18 (04-12-25 @ 14:25) (16 - 19)  SpO2: 100% (04-12-25 @ 14:25) (95% - 100%)  Wt(kg): --  I&O's Summary    11 Apr 2025 07:01  -  12 Apr 2025 07:00  --------------------------------------------------------  IN: 0 mL / OUT: 2300 mL / NET: -2300 mL    12 Apr 2025 07:01  -  12 Apr 2025 14:58  --------------------------------------------------------  IN: 0 mL / OUT: 2000 mL / NET: -2000 mL          REVIEW OF SYSTEMS:  phu complains , states " i am sleeping " and doesnt want to talk anymore   PHYSICAL EXAM:  Appearance: NAD. VS past 24 hrs -as above   HEENT:   Moist oral mucosa. Conjunctiva clear b/l.   Neck : supple  Respiratory: Lungs CTAB.  Gastrointestinal:  Soft, nontender. No rebound. No rigidity. BS present	  Cardiovascular: RRR ,S1S2 present  Neurologic: Non-focal. Moving all extremities.  Extremities: No edema. No erythema. No calf tenderness.  Skin: No rashes, No ecchymoses, No cyanosis.	  wounds ,skin lesions-See skin assesment flow sheet   LABS:                        9.4    9.30  )-----------( 217      ( 12 Apr 2025 11:55 )             29.8     04-12    137  |  102  |  39[H]  ----------------------------<  265[H]  3.8   |  23  |  3.04[H]    Ca    8.0[L]      12 Apr 2025 11:55    TPro  6.1  /  Alb  2.6[L]  /  TBili  0.2  /  DBili  x   /  AST  14  /  ALT  23  /  AlkPhos  109  04-12    CAPILLARY BLOOD GLUCOSE      POCT Blood Glucose.: 246 mg/dL (12 Apr 2025 12:09)  POCT Blood Glucose.: 163 mg/dL (12 Apr 2025 07:59)  POCT Blood Glucose.: 134 mg/dL (11 Apr 2025 22:06)  POCT Blood Glucose.: 254 mg/dL (11 Apr 2025 16:35)    PT/INR - ( 11 Apr 2025 16:11 )   PT: 30.6 sec;   INR: 2.62 ratio           Urinalysis Basic - ( 12 Apr 2025 11:55 )    Color: x / Appearance: x / SG: x / pH: x  Gluc: 265 mg/dL / Ketone: x  / Bili: x / Urobili: x   Blood: x / Protein: x / Nitrite: x   Leuk Esterase: x / RBC: x / WBC x   Sq Epi: x / Non Sq Epi: x / Bacteria: x        Culture - Blood (collected 10 Apr 2025 16:26)  Source: Blood Blood  Preliminary Report (12 Apr 2025 01:02):    No growth at 24 hours    Culture - Blood (collected 10 Apr 2025 16:26)  Source: Blood Blood  Preliminary Report (12 Apr 2025 01:02):    No growth at 24 hours    Urinalysis with Rflx Culture (collected 10 Apr 2025 03:04)    Culture - Urine (collected 10 Apr 2025 03:04)  Source: Catheterized  Final Report (11 Apr 2025 15:40):    <10,000 CFU/mL Normal Urogenital Ellen    Urinalysis with Rflx Culture (collected 05 Apr 2025 20:01)    Culture - Urine (collected 05 Apr 2025 20:01)  Source: Catheterized  Final Report (06 Apr 2025 19:16):    <10,000 CFU/mL Normal Urogenital Ellen    Culture - Blood (collected 05 Apr 2025 18:37)  Source: Blood Blood  Final Report (11 Apr 2025 02:01):    No growth at 5 days    Culture - Blood (collected 05 Apr 2025 18:37)  Source: Blood Blood  Final Report (11 Apr 2025 02:01):    No growth at 5 days      RADIOLOGY & ADDITIONAL TESTS:   reviewed elctronically  ASSESSMENT/PLAN: 	    25 minutes aggregate time was spent on this visit, 50% visit time spent in care co-ordination with other attendings and counselling patient .I have discussed care plan with patient / HCP/family member ,who expressed understanding of problems treatment and their effect and side effects, alternatives in details. I have asked if they have any questions and concerns and appropriately addressed them to best of my ability.

## 2025-04-13 LAB
ANION GAP SERPL CALC-SCNC: 11 MMOL/L — SIGNIFICANT CHANGE UP (ref 5–17)
BUN SERPL-MCNC: 36 MG/DL — HIGH (ref 7–23)
CALCIUM SERPL-MCNC: 8.2 MG/DL — LOW (ref 8.4–10.5)
CHLORIDE SERPL-SCNC: 104 MMOL/L — SIGNIFICANT CHANGE UP (ref 96–108)
CO2 SERPL-SCNC: 24 MMOL/L — SIGNIFICANT CHANGE UP (ref 22–31)
CREAT SERPL-MCNC: 2.88 MG/DL — HIGH (ref 0.5–1.3)
EGFR: 26 ML/MIN/1.73M2 — LOW
EGFR: 26 ML/MIN/1.73M2 — LOW
GLUCOSE BLDC GLUCOMTR-MCNC: 160 MG/DL — HIGH (ref 70–99)
GLUCOSE BLDC GLUCOMTR-MCNC: 183 MG/DL — HIGH (ref 70–99)
GLUCOSE BLDC GLUCOMTR-MCNC: 209 MG/DL — HIGH (ref 70–99)
GLUCOSE BLDC GLUCOMTR-MCNC: 214 MG/DL — HIGH (ref 70–99)
GLUCOSE SERPL-MCNC: 210 MG/DL — HIGH (ref 70–99)
HCT VFR BLD CALC: 28.6 % — LOW (ref 39–50)
HGB BLD-MCNC: 9.3 G/DL — LOW (ref 13–17)
INR BLD: 2.48 RATIO — HIGH (ref 0.85–1.16)
MCHC RBC-ENTMCNC: 23.1 PG — LOW (ref 27–34)
MCHC RBC-ENTMCNC: 32.5 G/DL — SIGNIFICANT CHANGE UP (ref 32–36)
MCV RBC AUTO: 71.1 FL — LOW (ref 80–100)
NRBC BLD AUTO-RTO: 0 /100 WBCS — SIGNIFICANT CHANGE UP (ref 0–0)
PLATELET # BLD AUTO: 199 K/UL — SIGNIFICANT CHANGE UP (ref 150–400)
POTASSIUM SERPL-MCNC: 3.8 MMOL/L — SIGNIFICANT CHANGE UP (ref 3.5–5.3)
POTASSIUM SERPL-SCNC: 3.8 MMOL/L — SIGNIFICANT CHANGE UP (ref 3.5–5.3)
PROTHROM AB SERPL-ACNC: 29 SEC — HIGH (ref 9.9–13.4)
RBC # BLD: 4.02 M/UL — LOW (ref 4.2–5.8)
RBC # FLD: 15.9 % — HIGH (ref 10.3–14.5)
SODIUM SERPL-SCNC: 139 MMOL/L — SIGNIFICANT CHANGE UP (ref 135–145)
WBC # BLD: 9.38 K/UL — SIGNIFICANT CHANGE UP (ref 3.8–10.5)
WBC # FLD AUTO: 9.38 K/UL — SIGNIFICANT CHANGE UP (ref 3.8–10.5)

## 2025-04-13 PROCEDURE — 36410 VNPNXR 3YR/> PHY/QHP DX/THER: CPT

## 2025-04-13 RX ORDER — HYDROMORPHONE/SOD CHLOR,ISO/PF 2 MG/10 ML
1 SYRINGE (ML) INJECTION ONCE
Refills: 0 | Status: DISCONTINUED | OUTPATIENT
Start: 2025-04-13 | End: 2025-04-13

## 2025-04-13 RX ORDER — OXYBUTYNIN CHLORIDE 5 MG/1
2 TABLET, FILM COATED, EXTENDED RELEASE ORAL
Qty: 0 | Refills: 0 | DISCHARGE
Start: 2025-04-13

## 2025-04-13 RX ORDER — INSULIN LISPRO 100 U/ML
3 INJECTION, SOLUTION INTRAVENOUS; SUBCUTANEOUS
Qty: 0 | Refills: 0 | DISCHARGE
Start: 2025-04-13

## 2025-04-13 RX ADMIN — Medication 1 MILLIGRAM(S): at 21:24

## 2025-04-13 RX ADMIN — Medication 1 MILLIGRAM(S): at 21:09

## 2025-04-13 RX ADMIN — Medication 1 TABLET(S): at 13:09

## 2025-04-13 RX ADMIN — INSULIN LISPRO 2: 100 INJECTION, SOLUTION INTRAVENOUS; SUBCUTANEOUS at 17:20

## 2025-04-13 RX ADMIN — INSULIN LISPRO 1: 100 INJECTION, SOLUTION INTRAVENOUS; SUBCUTANEOUS at 13:07

## 2025-04-13 RX ADMIN — Medication 1 MILLIGRAM(S): at 19:39

## 2025-04-13 RX ADMIN — Medication 4 MILLIGRAM(S): at 21:09

## 2025-04-13 RX ADMIN — FOLIC ACID 1 MILLIGRAM(S): 1 TABLET ORAL at 13:10

## 2025-04-13 RX ADMIN — POLYETHYLENE GLYCOL 3350 17 GRAM(S): 17 POWDER, FOR SOLUTION ORAL at 05:56

## 2025-04-13 RX ADMIN — Medication 40 MILLIGRAM(S): at 06:09

## 2025-04-13 RX ADMIN — AMLODIPINE BESYLATE 10 MILLIGRAM(S): 10 TABLET ORAL at 05:55

## 2025-04-13 RX ADMIN — FINASTERIDE 5 MILLIGRAM(S): 1 TABLET, FILM COATED ORAL at 13:10

## 2025-04-13 RX ADMIN — Medication 500 MILLIGRAM(S): at 13:09

## 2025-04-13 RX ADMIN — Medication 1 MILLIGRAM(S): at 13:45

## 2025-04-13 RX ADMIN — NICOTINE POLACRILEX 1 PATCH: 4 GUM, CHEWING ORAL at 07:52

## 2025-04-13 RX ADMIN — METOPROLOL SUCCINATE 25 MILLIGRAM(S): 50 TABLET, EXTENDED RELEASE ORAL at 17:33

## 2025-04-13 RX ADMIN — DULOXETINE 60 MILLIGRAM(S): 20 CAPSULE, DELAYED RELEASE ORAL at 13:10

## 2025-04-13 RX ADMIN — Medication 1 MILLIGRAM(S): at 01:40

## 2025-04-13 RX ADMIN — Medication 1 MILLIGRAM(S): at 13:15

## 2025-04-13 RX ADMIN — Medication 325 MILLIGRAM(S): at 13:10

## 2025-04-13 RX ADMIN — Medication 1 MILLIGRAM(S): at 19:24

## 2025-04-13 RX ADMIN — Medication 1 MILLIGRAM(S): at 01:25

## 2025-04-13 RX ADMIN — GABAPENTIN 600 MILLIGRAM(S): 400 CAPSULE ORAL at 13:11

## 2025-04-13 RX ADMIN — NICOTINE POLACRILEX 1 PATCH: 4 GUM, CHEWING ORAL at 19:00

## 2025-04-13 RX ADMIN — INSULIN LISPRO 2: 100 INJECTION, SOLUTION INTRAVENOUS; SUBCUTANEOUS at 08:14

## 2025-04-13 RX ADMIN — OXYBUTYNIN CHLORIDE 10 MILLIGRAM(S): 5 TABLET, FILM COATED, EXTENDED RELEASE ORAL at 05:54

## 2025-04-13 RX ADMIN — METOPROLOL SUCCINATE 25 MILLIGRAM(S): 50 TABLET, EXTENDED RELEASE ORAL at 05:54

## 2025-04-13 RX ADMIN — NICOTINE POLACRILEX 1 PATCH: 4 GUM, CHEWING ORAL at 13:09

## 2025-04-13 RX ADMIN — INSULIN GLARGINE-YFGN 10 UNIT(S): 100 INJECTION, SOLUTION SUBCUTANEOUS at 21:15

## 2025-04-13 RX ADMIN — Medication 0.1 MILLIGRAM(S): at 17:33

## 2025-04-13 RX ADMIN — GABAPENTIN 600 MILLIGRAM(S): 400 CAPSULE ORAL at 05:56

## 2025-04-13 RX ADMIN — OXYBUTYNIN CHLORIDE 10 MILLIGRAM(S): 5 TABLET, FILM COATED, EXTENDED RELEASE ORAL at 17:33

## 2025-04-13 NOTE — PROGRESS NOTE ADULT - SUBJECTIVE AND OBJECTIVE BOX
Patient is a 50y Male whom presented to the hospital with ckd and osmani     PAST MEDICAL & SURGICAL HISTORY:  BPH (benign prostatic hyperplasia)      HTN (hypertension)      Type 2 diabetes mellitus      Peripheral neuropathy      History of Guillain-[place] syndrome      History of CVA in adulthood      DDD (degenerative disc disease), lumbar      DVT, lower extremity      Renal stones      H/O Guillain-[place] syndrome      History of neurogenic bowel      DM2 (diabetes mellitus, type 2)      HTN (hypertension)      BPH without obstruction/lower urinary tract symptoms      Degenerative arthritis      DVT of lower limb, acute      CVA (cerebrovascular accident)      CVD (cerebrovascular disease)      Muscle weakness      Iron deficiency anemia      History of muscle spasm      Pain, unspecified      Vitamin deficiency      Obesity      GERD (gastroesophageal reflux disease)      H/O constipation      H/O insomnia      Essential (primary) hypertension      Acute embolism and thrombosis of deep vein of lower extremity      BPH (benign prostatic hyperplasia)      Diabetes mellitus due to underlying condition with diabetic neuropathy      Major depressive disorder, single episode      S/P IVC filter      H/O lithotripsy      Chronic suprapubic catheter          MEDICATIONS  (STANDING):  amLODIPine   Tablet 10 milliGRAM(s) Oral daily  ascorbic acid 500 milliGRAM(s) Oral daily  cloNIDine 0.1 milliGRAM(s) Oral two times a day  dextrose 5%. 1000 milliLiter(s) (100 mL/Hr) IV Continuous <Continuous>  dextrose 5%. 1000 milliLiter(s) (50 mL/Hr) IV Continuous <Continuous>  dextrose 50% Injectable 25 Gram(s) IV Push once  dextrose 50% Injectable 12.5 Gram(s) IV Push once  dextrose 50% Injectable 25 Gram(s) IV Push once  DULoxetine 60 milliGRAM(s) Oral daily  ferrous    sulfate 325 milliGRAM(s) Oral daily  finasteride 5 milliGRAM(s) Oral daily  folic acid 1 milliGRAM(s) Oral daily  gabapentin 600 milliGRAM(s) Oral every 8 hours  glucagon  Injectable 1 milliGRAM(s) IntraMuscular once  insulin glargine Injectable (LANTUS) 10 Unit(s) SubCutaneous at bedtime  insulin lispro (ADMELOG) corrective regimen sliding scale   SubCutaneous three times a day before meals  metoprolol tartrate 25 milliGRAM(s) Oral two times a day  multivitamin 1 Tablet(s) Oral daily  nicotine -  14 mG/24Hr(s) Patch 1 Patch Transdermal daily  oxybutynin 10 milliGRAM(s) Oral two times a day  pantoprazole    Tablet 40 milliGRAM(s) Oral before breakfast  senna 2 Tablet(s) Oral at bedtime  sodium chloride 0.45%. 1000 milliLiter(s) (50 mL/Hr) IV Continuous <Continuous>  warfarin 4 milliGRAM(s) Oral at bedtime      Allergies    sulfa drugs (Rash)  sulfa drugs (Other)  sulfa drugs (Unknown)  sulfa drugs (Hives)    Intolerances    Pipercillin Sodium-Tazobactam Sodium (Pruritus)      SOCIAL HISTORY:  Denies ETOh,Smoking,     FAMILY HISTORY:  FH: heart disease    FH: HTN (hypertension)    Family history of sinus tachycardia (Father)    FH: HTN (hypertension) (Mother)        REVIEW OF SYSTEMS:    CONSTITUTIONAL: No weakness, fevers or chills  RESPIRATORY: No cough, wheezing, hemoptysis; No shortness of breath  CARDIOVASCULAR: No chest pain or palpitations  GASTROINTESTINAL: No abdominal or epigastric pain. No nausea, vomiting,     No diarrhea or constipation. No melena                                   9.3    9.38  )-----------( 199      ( 13 Apr 2025 07:55 )             28.6       CBC Full  -  ( 13 Apr 2025 07:55 )  WBC Count : 9.38 K/uL  RBC Count : 4.02 M/uL  Hemoglobin : 9.3 g/dL  Hematocrit : 28.6 %  Platelet Count - Automated : 199 K/uL  Mean Cell Volume : 71.1 fL  Mean Cell Hemoglobin : 23.1 pg  Mean Cell Hemoglobin Concentration : 32.5 g/dL  Auto Neutrophil # : x  Auto Lymphocyte # : x  Auto Monocyte # : x  Auto Eosinophil # : x  Auto Basophil # : x  Auto Neutrophil % : x  Auto Lymphocyte % : x  Auto Monocyte % : x  Auto Eosinophil % : x  Auto Basophil % : x      04-13    139  |  104  |  36[H]  ----------------------------<  210[H]  3.8   |  24  |  2.88[H]    Ca    8.2[L]      13 Apr 2025 07:55    TPro  6.1  /  Alb  2.6[L]  /  TBili  0.2  /  DBili  x   /  AST  14  /  ALT  23  /  AlkPhos  109  04-12      CAPILLARY BLOOD GLUCOSE      POCT Blood Glucose.: 209 mg/dL (13 Apr 2025 08:06)  POCT Blood Glucose.: 175 mg/dL (12 Apr 2025 20:56)  POCT Blood Glucose.: 161 mg/dL (12 Apr 2025 17:05)  POCT Blood Glucose.: 246 mg/dL (12 Apr 2025 12:09)      Vital Signs Last 24 Hrs  T(C): 36.9 (13 Apr 2025 05:03), Max: 37 (12 Apr 2025 14:25)  T(F): 98.5 (13 Apr 2025 05:03), Max: 98.6 (12 Apr 2025 14:25)  HR: 59 (13 Apr 2025 05:03) (59 - 85)  BP: 128/74 (13 Apr 2025 05:03) (127/80 - 177/80)  BP(mean): --  RR: 18 (13 Apr 2025 05:03) (17 - 18)  SpO2: 96% (13 Apr 2025 05:03) (95% - 100%)    Parameters below as of 13 Apr 2025 05:03  Patient On (Oxygen Delivery Method): room air        Urinalysis Basic - ( 13 Apr 2025 07:55 )    Color: x / Appearance: x / SG: x / pH: x  Gluc: 210 mg/dL / Ketone: x  / Bili: x / Urobili: x   Blood: x / Protein: x / Nitrite: x   Leuk Esterase: x / RBC: x / WBC x   Sq Epi: x / Non Sq Epi: x / Bacteria: x        PT/INR - ( 13 Apr 2025 07:55 )   PT: 29.0 sec;   INR: 2.48 ratio             PHYSICAL EXAM:    Constitutional: NAD  HEENT: conjunctive   clear   Neck:  No JVD  Respiratory: CTAB  Cardiovascular: S1 and S2  Gastrointestinal: BS+, soft, NT/ND  Extremities: No peripheral edema  Neurological: A/O x 3, no focal deficits  bladder infections; s-p cath , bladder spasms ,

## 2025-04-13 NOTE — DISCHARGE NOTE PROVIDER - NSDCMRMEDTOKEN_GEN_ALL_CORE_FT
alfuzosin 10 mg oral tablet, extended release: 1 tab(s) orally once a day  amLODIPine 10 mg oral tablet: 1 tab(s) orally once a day  ascorbic acid 500 mg oral tablet: 1 tab(s) orally once a day  cloNIDine 0.1 mg oral tablet: 1 tab(s) orally 2 times a day  DULoxetine 60 mg oral delayed release capsule: 1 cap(s) orally once a day  ferrous sulfate 325 mg (65 mg elemental iron) oral tablet: 1 tab(s) orally once a day  finasteride 5 mg oral tablet: 1 tab(s) orally once a day  folic acid: 1 milligram(s) once a day  gabapentin 600 mg oral tablet: 1 tab(s) orally every 8 hours  HYDROmorphone 4 mg oral tablet: 1 tab(s) orally every 6 hours PRN  Insulin Lispro KwikPen 100 units/mL injectable solution: subcutaneous 3 times a day (before meals) sliding scale  Lantus 100 units/mL subcutaneous solution: 10 unit(s) subcutaneous once a day (at bedtime)  melatonin 5 mg oral tablet: 1 tab(s) orally once a day (at bedtime)  methocarbamol 750 mg oral tablet: 2 tab(s) orally every 8 hours as needed for  muscle spasm  metoprolol tartrate 25 mg oral tablet: 1 tab(s) orally 2 times a day  miconazole 2% topical cream: Apply topically to affected area 3 times a day abdomen  Multiple Vitamins oral tablet: 1 tab(s) orally once a day  Myrbetriq 25 mg oral tablet, extended release: 1 tab(s) orally once a day  nicotine 14 mg/24 hr transdermal film, extended release: 1 patch transdermally once a day  oxyBUTYnin 5 mg oral tablet: 2 tab(s) orally 2 times a day  pantoprazole 40 mg oral delayed release tablet: 1 tab(s) orally once a day (before a meal)  Senna 8.6 mg oral tablet: 2 tab(s) orally once a day  Tylenol 325 mg oral tablet: 2 tab(s) orally every 4 hours as needed for  mild pain  warfarin 4 mg oral tablet: 1 tab(s) orally once a day (at bedtime)   alfuzosin 10 mg oral tablet, extended release: 1 tab(s) orally once a day  amLODIPine 10 mg oral tablet: 1 tab(s) orally once a day  ascorbic acid 500 mg oral tablet: 1 tab(s) orally once a day  cloNIDine 0.1 mg oral tablet: 1 tab(s) orally 2 times a day  DULoxetine 60 mg oral delayed release capsule: 1 cap(s) orally once a day  ferrous sulfate 325 mg (65 mg elemental iron) oral tablet: 1 tab(s) orally once a day  finasteride 5 mg oral tablet: 1 tab(s) orally once a day  folic acid: 1 milligram(s) once a day  gabapentin 600 mg oral tablet: 1 tab(s) orally every 8 hours  HYDROmorphone 4 mg oral tablet: 1 tab(s) orally every 6 hours PRN  Insulin Lispro KwikPen 100 units/mL injectable solution: 3 unit(s) subcutaneous 3 times a day (before meals) and  additionally  sliding scale  Lantus 100 units/mL subcutaneous solution: 10 unit(s) subcutaneous once a day (at bedtime)  melatonin 5 mg oral tablet: 1 tab(s) orally once a day (at bedtime)  methocarbamol 750 mg oral tablet: 2 tab(s) orally every 8 hours as needed for  muscle spasm  metoprolol tartrate 25 mg oral tablet: 1 tab(s) orally 2 times a day  miconazole 2% topical cream: Apply topically to affected area 3 times a day abdomen  Multiple Vitamins oral tablet: 1 tab(s) orally once a day  Myrbetriq 25 mg oral tablet, extended release: 1 tab(s) orally once a day  nicotine 14 mg/24 hr transdermal film, extended release: 1 patch transdermally once a day  oxyBUTYnin 5 mg oral tablet: 2 tab(s) orally 2 times a day  pantoprazole 40 mg oral delayed release tablet: 1 tab(s) orally once a day (before a meal)  Senna 8.6 mg oral tablet: 2 tab(s) orally once a day  Tylenol 325 mg oral tablet: 2 tab(s) orally every 4 hours as needed for  mild pain  warfarin 4 mg oral tablet: 1 tab(s) orally once a day (at bedtime) Hold on 04/15 and check INR 04/16

## 2025-04-13 NOTE — PROGRESS NOTE ADULT - SUBJECTIVE AND OBJECTIVE BOX
JOIE BRUNO is a 50yMale , patient examined and chart reviewed.     INTERVAL HPI/ OVERNIGHT EVENTS:   No events.   Afebrile.    PAST MEDICAL & SURGICAL HISTORY:  BPH (benign prostatic hyperplasia)  HTN (hypertension)  Type 2 diabetes mellitus  Peripheral neuropathy  History of Guillain-[place] syndrome  History of CVA in adulthood  DDD (degenerative disc disease), lumbar  DVT, lower extremity  Renal stones  H/O Guillain-[place] syndrome  History of neurogenic bowel  DM2 (diabetes mellitus, type 2)  HTN (hypertension)  BPH without obstruction/lower urinary tract symptoms  Degenerative arthritis  DVT of lower limb, acute  CVA (cerebrovascular accident)  CVD (cerebrovascular disease)  Muscle weakness  Iron deficiency anemia  History of muscle spasm  Pain, unspecified  Vitamin deficiency  Obesity  GERD (gastroesophageal reflux disease)  H/O constipation  H/O insomnia  Essential (primary) hypertension  Acute embolism and thrombosis of deep vein of lower extremity  BPH (benign prostatic hyperplasia)  Diabetes mellitus due to underlying condition with diabetic neuropathy  Major depressive disorder, single episode  S/P IVC filter  H/O lithotripsy  Chronic suprapubic catheter      For details regarding the patient's social history, family history, and other miscellaneous elements, please refer the initial infectious diseases consultation and/or the admitting history and physical examination for this admission.      ROS:  CONSTITUTIONAL:  Negative fever or chills + chronic pains  EYES:  Negative  blurry vision or double vision  CARDIOVASCULAR:  Negative for chest pain or palpitations  RESPIRATORY:  Negative for cough, wheezing, or SOB   GASTROINTESTINAL:  Negative for nausea, vomiting, diarrhea, constipation, or abdominal pain  GENITOURINARY:  Negative frequency, urgency or dysuria + bladder spasms  NEUROLOGIC:  No headache, confusion, dizziness, lightheadedness  All other systems were reviewed and are negative     ALLERGIES:  sulfa drugs (Rash)  Pipercillin Sodium-Tazobactam Sodium (Pruritus)      Current inpatient medications :    ANTIBIOTICS/RELEVANT:  nicotine -  14 mG/24Hr(s) Patch 1 Patch Transdermal daily      acetaminophen     Tablet .. 650 milliGRAM(s) Oral every 6 hours PRN  aluminum hydroxide/magnesium hydroxide/simethicone Suspension 30 milliLiter(s) Oral every 4 hours PRN  amLODIPine   Tablet 10 milliGRAM(s) Oral daily  ascorbic acid 500 milliGRAM(s) Oral daily  cloNIDine 0.1 milliGRAM(s) Oral two times a day  dextrose 5%. 1000 milliLiter(s) IV Continuous <Continuous>  dextrose 5%. 1000 milliLiter(s) IV Continuous <Continuous>  dextrose 50% Injectable 25 Gram(s) IV Push once  dextrose 50% Injectable 12.5 Gram(s) IV Push once  dextrose 50% Injectable 25 Gram(s) IV Push once  dextrose Oral Gel 15 Gram(s) Oral once PRN  DULoxetine 60 milliGRAM(s) Oral daily  ferrous    sulfate 325 milliGRAM(s) Oral daily  finasteride 5 milliGRAM(s) Oral daily  folic acid 1 milliGRAM(s) Oral daily  gabapentin 600 milliGRAM(s) Oral every 8 hours  glucagon  Injectable 1 milliGRAM(s) IntraMuscular once  HYDROmorphone   Tablet 4 milliGRAM(s) Oral every 6 hours PRN  HYDROmorphone  Injectable 1 milliGRAM(s) IV Push every 6 hours PRN  insulin glargine Injectable (LANTUS) 10 Unit(s) SubCutaneous at bedtime  insulin lispro (ADMELOG) corrective regimen sliding scale   SubCutaneous three times a day before meals  melatonin 3 milliGRAM(s) Oral at bedtime PRN  methocarbamol 750 milliGRAM(s) Oral every 8 hours PRN  metoprolol tartrate 25 milliGRAM(s) Oral two times a day  multivitamin 1 Tablet(s) Oral daily  ondansetron Injectable 4 milliGRAM(s) IV Push every 8 hours PRN  oxybutynin 10 milliGRAM(s) Oral two times a day  pantoprazole    Tablet 40 milliGRAM(s) Oral before breakfast  senna 2 Tablet(s) Oral at bedtime  sodium chloride 0.45%. 1000 milliLiter(s) IV Continuous <Continuous>      Objective:  Vital Signs Last 24 Hrs  T(C): 36.8 (13 Apr 2025 20:57), Max: 36.9 (13 Apr 2025 05:03)  T(F): 98.2 (13 Apr 2025 20:57), Max: 98.5 (13 Apr 2025 05:03)  HR: 65 (13 Apr 2025 20:57) (59 - 71)  BP: 145/80 (13 Apr 2025 20:57) (128/74 - 145/80)  RR: 16 (13 Apr 2025 20:57) (16 - 18)  SpO2: 96% (13 Apr 2025 20:57) (96% - 98%)    Parameters below as of 13 Apr 2025 20:57  Patient On (Oxygen Delivery Method): room air    Physical Exam:  General: no acute distress  Neck: supple, trachea midline  Lungs: clear, no wheeze/rhonchi  Cardiovascular: regular rate and rhythm, S1 S2  Abdomen: soft, nontender,  bowel sounds normal   Neurological: alert and oriented x3  Skin: no rash  Extremities: no edema      LABS:                        9.3    9.38  )-----------( 199      ( 13 Apr 2025 07:55 )             28.6   04-13    139  |  104  |  36[H]  ----------------------------<  210[H]  3.8   |  24  |  2.88[H]    Ca    8.2[L]      13 Apr 2025 07:55    TPro  6.1  /  Alb  2.6[L]  /  TBili  0.2  /  DBili  x   /  AST  14  /  ALT  23  /  AlkPhos  109  04-12    MICROBIOLOGY:    Culture - Urine (collected 10 Apr 2025 03:04)  Source: Catheterized  Final Report (11 Apr 2025 15:40):    <10,000 CFU/mL Normal Urogenital Ellen    RADIOLOGY & ADDITIONAL STUDIES:    Assessment :  51YO M resident of [facility] PMH BPH CVA  neurogenic bladder sp suprapubic catheter at [facility] chronic bladder spasms who presented to the hospital with c/o bladder spasms, pt has a urologist at [facility], has been admitted there 3 times in the past 3 weeks, just discharged and arrived back at [facility] less than 4hr PTA to [place], states he was sent there for infection and received 2 doses of IV antibiotics and then they were stopped and he was sent home. In ED no fever chills n/v/d CP SOB. In ED Wbc 15K UA with minimal wbcs. Afebrile  Bladder spasms has been a chronic issue  Ucx ngts CAUTI ruled out  Clinically stable    Plan :   Monitor off antibiotics  Trend temps and cbc  Chronic pain per pain management  Stable from ID standpoint  Dc planning per primary team  No active ID issues  Will sign off case    Advance Directives- Full code  Current Medications are documented.   Drug-drug interactions reviewed.    Continue with present regiment.  Appropriate use of antibiotics and adverse effects reviewed.    > 35 minutes were spent in direct patient care reviewing notes, medications ,labs data/ imaging , discussion with multidisciplinary team.    Thank you for allowing me to participate in care of your patient .    Yadira Blackmon MD  Infectious Disease  [phone]    Individualized infection control protocols for an individual patient based on their diagnosis and risks in order to reduce risk of disease transmission followed. Coordinating with  infection prevention and control team members to enable healthcare facility staff to safely care for patient.  Managing infection prevention and treatment protocols associated with transitions of care for complex patients.  In-depth patient chart review that entails going back farther in time and assessing the complete breadth of all health care interactions, with higher-level synthesis for complex diagnoses.  Engaging in complex medical decision-making associated with antimicrobial prescribing including considerations such as antimicrobial resistance patterns, emergence of new variants/strains, recent antibiotic exposure, interactions/complications from comorbidities including concurrent infections, public health considerations to minimize development of antimicrobial resistance, and emerging and re-emerging infections.

## 2025-04-13 NOTE — DISCHARGE NOTE PROVIDER - PROVIDER TOKENS
PROVIDER:[TOKEN:[[id number]],FOLLOWUP:[1 week]],PROVIDER:[TOKEN:[[id number]],FOLLOWUP:[2 weeks]]

## 2025-04-13 NOTE — DISCHARGE NOTE PROVIDER - CARE PROVIDERS DIRECT ADDRESSES
[email or web address]

## 2025-04-13 NOTE — DISCHARGE NOTE PROVIDER - NSDCCPCAREPLAN_GEN_ALL_CORE_FT
PRINCIPAL DISCHARGE DIAGNOSIS  Diagnosis: Acute kidney injury superimposed on CKD  Assessment and Plan of Treatment:       SECONDARY DISCHARGE DIAGNOSES  Diagnosis: Bladder spasms  Assessment and Plan of Treatment:     Diagnosis: Chronic suprapubic catheter  Assessment and Plan of Treatment:     Diagnosis: Elevated WBCs  Assessment and Plan of Treatment: ruled out for UTI .WBC improved    Diagnosis: GERD (gastroesophageal reflux disease)  Assessment and Plan of Treatment:     Diagnosis: HTN (hypertension)  Assessment and Plan of Treatment:     Diagnosis: Type 2 diabetes mellitus  Assessment and Plan of Treatment:     Diagnosis: Peripheral neuropathy  Assessment and Plan of Treatment:     Diagnosis: Antiphospholipid syndrome  Assessment and Plan of Treatment:     Diagnosis: BPH (benign prostatic hyperplasia)  Assessment and Plan of Treatment:

## 2025-04-13 NOTE — DISCHARGE NOTE PROVIDER - HOSPITAL COURSE
50y M from [facility] nursing home JESSENIA with suprapubic catheter as pt concerned for urine infection and bladder spasms, pt has a urologist at [facility], has been admitted there 3 times in the past 3 weeks, just discharged and arrived back at [facility] less than 4hr PTA to [place], states he was sent there for infection and received 2 doses of IV antibiotics and then they were stopped and he was sent home today, states his previous 2 recent admissions he petitioned for longer stays, and won, but eventually was discharged, pt reports he refused to have IV antibiotics at the nursing home previously as he believes they will be dislodged and he wants to be in the hospital until antibiotics are complete, reports he was not discharged on antibiotics this time, urine culture from 4/5 done here was negative (which pt states he was not aware of), pt also states his bladder spasms are painful and wants pain medication, states he does have chronic spasms, but they recently stopped his myrbitriq and thinks that may have made them worse      Problem/Plan - 1:  ·  Problem: Acute kidney injury superimposed on CKD.   ·  Plan: SERIAL BMP ,INS/OUTS , Nephrology cons f/up.    Problem/Plan - 2:  ·  Problem: Elevated WBCs.   ·  Plan: septic workup , ID eval , patient just completed 2 courses of meropenem ID consult fro possible Funguria and chronic management of  recurrent UTI.seen by urologist -spasms/ ryan-cath  leakage  (recent multiple admissions for same problem at [place]), catheter was just exchanged 3 days prior to [place]  er visit   History chronic bladder colonization and n which should not be treated, and history recurrent UTI which require treatment,  recent C&S was negative. Seen by ID -Doubt CAUTI  Bladder spasms has been a chronic issue  Defer antibiotics  Trend temps and cbc.    Problem/Plan - 3:  ·  Problem: Bladder spasms.   ·  Plan: continue home medications ,   Recommendations  Treat bladder spasms with Myrbetriq 50 MG and Oxybutynin  10 mg  BID, monitor for constipations and start aggressive routine to prevent constipation with active hydration and, stool softeners and bulking agents (only if hydrating)  Cranberry extract  Referral upon discharge o Dr. JAY Overton ([phone]) who is expert in chronic bladder pain and neurogenic disorders.    Problem/Plan - 4:  ·  Problem: Chronic suprapubic catheter.   ·  Plan: continue home medications ,   Recommendations  Treat bladder spasms with Myrbetriq 50 MG and Oxybutynin  10 mg  BID, monitor for constipations and start aggressive routine to prevent constipation with active hydration and, stool softeners and bulking agents (only if hydrating)  Cranberry extract  Referral upon discharge o Dr. JAY Overton ([phone]) who is expert in chronic bladder pain and neurogenic disorders.    Problem/Plan - 5:  ·  Problem: GERD (gastroesophageal reflux disease).   ·  Plan: ppi.    Problem/Plan - 6:  ·  Problem: Type 2 diabetes mellitus.   ·  Plan: Accuchecks monitoring and insulin corrective regimen  sliding scale coverage with short acting inslulin, add longacting insulin as needed ,no concentrated sweets diet, serial labs ,HbA1C,education.    Problem/Plan - 7:  ·  Problem: HTN (hypertension).   ·  Plan: continue home medications.    Problem/Plan - 8:  ·  Problem: Peripheral neuropathy.   ·  Plan: pain management.    Problem/Plan - 9:  ·  Problem: Antiphospholipid syndrome.   ·  Plan: on coumadin.    Problem/Plan - 10:  ·  Problem: BPH (benign prostatic hyperplasia).   ·  Plan; continue home medications.    Problem/Plan - 11:  ·  Problem: Prophylactic measure.   ·  Plan: Gastrointestinal stress ulcer prophylaxis and DVT prophylaxis administered.

## 2025-04-13 NOTE — DISCHARGE NOTE PROVIDER - NSDCDCMDCOMP_GEN_ALL_CORE
Patients wife calling looking to discuss financial help with budesonide-formoterol (SYMBICORT) 160-4.5 MCG/ACT inhaler and potassium citrate ER 10 MEQ (1080 MG) Tab CR      Please call wife back. Ok to leave a detailed message.     This document is complete and the patient is ready for discharge.

## 2025-04-13 NOTE — CASE MANAGEMENT PROGRESS NOTE - NSCMPROGRESSNOTE_GEN_ALL_CORE
RN/CM noted pt medically cleared for D/C back to Skilled Nursing Facility [facility] today 04/13/2025. CM met with pt, DC notice provided and he is NOT agreeable with D/C today. Pt was provided with 24H notice yesterday 04/12/2025. CM stayed with pt while pt called to appeal his DC with [phone]- pt was ONLY able to leave a message and left his contact info. Staff on unit and MD made aware. CM remains available, MSW following case to coordinate DC back to Skilled Nursing Facility.

## 2025-04-13 NOTE — DISCHARGE NOTE PROVIDER - CARE PROVIDER_API CALL
Pahlavan, Mohsen  Nephrology  [address], [address]  [place] [address]  Phone: [phone]  Fax: [phone]  Follow Up Time: 1 week    Aaron Overton  Urology  [address] [place] [address]  Phone: [phone]  Fax: [phone]  Follow Up Time: 2 weeks

## 2025-04-13 NOTE — PROGRESS NOTE ADULT - SUBJECTIVE AND OBJECTIVE BOX
CHIEF COMPLAINT/ REASON FOR VISIT  .. Patient was seen to address the  issue listed under PROBLEM LIST which is located toward bottom of this note     JOIE BRUNO    SY 1EST 115 W1    Allergies    sulfa drugs (Rash)  sulfa drugs (Other)  sulfa drugs (Unknown)  sulfa drugs (Hives)    Intolerances    Pipercillin Sodium-Tazobactam Sodium (Pruritus)      PAST MEDICAL & SURGICAL HISTORY:  BPH (benign prostatic hyperplasia)      HTN (hypertension)      Type 2 diabetes mellitus      Peripheral neuropathy      History of Guillain-Corrigan syndrome      History of CVA in adulthood      DDD (degenerative disc disease), lumbar      DVT, lower extremity      Renal stones      H/O Guillain-Corrigan syndrome      History of neurogenic bowel      DM2 (diabetes mellitus, type 2)      HTN (hypertension)      BPH without obstruction/lower urinary tract symptoms      Degenerative arthritis      DVT of lower limb, acute      CVA (cerebrovascular accident)      CVD (cerebrovascular disease)      Muscle weakness      Iron deficiency anemia      History of muscle spasm      Pain, unspecified      Vitamin deficiency      Obesity      GERD (gastroesophageal reflux disease)      H/O constipation      H/O insomnia      Essential (primary) hypertension      Acute embolism and thrombosis of deep vein of lower extremity      BPH (benign prostatic hyperplasia)      Diabetes mellitus due to underlying condition with diabetic neuropathy      Major depressive disorder, single episode      S/P IVC filter      H/O lithotripsy      Chronic suprapubic catheter          FAMILY HISTORY:  FH: heart disease    FH: HTN (hypertension)    Family history of sinus tachycardia (Father)    FH: HTN (hypertension) (Mother)        Home Medications:  alfuzosin 10 mg oral tablet, extended release: 1 tab(s) orally once a day (20 Nov 2024 10:05)  amLODIPine 10 mg oral tablet: 1 tab(s) orally once a day (20 Nov 2024 10:28)  ascorbic acid 500 mg oral tablet: 1 tab(s) orally once a day (20 Nov 2024 10:28)  cloNIDine 0.1 mg oral tablet: 1 tab(s) orally 2 times a day (20 Nov 2024 10:28)  DULoxetine 60 mg oral delayed release capsule: 1 cap(s) orally once a day (20 Nov 2024 10:28)  ferrous sulfate 325 mg (65 mg elemental iron) oral tablet: 1 tab(s) orally once a day (20 Nov 2024 10:28)  finasteride 5 mg oral tablet: 1 tab(s) orally once a day (20 Nov 2024 10:28)  folic acid: 1 milligram(s) once a day (20 Nov 2024 10:28)  gabapentin 600 mg oral tablet: 1 tab(s) orally every 8 hours (20 Nov 2024 10:06)  HYDROmorphone 4 mg oral tablet: 1 tab(s) orally every 6 hours PRN (20 Nov 2024 10:28)  Insulin Lispro KwikPen 100 units/mL injectable solution: subcutaneous 3 times a day (before meals) sliding scale (13 Apr 2025 10:10)  Lantus 100 units/mL subcutaneous solution: 10 unit(s) subcutaneous once a day (at bedtime) (20 Nov 2024 10:08)  melatonin 5 mg oral tablet: 1 tab(s) orally once a day (at bedtime) (20 Nov 2024 10:28)  methocarbamol 750 mg oral tablet: 2 tab(s) orally every 8 hours as needed for  muscle spasm (20 Nov 2024 10:09)  metoprolol tartrate 25 mg oral tablet: 1 tab(s) orally 2 times a day (20 Nov 2024 10:28)  miconazole 2% topical cream: Apply topically to affected area 3 times a day abdomen (20 Nov 2024 10:28)  Multiple Vitamins oral tablet: 1 tab(s) orally once a day (20 Nov 2024 10:28)  Myrbetriq 25 mg oral tablet, extended release: 1 tab(s) orally once a day (20 Nov 2024 10:28)  nicotine 14 mg/24 hr transdermal film, extended release: 1 patch transdermally once a day (20 Nov 2024 10:24)  oxyBUTYnin 5 mg oral tablet: 2 tab(s) orally 2 times a day (13 Apr 2025 10:10)  pantoprazole 40 mg oral delayed release tablet: 1 tab(s) orally once a day (before a meal) (20 Nov 2024 10:28)  Senna 8.6 mg oral tablet: 2 tab(s) orally once a day (20 Nov 2024 10:24)  Tylenol 325 mg oral tablet: 2 tab(s) orally every 4 hours as needed for  mild pain (20 Nov 2024 10:26)  warfarin 4 mg oral tablet: 1 tab(s) orally once a day (at bedtime) (13 Apr 2025 10:10)      MEDICATIONS  (STANDING):  amLODIPine   Tablet 10 milliGRAM(s) Oral daily  ascorbic acid 500 milliGRAM(s) Oral daily  cloNIDine 0.1 milliGRAM(s) Oral two times a day  dextrose 5%. 1000 milliLiter(s) (50 mL/Hr) IV Continuous <Continuous>  dextrose 5%. 1000 milliLiter(s) (100 mL/Hr) IV Continuous <Continuous>  dextrose 50% Injectable 25 Gram(s) IV Push once  dextrose 50% Injectable 12.5 Gram(s) IV Push once  dextrose 50% Injectable 25 Gram(s) IV Push once  DULoxetine 60 milliGRAM(s) Oral daily  ferrous    sulfate 325 milliGRAM(s) Oral daily  finasteride 5 milliGRAM(s) Oral daily  folic acid 1 milliGRAM(s) Oral daily  gabapentin 600 milliGRAM(s) Oral every 8 hours  glucagon  Injectable 1 milliGRAM(s) IntraMuscular once  insulin glargine Injectable (LANTUS) 10 Unit(s) SubCutaneous at bedtime  insulin lispro (ADMELOG) corrective regimen sliding scale   SubCutaneous three times a day before meals  metoprolol tartrate 25 milliGRAM(s) Oral two times a day  multivitamin 1 Tablet(s) Oral daily  nicotine -  14 mG/24Hr(s) Patch 1 Patch Transdermal daily  oxybutynin 10 milliGRAM(s) Oral two times a day  pantoprazole    Tablet 40 milliGRAM(s) Oral before breakfast  senna 2 Tablet(s) Oral at bedtime  sodium chloride 0.45%. 1000 milliLiter(s) (75 mL/Hr) IV Continuous <Continuous>  warfarin 4 milliGRAM(s) Oral at bedtime    MEDICATIONS  (PRN):  acetaminophen     Tablet .. 650 milliGRAM(s) Oral every 6 hours PRN Temp greater or equal to 38C (100.4F), Mild Pain (1 - 3)  aluminum hydroxide/magnesium hydroxide/simethicone Suspension 30 milliLiter(s) Oral every 4 hours PRN Dyspepsia  dextrose Oral Gel 15 Gram(s) Oral once PRN Blood Glucose LESS THAN 70 milliGRAM(s)/deciliter  HYDROmorphone   Tablet 4 milliGRAM(s) Oral every 6 hours PRN Moderate Pain (4 - 6)  HYDROmorphone  Injectable 1 milliGRAM(s) IV Push every 6 hours PRN Severe Pain (7 - 10)  melatonin 3 milliGRAM(s) Oral at bedtime PRN Insomnia  methocarbamol 750 milliGRAM(s) Oral every 8 hours PRN for muscle spasm  ondansetron Injectable 4 milliGRAM(s) IV Push every 8 hours PRN Nausea and/or Vomiting      Diet, Consistent Carbohydrate w/Evening Snack:   DASH/TLC Sodium & Cholesterol Restricted (04-10-25 @ 10:12) [Active]          Vital Signs Last 24 Hrs  T(C): 36.9 (13 Apr 2025 05:03), Max: 37 (12 Apr 2025 14:25)  T(F): 98.5 (13 Apr 2025 05:03), Max: 98.6 (12 Apr 2025 14:25)  HR: 59 (13 Apr 2025 05:03) (59 - 85)  BP: 128/74 (13 Apr 2025 05:03) (127/80 - 177/80)  BP(mean): --  RR: 18 (13 Apr 2025 05:03) (17 - 18)  SpO2: 96% (13 Apr 2025 05:03) (95% - 100%)    Parameters below as of 13 Apr 2025 05:03  Patient On (Oxygen Delivery Method): room air          04-12-25 @ 07:01  -  04-13-25 @ 07:00  --------------------------------------------------------  IN: 0 mL / OUT: 4250 mL / NET: -4250 mL              LABS:                        9.3    9.38  )-----------( 199      ( 13 Apr 2025 07:55 )             28.6     04-13    139  |  104  |  36[H]  ----------------------------<  210[H]  3.8   |  24  |  2.88[H]    Ca    8.2[L]      13 Apr 2025 07:55    TPro  6.1  /  Alb  2.6[L]  /  TBili  0.2  /  DBili  x   /  AST  14  /  ALT  23  /  AlkPhos  109  04-12    PT/INR - ( 13 Apr 2025 07:55 )   PT: 29.0 sec;   INR: 2.48 ratio           Urinalysis Basic - ( 13 Apr 2025 07:55 )    Color: x / Appearance: x / SG: x / pH: x  Gluc: 210 mg/dL / Ketone: x  / Bili: x / Urobili: x   Blood: x / Protein: x / Nitrite: x   Leuk Esterase: x / RBC: x / WBC x   Sq Epi: x / Non Sq Epi: x / Bacteria: x            WBC:  WBC Count: 9.38 K/uL (04-13 @ 07:55)  WBC Count: 9.30 K/uL (04-12 @ 11:55)  WBC Count: 9.71 K/uL (04-11 @ 05:30)  WBC Count: 15.69 K/uL (04-10 @ 03:04)      MICROBIOLOGY:  RECENT CULTURES:  04-10 Blood Blood XXXX XXXX   No growth at 48 Hours    04-10 Catheterized XXXX XXXX   <10,000 CFU/mL Normal Urogenital Ellen                PT/INR - ( 13 Apr 2025 07:55 )   PT: 29.0 sec;   INR: 2.48 ratio             Sodium:  Sodium: 139 mmol/L (04-13 @ 07:55)  Sodium: 137 mmol/L (04-12 @ 11:55)  Sodium: 135 mmol/L (04-11 @ 05:30)  Sodium: 136 mmol/L (04-10 @ 03:04)      2.88 mg/dL 04-13 @ 07:55  3.04 mg/dL 04-12 @ 11:55  3.14 mg/dL 04-11 @ 05:30  2.63 mg/dL 04-10 @ 03:04      Hemoglobin:  Hemoglobin: 9.3 g/dL (04-13 @ 07:55)  Hemoglobin: 9.4 g/dL (04-12 @ 11:55)  Hemoglobin: 9.7 g/dL (04-11 @ 05:30)  Hemoglobin: 10.7 g/dL (04-10 @ 03:04)      Platelets: Platelet Count - Automated: 199 K/uL (04-13 @ 07:55)  Platelet Count - Automated: 217 K/uL (04-12 @ 11:55)  Platelet Count - Automated: 223 K/uL (04-11 @ 05:30)  Platelet Count - Automated: 259 K/uL (04-10 @ 03:04)      LIVER FUNCTIONS - ( 12 Apr 2025 11:55 )  Alb: 2.6 g/dL / Pro: 6.1 g/dL / ALK PHOS: 109 U/L / ALT: 23 U/L / AST: 14 U/L / GGT: x             Urinalysis Basic - ( 13 Apr 2025 07:55 )    Color: x / Appearance: x / SG: x / pH: x  Gluc: 210 mg/dL / Ketone: x  / Bili: x / Urobili: x   Blood: x / Protein: x / Nitrite: x   Leuk Esterase: x / RBC: x / WBC x   Sq Epi: x / Non Sq Epi: x / Bacteria: x        RADIOLOGY & ADDITIONAL STUDIES:      MICROBIOLOGY:  RECENT CULTURES:  04-10 Blood Blood XXXX XXXX   No growth at 48 Hours    04-10 Catheterized XXXX XXXX   <10,000 CFU/mL Normal Urogenital Ellen

## 2025-04-14 LAB
ALBUMIN SERPL ELPH-MCNC: 2.6 G/DL — LOW (ref 3.3–5)
ALP SERPL-CCNC: 105 U/L — SIGNIFICANT CHANGE UP (ref 30–120)
ALT FLD-CCNC: 23 U/L — SIGNIFICANT CHANGE UP (ref 10–60)
ANION GAP SERPL CALC-SCNC: 9 MMOL/L — SIGNIFICANT CHANGE UP (ref 5–17)
AST SERPL-CCNC: 12 U/L — SIGNIFICANT CHANGE UP (ref 10–40)
BILIRUB SERPL-MCNC: 0.3 MG/DL — SIGNIFICANT CHANGE UP (ref 0.2–1.2)
BUN SERPL-MCNC: 36 MG/DL — HIGH (ref 7–23)
CALCIUM SERPL-MCNC: 8.4 MG/DL — SIGNIFICANT CHANGE UP (ref 8.4–10.5)
CHLORIDE SERPL-SCNC: 103 MMOL/L — SIGNIFICANT CHANGE UP (ref 96–108)
CO2 SERPL-SCNC: 26 MMOL/L — SIGNIFICANT CHANGE UP (ref 22–31)
CREAT SERPL-MCNC: 2.85 MG/DL — HIGH (ref 0.5–1.3)
EGFR: 26 ML/MIN/1.73M2 — LOW
EGFR: 26 ML/MIN/1.73M2 — LOW
GLUCOSE BLDC GLUCOMTR-MCNC: 150 MG/DL — HIGH (ref 70–99)
GLUCOSE BLDC GLUCOMTR-MCNC: 157 MG/DL — HIGH (ref 70–99)
GLUCOSE BLDC GLUCOMTR-MCNC: 180 MG/DL — HIGH (ref 70–99)
GLUCOSE BLDC GLUCOMTR-MCNC: 184 MG/DL — HIGH (ref 70–99)
GLUCOSE SERPL-MCNC: 167 MG/DL — HIGH (ref 70–99)
HCT VFR BLD CALC: 29.8 % — LOW (ref 39–50)
HGB BLD-MCNC: 9.4 G/DL — LOW (ref 13–17)
INR BLD: 3.04 RATIO — HIGH (ref 0.85–1.16)
MCHC RBC-ENTMCNC: 22.7 PG — LOW (ref 27–34)
MCHC RBC-ENTMCNC: 31.5 G/DL — LOW (ref 32–36)
MCV RBC AUTO: 71.8 FL — LOW (ref 80–100)
NRBC BLD AUTO-RTO: 0 /100 WBCS — SIGNIFICANT CHANGE UP (ref 0–0)
PLATELET # BLD AUTO: 241 K/UL — SIGNIFICANT CHANGE UP (ref 150–400)
POTASSIUM SERPL-MCNC: 4 MMOL/L — SIGNIFICANT CHANGE UP (ref 3.5–5.3)
POTASSIUM SERPL-SCNC: 4 MMOL/L — SIGNIFICANT CHANGE UP (ref 3.5–5.3)
PROT SERPL-MCNC: 6.4 G/DL — SIGNIFICANT CHANGE UP (ref 6–8.3)
PROTHROM AB SERPL-ACNC: 34.9 SEC — HIGH (ref 9.9–13.4)
RBC # BLD: 4.15 M/UL — LOW (ref 4.2–5.8)
RBC # FLD: 16 % — HIGH (ref 10.3–14.5)
SODIUM SERPL-SCNC: 138 MMOL/L — SIGNIFICANT CHANGE UP (ref 135–145)
WBC # BLD: 9.78 K/UL — SIGNIFICANT CHANGE UP (ref 3.8–10.5)
WBC # FLD AUTO: 9.78 K/UL — SIGNIFICANT CHANGE UP (ref 3.8–10.5)

## 2025-04-14 RX ADMIN — Medication 500 MILLIGRAM(S): at 11:22

## 2025-04-14 RX ADMIN — OXYBUTYNIN CHLORIDE 10 MILLIGRAM(S): 5 TABLET, FILM COATED, EXTENDED RELEASE ORAL at 05:55

## 2025-04-14 RX ADMIN — NICOTINE POLACRILEX 1 PATCH: 4 GUM, CHEWING ORAL at 11:23

## 2025-04-14 RX ADMIN — Medication 4 MILLIGRAM(S): at 23:01

## 2025-04-14 RX ADMIN — DULOXETINE 60 MILLIGRAM(S): 20 CAPSULE, DELAYED RELEASE ORAL at 11:22

## 2025-04-14 RX ADMIN — Medication 3 MILLIGRAM(S): at 23:00

## 2025-04-14 RX ADMIN — Medication 1 TABLET(S): at 11:21

## 2025-04-14 RX ADMIN — Medication 4 MILLIGRAM(S): at 11:22

## 2025-04-14 RX ADMIN — NICOTINE POLACRILEX 1 PATCH: 4 GUM, CHEWING ORAL at 19:55

## 2025-04-14 RX ADMIN — Medication 4 MILLIGRAM(S): at 12:20

## 2025-04-14 RX ADMIN — Medication 0.1 MILLIGRAM(S): at 05:55

## 2025-04-14 RX ADMIN — OXYBUTYNIN CHLORIDE 10 MILLIGRAM(S): 5 TABLET, FILM COATED, EXTENDED RELEASE ORAL at 18:30

## 2025-04-14 RX ADMIN — FINASTERIDE 5 MILLIGRAM(S): 1 TABLET, FILM COATED ORAL at 11:21

## 2025-04-14 RX ADMIN — METOPROLOL SUCCINATE 25 MILLIGRAM(S): 50 TABLET, EXTENDED RELEASE ORAL at 05:55

## 2025-04-14 RX ADMIN — AMLODIPINE BESYLATE 10 MILLIGRAM(S): 10 TABLET ORAL at 05:55

## 2025-04-14 RX ADMIN — FOLIC ACID 1 MILLIGRAM(S): 1 TABLET ORAL at 11:21

## 2025-04-14 RX ADMIN — NICOTINE POLACRILEX 1 PATCH: 4 GUM, CHEWING ORAL at 08:51

## 2025-04-14 RX ADMIN — INSULIN GLARGINE-YFGN 10 UNIT(S): 100 INJECTION, SOLUTION SUBCUTANEOUS at 23:10

## 2025-04-14 RX ADMIN — Medication 325 MILLIGRAM(S): at 11:22

## 2025-04-14 RX ADMIN — GABAPENTIN 600 MILLIGRAM(S): 400 CAPSULE ORAL at 14:09

## 2025-04-14 RX ADMIN — GABAPENTIN 600 MILLIGRAM(S): 400 CAPSULE ORAL at 05:55

## 2025-04-14 RX ADMIN — Medication 4 MILLIGRAM(S): at 23:31

## 2025-04-14 RX ADMIN — INSULIN LISPRO 1: 100 INJECTION, SOLUTION INTRAVENOUS; SUBCUTANEOUS at 08:44

## 2025-04-14 RX ADMIN — METOPROLOL SUCCINATE 25 MILLIGRAM(S): 50 TABLET, EXTENDED RELEASE ORAL at 18:30

## 2025-04-14 RX ADMIN — NICOTINE POLACRILEX 1 PATCH: 4 GUM, CHEWING ORAL at 11:46

## 2025-04-14 RX ADMIN — INSULIN LISPRO 1: 100 INJECTION, SOLUTION INTRAVENOUS; SUBCUTANEOUS at 12:36

## 2025-04-14 RX ADMIN — Medication 1 MILLIGRAM(S): at 01:15

## 2025-04-14 RX ADMIN — Medication 0.1 MILLIGRAM(S): at 18:31

## 2025-04-14 RX ADMIN — Medication 40 MILLIGRAM(S): at 05:55

## 2025-04-14 RX ADMIN — GABAPENTIN 600 MILLIGRAM(S): 400 CAPSULE ORAL at 23:00

## 2025-04-14 NOTE — PROGRESS NOTE ADULT - PROBLEM SELECTOR PLAN 2
septic workup , ID eval , patient just completed 2 courses of meropenem ID consult fro possible Funguria and chronic management of  recurrent UTI.seen by urologist -spasms/ ryan-cath  leakage  (recent multiple admissions for same problem at [place]), catheter was just exchanged 3 days prior to [place]  er visit   History chronic bladder colonization and n which should not be treated, and history recurrent UTI which require treatment,  recent C&S was negative. Seen by ID -Doubt CAUTI  Bladder spasms has been a chronic issue  Defer antibiotics  Trend temps and cbc
septic workup , ID eval , patient just completed 2 courses of meropenem ID consult fro possible Funguria and chronic management of  recurrent UTI.seen by urologist -spasms/ ryan-cath  leakage  (recent multiple admissions for same problem at [place]), catheter was just exchanged 3 days prior to [place]  er visit   History chronic bladder colonization and n which should not be treated, and history recurrent UTI which require treatment,  recent C&S was negative. Seen by ID -Doubt CAUTI  Bladder spasms has been a chronic issue  Defer antibiotics  Trend temps and cbc
septic workup , ID eval , patient just completed 2 courses of meropenem ID consult fro possible Funguria and chronic management of  recurrent UTI.seen by urologist -spasms/ ryan-cath  leakage  (recent multiple admissions for same problem at [facility]), catheter was just exchanged 3 days prior to [place]  er visit   History chronic bladder colonization and n which should not be treated, and history recurrent UTI which require treatment,  recent C&S was negative. Seen by ID -Doubt CAUTI  Bladder spasms has been a chronic issue  Defer antibiotics  Trend temps and cbc

## 2025-04-14 NOTE — PROGRESS NOTE ADULT - PROBLEM SELECTOR PLAN 3
continue home medications ,   Recommendations  Treat bladder spasms with Myrbetriq 50 MG and Oxybutynin  10 mg  BID, monitor for constipations and start aggressive routine to prevent constipation with active hydration and, stool softeners and bulking agents (only if hydrating)  Cranberry extract  Referral upon discharge o Dr. JAY Overton ([phone]) who is expert in chronic bladder pain and neurogenic disorders
continue home medications ,   Recommendations  Treat bladder spasms with Myrbetriq 50 MG and Oxybutynin  10 mg  BID, monitor for constipations and start aggressive routine to prevent constipation with active hydration and, stool softeners and bulking agents (only if hydrating)  Cranberry extract  Referral upon discharge o Dr. JAY Overton ([phone]) who is expert in chronic bladder pain and neurogenic disorders
continue home medications ,   Recommendations  Treat bladder spasms with Myrbetriq 50 MG and Oxybutynin  10 mg  BID, monitor for constipations and start aggressive routine to prevent constipation with active hydration and, stool softeners and bulking agents (only if hydrating)  Cranberry extract  Referral upon discharge o Dr. JAY Overton ([phone]) who is expert in chronic bladder pain and neurogenic disorders

## 2025-04-14 NOTE — PROGRESS NOTE ADULT - SUBJECTIVE AND OBJECTIVE BOX
PROGRESS NOTE  Patient is a 50y old  Male who presents with a chief complaint of bladder spasms (14 Apr 2025 10:14)      OVERNIGHT      HPI:  50y M from [facility] nursing home BIBEMS with suprapubic catheter as pt concerned for urine infection and bladder spasms, pt has a urologist at [facility], has been admitted there 3 times in the past 3 weeks, just discharged and arrived back at [facility] less than 4hr PTA to [place], states he was sent there for infection and received 2 doses of IV antibiotics and then they were stopped and he was sent home today, states his previous 2 recent admissions he petitioned for longer stays, and won, but eventually was discharged, pt reports he refused to have IV antibiotics at the nursing home previously as he believes they will be dislodged and he wants to be in the hospital until antibiotics are complete, reports he was not discharged on antibiotics this time, urine culture from 4/5 done here was negative (which pt states he was not aware of), pt also states his bladder spasms are painful and wants pain medication, states he does have chronic spasms, but they recently stopped his myrbitriq and thinks that may have made them worse (10 Apr 2025 10:13)    PAST MEDICAL & SURGICAL HISTORY:  BPH (benign prostatic hyperplasia)      HTN (hypertension)      Type 2 diabetes mellitus      Peripheral neuropathy      History of Guillain-[place] syndrome      History of CVA in adulthood      DDD (degenerative disc disease), lumbar      DVT, lower extremity      Renal stones      H/O Guillain-[place] syndrome      History of neurogenic bowel      DM2 (diabetes mellitus, type 2)      HTN (hypertension)      BPH without obstruction/lower urinary tract symptoms      Degenerative arthritis      DVT of lower limb, acute      CVA (cerebrovascular accident)      CVD (cerebrovascular disease)      Muscle weakness      Iron deficiency anemia      History of muscle spasm      Pain, unspecified      Vitamin deficiency      Obesity      GERD (gastroesophageal reflux disease)      H/O constipation      H/O insomnia      Essential (primary) hypertension      Acute embolism and thrombosis of deep vein of lower extremity      BPH (benign prostatic hyperplasia)      Diabetes mellitus due to underlying condition with diabetic neuropathy      Major depressive disorder, single episode      S/P IVC filter      H/O lithotripsy      Chronic suprapubic catheter          MEDICATIONS  (STANDING):  amLODIPine   Tablet 10 milliGRAM(s) Oral daily  ascorbic acid 500 milliGRAM(s) Oral daily  cloNIDine 0.1 milliGRAM(s) Oral two times a day  dextrose 5%. 1000 milliLiter(s) (50 mL/Hr) IV Continuous <Continuous>  dextrose 5%. 1000 milliLiter(s) (100 mL/Hr) IV Continuous <Continuous>  dextrose 50% Injectable 25 Gram(s) IV Push once  dextrose 50% Injectable 12.5 Gram(s) IV Push once  dextrose 50% Injectable 25 Gram(s) IV Push once  DULoxetine 60 milliGRAM(s) Oral daily  ferrous    sulfate 325 milliGRAM(s) Oral daily  finasteride 5 milliGRAM(s) Oral daily  folic acid 1 milliGRAM(s) Oral daily  gabapentin 600 milliGRAM(s) Oral every 8 hours  glucagon  Injectable 1 milliGRAM(s) IntraMuscular once  insulin glargine Injectable (LANTUS) 10 Unit(s) SubCutaneous at bedtime  insulin lispro (ADMELOG) corrective regimen sliding scale   SubCutaneous three times a day before meals  metoprolol tartrate 25 milliGRAM(s) Oral two times a day  multivitamin 1 Tablet(s) Oral daily  nicotine -  14 mG/24Hr(s) Patch 1 Patch Transdermal daily  oxybutynin 10 milliGRAM(s) Oral two times a day  pantoprazole    Tablet 40 milliGRAM(s) Oral before breakfast  senna 2 Tablet(s) Oral at bedtime  warfarin 4 milliGRAM(s) Oral at bedtime    MEDICATIONS  (PRN):  acetaminophen     Tablet .. 650 milliGRAM(s) Oral every 6 hours PRN Temp greater or equal to 38C (100.4F), Mild Pain (1 - 3)  aluminum hydroxide/magnesium hydroxide/simethicone Suspension 30 milliLiter(s) Oral every 4 hours PRN Dyspepsia  dextrose Oral Gel 15 Gram(s) Oral once PRN Blood Glucose LESS THAN 70 milliGRAM(s)/deciliter  HYDROmorphone   Tablet 4 milliGRAM(s) Oral every 6 hours PRN Moderate Pain (4 - 6)  melatonin 3 milliGRAM(s) Oral at bedtime PRN Insomnia  methocarbamol 750 milliGRAM(s) Oral every 8 hours PRN for muscle spasm  ondansetron Injectable 4 milliGRAM(s) IV Push every 8 hours PRN Nausea and/or Vomiting      OBJECTIVE    T(C): 36.7 (04-14-25 @ 08:44), Max: 36.8 (04-13-25 @ 14:35)  HR: 55 (04-14-25 @ 08:44) (55 - 71)  BP: 125/77 (04-14-25 @ 08:44) (125/77 - 152/87)  RR: 18 (04-14-25 @ 08:44) (16 - 18)  SpO2: 95% (04-14-25 @ 08:44) (94% - 98%)  Wt(kg): --  I&O's Summary    13 Apr 2025 07:01  -  14 Apr 2025 07:00  --------------------------------------------------------  IN: 0 mL / OUT: 2590 mL / NET: -2590 mL          REVIEW OF SYSTEMS:  CONSTITUTIONAL: No fever, weight loss, or fatigue  EYES: No eye pain, visual disturbances, or discharge  ENMT:   No sinus or throat pain  NECK: No pain or stiffness  RESPIRATORY: No cough, wheezing, chills or hemoptysis; No shortness of breath  CARDIOVASCULAR: No chest pain, palpitations, dizziness, or leg swelling  GASTROINTESTINAL: No abdominal pain. No nausea, vomiting; No diarrhea or constipation. No melena or hematochezia.  GENITOURINARY: No dysuria, frequency, hematuria, or incontinence  NEUROLOGICAL: No headaches, memory loss, loss of strength, numbness, or tremors  SKIN: No itching, burning, rashes, or lesions   MUSCULOSKELETAL: No joint pain or swelling; No muscle, back, or extremity pain    PHYSICAL EXAM:  Appearance: NAD. VS past 24 hrs -as above   HEENT:   Moist oral mucosa. Conjunctiva clear b/l.   Neck : supple  Respiratory: Lungs CTAB.  Gastrointestinal:  Soft, nontender. No rebound. No rigidity. BS present	  Cardiovascular: RRR ,S1S2 present  Neurologic: Non-focal. Moving all extremities.  Extremities: No edema. No erythema. No calf tenderness.  Skin: No rashes, No ecchymoses, No cyanosis.	  wounds ,skin lesions-See skin assesment flow sheet   LABS:                        9.4    9.78  )-----------( 241      ( 14 Apr 2025 08:18 )             29.8     04-14    138  |  103  |  36[H]  ----------------------------<  167[H]  4.0   |  26  |  2.85[H]    Ca    8.4      14 Apr 2025 08:18    TPro  6.4  /  Alb  2.6[L]  /  TBili  0.3  /  DBili  x   /  AST  12  /  ALT  23  /  AlkPhos  105  04-14    CAPILLARY BLOOD GLUCOSE      POCT Blood Glucose.: 180 mg/dL (14 Apr 2025 08:26)  POCT Blood Glucose.: 183 mg/dL (13 Apr 2025 21:08)  POCT Blood Glucose.: 214 mg/dL (13 Apr 2025 17:00)  POCT Blood Glucose.: 160 mg/dL (13 Apr 2025 12:33)    PT/INR - ( 14 Apr 2025 08:18 )   PT: 34.9 sec;   INR: 3.04 ratio           Urinalysis Basic - ( 14 Apr 2025 08:18 )    Color: x / Appearance: x / SG: x / pH: x  Gluc: 167 mg/dL / Ketone: x  / Bili: x / Urobili: x   Blood: x / Protein: x / Nitrite: x   Leuk Esterase: x / RBC: x / WBC x   Sq Epi: x / Non Sq Epi: x / Bacteria: x        Culture - Blood (collected 10 Apr 2025 16:26)  Source: Blood Blood  Preliminary Report (14 Apr 2025 01:01):    No growth at 72 Hours    Culture - Blood (collected 10 Apr 2025 16:26)  Source: Blood Blood  Preliminary Report (14 Apr 2025 01:01):    No growth at 72 Hours    Urinalysis with Rflx Culture (collected 10 Apr 2025 03:04)    Culture - Urine (collected 10 Apr 2025 03:04)  Source: Catheterized  Final Report (11 Apr 2025 15:40):    <10,000 CFU/mL Normal Urogenital Ellen    Urinalysis with Rflx Culture (collected 05 Apr 2025 20:01)    Culture - Urine (collected 05 Apr 2025 20:01)  Source: Catheterized  Final Report (06 Apr 2025 19:16):    <10,000 CFU/mL Normal Urogenital Ellen    Culture - Blood (collected 05 Apr 2025 18:37)  Source: Blood Blood  Final Report (11 Apr 2025 02:01):    No growth at 5 days    Culture - Blood (collected 05 Apr 2025 18:37)  Source: Blood Blood  Final Report (11 Apr 2025 02:01):    No growth at 5 days      RADIOLOGY & ADDITIONAL TESTS:   reviewed elctronically  ASSESSMENT/PLAN: 	     PROGRESS NOTE  Patient is a 50y old  Male who presents with a chief complaint of bladder spasms (14 Apr 2025 10:14)    Chart and available morning labs /imaging are reviewed electronically , urgent issues addressed . More information  is being added upon completion of rounds , when more information is collected and management discussed with consultants , medical staff and social service/case management on the floor   OVERNIGHT  No new issues reported by medical staff . All above noted Patient is resting in a bed comfortably .Appealed discharge  .No distress noted     HPI:  50y M from [facility] nursing home JESSENIA with suprapubic catheter as pt concerned for urine infection and bladder spasms, pt has a urologist at [facility], has been admitted there 3 times in the past 3 weeks, just discharged and arrived back at [facility] less than 4hr PTA to [place], states he was sent there for infection and received 2 doses of IV antibiotics and then they were stopped and he was sent home today, states his previous 2 recent admissions he petitioned for longer stays, and won, but eventually was discharged, pt reports he refused to have IV antibiotics at the nursing home previously as he believes they will be dislodged and he wants to be in the hospital until antibiotics are complete, reports he was not discharged on antibiotics this time, urine culture from 4/5 done here was negative (which pt states he was not aware of), pt also states his bladder spasms are painful and wants pain medication, states he does have chronic spasms, but they recently stopped his myrbitriq and thinks that may have made them worse (10 Apr 2025 10:13)    PAST MEDICAL & SURGICAL HISTORY:  BPH (benign prostatic hyperplasia)      HTN (hypertension)      Type 2 diabetes mellitus      Peripheral neuropathy      History of Guillain-[place] syndrome      History of CVA in adulthood      DDD (degenerative disc disease), lumbar      DVT, lower extremity      Renal stones      H/O Guillain-[place] syndrome      History of neurogenic bowel      DM2 (diabetes mellitus, type 2)      HTN (hypertension)      BPH without obstruction/lower urinary tract symptoms      Degenerative arthritis      DVT of lower limb, acute      CVA (cerebrovascular accident)      CVD (cerebrovascular disease)      Muscle weakness      Iron deficiency anemia      History of muscle spasm      Pain, unspecified      Vitamin deficiency      Obesity      GERD (gastroesophageal reflux disease)      H/O constipation      H/O insomnia      Essential (primary) hypertension      Acute embolism and thrombosis of deep vein of lower extremity      BPH (benign prostatic hyperplasia)      Diabetes mellitus due to underlying condition with diabetic neuropathy      Major depressive disorder, single episode      S/P IVC filter      H/O lithotripsy      Chronic suprapubic catheter          MEDICATIONS  (STANDING):  amLODIPine   Tablet 10 milliGRAM(s) Oral daily  ascorbic acid 500 milliGRAM(s) Oral daily  cloNIDine 0.1 milliGRAM(s) Oral two times a day  dextrose 5%. 1000 milliLiter(s) (50 mL/Hr) IV Continuous <Continuous>  dextrose 5%. 1000 milliLiter(s) (100 mL/Hr) IV Continuous <Continuous>  dextrose 50% Injectable 25 Gram(s) IV Push once  dextrose 50% Injectable 12.5 Gram(s) IV Push once  dextrose 50% Injectable 25 Gram(s) IV Push once  DULoxetine 60 milliGRAM(s) Oral daily  ferrous    sulfate 325 milliGRAM(s) Oral daily  finasteride 5 milliGRAM(s) Oral daily  folic acid 1 milliGRAM(s) Oral daily  gabapentin 600 milliGRAM(s) Oral every 8 hours  glucagon  Injectable 1 milliGRAM(s) IntraMuscular once  insulin glargine Injectable (LANTUS) 10 Unit(s) SubCutaneous at bedtime  insulin lispro (ADMELOG) corrective regimen sliding scale   SubCutaneous three times a day before meals  metoprolol tartrate 25 milliGRAM(s) Oral two times a day  multivitamin 1 Tablet(s) Oral daily  nicotine -  14 mG/24Hr(s) Patch 1 Patch Transdermal daily  oxybutynin 10 milliGRAM(s) Oral two times a day  pantoprazole    Tablet 40 milliGRAM(s) Oral before breakfast  senna 2 Tablet(s) Oral at bedtime  warfarin 4 milliGRAM(s) Oral at bedtime    MEDICATIONS  (PRN):  acetaminophen     Tablet .. 650 milliGRAM(s) Oral every 6 hours PRN Temp greater or equal to 38C (100.4F), Mild Pain (1 - 3)  aluminum hydroxide/magnesium hydroxide/simethicone Suspension 30 milliLiter(s) Oral every 4 hours PRN Dyspepsia  dextrose Oral Gel 15 Gram(s) Oral once PRN Blood Glucose LESS THAN 70 milliGRAM(s)/deciliter  HYDROmorphone   Tablet 4 milliGRAM(s) Oral every 6 hours PRN Moderate Pain (4 - 6)  melatonin 3 milliGRAM(s) Oral at bedtime PRN Insomnia  methocarbamol 750 milliGRAM(s) Oral every 8 hours PRN for muscle spasm  ondansetron Injectable 4 milliGRAM(s) IV Push every 8 hours PRN Nausea and/or Vomiting      OBJECTIVE    T(C): 36.7 (04-14-25 @ 08:44), Max: 36.8 (04-13-25 @ 14:35)  HR: 55 (04-14-25 @ 08:44) (55 - 71)  BP: 125/77 (04-14-25 @ 08:44) (125/77 - 152/87)  RR: 18 (04-14-25 @ 08:44) (16 - 18)  SpO2: 95% (04-14-25 @ 08:44) (94% - 98%)  Wt(kg): --  I&O's Summary    13 Apr 2025 07:01  -  14 Apr 2025 07:00  --------------------------------------------------------  IN: 0 mL / OUT: 2590 mL / NET: -2590 mL          REVIEW OF SYSTEMS:  CONSTITUTIONAL: No fever, weight loss, or fatigue  EYES: No eye pain, visual disturbances, or discharge  ENMT:   No sinus or throat pain  NECK: No pain or stiffness  RESPIRATORY: No cough, wheezing, chills or hemoptysis; No shortness of breath  CARDIOVASCULAR: No chest pain, palpitations, dizziness, or leg swelling  GASTROINTESTINAL: No abdominal pain. No nausea, vomiting; No diarrhea or constipation. No melena or hematochezia.  GENITOURINARY: No dysuria, frequency, hematuria, or incontinence  NEUROLOGICAL: No headaches, memory loss, loss of strength, numbness, or tremors  SKIN: No itching, burning, rashes, or lesions   MUSCULOSKELETAL: No joint pain or swelling; No muscle, back, or extremity pain    PHYSICAL EXAM:  Appearance: NAD. VS past 24 hrs -as above   HEENT:   Moist oral mucosa. Conjunctiva clear b/l.   Neck : supple  Respiratory: Lungs CTAB.  Gastrointestinal:  Soft, nontender. No rebound. No rigidity. BS present	  Cardiovascular: RRR ,S1S2 present  Neurologic: Non-focal. Moving all extremities.  Extremities: No edema. No erythema. No calf tenderness.  Skin: No rashes, No ecchymoses, No cyanosis.	  wounds ,skin lesions-See skin assesment flow sheet   LABS:                        9.4    9.78  )-----------( 241      ( 14 Apr 2025 08:18 )             29.8     04-14    138  |  103  |  36[H]  ----------------------------<  167[H]  4.0   |  26  |  2.85[H]    Ca    8.4      14 Apr 2025 08:18    TPro  6.4  /  Alb  2.6[L]  /  TBili  0.3  /  DBili  x   /  AST  12  /  ALT  23  /  AlkPhos  105  04-14    CAPILLARY BLOOD GLUCOSE      POCT Blood Glucose.: 180 mg/dL (14 Apr 2025 08:26)  POCT Blood Glucose.: 183 mg/dL (13 Apr 2025 21:08)  POCT Blood Glucose.: 214 mg/dL (13 Apr 2025 17:00)  POCT Blood Glucose.: 160 mg/dL (13 Apr 2025 12:33)    PT/INR - ( 14 Apr 2025 08:18 )   PT: 34.9 sec;   INR: 3.04 ratio           Urinalysis Basic - ( 14 Apr 2025 08:18 )    Color: x / Appearance: x / SG: x / pH: x  Gluc: 167 mg/dL / Ketone: x  / Bili: x / Urobili: x   Blood: x / Protein: x / Nitrite: x   Leuk Esterase: x / RBC: x / WBC x   Sq Epi: x / Non Sq Epi: x / Bacteria: x        Culture - Blood (collected 10 Apr 2025 16:26)  Source: Blood Blood  Preliminary Report (14 Apr 2025 01:01):    No growth at 72 Hours    Culture - Blood (collected 10 Apr 2025 16:26)  Source: Blood Blood  Preliminary Report (14 Apr 2025 01:01):    No growth at 72 Hours    Urinalysis with Rflx Culture (collected 10 Apr 2025 03:04)    Culture - Urine (collected 10 Apr 2025 03:04)  Source: Catheterized  Final Report (11 Apr 2025 15:40):    <10,000 CFU/mL Normal Urogenital Ellen    Urinalysis with Rflx Culture (collected 05 Apr 2025 20:01)    Culture - Urine (collected 05 Apr 2025 20:01)  Source: Catheterized  Final Report (06 Apr 2025 19:16):    <10,000 CFU/mL Normal Urogenital Ellen    Culture - Blood (collected 05 Apr 2025 18:37)  Source: Blood Blood  Final Report (11 Apr 2025 02:01):    No growth at 5 days    Culture - Blood (collected 05 Apr 2025 18:37)  Source: Blood Blood  Final Report (11 Apr 2025 02:01):    No growth at 5 days      RADIOLOGY & ADDITIONAL TESTS:   reviewed elctronically  ASSESSMENT/PLAN: 	    25 minutes aggregate time was spent on this visit, 50% visit time spent in care co-ordination with other attendings and counselling patient .I have discussed care plan with patient / HCP/family member ,who expressed understanding of problems treatment and their effect and side effects, alternatives in details. I have asked if they have any questions and concerns and appropriately addressed them to best of my ability.

## 2025-04-14 NOTE — CHART NOTE - NSCHARTNOTEFT_GEN_A_CORE
Assessment:   brief history: Per H&P, "50y M from [facility] nursing home BIBEMS with suprapubic catheter as pt concerned for urine infection and bladder spasms, pt has a urologist at [facility], has been admitted there 3 times in the past 3 weeks, just discharged and arrived back at [facility] less than 4hr PTA to [place], states he was sent there for infection and received 2 doses of IV antibiotics and then they were stopped and he was sent home today, states his previous 2 recent admissions he petitioned for longer stays, and won, but eventually was discharged, pt reports he refused to have IV antibiotics at the nursing home previously as he believes they will be dislodged and he wants to be in the hospital until antibiotics are complete, reports he was not discharged on antibiotics this time, urine culture from 4/5 done here was negative (which pt states he was not aware of), pt also states his bladder spasms are painful and wants pain medication, states he does have chronic spasms, but they recently stopped his myrbitriq and thinks that may have made them worse."      Patient seen for nutrition follow up. Hospital course noted. chart reviewed. patient reports good appetite and good PO intake. Lunch tray still in the room at the time of my visit with 100% consumed. Assisted with breakfast menu selection for tomorrow per patient request. Reviewed concepts of consistent CHO diet and importance of mixed meals. Patient verbalizes understanding, without questions at this time. He is receptive to written diet education materials being left at bedside for consistent CHO nutrition therapy and diabetes plate method.    Factors impacting intake: [ ] none [ ] nausea  [ ] vomiting [ ] diarrhea [ ] constipation  [ ]chewing problems [ ] swallowing issues  [ ] other:     Diet Presciption: Diet, Consistent Carbohydrate w/Evening Snack:   DASH/TLC {Sodium & Cholesterol Restricted} (04-10-25 @ 10:12)    Intake: excellent per patient    Current Weight: Weight (kg): 127 (04-09 @ 23:43)no new weight  % Weight Change    Pertinent Medications: MEDICATIONS  (STANDING):  amLODIPine   Tablet 10 milliGRAM(s) Oral daily  ascorbic acid 500 milliGRAM(s) Oral daily  cloNIDine 0.1 milliGRAM(s) Oral two times a day  dextrose 5%. 1000 milliLiter(s) (50 mL/Hr) IV Continuous <Continuous>  dextrose 5%. 1000 milliLiter(s) (100 mL/Hr) IV Continuous <Continuous>  dextrose 50% Injectable 25 Gram(s) IV Push once  dextrose 50% Injectable 12.5 Gram(s) IV Push once  dextrose 50% Injectable 25 Gram(s) IV Push once  DULoxetine 60 milliGRAM(s) Oral daily  ferrous    sulfate 325 milliGRAM(s) Oral daily  finasteride 5 milliGRAM(s) Oral daily  folic acid 1 milliGRAM(s) Oral daily  gabapentin 600 milliGRAM(s) Oral every 8 hours  glucagon  Injectable 1 milliGRAM(s) IntraMuscular once  insulin glargine Injectable (LANTUS) 10 Unit(s) SubCutaneous at bedtime  insulin lispro (ADMELOG) corrective regimen sliding scale   SubCutaneous three times a day before meals  metoprolol tartrate 25 milliGRAM(s) Oral two times a day  multivitamin 1 Tablet(s) Oral daily  nicotine -  14 mG/24Hr(s) Patch 1 Patch Transdermal daily  oxybutynin 10 milliGRAM(s) Oral two times a day  pantoprazole    Tablet 40 milliGRAM(s) Oral before breakfast  senna 2 Tablet(s) Oral at bedtime  warfarin 4 milliGRAM(s) Oral at bedtime    MEDICATIONS  (PRN):  acetaminophen     Tablet .. 650 milliGRAM(s) Oral every 6 hours PRN Temp greater or equal to 38C (100.4F), Mild Pain (1 - 3)  aluminum hydroxide/magnesium hydroxide/simethicone Suspension 30 milliLiter(s) Oral every 4 hours PRN Dyspepsia  dextrose Oral Gel 15 Gram(s) Oral once PRN Blood Glucose LESS THAN 70 milliGRAM(s)/deciliter  HYDROmorphone   Tablet 4 milliGRAM(s) Oral every 6 hours PRN Moderate Pain (4 - 6)  melatonin 3 milliGRAM(s) Oral at bedtime PRN Insomnia  methocarbamol 750 milliGRAM(s) Oral every 8 hours PRN for muscle spasm  ondansetron Injectable 4 milliGRAM(s) IV Push every 8 hours PRN Nausea and/or Vomiting    Pertinent Labs: 04-14 Na138 mmol/L Glu 167 mg/dL[H] K+ 4.0 mmol/L Cr  2.85 mg/dL[H] BUN 36 mg/dL[H] 04-14 Alb 2.6 g/dL[L]     CAPILLARY BLOOD GLUCOSE      POCT Blood Glucose.: 157 mg/dL (14 Apr 2025 11:45)  POCT Blood Glucose.: 180 mg/dL (14 Apr 2025 08:26)  POCT Blood Glucose.: 183 mg/dL (13 Apr 2025 21:08)  POCT Blood Glucose.: 214 mg/dL (13 Apr 2025 17:00)    Skin: + 1 generalized edema, no pressure injuries per documentation    Estimated Needs:   [x ] no change since previous assessment  [ ] recalculated:     Previous Nutrition Diagnosis: altered nutrition related labs  [   Nutrition Diagnosis is [x ] ongoing, being addressed with therapeutic diet and review of diet education  [ ] resolved [ ] not applicable     New Nutrition Diagnosis: [ x] not applicable       Interventions:   Recommend  [ ] Change Diet To:  [ ] Nutrition Supplement  [ ] Nutrition Support  [x ] Other: encourage menu selection, honor patient food preferences within therapeutic diet rx    Monitoring and Evaluation:   [x ] PO intake [ x ] Tolerance to diet prescription [ x ] weights [ x ] labs[ x ] follow up per protocol  [ ] other:

## 2025-04-14 NOTE — PROGRESS NOTE ADULT - PROBLEM SELECTOR PLAN 4
continue home medications ,   Recommendations  Treat bladder spasms with Myrbetriq 50 MG and Oxybutynin  10 mg  BID, monitor for constipations and start aggressive routine to prevent constipation with active hydration and, stool softeners and bulking agents (only if hydrating)  Cranberry extract  Referral upon discharge o Dr. JAY Overton ([phone]) who is expert in chronic bladder pain and neurogenic disorders
continue home medications ,   Recommendations  Treat bladder spasms with Myrbetriq 50 MG and Oxybutynin  10 mg  BID, monitor for constipations and start aggressive routine to prevent constipation with active hydration and, stool softeners and bulking agents (only if hydrating)  Cranberry extract  Referral upon discharge o Dr. JAY Overton ([phone]) who is expert in chronic bladder pain and neurogenic disorders
continue home medications ,   Recommendations  Treat bladder spasms with Myrbetriq 50 MG and Oxybutynin  10 mg  BID, monitor for constipations and start aggressive routine to prevent constipation with active hydration and, stool softeners and bulking agents (only if hydrating)  Cranberry extract  Referral upon discharge o Dr. JAY Overton ([phone]) who is expert in chronic bladder pain and neurogenic disorders

## 2025-04-14 NOTE — SOCIAL WORK PROGRESS NOTE - NSSWPROGRESSNOTE_GEN_ALL_CORE
JANINE met with patient and he reported that he just got off the phone with [facility] to appeal his discharge back to [facility].  SW to follow for needs and support.

## 2025-04-14 NOTE — PROGRESS NOTE ADULT - PROBLEM SELECTOR PLAN 1
SERIAL BMP ,INS/OUTS , Nephrology cons f/up

## 2025-04-15 LAB
ANION GAP SERPL CALC-SCNC: 8 MMOL/L — SIGNIFICANT CHANGE UP (ref 5–17)
BUN SERPL-MCNC: 37 MG/DL — HIGH (ref 7–23)
CALCIUM SERPL-MCNC: 8.5 MG/DL — SIGNIFICANT CHANGE UP (ref 8.4–10.5)
CHLORIDE SERPL-SCNC: 101 MMOL/L — SIGNIFICANT CHANGE UP (ref 96–108)
CO2 SERPL-SCNC: 26 MMOL/L — SIGNIFICANT CHANGE UP (ref 22–31)
CREAT SERPL-MCNC: 2.77 MG/DL — HIGH (ref 0.5–1.3)
EGFR: 27 ML/MIN/1.73M2 — LOW
EGFR: 27 ML/MIN/1.73M2 — LOW
GLUCOSE BLDC GLUCOMTR-MCNC: 156 MG/DL — HIGH (ref 70–99)
GLUCOSE BLDC GLUCOMTR-MCNC: 200 MG/DL — HIGH (ref 70–99)
GLUCOSE BLDC GLUCOMTR-MCNC: 221 MG/DL — HIGH (ref 70–99)
GLUCOSE BLDC GLUCOMTR-MCNC: 231 MG/DL — HIGH (ref 70–99)
GLUCOSE SERPL-MCNC: 187 MG/DL — HIGH (ref 70–99)
HCT VFR BLD CALC: 29 % — LOW (ref 39–50)
HGB BLD-MCNC: 9.4 G/DL — LOW (ref 13–17)
INR BLD: 3.46 RATIO — HIGH (ref 0.85–1.16)
MCHC RBC-ENTMCNC: 23 PG — LOW (ref 27–34)
MCHC RBC-ENTMCNC: 32.4 G/DL — SIGNIFICANT CHANGE UP (ref 32–36)
MCV RBC AUTO: 70.9 FL — LOW (ref 80–100)
NRBC BLD AUTO-RTO: 0 /100 WBCS — SIGNIFICANT CHANGE UP (ref 0–0)
PLATELET # BLD AUTO: 211 K/UL — SIGNIFICANT CHANGE UP (ref 150–400)
POTASSIUM SERPL-MCNC: 3.5 MMOL/L — SIGNIFICANT CHANGE UP (ref 3.5–5.3)
POTASSIUM SERPL-SCNC: 3.5 MMOL/L — SIGNIFICANT CHANGE UP (ref 3.5–5.3)
PROTHROM AB SERPL-ACNC: 40.3 SEC — HIGH (ref 9.9–13.4)
RBC # BLD: 4.09 M/UL — LOW (ref 4.2–5.8)
RBC # FLD: 15.9 % — HIGH (ref 10.3–14.5)
SODIUM SERPL-SCNC: 135 MMOL/L — SIGNIFICANT CHANGE UP (ref 135–145)
WBC # BLD: 9.51 K/UL — SIGNIFICANT CHANGE UP (ref 3.8–10.5)
WBC # FLD AUTO: 9.51 K/UL — SIGNIFICANT CHANGE UP (ref 3.8–10.5)

## 2025-04-15 PROCEDURE — 99221 1ST HOSP IP/OBS SF/LOW 40: CPT

## 2025-04-15 RX ORDER — INSULIN LISPRO 100 U/ML
3 INJECTION, SOLUTION INTRAVENOUS; SUBCUTANEOUS
Refills: 0 | Status: DISCONTINUED | OUTPATIENT
Start: 2025-04-15 | End: 2025-04-16

## 2025-04-15 RX ORDER — INSULIN LISPRO 100 U/ML
INJECTION, SOLUTION INTRAVENOUS; SUBCUTANEOUS AT BEDTIME
Refills: 0 | Status: DISCONTINUED | OUTPATIENT
Start: 2025-04-15 | End: 2025-04-16

## 2025-04-15 RX ADMIN — NICOTINE POLACRILEX 1 PATCH: 4 GUM, CHEWING ORAL at 13:31

## 2025-04-15 RX ADMIN — Medication 325 MILLIGRAM(S): at 13:31

## 2025-04-15 RX ADMIN — OXYBUTYNIN CHLORIDE 10 MILLIGRAM(S): 5 TABLET, FILM COATED, EXTENDED RELEASE ORAL at 05:46

## 2025-04-15 RX ADMIN — GABAPENTIN 600 MILLIGRAM(S): 400 CAPSULE ORAL at 05:46

## 2025-04-15 RX ADMIN — INSULIN LISPRO 2: 100 INJECTION, SOLUTION INTRAVENOUS; SUBCUTANEOUS at 17:50

## 2025-04-15 RX ADMIN — Medication 500 MILLIGRAM(S): at 13:30

## 2025-04-15 RX ADMIN — Medication 0.1 MILLIGRAM(S): at 17:51

## 2025-04-15 RX ADMIN — METOPROLOL SUCCINATE 25 MILLIGRAM(S): 50 TABLET, EXTENDED RELEASE ORAL at 17:51

## 2025-04-15 RX ADMIN — NICOTINE POLACRILEX 1 PATCH: 4 GUM, CHEWING ORAL at 18:42

## 2025-04-15 RX ADMIN — GABAPENTIN 600 MILLIGRAM(S): 400 CAPSULE ORAL at 21:55

## 2025-04-15 RX ADMIN — Medication 4 MILLIGRAM(S): at 18:15

## 2025-04-15 RX ADMIN — OXYBUTYNIN CHLORIDE 10 MILLIGRAM(S): 5 TABLET, FILM COATED, EXTENDED RELEASE ORAL at 17:51

## 2025-04-15 RX ADMIN — Medication 0.1 MILLIGRAM(S): at 05:46

## 2025-04-15 RX ADMIN — INSULIN LISPRO 1: 100 INJECTION, SOLUTION INTRAVENOUS; SUBCUTANEOUS at 08:13

## 2025-04-15 RX ADMIN — METOPROLOL SUCCINATE 25 MILLIGRAM(S): 50 TABLET, EXTENDED RELEASE ORAL at 05:46

## 2025-04-15 RX ADMIN — METHOCARBAMOL 750 MILLIGRAM(S): 500 TABLET, FILM COATED ORAL at 17:52

## 2025-04-15 RX ADMIN — INSULIN LISPRO 3 UNIT(S): 100 INJECTION, SOLUTION INTRAVENOUS; SUBCUTANEOUS at 13:09

## 2025-04-15 RX ADMIN — GABAPENTIN 600 MILLIGRAM(S): 400 CAPSULE ORAL at 13:33

## 2025-04-15 RX ADMIN — FINASTERIDE 5 MILLIGRAM(S): 1 TABLET, FILM COATED ORAL at 13:31

## 2025-04-15 RX ADMIN — FOLIC ACID 1 MILLIGRAM(S): 1 TABLET ORAL at 13:30

## 2025-04-15 RX ADMIN — Medication 1 TABLET(S): at 13:30

## 2025-04-15 RX ADMIN — NICOTINE POLACRILEX 1 PATCH: 4 GUM, CHEWING ORAL at 13:30

## 2025-04-15 RX ADMIN — INSULIN LISPRO 3 UNIT(S): 100 INJECTION, SOLUTION INTRAVENOUS; SUBCUTANEOUS at 17:50

## 2025-04-15 RX ADMIN — DULOXETINE 60 MILLIGRAM(S): 20 CAPSULE, DELAYED RELEASE ORAL at 13:30

## 2025-04-15 RX ADMIN — Medication 4 MILLIGRAM(S): at 17:52

## 2025-04-15 RX ADMIN — AMLODIPINE BESYLATE 10 MILLIGRAM(S): 10 TABLET ORAL at 05:46

## 2025-04-15 RX ADMIN — Medication 40 MILLIGRAM(S): at 05:46

## 2025-04-15 RX ADMIN — NICOTINE POLACRILEX 1 PATCH: 4 GUM, CHEWING ORAL at 15:54

## 2025-04-15 RX ADMIN — INSULIN LISPRO 2: 100 INJECTION, SOLUTION INTRAVENOUS; SUBCUTANEOUS at 13:09

## 2025-04-15 RX ADMIN — INSULIN GLARGINE-YFGN 10 UNIT(S): 100 INJECTION, SOLUTION SUBCUTANEOUS at 21:54

## 2025-04-15 NOTE — CONSULT NOTE ADULT - ASSESSMENT
Impression:  Chronic SP Cystostomy in management of  neurogenic Bladder and patient compliant of bladder spasms/ ryan-cath  leakage  (recent multiple admissions for same problem at [place]), catheter was just exchanged 3 days ago)    History chronic bladder colonization and n which should not be treated, and history recurrent UTI which require treatment,  recent C&S was negative.    History CKD    Bilateral small non-obstructing calculi, observe    Hidden Penis- observe      Recommendations  Treat bladder spasms with Myrbetriq 50 MG and Oxybutynin  10 mg  BID, monitor for constipations and start aggressive routine to prevent constipation with active hydration and, stool softeners and bulking agents (only if hydrating)    Renal Consult for CKD    ID consult fro possible Funguria and chronic management of  recurrent UTI    Cranberry extract    Referral upon discharge o Dr. JAY Overton ([phone]) who is expert in chronic bladder pain and neurogenic disorders  
50y M from [facility] nursing home JESSENIA with suprapubic catheter as pt concerned for urine infection and bladder spasms, pt has a urologist at [facility], has been admitted there 3 times in the past 3 weeks, just discharged and arrived back at [facility] less than 4hr PTA to [place], states he was sent there for infection and received 2 doses of IV antibiotics and then they were stopped and he was sent home today, states his previous 2 recent admissions he petitioned for longer stays, and won, but eventually was discharged, pt reports he refused to have IV antibiotics at the nursing home previously as he believes they will be dislodged and he wants to be in the hospital until antibiotics are complete, reports he was not discharged on antibiotics this time, urine culture from 4/5 done here was negative (which pt states he was not aware of), pt also states his bladder spasms are painful and wants pain medication, states he does have chronic spasms, but they recently stopped his myrbitriq and thinks that may have made them worse (10 Apr 2025 10:13)        ckd stage3 and ACUTE RENAL FAILURE: sodium chloride 0.45%. 1000 milliLiter(s) (50 mL/Hr) IV Continuous   Serum creatinine is  at     , approximating GFR at   ml/min.   There is no progression . No uremic symptoms  No evidence of anemia .  Fluid status stable.  Will continue to avoid nephrotoxic drugs.  Patient remains asymptomatic.   Continue current therapy.  hold  diuretic.  hold   ACE inhibitor.  hold   ARB.  Additional evaluation:   ECG,    echocardiogram,     CXR,  will obtained recent   renal ultrasound to evalaute kidney size and possible stones ,      BP monitoring,continue current antihypertensive meds, low salt diet,followup with PMD in 1-2 weeks  amLODIPine   Tablet 10 milliGRAM(s) Oral daily  cloNIDine 0.1 milliGRAM(s) Oral two times a day      Accuchecks monitoring and insulin sliding scale coverage, no concentrated sweets diet, serial labs and f/up with PMD, monitor HB A 1 C every 3-4 mnth    Admit for septic workup and ID evaluation,send blood and urine cx,serial lactate levels,monitor vitals closley,ivfs hydration,monitor urine output and renal profile,iv abx as per  cons      
Physical Exam:   Vital Signs Last 24 Hrs  T(C): 36.7 (15 Apr 2025 05:07), Max: 37 (14 Apr 2025 22:57)  T(F): 98 (15 Apr 2025 05:07), Max: 98.6 (14 Apr 2025 22:57)  HR: 61 (15 Apr 2025 05:07) (54 - 63)  BP: 138/72 (15 Apr 2025 05:07) (136/82 - 157/78)  BP(mean): --  RR: 17 (15 Apr 2025 05:07) (17 - 18)  SpO2: 96% (15 Apr 2025 05:07) (95% - 98%)    Parameters below as of 15 Apr 2025 05:07  Patient On (Oxygen Delivery Method): room air       CAPILLARY BLOOD GLUCOSE      POCT Blood Glucose.: 200 mg/dL (15 Apr 2025 08:00)  POCT Blood Glucose.: 184 mg/dL (14 Apr 2025 23:03)  POCT Blood Glucose.: 150 mg/dL (14 Apr 2025 17:51)  POCT Blood Glucose.: 157 mg/dL (14 Apr 2025 11:45)        DIET: CC  >50%

## 2025-04-15 NOTE — PROGRESS NOTE ADULT - SUBJECTIVE AND OBJECTIVE BOX
Patient is a 50y Male whom presented to the hospital with ckd and osmani     PAST MEDICAL & SURGICAL HISTORY:  BPH (benign prostatic hyperplasia)      HTN (hypertension)      Type 2 diabetes mellitus      Peripheral neuropathy      History of Guillain-[place] syndrome      History of CVA in adulthood      DDD (degenerative disc disease), lumbar      DVT, lower extremity      Renal stones      H/O Guillain-[place] syndrome      History of neurogenic bowel      DM2 (diabetes mellitus, type 2)      HTN (hypertension)      BPH without obstruction/lower urinary tract symptoms      Degenerative arthritis      DVT of lower limb, acute      CVA (cerebrovascular accident)      CVD (cerebrovascular disease)      Muscle weakness      Iron deficiency anemia      History of muscle spasm      Pain, unspecified      Vitamin deficiency      Obesity      GERD (gastroesophageal reflux disease)      H/O constipation      H/O insomnia      Essential (primary) hypertension      Acute embolism and thrombosis of deep vein of lower extremity      BPH (benign prostatic hyperplasia)      Diabetes mellitus due to underlying condition with diabetic neuropathy      Major depressive disorder, single episode      S/P IVC filter      H/O lithotripsy      Chronic suprapubic catheter          MEDICATIONS  (STANDING):  amLODIPine   Tablet 10 milliGRAM(s) Oral daily  ascorbic acid 500 milliGRAM(s) Oral daily  cloNIDine 0.1 milliGRAM(s) Oral two times a day  dextrose 5%. 1000 milliLiter(s) (100 mL/Hr) IV Continuous <Continuous>  dextrose 5%. 1000 milliLiter(s) (50 mL/Hr) IV Continuous <Continuous>  dextrose 50% Injectable 25 Gram(s) IV Push once  dextrose 50% Injectable 12.5 Gram(s) IV Push once  dextrose 50% Injectable 25 Gram(s) IV Push once  DULoxetine 60 milliGRAM(s) Oral daily  ferrous    sulfate 325 milliGRAM(s) Oral daily  finasteride 5 milliGRAM(s) Oral daily  folic acid 1 milliGRAM(s) Oral daily  gabapentin 600 milliGRAM(s) Oral every 8 hours  glucagon  Injectable 1 milliGRAM(s) IntraMuscular once  insulin glargine Injectable (LANTUS) 10 Unit(s) SubCutaneous at bedtime  insulin lispro (ADMELOG) corrective regimen sliding scale   SubCutaneous three times a day before meals  metoprolol tartrate 25 milliGRAM(s) Oral two times a day  multivitamin 1 Tablet(s) Oral daily  nicotine -  14 mG/24Hr(s) Patch 1 Patch Transdermal daily  oxybutynin 10 milliGRAM(s) Oral two times a day  pantoprazole    Tablet 40 milliGRAM(s) Oral before breakfast  senna 2 Tablet(s) Oral at bedtime  sodium chloride 0.45%. 1000 milliLiter(s) (50 mL/Hr) IV Continuous <Continuous>  warfarin 4 milliGRAM(s) Oral at bedtime      Allergies    sulfa drugs (Rash)  sulfa drugs (Other)  sulfa drugs (Unknown)  sulfa drugs (Hives)    Intolerances    Pipercillin Sodium-Tazobactam Sodium (Pruritus)      SOCIAL HISTORY:  Denies ETOh,Smoking,     FAMILY HISTORY:  FH: heart disease    FH: HTN (hypertension)    Family history of sinus tachycardia (Father)    FH: HTN (hypertension) (Mother)        REVIEW OF SYSTEMS:    CONSTITUTIONAL: No weakness, fevers or chills  RESPIRATORY: No cough, wheezing, hemoptysis; No shortness of breath  CARDIOVASCULAR: No chest pain or palpitations  GASTROINTESTINAL: No abdominal or epigastric pain. No nausea, vomiting,     No diarrhea or constipation. No melena                                                 9.4    9.51  )-----------( 211      ( 15 Apr 2025 08:17 )             29.0       CBC Full  -  ( 15 Apr 2025 08:17 )  WBC Count : 9.51 K/uL  RBC Count : 4.09 M/uL  Hemoglobin : 9.4 g/dL  Hematocrit : 29.0 %  Platelet Count - Automated : 211 K/uL  Mean Cell Volume : 70.9 fL  Mean Cell Hemoglobin : 23.0 pg  Mean Cell Hemoglobin Concentration : 32.4 g/dL  Auto Neutrophil # : x  Auto Lymphocyte # : x  Auto Monocyte # : x  Auto Eosinophil # : x  Auto Basophil # : x  Auto Neutrophil % : x  Auto Lymphocyte % : x  Auto Monocyte % : x  Auto Eosinophil % : x  Auto Basophil % : x      04-15    135  |  101  |  37[H]  ----------------------------<  187[H]  3.5   |  26  |  2.77[H]    Ca    8.5      15 Apr 2025 08:17    TPro  6.4  /  Alb  2.6[L]  /  TBili  0.3  /  DBili  x   /  AST  12  /  ALT  23  /  AlkPhos  105  04-14      CAPILLARY BLOOD GLUCOSE      POCT Blood Glucose.: 200 mg/dL (15 Apr 2025 08:00)  POCT Blood Glucose.: 184 mg/dL (14 Apr 2025 23:03)  POCT Blood Glucose.: 150 mg/dL (14 Apr 2025 17:51)  POCT Blood Glucose.: 157 mg/dL (14 Apr 2025 11:45)      Vital Signs Last 24 Hrs  T(C): 36.7 (15 Apr 2025 05:07), Max: 37 (14 Apr 2025 22:57)  T(F): 98 (15 Apr 2025 05:07), Max: 98.6 (14 Apr 2025 22:57)  HR: 61 (15 Apr 2025 05:07) (54 - 63)  BP: 138/72 (15 Apr 2025 05:07) (136/82 - 157/78)  BP(mean): --  RR: 17 (15 Apr 2025 05:07) (17 - 18)  SpO2: 96% (15 Apr 2025 05:07) (95% - 98%)    Parameters below as of 15 Apr 2025 05:07  Patient On (Oxygen Delivery Method): room air        Urinalysis Basic - ( 15 Apr 2025 08:17 )    Color: x / Appearance: x / SG: x / pH: x  Gluc: 187 mg/dL / Ketone: x  / Bili: x / Urobili: x   Blood: x / Protein: x / Nitrite: x   Leuk Esterase: x / RBC: x / WBC x   Sq Epi: x / Non Sq Epi: x / Bacteria: x        PT/INR - ( 15 Apr 2025 08:17 )   PT: 40.3 sec;   INR: 3.46 ratio                          PHYSICAL EXAM:    Constitutional: NAD  HEENT: conjunctive   clear   Neck:  No JVD  Respiratory: CTAB  Cardiovascular: S1 and S2  Gastrointestinal: BS+, soft, NT/ND  Extremities: No peripheral edema  Neurological: A/O x 3, no focal deficits  bladder infections; s-p cath , bladder spasms ,

## 2025-04-15 NOTE — CONSULT NOTE ADULT - CONSULT REASON
ckd and osmani
Pain control
Rule out CAUTI
SP Catheter, Neurogenic bladder, SP pain, ryan cath leakage
50y A1C with Estimated Average Glucose Result: 9.0 % (04-11-25 @ 05:30)   diabetes mellitus uncontrolled type 2

## 2025-04-15 NOTE — CONSULT NOTE ADULT - PROBLEM SELECTOR RECOMMENDATION 9
Type 2 A1c 9% adm UTI  increase lantus to 12 units @ HS  add admelog 3 units TIDAC  mod ISS ACHS  CC diet and accuchek ACHS  Recommend endocrine-Perlman onconsult  FU appt: return to [facility]  DSC recommendations: return to [facility] on insulin regimen and glucose monitoring, lifestyle and diet modifications  diabetes education provided as documented above  Diabetes support info and cell # [phone] given   Goal 100-180 mg/dL; 140-180 mg/dL in critical care areas

## 2025-04-15 NOTE — CAREGIVER ENGAGEMENT NOTE - CAREGIVER EDUCATION NOTES - FREE TEXT
Per discussion w/ inpatient interdisciplinary treatment team this AM, patient remains medically cleared for transition to next level of care today, 04/15/2025.  received fax response from Medicaid's 3rd-party review organization indicating that the hospital's decision to discharge patient has been upheld, and patient's financial liability will begin if he remains hospitalized past tomorrow, 04/16/2025.  Radha and this  met w/ patient @ bedside on [address] who confirmed to be understanding of and agreeable to the above, as well as to anticipated discharge tomorrow, 04/16/2025, @ 13:00.  to continue to follow.

## 2025-04-15 NOTE — CONSULT NOTE ADULT - SUBJECTIVE AND OBJECTIVE BOX
CHIEF COMPLAINT:  Pain    HISTORY OF PRESENT ILLNESS:    50y M from [facility] nursing home [facility] with suprapubic catheter as pt concerned for urine infection and bladder spasms, [facility] has been [facility] there 3 times in the past 3 weeks, just discharged and arrived back at [facility] less than 4hr PTA to [place], states he was sent there for infection and received 2 doses of IV antibiotics and then they were stopped and he was sent home, states his previous 2 recent admissions he petitioned for longer stays, and won, but eventually was discharged, pt reports he refused to have IV antibiotics at the nursing home previously as he believes they will be dislodged and he wants to be in the hospital until antibiotics are complete, reports he was not discharged on antibiotics this time, urine culture from  done here was negative (which pt states he was not aware of), pt also states his bladder spasms are painful and wants pain medication, states he does have chronic spasms, but they recently stopped his myrbitriq and thinks that may have made them worse    When seen was resting comfortabley but explained he reported severe pain and spasms, requested Myrbetriq and Oxybutinin    PAST MEDICAL & SURGICAL HISTORY:  BPH (benign prostatic hyperplasia)      HTN (hypertension)      Type 2 diabetes mellitus      Peripheral neuropathy      History of Guillain-[place] syndrome      History of CVA in adulthood      DDD (degenerative disc disease), lumbar      DVT, lower extremity      Renal stones      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      History of neurogenic bowel      DM2 (diabetes mellitus, type 2)      HTN (hypertension)      BPH without obstruction/lower urinary tract symptoms      Degenerative arthritis      DVT of lower limb, acute      CVA (cerebrovascular accident)      CVD (cerebrovascular disease)      Muscle weakness      Iron deficiency anemia      History of muscle spasm      Pain, unspecified      Vitamin deficiency      Obesity      GERD (gastroesophageal reflux disease)      H/O constipation      H/O insomnia      Essential (primary) hypertension      Acute embolism and thrombosis of deep vein of lower extremity      BPH (benign prostatic hyperplasia)      Diabetes mellitus due to underlying condition with diabetic neuropathy      Major depressive disorder, single episode      S/P IVC filter      H/O lithotripsy      Chronic suprapubic catheter          REVIEW OF SYSTEMS:    CONSTITUTIONAL: No weakness, fevers or chills  EYES/ENT: No visual changes;  No vertigo or throat pain   NECK: No pain or stiffness  RESPIRATORY: No cough, wheezing, hemoptysis; No shortness of breath  CARDIOVASCULAR: No chest pain or palpitations  GASTROINTESTINAL: No abdominal or epigastric pain. No nausea, vomiting, or hematemesis; No diarrhea or constipation. No melena or hematochezia.  GENITOURINARY: No dysuria, frequency or hematuria  NEUROLOGICAL: No numbness or weakness  SKIN: No itching, burning, rashes, or lesions   All other review of systems is negative unless indicated above.    MEDICATIONS  (STANDING):  amLODIPine   Tablet 10 milliGRAM(s) Oral daily  ascorbic acid 500 milliGRAM(s) Oral daily  cloNIDine 0.1 milliGRAM(s) Oral two times a day  dextrose 5%. 1000 milliLiter(s) (50 mL/Hr) IV Continuous <Continuous>  dextrose 5%. 1000 milliLiter(s) (100 mL/Hr) IV Continuous <Continuous>  dextrose 50% Injectable 25 Gram(s) IV Push once  dextrose 50% Injectable 12.5 Gram(s) IV Push once  dextrose 50% Injectable 25 Gram(s) IV Push once  DULoxetine 60 milliGRAM(s) Oral daily  ferrous    sulfate 325 milliGRAM(s) Oral daily  finasteride 5 milliGRAM(s) Oral daily  folic acid 1 milliGRAM(s) Oral daily  gabapentin 600 milliGRAM(s) Oral every 8 hours  glucagon  Injectable 1 milliGRAM(s) IntraMuscular once  insulin glargine Injectable (LANTUS) 10 Unit(s) SubCutaneous at bedtime  insulin lispro (ADMELOG) corrective regimen sliding scale   SubCutaneous three times a day before meals  metoprolol tartrate 25 milliGRAM(s) Oral two times a day  multivitamin 1 Tablet(s) Oral daily  nicotine -  14 mG/24Hr(s) Patch 1 Patch Transdermal daily  oxybutynin 5 milliGRAM(s) Oral daily  pantoprazole    Tablet 40 milliGRAM(s) Oral before breakfast  senna 2 Tablet(s) Oral at bedtime  sodium chloride 0.45%. 1000 milliLiter(s) (50 mL/Hr) IV Continuous <Continuous>  warfarin 4 milliGRAM(s) Oral at bedtime    MEDICATIONS  (PRN):  acetaminophen     Tablet .. 650 milliGRAM(s) Oral every 6 hours PRN Temp greater or equal to 38C (100.4F), Mild Pain (1 - 3)  aluminum hydroxide/magnesium hydroxide/simethicone Suspension 30 milliLiter(s) Oral every 4 hours PRN Dyspepsia  dextrose Oral Gel 15 Gram(s) Oral once PRN Blood Glucose LESS THAN 70 milliGRAM(s)/deciliter  HYDROmorphone   Tablet 4 milliGRAM(s) Oral every 6 hours PRN Moderate Pain (4 - 6)  melatonin 3 milliGRAM(s) Oral at bedtime PRN Insomnia  methocarbamol 750 milliGRAM(s) Oral every 8 hours PRN for muscle spasm  ondansetron Injectable 4 milliGRAM(s) IV Push every 8 hours PRN Nausea and/or Vomiting      Allergies    sulfa drugs (Rash)  sulfa drugs (Other)  sulfa drugs (Unknown)  sulfa drugs (Hives)    Intolerances    Pipercillin Sodium-Tazobactam Sodium (Pruritus)      SOCIAL HISTORY:    FAMILY HISTORY:  FH: heart disease    FH: HTN (hypertension)    Family history of sinus tachycardia (Father)    FH: HTN (hypertension) (Mother)        Vital Signs Last 24 Hrs  T(C): 36.8 (10 Apr 2025 05:50), Max: 36.9 (2025 23:43)  T(F): 98.3 (10 Apr 2025 05:50), Max: 98.5 (2025 23:43)  HR: 73 (10 Apr 2025 05:50) (73 - 103)  BP: 149/88 (10 Apr 2025 05:50) (149/88 - 161/100)  BP(mean): --  RR: 18 (10 Apr 2025 05:50) (17 - 18)  SpO2: 97% (10 Apr 2025 05:50) (97% - 97%)    Parameters below as of 10 Apr 2025 05:50  Patient On (Oxygen Delivery Method): room air        PHYSICAL EXAM:    Constitutional: NAD, well-developed, obese  HEENT: STEVE, EOMI, Normal Hearing, Neck: No LAD, No JVD  Back: No CVA tenderness  Respiratory: CTAB     Abd: globose, BS+, soft, NT/ND, No CVAT, SP cath with yellow urine  : Hidden  phallus, patent  meatus, bilateral descended testes,   Extremities: No peripheral edema  Neurological: A/O x 3,Psychiatric: Normal mood, normal affect      LABS:                        10.7   15.69 )-----------( 259      ( 10 Apr 2025 03:04 )             33.3     04-10    136  |  101  |  36[H]  ----------------------------<  139[H]  4.1   |  23  |  2.63[H]    Ca    9.1      10 Apr 2025 03:04        Urinalysis Basic - ( 10 Apr 2025 03:04 )    Color: Yellow / Appearance: Cloudy / S.015 / pH: x  Gluc: 139 mg/dL / Ketone: Negative mg/dL  / Bili: Negative / Urobili: 0.2 mg/dL   Blood: x / Protein: 300 mg/dL / Nitrite: Negative   Leuk Esterase: Moderate / RBC: 4 /HPF / WBC 11 /HPF   Sq Epi: x / Non Sq Epi: x / Bacteria: Few /HPF      Urine Culture:   Hemoglobin: 10.7 g/dL (04-10 @ 03:04)  Hematocrit: 33.3 % (04-10 @ 03:04)      RADIOLOGY & ADDITIONAL STUDIES:    < from: CT Renal Stone Hunt (25 @ 19:27) >  ACC: [id number] EXAM:  CT RENAL STONE ROSALES   ORDERED BY: REYMUNDO SALTER     PROCEDURE DATE:  2025          INTERPRETATION:  CLINICAL INFORMATION: Flank pain. Bladder spasms.    COMPARISON: CT cystogram 10/29/2024. Contrast enhanced CT abdomen and   pelvis 10/20/2024.    CONTRAST/COMPLICATIONS:  IV Contrast: None  Oral Contrast: None    PROCEDURE:  CT of the Abdomen and Pelvis was performed.  Sagittal and coronal reformats were performed.    FINDINGS:    Note: Images without intravenous contrastare relatively insensitive for   the detection of solid organ abnormalities and some other types of   pathology.    LOWER CHEST: Some coronary artery calcifications noted.    LIVER: Within normal limits.  BILE DUCTS: Normal caliber.  GALLBLADDER: Large 2 cm calcified gallstone in the gallbladder.   Additional smaller gallstones are couple containing nitrogen gas. There   is sludge. No acute inflammatory findings. Overall the gallbladder   appears similar compared to 10/20/2024.  SPLEEN: Within normal limits.  PANCREAS: Within normal limits.  ADRENALS: Nodular hyperplasia again noted probably bilaterally more so on   the left, unchanged.  KIDNEYS/URETERS: Multiple small less than 3 mm nonobstructing calculi in   both kidneys. No hydronephrosis.    BLADDER: Suprapubic catheter completely decompresses the bladder. Small   diverticulum at the dome as before.  REPRODUCTIVE ORGANS: Nonenlarged prostate.    BOWEL: No bowel obstruction. Appendix is not visualized. No evidence of   inflammation inthe pericecal region. Sigmoid colon is tortuous coursing   through the right lower quadrant. No definitive diverticular disease.  PERITONEUM/RETROPERITONEUM: Within normal limits.  VESSELS: IVC filter.  LYMPH NODES: No lymphadenopathy.  ABDOMINAL WALL: Within normal limits.  BONES: Degenerative changes. Spinal fusion hardware as before.    IMPRESSION:    Suprapubic catheter completely decompresses the bladder. Multiple very   small nonobstructing renal calculi bilaterally as before. No ureteral   calculi or hydronephrosis.    --- End of Report ---            ABEL SANCHEZ MD; Attending Radiologist  This document has been electronically signed. 2025  8:01PM    < end of copied text >  
Physical Medicine and Rehabilitation Initial Evaluation    Patients acute care records reviewed and are summarized as follows:     Patient is a 50y Male who is admitted to acute care for cystitis/uti/bladder spasms. Patient is known to this service from prior admission for related diagnosis. Patient referred for pain control.    Medical studies/laboratory studies reviewed, including renal function which is impaired.    The patient was seen and examined at bedside. Complains of abdominal/bladder/urinary tract pain.    ROS:  Constitutional: Denies fevers or chills  GI/: Complains of pain    PAST MEDICAL & SURGICAL HISTORY:  BPH (benign prostatic hyperplasia)  HTN (hypertension)  Type 2 diabetes mellitus  Peripheral neuropathy  History of Guillain-[place] syndrome  History of CVA in adulthood  DDD (degenerative disc disease), lumbar  DVT, lower extremity  Renal stones  H/O Guillain-[place] syndrome  History of neurogenic bowel  DM2 (diabetes mellitus, type 2)  HTN (hypertension)  BPH without obstruction/lower urinary tract symptoms  Degenerative arthritis  DVT of lower limb, acute  CVA (cerebrovascular accident)  CVD (cerebrovascular disease)  Muscle weakness  Iron deficiency anemia  History of muscle spasm  Pain, unspecified  Vitamin deficiency  Obesity  GERD (gastroesophageal reflux disease)  H/O constipation  H/O insomnia  Essential (primary) hypertension  Acute embolism and thrombosis of deep vein of lower extremity  BPH (benign prostatic hyperplasia)  Diabetes mellitus due to underlying condition with diabetic neuropathy  Major depressive disorder, single episode  S/P IVC filter  H/O lithotripsy  Chronic suprapubic catheter    Medications: reviewed    Physical Exam:   Vitals: reviewed    Constitutional: Gen: In no acute distress, cooperative with exam and questioning   GI/: Abdominal tenderness no rebound or rigidity noted  Psychiatric: Awake alert fully oriented    A/P 50y year old Male with /abdominal/urinary tract pain, bladder spasms    Revision of regimen 1mg IV dilaudid Q6H PRN for severe pain in lieu of 4mg oral dilaudid  4mg oral dilaudid for moderate pain  Can keep this in place while patient is in house, discharge back on 4mg oral dilaudid at his home dosing schedule    Primary team notes are reviewed  Laboratory studies reviewed including those mentioned earlier/above  Discussed management/coordinated care with primary team/referring provider.  High risk of morbidity from treatment with: schedule II narcotics    55 minutes spent during patient encounter:    ¦ preparing to see the patient   ¦ performing a medically appropriate examination and/or evaluation   ¦ counseling and educating the patient/family/caregiver   ¦ ordering medications, tests, or procedures   ¦ referring and communicating with other health care professionals  ¦ documenting clinical information in the electronic or other health record   ¦ care coordination    
  Patient is a 50y Male whom presented to the hospital with     PAST MEDICAL & SURGICAL HISTORY:  BPH (benign prostatic hyperplasia)      HTN (hypertension)      Type 2 diabetes mellitus      Peripheral neuropathy      History of Guillain-[place] syndrome      History of CVA in adulthood      DDD (degenerative disc disease), lumbar      DVT, lower extremity      Renal stones      H/O Guillain-Halfway syndrome      History of neurogenic bowel      DM2 (diabetes mellitus, type 2)      HTN (hypertension)      BPH without obstruction/lower urinary tract symptoms      Degenerative arthritis      DVT of lower limb, acute      CVA (cerebrovascular accident)      CVD (cerebrovascular disease)      Muscle weakness      Iron deficiency anemia      History of muscle spasm      Pain, unspecified      Vitamin deficiency      Obesity      GERD (gastroesophageal reflux disease)      H/O constipation      H/O insomnia      Essential (primary) hypertension      Acute embolism and thrombosis of deep vein of lower extremity      BPH (benign prostatic hyperplasia)      Diabetes mellitus due to underlying condition with diabetic neuropathy      Major depressive disorder, single episode      S/P IVC filter      H/O lithotripsy      Chronic suprapubic catheter          MEDICATIONS  (STANDING):  amLODIPine   Tablet 10 milliGRAM(s) Oral daily  ascorbic acid 500 milliGRAM(s) Oral daily  cloNIDine 0.1 milliGRAM(s) Oral two times a day  dextrose 5%. 1000 milliLiter(s) (100 mL/Hr) IV Continuous <Continuous>  dextrose 5%. 1000 milliLiter(s) (50 mL/Hr) IV Continuous <Continuous>  dextrose 50% Injectable 25 Gram(s) IV Push once  dextrose 50% Injectable 12.5 Gram(s) IV Push once  dextrose 50% Injectable 25 Gram(s) IV Push once  DULoxetine 60 milliGRAM(s) Oral daily  ferrous    sulfate 325 milliGRAM(s) Oral daily  finasteride 5 milliGRAM(s) Oral daily  folic acid 1 milliGRAM(s) Oral daily  gabapentin 600 milliGRAM(s) Oral every 8 hours  glucagon  Injectable 1 milliGRAM(s) IntraMuscular once  insulin glargine Injectable (LANTUS) 10 Unit(s) SubCutaneous at bedtime  insulin lispro (ADMELOG) corrective regimen sliding scale   SubCutaneous three times a day before meals  metoprolol tartrate 25 milliGRAM(s) Oral two times a day  multivitamin 1 Tablet(s) Oral daily  nicotine -  14 mG/24Hr(s) Patch 1 Patch Transdermal daily  oxybutynin 10 milliGRAM(s) Oral two times a day  pantoprazole    Tablet 40 milliGRAM(s) Oral before breakfast  senna 2 Tablet(s) Oral at bedtime  sodium chloride 0.45%. 1000 milliLiter(s) (50 mL/Hr) IV Continuous <Continuous>  warfarin 4 milliGRAM(s) Oral at bedtime      Allergies    sulfa drugs (Rash)  sulfa drugs (Other)  sulfa drugs (Unknown)  sulfa drugs (Hives)    Intolerances    Pipercillin Sodium-Tazobactam Sodium (Pruritus)      SOCIAL HISTORY:  Denies ETOh,Smoking,     FAMILY HISTORY:  FH: heart disease    FH: HTN (hypertension)    Family history of sinus tachycardia (Father)    FH: HTN (hypertension) (Mother)        REVIEW OF SYSTEMS:    CONSTITUTIONAL: No weakness, fevers or chills  RESPIRATORY: No cough, wheezing, hemoptysis; No shortness of breath  CARDIOVASCULAR: No chest pain or palpitations  GASTROINTESTINAL: No abdominal or epigastric pain. No nausea, vomiting,     No diarrhea or constipation. No melena   GENITOURINARY: No dysuria, frequency or hematuria  bladder infections; s-p cath , bladder spasms ,  SKIN: dry      VITAL:  T(C): , Max: 37.1 (04-10-25 @ 17:28)  T(F): , Max: 98.8 (04-10-25 @ 17:28)  HR: 70 (04-10-25 @ 17:28)  BP: 159/93 (04-10-25 @ 17:28)  BP(mean): 115 (04-10-25 @ 17:28)  RR: 18 (04-10-25 @ 17:28)  SpO2: 96% (04-10-25 @ 17:28)  Wt(kg): --    I and O's:    Height (cm): 182.9 ( @ 23:43)  Weight (kg): 127 ( @ :43)  BMI (kg/m2): 38 ( 23:43)  BSA (m2): 2.46 ( 23:43)    PHYSICAL EXAM:    Constitutional: NAD  HEENT: conjunctive   clear   Neck:  No JVD  Respiratory: CTAB  Cardiovascular: S1 and S2  Gastrointestinal: BS+, soft, NT/ND  Extremities: No peripheral edema  Neurological: A/O x 3, no focal deficits  bladder infections; s-p cath , bladder spasms ,    LABS:                        10.7   15.69 )-----------( 259      ( 10 Apr 2025 03:04 )             33.3     04-10    136  |  101  |  36[H]  ----------------------------<  139[H]  4.1   |  23  |  2.63[H]    Ca    9.1      10 Apr 2025 03:04        Urine Studies:  Urinalysis Basic - ( 10 Apr 2025 03:04 )    Color: Yellow / Appearance: Cloudy / S.015 / pH: x  Gluc: 139 mg/dL / Ketone: Negative mg/dL  / Bili: Negative / Urobili: 0.2 mg/dL   Blood: x / Protein: 300 mg/dL / Nitrite: Negative   Leuk Esterase: Moderate / RBC: 4 /HPF / WBC 11 /HPF   Sq Epi: x / Non Sq Epi: x / Bacteria: Few /HPF            RADIOLOGY & ADDITIONAL STUDIES:          MEDICATIONS  (STANDING):  amLODIPine   Tablet 10 milliGRAM(s) Oral daily  ascorbic acid 500 milliGRAM(s) Oral daily  cloNIDine 0.1 milliGRAM(s) Oral two times a day  dextrose 5%. 1000 milliLiter(s) (100 mL/Hr) IV Continuous <Continuous>  dextrose 5%. 1000 milliLiter(s) (50 mL/Hr) IV Continuous <Continuous>  dextrose 50% Injectable 25 Gram(s) IV Push once  dextrose 50% Injectable 12.5 Gram(s) IV Push once  dextrose 50% Injectable 25 Gram(s) IV Push once  DULoxetine 60 milliGRAM(s) Oral daily  ferrous    sulfate 325 milliGRAM(s) Oral daily  finasteride 5 milliGRAM(s) Oral daily  folic acid 1 milliGRAM(s) Oral daily  gabapentin 600 milliGRAM(s) Oral every 8 hours  glucagon  Injectable 1 milliGRAM(s) IntraMuscular once  insulin glargine Injectable (LANTUS) 10 Unit(s) SubCutaneous at bedtime  insulin lispro (ADMELOG) corrective regimen sliding scale   SubCutaneous three times a day before meals  metoprolol tartrate 25 milliGRAM(s) Oral two times a day  multivitamin 1 Tablet(s) Oral daily  nicotine -  14 mG/24Hr(s) Patch 1 Patch Transdermal daily  oxybutynin 10 milliGRAM(s) Oral two times a day  pantoprazole    Tablet 40 milliGRAM(s) Oral before breakfast  senna 2 Tablet(s) Oral at bedtime  sodium chloride 0.45%. 1000 milliLiter(s) (50 mL/Hr) IV Continuous <Continuous>  warfarin 4 milliGRAM(s) Oral at bedtime        
    HPI:  49YO M resident of [facility] BPH CVA  neurogenic bladder sp suprapubic catheter at [facility] chronic bladder spasms who presented to the hospital with c/o bladder spasms, pt has a urologist at [facility], has been admitted there 3 times in the past 3 weeks, just discharged and arrived back at [facility] less than 4hr PTA to [place], states he was sent there for infection and received 2 doses of IV antibiotics and then they were stopped and he was sent home. no fever chills n/v/d CP SOB. In ED Wbc 15K UA with minimal wbcs. Afebrile    Infectious Disease consult was called to evaluate pt and for antibiotic management.    Past Medical & Surgical Hx:  PAST MEDICAL & SURGICAL HISTORY:  BPH (benign prostatic hyperplasia)  HTN (hypertension)  Type 2 diabetes mellitus  Peripheral neuropathy  History of Guillain-[place] syndrome  History of CVA in adulthood  DDD (degenerative disc disease), lumbar  DVT, lower extremity  Renal stones  H/O Guillain-[place] syndrome  History of neurogenic bowel  DM2 (diabetes mellitus, type 2)  HTN (hypertension)  BPH without obstruction/lower urinary tract symptoms  Degenerative arthritis  DVT of lower limb, acute  CVA (cerebrovascular accident)  CVD (cerebrovascular disease)  Muscle weakness  Iron deficiency anemia  History of muscle spasm  Pain, unspecified  Vitamin deficiency  Obesity  GERD (gastroesophageal reflux disease)  H/O constipation  H/O insomnia  Essential (primary) hypertension  Acute embolism and thrombosis of deep vein of lower extremity  BPH (benign prostatic hyperplasia)  Diabetes mellitus due to underlying condition with diabetic neuropathy  Major depressive disorder, single episode  S/P IVC filter  H/O lithotripsy  Chronic suprapubic catheter      Social History--  EtOH: denies   Tobacco: denies  Drug Use: denies     FAMILY HISTORY:  FH: heart disease    FH: HTN (hypertension)    Family history of sinus tachycardia (Father)    FH: HTN (hypertension) (Mother)        Allergies  sulfa drugs (Rash)    Intolerances  Pipercillin Sodium-Tazobactam Sodium (Pruritus)      Home/ Out patient  Medications :  Current Inpatient Medications :    ANTIBIOTICS:       OTHER RELEVANT MEDICATIONS :  acetaminophen     Tablet .. 650 milliGRAM(s) Oral every 6 hours PRN  aluminum hydroxide/magnesium hydroxide/simethicone Suspension 30 milliLiter(s) Oral every 4 hours PRN  amLODIPine   Tablet 10 milliGRAM(s) Oral daily  ascorbic acid 500 milliGRAM(s) Oral daily  cloNIDine 0.1 milliGRAM(s) Oral two times a day  dextrose 5%. 1000 milliLiter(s) IV Continuous <Continuous>  dextrose 5%. 1000 milliLiter(s) IV Continuous <Continuous>  dextrose 50% Injectable 25 Gram(s) IV Push once  dextrose 50% Injectable 12.5 Gram(s) IV Push once  dextrose 50% Injectable 25 Gram(s) IV Push once  dextrose Oral Gel 15 Gram(s) Oral once PRN  DULoxetine 60 milliGRAM(s) Oral daily  ferrous    sulfate 325 milliGRAM(s) Oral daily  finasteride 5 milliGRAM(s) Oral daily  folic acid 1 milliGRAM(s) Oral daily  gabapentin 600 milliGRAM(s) Oral every 8 hours  glucagon  Injectable 1 milliGRAM(s) IntraMuscular once  HYDROmorphone   Tablet 4 milliGRAM(s) Oral every 6 hours PRN  HYDROmorphone  Injectable 1 milliGRAM(s) IV Push every 8 hours PRN  insulin glargine Injectable (LANTUS) 10 Unit(s) SubCutaneous at bedtime  insulin lispro (ADMELOG) corrective regimen sliding scale   SubCutaneous three times a day before meals  melatonin 3 milliGRAM(s) Oral at bedtime PRN  methocarbamol 750 milliGRAM(s) Oral every 8 hours PRN  metoprolol tartrate 25 milliGRAM(s) Oral two times a day  multivitamin 1 Tablet(s) Oral daily  ondansetron Injectable 4 milliGRAM(s) IV Push every 8 hours PRN  oxybutynin 10 milliGRAM(s) Oral two times a day  pantoprazole    Tablet 40 milliGRAM(s) Oral before breakfast  senna 2 Tablet(s) Oral at bedtime  sodium chloride 0.45%. 1000 milliLiter(s) IV Continuous <Continuous>  warfarin 4 milliGRAM(s) Oral at bedtime    ROS:  CONSTITUTIONAL:  Negative fever or chills  EYES:  Negative  blurry vision or double vision  CARDIOVASCULAR:  Negative for chest pain or palpitations  RESPIRATORY:  Negative for cough, wheezing, or SOB   GASTROINTESTINAL:  Negative for nausea, vomiting, diarrhea, constipation, or abdominal pain  GENITOURINARY:  Negative frequency, urgency , dysuria or hematuria +spasms  NEUROLOGIC:  No headache, confusion, dizziness, lightheadedness  All other systems were reviewed and are negative        Physical Exam:  Vital Signs Last 24 Hrs  T(C): 37 (11 Apr 2025 00:03), Max: 37.1 (10 Apr 2025 17:28)  T(F): 98.6 (11 Apr 2025 00:03), Max: 98.8 (10 Apr 2025 17:28)  HR: 63 (11 Apr 2025 00:03) (63 - 73)  BP: 125/78 (11 Apr 2025 00:03) (125/78 - 169/99)  BP(mean): 115 (10 Apr 2025 17:28) (115 - 115)  RR: 18 (11 Apr 2025 00:03) (18 - 18)  SpO2: 96% (11 Apr 2025 00:03) (96% - 98%)    Parameters below as of 10 Apr 2025 17:28  Patient On (Oxygen Delivery Method): room air      General: no acute distress Obese  Neck: supple, trachea midline  Lungs: clear, no wheeze/rhonchi  Cardiovascular: regular rate and rhythm, S1 S2  Abdomen: soft, nontender, ND, bowel sounds normal +SPT  Neurological:  alert and oriented x3  Skin: no rash  Extremities: no edema    Labs:                         10.7   15.69 )-----------( 259      ( 10 Apr 2025 03:04 )             33.3   04-10    136  |  101  |  36[H]  ----------------------------<  139[H]  4.1   |  23  |  2.63[H]    Ca    9.1      10 Apr 2025 03:04    Urine Microscopic-Add On (NC) (04.10.25 @ 03:04)    White Blood Cell - Urine: 11 /HPF   Red Blood Cell - Urine: 4 /HPF   Bacteria: Few /HPF   Granular Cast: Present   Squamous Epithelial Cells: Present   Comment - Urine: Many Yeast budding and hyphae  Urinalysis with Rflx Culture (04.10.25 @ 03:04)    Urine Appearance: Cloudy   Color: Yellow   Specific Gravity: 1.015   pH Urine: 6.0   Protein, Urine: 300 mg/dL   Glucose Qualitative, Urine: 100 mg/dL   Ketone - Urine: Negative mg/dL   Blood, Urine: Moderate   Bilirubin: Negative   Urobilinogen: 0.2 mg/dL   Leukocyte Esterase Concentration: Moderate   Nitrite: Negative    RECENT CULTURES:          RADIOLOGY & ADDITIONAL STUDIES:    ACC: [id number] EXAM:  CT RENAL STONE ROSALES   ORDERED BY: REYMUNDO SALTER     PROCEDURE DATE:  04/05/2025          INTERPRETATION:  CLINICAL INFORMATION: Flank pain. Bladder spasms.    COMPARISON: CT cystogram 10/29/2024. Contrast enhanced CT abdomen and   pelvis 10/20/2024.    CONTRAST/COMPLICATIONS:  IV Contrast: None  Oral Contrast: None    PROCEDURE:  CT of the Abdomen and Pelvis was performed.  Sagittal and coronal reformats were performed.    FINDINGS:    Note: Images without intravenous contrastare relatively insensitive for   the detection of solid organ abnormalities and some other types of   pathology.    LOWER CHEST: Some coronary artery calcifications noted.    LIVER: Within normal limits.  BILE DUCTS: Normal caliber.  GALLBLADDER: Large 2 cm calcified gallstone in the gallbladder.   Additional smaller gallstones are couple containing nitrogen gas. There   is sludge. No acute inflammatory findings. Overall the gallbladder   appears similar compared to 10/20/2024.  SPLEEN: Within normal limits.  PANCREAS: Within normal limits.  ADRENALS: Nodular hyperplasia again noted probably bilaterally more so on   the left, unchanged.  KIDNEYS/URETERS: Multiple small less than 3 mm nonobstructing calculi in   both kidneys. No hydronephrosis.    BLADDER: Suprapubic catheter completely decompresses the bladder. Small   diverticulum at the dome as before.  REPRODUCTIVE ORGANS: Nonenlarged prostate.    BOWEL: No bowel obstruction. Appendix is not visualized. No evidence of   inflammation inthe pericecal region. Sigmoid colon is tortuous coursing   through the right lower quadrant. No definitive diverticular disease.  PERITONEUM/RETROPERITONEUM: Within normal limits.  VESSELS: IVC filter.  LYMPH NODES: No lymphadenopathy.  ABDOMINAL WALL: Within normal limits.  BONES: Degenerative changes. Spinal fusion hardware as before.    IMPRESSION:    Suprapubic catheter completely decompresses the bladder. Multiple very   small nonobstructing renal calculi bilaterally as before. No ureteral   calculi or hydronephrosis.    Assessment :   49YO M resident of [facility] BPH CVA  neurogenic bladder sp suprapubic catheter at [facility] chronic bladder spasms who presented to the hospital with c/o bladder spasms, pt has a urologist at [facility], has been admitted there 3 times in the past 3 weeks, just discharged and arrived back at [facility] less than 4hr PTA to [place], states he was sent there for infection and received 2 doses of IV antibiotics and then they were stopped and he was sent home. In ED no fever chills n/v/d CP SOB. In ED Wbc 15K UA with minimal wbcs. Afebrile  Doubt CAUTI  Bladder spasms has been a chronic issue  Seen by urology    Plan :   Defer antibiotics  Trend temps and cbc  Fu cultures    Advance Directives- Full code  Current Medications are documented.   Drug-drug interactions reviewed.    Continue with present regiment .  Approptiate use of antibiotics and adverse effects reviewed.      > 45 minutes spent in direct patient care reviewing  the notes, lab data/ imaging , discussion with multidisciplinary team. All questions were addressed and answered to the best of my capacity .    Thank you for allowing me to participate in the care of your patient .      Yadira Blackmon MD  Infectious Disease  [phone]    Individualized infection control protocols for an individual patient based on their diagnosis and risks in order to reduce risk of disease transmission followed. Coordinating with  infection prevention and control team members to enable healthcare facility staff to safely care for patient.  Managing infection prevention and treatment protocols associated with transitions of care for complex patients.  In-depth patient chart review that entails going back farther in time and assessing the complete breadth of all health care interactions, with higher-level synthesis for complex diagnoses.  Engaging in complex medical decision-making associated with antimicrobial prescribing including considerations such as antimicrobial resistance patterns, emergence of new variants/strains, recent antibiotic exposure, interactions/complications from comorbidities including concurrent infections, public health considerations to minimize development of antimicrobial resistance, and emerging and re-emerging infections.         
Patient is a 50y old  Male who presents with a chief complaint of bladder spasms (15 Apr 2025 08:46)    palu @ [facility]  Type: 2 DX a couple years no known complications, managed by PCP Dr. edna Sanchez, a1c 9%. rx lantus 10 units @ HS, admelog 4-8 units premeal. all insulin and fingersticks done by staff, patient reports usually 200s. denies hypoglycemia. discussed pathophysiology of T2DM. explained need for improved glycemic control, reviewed CC diet and carb identification, pt reports eats food from rehab and gets food delivered, educated to limit carbs, priortize lean protein and nonstarchy vegetables. verbal education provided  diabetes education provided- A1c measure and BG targets  fasting, <180 2 hours postmeal. medication MOA and considerations/side effects, inhospital BGM frequency and insulin administration, s/s of hyperglycemia/hypoglycemia and management, glycemic control and preventing complications, consistent carb diet, balanced plate method, consistent meal planning. sick day management, provider f/u    HPI:  50y M from [facility] nursing home BIBEMS with suprapubic catheter as pt concerned for urine infection and bladder spasms, pt has a urologist at [facility], has been admitted there 3 times in the past 3 weeks, just discharged and arrived back at [facility] less than 4hr PTA to [place], states he was sent there for infection and received 2 doses of IV antibiotics and then they were stopped and he was sent home today, states his previous 2 recent admissions he petitioned for longer stays, and won, but eventually was discharged, pt reports he refused to have IV antibiotics at the nursing home previously as he believes they will be dislodged and he wants to be in the hospital until antibiotics are complete, reports he was not discharged on antibiotics this time, urine culture from 4/5 done here was negative (which pt states he was not aware of), pt also states his bladder spasms are painful and wants pain medication, states he does have chronic spasms, but they recently stopped his myrbitriq and thinks that may have made them worse (10 Apr 2025 10:13)      PAST MEDICAL & SURGICAL HISTORY:  BPH (benign prostatic hyperplasia)      HTN (hypertension)      Type 2 diabetes mellitus      Peripheral neuropathy      History of Guillain-Brewton syndrome      History of CVA in adulthood      DDD (degenerative disc disease), lumbar      DVT, lower extremity      Renal stones      H/O Guillain-Brewton syndrome      History of neurogenic bowel      DM2 (diabetes mellitus, type 2)      HTN (hypertension)      BPH without obstruction/lower urinary tract symptoms      Degenerative arthritis      DVT of lower limb, acute      CVA (cerebrovascular accident)      CVD (cerebrovascular disease)      Muscle weakness      Iron deficiency anemia      History of muscle spasm      Pain, unspecified      Vitamin deficiency      Obesity      GERD (gastroesophageal reflux disease)      H/O constipation      H/O insomnia      Essential (primary) hypertension      Acute embolism and thrombosis of deep vein of lower extremity      BPH (benign prostatic hyperplasia)      Diabetes mellitus due to underlying condition with diabetic neuropathy      Major depressive disorder, single episode      S/P IVC filter      H/O lithotripsy      Chronic suprapubic catheter          REVIEW OF SYSTEMS  General:	as above  Respiratory: NAD, No SOB, no cough  Cardiovascular: No chest pain, no palpitations	  Endocrine: no polyuria, no polydipsia, or S/S of hypoglycemia  Neuro: denies numbess, no tingling	  Other:	      Allergies    sulfa drugs (Rash)  sulfa drugs (Other)  sulfa drugs (Unknown)  sulfa drugs (Hives)    Intolerances    Pipercillin Sodium-Tazobactam Sodium (Pruritus)      MEDICATIONS  (STANDING):  amLODIPine   Tablet 10 milliGRAM(s) Oral daily  ascorbic acid 500 milliGRAM(s) Oral daily  cloNIDine 0.1 milliGRAM(s) Oral two times a day  dextrose 5%. 1000 milliLiter(s) (50 mL/Hr) IV Continuous <Continuous>  dextrose 5%. 1000 milliLiter(s) (100 mL/Hr) IV Continuous <Continuous>  dextrose 50% Injectable 25 Gram(s) IV Push once  dextrose 50% Injectable 12.5 Gram(s) IV Push once  dextrose 50% Injectable 25 Gram(s) IV Push once  DULoxetine 60 milliGRAM(s) Oral daily  ferrous    sulfate 325 milliGRAM(s) Oral daily  finasteride 5 milliGRAM(s) Oral daily  folic acid 1 milliGRAM(s) Oral daily  gabapentin 600 milliGRAM(s) Oral every 8 hours  glucagon  Injectable 1 milliGRAM(s) IntraMuscular once  insulin glargine Injectable (LANTUS) 10 Unit(s) SubCutaneous at bedtime  insulin lispro (ADMELOG) corrective regimen sliding scale   SubCutaneous three times a day before meals  insulin lispro (ADMELOG) corrective regimen sliding scale   SubCutaneous at bedtime  insulin lispro Injectable (ADMELOG) 3 Unit(s) SubCutaneous three times a day before meals  metoprolol tartrate 25 milliGRAM(s) Oral two times a day  multivitamin 1 Tablet(s) Oral daily  nicotine -  14 mG/24Hr(s) Patch 1 Patch Transdermal daily  oxybutynin 10 milliGRAM(s) Oral two times a day  pantoprazole    Tablet 40 milliGRAM(s) Oral before breakfast  senna 2 Tablet(s) Oral at bedtime

## 2025-04-15 NOTE — PROGRESS NOTE ADULT - SUBJECTIVE AND OBJECTIVE BOX
CHIEF COMPLAINT/ REASON FOR VISIT  .. Patient was seen to address the  issue listed under PROBLEM LIST which is located toward bottom of this note     JOIE BRUNO    SY 1EST 115 W1    Allergies    sulfa drugs (Rash)  sulfa drugs (Other)  sulfa drugs (Unknown)  sulfa drugs (Hives)    Intolerances    Pipercillin Sodium-Tazobactam Sodium (Pruritus)      PAST MEDICAL & SURGICAL HISTORY:  BPH (benign prostatic hyperplasia)      HTN (hypertension)      Type 2 diabetes mellitus      Peripheral neuropathy      History of Guillain-Cochise syndrome      History of CVA in adulthood      DDD (degenerative disc disease), lumbar      DVT, lower extremity      Renal stones      H/O Guillain-Cochise syndrome      History of neurogenic bowel      DM2 (diabetes mellitus, type 2)      HTN (hypertension)      BPH without obstruction/lower urinary tract symptoms      Degenerative arthritis      DVT of lower limb, acute      CVA (cerebrovascular accident)      CVD (cerebrovascular disease)      Muscle weakness      Iron deficiency anemia      History of muscle spasm      Pain, unspecified      Vitamin deficiency      Obesity      GERD (gastroesophageal reflux disease)      H/O constipation      H/O insomnia      Essential (primary) hypertension      Acute embolism and thrombosis of deep vein of lower extremity      BPH (benign prostatic hyperplasia)      Diabetes mellitus due to underlying condition with diabetic neuropathy      Major depressive disorder, single episode      S/P IVC filter      H/O lithotripsy      Chronic suprapubic catheter          FAMILY HISTORY:  FH: heart disease    FH: HTN (hypertension)    Family history of sinus tachycardia (Father)    FH: HTN (hypertension) (Mother)        Home Medications:  alfuzosin 10 mg oral tablet, extended release: 1 tab(s) orally once a day (20 Nov 2024 10:05)  amLODIPine 10 mg oral tablet: 1 tab(s) orally once a day (20 Nov 2024 10:28)  ascorbic acid 500 mg oral tablet: 1 tab(s) orally once a day (20 Nov 2024 10:28)  cloNIDine 0.1 mg oral tablet: 1 tab(s) orally 2 times a day (20 Nov 2024 10:28)  DULoxetine 60 mg oral delayed release capsule: 1 cap(s) orally once a day (20 Nov 2024 10:28)  ferrous sulfate 325 mg (65 mg elemental iron) oral tablet: 1 tab(s) orally once a day (20 Nov 2024 10:28)  finasteride 5 mg oral tablet: 1 tab(s) orally once a day (20 Nov 2024 10:28)  folic acid: 1 milligram(s) once a day (20 Nov 2024 10:28)  gabapentin 600 mg oral tablet: 1 tab(s) orally every 8 hours (20 Nov 2024 10:06)  HYDROmorphone 4 mg oral tablet: 1 tab(s) orally every 6 hours PRN (20 Nov 2024 10:28)  Insulin Lispro KwikPen 100 units/mL injectable solution: 3 unit(s) subcutaneous 3 times a day (before meals) and  additionally  sliding scale (15 Apr 2025 09:42)  Lantus 100 units/mL subcutaneous solution: 10 unit(s) subcutaneous once a day (at bedtime) (20 Nov 2024 10:08)  melatonin 5 mg oral tablet: 1 tab(s) orally once a day (at bedtime) (20 Nov 2024 10:28)  methocarbamol 750 mg oral tablet: 2 tab(s) orally every 8 hours as needed for  muscle spasm (20 Nov 2024 10:09)  metoprolol tartrate 25 mg oral tablet: 1 tab(s) orally 2 times a day (20 Nov 2024 10:28)  miconazole 2% topical cream: Apply topically to affected area 3 times a day abdomen (20 Nov 2024 10:28)  Multiple Vitamins oral tablet: 1 tab(s) orally once a day (20 Nov 2024 10:28)  Myrbetriq 25 mg oral tablet, extended release: 1 tab(s) orally once a day (20 Nov 2024 10:28)  nicotine 14 mg/24 hr transdermal film, extended release: 1 patch transdermally once a day (20 Nov 2024 10:24)  oxyBUTYnin 5 mg oral tablet: 2 tab(s) orally 2 times a day (13 Apr 2025 10:10)  pantoprazole 40 mg oral delayed release tablet: 1 tab(s) orally once a day (before a meal) (20 Nov 2024 10:28)  Senna 8.6 mg oral tablet: 2 tab(s) orally once a day (20 Nov 2024 10:24)  Tylenol 325 mg oral tablet: 2 tab(s) orally every 4 hours as needed for  mild pain (20 Nov 2024 10:26)  warfarin 4 mg oral tablet: 1 tab(s) orally once a day (at bedtime) Hold on 04/15 and check INR 04/16 (15 Apr 2025 09:42)      MEDICATIONS  (STANDING):  amLODIPine   Tablet 10 milliGRAM(s) Oral daily  ascorbic acid 500 milliGRAM(s) Oral daily  cloNIDine 0.1 milliGRAM(s) Oral two times a day  dextrose 5%. 1000 milliLiter(s) (50 mL/Hr) IV Continuous <Continuous>  dextrose 5%. 1000 milliLiter(s) (100 mL/Hr) IV Continuous <Continuous>  dextrose 50% Injectable 25 Gram(s) IV Push once  dextrose 50% Injectable 12.5 Gram(s) IV Push once  dextrose 50% Injectable 25 Gram(s) IV Push once  DULoxetine 60 milliGRAM(s) Oral daily  ferrous    sulfate 325 milliGRAM(s) Oral daily  finasteride 5 milliGRAM(s) Oral daily  folic acid 1 milliGRAM(s) Oral daily  gabapentin 600 milliGRAM(s) Oral every 8 hours  glucagon  Injectable 1 milliGRAM(s) IntraMuscular once  insulin glargine Injectable (LANTUS) 10 Unit(s) SubCutaneous at bedtime  insulin lispro (ADMELOG) corrective regimen sliding scale   SubCutaneous three times a day before meals  insulin lispro (ADMELOG) corrective regimen sliding scale   SubCutaneous at bedtime  insulin lispro Injectable (ADMELOG) 3 Unit(s) SubCutaneous three times a day before meals  metoprolol tartrate 25 milliGRAM(s) Oral two times a day  multivitamin 1 Tablet(s) Oral daily  nicotine -  14 mG/24Hr(s) Patch 1 Patch Transdermal daily  oxybutynin 10 milliGRAM(s) Oral two times a day  pantoprazole    Tablet 40 milliGRAM(s) Oral before breakfast  senna 2 Tablet(s) Oral at bedtime    MEDICATIONS  (PRN):  acetaminophen     Tablet .. 650 milliGRAM(s) Oral every 6 hours PRN Temp greater or equal to 38C (100.4F), Mild Pain (1 - 3)  aluminum hydroxide/magnesium hydroxide/simethicone Suspension 30 milliLiter(s) Oral every 4 hours PRN Dyspepsia  dextrose Oral Gel 15 Gram(s) Oral once PRN Blood Glucose LESS THAN 70 milliGRAM(s)/deciliter  HYDROmorphone   Tablet 4 milliGRAM(s) Oral every 6 hours PRN Moderate Pain (4 - 6)  melatonin 3 milliGRAM(s) Oral at bedtime PRN Insomnia  methocarbamol 750 milliGRAM(s) Oral every 8 hours PRN for muscle spasm  ondansetron Injectable 4 milliGRAM(s) IV Push every 8 hours PRN Nausea and/or Vomiting      Diet, Consistent Carbohydrate w/Evening Snack:   DASH/TLC Sodium & Cholesterol Restricted (04-10-25 @ 10:12) [Active]          Vital Signs Last 24 Hrs  T(C): 36.4 (15 Apr 2025 08:40), Max: 37 (14 Apr 2025 22:57)  T(F): 97.6 (15 Apr 2025 08:40), Max: 98.6 (14 Apr 2025 22:57)  HR: 54 (15 Apr 2025 08:40) (54 - 63)  BP: 121/78 (15 Apr 2025 08:40) (121/78 - 157/78)  BP(mean): --  RR: 18 (15 Apr 2025 08:40) (17 - 18)  SpO2: 96% (15 Apr 2025 08:40) (95% - 98%)    Parameters below as of 15 Apr 2025 08:40  Patient On (Oxygen Delivery Method): room air          04-14-25 @ 07:01  -  04-15-25 @ 07:00  --------------------------------------------------------  IN: 0 mL / OUT: 2800 mL / NET: -2800 mL              LABS:                        9.4    9.51  )-----------( 211      ( 15 Apr 2025 08:17 )             29.0     04-15    135  |  101  |  37[H]  ----------------------------<  187[H]  3.5   |  26  |  2.77[H]    Ca    8.5      15 Apr 2025 08:17    TPro  6.4  /  Alb  2.6[L]  /  TBili  0.3  /  DBili  x   /  AST  12  /  ALT  23  /  AlkPhos  105  04-14    PT/INR - ( 15 Apr 2025 08:17 )   PT: 40.3 sec;   INR: 3.46 ratio           Urinalysis Basic - ( 15 Apr 2025 08:17 )    Color: x / Appearance: x / SG: x / pH: x  Gluc: 187 mg/dL / Ketone: x  / Bili: x / Urobili: x   Blood: x / Protein: x / Nitrite: x   Leuk Esterase: x / RBC: x / WBC x   Sq Epi: x / Non Sq Epi: x / Bacteria: x            WBC:  WBC Count: 9.51 K/uL (04-15 @ 08:17)  WBC Count: 9.78 K/uL (04-14 @ 08:18)  WBC Count: 9.38 K/uL (04-13 @ 07:55)  WBC Count: 9.30 K/uL (04-12 @ 11:55)      MICROBIOLOGY:  RECENT CULTURES:  04-10 Blood Blood XXXX XXXX   No growth at 4 days    04-10 Catheterized XXXX XXXX   <10,000 CFU/mL Normal Urogenital Ellen                PT/INR - ( 15 Apr 2025 08:17 )   PT: 40.3 sec;   INR: 3.46 ratio             Sodium:  Sodium: 135 mmol/L (04-15 @ 08:17)  Sodium: 138 mmol/L (04-14 @ 08:18)  Sodium: 139 mmol/L (04-13 @ 07:55)  Sodium: 137 mmol/L (04-12 @ 11:55)      2.77 mg/dL 04-15 @ 08:17  2.85 mg/dL 04-14 @ 08:18  2.88 mg/dL 04-13 @ 07:55  3.04 mg/dL 04-12 @ 11:55      Hemoglobin:  Hemoglobin: 9.4 g/dL (04-15 @ 08:17)  Hemoglobin: 9.4 g/dL (04-14 @ 08:18)  Hemoglobin: 9.3 g/dL (04-13 @ 07:55)  Hemoglobin: 9.4 g/dL (04-12 @ 11:55)      Platelets: Platelet Count - Automated: 211 K/uL (04-15 @ 08:17)  Platelet Count - Automated: 241 K/uL (04-14 @ 08:18)  Platelet Count - Automated: 199 K/uL (04-13 @ 07:55)  Platelet Count - Automated: 217 K/uL (04-12 @ 11:55)      LIVER FUNCTIONS - ( 14 Apr 2025 08:18 )  Alb: 2.6 g/dL / Pro: 6.4 g/dL / ALK PHOS: 105 U/L / ALT: 23 U/L / AST: 12 U/L / GGT: x             Urinalysis Basic - ( 15 Apr 2025 08:17 )    Color: x / Appearance: x / SG: x / pH: x  Gluc: 187 mg/dL / Ketone: x  / Bili: x / Urobili: x   Blood: x / Protein: x / Nitrite: x   Leuk Esterase: x / RBC: x / WBC x   Sq Epi: x / Non Sq Epi: x / Bacteria: x        RADIOLOGY & ADDITIONAL STUDIES:      MICROBIOLOGY:  RECENT CULTURES:  04-10 Blood Blood XXXX XXXX   No growth at 4 days    04-10 Catheterized XXXX XXXX   <10,000 CFU/mL Normal Urogenital Ellen

## 2025-04-16 VITALS
RESPIRATION RATE: 20 BRPM | HEART RATE: 70 BPM | TEMPERATURE: 98 F | SYSTOLIC BLOOD PRESSURE: 131 MMHG | OXYGEN SATURATION: 96 % | DIASTOLIC BLOOD PRESSURE: 77 MMHG

## 2025-04-16 LAB
ALBUMIN SERPL ELPH-MCNC: 2.7 G/DL — LOW (ref 3.3–5)
ALP SERPL-CCNC: 107 U/L — SIGNIFICANT CHANGE UP (ref 30–120)
ALT FLD-CCNC: 19 U/L — SIGNIFICANT CHANGE UP (ref 10–60)
ANION GAP SERPL CALC-SCNC: 11 MMOL/L — SIGNIFICANT CHANGE UP (ref 5–17)
AST SERPL-CCNC: 13 U/L — SIGNIFICANT CHANGE UP (ref 10–40)
BILIRUB SERPL-MCNC: 0.3 MG/DL — SIGNIFICANT CHANGE UP (ref 0.2–1.2)
BUN SERPL-MCNC: 45 MG/DL — HIGH (ref 7–23)
CALCIUM SERPL-MCNC: 8.3 MG/DL — LOW (ref 8.4–10.5)
CHLORIDE SERPL-SCNC: 102 MMOL/L — SIGNIFICANT CHANGE UP (ref 96–108)
CO2 SERPL-SCNC: 24 MMOL/L — SIGNIFICANT CHANGE UP (ref 22–31)
CREAT SERPL-MCNC: 2.93 MG/DL — HIGH (ref 0.5–1.3)
CULTURE RESULTS: SIGNIFICANT CHANGE UP
CULTURE RESULTS: SIGNIFICANT CHANGE UP
EGFR: 25 ML/MIN/1.73M2 — LOW
EGFR: 25 ML/MIN/1.73M2 — LOW
GLUCOSE BLDC GLUCOMTR-MCNC: 152 MG/DL — HIGH (ref 70–99)
GLUCOSE BLDC GLUCOMTR-MCNC: 153 MG/DL — HIGH (ref 70–99)
GLUCOSE SERPL-MCNC: 155 MG/DL — HIGH (ref 70–99)
HCT VFR BLD CALC: 30.4 % — LOW (ref 39–50)
HGB BLD-MCNC: 9.7 G/DL — LOW (ref 13–17)
INR BLD: 3.18 RATIO — HIGH (ref 0.85–1.16)
MCHC RBC-ENTMCNC: 22.9 PG — LOW (ref 27–34)
MCHC RBC-ENTMCNC: 31.9 G/DL — LOW (ref 32–36)
MCV RBC AUTO: 71.9 FL — LOW (ref 80–100)
NRBC BLD AUTO-RTO: 0 /100 WBCS — SIGNIFICANT CHANGE UP (ref 0–0)
PLATELET # BLD AUTO: 213 K/UL — SIGNIFICANT CHANGE UP (ref 150–400)
POTASSIUM SERPL-MCNC: 3.6 MMOL/L — SIGNIFICANT CHANGE UP (ref 3.5–5.3)
POTASSIUM SERPL-SCNC: 3.6 MMOL/L — SIGNIFICANT CHANGE UP (ref 3.5–5.3)
PROT SERPL-MCNC: 6.3 G/DL — SIGNIFICANT CHANGE UP (ref 6–8.3)
PROTHROM AB SERPL-ACNC: 36.5 SEC — HIGH (ref 9.9–13.4)
RBC # BLD: 4.23 M/UL — SIGNIFICANT CHANGE UP (ref 4.2–5.8)
RBC # FLD: 16.4 % — HIGH (ref 10.3–14.5)
SODIUM SERPL-SCNC: 137 MMOL/L — SIGNIFICANT CHANGE UP (ref 135–145)
SPECIMEN SOURCE: SIGNIFICANT CHANGE UP
SPECIMEN SOURCE: SIGNIFICANT CHANGE UP
WBC # BLD: 11.11 K/UL — HIGH (ref 3.8–10.5)
WBC # FLD AUTO: 11.11 K/UL — HIGH (ref 3.8–10.5)

## 2025-04-16 PROCEDURE — 85610 PROTHROMBIN TIME: CPT

## 2025-04-16 PROCEDURE — 80053 COMPREHEN METABOLIC PANEL: CPT

## 2025-04-16 PROCEDURE — 36415 COLL VENOUS BLD VENIPUNCTURE: CPT

## 2025-04-16 PROCEDURE — 87086 URINE CULTURE/COLONY COUNT: CPT

## 2025-04-16 PROCEDURE — 80048 BASIC METABOLIC PNL TOTAL CA: CPT

## 2025-04-16 PROCEDURE — 82962 GLUCOSE BLOOD TEST: CPT

## 2025-04-16 PROCEDURE — 81001 URINALYSIS AUTO W/SCOPE: CPT

## 2025-04-16 PROCEDURE — 83036 HEMOGLOBIN GLYCOSYLATED A1C: CPT

## 2025-04-16 PROCEDURE — 99285 EMERGENCY DEPT VISIT HI MDM: CPT

## 2025-04-16 PROCEDURE — 87040 BLOOD CULTURE FOR BACTERIA: CPT

## 2025-04-16 PROCEDURE — 85027 COMPLETE CBC AUTOMATED: CPT

## 2025-04-16 PROCEDURE — 85025 COMPLETE CBC W/AUTO DIFF WBC: CPT

## 2025-04-16 RX ADMIN — INSULIN LISPRO 3 UNIT(S): 100 INJECTION, SOLUTION INTRAVENOUS; SUBCUTANEOUS at 08:28

## 2025-04-16 RX ADMIN — INSULIN LISPRO 1: 100 INJECTION, SOLUTION INTRAVENOUS; SUBCUTANEOUS at 12:37

## 2025-04-16 RX ADMIN — AMLODIPINE BESYLATE 10 MILLIGRAM(S): 10 TABLET ORAL at 05:45

## 2025-04-16 RX ADMIN — Medication 0.1 MILLIGRAM(S): at 05:45

## 2025-04-16 RX ADMIN — INSULIN LISPRO 3 UNIT(S): 100 INJECTION, SOLUTION INTRAVENOUS; SUBCUTANEOUS at 12:37

## 2025-04-16 RX ADMIN — Medication 325 MILLIGRAM(S): at 12:39

## 2025-04-16 RX ADMIN — NICOTINE POLACRILEX 1 PATCH: 4 GUM, CHEWING ORAL at 12:38

## 2025-04-16 RX ADMIN — Medication 500 MILLIGRAM(S): at 12:38

## 2025-04-16 RX ADMIN — Medication 1 TABLET(S): at 12:39

## 2025-04-16 RX ADMIN — OXYBUTYNIN CHLORIDE 10 MILLIGRAM(S): 5 TABLET, FILM COATED, EXTENDED RELEASE ORAL at 05:44

## 2025-04-16 RX ADMIN — GABAPENTIN 600 MILLIGRAM(S): 400 CAPSULE ORAL at 05:45

## 2025-04-16 RX ADMIN — FOLIC ACID 1 MILLIGRAM(S): 1 TABLET ORAL at 12:39

## 2025-04-16 RX ADMIN — DULOXETINE 60 MILLIGRAM(S): 20 CAPSULE, DELAYED RELEASE ORAL at 12:39

## 2025-04-16 RX ADMIN — FINASTERIDE 5 MILLIGRAM(S): 1 TABLET, FILM COATED ORAL at 12:39

## 2025-04-16 RX ADMIN — INSULIN LISPRO 1: 100 INJECTION, SOLUTION INTRAVENOUS; SUBCUTANEOUS at 08:29

## 2025-04-16 RX ADMIN — Medication 40 MILLIGRAM(S): at 05:44

## 2025-04-16 NOTE — PROGRESS NOTE ADULT - REASON FOR ADMISSION
bladder spasms

## 2025-04-16 NOTE — DISCHARGE NOTE NURSING/CASE MANAGEMENT/SOCIAL WORK - PATIENT PORTAL LINK FT
You can access the FollowMyHealth Patient Portal offered by [facility] by registering at the following website: [email or web address]. By joining [facility]’s FollowMyHealth portal, you will also be able to view your health information using other applications (apps) compatible with our system.

## 2025-04-16 NOTE — PROGRESS NOTE ADULT - NUTRITIONAL ASSESSMENT
MEDICATIONS  (STANDING):  amLODIPine   Tablet 10 milliGRAM(s) Oral daily  ascorbic acid 500 milliGRAM(s) Oral daily  cloNIDine 0.1 milliGRAM(s) Oral two times a day  dextrose 5%. 1000 milliLiter(s) (100 mL/Hr) IV Continuous <Continuous>  dextrose 5%. 1000 milliLiter(s) (50 mL/Hr) IV Continuous <Continuous>  dextrose 50% Injectable 25 Gram(s) IV Push once  dextrose 50% Injectable 12.5 Gram(s) IV Push once  dextrose 50% Injectable 25 Gram(s) IV Push once  DULoxetine 60 milliGRAM(s) Oral daily  ferrous    sulfate 325 milliGRAM(s) Oral daily  finasteride 5 milliGRAM(s) Oral daily  folic acid 1 milliGRAM(s) Oral daily  gabapentin 600 milliGRAM(s) Oral every 8 hours  glucagon  Injectable 1 milliGRAM(s) IntraMuscular once  insulin glargine Injectable (LANTUS) 10 Unit(s) SubCutaneous at bedtime  insulin lispro (ADMELOG) corrective regimen sliding scale   SubCutaneous three times a day before meals  metoprolol tartrate 25 milliGRAM(s) Oral two times a day  multivitamin 1 Tablet(s) Oral daily  nicotine -  14 mG/24Hr(s) Patch 1 Patch Transdermal daily  oxybutynin 10 milliGRAM(s) Oral two times a day  pantoprazole    Tablet 40 milliGRAM(s) Oral before breakfast  senna 2 Tablet(s) Oral at bedtime  sodium chloride 0.45%. 1000 milliLiter(s) (50 mL/Hr) IV Continuous <Continuous>  warfarin 4 milliGRAM(s) Oral at bedtime
MEDICATIONS  (STANDING):  amLODIPine   Tablet 10 milliGRAM(s) Oral daily  ascorbic acid 500 milliGRAM(s) Oral daily  cloNIDine 0.1 milliGRAM(s) Oral two times a day  dextrose 5%. 1000 milliLiter(s) (50 mL/Hr) IV Continuous <Continuous>  dextrose 5%. 1000 milliLiter(s) (100 mL/Hr) IV Continuous <Continuous>  dextrose 50% Injectable 25 Gram(s) IV Push once  dextrose 50% Injectable 12.5 Gram(s) IV Push once  dextrose 50% Injectable 25 Gram(s) IV Push once  DULoxetine 60 milliGRAM(s) Oral daily  ferrous    sulfate 325 milliGRAM(s) Oral daily  finasteride 5 milliGRAM(s) Oral daily  folic acid 1 milliGRAM(s) Oral daily  gabapentin 600 milliGRAM(s) Oral every 8 hours  glucagon  Injectable 1 milliGRAM(s) IntraMuscular once  insulin glargine Injectable (LANTUS) 10 Unit(s) SubCutaneous at bedtime  insulin lispro (ADMELOG) corrective regimen sliding scale   SubCutaneous three times a day before meals  metoprolol tartrate 25 milliGRAM(s) Oral two times a day  multivitamin 1 Tablet(s) Oral daily  nicotine -  14 mG/24Hr(s) Patch 1 Patch Transdermal daily  oxybutynin 10 milliGRAM(s) Oral two times a day  pantoprazole    Tablet 40 milliGRAM(s) Oral before breakfast  senna 2 Tablet(s) Oral at bedtime
MEDICATIONS  (STANDING):  amLODIPine   Tablet 10 milliGRAM(s) Oral daily  ascorbic acid 500 milliGRAM(s) Oral daily  cloNIDine 0.1 milliGRAM(s) Oral two times a day  dextrose 5%. 1000 milliLiter(s) (50 mL/Hr) IV Continuous <Continuous>  dextrose 5%. 1000 milliLiter(s) (100 mL/Hr) IV Continuous <Continuous>  dextrose 50% Injectable 25 Gram(s) IV Push once  dextrose 50% Injectable 12.5 Gram(s) IV Push once  dextrose 50% Injectable 25 Gram(s) IV Push once  DULoxetine 60 milliGRAM(s) Oral daily  ferrous    sulfate 325 milliGRAM(s) Oral daily  finasteride 5 milliGRAM(s) Oral daily  folic acid 1 milliGRAM(s) Oral daily  gabapentin 600 milliGRAM(s) Oral every 8 hours  glucagon  Injectable 1 milliGRAM(s) IntraMuscular once  insulin glargine Injectable (LANTUS) 10 Unit(s) SubCutaneous at bedtime  insulin lispro (ADMELOG) corrective regimen sliding scale   SubCutaneous three times a day before meals  metoprolol tartrate 25 milliGRAM(s) Oral two times a day  multivitamin 1 Tablet(s) Oral daily  nicotine -  14 mG/24Hr(s) Patch 1 Patch Transdermal daily  oxybutynin 10 milliGRAM(s) Oral two times a day  pantoprazole    Tablet 40 milliGRAM(s) Oral before breakfast  senna 2 Tablet(s) Oral at bedtime  sodium chloride 0.45%. 1000 milliLiter(s) (75 mL/Hr) IV Continuous <Continuous>
MEDICATIONS  (STANDING):  amLODIPine   Tablet 10 milliGRAM(s) Oral daily  ascorbic acid 500 milliGRAM(s) Oral daily  cloNIDine 0.1 milliGRAM(s) Oral two times a day  dextrose 5%. 1000 milliLiter(s) (50 mL/Hr) IV Continuous <Continuous>  dextrose 5%. 1000 milliLiter(s) (100 mL/Hr) IV Continuous <Continuous>  dextrose 50% Injectable 25 Gram(s) IV Push once  dextrose 50% Injectable 12.5 Gram(s) IV Push once  dextrose 50% Injectable 25 Gram(s) IV Push once  DULoxetine 60 milliGRAM(s) Oral daily  ferrous    sulfate 325 milliGRAM(s) Oral daily  finasteride 5 milliGRAM(s) Oral daily  folic acid 1 milliGRAM(s) Oral daily  gabapentin 600 milliGRAM(s) Oral every 8 hours  glucagon  Injectable 1 milliGRAM(s) IntraMuscular once  insulin glargine Injectable (LANTUS) 10 Unit(s) SubCutaneous at bedtime  insulin lispro (ADMELOG) corrective regimen sliding scale   SubCutaneous three times a day before meals  metoprolol tartrate 25 milliGRAM(s) Oral two times a day  multivitamin 1 Tablet(s) Oral daily  nicotine -  14 mG/24Hr(s) Patch 1 Patch Transdermal daily  oxybutynin 10 milliGRAM(s) Oral two times a day  pantoprazole    Tablet 40 milliGRAM(s) Oral before breakfast  senna 2 Tablet(s) Oral at bedtime  sodium chloride 0.45%. 1000 milliLiter(s) (75 mL/Hr) IV Continuous <Continuous>  warfarin 4 milliGRAM(s) Oral at bedtime
9.4    9.78  )-----------( 241      ( 14 Apr 2025 08:18 )             29.8       CBC Full  -  ( 14 Apr 2025 08:18 )  WBC Count : 9.78 K/uL  RBC Count : 4.15 M/uL  Hemoglobin : 9.4 g/dL  Hematocrit : 29.8 %  Platelet Count - Automated : 241 K/uL  Mean Cell Volume : 71.8 fL  Mean Cell Hemoglobin : 22.7 pg  Mean Cell Hemoglobin Concentration : 31.5 g/dL  Auto Neutrophil # : x  Auto Lymphocyte # : x  Auto Monocyte # : x  Auto Eosinophil # : x  Auto Basophil # : x  Auto Neutrophil % : x  Auto Lymphocyte % : x  Auto Monocyte % : x  Auto Eosinophil % : x  Auto Basophil % : x      04-14    138  |  103  |  36[H]  ----------------------------<  167[H]  4.0   |  26  |  2.85[H]    Ca    8.4      14 Apr 2025 08:18    TPro  6.4  /  Alb  2.6[L]  /  TBili  0.3  /  DBili  x   /  AST  12  /  ALT  23  /  AlkPhos  105  04-14      CAPILLARY BLOOD GLUCOSE      POCT Blood Glucose.: 180 mg/dL (14 Apr 2025 08:26)  POCT Blood Glucose.: 183 mg/dL (13 Apr 2025 21:08)  POCT Blood Glucose.: 214 mg/dL (13 Apr 2025 17:00)  POCT Blood Glucose.: 160 mg/dL (13 Apr 2025 12:33)      Vital Signs Last 24 Hrs  T(C): 36.7 (14 Apr 2025 08:44), Max: 36.8 (13 Apr 2025 14:35)  T(F): 98.1 (14 Apr 2025 08:44), Max: 98.2 (13 Apr 2025 14:35)  HR: 55 (14 Apr 2025 08:44) (55 - 71)  BP: 125/77 (14 Apr 2025 08:44) (125/77 - 152/87)  BP(mean): --  RR: 18 (14 Apr 2025 08:44) (16 - 18)  SpO2: 95% (14 Apr 2025 08:44) (94% - 98%)    Parameters below as of 14 Apr 2025 08:44  Patient On (Oxygen Delivery Method): room air        Urinalysis Basic - ( 14 Apr 2025 08:18 )    Color: x / Appearance: x / SG: x / pH: x  Gluc: 167 mg/dL / Ketone: x  / Bili: x / Urobili: x   Blood: x / Protein: x / Nitrite: x   Leuk Esterase: x / RBC: x / WBC x   Sq Epi: x / Non Sq Epi: x / Bacteria: x        PT/INR - ( 14 Apr 2025 08:18 )   PT: 34.9 sec;   INR: 3.04 ratio
MEDICATIONS  (STANDING):  amLODIPine   Tablet 10 milliGRAM(s) Oral daily  ascorbic acid 500 milliGRAM(s) Oral daily  cloNIDine 0.1 milliGRAM(s) Oral two times a day  dextrose 5%. 1000 milliLiter(s) (50 mL/Hr) IV Continuous <Continuous>  dextrose 5%. 1000 milliLiter(s) (100 mL/Hr) IV Continuous <Continuous>  dextrose 50% Injectable 25 Gram(s) IV Push once  dextrose 50% Injectable 12.5 Gram(s) IV Push once  dextrose 50% Injectable 25 Gram(s) IV Push once  DULoxetine 60 milliGRAM(s) Oral daily  ferrous    sulfate 325 milliGRAM(s) Oral daily  finasteride 5 milliGRAM(s) Oral daily  folic acid 1 milliGRAM(s) Oral daily  gabapentin 600 milliGRAM(s) Oral every 8 hours  glucagon  Injectable 1 milliGRAM(s) IntraMuscular once  insulin glargine Injectable (LANTUS) 10 Unit(s) SubCutaneous at bedtime  insulin lispro (ADMELOG) corrective regimen sliding scale   SubCutaneous three times a day before meals  metoprolol tartrate 25 milliGRAM(s) Oral two times a day  multivitamin 1 Tablet(s) Oral daily  nicotine -  14 mG/24Hr(s) Patch 1 Patch Transdermal daily  oxybutynin 10 milliGRAM(s) Oral two times a day  pantoprazole    Tablet 40 milliGRAM(s) Oral before breakfast  senna 2 Tablet(s) Oral at bedtime

## 2025-04-16 NOTE — PROGRESS NOTE ADULT - ASSESSMENT
50y M from [facility] nursing home JESSENIA with suprapubic catheter as pt concerned for urine infection and bladder spasms, pt has a urologist at [facility], has been admitted there 3 times in the past 3 weeks, just discharged and arrived back at [facility] less than 4hr PTA to [place], states he was sent there for infection and received 2 doses of IV antibiotics and then they were stopped and he was sent home today, states his previous 2 recent admissions he petitioned for longer stays, and won, but eventually was discharged, pt reports he refused to have IV antibiotics at the nursing home previously as he believes they will be dislodged and he wants to be in the hospital until antibiotics are complete, reports he was not discharged on antibiotics this time, urine culture from 4/5 done here was negative (which pt states he was not aware of), pt also states his bladder spasms are painful and wants pain medication, states he does have chronic spasms, but they recently stopped his myrbitriq and thinks that may have made them worse (10 Apr 2025 10:13)        ckd stage3 and ACUTE RENAL FAILURE: sodium chloride 0.45%. 1000 milliLiter(s) (75  mL/Hr) IV Continuous , gfr is controlled    History chronic bladder colonization and n which should not be treated, and history recurrent UTI which require treatment,  recent C&S was negative.  Bilateral small non-obstructing calculi, observe   bladder spasms with Myrbetriq 50 MG and Oxybutynin  10 mg  BID,   Serum creatinine is  increasing      , approximating GFR at   ml/min.   There is no progression . No uremic symptoms  No evidence of anemia .  Fluid status stable.  Will continue to avoid nephrotoxic drugs.  Patient remains asymptomatic.   Continue current therapy.        Accuchecks monitoring and insulin sliding scale coverage, no concentrated sweets diet, serial labs and f/up with PMD, monitor HB A 1 C every 3-4 mnth  insulin glargine Injectable (LANTUS) 10 Unit(s) SubCutaneous at bedtime  insulin lispro (ADMELOG) corrective regimen sliding scale   SubCutaneous three times a day before meals      BP monitoring,continue current antihypertensive meds, low salt diet,followup with PMD in 1-2 weeks  amLODIPine   Tablet 10 milliGRAM(s) Oral daily
50y M from [facility] nursing home JESSENIA with suprapubic catheter as pt concerned for urine infection and bladder spasms, pt has a urologist at [facility], has been admitted there 3 times in the past 3 weeks, just discharged and arrived back at [facility] less than 4hr PTA to [place], states he was sent there for infection and received 2 doses of IV antibiotics and then they were stopped and he was sent home today, states his previous 2 recent admissions he petitioned for longer stays, and won, but eventually was discharged, pt reports he refused to have IV antibiotics at the nursing home previously as he believes they will be dislodged and he wants to be in the hospital until antibiotics are complete, reports he was not discharged on antibiotics this time, urine culture from 4/5 done here was negative (which pt states he was not aware of), pt also states his bladder spasms are painful and wants pain medication, states he does have chronic spasms, but they recently stopped his myrbitriq and thinks that may have made them worse
50y M from [facility] nursing home JESSENIA with suprapubic catheter as pt concerned for urine infection and bladder spasms, pt has a urologist at [facility], has been admitted there 3 times in the past 3 weeks, just discharged and arrived back at [facility] less than 4hr PTA to [place], states he was sent there for infection and received 2 doses of IV antibiotics and then they were stopped and he was sent home today, states his previous 2 recent admissions he petitioned for longer stays, and won, but eventually was discharged, pt reports he refused to have IV antibiotics at the nursing home previously as he believes they will be dislodged and he wants to be in the hospital until antibiotics are complete, reports he was not discharged on antibiotics this time, urine culture from 4/5 done here was negative (which pt states he was not aware of), pt also states his bladder spasms are painful and wants pain medication, states he does have chronic spasms, but they recently stopped his myrbitriq and thinks that may have made them worse (10 Apr 2025 10:13)        ckd stage3 and ACUTE RENAL FAILURE: sodium chloride 0.45%. 1000 milliLiter(s) (75  mL/Hr) IV Continuous , gfr is controlled    History chronic bladder colonization and n which should not be treated, and history recurrent UTI which require treatment,  recent C&S was negative.  Bilateral small non-obstructing calculi, observe   bladder spasms with Myrbetriq 50 MG and Oxybutynin  10 mg  BID,   Serum creatinine is  increasing      , approximating GFR at   ml/min.   There is no progression . No uremic symptoms  No evidence of anemia .  Fluid status stable.  Will continue to avoid nephrotoxic drugs.  Patient remains asymptomatic.   Continue current therapy.        Accuchecks monitoring and insulin sliding scale coverage, no concentrated sweets diet, serial labs and f/up with PMD, monitor HB A 1 C every 3-4 mnth  insulin glargine Injectable (LANTUS) 10 Unit(s) SubCutaneous at bedtime  insulin lispro (ADMELOG) corrective regimen sliding scale   SubCutaneous three times a day before meals      BP monitoring,continue current antihypertensive meds, low salt diet,followup with PMD in 1-2 weeks  amLODIPine   Tablet 10 milliGRAM(s) Oral daily
50y M from [facility] nursing home JESSENIA with suprapubic catheter as pt concerned for urine infection and bladder spasms, pt has a urologist at [facility], has been admitted there 3 times in the past 3 weeks, just discharged and arrived back at [facility] less than 4hr PTA to [place], states he was sent there for infection and received 2 doses of IV antibiotics and then they were stopped and he was sent home today, states his previous 2 recent admissions he petitioned for longer stays, and won, but eventually was discharged, pt reports he refused to have IV antibiotics at the nursing home previously as he believes they will be dislodged and he wants to be in the hospital until antibiotics are complete, reports he was not discharged on antibiotics this time, urine culture from 4/5 done here was negative (which pt states he was not aware of), pt also states his bladder spasms are painful and wants pain medication, states he does have chronic spasms, but they recently stopped his myrbitriq and thinks that may have made them worse (10 Apr 2025 10:13)        ckd stage3 and ACUTE RENAL FAILURE: sodium chloride 0.45%. 1000 milliLiter(s) (75  mL/Hr) IV Continuous , lab is pending   History chronic bladder colonization and n which should not be treated, and history recurrent UTI which require treatment,  recent C&S was negative.  Bilateral small non-obstructing calculi, observe   bladder spasms with Myrbetriq 50 MG and Oxybutynin  10 mg  BID,   Serum creatinine is  increasing      , approximating GFR at   ml/min.   There is no progression . No uremic symptoms  No evidence of anemia .  Fluid status stable.  Will continue to avoid nephrotoxic drugs.  Patient remains asymptomatic.   Continue current therapy.        Accuchecks monitoring and insulin sliding scale coverage, no concentrated sweets diet, serial labs and f/up with PMD, monitor HB A 1 C every 3-4 mnth  insulin glargine Injectable (LANTUS) 10 Unit(s) SubCutaneous at bedtime  insulin lispro (ADMELOG) corrective regimen sliding scale   SubCutaneous three times a day before meals      BP monitoring,continue current antihypertensive meds, low salt diet,followup with PMD in 1-2 weeks  amLODIPine   Tablet 10 milliGRAM(s) Oral daily
   REASON FOR VISIT  .. Management of problems listed below      EVENTS.          REVIEW OF SYMPTOMS   Able to give ROS  Yes     RELIABILITY +/-   CONSTITUTIONAL Weakness Yes    ENDOCRINE  No heat or cold intolerance    ALLERGY No hives  Sore throat No stridor  RESP Shortness of breath YES   NEURO New weakness No   CARDIAC   Palpitations No         PHYSICAL EXAM    HEENT Unremarkable  atraumatic   RESP Fair air entry  Harsh breath sound   CARDIAC S1 S2 No S3     NO JVD    ABDOMEN No hepatosplenomegaly   PEDAL EDEMA present No calf tenderness  NO rash     CHIEF COMPLAINT.   . 4/10 leaking spc    OVERALL PRESENTATION.  . 51YO M resident of [facility] PMH BPH CVA  neurogenic bladder sp suprapubic catheter at [facility] chronic bladder spasms who presented to the hospital  4/10 with c/o bladder spasms, pt has a urologist at [facility], has been admitted there 3 times in the past 3 weeks, just discharged and arrived back at [facility] less than 4hr   . In ED no fever chills n/v/d CP SOB. In ED Wbc 15K UA with minimal wbcs. Afebrile  Bladder spasms has been a chronic issue  Ucx ngts CAUTI ruled out    PMH.        BEST PRACTICE ISSUES.  . HOB ELEVATN.    .... Yes  . DIET  .   .... 4/10 CONS CARB  . FREE WATER.   ....   .  IV FLUID.  ..... 4/10 iv 1/2 ns 75   . DVT PPLX .    .... 4/12 warfarin   . STRESS ULCR PPLX .   ....   . DATE/DM MGMT.   ..... See under Endocrine section   GENERAL DATA .   . COVID.         ....   . GOC.    ....    . ICU STAY.    .... no   . INFECTION PPLX .   ....   . ALLGY.   ....     nka   . WT.   .... 4/13/2025 127  . BMI.  .... 4/13/2025 18      XXXXXXXXXXXXXXXXXXXXXXX  VITALS/GAS EXCHANGE/DRIPS    ABGS.     .  VS/ PO/IO/ VENT/ DRIPS.   4/16/2025nafeb 57 140/70   4/16/2025 ra 94%    XXXXXXXX[id number]  PROBLEM ASSESSMENT RECOMMENDATIONS.  RESP.   . SMOKER   .... NICOTINE 4/10 N 14    . GAS EXCHANGE .   .... target PO 90-95%     INFECTION.  . DATA  .... w 4/13-4/15-4/16/2025 w 9.3- 9.5 - 11  .... nc 4/10 (-)   ..... uc 4/10 (-)     CARDIAC.  . HYTN   ....  AMLODIPINE 4/10  .... CLONIDINE .1 X 2 4/10   .... METOPROLOL 4/10 M 25.2     HEMAT.  . DATA  .... Hb 4/13-4/14-4/15/2025   .... Hb 9.3- 9.4 - 9.7   .... Plt 4/13/2025 plt 199   .... inr 4/13-4/15-4/16/2025   .... inr 248 - 346 - 318     RENAL.  . DATA  .... Na 4/13/2025 Na 139  .... K 4/13/2025 K 3.8   .... CO2 4/13/2025 co2 24   .... ag 4/13/2025 ag 11   .... alb 4/10/2025 alb 2     . ANTONIO/CKD  .... Cr 4/13-4/15-4/16/2025 Cr 2.8 - 2.7- 2.9     NEURO.  . PAIN  .... HYDROMORPHONE 4/11        [id number]   SUMMARY  CC.   . 4/10/2025 leaking spc   MAIN ISSUES.  . BLADDER SPASMS   PMH.   . UTI 10/22/2024  . SUPRAPUBIC CATHETER MALFUNCTION 10/22/2024  . ANTONIO   . VTE   . BPH   . CVA   . DDD  . DM   . GBS   . MDD   . OBESITY   PSH  . IVCF   . LITHOTRIPSY   . SPC    PROCEDURES/DEVICES .      DISCUSSIONS.  .... Discussed with primary care and relevant consultants on an ongoing basis       TIME SPENT.  . Over 36 minutes aggregate care time spent on encounter; activities included   direct patient care, counseling and/or coordinating care reviewing notes, lab data/ imaging , discussion with multidisciplinary team/ patient  /family and explaining in detail risks, benefits, alternatives  of the recommendations     KIANA SALTER 49 m 1975  4/10/2025  
50y M from [facility] nursing home JESSENIA with suprapubic catheter as pt concerned for urine infection and bladder spasms, pt has a urologist at [facility], has been admitted there 3 times in the past 3 weeks, just discharged and arrived back at [facility] less than 4hr PTA to [place], states he was sent there for infection and received 2 doses of IV antibiotics and then they were stopped and he was sent home today, states his previous 2 recent admissions he petitioned for longer stays, and won, but eventually was discharged, pt reports he refused to have IV antibiotics at the nursing home previously as he believes they will be dislodged and he wants to be in the hospital until antibiotics are complete, reports he was not discharged on antibiotics this time, urine culture from 4/5 done here was negative (which pt states he was not aware of), pt also states his bladder spasms are painful and wants pain medication, states he does have chronic spasms, but they recently stopped his myrbitriq and thinks that may have made them worse (10 Apr 2025 10:13)        ckd stage3 and ACUTE RENAL FAILURE: sodium chloride 0.45%. 1000 milliLiter(s) (75  mL/Hr) IV Continuous , lab is pending   History chronic bladder colonization and n which should not be treated, and history recurrent UTI which require treatment,  recent C&S was negative.  Bilateral small non-obstructing calculi, observe   bladder spasms with Myrbetriq 50 MG and Oxybutynin  10 mg  BID,   Serum creatinine is  increasing      , approximating GFR at   ml/min.   There is no progression . No uremic symptoms  No evidence of anemia .  Fluid status stable.  Will continue to avoid nephrotoxic drugs.  Patient remains asymptomatic.   Continue current therapy.  hold  diuretic.  hold   ACE inhibitor.  hold   ARB.  Additional evaluation:   ECG,    echocardiogram,     CXR,  will obtained recent   renal ultrasound to evalaute kidney size and possible stones ,      BP monitoring,continue current antihypertensive meds, low salt diet,followup with PMD in 1-2 weeks  amLODIPine   Tablet 10 milliGRAM(s) Oral daily  cloNIDine 0.1 milliGRAM(s) Oral two times a day      Accuchecks monitoring and insulin sliding scale coverage, no concentrated sweets diet, serial labs and f/up with PMD, monitor HB A 1 C every 3-4 mnth    Admitsend blood and urine cx,serial lactate levels,monitor vitals closley,ivfs hydration,monitor urine output and renal profile,iv abx as per  cons      
50y M from [facility] nursing home JESSENIA with suprapubic catheter as pt concerned for urine infection and bladder spasms, pt has a urologist at [facility], has been admitted there 3 times in the past 3 weeks, just discharged and arrived back at [facility] less than 4hr PTA to [place], states he was sent there for infection and received 2 doses of IV antibiotics and then they were stopped and he was sent home today, states his previous 2 recent admissions he petitioned for longer stays, and won, but eventually was discharged, pt reports he refused to have IV antibiotics at the nursing home previously as he believes they will be dislodged and he wants to be in the hospital until antibiotics are complete, reports he was not discharged on antibiotics this time, urine culture from 4/5 done here was negative (which pt states he was not aware of), pt also states his bladder spasms are painful and wants pain medication, states he does have chronic spasms, but they recently stopped his myrbitriq and thinks that may have made them worse (10 Apr 2025 10:13)        ckd stage3 and ACUTE RENAL FAILURE: sodium chloride 0.45%. 1000 milliLiter(s) (75  mL/Hr) IV Continuous , gfr is controlled    History chronic bladder colonization and n which should not be treated, and history recurrent UTI which require treatment,  recent C&S was negative.  Bilateral small non-obstructing calculi, observe   bladder spasms with Myrbetriq 50 MG and Oxybutynin  10 mg  BID,   Serum creatinine is  increasing      , approximating GFR at   ml/min.   There is no progression . No uremic symptoms  No evidence of anemia .  Fluid status stable.  Will continue to avoid nephrotoxic drugs.  Patient remains asymptomatic.   Continue current therapy.        Accuchecks monitoring and insulin sliding scale coverage, no concentrated sweets diet, serial labs and f/up with PMD, monitor HB A 1 C every 3-4 mnth  insulin glargine Injectable (LANTUS) 10 Unit(s) SubCutaneous at bedtime  insulin lispro (ADMELOG) corrective regimen sliding scale   SubCutaneous three times a day before meals      BP monitoring,continue current antihypertensive meds, low salt diet,followup with PMD in 1-2 weeks  amLODIPine   Tablet 10 milliGRAM(s) Oral daily
   REASON FOR VISIT  .. Management of problems listed below      EVENTS.          REVIEW OF SYMPTOMS   Able to give ROS  Yes     RELIABILITY +/-   CONSTITUTIONAL Weakness Yes    ENDOCRINE  No heat or cold intolerance    ALLERGY No hives  Sore throat No stridor  RESP Shortness of breath YES   NEURO New weakness No   CARDIAC   Palpitations No         PHYSICAL EXAM    HEENT Unremarkable  atraumatic   RESP Fair air entry  Harsh breath sound   CARDIAC S1 S2 No S3     NO JVD    ABDOMEN No hepatosplenomegaly   PEDAL EDEMA present No calf tenderness  NO rash     CHIEF COMPLAINT.   . 4/10 leaking spc    OVERALL PRESENTATION.  . 49YO M resident of [facility] PMH BPH CVA  neurogenic bladder sp suprapubic catheter at [facility] chronic bladder spasms who presented to the hospital  4/10 with c/o bladder spasms, pt has a urologist at [facility], has been admitted there 3 times in the past 3 weeks, just discharged and arrived back at [facility] less than 4hr   . In ED no fever chills n/v/d CP SOB. In ED Wbc 15K UA with minimal wbcs. Afebrile  Bladder spasms has been a chronic issue  Ucx ngts CAUTI ruled out      BEST PRACTICE ISSUES.  . HOB ELEVATN.    .... Yes  . DIET  .   .... 4/10 CONS CARB  . FREE WATER.   ....   .  IV FLUID.  ..... 4/10 iv 1/2 ns 75   . DVT PPLX .    .... 4/12 warfarin   . STRESS ULCR PPLX .   ....   . DATE/DM MGMT.   ..... See under Endocrine section   GENERAL DATA .   . COVID.         ....   . GOC.    ....    . ICU STAY.    .... no   . INFECTION PPLX .   ....   . ALLGY.   ....     nka   . WT.   .... 4/13/2025 127  . BMI.  .... 4/13/2025 18      XXXXXXXXXXXXXXXXXXXXXXX  VITALS/GAS EXCHANGE/DRIPS    ABGS.     .  VS/ PO/IO/ VENT/ DRIPS.   4/15/2025 afeb 54 120/70   4/15/2025 ra 96%     XXXXXXXXXXXXXXXXXXXXXXXXXXXXXXXXXXX  PROBLEM ASSESSMENT RECOMMENDATIONS.  RESP.   . SMOKER   .... NICOTINE 4/10 N 14    . GAS EXCHANGE .   .... target PO 90-95%     INFECTION.  . DATA  .... w 4/13-4/15/2025 w 9.3- 9.5   .... nc 4/10 (-)   ..... uc 4/10 (-)     CARDIAC.  . HYTN   ....  AMLODIPINE 4/10  .... CLONIDINE .1 X 2 4/10   .... METOPROLOL 4/10 M 25.2     HEMAT.  . DATA  .... Hb 4/13-4/14/2025 Hb 9.3- 9.4   .... Plt 4/13/2025 plt 199   .... inr 4/13-4/15/2025 inr 248 - 346     RENAL.  . DATA  .... Na 4/13/2025 Na 139  .... K 4/13/2025 K 3.8   .... CO2 4/13/2025 co2 24   .... ag 4/13/2025 ag 11   .... alb 4/10/2025 alb 2     . ANTONIO/CKD  .... Cr 4/13-4/15/2025 Cr 2.8 - 2.7    NEURO.  . PAIN  .... HYDROMORPHONE 4/11        XXXXXXXXXXXXXXXXXXXXXX   SUMMARY  CC.   . 4/10/2025 leaking spc   MAIN ISSUES.  . BLADDER SPASMS   PMH.   . UTI 10/22/2024  . SUPRAPUBIC CATHETER MALFUNCTION 10/22/2024  . ANTONIO   . VTE   . BPH   . CVA   . DDD  . DM   . GBS   . MDD   . OBESITY   PSH  . IVCF   . LITHOTRIPSY   . SPC    PROCEDURES/DEVICES .      DISCUSSIONS.  .... Discussed with primary care and relevant consultants on an ongoing basis       TIME SPENT.  . Over 36 minutes aggregate care time spent on encounter; activities included   direct patient care, counseling and/or coordinating care reviewing notes, lab data/ imaging , discussion with multidisciplinary team/ patient  /family and explaining in detail risks, benefits, alternatives  of the recommendations     KIANA SALTER 49 m 1975  4/10/2025   
   REASON FOR VISIT  .. Management of problems listed below      EVENTS.          REVIEW OF SYMPTOMS   Able to give ROS  Yes     RELIABILITY +/-   CONSTITUTIONAL Weakness Yes    ENDOCRINE  No heat or cold intolerance    ALLERGY No hives  Sore throat No stridor  RESP Shortness of breath YES   NEURO New weakness No   CARDIAC   Palpitations No         PHYSICAL EXAM    HEENT Unremarkable  atraumatic   RESP Fair air entry  Harsh breath sound   CARDIAC S1 S2 No S3     NO JVD    ABDOMEN No hepatosplenomegaly   PEDAL EDEMA present No calf tenderness  NO rash     CHIEF COMPLAINT.   . 4/10 leaking spc    OVERALL PRESENTATION.  . 49YO M resident of [facility] PMH BPH CVA  neurogenic bladder sp suprapubic catheter at [facility] chronic bladder spasms who presented to the hospital  4/10 with c/o bladder spasms, pt has a urologist at [facility], has been admitted there 3 times in the past 3 weeks, just discharged and arrived back at [facility] less than 4hr   . In ED no fever chills n/v/d CP SOB. In ED Wbc 15K UA with minimal wbcs. Afebrile  Bladder spasms has been a chronic issue  Ucx ngts CAUTI ruled out      BEST PRACTICE ISSUES.  . HOB ELEVATN.    .... Yes  . DIET  .   .... 4/10 CONS CARB  . FREE WATER.   ....   .  IV FLUID.  ..... 4/10 iv 1/2 ns 75   . DVT PPLX .    .... 4/12 warfarin   . STRESS ULCR PPLX .   ....   . DATE/DM MGMT.   ..... See under Endocrine section   GENERAL DATA .   . COVID.         ....   . GOC.    ....    . ICU STAY.    .... no   . INFECTION PPLX .   ....   . ALLGY.   ....     nka   . WT.   .... 4/13/2025 127  . BMI.  .... 4/13/2025 18      XXXXXXXXXXXXXXXXXXXXXXX  VITALS/GAS EXCHANGE/DRIPS    ABGS.     .  VS/ PO/IO/ VENT/ DRIPS.   4/13/2025 afeb 62 130/70   4/13/2025 ra 96%      XXXXXXXXXXXXXXXXXXXXXXXXXXXXXXXXXXX  PROBLEM ASSESSMENT RECOMMENDATIONS.  RESP.   . SMOKER   .... NICOTINE 4/10 N 14    . GAS EXCHANGE .   .... target PO 90-95%     INFECTION.  . DATA  .... w 4/13/2025 w 9.3  .... nc 4/10 (-)   ..... uc 4/10 (-)     CARDIAC.  . HYTN   ....  AMLODIPINE 4/10  .... CLONIDINE .1 X 2 4/10   .... METOPROLOL 4/10 M 25.2     HEMAT.  . DATA  .... Hb 4/13/2025 Hb 9.3  .... Plt 4/13/2025 plt 199   .... inr 4/13/2025 inr 248     RENAL.  . DATA  .... Na 4/13/2025 Na 139  .... K 4/13/2025 K 3.8   .... CO2 4/13/2025 co2 24   .... Cr 4/13/2025 Cr 2.8   .... ag 4/13/2025 ag 11   .... alb 4/10/2025 alb 2     ENDO.  . DM  .    NEURO.  . PAIN  .... HYDROMORPHONE 4/11        [id number]   SUMMARY  CC.   . 4/10/2025 leaking spc   MAIN ISSUES.  . BLADDER SPASMS   PMH.   . UTI 10/22/2024  . SUPRAPUBIC CATHETER MALFUNCTION 10/22/2024  . ANTONIO   . VTE   . BPH   . CVA   . DDD  . DM   . GBS   . MDD   . OBESITY   PSH  . IVCF   . LITHOTRIPSY   . SPC    PROCEDURES/DEVICES .      DISCUSSIONS.  .... Discussed with primary care and relevant consultants on an ongoing basis       TIME SPENT.  . Over 36 minutes aggregate care time spent on encounter; activities included   direct patient care, counseling and/or coordinating care reviewing notes, lab data/ imaging , discussion with multidisciplinary team/ patient  /family and explaining in detail risks, benefits, alternatives  of the recommendations     KIANA SALTER 49 m 1975  4/10/2025  
50y M from [facility] nursing home JESSENIA with suprapubic catheter as pt concerned for urine infection and bladder spasms, pt has a urologist at [facility], has been admitted there 3 times in the past 3 weeks, just discharged and arrived back at [facility] less than 4hr PTA to [place], states he was sent there for infection and received 2 doses of IV antibiotics and then they were stopped and he was sent home today, states his previous 2 recent admissions he petitioned for longer stays, and won, but eventually was discharged, pt reports he refused to have IV antibiotics at the nursing home previously as he believes they will be dislodged and he wants to be in the hospital until antibiotics are complete, reports he was not discharged on antibiotics this time, urine culture from 4/5 done here was negative (which pt states he was not aware of), pt also states his bladder spasms are painful and wants pain medication, states he does have chronic spasms, but they recently stopped his myrbitriq and thinks that may have made them worse (10 Apr 2025 10:13)        ckd stage3 and ACUTE RENAL FAILURE: sodium chloride 0.45%. 1000 milliLiter(s) (50 mL/Hr) IV Continuous   History chronic bladder colonization and n which should not be treated, and history recurrent UTI which require treatment,  recent C&S was negative.  Bilateral small non-obstructing calculi, observe   bladder spasms with Myrbetriq 50 MG and Oxybutynin  10 mg  BID,   Serum creatinine is  increasing      , approximating GFR at   ml/min.   There is no progression . No uremic symptoms  No evidence of anemia .  Fluid status stable.  Will continue to avoid nephrotoxic drugs.  Patient remains asymptomatic.   Continue current therapy.  hold  diuretic.  hold   ACE inhibitor.  hold   ARB.  Additional evaluation:   ECG,    echocardiogram,     CXR,  will obtained recent   renal ultrasound to evalaute kidney size and possible stones ,      BP monitoring,continue current antihypertensive meds, low salt diet,followup with PMD in 1-2 weeks  amLODIPine   Tablet 10 milliGRAM(s) Oral daily  cloNIDine 0.1 milliGRAM(s) Oral two times a day      Accuchecks monitoring and insulin sliding scale coverage, no concentrated sweets diet, serial labs and f/up with PMD, monitor HB A 1 C every 3-4 mnth    Admitsend blood and urine cx,serial lactate levels,monitor vitals closley,ivfs hydration,monitor urine output and renal profile,iv abx as per  cons      
50y M from [facility] nursing home J[facility] with suprapubic catheter as pt concerned for urine infection and bladder spasms, pt has a urologist at [facility], has been admitted there 3 times in the past 3 weeks, just discharged and arrived back at [facility] less than 4hr PTA to [place], states he was sent there for infection and received 2 doses of IV antibiotics and then they were stopped and he was sent home today, states his previous 2 recent admissions he petitioned for longer stays, and won, but eventually was discharged, pt reports he refused to have IV antibiotics at the nursing home previously as he believes they will be dislodged and he wants to be in the hospital until antibiotics are complete, reports he was not discharged on antibiotics this time, urine culture from 4/5 done here was negative (which pt states he was not aware of), pt also states his bladder spasms are painful and wants pain medication, states he does have chronic spasms, but they recently stopped his myrbitriq and thinks that may have made them worse
50y M from [facility] nursing home J[facility] with suprapubic catheter as pt concerned for urine infection and bladder spasms, pt has a urologist at [facility], has been admitted there 3 times in the past 3 weeks, just discharged and arrived back at [facility] less than 4hr PTA to [place], states he was sent there for infection and received 2 doses of IV antibiotics and then they were stopped and he was sent home today, states his previous 2 recent admissions he petitioned for longer stays, and won, but eventually was discharged, pt reports he refused to have IV antibiotics at the nursing home previously as he believes they will be dislodged and he wants to be in the hospital until antibiotics are complete, reports he was not discharged on antibiotics this time, urine culture from 4/5 done here was negative (which pt states he was not aware of), pt also states his bladder spasms are painful and wants pain medication, states he does have chronic spasms, but they recently stopped his myrbitriq and thinks that may have made them worse

## 2025-04-16 NOTE — CAREGIVER ENGAGEMENT NOTE - CAREGIVER EDUCATION NOTES - FREE TEXT
Per discussion w/ inpatient interdisciplinary treatment team this AM, patient is medically cleared for transition to next level of care today, 04/16/2025.  sent updated clinical documentation to patient's long-term care skilled nursing facility, [facility] in [place], and then spoke w/ Ainsley LEACH in the admissions dept for [facility] @ [phone] who confirmed that patient has been accepted back w/ bed available for a resumption of care date/time of today, 04/16/2025, @ 13:00. Afterward,  requested non-emergent ambulance for discharge transport via sending electronic referral to [facility] -- trip assigned to transport provider VIV @ [phone], and  spoke w/ [facility] of [facility] @ p [phone] who issued trip authorization #[id number].  made indication to [facility] in referral and verbal indication to Deepthi of [facility] that patient will require bariatric stretcher b/c he weighs 280lb. Of note, patient's primary health insurance is [facility] Medicaid, however Barbara ISABEL in the admissions dept for [facility] identified that no pre-authorization or denial will be needed for patient to return to the skilled nursing facility.  met w/ patient @ bedside on unit [facility] who confirmed to be understanding of and agreeable to the above discharge plan.    Attending MD Sanchez & unit RN Deandre both aware. Packet of clinical information, including non-emergent ambulance transport certification form, left in RN station on unit 1East to accompany patient to receiving facility.  to remain available for any continued needs.

## 2025-04-16 NOTE — PROGRESS NOTE ADULT - SUBJECTIVE AND OBJECTIVE BOX
Patient is a 50y Male whom presented to the hospital with ckd and osmani     PAST MEDICAL & SURGICAL HISTORY:  BPH (benign prostatic hyperplasia)      HTN (hypertension)      Type 2 diabetes mellitus      Peripheral neuropathy      History of Guillain-[place] syndrome      History of CVA in adulthood      DDD (degenerative disc disease), lumbar      DVT, lower extremity      Renal stones      H/O Guillain-[place] syndrome      History of neurogenic bowel      DM2 (diabetes mellitus, type 2)      HTN (hypertension)      BPH without obstruction/lower urinary tract symptoms      Degenerative arthritis      DVT of lower limb, acute      CVA (cerebrovascular accident)      CVD (cerebrovascular disease)      Muscle weakness      Iron deficiency anemia      History of muscle spasm      Pain, unspecified      Vitamin deficiency      Obesity      GERD (gastroesophageal reflux disease)      H/O constipation      H/O insomnia      Essential (primary) hypertension      Acute embolism and thrombosis of deep vein of lower extremity      BPH (benign prostatic hyperplasia)      Diabetes mellitus due to underlying condition with diabetic neuropathy      Major depressive disorder, single episode      S/P IVC filter      H/O lithotripsy      Chronic suprapubic catheter          MEDICATIONS  (STANDING):  amLODIPine   Tablet 10 milliGRAM(s) Oral daily  ascorbic acid 500 milliGRAM(s) Oral daily  cloNIDine 0.1 milliGRAM(s) Oral two times a day  dextrose 5%. 1000 milliLiter(s) (100 mL/Hr) IV Continuous <Continuous>  dextrose 5%. 1000 milliLiter(s) (50 mL/Hr) IV Continuous <Continuous>  dextrose 50% Injectable 25 Gram(s) IV Push once  dextrose 50% Injectable 12.5 Gram(s) IV Push once  dextrose 50% Injectable 25 Gram(s) IV Push once  DULoxetine 60 milliGRAM(s) Oral daily  ferrous    sulfate 325 milliGRAM(s) Oral daily  finasteride 5 milliGRAM(s) Oral daily  folic acid 1 milliGRAM(s) Oral daily  gabapentin 600 milliGRAM(s) Oral every 8 hours  glucagon  Injectable 1 milliGRAM(s) IntraMuscular once  insulin glargine Injectable (LANTUS) 10 Unit(s) SubCutaneous at bedtime  insulin lispro (ADMELOG) corrective regimen sliding scale   SubCutaneous three times a day before meals  metoprolol tartrate 25 milliGRAM(s) Oral two times a day  multivitamin 1 Tablet(s) Oral daily  nicotine -  14 mG/24Hr(s) Patch 1 Patch Transdermal daily  oxybutynin 10 milliGRAM(s) Oral two times a day  pantoprazole    Tablet 40 milliGRAM(s) Oral before breakfast  senna 2 Tablet(s) Oral at bedtime  sodium chloride 0.45%. 1000 milliLiter(s) (50 mL/Hr) IV Continuous <Continuous>  warfarin 4 milliGRAM(s) Oral at bedtime      Allergies    sulfa drugs (Rash)  sulfa drugs (Other)  sulfa drugs (Unknown)  sulfa drugs (Hives)    Intolerances    Pipercillin Sodium-Tazobactam Sodium (Pruritus)      SOCIAL HISTORY:  Denies ETOh,Smoking,     FAMILY HISTORY:  FH: heart disease    FH: HTN (hypertension)    Family history of sinus tachycardia (Father)    FH: HTN (hypertension) (Mother)        REVIEW OF SYSTEMS:    CONSTITUTIONAL: No weakness, fevers or chills  RESPIRATORY: No cough, wheezing, hemoptysis; No shortness of breath  CARDIOVASCULAR: No chest pain or palpitations  GASTROINTESTINAL: No abdominal or epigastric pain. No nausea, vomiting,     No diarrhea or constipation. No melena                           9.7    11.11 )-----------( 213      ( 16 Apr 2025 08:15 )             30.4       CBC Full  -  ( 16 Apr 2025 08:15 )  WBC Count : 11.11 K/uL  RBC Count : 4.23 M/uL  Hemoglobin : 9.7 g/dL  Hematocrit : 30.4 %  Platelet Count - Automated : 213 K/uL  Mean Cell Volume : 71.9 fL  Mean Cell Hemoglobin : 22.9 pg  Mean Cell Hemoglobin Concentration : 31.9 g/dL  Auto Neutrophil # : x  Auto Lymphocyte # : x  Auto Monocyte # : x  Auto Eosinophil # : x  Auto Basophil # : x  Auto Neutrophil % : x  Auto Lymphocyte % : x  Auto Monocyte % : x  Auto Eosinophil % : x  Auto Basophil % : x      04-16    137  |  102  |  45[H]  ----------------------------<  155[H]  3.6   |  24  |  2.93[H]    Ca    8.3[L]      16 Apr 2025 08:15    TPro  6.3  /  Alb  2.7[L]  /  TBili  0.3  /  DBili  x   /  AST  13  /  ALT  19  /  AlkPhos  107  04-16      CAPILLARY BLOOD GLUCOSE      POCT Blood Glucose.: 152 mg/dL (16 Apr 2025 07:56)  POCT Blood Glucose.: 156 mg/dL (15 Apr 2025 21:11)  POCT Blood Glucose.: 221 mg/dL (15 Apr 2025 16:52)  POCT Blood Glucose.: 231 mg/dL (15 Apr 2025 12:39)      Vital Signs Last 24 Hrs  T(C): 36.6 (16 Apr 2025 05:47), Max: 36.9 (15 Apr 2025 20:44)  T(F): 97.9 (16 Apr 2025 05:47), Max: 98.4 (15 Apr 2025 20:44)  HR: 57 (16 Apr 2025 05:47) (57 - 58)  BP: 143/75 (16 Apr 2025 05:47) (125/73 - 148/80)  BP(mean): --  RR: 18 (16 Apr 2025 05:47) (18 - 18)  SpO2: 94% (16 Apr 2025 05:47) (94% - 96%)    Parameters below as of 16 Apr 2025 05:47  Patient On (Oxygen Delivery Method): room air        Urinalysis Basic - ( 16 Apr 2025 08:15 )    Color: x / Appearance: x / SG: x / pH: x  Gluc: 155 mg/dL / Ketone: x  / Bili: x / Urobili: x   Blood: x / Protein: x / Nitrite: x   Leuk Esterase: x / RBC: x / WBC x   Sq Epi: x / Non Sq Epi: x / Bacteria: x        PT/INR - ( 16 Apr 2025 08:15 )   PT: 36.5 sec;   INR: 3.18 ratio                      PHYSICAL EXAM:    Constitutional: NAD  HEENT: conjunctive   clear   Neck:  No JVD  Respiratory: CTAB  Cardiovascular: S1 and S2  Gastrointestinal: BS+, soft, NT/ND  Extremities: No peripheral edema  Neurological: A/O x 3, no focal deficits  bladder infections; s-p cath , bladder spasms ,

## 2025-04-16 NOTE — DISCHARGE NOTE NURSING/CASE MANAGEMENT/SOCIAL WORK - NSDCPEFALRISK_GEN_ALL_CORE
Addended by: EVELYN CLOUD on: 10/18/2019 09:55 AM     Modules accepted: Orders     For information on Fall & Injury Prevention, visit: [email or web address] OR  [email or web address] OR  https://www.cdc.gov/steadi/patient.html

## 2025-04-16 NOTE — SOCIAL WORK PROGRESS NOTE - NSSWPROGRESSNOTE_GEN_ALL_CORE
Per discussion w/ inpatient interdisciplinary treatment team this AM, patient is medically cleared for transition to next level of care today, 04/16/2025.  sent updated clinical documentation to patient's long-term care skilled nursing facility, [facility] in [place], and then spoke w/ Ainsley LEACH in the admissions dept for [facility] @ [phone] who confirmed that patient has been accepted back w/ bed available for a resumption of care date/time of today, 04/16/2025, @ 13:00. Afterward,  requested non-emergent ambulance for discharge transport via sending electronic referral to [facility] -- trip assigned to transport provider VIV @ [phone], and  spoke w/ [facility]thi of [facility] @ p [phone] who issued trip authorization #[id number].  made indication to [facility] in referral and verbal indication to [facility]thi of [facility] that patient will require bariatric stretcher b/c he weighs 280lb. Of note, patient's primary health insurance is [facility] Medicaid, however Barbara ISABEL in the admissions dept for [facility] identified that no pre-authorization or denial will be needed for patient to return to the skilled nursing facility.  met w/ patient @ bedside on unit 1East who confirmed to be understanding of and agreeable to the above discharge plan.    Attending MD Sanchez & unit RN Deandre both aware. Packet of clinical information, including non-emergent ambulance transport certification form, left in RN station on unit 1East to accompany patient to receiving facility.  to remain available for any continued needs.

## 2025-04-16 NOTE — CAREGIVER ENGAGEMENT NOTE - CAREGIVER EDUCATION - TYPES DISCUSSED
Discharge plan/DME/Insurance benefits/Post-acute care agency contact/Post-discharge escalation process/SNF placement process/Transportation coordination/Transportation letter provided

## 2025-04-16 NOTE — DISCHARGE NOTE NURSING/CASE MANAGEMENT/SOCIAL WORK - FINANCIAL ASSISTANCE
[facility] provides services at a reduced cost to those who are determined to be eligible through [facility]’s financial assistance program. Information regarding [facility]’s financial assistance program can be found by going to [email or web address] or by calling [phone].

## 2025-04-25 ENCOUNTER — INPATIENT (INPATIENT)
Facility: HOSPITAL | Age: 50
LOS: 14 days | Discharge: NURSING, REHAB OR LONG-TERM CARE | DRG: 392 | End: 2025-05-10
Attending: STUDENT IN AN ORGANIZED HEALTH CARE EDUCATION/TRAINING PROGRAM | Admitting: INTERNAL MEDICINE
Payer: MEDICAID

## 2025-04-25 VITALS
RESPIRATION RATE: 18 BRPM | OXYGEN SATURATION: 98 % | DIASTOLIC BLOOD PRESSURE: 88 MMHG | HEIGHT: 72 IN | HEART RATE: 76 BPM | WEIGHT: 287.04 LBS | SYSTOLIC BLOOD PRESSURE: 132 MMHG | TEMPERATURE: 98 F

## 2025-04-25 DIAGNOSIS — I10 ESSENTIAL (PRIMARY) HYPERTENSION: ICD-10-CM

## 2025-04-25 DIAGNOSIS — D64.9 ANEMIA, UNSPECIFIED: ICD-10-CM

## 2025-04-25 DIAGNOSIS — D68.61 ANTIPHOSPHOLIPID SYNDROME: ICD-10-CM

## 2025-04-25 DIAGNOSIS — Z95.828 PRESENCE OF OTHER VASCULAR IMPLANTS AND GRAFTS: Chronic | ICD-10-CM

## 2025-04-25 DIAGNOSIS — N40.0 BENIGN PROSTATIC HYPERPLASIA WITHOUT LOWER URINARY TRACT SYMPTOMS: ICD-10-CM

## 2025-04-25 DIAGNOSIS — Z29.9 ENCOUNTER FOR PROPHYLACTIC MEASURES, UNSPECIFIED: ICD-10-CM

## 2025-04-25 DIAGNOSIS — Z93.59 OTHER CYSTOSTOMY STATUS: Chronic | ICD-10-CM

## 2025-04-25 DIAGNOSIS — R93.2 ABNORMAL FINDINGS ON DIAGNOSTIC IMAGING OF LIVER AND BILIARY TRACT: ICD-10-CM

## 2025-04-25 DIAGNOSIS — N17.9 ACUTE KIDNEY FAILURE, UNSPECIFIED: ICD-10-CM

## 2025-04-25 DIAGNOSIS — R11.2 NAUSEA WITH VOMITING, UNSPECIFIED: ICD-10-CM

## 2025-04-25 DIAGNOSIS — Z98.890 OTHER SPECIFIED POSTPROCEDURAL STATES: Chronic | ICD-10-CM

## 2025-04-25 DIAGNOSIS — Z93.59 OTHER CYSTOSTOMY STATUS: ICD-10-CM

## 2025-04-25 DIAGNOSIS — N39.0 URINARY TRACT INFECTION, SITE NOT SPECIFIED: ICD-10-CM

## 2025-04-25 DIAGNOSIS — G62.9 POLYNEUROPATHY, UNSPECIFIED: ICD-10-CM

## 2025-04-25 DIAGNOSIS — E11.9 TYPE 2 DIABETES MELLITUS WITHOUT COMPLICATIONS: ICD-10-CM

## 2025-04-25 DIAGNOSIS — D72.829 ELEVATED WHITE BLOOD CELL COUNT, UNSPECIFIED: ICD-10-CM

## 2025-04-25 DIAGNOSIS — K92.0 HEMATEMESIS: ICD-10-CM

## 2025-04-25 LAB
ALBUMIN SERPL ELPH-MCNC: 2.7 G/DL — LOW (ref 3.3–5)
ALBUMIN SERPL ELPH-MCNC: 2.8 G/DL — LOW (ref 3.3–5)
ALP SERPL-CCNC: 113 U/L — SIGNIFICANT CHANGE UP (ref 30–120)
ALP SERPL-CCNC: 99 U/L — SIGNIFICANT CHANGE UP (ref 30–120)
ALT FLD-CCNC: 17 U/L — SIGNIFICANT CHANGE UP (ref 10–60)
ALT FLD-CCNC: 22 U/L — SIGNIFICANT CHANGE UP (ref 10–60)
ANION GAP SERPL CALC-SCNC: 10 MMOL/L — SIGNIFICANT CHANGE UP (ref 5–17)
ANION GAP SERPL CALC-SCNC: 3 MMOL/L — LOW (ref 5–17)
APPEARANCE UR: ABNORMAL
APTT BLD: 31.1 SEC — SIGNIFICANT CHANGE UP (ref 26.1–36.8)
AST SERPL-CCNC: 11 U/L — SIGNIFICANT CHANGE UP (ref 10–40)
AST SERPL-CCNC: 12 U/L — SIGNIFICANT CHANGE UP (ref 10–40)
BASOPHILS # BLD AUTO: 0.07 K/UL — SIGNIFICANT CHANGE UP (ref 0–0.2)
BASOPHILS NFR BLD AUTO: 0.4 % — SIGNIFICANT CHANGE UP (ref 0–2)
BILIRUB SERPL-MCNC: 0.3 MG/DL — SIGNIFICANT CHANGE UP (ref 0.2–1.2)
BILIRUB SERPL-MCNC: 0.5 MG/DL — SIGNIFICANT CHANGE UP (ref 0.2–1.2)
BILIRUB UR-MCNC: NEGATIVE — SIGNIFICANT CHANGE UP
BUN SERPL-MCNC: 36 MG/DL — HIGH (ref 7–23)
BUN SERPL-MCNC: 47 MG/DL — HIGH (ref 7–23)
CALCIUM SERPL-MCNC: 8.4 MG/DL — SIGNIFICANT CHANGE UP (ref 8.4–10.5)
CALCIUM SERPL-MCNC: 8.8 MG/DL — SIGNIFICANT CHANGE UP (ref 8.4–10.5)
CHLORIDE SERPL-SCNC: 102 MMOL/L — SIGNIFICANT CHANGE UP (ref 96–108)
CHLORIDE SERPL-SCNC: 94 MMOL/L — LOW (ref 96–108)
CO2 SERPL-SCNC: 34 MMOL/L — HIGH (ref 22–31)
CO2 SERPL-SCNC: 35 MMOL/L — HIGH (ref 22–31)
COLOR SPEC: YELLOW — SIGNIFICANT CHANGE UP
CREAT SERPL-MCNC: 2.88 MG/DL — HIGH (ref 0.5–1.3)
CREAT SERPL-MCNC: 3.36 MG/DL — HIGH (ref 0.5–1.3)
DIFF PNL FLD: ABNORMAL
EGFR: 22 ML/MIN/1.73M2 — LOW
EGFR: 22 ML/MIN/1.73M2 — LOW
EGFR: 26 ML/MIN/1.73M2 — LOW
EGFR: 26 ML/MIN/1.73M2 — LOW
EOSINOPHIL # BLD AUTO: 0.05 K/UL — SIGNIFICANT CHANGE UP (ref 0–0.5)
EOSINOPHIL NFR BLD AUTO: 0.3 % — SIGNIFICANT CHANGE UP (ref 0–6)
FLUAV AG NPH QL: SIGNIFICANT CHANGE UP
FLUBV AG NPH QL: SIGNIFICANT CHANGE UP
GLUCOSE BLDC GLUCOMTR-MCNC: 160 MG/DL — HIGH (ref 70–99)
GLUCOSE BLDC GLUCOMTR-MCNC: 184 MG/DL — HIGH (ref 70–99)
GLUCOSE BLDC GLUCOMTR-MCNC: 186 MG/DL — HIGH (ref 70–99)
GLUCOSE SERPL-MCNC: 171 MG/DL — HIGH (ref 70–99)
GLUCOSE SERPL-MCNC: 244 MG/DL — HIGH (ref 70–99)
GLUCOSE UR QL: 100 MG/DL
HCT VFR BLD CALC: 29.1 % — LOW (ref 39–50)
HCT VFR BLD CALC: 31.3 % — LOW (ref 39–50)
HGB BLD-MCNC: 10.1 G/DL — LOW (ref 13–17)
HGB BLD-MCNC: 9 G/DL — LOW (ref 13–17)
IMM GRANULOCYTES NFR BLD AUTO: 0.6 % — SIGNIFICANT CHANGE UP (ref 0–0.9)
INR BLD: 1.92 RATIO — HIGH (ref 0.85–1.16)
INR BLD: 2.1 RATIO — HIGH (ref 0.85–1.16)
KETONES UR-MCNC: NEGATIVE MG/DL — SIGNIFICANT CHANGE UP
LACTATE SERPL-SCNC: 0.6 MMOL/L — LOW (ref 0.7–2)
LEUKOCYTE ESTERASE UR-ACNC: ABNORMAL
LIDOCAIN IGE QN: 27 U/L — SIGNIFICANT CHANGE UP (ref 16–77)
LYMPHOCYTES # BLD AUTO: 1.1 K/UL — SIGNIFICANT CHANGE UP (ref 1–3.3)
LYMPHOCYTES # BLD AUTO: 6.3 % — LOW (ref 13–44)
MAGNESIUM SERPL-MCNC: 1.5 MG/DL — LOW (ref 1.6–2.6)
MAGNESIUM SERPL-MCNC: 1.7 MG/DL — SIGNIFICANT CHANGE UP (ref 1.6–2.6)
MCHC RBC-ENTMCNC: 23.1 PG — LOW (ref 27–34)
MCHC RBC-ENTMCNC: 23.3 PG — LOW (ref 27–34)
MCHC RBC-ENTMCNC: 30.9 G/DL — LOW (ref 32–36)
MCHC RBC-ENTMCNC: 32.3 G/DL — SIGNIFICANT CHANGE UP (ref 32–36)
MCV RBC AUTO: 72.3 FL — LOW (ref 80–100)
MCV RBC AUTO: 74.6 FL — LOW (ref 80–100)
MONOCYTES # BLD AUTO: 0.89 K/UL — SIGNIFICANT CHANGE UP (ref 0–0.9)
MONOCYTES NFR BLD AUTO: 5.1 % — SIGNIFICANT CHANGE UP (ref 2–14)
NEUTROPHILS # BLD AUTO: 15.26 K/UL — HIGH (ref 1.8–7.4)
NEUTROPHILS NFR BLD AUTO: 87.3 % — HIGH (ref 43–77)
NITRITE UR-MCNC: NEGATIVE — SIGNIFICANT CHANGE UP
NRBC BLD AUTO-RTO: 0 /100 WBCS — SIGNIFICANT CHANGE UP (ref 0–0)
NRBC BLD AUTO-RTO: 0 /100 WBCS — SIGNIFICANT CHANGE UP (ref 0–0)
OB PNL STL: NEGATIVE — SIGNIFICANT CHANGE UP
PH UR: 7.5 — SIGNIFICANT CHANGE UP (ref 5–8)
PHOSPHATE SERPL-MCNC: 3.3 MG/DL — SIGNIFICANT CHANGE UP (ref 2.5–4.5)
PLATELET # BLD AUTO: 198 K/UL — SIGNIFICANT CHANGE UP (ref 150–400)
PLATELET # BLD AUTO: 261 K/UL — SIGNIFICANT CHANGE UP (ref 150–400)
POTASSIUM SERPL-MCNC: 3.1 MMOL/L — LOW (ref 3.5–5.3)
POTASSIUM SERPL-MCNC: 3.2 MMOL/L — LOW (ref 3.5–5.3)
POTASSIUM SERPL-SCNC: 3.1 MMOL/L — LOW (ref 3.5–5.3)
POTASSIUM SERPL-SCNC: 3.2 MMOL/L — LOW (ref 3.5–5.3)
PROT SERPL-MCNC: 6.7 G/DL — SIGNIFICANT CHANGE UP (ref 6–8.3)
PROT SERPL-MCNC: 7.3 G/DL — SIGNIFICANT CHANGE UP (ref 6–8.3)
PROT UR-MCNC: 300 MG/DL
PROTHROM AB SERPL-ACNC: 22.1 SEC — HIGH (ref 9.9–13.4)
PROTHROM AB SERPL-ACNC: 24.6 SEC — HIGH (ref 9.9–13.4)
RBC # BLD: 3.9 M/UL — LOW (ref 4.2–5.8)
RBC # BLD: 4.33 M/UL — SIGNIFICANT CHANGE UP (ref 4.2–5.8)
RBC # FLD: 17.7 % — HIGH (ref 10.3–14.5)
RBC # FLD: 17.8 % — HIGH (ref 10.3–14.5)
RSV RNA NPH QL NAA+NON-PROBE: SIGNIFICANT CHANGE UP
SARS-COV-2 RNA SPEC QL NAA+PROBE: SIGNIFICANT CHANGE UP
SODIUM SERPL-SCNC: 138 MMOL/L — SIGNIFICANT CHANGE UP (ref 135–145)
SODIUM SERPL-SCNC: 140 MMOL/L — SIGNIFICANT CHANGE UP (ref 135–145)
SOURCE RESPIRATORY: SIGNIFICANT CHANGE UP
SP GR SPEC: 1.01 — SIGNIFICANT CHANGE UP (ref 1–1.03)
TROPONIN I, HIGH SENSITIVITY RESULT: 9.3 NG/L — SIGNIFICANT CHANGE UP
UROBILINOGEN FLD QL: 1 MG/DL — SIGNIFICANT CHANGE UP (ref 0.2–1)
WBC # BLD: 12.33 K/UL — HIGH (ref 3.8–10.5)
WBC # BLD: 17.47 K/UL — HIGH (ref 3.8–10.5)
WBC # FLD AUTO: 12.33 K/UL — HIGH (ref 3.8–10.5)
WBC # FLD AUTO: 17.47 K/UL — HIGH (ref 3.8–10.5)

## 2025-04-25 PROCEDURE — 99285 EMERGENCY DEPT VISIT HI MDM: CPT

## 2025-04-25 PROCEDURE — 74176 CT ABD & PELVIS W/O CONTRAST: CPT | Mod: 26

## 2025-04-25 PROCEDURE — 76705 ECHO EXAM OF ABDOMEN: CPT | Mod: 26

## 2025-04-25 PROCEDURE — 78227 HEPATOBIL SYST IMAGE W/DRUG: CPT | Mod: 26

## 2025-04-25 PROCEDURE — 93010 ELECTROCARDIOGRAM REPORT: CPT

## 2025-04-25 RX ORDER — FINASTERIDE 1 MG/1
5 TABLET, FILM COATED ORAL DAILY
Refills: 0 | Status: DISCONTINUED | OUTPATIENT
Start: 2025-04-25 | End: 2025-04-28

## 2025-04-25 RX ORDER — METOPROLOL SUCCINATE 50 MG/1
25 TABLET, EXTENDED RELEASE ORAL
Refills: 0 | Status: DISCONTINUED | OUTPATIENT
Start: 2025-04-25 | End: 2025-04-28

## 2025-04-25 RX ORDER — MELATONIN 5 MG
3 TABLET ORAL AT BEDTIME
Refills: 0 | Status: DISCONTINUED | OUTPATIENT
Start: 2025-04-25 | End: 2025-04-25

## 2025-04-25 RX ORDER — DULOXETINE 20 MG/1
1 CAPSULE, DELAYED RELEASE ORAL
Qty: 0 | Refills: 0 | DISCHARGE
Start: 2025-04-25

## 2025-04-25 RX ORDER — DEXTROSE 50 % IN WATER 50 %
15 SYRINGE (ML) INTRAVENOUS ONCE
Refills: 0 | Status: DISCONTINUED | OUTPATIENT
Start: 2025-04-25 | End: 2025-04-25

## 2025-04-25 RX ORDER — FINASTERIDE 1 MG/1
1 TABLET, FILM COATED ORAL
Qty: 0 | Refills: 0 | DISCHARGE
Start: 2025-04-25

## 2025-04-25 RX ORDER — INSULIN LISPRO 100 U/ML
INJECTION, SOLUTION INTRAVENOUS; SUBCUTANEOUS EVERY 6 HOURS
Refills: 0 | Status: DISCONTINUED | OUTPATIENT
Start: 2025-04-25 | End: 2025-04-25

## 2025-04-25 RX ORDER — SODIUM CHLORIDE 9 G/1000ML
1000 INJECTION, SOLUTION INTRAVENOUS
Refills: 0 | Status: DISCONTINUED | OUTPATIENT
Start: 2025-04-25 | End: 2025-04-28

## 2025-04-25 RX ORDER — B1/B2/B3/B5/B6/B12/VIT C/FOLIC 500-0.5 MG
1 TABLET ORAL DAILY
Refills: 0 | Status: DISCONTINUED | OUTPATIENT
Start: 2025-04-25 | End: 2025-04-25

## 2025-04-25 RX ORDER — POLYETHYLENE GLYCOL 3350 17 G/17G
17 POWDER, FOR SOLUTION ORAL DAILY
Refills: 0 | Status: DISCONTINUED | OUTPATIENT
Start: 2025-04-25 | End: 2025-04-28

## 2025-04-25 RX ORDER — INSULIN LISPRO 100 U/ML
INJECTION, SOLUTION INTRAVENOUS; SUBCUTANEOUS
Refills: 0 | Status: DISCONTINUED | OUTPATIENT
Start: 2025-04-25 | End: 2025-04-28

## 2025-04-25 RX ORDER — DEXTROSE 50 % IN WATER 50 %
25 SYRINGE (ML) INTRAVENOUS ONCE
Refills: 0 | Status: DISCONTINUED | OUTPATIENT
Start: 2025-04-25 | End: 2025-04-25

## 2025-04-25 RX ORDER — FERROUS SULFATE 137(45) MG
325 TABLET, EXTENDED RELEASE ORAL DAILY
Refills: 0 | Status: DISCONTINUED | OUTPATIENT
Start: 2025-04-25 | End: 2025-04-25

## 2025-04-25 RX ORDER — OXYBUTYNIN CHLORIDE 5 MG/1
5 TABLET, FILM COATED, EXTENDED RELEASE ORAL
Refills: 0 | Status: DISCONTINUED | OUTPATIENT
Start: 2025-04-25 | End: 2025-04-28

## 2025-04-25 RX ORDER — GABAPENTIN 400 MG/1
600 CAPSULE ORAL EVERY 8 HOURS
Refills: 0 | Status: DISCONTINUED | OUTPATIENT
Start: 2025-04-25 | End: 2025-04-25

## 2025-04-25 RX ORDER — DEXTROSE 50 % IN WATER 50 %
12.5 SYRINGE (ML) INTRAVENOUS ONCE
Refills: 0 | Status: DISCONTINUED | OUTPATIENT
Start: 2025-04-25 | End: 2025-04-28

## 2025-04-25 RX ORDER — PIPERACILLIN-TAZO-DEXTROSE,ISO 3.375G/5
3.38 IV SOLUTION, PIGGYBACK PREMIX FROZEN(ML) INTRAVENOUS
Qty: 0 | Refills: 0 | DISCHARGE
Start: 2025-04-25

## 2025-04-25 RX ORDER — DULOXETINE 20 MG/1
60 CAPSULE, DELAYED RELEASE ORAL DAILY
Refills: 0 | Status: DISCONTINUED | OUTPATIENT
Start: 2025-04-25 | End: 2025-04-28

## 2025-04-25 RX ORDER — MICONAZOLE NITRATE 2 %
1 CREAM WITH APPLICATOR VAGINAL
Refills: 0 | DISCHARGE

## 2025-04-25 RX ORDER — ACETAMINOPHEN 500 MG/5ML
1000 LIQUID (ML) ORAL ONCE
Refills: 0 | Status: DISCONTINUED | OUTPATIENT
Start: 2025-04-25 | End: 2025-04-25

## 2025-04-25 RX ORDER — GABAPENTIN 400 MG/1
600 CAPSULE ORAL THREE TIMES A DAY
Refills: 0 | Status: DISCONTINUED | OUTPATIENT
Start: 2025-04-25 | End: 2025-04-28

## 2025-04-25 RX ORDER — DULOXETINE 20 MG/1
60 CAPSULE, DELAYED RELEASE ORAL DAILY
Refills: 0 | Status: DISCONTINUED | OUTPATIENT
Start: 2025-04-25 | End: 2025-04-25

## 2025-04-25 RX ORDER — FINASTERIDE 1 MG/1
5 TABLET, FILM COATED ORAL DAILY
Refills: 0 | Status: DISCONTINUED | OUTPATIENT
Start: 2025-04-25 | End: 2025-04-25

## 2025-04-25 RX ORDER — DEXTROSE 50 % IN WATER 50 %
25 SYRINGE (ML) INTRAVENOUS ONCE
Refills: 0 | Status: DISCONTINUED | OUTPATIENT
Start: 2025-04-25 | End: 2025-04-28

## 2025-04-25 RX ORDER — HYDROMORPHONE/SOD CHLOR,ISO/PF 2 MG/10 ML
4 SYRINGE (ML) INJECTION EVERY 6 HOURS
Refills: 0 | Status: DISCONTINUED | OUTPATIENT
Start: 2025-04-25 | End: 2025-04-25

## 2025-04-25 RX ORDER — ACETAMINOPHEN 500 MG/5ML
650 LIQUID (ML) ORAL EVERY 6 HOURS
Refills: 0 | Status: DISCONTINUED | OUTPATIENT
Start: 2025-04-25 | End: 2025-04-25

## 2025-04-25 RX ORDER — AMLODIPINE BESYLATE 10 MG/1
10 TABLET ORAL DAILY
Refills: 0 | Status: DISCONTINUED | OUTPATIENT
Start: 2025-04-26 | End: 2025-04-28

## 2025-04-25 RX ORDER — MELATONIN 5 MG
5 TABLET ORAL AT BEDTIME
Refills: 0 | Status: DISCONTINUED | OUTPATIENT
Start: 2025-04-25 | End: 2025-04-25

## 2025-04-25 RX ORDER — GLUCAGON 3 MG/1
1 POWDER NASAL ONCE
Refills: 0 | Status: DISCONTINUED | OUTPATIENT
Start: 2025-04-25 | End: 2025-04-28

## 2025-04-25 RX ORDER — HYDROMORPHONE/SOD CHLOR,ISO/PF 2 MG/10 ML
1 SYRINGE (ML) INJECTION ONCE
Refills: 0 | Status: DISCONTINUED | OUTPATIENT
Start: 2025-04-25 | End: 2025-04-25

## 2025-04-25 RX ORDER — SENNA 187 MG
2 TABLET ORAL AT BEDTIME
Refills: 0 | Status: DISCONTINUED | OUTPATIENT
Start: 2025-04-25 | End: 2025-04-25

## 2025-04-25 RX ORDER — MAGNESIUM, ALUMINUM HYDROXIDE 200-200 MG
30 TABLET,CHEWABLE ORAL EVERY 4 HOURS
Refills: 0 | Status: DISCONTINUED | OUTPATIENT
Start: 2025-04-25 | End: 2025-04-25

## 2025-04-25 RX ORDER — DEXTROSE 50 % IN WATER 50 %
12.5 SYRINGE (ML) INTRAVENOUS ONCE
Refills: 0 | Status: DISCONTINUED | OUTPATIENT
Start: 2025-04-25 | End: 2025-04-25

## 2025-04-25 RX ORDER — ONDANSETRON HCL/PF 4 MG/2 ML
4 VIAL (ML) INJECTION ONCE
Refills: 0 | Status: COMPLETED | OUTPATIENT
Start: 2025-04-25 | End: 2025-04-25

## 2025-04-25 RX ORDER — LACTOBACILLUS ACIDOPHILUS/PECT 75 MM-100
1 CAPSULE ORAL
Qty: 0 | Refills: 0 | DISCHARGE
Start: 2025-04-25

## 2025-04-25 RX ORDER — METOPROLOL SUCCINATE 50 MG/1
25 TABLET, EXTENDED RELEASE ORAL
Refills: 0 | Status: DISCONTINUED | OUTPATIENT
Start: 2025-04-25 | End: 2025-04-25

## 2025-04-25 RX ORDER — MELATONIN 5 MG
1 TABLET ORAL
Qty: 0 | Refills: 0 | DISCHARGE
Start: 2025-04-25

## 2025-04-25 RX ORDER — HYDROMORPHONE/SOD CHLOR,ISO/PF 2 MG/10 ML
4 SYRINGE (ML) INJECTION EVERY 6 HOURS
Refills: 0 | Status: DISCONTINUED | OUTPATIENT
Start: 2025-04-25 | End: 2025-04-28

## 2025-04-25 RX ORDER — ONDANSETRON HCL/PF 4 MG/2 ML
4 VIAL (ML) INJECTION EVERY 8 HOURS
Refills: 0 | Status: DISCONTINUED | OUTPATIENT
Start: 2025-04-25 | End: 2025-04-25

## 2025-04-25 RX ORDER — FOLIC ACID 1 MG/1
1 TABLET ORAL DAILY
Refills: 0 | Status: DISCONTINUED | OUTPATIENT
Start: 2025-04-25 | End: 2025-04-25

## 2025-04-25 RX ORDER — PIPERACILLIN-TAZO-DEXTROSE,ISO 3.375G/5
3.38 IV SOLUTION, PIGGYBACK PREMIX FROZEN(ML) INTRAVENOUS EVERY 8 HOURS
Refills: 0 | Status: DISCONTINUED | OUTPATIENT
Start: 2025-04-25 | End: 2025-04-28

## 2025-04-25 RX ORDER — MAGNESIUM SULFATE 500 MG/ML
1 SYRINGE (ML) INJECTION ONCE
Refills: 0 | Status: COMPLETED | OUTPATIENT
Start: 2025-04-25 | End: 2025-04-25

## 2025-04-25 RX ORDER — DEXTROSE 50 % IN WATER 50 %
15 SYRINGE (ML) INTRAVENOUS ONCE
Refills: 0 | Status: DISCONTINUED | OUTPATIENT
Start: 2025-04-25 | End: 2025-04-28

## 2025-04-25 RX ORDER — NICOTINE POLACRILEX 4 MG/1
1 GUM, CHEWING ORAL DAILY
Refills: 0 | Status: DISCONTINUED | OUTPATIENT
Start: 2025-04-25 | End: 2025-04-25

## 2025-04-25 RX ORDER — METOCLOPRAMIDE HCL 10 MG
5 TABLET ORAL ONCE
Refills: 0 | Status: COMPLETED | OUTPATIENT
Start: 2025-04-25 | End: 2025-04-25

## 2025-04-25 RX ORDER — SENNA 187 MG
2 TABLET ORAL AT BEDTIME
Refills: 0 | Status: DISCONTINUED | OUTPATIENT
Start: 2025-04-25 | End: 2025-04-28

## 2025-04-25 RX ORDER — LACTOBACILLUS ACIDOPHILUS/PECT 75 MM-100
1 CAPSULE ORAL EVERY 12 HOURS
Refills: 0 | Status: DISCONTINUED | OUTPATIENT
Start: 2025-04-25 | End: 2025-04-25

## 2025-04-25 RX ORDER — MEROPENEM 1 G/30ML
500 INJECTION INTRAVENOUS ONCE
Refills: 0 | Status: COMPLETED | OUTPATIENT
Start: 2025-04-25 | End: 2025-04-25

## 2025-04-25 RX ORDER — SODIUM CHLORIDE 9 G/1000ML
1000 INJECTION, SOLUTION INTRAVENOUS
Refills: 0 | Status: DISCONTINUED | OUTPATIENT
Start: 2025-04-25 | End: 2025-04-25

## 2025-04-25 RX ORDER — GLUCAGON 3 MG/1
1 POWDER NASAL ONCE
Refills: 0 | Status: DISCONTINUED | OUTPATIENT
Start: 2025-04-25 | End: 2025-04-25

## 2025-04-25 RX ORDER — METHOCARBAMOL 500 MG/1
1500 TABLET, FILM COATED ORAL EVERY 8 HOURS
Refills: 0 | Status: DISCONTINUED | OUTPATIENT
Start: 2025-04-25 | End: 2025-04-25

## 2025-04-25 RX ORDER — METOPROLOL SUCCINATE 50 MG/1
1 TABLET, EXTENDED RELEASE ORAL
Qty: 0 | Refills: 0 | DISCHARGE
Start: 2025-04-25

## 2025-04-25 RX ORDER — OXYBUTYNIN CHLORIDE 5 MG/1
10 TABLET, FILM COATED, EXTENDED RELEASE ORAL
Refills: 0 | Status: DISCONTINUED | OUTPATIENT
Start: 2025-04-25 | End: 2025-04-25

## 2025-04-25 RX ORDER — INSULIN LISPRO 100 U/ML
INJECTION, SOLUTION INTRAVENOUS; SUBCUTANEOUS AT BEDTIME
Refills: 0 | Status: DISCONTINUED | OUTPATIENT
Start: 2025-04-26 | End: 2025-04-28

## 2025-04-25 RX ORDER — PIPERACILLIN-TAZO-DEXTROSE,ISO 3.375G/5
3.38 IV SOLUTION, PIGGYBACK PREMIX FROZEN(ML) INTRAVENOUS EVERY 8 HOURS
Refills: 0 | Status: DISCONTINUED | OUTPATIENT
Start: 2025-04-25 | End: 2025-04-25

## 2025-04-25 RX ADMIN — Medication 4 MILLIGRAM(S): at 17:35

## 2025-04-25 RX ADMIN — Medication 0.1 MILLIGRAM(S): at 17:25

## 2025-04-25 RX ADMIN — Medication 4 MILLIGRAM(S): at 17:06

## 2025-04-25 RX ADMIN — FINASTERIDE 5 MILLIGRAM(S): 1 TABLET, FILM COATED ORAL at 13:41

## 2025-04-25 RX ADMIN — Medication 1 TABLET(S): at 17:20

## 2025-04-25 RX ADMIN — Medication 1 MILLIGRAM(S): at 23:29

## 2025-04-25 RX ADMIN — Medication 4 MILLIGRAM(S): at 08:22

## 2025-04-25 RX ADMIN — OXYBUTYNIN CHLORIDE 10 MILLIGRAM(S): 5 TABLET, FILM COATED, EXTENDED RELEASE ORAL at 17:33

## 2025-04-25 RX ADMIN — INSULIN LISPRO 1: 100 INJECTION, SOLUTION INTRAVENOUS; SUBCUTANEOUS at 17:30

## 2025-04-25 RX ADMIN — GABAPENTIN 600 MILLIGRAM(S): 400 CAPSULE ORAL at 14:45

## 2025-04-25 RX ADMIN — Medication 1000 MILLILITER(S): at 09:20

## 2025-04-25 RX ADMIN — Medication 4 MILLIGRAM(S): at 12:18

## 2025-04-25 RX ADMIN — Medication 40 MILLIGRAM(S): at 08:21

## 2025-04-25 RX ADMIN — Medication 1000 MILLILITER(S): at 08:24

## 2025-04-25 RX ADMIN — Medication 100 MILLIEQUIVALENT(S): at 14:44

## 2025-04-25 RX ADMIN — Medication 1 MILLIGRAM(S): at 08:21

## 2025-04-25 RX ADMIN — NICOTINE POLACRILEX 1 PATCH: 4 GUM, CHEWING ORAL at 21:00

## 2025-04-25 RX ADMIN — Medication 40 MILLIGRAM(S): at 17:21

## 2025-04-25 RX ADMIN — Medication 1 MILLIGRAM(S): at 08:36

## 2025-04-25 RX ADMIN — Medication 100 MILLIEQUIVALENT(S): at 10:04

## 2025-04-25 RX ADMIN — Medication 100 MILLIEQUIVALENT(S): at 13:36

## 2025-04-25 RX ADMIN — Medication 5 MILLIGRAM(S): at 15:57

## 2025-04-25 RX ADMIN — NICOTINE POLACRILEX 1 PATCH: 4 GUM, CHEWING ORAL at 13:42

## 2025-04-25 RX ADMIN — Medication 500 MILLIGRAM(S): at 13:40

## 2025-04-25 RX ADMIN — Medication 75 MILLILITER(S): at 14:44

## 2025-04-25 RX ADMIN — FOLIC ACID 1 MILLIGRAM(S): 1 TABLET ORAL at 13:42

## 2025-04-25 RX ADMIN — Medication 100 GRAM(S): at 10:04

## 2025-04-25 RX ADMIN — DULOXETINE 60 MILLIGRAM(S): 20 CAPSULE, DELAYED RELEASE ORAL at 13:40

## 2025-04-25 RX ADMIN — METOPROLOL SUCCINATE 25 MILLIGRAM(S): 50 TABLET, EXTENDED RELEASE ORAL at 17:25

## 2025-04-25 RX ADMIN — Medication 325 MILLIGRAM(S): at 13:41

## 2025-04-25 RX ADMIN — MEROPENEM 100 MILLIGRAM(S): 1 INJECTION INTRAVENOUS at 13:35

## 2025-04-25 RX ADMIN — Medication 1 GRAM(S): at 11:00

## 2025-04-25 RX ADMIN — INSULIN LISPRO 1: 100 INJECTION, SOLUTION INTRAVENOUS; SUBCUTANEOUS at 14:09

## 2025-04-25 RX ADMIN — Medication 1 TABLET(S): at 13:42

## 2025-04-25 RX ADMIN — Medication 4 MILLIGRAM(S): at 15:00

## 2025-04-26 DIAGNOSIS — N39.0 URINARY TRACT INFECTION, SITE NOT SPECIFIED: ICD-10-CM

## 2025-04-26 DIAGNOSIS — G62.9 POLYNEUROPATHY, UNSPECIFIED: ICD-10-CM

## 2025-04-26 DIAGNOSIS — K81.0 ACUTE CHOLECYSTITIS: ICD-10-CM

## 2025-04-26 LAB
A1C WITH ESTIMATED AVERAGE GLUCOSE RESULT: 8.4 % — HIGH (ref 4–5.6)
ADD ON TEST-SPECIMEN IN LAB: SIGNIFICANT CHANGE UP
ALBUMIN SERPL ELPH-MCNC: 2.8 G/DL — LOW (ref 3.3–5)
ALP SERPL-CCNC: 90 U/L — SIGNIFICANT CHANGE UP (ref 40–120)
ALT FLD-CCNC: 9 U/L — LOW (ref 10–45)
ANION GAP SERPL CALC-SCNC: 14 MMOL/L — SIGNIFICANT CHANGE UP (ref 5–17)
APTT BLD: 32 SEC — SIGNIFICANT CHANGE UP (ref 26.1–36.8)
AST SERPL-CCNC: 8 U/L — LOW (ref 10–40)
BILIRUB SERPL-MCNC: 0.2 MG/DL — SIGNIFICANT CHANGE UP (ref 0.2–1.2)
BUN SERPL-MCNC: 31 MG/DL — HIGH (ref 7–23)
CALCIUM SERPL-MCNC: 8.2 MG/DL — LOW (ref 8.4–10.5)
CHLORIDE SERPL-SCNC: 102 MMOL/L — SIGNIFICANT CHANGE UP (ref 96–108)
CO2 SERPL-SCNC: 25 MMOL/L — SIGNIFICANT CHANGE UP (ref 22–31)
CREAT SERPL-MCNC: 2.81 MG/DL — HIGH (ref 0.5–1.3)
EGFR: 27 ML/MIN/1.73M2 — LOW
EGFR: 27 ML/MIN/1.73M2 — LOW
ESTIMATED AVERAGE GLUCOSE: 194 MG/DL — HIGH (ref 68–114)
GLUCOSE BLDC GLUCOMTR-MCNC: 155 MG/DL — HIGH (ref 70–99)
GLUCOSE BLDC GLUCOMTR-MCNC: 160 MG/DL — HIGH (ref 70–99)
GLUCOSE BLDC GLUCOMTR-MCNC: 167 MG/DL — HIGH (ref 70–99)
GLUCOSE BLDC GLUCOMTR-MCNC: 267 MG/DL — HIGH (ref 70–99)
GLUCOSE SERPL-MCNC: 127 MG/DL — HIGH (ref 70–99)
HCT VFR BLD CALC: 27.5 % — LOW (ref 39–50)
HGB BLD-MCNC: 8.6 G/DL — LOW (ref 13–17)
INR BLD: 2.25 RATIO — HIGH (ref 0.85–1.16)
MAGNESIUM SERPL-MCNC: 1.9 MG/DL — SIGNIFICANT CHANGE UP (ref 1.6–2.6)
MCHC RBC-ENTMCNC: 23.8 PG — LOW (ref 27–34)
MCHC RBC-ENTMCNC: 31.3 G/DL — LOW (ref 32–36)
MCV RBC AUTO: 76.2 FL — LOW (ref 80–100)
NRBC BLD AUTO-RTO: 0 /100 WBCS — SIGNIFICANT CHANGE UP (ref 0–0)
PHOSPHATE SERPL-MCNC: 2.9 MG/DL — SIGNIFICANT CHANGE UP (ref 2.5–4.5)
PLATELET # BLD AUTO: 199 K/UL — SIGNIFICANT CHANGE UP (ref 150–400)
POTASSIUM SERPL-MCNC: 3.3 MMOL/L — LOW (ref 3.5–5.3)
POTASSIUM SERPL-SCNC: 3.3 MMOL/L — LOW (ref 3.5–5.3)
PROT SERPL-MCNC: 6 G/DL — SIGNIFICANT CHANGE UP (ref 6–8.3)
PROTHROM AB SERPL-ACNC: 25.4 SEC — HIGH (ref 9.9–13.4)
RBC # BLD: 3.61 M/UL — LOW (ref 4.2–5.8)
RBC # FLD: 17.6 % — HIGH (ref 10.3–14.5)
SODIUM SERPL-SCNC: 141 MMOL/L — SIGNIFICANT CHANGE UP (ref 135–145)
WBC # BLD: 9.29 K/UL — SIGNIFICANT CHANGE UP (ref 3.8–10.5)
WBC # FLD AUTO: 9.29 K/UL — SIGNIFICANT CHANGE UP (ref 3.8–10.5)

## 2025-04-26 PROCEDURE — 99233 SBSQ HOSP IP/OBS HIGH 50: CPT | Mod: GC

## 2025-04-26 PROCEDURE — 99222 1ST HOSP IP/OBS MODERATE 55: CPT

## 2025-04-26 PROCEDURE — 74183 MRI ABD W/O CNTR FLWD CNTR: CPT | Mod: 26

## 2025-04-26 RX ORDER — HYDROMORPHONE/SOD CHLOR,ISO/PF 2 MG/10 ML
1 SYRINGE (ML) INJECTION ONCE
Refills: 0 | Status: DISCONTINUED | OUTPATIENT
Start: 2025-04-26 | End: 2025-04-26

## 2025-04-26 RX ORDER — MIRABEGRON 50 MG/1
25 TABLET, FILM COATED, EXTENDED RELEASE ORAL DAILY
Refills: 0 | Status: DISCONTINUED | OUTPATIENT
Start: 2025-04-26 | End: 2025-04-28

## 2025-04-26 RX ORDER — HYDROMORPHONE/SOD CHLOR,ISO/PF 2 MG/10 ML
2 SYRINGE (ML) INJECTION EVERY 6 HOURS
Refills: 0 | Status: DISCONTINUED | OUTPATIENT
Start: 2025-04-26 | End: 2025-04-28

## 2025-04-26 RX ORDER — SODIUM CHLORIDE 9 G/1000ML
1000 INJECTION, SOLUTION INTRAVENOUS
Refills: 0 | Status: DISCONTINUED | OUTPATIENT
Start: 2025-04-26 | End: 2025-04-28

## 2025-04-26 RX ORDER — ONDANSETRON HCL/PF 4 MG/2 ML
4 VIAL (ML) INJECTION EVERY 8 HOURS
Refills: 0 | Status: DISCONTINUED | OUTPATIENT
Start: 2025-04-26 | End: 2025-04-28

## 2025-04-26 RX ADMIN — GABAPENTIN 600 MILLIGRAM(S): 400 CAPSULE ORAL at 13:43

## 2025-04-26 RX ADMIN — FINASTERIDE 5 MILLIGRAM(S): 1 TABLET, FILM COATED ORAL at 11:54

## 2025-04-26 RX ADMIN — METOPROLOL SUCCINATE 25 MILLIGRAM(S): 50 TABLET, EXTENDED RELEASE ORAL at 20:26

## 2025-04-26 RX ADMIN — GABAPENTIN 600 MILLIGRAM(S): 400 CAPSULE ORAL at 05:19

## 2025-04-26 RX ADMIN — MIRABEGRON 25 MILLIGRAM(S): 50 TABLET, FILM COATED, EXTENDED RELEASE ORAL at 11:54

## 2025-04-26 RX ADMIN — INSULIN LISPRO 1: 100 INJECTION, SOLUTION INTRAVENOUS; SUBCUTANEOUS at 12:53

## 2025-04-26 RX ADMIN — Medication 40 MILLIGRAM(S): at 05:20

## 2025-04-26 RX ADMIN — METOPROLOL SUCCINATE 25 MILLIGRAM(S): 50 TABLET, EXTENDED RELEASE ORAL at 05:19

## 2025-04-26 RX ADMIN — Medication 4 MILLIGRAM(S): at 16:09

## 2025-04-26 RX ADMIN — Medication 25 GRAM(S): at 13:45

## 2025-04-26 RX ADMIN — Medication 4 MILLIGRAM(S): at 12:58

## 2025-04-26 RX ADMIN — Medication 25 GRAM(S): at 21:35

## 2025-04-26 RX ADMIN — INSULIN LISPRO 1: 100 INJECTION, SOLUTION INTRAVENOUS; SUBCUTANEOUS at 08:59

## 2025-04-26 RX ADMIN — Medication 1 MILLIGRAM(S): at 00:00

## 2025-04-26 RX ADMIN — NICOTINE POLACRILEX 1 PATCH: 4 GUM, CHEWING ORAL at 13:30

## 2025-04-26 RX ADMIN — Medication 4 MILLIGRAM(S): at 02:02

## 2025-04-26 RX ADMIN — Medication 0.1 MILLIGRAM(S): at 20:26

## 2025-04-26 RX ADMIN — POLYETHYLENE GLYCOL 3350 17 GRAM(S): 17 POWDER, FOR SOLUTION ORAL at 11:53

## 2025-04-26 RX ADMIN — Medication 2 MILLIGRAM(S): at 21:20

## 2025-04-26 RX ADMIN — Medication 4 MILLIGRAM(S): at 02:50

## 2025-04-26 RX ADMIN — GABAPENTIN 600 MILLIGRAM(S): 400 CAPSULE ORAL at 21:33

## 2025-04-26 RX ADMIN — INSULIN LISPRO 1: 100 INJECTION, SOLUTION INTRAVENOUS; SUBCUTANEOUS at 17:39

## 2025-04-26 RX ADMIN — Medication 25 GRAM(S): at 05:19

## 2025-04-26 RX ADMIN — Medication 4 MILLIGRAM(S): at 17:09

## 2025-04-26 RX ADMIN — Medication 4 MILLIGRAM(S): at 06:47

## 2025-04-26 RX ADMIN — Medication 2 MILLIGRAM(S): at 13:58

## 2025-04-26 RX ADMIN — OXYBUTYNIN CHLORIDE 5 MILLIGRAM(S): 5 TABLET, FILM COATED, EXTENDED RELEASE ORAL at 05:19

## 2025-04-26 RX ADMIN — Medication 0.1 MILLIGRAM(S): at 05:19

## 2025-04-26 RX ADMIN — Medication 2 MILLIGRAM(S): at 12:58

## 2025-04-26 RX ADMIN — Medication 100 MILLIEQUIVALENT(S): at 10:30

## 2025-04-26 RX ADMIN — Medication 100 MILLIEQUIVALENT(S): at 11:52

## 2025-04-26 RX ADMIN — AMLODIPINE BESYLATE 10 MILLIGRAM(S): 10 TABLET ORAL at 05:19

## 2025-04-26 RX ADMIN — DULOXETINE 60 MILLIGRAM(S): 20 CAPSULE, DELAYED RELEASE ORAL at 11:53

## 2025-04-26 RX ADMIN — OXYBUTYNIN CHLORIDE 5 MILLIGRAM(S): 5 TABLET, FILM COATED, EXTENDED RELEASE ORAL at 20:26

## 2025-04-26 RX ADMIN — NICOTINE POLACRILEX 1 PATCH: 4 GUM, CHEWING ORAL at 08:39

## 2025-04-26 RX ADMIN — Medication 2 MILLIGRAM(S): at 06:47

## 2025-04-26 RX ADMIN — Medication 100 MILLIEQUIVALENT(S): at 08:58

## 2025-04-26 RX ADMIN — Medication 2 MILLIGRAM(S): at 20:26

## 2025-04-26 RX ADMIN — SODIUM CHLORIDE 75 MILLILITER(S): 9 INJECTION, SOLUTION INTRAVENOUS at 18:30

## 2025-04-26 RX ADMIN — INSULIN LISPRO 1: 100 INJECTION, SOLUTION INTRAVENOUS; SUBCUTANEOUS at 21:32

## 2025-04-27 LAB
ANION GAP SERPL CALC-SCNC: 11 MMOL/L — SIGNIFICANT CHANGE UP (ref 5–17)
APTT BLD: 32.3 SEC — SIGNIFICANT CHANGE UP (ref 26.1–36.8)
APTT BLD: 54.8 SEC — HIGH (ref 26.1–36.8)
BUN SERPL-MCNC: 27 MG/DL — HIGH (ref 7–23)
CALCIUM SERPL-MCNC: 8.1 MG/DL — LOW (ref 8.4–10.5)
CHLORIDE SERPL-SCNC: 103 MMOL/L — SIGNIFICANT CHANGE UP (ref 96–108)
CO2 SERPL-SCNC: 21 MMOL/L — LOW (ref 22–31)
CREAT SERPL-MCNC: 2.81 MG/DL — HIGH (ref 0.5–1.3)
EGFR: 27 ML/MIN/1.73M2 — LOW
EGFR: 27 ML/MIN/1.73M2 — LOW
GLUCOSE BLDC GLUCOMTR-MCNC: 202 MG/DL — HIGH (ref 70–99)
GLUCOSE BLDC GLUCOMTR-MCNC: 215 MG/DL — HIGH (ref 70–99)
GLUCOSE BLDC GLUCOMTR-MCNC: 223 MG/DL — HIGH (ref 70–99)
GLUCOSE BLDC GLUCOMTR-MCNC: 280 MG/DL — HIGH (ref 70–99)
GLUCOSE SERPL-MCNC: 213 MG/DL — HIGH (ref 70–99)
HCT VFR BLD CALC: 25.2 % — LOW (ref 39–50)
HCT VFR BLD CALC: 28.7 % — LOW (ref 39–50)
HGB BLD-MCNC: 7.7 G/DL — LOW (ref 13–17)
HGB BLD-MCNC: 8.8 G/DL — LOW (ref 13–17)
INR BLD: 1.88 RATIO — HIGH (ref 0.85–1.16)
MCHC RBC-ENTMCNC: 23.6 PG — LOW (ref 27–34)
MCHC RBC-ENTMCNC: 23.8 PG — LOW (ref 27–34)
MCHC RBC-ENTMCNC: 30.6 G/DL — LOW (ref 32–36)
MCHC RBC-ENTMCNC: 30.7 G/DL — LOW (ref 32–36)
MCV RBC AUTO: 76.9 FL — LOW (ref 80–100)
MCV RBC AUTO: 77.8 FL — LOW (ref 80–100)
NRBC BLD AUTO-RTO: 0 /100 WBCS — SIGNIFICANT CHANGE UP (ref 0–0)
NRBC BLD AUTO-RTO: 0 /100 WBCS — SIGNIFICANT CHANGE UP (ref 0–0)
PLATELET # BLD AUTO: 151 K/UL — SIGNIFICANT CHANGE UP (ref 150–400)
PLATELET # BLD AUTO: 209 K/UL — SIGNIFICANT CHANGE UP (ref 150–400)
POTASSIUM SERPL-MCNC: 3.6 MMOL/L — SIGNIFICANT CHANGE UP (ref 3.5–5.3)
POTASSIUM SERPL-SCNC: 3.6 MMOL/L — SIGNIFICANT CHANGE UP (ref 3.5–5.3)
PROTHROM AB SERPL-ACNC: 21.5 SEC — HIGH (ref 9.9–13.4)
RBC # BLD: 3.24 M/UL — LOW (ref 4.2–5.8)
RBC # BLD: 3.73 M/UL — LOW (ref 4.2–5.8)
RBC # FLD: 17.2 % — HIGH (ref 10.3–14.5)
RBC # FLD: 17.3 % — HIGH (ref 10.3–14.5)
SODIUM SERPL-SCNC: 135 MMOL/L — SIGNIFICANT CHANGE UP (ref 135–145)
WBC # BLD: 6.51 K/UL — SIGNIFICANT CHANGE UP (ref 3.8–10.5)
WBC # BLD: 8.25 K/UL — SIGNIFICANT CHANGE UP (ref 3.8–10.5)
WBC # FLD AUTO: 6.51 K/UL — SIGNIFICANT CHANGE UP (ref 3.8–10.5)
WBC # FLD AUTO: 8.25 K/UL — SIGNIFICANT CHANGE UP (ref 3.8–10.5)

## 2025-04-27 RX ORDER — HEPARIN SODIUM 1000 [USP'U]/ML
INJECTION INTRAVENOUS; SUBCUTANEOUS
Qty: 25000 | Refills: 0 | Status: DISCONTINUED | OUTPATIENT
Start: 2025-04-27 | End: 2025-04-28

## 2025-04-27 RX ORDER — HEPARIN SODIUM 1000 [USP'U]/ML
5000 INJECTION INTRAVENOUS; SUBCUTANEOUS EVERY 6 HOURS
Refills: 0 | Status: DISCONTINUED | OUTPATIENT
Start: 2025-04-27 | End: 2025-04-28

## 2025-04-27 RX ORDER — HEPARIN SODIUM 1000 [USP'U]/ML
10000 INJECTION INTRAVENOUS; SUBCUTANEOUS ONCE
Refills: 0 | Status: COMPLETED | OUTPATIENT
Start: 2025-04-27 | End: 2025-04-27

## 2025-04-27 RX ORDER — HEPARIN SODIUM 1000 [USP'U]/ML
10000 INJECTION INTRAVENOUS; SUBCUTANEOUS EVERY 6 HOURS
Refills: 0 | Status: DISCONTINUED | OUTPATIENT
Start: 2025-04-27 | End: 2025-04-28

## 2025-04-27 RX ADMIN — Medication 4 MILLIGRAM(S): at 06:18

## 2025-04-27 RX ADMIN — GABAPENTIN 600 MILLIGRAM(S): 400 CAPSULE ORAL at 05:21

## 2025-04-27 RX ADMIN — OXYBUTYNIN CHLORIDE 5 MILLIGRAM(S): 5 TABLET, FILM COATED, EXTENDED RELEASE ORAL at 08:33

## 2025-04-27 RX ADMIN — INSULIN LISPRO 2: 100 INJECTION, SOLUTION INTRAVENOUS; SUBCUTANEOUS at 08:34

## 2025-04-27 RX ADMIN — Medication 2 MILLIGRAM(S): at 21:35

## 2025-04-27 RX ADMIN — INSULIN LISPRO 2: 100 INJECTION, SOLUTION INTRAVENOUS; SUBCUTANEOUS at 12:39

## 2025-04-27 RX ADMIN — POLYETHYLENE GLYCOL 3350 17 GRAM(S): 17 POWDER, FOR SOLUTION ORAL at 12:39

## 2025-04-27 RX ADMIN — HEPARIN SODIUM 10000 UNIT(S): 1000 INJECTION INTRAVENOUS; SUBCUTANEOUS at 12:39

## 2025-04-27 RX ADMIN — GABAPENTIN 600 MILLIGRAM(S): 400 CAPSULE ORAL at 12:37

## 2025-04-27 RX ADMIN — DULOXETINE 60 MILLIGRAM(S): 20 CAPSULE, DELAYED RELEASE ORAL at 12:37

## 2025-04-27 RX ADMIN — Medication 25 GRAM(S): at 05:21

## 2025-04-27 RX ADMIN — INSULIN LISPRO 3: 100 INJECTION, SOLUTION INTRAVENOUS; SUBCUTANEOUS at 16:56

## 2025-04-27 RX ADMIN — Medication 1 MILLIGRAM(S): at 00:14

## 2025-04-27 RX ADMIN — Medication 4 MILLIGRAM(S): at 12:37

## 2025-04-27 RX ADMIN — Medication 2 MILLIGRAM(S): at 22:35

## 2025-04-27 RX ADMIN — Medication 0.1 MILLIGRAM(S): at 21:30

## 2025-04-27 RX ADMIN — Medication 1 MILLIGRAM(S): at 00:38

## 2025-04-27 RX ADMIN — Medication 25 GRAM(S): at 21:31

## 2025-04-27 RX ADMIN — MIRABEGRON 25 MILLIGRAM(S): 50 TABLET, FILM COATED, EXTENDED RELEASE ORAL at 12:38

## 2025-04-27 RX ADMIN — METOPROLOL SUCCINATE 25 MILLIGRAM(S): 50 TABLET, EXTENDED RELEASE ORAL at 08:33

## 2025-04-27 RX ADMIN — OXYBUTYNIN CHLORIDE 5 MILLIGRAM(S): 5 TABLET, FILM COATED, EXTENDED RELEASE ORAL at 21:30

## 2025-04-27 RX ADMIN — HEPARIN SODIUM 2800 UNIT(S)/HR: 1000 INJECTION INTRAVENOUS; SUBCUTANEOUS at 17:01

## 2025-04-27 RX ADMIN — GABAPENTIN 600 MILLIGRAM(S): 400 CAPSULE ORAL at 21:35

## 2025-04-27 RX ADMIN — METOPROLOL SUCCINATE 25 MILLIGRAM(S): 50 TABLET, EXTENDED RELEASE ORAL at 21:30

## 2025-04-27 RX ADMIN — HEPARIN SODIUM 2300 UNIT(S)/HR: 1000 INJECTION INTRAVENOUS; SUBCUTANEOUS at 12:48

## 2025-04-27 RX ADMIN — Medication 4 MILLIGRAM(S): at 05:21

## 2025-04-27 RX ADMIN — AMLODIPINE BESYLATE 10 MILLIGRAM(S): 10 TABLET ORAL at 05:20

## 2025-04-27 RX ADMIN — Medication 40 MILLIGRAM(S): at 05:20

## 2025-04-27 RX ADMIN — Medication 0.1 MILLIGRAM(S): at 08:33

## 2025-04-27 RX ADMIN — FINASTERIDE 5 MILLIGRAM(S): 1 TABLET, FILM COATED ORAL at 12:38

## 2025-04-28 LAB
ADD ON TEST-SPECIMEN IN LAB: SIGNIFICANT CHANGE UP
ALBUMIN SERPL ELPH-MCNC: 3.1 G/DL — LOW (ref 3.3–5)
ALP SERPL-CCNC: 83 U/L — SIGNIFICANT CHANGE UP (ref 40–120)
ALT FLD-CCNC: 10 U/L — SIGNIFICANT CHANGE UP (ref 10–45)
ANION GAP SERPL CALC-SCNC: 11 MMOL/L — SIGNIFICANT CHANGE UP (ref 5–17)
APTT BLD: 30.4 SEC — SIGNIFICANT CHANGE UP (ref 26.1–36.8)
AST SERPL-CCNC: 12 U/L — SIGNIFICANT CHANGE UP (ref 10–40)
BILIRUB SERPL-MCNC: 0.1 MG/DL — LOW (ref 0.2–1.2)
BUN SERPL-MCNC: 26 MG/DL — HIGH (ref 7–23)
CALCIUM SERPL-MCNC: 8.2 MG/DL — LOW (ref 8.4–10.5)
CHLORIDE SERPL-SCNC: 100 MMOL/L — SIGNIFICANT CHANGE UP (ref 96–108)
CO2 SERPL-SCNC: 22 MMOL/L — SIGNIFICANT CHANGE UP (ref 22–31)
CREAT SERPL-MCNC: 2.91 MG/DL — HIGH (ref 0.5–1.3)
EGFR: 26 ML/MIN/1.73M2 — LOW
EGFR: 26 ML/MIN/1.73M2 — LOW
GLUCOSE BLDC GLUCOMTR-MCNC: 212 MG/DL — HIGH (ref 70–99)
GLUCOSE BLDC GLUCOMTR-MCNC: 242 MG/DL — HIGH (ref 70–99)
GLUCOSE BLDC GLUCOMTR-MCNC: 262 MG/DL — HIGH (ref 70–99)
GLUCOSE BLDC GLUCOMTR-MCNC: 296 MG/DL — HIGH (ref 70–99)
GLUCOSE BLDC GLUCOMTR-MCNC: 313 MG/DL — HIGH (ref 70–99)
GLUCOSE SERPL-MCNC: 233 MG/DL — HIGH (ref 70–99)
HCT VFR BLD CALC: 27.7 % — LOW (ref 39–50)
HGB BLD-MCNC: 8.7 G/DL — LOW (ref 13–17)
INR BLD: 1.64 RATIO — HIGH (ref 0.85–1.16)
MAGNESIUM SERPL-MCNC: 1.8 MG/DL — SIGNIFICANT CHANGE UP (ref 1.6–2.6)
MCHC RBC-ENTMCNC: 23.8 PG — LOW (ref 27–34)
MCHC RBC-ENTMCNC: 31.4 G/DL — LOW (ref 32–36)
MCV RBC AUTO: 75.9 FL — LOW (ref 80–100)
NRBC BLD AUTO-RTO: 0 /100 WBCS — SIGNIFICANT CHANGE UP (ref 0–0)
PHOSPHATE SERPL-MCNC: 3 MG/DL — SIGNIFICANT CHANGE UP (ref 2.5–4.5)
PLATELET # BLD AUTO: 178 K/UL — SIGNIFICANT CHANGE UP (ref 150–400)
POTASSIUM SERPL-MCNC: 3.8 MMOL/L — SIGNIFICANT CHANGE UP (ref 3.5–5.3)
POTASSIUM SERPL-SCNC: 3.8 MMOL/L — SIGNIFICANT CHANGE UP (ref 3.5–5.3)
PROT SERPL-MCNC: 6.3 G/DL — SIGNIFICANT CHANGE UP (ref 6–8.3)
PROTHROM AB SERPL-ACNC: 18.6 SEC — HIGH (ref 9.9–13.4)
RBC # BLD: 3.65 M/UL — LOW (ref 4.2–5.8)
RBC # FLD: 17 % — HIGH (ref 10.3–14.5)
SODIUM SERPL-SCNC: 133 MMOL/L — LOW (ref 135–145)
WBC # BLD: 7.87 K/UL — SIGNIFICANT CHANGE UP (ref 3.8–10.5)
WBC # FLD AUTO: 7.87 K/UL — SIGNIFICANT CHANGE UP (ref 3.8–10.5)

## 2025-04-28 PROCEDURE — 88304 TISSUE EXAM BY PATHOLOGIST: CPT | Mod: 26

## 2025-04-28 PROCEDURE — 47562 LAPAROSCOPIC CHOLECYSTECTOMY: CPT

## 2025-04-28 DEVICE — SURGIFLO MATRIX WITH THROMBIN KIT: Type: IMPLANTABLE DEVICE | Site: ABDOMINAL | Status: FUNCTIONAL

## 2025-04-28 DEVICE — CLIP APPLIER COVIDIEN ENDOCLIP III 5MM: Type: IMPLANTABLE DEVICE | Site: ABDOMINAL | Status: FUNCTIONAL

## 2025-04-28 RX ORDER — MAGNESIUM, ALUMINUM HYDROXIDE 200-200 MG
30 TABLET,CHEWABLE ORAL EVERY 4 HOURS
Refills: 0 | Status: DISCONTINUED | OUTPATIENT
Start: 2025-04-28 | End: 2025-05-10

## 2025-04-28 RX ORDER — DEXTROSE 50 % IN WATER 50 %
12.5 SYRINGE (ML) INTRAVENOUS ONCE
Refills: 0 | Status: DISCONTINUED | OUTPATIENT
Start: 2025-04-28 | End: 2025-05-10

## 2025-04-28 RX ORDER — GLUCAGON 3 MG/1
1 POWDER NASAL ONCE
Refills: 0 | Status: DISCONTINUED | OUTPATIENT
Start: 2025-04-28 | End: 2025-05-10

## 2025-04-28 RX ORDER — FERROUS SULFATE 137(45) MG
325 TABLET, EXTENDED RELEASE ORAL DAILY
Refills: 0 | Status: DISCONTINUED | OUTPATIENT
Start: 2025-04-28 | End: 2025-05-10

## 2025-04-28 RX ORDER — OXYBUTYNIN CHLORIDE 5 MG/1
5 TABLET, FILM COATED, EXTENDED RELEASE ORAL
Refills: 0 | Status: DISCONTINUED | OUTPATIENT
Start: 2025-04-28 | End: 2025-05-10

## 2025-04-28 RX ORDER — B1/B2/B3/B5/B6/B12/VIT C/FOLIC 500-0.5 MG
1 TABLET ORAL DAILY
Refills: 0 | Status: DISCONTINUED | OUTPATIENT
Start: 2025-04-28 | End: 2025-05-10

## 2025-04-28 RX ORDER — DULOXETINE 20 MG/1
60 CAPSULE, DELAYED RELEASE ORAL DAILY
Refills: 0 | Status: DISCONTINUED | OUTPATIENT
Start: 2025-04-28 | End: 2025-05-10

## 2025-04-28 RX ORDER — POLYETHYLENE GLYCOL 3350 17 G/17G
17 POWDER, FOR SOLUTION ORAL DAILY
Refills: 0 | Status: DISCONTINUED | OUTPATIENT
Start: 2025-04-28 | End: 2025-05-01

## 2025-04-28 RX ORDER — ONDANSETRON HCL/PF 4 MG/2 ML
4 VIAL (ML) INJECTION EVERY 8 HOURS
Refills: 0 | Status: DISCONTINUED | OUTPATIENT
Start: 2025-04-28 | End: 2025-05-01

## 2025-04-28 RX ORDER — HYDROMORPHONE/SOD CHLOR,ISO/PF 2 MG/10 ML
0.5 SYRINGE (ML) INJECTION ONCE
Refills: 0 | Status: DISCONTINUED | OUTPATIENT
Start: 2025-04-28 | End: 2025-04-28

## 2025-04-28 RX ORDER — MIRABEGRON 50 MG/1
25 TABLET, FILM COATED, EXTENDED RELEASE ORAL DAILY
Refills: 0 | Status: DISCONTINUED | OUTPATIENT
Start: 2025-04-28 | End: 2025-05-10

## 2025-04-28 RX ORDER — HYDROMORPHONE/SOD CHLOR,ISO/PF 2 MG/10 ML
0.25 SYRINGE (ML) INJECTION ONCE
Refills: 0 | Status: DISCONTINUED | OUTPATIENT
Start: 2025-04-28 | End: 2025-04-28

## 2025-04-28 RX ORDER — FINASTERIDE 1 MG/1
5 TABLET, FILM COATED ORAL DAILY
Refills: 0 | Status: DISCONTINUED | OUTPATIENT
Start: 2025-04-28 | End: 2025-05-10

## 2025-04-28 RX ORDER — ACETAMINOPHEN 500 MG/5ML
650 LIQUID (ML) ORAL EVERY 6 HOURS
Refills: 0 | Status: DISCONTINUED | OUTPATIENT
Start: 2025-04-28 | End: 2025-05-01

## 2025-04-28 RX ORDER — AMLODIPINE BESYLATE 10 MG/1
10 TABLET ORAL DAILY
Refills: 0 | Status: DISCONTINUED | OUTPATIENT
Start: 2025-04-28 | End: 2025-05-10

## 2025-04-28 RX ORDER — HYDROMORPHONE/SOD CHLOR,ISO/PF 2 MG/10 ML
1 SYRINGE (ML) INJECTION ONCE
Refills: 0 | Status: DISCONTINUED | OUTPATIENT
Start: 2025-04-28 | End: 2025-04-28

## 2025-04-28 RX ORDER — DEXTROSE 50 % IN WATER 50 %
25 SYRINGE (ML) INTRAVENOUS ONCE
Refills: 0 | Status: DISCONTINUED | OUTPATIENT
Start: 2025-04-28 | End: 2025-05-10

## 2025-04-28 RX ORDER — SENNA 187 MG
2 TABLET ORAL AT BEDTIME
Refills: 0 | Status: DISCONTINUED | OUTPATIENT
Start: 2025-04-28 | End: 2025-05-01

## 2025-04-28 RX ORDER — HEPARIN SODIUM 1000 [USP'U]/ML
2300 INJECTION INTRAVENOUS; SUBCUTANEOUS
Qty: 25000 | Refills: 0 | Status: DISCONTINUED | OUTPATIENT
Start: 2025-04-28 | End: 2025-05-03

## 2025-04-28 RX ORDER — HYDROMORPHONE/SOD CHLOR,ISO/PF 2 MG/10 ML
4 SYRINGE (ML) INJECTION EVERY 6 HOURS
Refills: 0 | Status: DISCONTINUED | OUTPATIENT
Start: 2025-04-28 | End: 2025-04-29

## 2025-04-28 RX ORDER — HYDROMORPHONE/SOD CHLOR,ISO/PF 2 MG/10 ML
0.5 SYRINGE (ML) INJECTION
Refills: 0 | Status: DISCONTINUED | OUTPATIENT
Start: 2025-04-28 | End: 2025-04-28

## 2025-04-28 RX ORDER — LACTOBACILLUS ACIDOPHILUS/PECT 75 MM-100
1 CAPSULE ORAL EVERY 12 HOURS
Refills: 0 | Status: DISCONTINUED | OUTPATIENT
Start: 2025-04-28 | End: 2025-05-10

## 2025-04-28 RX ORDER — MELATONIN 5 MG
5 TABLET ORAL AT BEDTIME
Refills: 0 | Status: DISCONTINUED | OUTPATIENT
Start: 2025-04-28 | End: 2025-05-10

## 2025-04-28 RX ORDER — SODIUM CHLORIDE 9 G/1000ML
1000 INJECTION, SOLUTION INTRAVENOUS
Refills: 0 | Status: DISCONTINUED | OUTPATIENT
Start: 2025-04-28 | End: 2025-05-10

## 2025-04-28 RX ORDER — MELATONIN 5 MG
5 TABLET ORAL AT BEDTIME
Refills: 0 | Status: DISCONTINUED | OUTPATIENT
Start: 2025-04-28 | End: 2025-04-28

## 2025-04-28 RX ORDER — GABAPENTIN 400 MG/1
600 CAPSULE ORAL EVERY 8 HOURS
Refills: 0 | Status: DISCONTINUED | OUTPATIENT
Start: 2025-04-28 | End: 2025-05-05

## 2025-04-28 RX ORDER — FOLIC ACID 1 MG/1
1 TABLET ORAL DAILY
Refills: 0 | Status: DISCONTINUED | OUTPATIENT
Start: 2025-04-28 | End: 2025-05-10

## 2025-04-28 RX ORDER — METHOCARBAMOL 500 MG/1
1500 TABLET, FILM COATED ORAL EVERY 8 HOURS
Refills: 0 | Status: DISCONTINUED | OUTPATIENT
Start: 2025-04-28 | End: 2025-05-05

## 2025-04-28 RX ORDER — INSULIN LISPRO 100 U/ML
INJECTION, SOLUTION INTRAVENOUS; SUBCUTANEOUS AT BEDTIME
Refills: 0 | Status: DISCONTINUED | OUTPATIENT
Start: 2025-04-28 | End: 2025-05-01

## 2025-04-28 RX ORDER — NICOTINE POLACRILEX 4 MG/1
1 GUM, CHEWING ORAL DAILY
Refills: 0 | Status: DISCONTINUED | OUTPATIENT
Start: 2025-04-28 | End: 2025-05-10

## 2025-04-28 RX ORDER — ERTAPENEM SODIUM 1 G/1
500 INJECTION, POWDER, LYOPHILIZED, FOR SOLUTION INTRAMUSCULAR; INTRAVENOUS EVERY 24 HOURS
Refills: 0 | Status: DISCONTINUED | OUTPATIENT
Start: 2025-04-28 | End: 2025-04-28

## 2025-04-28 RX ORDER — DEXTROSE 50 % IN WATER 50 %
15 SYRINGE (ML) INTRAVENOUS ONCE
Refills: 0 | Status: DISCONTINUED | OUTPATIENT
Start: 2025-04-28 | End: 2025-05-10

## 2025-04-28 RX ORDER — ONDANSETRON HCL/PF 4 MG/2 ML
4 VIAL (ML) INJECTION ONCE
Refills: 0 | Status: DISCONTINUED | OUTPATIENT
Start: 2025-04-28 | End: 2025-04-28

## 2025-04-28 RX ORDER — METOPROLOL SUCCINATE 50 MG/1
25 TABLET, EXTENDED RELEASE ORAL
Refills: 0 | Status: DISCONTINUED | OUTPATIENT
Start: 2025-04-28 | End: 2025-05-10

## 2025-04-28 RX ORDER — INSULIN LISPRO 100 U/ML
INJECTION, SOLUTION INTRAVENOUS; SUBCUTANEOUS
Refills: 0 | Status: DISCONTINUED | OUTPATIENT
Start: 2025-04-28 | End: 2025-05-01

## 2025-04-28 RX ADMIN — Medication 0.1 MILLIGRAM(S): at 16:22

## 2025-04-28 RX ADMIN — Medication 5 MILLIGRAM(S): at 20:15

## 2025-04-28 RX ADMIN — Medication 0.5 MILLIGRAM(S): at 04:13

## 2025-04-28 RX ADMIN — Medication 1 MILLIGRAM(S): at 16:35

## 2025-04-28 RX ADMIN — Medication 1 MILLIGRAM(S): at 00:46

## 2025-04-28 RX ADMIN — FOLIC ACID 1 MILLIGRAM(S): 1 TABLET ORAL at 16:23

## 2025-04-28 RX ADMIN — INSULIN LISPRO 4: 100 INJECTION, SOLUTION INTRAVENOUS; SUBCUTANEOUS at 17:42

## 2025-04-28 RX ADMIN — HEPARIN SODIUM 23 UNIT(S)/HR: 1000 INJECTION INTRAVENOUS; SUBCUTANEOUS at 19:38

## 2025-04-28 RX ADMIN — Medication 2 TABLET(S): at 22:21

## 2025-04-28 RX ADMIN — NICOTINE POLACRILEX 1 PATCH: 4 GUM, CHEWING ORAL at 16:26

## 2025-04-28 RX ADMIN — GABAPENTIN 600 MILLIGRAM(S): 400 CAPSULE ORAL at 06:05

## 2025-04-28 RX ADMIN — Medication 40 MILLIGRAM(S): at 06:05

## 2025-04-28 RX ADMIN — Medication 0.5 MILLIGRAM(S): at 20:45

## 2025-04-28 RX ADMIN — INSULIN LISPRO: 100 INJECTION, SOLUTION INTRAVENOUS; SUBCUTANEOUS at 13:58

## 2025-04-28 RX ADMIN — AMLODIPINE BESYLATE 10 MILLIGRAM(S): 10 TABLET ORAL at 06:05

## 2025-04-28 RX ADMIN — Medication 4 MILLIGRAM(S): at 07:59

## 2025-04-28 RX ADMIN — OXYBUTYNIN CHLORIDE 5 MILLIGRAM(S): 5 TABLET, FILM COATED, EXTENDED RELEASE ORAL at 16:25

## 2025-04-28 RX ADMIN — Medication 0.5 MILLIGRAM(S): at 21:16

## 2025-04-28 RX ADMIN — Medication 1 MILLIGRAM(S): at 00:29

## 2025-04-28 RX ADMIN — Medication 4 MILLIGRAM(S): at 06:04

## 2025-04-28 RX ADMIN — Medication 0.5 MILLIGRAM(S): at 14:15

## 2025-04-28 RX ADMIN — FINASTERIDE 5 MILLIGRAM(S): 1 TABLET, FILM COATED ORAL at 16:27

## 2025-04-28 RX ADMIN — Medication 1 MILLIGRAM(S): at 17:05

## 2025-04-28 RX ADMIN — GABAPENTIN 600 MILLIGRAM(S): 400 CAPSULE ORAL at 21:17

## 2025-04-28 RX ADMIN — Medication 0.5 MILLIGRAM(S): at 13:35

## 2025-04-28 RX ADMIN — Medication 4 MILLIGRAM(S): at 00:29

## 2025-04-28 RX ADMIN — Medication 1 TABLET(S): at 16:24

## 2025-04-28 RX ADMIN — Medication 0.5 MILLIGRAM(S): at 21:46

## 2025-04-28 RX ADMIN — DULOXETINE 60 MILLIGRAM(S): 20 CAPSULE, DELAYED RELEASE ORAL at 16:23

## 2025-04-28 RX ADMIN — INSULIN LISPRO 2: 100 INJECTION, SOLUTION INTRAVENOUS; SUBCUTANEOUS at 08:27

## 2025-04-28 RX ADMIN — Medication 0.5 MILLIGRAM(S): at 13:25

## 2025-04-28 RX ADMIN — METHOCARBAMOL 1500 MILLIGRAM(S): 500 TABLET, FILM COATED ORAL at 16:44

## 2025-04-28 RX ADMIN — Medication 0.1 MILLIGRAM(S): at 07:59

## 2025-04-28 RX ADMIN — OXYBUTYNIN CHLORIDE 5 MILLIGRAM(S): 5 TABLET, FILM COATED, EXTENDED RELEASE ORAL at 07:59

## 2025-04-28 RX ADMIN — Medication 0.5 MILLIGRAM(S): at 20:15

## 2025-04-28 RX ADMIN — Medication 325 MILLIGRAM(S): at 16:23

## 2025-04-28 RX ADMIN — INSULIN LISPRO 4: 100 INJECTION, SOLUTION INTRAVENOUS; SUBCUTANEOUS at 21:18

## 2025-04-28 RX ADMIN — Medication 25 GRAM(S): at 06:05

## 2025-04-28 RX ADMIN — Medication 500 MILLIGRAM(S): at 16:21

## 2025-04-28 RX ADMIN — Medication 1 TABLET(S): at 16:27

## 2025-04-28 RX ADMIN — Medication 4 MILLIGRAM(S): at 16:16

## 2025-04-28 RX ADMIN — Medication 0.5 MILLIGRAM(S): at 14:02

## 2025-04-28 RX ADMIN — METOPROLOL SUCCINATE 25 MILLIGRAM(S): 50 TABLET, EXTENDED RELEASE ORAL at 07:59

## 2025-04-29 LAB
ANION GAP SERPL CALC-SCNC: 16 MMOL/L — SIGNIFICANT CHANGE UP (ref 5–17)
APTT BLD: 110.5 SEC — HIGH (ref 26.1–36.8)
APTT BLD: 43.5 SEC — HIGH (ref 26.1–36.8)
APTT BLD: 68.6 SEC — HIGH (ref 26.1–36.8)
BUN SERPL-MCNC: 28 MG/DL — HIGH (ref 7–23)
CALCIUM SERPL-MCNC: 8.5 MG/DL — SIGNIFICANT CHANGE UP (ref 8.4–10.5)
CHLORIDE SERPL-SCNC: 99 MMOL/L — SIGNIFICANT CHANGE UP (ref 96–108)
CO2 SERPL-SCNC: 20 MMOL/L — LOW (ref 22–31)
CREAT SERPL-MCNC: 2.96 MG/DL — HIGH (ref 0.5–1.3)
EGFR: 25 ML/MIN/1.73M2 — LOW
EGFR: 25 ML/MIN/1.73M2 — LOW
GLUCOSE BLDC GLUCOMTR-MCNC: 158 MG/DL — HIGH (ref 70–99)
GLUCOSE BLDC GLUCOMTR-MCNC: 190 MG/DL — HIGH (ref 70–99)
GLUCOSE BLDC GLUCOMTR-MCNC: 240 MG/DL — HIGH (ref 70–99)
GLUCOSE BLDC GLUCOMTR-MCNC: 242 MG/DL — HIGH (ref 70–99)
GLUCOSE BLDC GLUCOMTR-MCNC: 265 MG/DL — HIGH (ref 70–99)
GLUCOSE BLDC GLUCOMTR-MCNC: 278 MG/DL — HIGH (ref 70–99)
GLUCOSE SERPL-MCNC: 170 MG/DL — HIGH (ref 70–99)
HCT VFR BLD CALC: 28.6 % — LOW (ref 39–50)
HGB BLD-MCNC: 8.5 G/DL — LOW (ref 13–17)
INR BLD: 1.43 RATIO — HIGH (ref 0.85–1.16)
MAGNESIUM SERPL-MCNC: 1.9 MG/DL — SIGNIFICANT CHANGE UP (ref 1.6–2.6)
MCHC RBC-ENTMCNC: 23.7 PG — LOW (ref 27–34)
MCHC RBC-ENTMCNC: 29.7 G/DL — LOW (ref 32–36)
MCV RBC AUTO: 79.7 FL — LOW (ref 80–100)
NRBC BLD AUTO-RTO: 0 /100 WBCS — SIGNIFICANT CHANGE UP (ref 0–0)
PHOSPHATE SERPL-MCNC: 2.8 MG/DL — SIGNIFICANT CHANGE UP (ref 2.5–4.5)
PLATELET # BLD AUTO: 151 K/UL — SIGNIFICANT CHANGE UP (ref 150–400)
POTASSIUM SERPL-MCNC: 4.4 MMOL/L — SIGNIFICANT CHANGE UP (ref 3.5–5.3)
POTASSIUM SERPL-SCNC: 4.4 MMOL/L — SIGNIFICANT CHANGE UP (ref 3.5–5.3)
PROTHROM AB SERPL-ACNC: 16.2 SEC — HIGH (ref 9.9–13.4)
RBC # BLD: 3.59 M/UL — LOW (ref 4.2–5.8)
RBC # FLD: 17 % — HIGH (ref 10.3–14.5)
SODIUM SERPL-SCNC: 135 MMOL/L — SIGNIFICANT CHANGE UP (ref 135–145)
WBC # BLD: 10.62 K/UL — HIGH (ref 3.8–10.5)
WBC # FLD AUTO: 10.62 K/UL — HIGH (ref 3.8–10.5)

## 2025-04-29 RX ORDER — HYDROMORPHONE/SOD CHLOR,ISO/PF 2 MG/10 ML
0.5 SYRINGE (ML) INJECTION
Refills: 0 | Status: DISCONTINUED | OUTPATIENT
Start: 2025-04-29 | End: 2025-05-01

## 2025-04-29 RX ORDER — NALOXONE HYDROCHLORIDE 0.4 MG/ML
0.1 INJECTION, SOLUTION INTRAMUSCULAR; INTRAVENOUS; SUBCUTANEOUS
Refills: 0 | Status: DISCONTINUED | OUTPATIENT
Start: 2025-04-29 | End: 2025-05-05

## 2025-04-29 RX ORDER — ERTAPENEM SODIUM 1 G/1
500 INJECTION, POWDER, LYOPHILIZED, FOR SOLUTION INTRAMUSCULAR; INTRAVENOUS EVERY 24 HOURS
Refills: 0 | Status: COMPLETED | OUTPATIENT
Start: 2025-04-29 | End: 2025-05-01

## 2025-04-29 RX ORDER — HYDROMORPHONE/SOD CHLOR,ISO/PF 2 MG/10 ML
30 SYRINGE (ML) INJECTION
Refills: 0 | Status: DISCONTINUED | OUTPATIENT
Start: 2025-04-29 | End: 2025-04-29

## 2025-04-29 RX ORDER — HYDROMORPHONE/SOD CHLOR,ISO/PF 2 MG/10 ML
30 SYRINGE (ML) INJECTION
Refills: 0 | Status: DISCONTINUED | OUTPATIENT
Start: 2025-04-29 | End: 2025-05-01

## 2025-04-29 RX ADMIN — Medication 2 TABLET(S): at 23:34

## 2025-04-29 RX ADMIN — Medication 30 MILLILITER(S): at 07:44

## 2025-04-29 RX ADMIN — Medication 30 MILLILITER(S): at 19:42

## 2025-04-29 RX ADMIN — AMLODIPINE BESYLATE 10 MILLIGRAM(S): 10 TABLET ORAL at 06:02

## 2025-04-29 RX ADMIN — GABAPENTIN 600 MILLIGRAM(S): 400 CAPSULE ORAL at 23:33

## 2025-04-29 RX ADMIN — ERTAPENEM SODIUM 100 MILLIGRAM(S): 1 INJECTION, POWDER, LYOPHILIZED, FOR SOLUTION INTRAMUSCULAR; INTRAVENOUS at 11:25

## 2025-04-29 RX ADMIN — Medication 0.1 MILLIGRAM(S): at 06:03

## 2025-04-29 RX ADMIN — Medication 500 MILLIGRAM(S): at 11:25

## 2025-04-29 RX ADMIN — Medication 4 MILLIGRAM(S): at 23:35

## 2025-04-29 RX ADMIN — Medication 325 MILLIGRAM(S): at 11:27

## 2025-04-29 RX ADMIN — POLYETHYLENE GLYCOL 3350 17 GRAM(S): 17 POWDER, FOR SOLUTION ORAL at 11:30

## 2025-04-29 RX ADMIN — Medication 1 TABLET(S): at 19:44

## 2025-04-29 RX ADMIN — Medication 0.1 MILLIGRAM(S): at 19:44

## 2025-04-29 RX ADMIN — Medication 0.5 MILLIGRAM(S): at 16:16

## 2025-04-29 RX ADMIN — Medication 1 TABLET(S): at 11:27

## 2025-04-29 RX ADMIN — INSULIN LISPRO 4: 100 INJECTION, SOLUTION INTRAVENOUS; SUBCUTANEOUS at 17:15

## 2025-04-29 RX ADMIN — Medication 0.5 MILLIGRAM(S): at 19:48

## 2025-04-29 RX ADMIN — Medication 1 TABLET(S): at 06:04

## 2025-04-29 RX ADMIN — HEPARIN SODIUM 23 UNIT(S)/HR: 1000 INJECTION INTRAVENOUS; SUBCUTANEOUS at 07:43

## 2025-04-29 RX ADMIN — NICOTINE POLACRILEX 1 PATCH: 4 GUM, CHEWING ORAL at 11:28

## 2025-04-29 RX ADMIN — GABAPENTIN 600 MILLIGRAM(S): 400 CAPSULE ORAL at 14:41

## 2025-04-29 RX ADMIN — Medication 0.5 MILLIGRAM(S): at 06:23

## 2025-04-29 RX ADMIN — MIRABEGRON 25 MILLIGRAM(S): 50 TABLET, FILM COATED, EXTENDED RELEASE ORAL at 11:30

## 2025-04-29 RX ADMIN — HEPARIN SODIUM 20 UNIT(S)/HR: 1000 INJECTION INTRAVENOUS; SUBCUTANEOUS at 19:37

## 2025-04-29 RX ADMIN — OXYBUTYNIN CHLORIDE 5 MILLIGRAM(S): 5 TABLET, FILM COATED, EXTENDED RELEASE ORAL at 06:03

## 2025-04-29 RX ADMIN — FOLIC ACID 1 MILLIGRAM(S): 1 TABLET ORAL at 11:26

## 2025-04-29 RX ADMIN — INSULIN LISPRO 2: 100 INJECTION, SOLUTION INTRAVENOUS; SUBCUTANEOUS at 08:58

## 2025-04-29 RX ADMIN — FINASTERIDE 5 MILLIGRAM(S): 1 TABLET, FILM COATED ORAL at 11:27

## 2025-04-29 RX ADMIN — HEPARIN SODIUM 23 UNIT(S)/HR: 1000 INJECTION INTRAVENOUS; SUBCUTANEOUS at 06:18

## 2025-04-29 RX ADMIN — GABAPENTIN 600 MILLIGRAM(S): 400 CAPSULE ORAL at 06:04

## 2025-04-29 RX ADMIN — Medication 30 MILLILITER(S): at 03:12

## 2025-04-29 RX ADMIN — HEPARIN SODIUM 20 UNIT(S)/HR: 1000 INJECTION INTRAVENOUS; SUBCUTANEOUS at 15:12

## 2025-04-29 RX ADMIN — Medication 40 MILLIGRAM(S): at 06:03

## 2025-04-29 RX ADMIN — DULOXETINE 60 MILLIGRAM(S): 20 CAPSULE, DELAYED RELEASE ORAL at 11:26

## 2025-04-29 RX ADMIN — OXYBUTYNIN CHLORIDE 5 MILLIGRAM(S): 5 TABLET, FILM COATED, EXTENDED RELEASE ORAL at 19:46

## 2025-04-30 LAB
ADD ON TEST-SPECIMEN IN LAB: SIGNIFICANT CHANGE UP
ANION GAP SERPL CALC-SCNC: 16 MMOL/L — SIGNIFICANT CHANGE UP (ref 5–17)
APTT BLD: 41 SEC — HIGH (ref 26.1–36.8)
APTT BLD: 50.9 SEC — HIGH (ref 26.1–36.8)
APTT BLD: 85.5 SEC — HIGH (ref 26.1–36.8)
BUN SERPL-MCNC: 27 MG/DL — HIGH (ref 7–23)
CALCIUM SERPL-MCNC: 8.5 MG/DL — SIGNIFICANT CHANGE UP (ref 8.4–10.5)
CHLORIDE SERPL-SCNC: 100 MMOL/L — SIGNIFICANT CHANGE UP (ref 96–108)
CO2 SERPL-SCNC: 20 MMOL/L — LOW (ref 22–31)
CREAT SERPL-MCNC: 2.72 MG/DL — HIGH (ref 0.5–1.3)
EGFR: 28 ML/MIN/1.73M2 — LOW
EGFR: 28 ML/MIN/1.73M2 — LOW
GLUCOSE BLDC GLUCOMTR-MCNC: 140 MG/DL — HIGH (ref 70–99)
GLUCOSE BLDC GLUCOMTR-MCNC: 149 MG/DL — HIGH (ref 70–99)
GLUCOSE BLDC GLUCOMTR-MCNC: 167 MG/DL — HIGH (ref 70–99)
GLUCOSE BLDC GLUCOMTR-MCNC: 185 MG/DL — HIGH (ref 70–99)
GLUCOSE SERPL-MCNC: 138 MG/DL — HIGH (ref 70–99)
HCT VFR BLD CALC: 30.4 % — LOW (ref 39–50)
HGB BLD-MCNC: 9.4 G/DL — LOW (ref 13–17)
INR BLD: 1.29 RATIO — HIGH (ref 0.85–1.16)
MAGNESIUM SERPL-MCNC: 1.7 MG/DL — SIGNIFICANT CHANGE UP (ref 1.6–2.6)
MCHC RBC-ENTMCNC: 23.6 PG — LOW (ref 27–34)
MCHC RBC-ENTMCNC: 30.9 G/DL — LOW (ref 32–36)
MCV RBC AUTO: 76.2 FL — LOW (ref 80–100)
NRBC BLD AUTO-RTO: 0 /100 WBCS — SIGNIFICANT CHANGE UP (ref 0–0)
PHOSPHATE SERPL-MCNC: 3.2 MG/DL — SIGNIFICANT CHANGE UP (ref 2.5–4.5)
PLATELET # BLD AUTO: 193 K/UL — SIGNIFICANT CHANGE UP (ref 150–400)
POTASSIUM SERPL-MCNC: 4.4 MMOL/L — SIGNIFICANT CHANGE UP (ref 3.5–5.3)
POTASSIUM SERPL-SCNC: 4.4 MMOL/L — SIGNIFICANT CHANGE UP (ref 3.5–5.3)
PROTHROM AB SERPL-ACNC: 14.7 SEC — HIGH (ref 9.9–13.4)
RBC # BLD: 3.99 M/UL — LOW (ref 4.2–5.8)
RBC # FLD: 17.5 % — HIGH (ref 10.3–14.5)
SODIUM SERPL-SCNC: 136 MMOL/L — SIGNIFICANT CHANGE UP (ref 135–145)
WBC # BLD: 13.84 K/UL — HIGH (ref 3.8–10.5)
WBC # FLD AUTO: 13.84 K/UL — HIGH (ref 3.8–10.5)

## 2025-04-30 PROCEDURE — 74018 RADEX ABDOMEN 1 VIEW: CPT | Mod: 26

## 2025-04-30 RX ORDER — MAGNESIUM SULFATE 500 MG/ML
2 SYRINGE (ML) INJECTION ONCE
Refills: 0 | Status: COMPLETED | OUTPATIENT
Start: 2025-04-30 | End: 2025-04-30

## 2025-04-30 RX ORDER — NALOXEGOL OXALATE 12.5 MG/1
25 TABLET, FILM COATED ORAL DAILY
Refills: 0 | Status: DISCONTINUED | OUTPATIENT
Start: 2025-04-30 | End: 2025-05-01

## 2025-04-30 RX ORDER — ONDANSETRON HCL/PF 4 MG/2 ML
8 VIAL (ML) INJECTION ONCE
Refills: 0 | Status: COMPLETED | OUTPATIENT
Start: 2025-04-30 | End: 2025-04-30

## 2025-04-30 RX ORDER — ONDANSETRON HCL/PF 4 MG/2 ML
8 VIAL (ML) INJECTION ONCE
Refills: 0 | Status: DISCONTINUED | OUTPATIENT
Start: 2025-04-30 | End: 2025-04-30

## 2025-04-30 RX ORDER — SIMETHICONE 80 MG
80 TABLET,CHEWABLE ORAL ONCE
Refills: 0 | Status: COMPLETED | OUTPATIENT
Start: 2025-04-30 | End: 2025-04-30

## 2025-04-30 RX ORDER — NALOXEGOL OXALATE 12.5 MG/1
25 TABLET, FILM COATED ORAL ONCE
Refills: 0 | Status: COMPLETED | OUTPATIENT
Start: 2025-04-30 | End: 2025-04-30

## 2025-04-30 RX ADMIN — AMLODIPINE BESYLATE 10 MILLIGRAM(S): 10 TABLET ORAL at 05:23

## 2025-04-30 RX ADMIN — DULOXETINE 60 MILLIGRAM(S): 20 CAPSULE, DELAYED RELEASE ORAL at 11:41

## 2025-04-30 RX ADMIN — HEPARIN SODIUM 26 UNIT(S)/HR: 1000 INJECTION INTRAVENOUS; SUBCUTANEOUS at 05:19

## 2025-04-30 RX ADMIN — Medication 6 MILLIGRAM(S): at 23:06

## 2025-04-30 RX ADMIN — HEPARIN SODIUM 26 UNIT(S)/HR: 1000 INJECTION INTRAVENOUS; SUBCUTANEOUS at 07:25

## 2025-04-30 RX ADMIN — Medication 0.1 MILLIGRAM(S): at 18:50

## 2025-04-30 RX ADMIN — Medication 0.5 MILLIGRAM(S): at 17:35

## 2025-04-30 RX ADMIN — Medication 0.5 MILLIGRAM(S): at 03:23

## 2025-04-30 RX ADMIN — Medication 25 GRAM(S): at 10:53

## 2025-04-30 RX ADMIN — HEPARIN SODIUM 29 UNIT(S)/HR: 1000 INJECTION INTRAVENOUS; SUBCUTANEOUS at 19:14

## 2025-04-30 RX ADMIN — Medication 0.5 MILLIGRAM(S): at 07:25

## 2025-04-30 RX ADMIN — MIRABEGRON 25 MILLIGRAM(S): 50 TABLET, FILM COATED, EXTENDED RELEASE ORAL at 11:42

## 2025-04-30 RX ADMIN — Medication 500 MILLIGRAM(S): at 11:42

## 2025-04-30 RX ADMIN — NICOTINE POLACRILEX 1 PATCH: 4 GUM, CHEWING ORAL at 11:42

## 2025-04-30 RX ADMIN — Medication 325 MILLIGRAM(S): at 11:41

## 2025-04-30 RX ADMIN — POLYETHYLENE GLYCOL 3350 17 GRAM(S): 17 POWDER, FOR SOLUTION ORAL at 18:58

## 2025-04-30 RX ADMIN — Medication 40 MILLIGRAM(S): at 05:24

## 2025-04-30 RX ADMIN — Medication 0.5 MILLIGRAM(S): at 10:52

## 2025-04-30 RX ADMIN — Medication 1 TABLET(S): at 11:41

## 2025-04-30 RX ADMIN — FINASTERIDE 5 MILLIGRAM(S): 1 TABLET, FILM COATED ORAL at 11:42

## 2025-04-30 RX ADMIN — OXYBUTYNIN CHLORIDE 5 MILLIGRAM(S): 5 TABLET, FILM COATED, EXTENDED RELEASE ORAL at 05:22

## 2025-04-30 RX ADMIN — METOPROLOL SUCCINATE 25 MILLIGRAM(S): 50 TABLET, EXTENDED RELEASE ORAL at 05:24

## 2025-04-30 RX ADMIN — Medication 8 MILLIGRAM(S): at 22:31

## 2025-04-30 RX ADMIN — Medication 30 MILLILITER(S): at 19:14

## 2025-04-30 RX ADMIN — FOLIC ACID 1 MILLIGRAM(S): 1 TABLET ORAL at 11:41

## 2025-04-30 RX ADMIN — ERTAPENEM SODIUM 100 MILLIGRAM(S): 1 INJECTION, POWDER, LYOPHILIZED, FOR SOLUTION INTRAMUSCULAR; INTRAVENOUS at 13:04

## 2025-04-30 RX ADMIN — INSULIN LISPRO 2: 100 INJECTION, SOLUTION INTRAVENOUS; SUBCUTANEOUS at 09:00

## 2025-04-30 RX ADMIN — Medication 30 MILLILITER(S): at 12:55

## 2025-04-30 RX ADMIN — HEPARIN SODIUM 23 UNIT(S)/HR: 1000 INJECTION INTRAVENOUS; SUBCUTANEOUS at 00:03

## 2025-04-30 RX ADMIN — Medication 0.5 MILLIGRAM(S): at 13:04

## 2025-04-30 RX ADMIN — Medication 1 TABLET(S): at 18:50

## 2025-04-30 RX ADMIN — Medication 4 MILLIGRAM(S): at 09:09

## 2025-04-30 RX ADMIN — Medication 0.1 MILLIGRAM(S): at 05:22

## 2025-04-30 RX ADMIN — GABAPENTIN 600 MILLIGRAM(S): 400 CAPSULE ORAL at 05:24

## 2025-04-30 RX ADMIN — Medication 30 MILLILITER(S): at 22:19

## 2025-04-30 RX ADMIN — NALOXEGOL OXALATE 25 MILLIGRAM(S): 12.5 TABLET, FILM COATED ORAL at 18:50

## 2025-04-30 RX ADMIN — Medication 1 TABLET(S): at 05:22

## 2025-04-30 RX ADMIN — HEPARIN SODIUM 26 UNIT(S)/HR: 1000 INJECTION INTRAVENOUS; SUBCUTANEOUS at 11:04

## 2025-04-30 RX ADMIN — METOPROLOL SUCCINATE 25 MILLIGRAM(S): 50 TABLET, EXTENDED RELEASE ORAL at 18:50

## 2025-04-30 RX ADMIN — Medication 80 MILLIGRAM(S): at 12:10

## 2025-04-30 RX ADMIN — NICOTINE POLACRILEX 1 PATCH: 4 GUM, CHEWING ORAL at 18:35

## 2025-04-30 RX ADMIN — OXYBUTYNIN CHLORIDE 5 MILLIGRAM(S): 5 TABLET, FILM COATED, EXTENDED RELEASE ORAL at 18:50

## 2025-04-30 RX ADMIN — Medication 650 MILLIGRAM(S): at 18:58

## 2025-04-30 RX ADMIN — Medication 4 MILLIGRAM(S): at 17:16

## 2025-04-30 RX ADMIN — Medication 30 MILLILITER(S): at 07:25

## 2025-04-30 RX ADMIN — NICOTINE POLACRILEX 1 PATCH: 4 GUM, CHEWING ORAL at 11:40

## 2025-05-01 LAB
ANION GAP SERPL CALC-SCNC: 16 MMOL/L — SIGNIFICANT CHANGE UP (ref 5–17)
APTT BLD: 117.8 SEC — HIGH (ref 26.1–36.8)
APTT BLD: 98.9 SEC — HIGH (ref 26.1–36.8)
APTT BLD: 99.5 SEC — HIGH (ref 26.1–36.8)
BUN SERPL-MCNC: 30 MG/DL — HIGH (ref 7–23)
CALCIUM SERPL-MCNC: 9.3 MG/DL — SIGNIFICANT CHANGE UP (ref 8.4–10.5)
CHLORIDE SERPL-SCNC: 91 MMOL/L — LOW (ref 96–108)
CO2 SERPL-SCNC: 35 MMOL/L — HIGH (ref 22–31)
CREAT SERPL-MCNC: 3.04 MG/DL — HIGH (ref 0.5–1.3)
CULTURE RESULTS: SIGNIFICANT CHANGE UP
CULTURE RESULTS: SIGNIFICANT CHANGE UP
EGFR: 24 ML/MIN/1.73M2 — LOW
EGFR: 24 ML/MIN/1.73M2 — LOW
GLUCOSE BLDC GLUCOMTR-MCNC: 132 MG/DL — HIGH (ref 70–99)
GLUCOSE BLDC GLUCOMTR-MCNC: 174 MG/DL — HIGH (ref 70–99)
GLUCOSE BLDC GLUCOMTR-MCNC: 213 MG/DL — HIGH (ref 70–99)
GLUCOSE SERPL-MCNC: 188 MG/DL — HIGH (ref 70–99)
HCT VFR BLD CALC: 32.9 % — LOW (ref 39–50)
HGB BLD-MCNC: 10.4 G/DL — LOW (ref 13–17)
INR BLD: 1.4 RATIO — HIGH (ref 0.85–1.16)
MAGNESIUM SERPL-MCNC: 2.4 MG/DL — SIGNIFICANT CHANGE UP (ref 1.6–2.6)
MCHC RBC-ENTMCNC: 23.5 PG — LOW (ref 27–34)
MCHC RBC-ENTMCNC: 31.6 G/DL — LOW (ref 32–36)
MCV RBC AUTO: 74.4 FL — LOW (ref 80–100)
NRBC BLD AUTO-RTO: 0 /100 WBCS — SIGNIFICANT CHANGE UP (ref 0–0)
PHOSPHATE SERPL-MCNC: 3.7 MG/DL — SIGNIFICANT CHANGE UP (ref 2.5–4.5)
PLATELET # BLD AUTO: 222 K/UL — SIGNIFICANT CHANGE UP (ref 150–400)
POTASSIUM SERPL-MCNC: 3.4 MMOL/L — LOW (ref 3.5–5.3)
POTASSIUM SERPL-SCNC: 3.4 MMOL/L — LOW (ref 3.5–5.3)
PROTHROM AB SERPL-ACNC: 16 SEC — HIGH (ref 9.9–13.4)
RBC # BLD: 4.42 M/UL — SIGNIFICANT CHANGE UP (ref 4.2–5.8)
RBC # FLD: 17.3 % — HIGH (ref 10.3–14.5)
SODIUM SERPL-SCNC: 142 MMOL/L — SIGNIFICANT CHANGE UP (ref 135–145)
SPECIMEN SOURCE: SIGNIFICANT CHANGE UP
SPECIMEN SOURCE: SIGNIFICANT CHANGE UP
SURGICAL PATHOLOGY STUDY: SIGNIFICANT CHANGE UP
WBC # BLD: 12.88 K/UL — HIGH (ref 3.8–10.5)
WBC # FLD AUTO: 12.88 K/UL — HIGH (ref 3.8–10.5)

## 2025-05-01 PROCEDURE — 93010 ELECTROCARDIOGRAM REPORT: CPT

## 2025-05-01 PROCEDURE — 99233 SBSQ HOSP IP/OBS HIGH 50: CPT | Mod: 24

## 2025-05-01 RX ORDER — SODIUM CHLORIDE 9 G/1000ML
1000 INJECTION, SOLUTION INTRAVENOUS
Refills: 0 | Status: DISCONTINUED | OUTPATIENT
Start: 2025-05-01 | End: 2025-05-02

## 2025-05-01 RX ORDER — INSULIN LISPRO 100 U/ML
INJECTION, SOLUTION INTRAVENOUS; SUBCUTANEOUS EVERY 6 HOURS
Refills: 0 | Status: DISCONTINUED | OUTPATIENT
Start: 2025-05-01 | End: 2025-05-04

## 2025-05-01 RX ORDER — SODIUM CHLORIDE 9 G/1000ML
1000 INJECTION, SOLUTION INTRAVENOUS ONCE
Refills: 0 | Status: COMPLETED | OUTPATIENT
Start: 2025-05-01 | End: 2025-05-01

## 2025-05-01 RX ORDER — METHYLNALTREXONE BROMIDE 12 MG/.6ML
12 INJECTION, SOLUTION SUBCUTANEOUS ONCE
Refills: 0 | Status: DISCONTINUED | OUTPATIENT
Start: 2025-05-01 | End: 2025-05-01

## 2025-05-01 RX ORDER — HYDROMORPHONE/SOD CHLOR,ISO/PF 2 MG/10 ML
0.5 SYRINGE (ML) INJECTION EVERY 4 HOURS
Refills: 0 | Status: DISCONTINUED | OUTPATIENT
Start: 2025-05-01 | End: 2025-05-03

## 2025-05-01 RX ORDER — ACETAMINOPHEN 500 MG/5ML
1000 LIQUID (ML) ORAL EVERY 6 HOURS
Refills: 0 | Status: COMPLETED | OUTPATIENT
Start: 2025-05-01 | End: 2025-05-02

## 2025-05-01 RX ORDER — ONDANSETRON HCL/PF 4 MG/2 ML
8 VIAL (ML) INJECTION EVERY 8 HOURS
Refills: 0 | Status: DISCONTINUED | OUTPATIENT
Start: 2025-05-01 | End: 2025-05-10

## 2025-05-01 RX ORDER — METHYLNALTREXONE BROMIDE 12 MG/.6ML
10 INJECTION, SOLUTION SUBCUTANEOUS ONCE
Refills: 0 | Status: COMPLETED | OUTPATIENT
Start: 2025-05-01 | End: 2025-05-01

## 2025-05-01 RX ADMIN — Medication 1 TABLET(S): at 11:19

## 2025-05-01 RX ADMIN — Medication 400 MILLIGRAM(S): at 23:38

## 2025-05-01 RX ADMIN — HEPARIN SODIUM 26 UNIT(S)/HR: 1000 INJECTION INTRAVENOUS; SUBCUTANEOUS at 02:09

## 2025-05-01 RX ADMIN — SODIUM CHLORIDE 100 MILLILITER(S): 9 INJECTION, SOLUTION INTRAVENOUS at 08:02

## 2025-05-01 RX ADMIN — HEPARIN SODIUM 26 UNIT(S)/HR: 1000 INJECTION INTRAVENOUS; SUBCUTANEOUS at 07:04

## 2025-05-01 RX ADMIN — HEPARIN SODIUM 29 UNIT(S)/HR: 1000 INJECTION INTRAVENOUS; SUBCUTANEOUS at 02:00

## 2025-05-01 RX ADMIN — ERTAPENEM SODIUM 100 MILLIGRAM(S): 1 INJECTION, POWDER, LYOPHILIZED, FOR SOLUTION INTRAMUSCULAR; INTRAVENOUS at 10:02

## 2025-05-01 RX ADMIN — FOLIC ACID 1 MILLIGRAM(S): 1 TABLET ORAL at 11:19

## 2025-05-01 RX ADMIN — SODIUM CHLORIDE 1000 MILLILITER(S): 9 INJECTION, SOLUTION INTRAVENOUS at 10:02

## 2025-05-01 RX ADMIN — METOPROLOL SUCCINATE 25 MILLIGRAM(S): 50 TABLET, EXTENDED RELEASE ORAL at 17:18

## 2025-05-01 RX ADMIN — Medication 500 MILLIGRAM(S): at 11:19

## 2025-05-01 RX ADMIN — INSULIN LISPRO 4: 100 INJECTION, SOLUTION INTRAVENOUS; SUBCUTANEOUS at 13:50

## 2025-05-01 RX ADMIN — Medication 100 MILLIEQUIVALENT(S): at 19:06

## 2025-05-01 RX ADMIN — NICOTINE POLACRILEX 1 PATCH: 4 GUM, CHEWING ORAL at 07:00

## 2025-05-01 RX ADMIN — Medication 4 MILLIGRAM(S): at 03:34

## 2025-05-01 RX ADMIN — GABAPENTIN 600 MILLIGRAM(S): 400 CAPSULE ORAL at 21:19

## 2025-05-01 RX ADMIN — Medication 400 MILLIGRAM(S): at 17:14

## 2025-05-01 RX ADMIN — Medication 100 MILLIEQUIVALENT(S): at 17:14

## 2025-05-01 RX ADMIN — Medication 0.5 MILLIGRAM(S): at 21:24

## 2025-05-01 RX ADMIN — Medication 1 TABLET(S): at 17:18

## 2025-05-01 RX ADMIN — MIRABEGRON 25 MILLIGRAM(S): 50 TABLET, FILM COATED, EXTENDED RELEASE ORAL at 11:20

## 2025-05-01 RX ADMIN — Medication 1000 MILLIGRAM(S): at 18:00

## 2025-05-01 RX ADMIN — HEPARIN SODIUM 26 UNIT(S)/HR: 1000 INJECTION INTRAVENOUS; SUBCUTANEOUS at 19:05

## 2025-05-01 RX ADMIN — METHYLNALTREXONE BROMIDE 10 MILLIGRAM(S): 12 INJECTION, SOLUTION SUBCUTANEOUS at 11:17

## 2025-05-01 RX ADMIN — Medication 5 MILLIGRAM(S): at 21:19

## 2025-05-01 RX ADMIN — FINASTERIDE 5 MILLIGRAM(S): 1 TABLET, FILM COATED ORAL at 11:19

## 2025-05-01 RX ADMIN — Medication 30 MILLILITER(S): at 07:04

## 2025-05-01 RX ADMIN — DULOXETINE 60 MILLIGRAM(S): 20 CAPSULE, DELAYED RELEASE ORAL at 11:20

## 2025-05-01 RX ADMIN — Medication 0.1 MILLIGRAM(S): at 17:18

## 2025-05-01 RX ADMIN — Medication 4 MILLIGRAM(S): at 21:19

## 2025-05-01 RX ADMIN — NICOTINE POLACRILEX 1 PATCH: 4 GUM, CHEWING ORAL at 11:20

## 2025-05-01 RX ADMIN — HEPARIN SODIUM 26 UNIT(S)/HR: 1000 INJECTION INTRAVENOUS; SUBCUTANEOUS at 09:56

## 2025-05-01 RX ADMIN — INSULIN LISPRO 2: 100 INJECTION, SOLUTION INTRAVENOUS; SUBCUTANEOUS at 17:51

## 2025-05-01 RX ADMIN — NICOTINE POLACRILEX 1 PATCH: 4 GUM, CHEWING ORAL at 18:31

## 2025-05-01 RX ADMIN — Medication 325 MILLIGRAM(S): at 11:20

## 2025-05-01 RX ADMIN — NICOTINE POLACRILEX 1 PATCH: 4 GUM, CHEWING ORAL at 11:00

## 2025-05-01 RX ADMIN — Medication 0.5 MILLIGRAM(S): at 21:54

## 2025-05-01 RX ADMIN — Medication 100 MILLIEQUIVALENT(S): at 14:00

## 2025-05-01 RX ADMIN — HEPARIN SODIUM 26 UNIT(S)/HR: 1000 INJECTION INTRAVENOUS; SUBCUTANEOUS at 22:30

## 2025-05-01 RX ADMIN — HEPARIN SODIUM 26 UNIT(S)/HR: 1000 INJECTION INTRAVENOUS; SUBCUTANEOUS at 17:48

## 2025-05-01 RX ADMIN — OXYBUTYNIN CHLORIDE 5 MILLIGRAM(S): 5 TABLET, FILM COATED, EXTENDED RELEASE ORAL at 17:14

## 2025-05-01 RX ADMIN — Medication 8 MILLIGRAM(S): at 10:01

## 2025-05-02 LAB
ANION GAP SERPL CALC-SCNC: 12 MMOL/L — SIGNIFICANT CHANGE UP (ref 5–17)
APTT BLD: 123.7 SEC — CRITICAL HIGH (ref 26.1–36.8)
APTT BLD: 123.9 SEC — CRITICAL HIGH (ref 26.1–36.8)
APTT BLD: 95.4 SEC — HIGH (ref 26.1–36.8)
BUN SERPL-MCNC: 31 MG/DL — HIGH (ref 7–23)
CALCIUM SERPL-MCNC: 8.6 MG/DL — SIGNIFICANT CHANGE UP (ref 8.4–10.5)
CHLORIDE SERPL-SCNC: 96 MMOL/L — SIGNIFICANT CHANGE UP (ref 96–108)
CO2 SERPL-SCNC: 28 MMOL/L — SIGNIFICANT CHANGE UP (ref 22–31)
CREAT SERPL-MCNC: 2.9 MG/DL — HIGH (ref 0.5–1.3)
EGFR: 26 ML/MIN/1.73M2 — LOW
EGFR: 26 ML/MIN/1.73M2 — LOW
GLUCOSE BLDC GLUCOMTR-MCNC: 125 MG/DL — HIGH (ref 70–99)
GLUCOSE BLDC GLUCOMTR-MCNC: 129 MG/DL — HIGH (ref 70–99)
GLUCOSE BLDC GLUCOMTR-MCNC: 144 MG/DL — HIGH (ref 70–99)
GLUCOSE BLDC GLUCOMTR-MCNC: 159 MG/DL — HIGH (ref 70–99)
GLUCOSE SERPL-MCNC: 123 MG/DL — HIGH (ref 70–99)
HCT VFR BLD CALC: 27 % — LOW (ref 39–50)
HGB BLD-MCNC: 8.3 G/DL — LOW (ref 13–17)
INR BLD: 1.69 RATIO — HIGH (ref 0.85–1.16)
MAGNESIUM SERPL-MCNC: 2.1 MG/DL — SIGNIFICANT CHANGE UP (ref 1.6–2.6)
MCHC RBC-ENTMCNC: 23.1 PG — LOW (ref 27–34)
MCHC RBC-ENTMCNC: 30.7 G/DL — LOW (ref 32–36)
MCV RBC AUTO: 75 FL — LOW (ref 80–100)
NRBC BLD AUTO-RTO: 0 /100 WBCS — SIGNIFICANT CHANGE UP (ref 0–0)
PHOSPHATE SERPL-MCNC: 2.8 MG/DL — SIGNIFICANT CHANGE UP (ref 2.5–4.5)
PLATELET # BLD AUTO: 192 K/UL — SIGNIFICANT CHANGE UP (ref 150–400)
POTASSIUM SERPL-MCNC: 3.7 MMOL/L — SIGNIFICANT CHANGE UP (ref 3.5–5.3)
POTASSIUM SERPL-SCNC: 3.7 MMOL/L — SIGNIFICANT CHANGE UP (ref 3.5–5.3)
PROTHROM AB SERPL-ACNC: 19.2 SEC — HIGH (ref 9.9–13.4)
RBC # BLD: 3.6 M/UL — LOW (ref 4.2–5.8)
RBC # FLD: 17 % — HIGH (ref 10.3–14.5)
SODIUM SERPL-SCNC: 136 MMOL/L — SIGNIFICANT CHANGE UP (ref 135–145)
WBC # BLD: 8.79 K/UL — SIGNIFICANT CHANGE UP (ref 3.8–10.5)
WBC # FLD AUTO: 8.79 K/UL — SIGNIFICANT CHANGE UP (ref 3.8–10.5)

## 2025-05-02 RX ADMIN — Medication 325 MILLIGRAM(S): at 15:02

## 2025-05-02 RX ADMIN — HEPARIN SODIUM 20 UNIT(S)/HR: 1000 INJECTION INTRAVENOUS; SUBCUTANEOUS at 19:17

## 2025-05-02 RX ADMIN — Medication 400 MILLIGRAM(S): at 05:43

## 2025-05-02 RX ADMIN — Medication 0.5 MILLIGRAM(S): at 06:48

## 2025-05-02 RX ADMIN — Medication 0.5 MILLIGRAM(S): at 22:27

## 2025-05-02 RX ADMIN — AMLODIPINE BESYLATE 10 MILLIGRAM(S): 10 TABLET ORAL at 05:43

## 2025-05-02 RX ADMIN — NICOTINE POLACRILEX 1 PATCH: 4 GUM, CHEWING ORAL at 21:53

## 2025-05-02 RX ADMIN — NICOTINE POLACRILEX 1 PATCH: 4 GUM, CHEWING ORAL at 15:00

## 2025-05-02 RX ADMIN — Medication 40 MILLIGRAM(S): at 05:42

## 2025-05-02 RX ADMIN — Medication 0.5 MILLIGRAM(S): at 18:55

## 2025-05-02 RX ADMIN — Medication 0.5 MILLIGRAM(S): at 06:18

## 2025-05-02 RX ADMIN — Medication 0.5 MILLIGRAM(S): at 14:45

## 2025-05-02 RX ADMIN — Medication 1 TABLET(S): at 17:26

## 2025-05-02 RX ADMIN — Medication 0.5 MILLIGRAM(S): at 23:00

## 2025-05-02 RX ADMIN — FOLIC ACID 1 MILLIGRAM(S): 1 TABLET ORAL at 15:04

## 2025-05-02 RX ADMIN — FINASTERIDE 5 MILLIGRAM(S): 1 TABLET, FILM COATED ORAL at 15:01

## 2025-05-02 RX ADMIN — Medication 500 MILLIGRAM(S): at 17:26

## 2025-05-02 RX ADMIN — MIRABEGRON 25 MILLIGRAM(S): 50 TABLET, FILM COATED, EXTENDED RELEASE ORAL at 17:26

## 2025-05-02 RX ADMIN — DULOXETINE 60 MILLIGRAM(S): 20 CAPSULE, DELAYED RELEASE ORAL at 15:02

## 2025-05-02 RX ADMIN — GABAPENTIN 600 MILLIGRAM(S): 400 CAPSULE ORAL at 22:33

## 2025-05-02 RX ADMIN — OXYBUTYNIN CHLORIDE 5 MILLIGRAM(S): 5 TABLET, FILM COATED, EXTENDED RELEASE ORAL at 05:43

## 2025-05-02 RX ADMIN — Medication 1000 MILLIGRAM(S): at 06:13

## 2025-05-02 RX ADMIN — METOPROLOL SUCCINATE 25 MILLIGRAM(S): 50 TABLET, EXTENDED RELEASE ORAL at 17:46

## 2025-05-02 RX ADMIN — Medication 0.5 MILLIGRAM(S): at 15:15

## 2025-05-02 RX ADMIN — Medication 4 MILLIGRAM(S): at 22:28

## 2025-05-02 RX ADMIN — Medication 5 MILLIGRAM(S): at 22:27

## 2025-05-02 RX ADMIN — HEPARIN SODIUM 23 UNIT(S)/HR: 1000 INJECTION INTRAVENOUS; SUBCUTANEOUS at 07:16

## 2025-05-02 RX ADMIN — Medication 0.5 MILLIGRAM(S): at 18:25

## 2025-05-02 RX ADMIN — Medication 400 MILLIGRAM(S): at 14:49

## 2025-05-02 RX ADMIN — Medication 0.1 MILLIGRAM(S): at 12:05

## 2025-05-02 RX ADMIN — OXYBUTYNIN CHLORIDE 5 MILLIGRAM(S): 5 TABLET, FILM COATED, EXTENDED RELEASE ORAL at 15:00

## 2025-05-02 RX ADMIN — HEPARIN SODIUM 20 UNIT(S)/HR: 1000 INJECTION INTRAVENOUS; SUBCUTANEOUS at 17:39

## 2025-05-02 RX ADMIN — GABAPENTIN 600 MILLIGRAM(S): 400 CAPSULE ORAL at 15:03

## 2025-05-02 RX ADMIN — GABAPENTIN 600 MILLIGRAM(S): 400 CAPSULE ORAL at 05:43

## 2025-05-02 RX ADMIN — Medication 1 TABLET(S): at 17:36

## 2025-05-02 RX ADMIN — Medication 8 MILLIGRAM(S): at 18:31

## 2025-05-02 RX ADMIN — Medication 1000 MILLIGRAM(S): at 00:08

## 2025-05-02 RX ADMIN — HEPARIN SODIUM 23 UNIT(S)/HR: 1000 INJECTION INTRAVENOUS; SUBCUTANEOUS at 01:35

## 2025-05-02 RX ADMIN — Medication 0.1 MILLIGRAM(S): at 22:27

## 2025-05-02 RX ADMIN — Medication 1 TABLET(S): at 05:43

## 2025-05-03 LAB
ANION GAP SERPL CALC-SCNC: 11 MMOL/L — SIGNIFICANT CHANGE UP (ref 5–17)
APTT BLD: 51.3 SEC — HIGH (ref 26.1–36.8)
APTT BLD: 52.8 SEC — HIGH (ref 26.1–36.8)
APTT BLD: 81 SEC — HIGH (ref 26.1–36.8)
BUN SERPL-MCNC: 34 MG/DL — HIGH (ref 7–23)
CALCIUM SERPL-MCNC: 8.7 MG/DL — SIGNIFICANT CHANGE UP (ref 8.4–10.5)
CHLORIDE SERPL-SCNC: 97 MMOL/L — SIGNIFICANT CHANGE UP (ref 96–108)
CO2 SERPL-SCNC: 26 MMOL/L — SIGNIFICANT CHANGE UP (ref 22–31)
CREAT SERPL-MCNC: 3.02 MG/DL — HIGH (ref 0.5–1.3)
EGFR: 24 ML/MIN/1.73M2 — LOW
EGFR: 24 ML/MIN/1.73M2 — LOW
GLUCOSE BLDC GLUCOMTR-MCNC: 123 MG/DL — HIGH (ref 70–99)
GLUCOSE BLDC GLUCOMTR-MCNC: 136 MG/DL — HIGH (ref 70–99)
GLUCOSE BLDC GLUCOMTR-MCNC: 138 MG/DL — HIGH (ref 70–99)
GLUCOSE BLDC GLUCOMTR-MCNC: 174 MG/DL — HIGH (ref 70–99)
GLUCOSE SERPL-MCNC: 137 MG/DL — HIGH (ref 70–99)
HCT VFR BLD CALC: 27.9 % — LOW (ref 39–50)
HGB BLD-MCNC: 8.9 G/DL — LOW (ref 13–17)
INR BLD: 2.2 RATIO — HIGH (ref 0.85–1.16)
MAGNESIUM SERPL-MCNC: 2.1 MG/DL — SIGNIFICANT CHANGE UP (ref 1.6–2.6)
MCHC RBC-ENTMCNC: 24.1 PG — LOW (ref 27–34)
MCHC RBC-ENTMCNC: 31.9 G/DL — LOW (ref 32–36)
MCV RBC AUTO: 75.6 FL — LOW (ref 80–100)
NRBC BLD AUTO-RTO: 0 /100 WBCS — SIGNIFICANT CHANGE UP (ref 0–0)
PHOSPHATE SERPL-MCNC: 2.7 MG/DL — SIGNIFICANT CHANGE UP (ref 2.5–4.5)
PLATELET # BLD AUTO: 221 K/UL — SIGNIFICANT CHANGE UP (ref 150–400)
POTASSIUM SERPL-MCNC: 3.9 MMOL/L — SIGNIFICANT CHANGE UP (ref 3.5–5.3)
POTASSIUM SERPL-SCNC: 3.9 MMOL/L — SIGNIFICANT CHANGE UP (ref 3.5–5.3)
PROTHROM AB SERPL-ACNC: 24.9 SEC — HIGH (ref 9.9–13.4)
RBC # BLD: 3.69 M/UL — LOW (ref 4.2–5.8)
RBC # FLD: 16.7 % — HIGH (ref 10.3–14.5)
SODIUM SERPL-SCNC: 134 MMOL/L — LOW (ref 135–145)
WBC # BLD: 9.38 K/UL — SIGNIFICANT CHANGE UP (ref 3.8–10.5)
WBC # FLD AUTO: 9.38 K/UL — SIGNIFICANT CHANGE UP (ref 3.8–10.5)

## 2025-05-03 RX ORDER — HYDROMORPHONE/SOD CHLOR,ISO/PF 2 MG/10 ML
0.5 SYRINGE (ML) INJECTION ONCE
Refills: 0 | Status: DISCONTINUED | OUTPATIENT
Start: 2025-05-03 | End: 2025-05-03

## 2025-05-03 RX ORDER — POTASSIUM PHOSPHATE, MONOBASIC POTASSIUM PHOSPHATE, DIBASIC INJECTION, 236; 224 MG/ML; MG/ML
15 SOLUTION, CONCENTRATE INTRAVENOUS ONCE
Refills: 0 | Status: COMPLETED | OUTPATIENT
Start: 2025-05-03 | End: 2025-05-03

## 2025-05-03 RX ORDER — HYDROMORPHONE/SOD CHLOR,ISO/PF 2 MG/10 ML
1 SYRINGE (ML) INJECTION EVERY 4 HOURS
Refills: 0 | Status: DISCONTINUED | OUTPATIENT
Start: 2025-05-03 | End: 2025-05-05

## 2025-05-03 RX ADMIN — METOPROLOL SUCCINATE 25 MILLIGRAM(S): 50 TABLET, EXTENDED RELEASE ORAL at 17:19

## 2025-05-03 RX ADMIN — OXYBUTYNIN CHLORIDE 5 MILLIGRAM(S): 5 TABLET, FILM COATED, EXTENDED RELEASE ORAL at 17:20

## 2025-05-03 RX ADMIN — Medication 1 MILLIGRAM(S): at 13:07

## 2025-05-03 RX ADMIN — Medication 1 MILLIGRAM(S): at 22:20

## 2025-05-03 RX ADMIN — Medication 1 TABLET(S): at 17:19

## 2025-05-03 RX ADMIN — HEPARIN SODIUM 20 UNIT(S)/HR: 1000 INJECTION INTRAVENOUS; SUBCUTANEOUS at 00:18

## 2025-05-03 RX ADMIN — POTASSIUM PHOSPHATE, MONOBASIC POTASSIUM PHOSPHATE, DIBASIC INJECTION, 62.5 MILLIMOLE(S): 236; 224 SOLUTION, CONCENTRATE INTRAVENOUS at 13:21

## 2025-05-03 RX ADMIN — Medication 40 MILLIGRAM(S): at 04:57

## 2025-05-03 RX ADMIN — NICOTINE POLACRILEX 1 PATCH: 4 GUM, CHEWING ORAL at 13:07

## 2025-05-03 RX ADMIN — Medication 0.5 MILLIGRAM(S): at 05:37

## 2025-05-03 RX ADMIN — Medication 5 MILLIGRAM(S): at 21:15

## 2025-05-03 RX ADMIN — Medication 1 MILLIGRAM(S): at 17:20

## 2025-05-03 RX ADMIN — Medication 325 MILLIGRAM(S): at 13:06

## 2025-05-03 RX ADMIN — Medication 500 MILLIGRAM(S): at 13:06

## 2025-05-03 RX ADMIN — Medication 1 TABLET(S): at 04:56

## 2025-05-03 RX ADMIN — INSULIN LISPRO 2: 100 INJECTION, SOLUTION INTRAVENOUS; SUBCUTANEOUS at 13:21

## 2025-05-03 RX ADMIN — GABAPENTIN 600 MILLIGRAM(S): 400 CAPSULE ORAL at 13:06

## 2025-05-03 RX ADMIN — GABAPENTIN 600 MILLIGRAM(S): 400 CAPSULE ORAL at 21:14

## 2025-05-03 RX ADMIN — Medication 0.5 MILLIGRAM(S): at 03:00

## 2025-05-03 RX ADMIN — Medication 0.1 MILLIGRAM(S): at 13:06

## 2025-05-03 RX ADMIN — Medication 1 MILLIGRAM(S): at 21:27

## 2025-05-03 RX ADMIN — OXYBUTYNIN CHLORIDE 5 MILLIGRAM(S): 5 TABLET, FILM COATED, EXTENDED RELEASE ORAL at 04:56

## 2025-05-03 RX ADMIN — Medication 1 MILLIGRAM(S): at 08:56

## 2025-05-03 RX ADMIN — Medication 0.5 MILLIGRAM(S): at 06:55

## 2025-05-03 RX ADMIN — Medication 1 TABLET(S): at 13:06

## 2025-05-03 RX ADMIN — Medication 8 MILLIGRAM(S): at 21:53

## 2025-05-03 RX ADMIN — Medication 0.5 MILLIGRAM(S): at 02:28

## 2025-05-03 RX ADMIN — Medication 0.1 MILLIGRAM(S): at 21:27

## 2025-05-03 RX ADMIN — GABAPENTIN 600 MILLIGRAM(S): 400 CAPSULE ORAL at 04:57

## 2025-05-03 RX ADMIN — Medication 4 MILLIGRAM(S): at 21:14

## 2025-05-03 RX ADMIN — FINASTERIDE 5 MILLIGRAM(S): 1 TABLET, FILM COATED ORAL at 13:06

## 2025-05-03 RX ADMIN — Medication 0.5 MILLIGRAM(S): at 04:06

## 2025-05-03 RX ADMIN — Medication 0.5 MILLIGRAM(S): at 06:41

## 2025-05-03 RX ADMIN — NICOTINE POLACRILEX 1 PATCH: 4 GUM, CHEWING ORAL at 19:00

## 2025-05-03 RX ADMIN — DULOXETINE 60 MILLIGRAM(S): 20 CAPSULE, DELAYED RELEASE ORAL at 13:06

## 2025-05-03 RX ADMIN — AMLODIPINE BESYLATE 10 MILLIGRAM(S): 10 TABLET ORAL at 04:56

## 2025-05-03 RX ADMIN — METHOCARBAMOL 1500 MILLIGRAM(S): 500 TABLET, FILM COATED ORAL at 01:37

## 2025-05-03 RX ADMIN — MIRABEGRON 25 MILLIGRAM(S): 50 TABLET, FILM COATED, EXTENDED RELEASE ORAL at 13:06

## 2025-05-03 RX ADMIN — FOLIC ACID 1 MILLIGRAM(S): 1 TABLET ORAL at 13:07

## 2025-05-04 LAB
ALBUMIN SERPL ELPH-MCNC: 3.1 G/DL — LOW (ref 3.3–5)
ALP SERPL-CCNC: 84 U/L — SIGNIFICANT CHANGE UP (ref 40–120)
ALT FLD-CCNC: 18 U/L — SIGNIFICANT CHANGE UP (ref 10–45)
ANION GAP SERPL CALC-SCNC: 14 MMOL/L — SIGNIFICANT CHANGE UP (ref 5–17)
APTT BLD: 37 SEC — HIGH (ref 26.1–36.8)
AST SERPL-CCNC: 23 U/L — SIGNIFICANT CHANGE UP (ref 10–40)
BILIRUB DIRECT SERPL-MCNC: <0.1 MG/DL — SIGNIFICANT CHANGE UP (ref 0–0.3)
BILIRUB INDIRECT FLD-MCNC: >0.1 MG/DL — LOW (ref 0.2–1)
BILIRUB SERPL-MCNC: 0.2 MG/DL — SIGNIFICANT CHANGE UP (ref 0.2–1.2)
BUN SERPL-MCNC: 34 MG/DL — HIGH (ref 7–23)
CALCIUM SERPL-MCNC: 8.7 MG/DL — SIGNIFICANT CHANGE UP (ref 8.4–10.5)
CHLORIDE SERPL-SCNC: 99 MMOL/L — SIGNIFICANT CHANGE UP (ref 96–108)
CO2 SERPL-SCNC: 20 MMOL/L — LOW (ref 22–31)
CREAT SERPL-MCNC: 2.78 MG/DL — HIGH (ref 0.5–1.3)
EGFR: 27 ML/MIN/1.73M2 — LOW
EGFR: 27 ML/MIN/1.73M2 — LOW
GLUCOSE BLDC GLUCOMTR-MCNC: 125 MG/DL — HIGH (ref 70–99)
GLUCOSE BLDC GLUCOMTR-MCNC: 144 MG/DL — HIGH (ref 70–99)
GLUCOSE BLDC GLUCOMTR-MCNC: 150 MG/DL — HIGH (ref 70–99)
GLUCOSE BLDC GLUCOMTR-MCNC: 178 MG/DL — HIGH (ref 70–99)
GLUCOSE BLDC GLUCOMTR-MCNC: 189 MG/DL — HIGH (ref 70–99)
GLUCOSE SERPL-MCNC: 141 MG/DL — HIGH (ref 70–99)
HCT VFR BLD CALC: 29 % — LOW (ref 39–50)
HGB BLD-MCNC: 9 G/DL — LOW (ref 13–17)
INR BLD: 2.07 RATIO — HIGH (ref 0.85–1.16)
MAGNESIUM SERPL-MCNC: 2.1 MG/DL — SIGNIFICANT CHANGE UP (ref 1.6–2.6)
MCHC RBC-ENTMCNC: 23.3 PG — LOW (ref 27–34)
MCHC RBC-ENTMCNC: 31 G/DL — LOW (ref 32–36)
MCV RBC AUTO: 74.9 FL — LOW (ref 80–100)
NRBC BLD AUTO-RTO: 0 /100 WBCS — SIGNIFICANT CHANGE UP (ref 0–0)
PHOSPHATE SERPL-MCNC: 3.7 MG/DL — SIGNIFICANT CHANGE UP (ref 2.5–4.5)
PLATELET # BLD AUTO: 226 K/UL — SIGNIFICANT CHANGE UP (ref 150–400)
POTASSIUM SERPL-MCNC: 4.3 MMOL/L — SIGNIFICANT CHANGE UP (ref 3.5–5.3)
POTASSIUM SERPL-SCNC: 4.3 MMOL/L — SIGNIFICANT CHANGE UP (ref 3.5–5.3)
PROT SERPL-MCNC: 6.7 G/DL — SIGNIFICANT CHANGE UP (ref 6–8.3)
PROTHROM AB SERPL-ACNC: 23.4 SEC — HIGH (ref 9.9–13.4)
RBC # BLD: 3.87 M/UL — LOW (ref 4.2–5.8)
RBC # FLD: 16.6 % — HIGH (ref 10.3–14.5)
SODIUM SERPL-SCNC: 133 MMOL/L — LOW (ref 135–145)
WBC # BLD: 9.93 K/UL — SIGNIFICANT CHANGE UP (ref 3.8–10.5)
WBC # FLD AUTO: 9.93 K/UL — SIGNIFICANT CHANGE UP (ref 3.8–10.5)

## 2025-05-04 RX ORDER — ACETAMINOPHEN 500 MG/5ML
1000 LIQUID (ML) ORAL EVERY 6 HOURS
Refills: 0 | Status: DISCONTINUED | OUTPATIENT
Start: 2025-05-04 | End: 2025-05-10

## 2025-05-04 RX ORDER — INSULIN LISPRO 100 U/ML
INJECTION, SOLUTION INTRAVENOUS; SUBCUTANEOUS
Refills: 0 | Status: DISCONTINUED | OUTPATIENT
Start: 2025-05-04 | End: 2025-05-10

## 2025-05-04 RX ORDER — HYDROMORPHONE/SOD CHLOR,ISO/PF 2 MG/10 ML
0.2 SYRINGE (ML) INJECTION ONCE
Refills: 0 | Status: DISCONTINUED | OUTPATIENT
Start: 2025-05-04 | End: 2025-05-04

## 2025-05-04 RX ORDER — INSULIN LISPRO 100 U/ML
INJECTION, SOLUTION INTRAVENOUS; SUBCUTANEOUS AT BEDTIME
Refills: 0 | Status: DISCONTINUED | OUTPATIENT
Start: 2025-05-04 | End: 2025-05-10

## 2025-05-04 RX ADMIN — GABAPENTIN 600 MILLIGRAM(S): 400 CAPSULE ORAL at 13:36

## 2025-05-04 RX ADMIN — METOPROLOL SUCCINATE 25 MILLIGRAM(S): 50 TABLET, EXTENDED RELEASE ORAL at 05:32

## 2025-05-04 RX ADMIN — Medication 1 MILLIGRAM(S): at 18:33

## 2025-05-04 RX ADMIN — Medication 0.1 MILLIGRAM(S): at 23:09

## 2025-05-04 RX ADMIN — Medication 1 MILLIGRAM(S): at 06:02

## 2025-05-04 RX ADMIN — NICOTINE POLACRILEX 1 PATCH: 4 GUM, CHEWING ORAL at 07:00

## 2025-05-04 RX ADMIN — MIRABEGRON 25 MILLIGRAM(S): 50 TABLET, FILM COATED, EXTENDED RELEASE ORAL at 11:40

## 2025-05-04 RX ADMIN — DULOXETINE 60 MILLIGRAM(S): 20 CAPSULE, DELAYED RELEASE ORAL at 11:39

## 2025-05-04 RX ADMIN — Medication 1 MILLIGRAM(S): at 12:41

## 2025-05-04 RX ADMIN — Medication 1 TABLET(S): at 17:12

## 2025-05-04 RX ADMIN — Medication 4 MILLIGRAM(S): at 21:47

## 2025-05-04 RX ADMIN — Medication 1 MILLIGRAM(S): at 22:17

## 2025-05-04 RX ADMIN — Medication 1 TABLET(S): at 11:39

## 2025-05-04 RX ADMIN — Medication 0.1 MILLIGRAM(S): at 11:38

## 2025-05-04 RX ADMIN — Medication 1 MILLIGRAM(S): at 01:28

## 2025-05-04 RX ADMIN — Medication 40 MILLIGRAM(S): at 05:32

## 2025-05-04 RX ADMIN — AMLODIPINE BESYLATE 10 MILLIGRAM(S): 10 TABLET ORAL at 05:32

## 2025-05-04 RX ADMIN — Medication 1 MILLIGRAM(S): at 01:58

## 2025-05-04 RX ADMIN — Medication 1 MILLIGRAM(S): at 21:47

## 2025-05-04 RX ADMIN — Medication 1 MILLIGRAM(S): at 12:11

## 2025-05-04 RX ADMIN — Medication 500 MILLIGRAM(S): at 11:39

## 2025-05-04 RX ADMIN — OXYBUTYNIN CHLORIDE 5 MILLIGRAM(S): 5 TABLET, FILM COATED, EXTENDED RELEASE ORAL at 05:32

## 2025-05-04 RX ADMIN — METOPROLOL SUCCINATE 25 MILLIGRAM(S): 50 TABLET, EXTENDED RELEASE ORAL at 17:12

## 2025-05-04 RX ADMIN — Medication 5 MILLIGRAM(S): at 21:48

## 2025-05-04 RX ADMIN — NICOTINE POLACRILEX 1 PATCH: 4 GUM, CHEWING ORAL at 11:39

## 2025-05-04 RX ADMIN — GABAPENTIN 600 MILLIGRAM(S): 400 CAPSULE ORAL at 05:31

## 2025-05-04 RX ADMIN — INSULIN LISPRO 2: 100 INJECTION, SOLUTION INTRAVENOUS; SUBCUTANEOUS at 21:47

## 2025-05-04 RX ADMIN — Medication 1 MILLIGRAM(S): at 17:41

## 2025-05-04 RX ADMIN — Medication 1 TABLET(S): at 05:31

## 2025-05-04 RX ADMIN — Medication 0.2 MILLIGRAM(S): at 23:53

## 2025-05-04 RX ADMIN — OXYBUTYNIN CHLORIDE 5 MILLIGRAM(S): 5 TABLET, FILM COATED, EXTENDED RELEASE ORAL at 17:12

## 2025-05-04 RX ADMIN — NICOTINE POLACRILEX 1 PATCH: 4 GUM, CHEWING ORAL at 14:05

## 2025-05-04 RX ADMIN — FINASTERIDE 5 MILLIGRAM(S): 1 TABLET, FILM COATED ORAL at 11:39

## 2025-05-04 RX ADMIN — Medication 325 MILLIGRAM(S): at 11:39

## 2025-05-04 RX ADMIN — Medication 1 MILLIGRAM(S): at 05:32

## 2025-05-04 RX ADMIN — GABAPENTIN 600 MILLIGRAM(S): 400 CAPSULE ORAL at 21:47

## 2025-05-04 RX ADMIN — INSULIN LISPRO 2: 100 INJECTION, SOLUTION INTRAVENOUS; SUBCUTANEOUS at 17:12

## 2025-05-04 RX ADMIN — FOLIC ACID 1 MILLIGRAM(S): 1 TABLET ORAL at 11:39

## 2025-05-05 LAB
ALBUMIN SERPL ELPH-MCNC: 3 G/DL — LOW (ref 3.3–5)
ALP SERPL-CCNC: 78 U/L — SIGNIFICANT CHANGE UP (ref 40–120)
ALT FLD-CCNC: 29 U/L — SIGNIFICANT CHANGE UP (ref 10–45)
ANION GAP SERPL CALC-SCNC: 15 MMOL/L — SIGNIFICANT CHANGE UP (ref 5–17)
APTT BLD: 40.5 SEC — HIGH (ref 26.1–36.8)
AST SERPL-CCNC: 22 U/L — SIGNIFICANT CHANGE UP (ref 10–40)
BILIRUB DIRECT SERPL-MCNC: <0.1 MG/DL — SIGNIFICANT CHANGE UP (ref 0–0.3)
BILIRUB INDIRECT FLD-MCNC: >0.1 MG/DL — LOW (ref 0.2–1)
BILIRUB SERPL-MCNC: 0.2 MG/DL — SIGNIFICANT CHANGE UP (ref 0.2–1.2)
BUN SERPL-MCNC: 40 MG/DL — HIGH (ref 7–23)
CALCIUM SERPL-MCNC: 8.4 MG/DL — SIGNIFICANT CHANGE UP (ref 8.4–10.5)
CHLORIDE SERPL-SCNC: 99 MMOL/L — SIGNIFICANT CHANGE UP (ref 96–108)
CO2 SERPL-SCNC: 16 MMOL/L — LOW (ref 22–31)
CREAT SERPL-MCNC: 2.84 MG/DL — HIGH (ref 0.5–1.3)
EGFR: 26 ML/MIN/1.73M2 — LOW
EGFR: 26 ML/MIN/1.73M2 — LOW
GLUCOSE BLDC GLUCOMTR-MCNC: 185 MG/DL — HIGH (ref 70–99)
GLUCOSE BLDC GLUCOMTR-MCNC: 210 MG/DL — HIGH (ref 70–99)
GLUCOSE BLDC GLUCOMTR-MCNC: 211 MG/DL — HIGH (ref 70–99)
GLUCOSE BLDC GLUCOMTR-MCNC: 251 MG/DL — HIGH (ref 70–99)
GLUCOSE SERPL-MCNC: 224 MG/DL — HIGH (ref 70–99)
HCT VFR BLD CALC: 27.7 % — LOW (ref 39–50)
HGB BLD-MCNC: 8.8 G/DL — LOW (ref 13–17)
INR BLD: 2.6 RATIO — HIGH (ref 0.85–1.16)
MAGNESIUM SERPL-MCNC: 2 MG/DL — SIGNIFICANT CHANGE UP (ref 1.6–2.6)
MCHC RBC-ENTMCNC: 23.9 PG — LOW (ref 27–34)
MCHC RBC-ENTMCNC: 31.8 G/DL — LOW (ref 32–36)
MCV RBC AUTO: 75.3 FL — LOW (ref 80–100)
NRBC BLD AUTO-RTO: 0 /100 WBCS — SIGNIFICANT CHANGE UP (ref 0–0)
PHOSPHATE SERPL-MCNC: 3.6 MG/DL — SIGNIFICANT CHANGE UP (ref 2.5–4.5)
PLATELET # BLD AUTO: 224 K/UL — SIGNIFICANT CHANGE UP (ref 150–400)
POTASSIUM SERPL-MCNC: 4.4 MMOL/L — SIGNIFICANT CHANGE UP (ref 3.5–5.3)
POTASSIUM SERPL-SCNC: 4.4 MMOL/L — SIGNIFICANT CHANGE UP (ref 3.5–5.3)
PROT SERPL-MCNC: 6.4 G/DL — SIGNIFICANT CHANGE UP (ref 6–8.3)
PROTHROM AB SERPL-ACNC: 29.6 SEC — HIGH (ref 9.9–13.4)
RBC # BLD: 3.68 M/UL — LOW (ref 4.2–5.8)
RBC # FLD: 16.8 % — HIGH (ref 10.3–14.5)
SODIUM SERPL-SCNC: 130 MMOL/L — LOW (ref 135–145)
WBC # BLD: 11.63 K/UL — HIGH (ref 3.8–10.5)
WBC # FLD AUTO: 11.63 K/UL — HIGH (ref 3.8–10.5)

## 2025-05-05 PROCEDURE — 99222 1ST HOSP IP/OBS MODERATE 55: CPT

## 2025-05-05 RX ORDER — HYDROMORPHONE/SOD CHLOR,ISO/PF 2 MG/10 ML
4 SYRINGE (ML) INJECTION EVERY 4 HOURS
Refills: 0 | Status: DISCONTINUED | OUTPATIENT
Start: 2025-05-05 | End: 2025-05-10

## 2025-05-05 RX ORDER — SENNA 187 MG
2 TABLET ORAL AT BEDTIME
Refills: 0 | Status: DISCONTINUED | OUTPATIENT
Start: 2025-05-05 | End: 2025-05-10

## 2025-05-05 RX ORDER — HYDROMORPHONE/SOD CHLOR,ISO/PF 2 MG/10 ML
1 SYRINGE (ML) INJECTION EVERY 6 HOURS
Refills: 0 | Status: DISCONTINUED | OUTPATIENT
Start: 2025-05-05 | End: 2025-05-06

## 2025-05-05 RX ORDER — METHOCARBAMOL 500 MG/1
1500 TABLET, FILM COATED ORAL EVERY 8 HOURS
Refills: 0 | Status: DISCONTINUED | OUTPATIENT
Start: 2025-05-05 | End: 2025-05-10

## 2025-05-05 RX ORDER — POLYETHYLENE GLYCOL 3350 17 G/17G
17 POWDER, FOR SOLUTION ORAL DAILY
Refills: 0 | Status: DISCONTINUED | OUTPATIENT
Start: 2025-05-05 | End: 2025-05-10

## 2025-05-05 RX ORDER — HYDROMORPHONE/SOD CHLOR,ISO/PF 2 MG/10 ML
1 SYRINGE (ML) INJECTION EVERY 4 HOURS
Refills: 0 | Status: DISCONTINUED | OUTPATIENT
Start: 2025-05-05 | End: 2025-05-05

## 2025-05-05 RX ORDER — GABAPENTIN 400 MG/1
400 CAPSULE ORAL EVERY 8 HOURS
Refills: 0 | Status: DISCONTINUED | OUTPATIENT
Start: 2025-05-05 | End: 2025-05-10

## 2025-05-05 RX ORDER — NALOXONE HYDROCHLORIDE 0.4 MG/ML
0.2 INJECTION, SOLUTION INTRAMUSCULAR; INTRAVENOUS; SUBCUTANEOUS
Refills: 0 | Status: DISCONTINUED | OUTPATIENT
Start: 2025-05-05 | End: 2025-05-10

## 2025-05-05 RX ADMIN — INSULIN LISPRO 2: 100 INJECTION, SOLUTION INTRAVENOUS; SUBCUTANEOUS at 18:38

## 2025-05-05 RX ADMIN — Medication 1 MILLIGRAM(S): at 18:30

## 2025-05-05 RX ADMIN — POLYETHYLENE GLYCOL 3350 17 GRAM(S): 17 POWDER, FOR SOLUTION ORAL at 18:30

## 2025-05-05 RX ADMIN — Medication 5 MILLIGRAM(S): at 21:19

## 2025-05-05 RX ADMIN — INSULIN LISPRO 4: 100 INJECTION, SOLUTION INTRAVENOUS; SUBCUTANEOUS at 15:08

## 2025-05-05 RX ADMIN — Medication 4 MILLIGRAM(S): at 21:18

## 2025-05-05 RX ADMIN — Medication 4 MILLIGRAM(S): at 15:03

## 2025-05-05 RX ADMIN — AMLODIPINE BESYLATE 10 MILLIGRAM(S): 10 TABLET ORAL at 05:24

## 2025-05-05 RX ADMIN — GABAPENTIN 400 MILLIGRAM(S): 400 CAPSULE ORAL at 15:02

## 2025-05-05 RX ADMIN — METOPROLOL SUCCINATE 25 MILLIGRAM(S): 50 TABLET, EXTENDED RELEASE ORAL at 18:30

## 2025-05-05 RX ADMIN — NICOTINE POLACRILEX 1 PATCH: 4 GUM, CHEWING ORAL at 19:00

## 2025-05-05 RX ADMIN — Medication 500 MILLIGRAM(S): at 15:04

## 2025-05-05 RX ADMIN — Medication 1 MILLIGRAM(S): at 05:24

## 2025-05-05 RX ADMIN — NICOTINE POLACRILEX 1 PATCH: 4 GUM, CHEWING ORAL at 15:05

## 2025-05-05 RX ADMIN — Medication 1 TABLET(S): at 18:29

## 2025-05-05 RX ADMIN — Medication 1 MILLIGRAM(S): at 02:28

## 2025-05-05 RX ADMIN — GABAPENTIN 600 MILLIGRAM(S): 400 CAPSULE ORAL at 05:24

## 2025-05-05 RX ADMIN — Medication 1 MILLIGRAM(S): at 10:21

## 2025-05-05 RX ADMIN — OXYBUTYNIN CHLORIDE 5 MILLIGRAM(S): 5 TABLET, FILM COATED, EXTENDED RELEASE ORAL at 05:24

## 2025-05-05 RX ADMIN — Medication 0.2 MILLIGRAM(S): at 00:23

## 2025-05-05 RX ADMIN — Medication 1 TABLET(S): at 15:09

## 2025-05-05 RX ADMIN — Medication 4 MILLIGRAM(S): at 21:19

## 2025-05-05 RX ADMIN — INSULIN LISPRO 6: 100 INJECTION, SOLUTION INTRAVENOUS; SUBCUTANEOUS at 21:20

## 2025-05-05 RX ADMIN — INSULIN LISPRO 4: 100 INJECTION, SOLUTION INTRAVENOUS; SUBCUTANEOUS at 08:33

## 2025-05-05 RX ADMIN — Medication 40 MILLIGRAM(S): at 05:24

## 2025-05-05 RX ADMIN — DULOXETINE 60 MILLIGRAM(S): 20 CAPSULE, DELAYED RELEASE ORAL at 15:05

## 2025-05-05 RX ADMIN — Medication 4 MILLIGRAM(S): at 21:48

## 2025-05-05 RX ADMIN — FOLIC ACID 1 MILLIGRAM(S): 1 TABLET ORAL at 15:03

## 2025-05-05 RX ADMIN — OXYBUTYNIN CHLORIDE 5 MILLIGRAM(S): 5 TABLET, FILM COATED, EXTENDED RELEASE ORAL at 18:29

## 2025-05-05 RX ADMIN — FINASTERIDE 5 MILLIGRAM(S): 1 TABLET, FILM COATED ORAL at 15:04

## 2025-05-05 RX ADMIN — Medication 325 MILLIGRAM(S): at 15:05

## 2025-05-05 RX ADMIN — Medication 1 TABLET(S): at 05:24

## 2025-05-05 RX ADMIN — METHOCARBAMOL 1500 MILLIGRAM(S): 500 TABLET, FILM COATED ORAL at 15:27

## 2025-05-05 RX ADMIN — Medication 1 MILLIGRAM(S): at 05:54

## 2025-05-05 RX ADMIN — Medication 1 MILLIGRAM(S): at 01:58

## 2025-05-05 RX ADMIN — MIRABEGRON 25 MILLIGRAM(S): 50 TABLET, FILM COATED, EXTENDED RELEASE ORAL at 15:04

## 2025-05-06 LAB
ALBUMIN SERPL ELPH-MCNC: 3.3 G/DL — SIGNIFICANT CHANGE UP (ref 3.3–5)
ALP SERPL-CCNC: 84 U/L — SIGNIFICANT CHANGE UP (ref 40–120)
ALT FLD-CCNC: 34 U/L — SIGNIFICANT CHANGE UP (ref 10–45)
ANION GAP SERPL CALC-SCNC: 14 MMOL/L — SIGNIFICANT CHANGE UP (ref 5–17)
APPEARANCE UR: CLEAR — SIGNIFICANT CHANGE UP
APTT BLD: 42.2 SEC — HIGH (ref 26.1–36.8)
AST SERPL-CCNC: 20 U/L — SIGNIFICANT CHANGE UP (ref 10–40)
BACTERIA # UR AUTO: NEGATIVE /HPF — SIGNIFICANT CHANGE UP
BILIRUB DIRECT SERPL-MCNC: <0.1 MG/DL — SIGNIFICANT CHANGE UP (ref 0–0.3)
BILIRUB INDIRECT FLD-MCNC: >0.1 MG/DL — LOW (ref 0.2–1)
BILIRUB SERPL-MCNC: 0.2 MG/DL — SIGNIFICANT CHANGE UP (ref 0.2–1.2)
BILIRUB UR-MCNC: NEGATIVE — SIGNIFICANT CHANGE UP
BUN SERPL-MCNC: 39 MG/DL — HIGH (ref 7–23)
CALCIUM SERPL-MCNC: 8.9 MG/DL — SIGNIFICANT CHANGE UP (ref 8.4–10.5)
CAST: 0 /LPF — SIGNIFICANT CHANGE UP (ref 0–4)
CHLORIDE SERPL-SCNC: 99 MMOL/L — SIGNIFICANT CHANGE UP (ref 96–108)
CO2 SERPL-SCNC: 18 MMOL/L — LOW (ref 22–31)
COLOR SPEC: YELLOW — SIGNIFICANT CHANGE UP
CREAT SERPL-MCNC: 2.91 MG/DL — HIGH (ref 0.5–1.3)
DIFF PNL FLD: ABNORMAL
EGFR: 26 ML/MIN/1.73M2 — LOW
EGFR: 26 ML/MIN/1.73M2 — LOW
GLUCOSE BLDC GLUCOMTR-MCNC: 165 MG/DL — HIGH (ref 70–99)
GLUCOSE BLDC GLUCOMTR-MCNC: 191 MG/DL — HIGH (ref 70–99)
GLUCOSE BLDC GLUCOMTR-MCNC: 224 MG/DL — HIGH (ref 70–99)
GLUCOSE BLDC GLUCOMTR-MCNC: 228 MG/DL — HIGH (ref 70–99)
GLUCOSE SERPL-MCNC: 170 MG/DL — HIGH (ref 70–99)
GLUCOSE UR QL: NEGATIVE MG/DL — SIGNIFICANT CHANGE UP
HCT VFR BLD CALC: 29.3 % — LOW (ref 39–50)
HGB BLD-MCNC: 9.4 G/DL — LOW (ref 13–17)
INR BLD: 2.85 RATIO — HIGH (ref 0.85–1.16)
KETONES UR-MCNC: NEGATIVE MG/DL — SIGNIFICANT CHANGE UP
LEUKOCYTE ESTERASE UR-ACNC: ABNORMAL
MAGNESIUM SERPL-MCNC: 2 MG/DL — SIGNIFICANT CHANGE UP (ref 1.6–2.6)
MCHC RBC-ENTMCNC: 23.9 PG — LOW (ref 27–34)
MCHC RBC-ENTMCNC: 32.1 G/DL — SIGNIFICANT CHANGE UP (ref 32–36)
MCV RBC AUTO: 74.4 FL — LOW (ref 80–100)
NITRITE UR-MCNC: NEGATIVE — SIGNIFICANT CHANGE UP
NRBC BLD AUTO-RTO: 0 /100 WBCS — SIGNIFICANT CHANGE UP (ref 0–0)
PH UR: 6 — SIGNIFICANT CHANGE UP (ref 5–8)
PHOSPHATE SERPL-MCNC: 4 MG/DL — SIGNIFICANT CHANGE UP (ref 2.5–4.5)
PLATELET # BLD AUTO: 258 K/UL — SIGNIFICANT CHANGE UP (ref 150–400)
POTASSIUM SERPL-MCNC: 4.2 MMOL/L — SIGNIFICANT CHANGE UP (ref 3.5–5.3)
POTASSIUM SERPL-SCNC: 4.2 MMOL/L — SIGNIFICANT CHANGE UP (ref 3.5–5.3)
PROT SERPL-MCNC: 7.1 G/DL — SIGNIFICANT CHANGE UP (ref 6–8.3)
PROT UR-MCNC: 100 MG/DL
PROTHROM AB SERPL-ACNC: 32.1 SEC — HIGH (ref 9.9–13.4)
RBC # BLD: 3.94 M/UL — LOW (ref 4.2–5.8)
RBC # FLD: 16.8 % — HIGH (ref 10.3–14.5)
RBC CASTS # UR COMP ASSIST: 7 /HPF — HIGH (ref 0–4)
REVIEW: SIGNIFICANT CHANGE UP
SODIUM SERPL-SCNC: 131 MMOL/L — LOW (ref 135–145)
SP GR SPEC: 1.01 — SIGNIFICANT CHANGE UP (ref 1–1.03)
SQUAMOUS # UR AUTO: 0 /HPF — SIGNIFICANT CHANGE UP (ref 0–5)
UROBILINOGEN FLD QL: 1 MG/DL — SIGNIFICANT CHANGE UP (ref 0.2–1)
WBC # BLD: 10.77 K/UL — HIGH (ref 3.8–10.5)
WBC # FLD AUTO: 10.77 K/UL — HIGH (ref 3.8–10.5)
WBC UR QL: 43 /HPF — HIGH (ref 0–5)

## 2025-05-06 PROCEDURE — 99231 SBSQ HOSP IP/OBS SF/LOW 25: CPT

## 2025-05-06 RX ORDER — NALOXEGOL OXALATE 12.5 MG/1
12.5 TABLET, FILM COATED ORAL DAILY
Refills: 0 | Status: DISCONTINUED | OUTPATIENT
Start: 2025-05-06 | End: 2025-05-10

## 2025-05-06 RX ADMIN — Medication 0.1 MILLIGRAM(S): at 23:45

## 2025-05-06 RX ADMIN — Medication 4 MILLIGRAM(S): at 16:16

## 2025-05-06 RX ADMIN — Medication 1 MILLIGRAM(S): at 00:05

## 2025-05-06 RX ADMIN — Medication 1 MILLIGRAM(S): at 07:46

## 2025-05-06 RX ADMIN — INSULIN LISPRO 2: 100 INJECTION, SOLUTION INTRAVENOUS; SUBCUTANEOUS at 08:35

## 2025-05-06 RX ADMIN — GABAPENTIN 400 MILLIGRAM(S): 400 CAPSULE ORAL at 13:41

## 2025-05-06 RX ADMIN — Medication 4 MILLIGRAM(S): at 02:01

## 2025-05-06 RX ADMIN — Medication 4 MILLIGRAM(S): at 22:18

## 2025-05-06 RX ADMIN — NICOTINE POLACRILEX 1 PATCH: 4 GUM, CHEWING ORAL at 11:25

## 2025-05-06 RX ADMIN — INSULIN LISPRO 4: 100 INJECTION, SOLUTION INTRAVENOUS; SUBCUTANEOUS at 17:27

## 2025-05-06 RX ADMIN — FOLIC ACID 1 MILLIGRAM(S): 1 TABLET ORAL at 11:23

## 2025-05-06 RX ADMIN — NICOTINE POLACRILEX 1 PATCH: 4 GUM, CHEWING ORAL at 07:20

## 2025-05-06 RX ADMIN — DULOXETINE 60 MILLIGRAM(S): 20 CAPSULE, DELAYED RELEASE ORAL at 11:24

## 2025-05-06 RX ADMIN — METOPROLOL SUCCINATE 25 MILLIGRAM(S): 50 TABLET, EXTENDED RELEASE ORAL at 17:28

## 2025-05-06 RX ADMIN — Medication 4 MILLIGRAM(S): at 02:31

## 2025-05-06 RX ADMIN — Medication 1 MILLIGRAM(S): at 00:35

## 2025-05-06 RX ADMIN — Medication 325 MILLIGRAM(S): at 11:23

## 2025-05-06 RX ADMIN — Medication 500 MILLIGRAM(S): at 11:24

## 2025-05-06 RX ADMIN — INSULIN LISPRO 2: 100 INJECTION, SOLUTION INTRAVENOUS; SUBCUTANEOUS at 21:45

## 2025-05-06 RX ADMIN — METOPROLOL SUCCINATE 25 MILLIGRAM(S): 50 TABLET, EXTENDED RELEASE ORAL at 05:22

## 2025-05-06 RX ADMIN — METHOCARBAMOL 1500 MILLIGRAM(S): 500 TABLET, FILM COATED ORAL at 22:18

## 2025-05-06 RX ADMIN — FINASTERIDE 5 MILLIGRAM(S): 1 TABLET, FILM COATED ORAL at 11:23

## 2025-05-06 RX ADMIN — OXYBUTYNIN CHLORIDE 5 MILLIGRAM(S): 5 TABLET, FILM COATED, EXTENDED RELEASE ORAL at 05:22

## 2025-05-06 RX ADMIN — Medication 1 MILLIGRAM(S): at 08:30

## 2025-05-06 RX ADMIN — Medication 0.1 MILLIGRAM(S): at 00:06

## 2025-05-06 RX ADMIN — Medication 4 MILLIGRAM(S): at 11:21

## 2025-05-06 RX ADMIN — Medication 1 TABLET(S): at 05:22

## 2025-05-06 RX ADMIN — OXYBUTYNIN CHLORIDE 5 MILLIGRAM(S): 5 TABLET, FILM COATED, EXTENDED RELEASE ORAL at 17:28

## 2025-05-06 RX ADMIN — METHOCARBAMOL 1500 MILLIGRAM(S): 500 TABLET, FILM COATED ORAL at 05:22

## 2025-05-06 RX ADMIN — Medication 1 TABLET(S): at 17:28

## 2025-05-06 RX ADMIN — Medication 4 MILLIGRAM(S): at 07:14

## 2025-05-06 RX ADMIN — Medication 4 MILLIGRAM(S): at 17:17

## 2025-05-06 RX ADMIN — INSULIN LISPRO 4: 100 INJECTION, SOLUTION INTRAVENOUS; SUBCUTANEOUS at 12:38

## 2025-05-06 RX ADMIN — Medication 40 MILLIGRAM(S): at 06:35

## 2025-05-06 RX ADMIN — METHOCARBAMOL 1500 MILLIGRAM(S): 500 TABLET, FILM COATED ORAL at 13:41

## 2025-05-06 RX ADMIN — Medication 5 MILLIGRAM(S): at 21:27

## 2025-05-06 RX ADMIN — Medication 4 MILLIGRAM(S): at 10:51

## 2025-05-06 RX ADMIN — Medication 4 MILLIGRAM(S): at 06:44

## 2025-05-06 RX ADMIN — Medication 4 MILLIGRAM(S): at 22:48

## 2025-05-06 RX ADMIN — Medication 1 TABLET(S): at 11:23

## 2025-05-06 RX ADMIN — MIRABEGRON 25 MILLIGRAM(S): 50 TABLET, FILM COATED, EXTENDED RELEASE ORAL at 11:24

## 2025-05-06 RX ADMIN — GABAPENTIN 400 MILLIGRAM(S): 400 CAPSULE ORAL at 05:23

## 2025-05-06 RX ADMIN — AMLODIPINE BESYLATE 10 MILLIGRAM(S): 10 TABLET ORAL at 05:23

## 2025-05-06 RX ADMIN — Medication 2 MILLIGRAM(S): at 21:27

## 2025-05-06 RX ADMIN — NICOTINE POLACRILEX 1 PATCH: 4 GUM, CHEWING ORAL at 15:00

## 2025-05-07 LAB
ANION GAP SERPL CALC-SCNC: 15 MMOL/L — SIGNIFICANT CHANGE UP (ref 5–17)
APTT BLD: 37.6 SEC — HIGH (ref 26.1–36.8)
BUN SERPL-MCNC: 41 MG/DL — HIGH (ref 7–23)
CALCIUM SERPL-MCNC: 9 MG/DL — SIGNIFICANT CHANGE UP (ref 8.4–10.5)
CHLORIDE SERPL-SCNC: 101 MMOL/L — SIGNIFICANT CHANGE UP (ref 96–108)
CO2 SERPL-SCNC: 17 MMOL/L — LOW (ref 22–31)
CREAT SERPL-MCNC: 2.82 MG/DL — HIGH (ref 0.5–1.3)
EGFR: 27 ML/MIN/1.73M2 — LOW
EGFR: 27 ML/MIN/1.73M2 — LOW
GLUCOSE BLDC GLUCOMTR-MCNC: 150 MG/DL — HIGH (ref 70–99)
GLUCOSE BLDC GLUCOMTR-MCNC: 179 MG/DL — HIGH (ref 70–99)
GLUCOSE BLDC GLUCOMTR-MCNC: 181 MG/DL — HIGH (ref 70–99)
GLUCOSE BLDC GLUCOMTR-MCNC: 203 MG/DL — HIGH (ref 70–99)
GLUCOSE SERPL-MCNC: 169 MG/DL — HIGH (ref 70–99)
HCT VFR BLD CALC: 28.6 % — LOW (ref 39–50)
HGB BLD-MCNC: 9.2 G/DL — LOW (ref 13–17)
INR BLD: 3.34 RATIO — HIGH (ref 0.85–1.16)
MAGNESIUM SERPL-MCNC: 2 MG/DL — SIGNIFICANT CHANGE UP (ref 1.6–2.6)
MCHC RBC-ENTMCNC: 23.9 PG — LOW (ref 27–34)
MCHC RBC-ENTMCNC: 32.2 G/DL — SIGNIFICANT CHANGE UP (ref 32–36)
MCV RBC AUTO: 74.3 FL — LOW (ref 80–100)
NRBC BLD AUTO-RTO: 0 /100 WBCS — SIGNIFICANT CHANGE UP (ref 0–0)
PHOSPHATE SERPL-MCNC: 4.5 MG/DL — SIGNIFICANT CHANGE UP (ref 2.5–4.5)
PLATELET # BLD AUTO: 259 K/UL — SIGNIFICANT CHANGE UP (ref 150–400)
POTASSIUM SERPL-MCNC: 3.9 MMOL/L — SIGNIFICANT CHANGE UP (ref 3.5–5.3)
POTASSIUM SERPL-SCNC: 3.9 MMOL/L — SIGNIFICANT CHANGE UP (ref 3.5–5.3)
PROTHROM AB SERPL-ACNC: 37.9 SEC — HIGH (ref 9.9–13.4)
RBC # BLD: 3.85 M/UL — LOW (ref 4.2–5.8)
RBC # FLD: 16.9 % — HIGH (ref 10.3–14.5)
SODIUM SERPL-SCNC: 133 MMOL/L — LOW (ref 135–145)
WBC # BLD: 9.32 K/UL — SIGNIFICANT CHANGE UP (ref 3.8–10.5)
WBC # FLD AUTO: 9.32 K/UL — SIGNIFICANT CHANGE UP (ref 3.8–10.5)

## 2025-05-07 PROCEDURE — 99232 SBSQ HOSP IP/OBS MODERATE 35: CPT | Mod: 24

## 2025-05-07 RX ORDER — ACETAMINOPHEN 500 MG/5ML
975 LIQUID (ML) ORAL ONCE
Refills: 0 | Status: COMPLETED | OUTPATIENT
Start: 2025-05-07 | End: 2025-05-07

## 2025-05-07 RX ADMIN — INSULIN LISPRO 2: 100 INJECTION, SOLUTION INTRAVENOUS; SUBCUTANEOUS at 21:42

## 2025-05-07 RX ADMIN — METHOCARBAMOL 1500 MILLIGRAM(S): 500 TABLET, FILM COATED ORAL at 15:14

## 2025-05-07 RX ADMIN — METOPROLOL SUCCINATE 25 MILLIGRAM(S): 50 TABLET, EXTENDED RELEASE ORAL at 05:45

## 2025-05-07 RX ADMIN — Medication 500 MILLIGRAM(S): at 11:16

## 2025-05-07 RX ADMIN — NICOTINE POLACRILEX 1 PATCH: 4 GUM, CHEWING ORAL at 11:14

## 2025-05-07 RX ADMIN — Medication 0.1 MILLIGRAM(S): at 23:14

## 2025-05-07 RX ADMIN — METHOCARBAMOL 1500 MILLIGRAM(S): 500 TABLET, FILM COATED ORAL at 05:45

## 2025-05-07 RX ADMIN — FINASTERIDE 5 MILLIGRAM(S): 1 TABLET, FILM COATED ORAL at 11:16

## 2025-05-07 RX ADMIN — Medication 4 MILLIGRAM(S): at 02:51

## 2025-05-07 RX ADMIN — METHOCARBAMOL 1500 MILLIGRAM(S): 500 TABLET, FILM COATED ORAL at 23:13

## 2025-05-07 RX ADMIN — Medication 4 MILLIGRAM(S): at 11:59

## 2025-05-07 RX ADMIN — Medication 0.1 MILLIGRAM(S): at 11:18

## 2025-05-07 RX ADMIN — Medication 4 MILLIGRAM(S): at 17:13

## 2025-05-07 RX ADMIN — Medication 1 TABLET(S): at 05:45

## 2025-05-07 RX ADMIN — INSULIN LISPRO 4: 100 INJECTION, SOLUTION INTRAVENOUS; SUBCUTANEOUS at 12:21

## 2025-05-07 RX ADMIN — Medication 1 TABLET(S): at 11:16

## 2025-05-07 RX ADMIN — Medication 4 MILLIGRAM(S): at 17:43

## 2025-05-07 RX ADMIN — Medication 4 MILLIGRAM(S): at 22:11

## 2025-05-07 RX ADMIN — NICOTINE POLACRILEX 1 PATCH: 4 GUM, CHEWING ORAL at 07:16

## 2025-05-07 RX ADMIN — Medication 4 MILLIGRAM(S): at 03:21

## 2025-05-07 RX ADMIN — OXYBUTYNIN CHLORIDE 5 MILLIGRAM(S): 5 TABLET, FILM COATED, EXTENDED RELEASE ORAL at 05:45

## 2025-05-07 RX ADMIN — Medication 325 MILLIGRAM(S): at 11:16

## 2025-05-07 RX ADMIN — GABAPENTIN 400 MILLIGRAM(S): 400 CAPSULE ORAL at 05:45

## 2025-05-07 RX ADMIN — NICOTINE POLACRILEX 1 PATCH: 4 GUM, CHEWING ORAL at 12:13

## 2025-05-07 RX ADMIN — OXYBUTYNIN CHLORIDE 5 MILLIGRAM(S): 5 TABLET, FILM COATED, EXTENDED RELEASE ORAL at 17:14

## 2025-05-07 RX ADMIN — Medication 975 MILLIGRAM(S): at 00:37

## 2025-05-07 RX ADMIN — FOLIC ACID 1 MILLIGRAM(S): 1 TABLET ORAL at 11:15

## 2025-05-07 RX ADMIN — Medication 4 MILLIGRAM(S): at 12:45

## 2025-05-07 RX ADMIN — Medication 1 TABLET(S): at 17:14

## 2025-05-07 RX ADMIN — MIRABEGRON 25 MILLIGRAM(S): 50 TABLET, FILM COATED, EXTENDED RELEASE ORAL at 11:14

## 2025-05-07 RX ADMIN — NICOTINE POLACRILEX 1 PATCH: 4 GUM, CHEWING ORAL at 19:00

## 2025-05-07 RX ADMIN — GABAPENTIN 400 MILLIGRAM(S): 400 CAPSULE ORAL at 13:34

## 2025-05-07 RX ADMIN — INSULIN LISPRO 2: 100 INJECTION, SOLUTION INTRAVENOUS; SUBCUTANEOUS at 07:48

## 2025-05-07 RX ADMIN — POLYETHYLENE GLYCOL 3350 17 GRAM(S): 17 POWDER, FOR SOLUTION ORAL at 11:14

## 2025-05-07 RX ADMIN — DULOXETINE 60 MILLIGRAM(S): 20 CAPSULE, DELAYED RELEASE ORAL at 11:15

## 2025-05-07 RX ADMIN — Medication 4 MILLIGRAM(S): at 08:30

## 2025-05-07 RX ADMIN — AMLODIPINE BESYLATE 10 MILLIGRAM(S): 10 TABLET ORAL at 05:45

## 2025-05-07 RX ADMIN — Medication 4 MILLIGRAM(S): at 07:45

## 2025-05-07 RX ADMIN — Medication 5 MILLIGRAM(S): at 21:41

## 2025-05-07 RX ADMIN — Medication 4 MILLIGRAM(S): at 21:41

## 2025-05-07 RX ADMIN — Medication 975 MILLIGRAM(S): at 01:07

## 2025-05-08 LAB
ALBUMIN SERPL ELPH-MCNC: 3.2 G/DL — LOW (ref 3.3–5)
ALP SERPL-CCNC: 185 U/L — HIGH (ref 40–120)
ALT FLD-CCNC: 82 U/L — HIGH (ref 10–45)
ANION GAP SERPL CALC-SCNC: 13 MMOL/L — SIGNIFICANT CHANGE UP (ref 5–17)
AST SERPL-CCNC: 97 U/L — HIGH (ref 10–40)
BILIRUB SERPL-MCNC: 0.3 MG/DL — SIGNIFICANT CHANGE UP (ref 0.2–1.2)
BUN SERPL-MCNC: 39 MG/DL — HIGH (ref 7–23)
CALCIUM SERPL-MCNC: 8.8 MG/DL — SIGNIFICANT CHANGE UP (ref 8.4–10.5)
CHLORIDE SERPL-SCNC: 104 MMOL/L — SIGNIFICANT CHANGE UP (ref 96–108)
CO2 SERPL-SCNC: 16 MMOL/L — LOW (ref 22–31)
CREAT SERPL-MCNC: 2.79 MG/DL — HIGH (ref 0.5–1.3)
CULTURE RESULTS: ABNORMAL
EGFR: 27 ML/MIN/1.73M2 — LOW
EGFR: 27 ML/MIN/1.73M2 — LOW
GLUCOSE BLDC GLUCOMTR-MCNC: 169 MG/DL — HIGH (ref 70–99)
GLUCOSE BLDC GLUCOMTR-MCNC: 177 MG/DL — HIGH (ref 70–99)
GLUCOSE BLDC GLUCOMTR-MCNC: 178 MG/DL — HIGH (ref 70–99)
GLUCOSE BLDC GLUCOMTR-MCNC: 213 MG/DL — HIGH (ref 70–99)
GLUCOSE SERPL-MCNC: 181 MG/DL — HIGH (ref 70–99)
HCT VFR BLD CALC: 29 % — LOW (ref 39–50)
HGB BLD-MCNC: 9 G/DL — LOW (ref 13–17)
INR BLD: 3.26 RATIO — HIGH (ref 0.85–1.16)
MAGNESIUM SERPL-MCNC: 1.9 MG/DL — SIGNIFICANT CHANGE UP (ref 1.6–2.6)
MCHC RBC-ENTMCNC: 23.3 PG — LOW (ref 27–34)
MCHC RBC-ENTMCNC: 31 G/DL — LOW (ref 32–36)
MCV RBC AUTO: 75.1 FL — LOW (ref 80–100)
NRBC BLD AUTO-RTO: 0 /100 WBCS — SIGNIFICANT CHANGE UP (ref 0–0)
PHOSPHATE SERPL-MCNC: 4.4 MG/DL — SIGNIFICANT CHANGE UP (ref 2.5–4.5)
PLATELET # BLD AUTO: 277 K/UL — SIGNIFICANT CHANGE UP (ref 150–400)
POTASSIUM SERPL-MCNC: 4 MMOL/L — SIGNIFICANT CHANGE UP (ref 3.5–5.3)
POTASSIUM SERPL-SCNC: 4 MMOL/L — SIGNIFICANT CHANGE UP (ref 3.5–5.3)
PROT SERPL-MCNC: 7 G/DL — SIGNIFICANT CHANGE UP (ref 6–8.3)
PROTHROM AB SERPL-ACNC: 37.1 SEC — HIGH (ref 9.9–13.4)
RBC # BLD: 3.86 M/UL — LOW (ref 4.2–5.8)
RBC # FLD: 16.9 % — HIGH (ref 10.3–14.5)
SODIUM SERPL-SCNC: 133 MMOL/L — LOW (ref 135–145)
SPECIMEN SOURCE: SIGNIFICANT CHANGE UP
WBC # BLD: 7.54 K/UL — SIGNIFICANT CHANGE UP (ref 3.8–10.5)
WBC # FLD AUTO: 7.54 K/UL — SIGNIFICANT CHANGE UP (ref 3.8–10.5)

## 2025-05-08 RX ORDER — FINASTERIDE 1 MG/1
1 TABLET, FILM COATED ORAL
Qty: 0 | Refills: 0 | DISCHARGE
Start: 2025-05-08

## 2025-05-08 RX ORDER — METHOCARBAMOL 500 MG/1
2 TABLET, FILM COATED ORAL
Qty: 0 | Refills: 0 | DISCHARGE
Start: 2025-05-08

## 2025-05-08 RX ORDER — MAGNESIUM, ALUMINUM HYDROXIDE 200-200 MG
30 TABLET,CHEWABLE ORAL
Qty: 0 | Refills: 0 | DISCHARGE
Start: 2025-05-08

## 2025-05-08 RX ORDER — NALOXEGOL OXALATE 12.5 MG/1
1 TABLET, FILM COATED ORAL
Qty: 0 | Refills: 0 | DISCHARGE
Start: 2025-05-08

## 2025-05-08 RX ORDER — DULOXETINE 20 MG/1
1 CAPSULE, DELAYED RELEASE ORAL
Qty: 0 | Refills: 0 | DISCHARGE
Start: 2025-05-08

## 2025-05-08 RX ORDER — MELATONIN 5 MG
1 TABLET ORAL
Qty: 0 | Refills: 0 | DISCHARGE
Start: 2025-05-08

## 2025-05-08 RX ORDER — INSULIN GLARGINE-YFGN 100 [IU]/ML
10 INJECTION, SOLUTION SUBCUTANEOUS
Qty: 0 | Refills: 0 | DISCHARGE
Start: 2025-05-08

## 2025-05-08 RX ORDER — NICOTINE POLACRILEX 4 MG/1
1 GUM, CHEWING ORAL
Qty: 0 | Refills: 0 | DISCHARGE
Start: 2025-05-08

## 2025-05-08 RX ORDER — METOCLOPRAMIDE HCL 10 MG
5 TABLET ORAL ONCE
Refills: 0 | Status: COMPLETED | OUTPATIENT
Start: 2025-05-08 | End: 2025-05-08

## 2025-05-08 RX ORDER — METOPROLOL SUCCINATE 50 MG/1
1 TABLET, EXTENDED RELEASE ORAL
Qty: 0 | Refills: 0 | DISCHARGE
Start: 2025-05-08

## 2025-05-08 RX ADMIN — METOPROLOL SUCCINATE 25 MILLIGRAM(S): 50 TABLET, EXTENDED RELEASE ORAL at 17:14

## 2025-05-08 RX ADMIN — Medication 1 TABLET(S): at 11:12

## 2025-05-08 RX ADMIN — INSULIN LISPRO 4: 100 INJECTION, SOLUTION INTRAVENOUS; SUBCUTANEOUS at 13:21

## 2025-05-08 RX ADMIN — INSULIN LISPRO 2: 100 INJECTION, SOLUTION INTRAVENOUS; SUBCUTANEOUS at 22:34

## 2025-05-08 RX ADMIN — INSULIN LISPRO 2: 100 INJECTION, SOLUTION INTRAVENOUS; SUBCUTANEOUS at 17:14

## 2025-05-08 RX ADMIN — MIRABEGRON 25 MILLIGRAM(S): 50 TABLET, FILM COATED, EXTENDED RELEASE ORAL at 11:13

## 2025-05-08 RX ADMIN — Medication 1 TABLET(S): at 17:14

## 2025-05-08 RX ADMIN — Medication 4 MILLIGRAM(S): at 12:11

## 2025-05-08 RX ADMIN — NICOTINE POLACRILEX 1 PATCH: 4 GUM, CHEWING ORAL at 06:59

## 2025-05-08 RX ADMIN — Medication 4 MILLIGRAM(S): at 06:02

## 2025-05-08 RX ADMIN — Medication 325 MILLIGRAM(S): at 11:12

## 2025-05-08 RX ADMIN — Medication 8 MILLIGRAM(S): at 19:33

## 2025-05-08 RX ADMIN — OXYBUTYNIN CHLORIDE 5 MILLIGRAM(S): 5 TABLET, FILM COATED, EXTENDED RELEASE ORAL at 05:32

## 2025-05-08 RX ADMIN — INSULIN LISPRO 2: 100 INJECTION, SOLUTION INTRAVENOUS; SUBCUTANEOUS at 08:25

## 2025-05-08 RX ADMIN — Medication 1 TABLET(S): at 05:32

## 2025-05-08 RX ADMIN — NALOXEGOL OXALATE 12.5 MILLIGRAM(S): 12.5 TABLET, FILM COATED ORAL at 11:13

## 2025-05-08 RX ADMIN — METHOCARBAMOL 1500 MILLIGRAM(S): 500 TABLET, FILM COATED ORAL at 15:24

## 2025-05-08 RX ADMIN — DULOXETINE 60 MILLIGRAM(S): 20 CAPSULE, DELAYED RELEASE ORAL at 11:13

## 2025-05-08 RX ADMIN — Medication 4 MILLIGRAM(S): at 11:11

## 2025-05-08 RX ADMIN — Medication 5 MILLIGRAM(S): at 23:36

## 2025-05-08 RX ADMIN — Medication 40 MILLIGRAM(S): at 05:31

## 2025-05-08 RX ADMIN — POLYETHYLENE GLYCOL 3350 17 GRAM(S): 17 POWDER, FOR SOLUTION ORAL at 11:13

## 2025-05-08 RX ADMIN — NICOTINE POLACRILEX 1 PATCH: 4 GUM, CHEWING ORAL at 13:24

## 2025-05-08 RX ADMIN — Medication 4 MILLIGRAM(S): at 05:32

## 2025-05-08 RX ADMIN — GABAPENTIN 400 MILLIGRAM(S): 400 CAPSULE ORAL at 13:23

## 2025-05-08 RX ADMIN — METOPROLOL SUCCINATE 25 MILLIGRAM(S): 50 TABLET, EXTENDED RELEASE ORAL at 05:32

## 2025-05-08 RX ADMIN — METHOCARBAMOL 1500 MILLIGRAM(S): 500 TABLET, FILM COATED ORAL at 07:15

## 2025-05-08 RX ADMIN — Medication 500 MILLIGRAM(S): at 11:12

## 2025-05-08 RX ADMIN — AMLODIPINE BESYLATE 10 MILLIGRAM(S): 10 TABLET ORAL at 05:31

## 2025-05-08 RX ADMIN — FOLIC ACID 1 MILLIGRAM(S): 1 TABLET ORAL at 11:13

## 2025-05-08 RX ADMIN — NICOTINE POLACRILEX 1 PATCH: 4 GUM, CHEWING ORAL at 11:13

## 2025-05-08 RX ADMIN — FINASTERIDE 5 MILLIGRAM(S): 1 TABLET, FILM COATED ORAL at 11:12

## 2025-05-08 RX ADMIN — OXYBUTYNIN CHLORIDE 5 MILLIGRAM(S): 5 TABLET, FILM COATED, EXTENDED RELEASE ORAL at 17:15

## 2025-05-09 LAB
ALBUMIN SERPL ELPH-MCNC: 3.6 G/DL — SIGNIFICANT CHANGE UP (ref 3.3–5)
ALBUMIN SERPL ELPH-MCNC: 3.6 G/DL — SIGNIFICANT CHANGE UP (ref 3.3–5)
ALP SERPL-CCNC: 178 U/L — HIGH (ref 40–120)
ALP SERPL-CCNC: 204 U/L — HIGH (ref 40–120)
ALT FLD-CCNC: 110 U/L — HIGH (ref 10–45)
ALT FLD-CCNC: 95 U/L — HIGH (ref 10–45)
ANION GAP SERPL CALC-SCNC: 16 MMOL/L — SIGNIFICANT CHANGE UP (ref 5–17)
ANION GAP SERPL CALC-SCNC: 18 MMOL/L — HIGH (ref 5–17)
AST SERPL-CCNC: 40 U/L — SIGNIFICANT CHANGE UP (ref 10–40)
AST SERPL-CCNC: 67 U/L — HIGH (ref 10–40)
BILIRUB SERPL-MCNC: 0.3 MG/DL — SIGNIFICANT CHANGE UP (ref 0.2–1.2)
BILIRUB SERPL-MCNC: 0.3 MG/DL — SIGNIFICANT CHANGE UP (ref 0.2–1.2)
BUN SERPL-MCNC: 38 MG/DL — HIGH (ref 7–23)
BUN SERPL-MCNC: 39 MG/DL — HIGH (ref 7–23)
CALCIUM SERPL-MCNC: 9 MG/DL — SIGNIFICANT CHANGE UP (ref 8.4–10.5)
CALCIUM SERPL-MCNC: 9.3 MG/DL — SIGNIFICANT CHANGE UP (ref 8.4–10.5)
CHLORIDE SERPL-SCNC: 97 MMOL/L — SIGNIFICANT CHANGE UP (ref 96–108)
CHLORIDE SERPL-SCNC: 99 MMOL/L — SIGNIFICANT CHANGE UP (ref 96–108)
CO2 SERPL-SCNC: 19 MMOL/L — LOW (ref 22–31)
CO2 SERPL-SCNC: 21 MMOL/L — LOW (ref 22–31)
CREAT SERPL-MCNC: 2.77 MG/DL — HIGH (ref 0.5–1.3)
CREAT SERPL-MCNC: 2.82 MG/DL — HIGH (ref 0.5–1.3)
EGFR: 27 ML/MIN/1.73M2 — LOW
GLUCOSE BLDC GLUCOMTR-MCNC: 196 MG/DL — HIGH (ref 70–99)
GLUCOSE BLDC GLUCOMTR-MCNC: 278 MG/DL — HIGH (ref 70–99)
GLUCOSE SERPL-MCNC: 137 MG/DL — HIGH (ref 70–99)
GLUCOSE SERPL-MCNC: 189 MG/DL — HIGH (ref 70–99)
HCT VFR BLD CALC: 31 % — LOW (ref 39–50)
HCT VFR BLD CALC: 33.9 % — LOW (ref 39–50)
HGB BLD-MCNC: 10.8 G/DL — LOW (ref 13–17)
HGB BLD-MCNC: 9.9 G/DL — LOW (ref 13–17)
INR BLD: 2 RATIO — HIGH (ref 0.85–1.16)
MAGNESIUM SERPL-MCNC: 1.8 MG/DL — SIGNIFICANT CHANGE UP (ref 1.6–2.6)
MAGNESIUM SERPL-MCNC: 1.9 MG/DL — SIGNIFICANT CHANGE UP (ref 1.6–2.6)
MCHC RBC-ENTMCNC: 23.3 PG — LOW (ref 27–34)
MCHC RBC-ENTMCNC: 23.4 PG — LOW (ref 27–34)
MCHC RBC-ENTMCNC: 31.9 G/DL — LOW (ref 32–36)
MCHC RBC-ENTMCNC: 31.9 G/DL — LOW (ref 32–36)
MCV RBC AUTO: 72.9 FL — LOW (ref 80–100)
MCV RBC AUTO: 73.4 FL — LOW (ref 80–100)
NRBC BLD AUTO-RTO: 0 /100 WBCS — SIGNIFICANT CHANGE UP (ref 0–0)
NRBC BLD AUTO-RTO: 0 /100 WBCS — SIGNIFICANT CHANGE UP (ref 0–0)
PHOSPHATE SERPL-MCNC: 3.3 MG/DL — SIGNIFICANT CHANGE UP (ref 2.5–4.5)
PHOSPHATE SERPL-MCNC: 3.8 MG/DL — SIGNIFICANT CHANGE UP (ref 2.5–4.5)
PLATELET # BLD AUTO: 339 K/UL — SIGNIFICANT CHANGE UP (ref 150–400)
PLATELET # BLD AUTO: 341 K/UL — SIGNIFICANT CHANGE UP (ref 150–400)
POTASSIUM SERPL-MCNC: 3.5 MMOL/L — SIGNIFICANT CHANGE UP (ref 3.5–5.3)
POTASSIUM SERPL-MCNC: 4.3 MMOL/L — SIGNIFICANT CHANGE UP (ref 3.5–5.3)
POTASSIUM SERPL-SCNC: 3.5 MMOL/L — SIGNIFICANT CHANGE UP (ref 3.5–5.3)
POTASSIUM SERPL-SCNC: 4.3 MMOL/L — SIGNIFICANT CHANGE UP (ref 3.5–5.3)
PROT SERPL-MCNC: 7.4 G/DL — SIGNIFICANT CHANGE UP (ref 6–8.3)
PROT SERPL-MCNC: 8 G/DL — SIGNIFICANT CHANGE UP (ref 6–8.3)
PROTHROM AB SERPL-ACNC: 22.8 SEC — HIGH (ref 9.9–13.4)
RBC # BLD: 4.25 M/UL — SIGNIFICANT CHANGE UP (ref 4.2–5.8)
RBC # BLD: 4.62 M/UL — SIGNIFICANT CHANGE UP (ref 4.2–5.8)
RBC # FLD: 16.8 % — HIGH (ref 10.3–14.5)
RBC # FLD: 17 % — HIGH (ref 10.3–14.5)
SODIUM SERPL-SCNC: 134 MMOL/L — LOW (ref 135–145)
SODIUM SERPL-SCNC: 136 MMOL/L — SIGNIFICANT CHANGE UP (ref 135–145)
WBC # BLD: 10.86 K/UL — HIGH (ref 3.8–10.5)
WBC # BLD: 10.91 K/UL — HIGH (ref 3.8–10.5)
WBC # FLD AUTO: 10.86 K/UL — HIGH (ref 3.8–10.5)
WBC # FLD AUTO: 10.91 K/UL — HIGH (ref 3.8–10.5)

## 2025-05-09 PROCEDURE — 74018 RADEX ABDOMEN 1 VIEW: CPT | Mod: 26

## 2025-05-09 RX ADMIN — Medication 4 MILLIGRAM(S): at 22:17

## 2025-05-09 RX ADMIN — OXYBUTYNIN CHLORIDE 5 MILLIGRAM(S): 5 TABLET, FILM COATED, EXTENDED RELEASE ORAL at 16:47

## 2025-05-09 RX ADMIN — Medication 4 MILLIGRAM(S): at 17:35

## 2025-05-09 RX ADMIN — Medication 0.1 MILLIGRAM(S): at 23:00

## 2025-05-09 RX ADMIN — Medication 2 TABLET(S): at 22:17

## 2025-05-09 RX ADMIN — Medication 5 MILLIGRAM(S): at 22:17

## 2025-05-09 RX ADMIN — METHOCARBAMOL 1500 MILLIGRAM(S): 500 TABLET, FILM COATED ORAL at 16:48

## 2025-05-09 RX ADMIN — GABAPENTIN 400 MILLIGRAM(S): 400 CAPSULE ORAL at 22:17

## 2025-05-09 RX ADMIN — Medication 4 MILLIGRAM(S): at 16:47

## 2025-05-09 RX ADMIN — NICOTINE POLACRILEX 1 PATCH: 4 GUM, CHEWING ORAL at 07:00

## 2025-05-09 RX ADMIN — Medication 4 MILLIGRAM(S): at 22:47

## 2025-05-09 RX ADMIN — NICOTINE POLACRILEX 1 PATCH: 4 GUM, CHEWING ORAL at 17:11

## 2025-05-09 RX ADMIN — METOPROLOL SUCCINATE 25 MILLIGRAM(S): 50 TABLET, EXTENDED RELEASE ORAL at 16:47

## 2025-05-09 RX ADMIN — INSULIN LISPRO 2: 100 INJECTION, SOLUTION INTRAVENOUS; SUBCUTANEOUS at 22:46

## 2025-05-10 VITALS
RESPIRATION RATE: 18 BRPM | HEART RATE: 67 BPM | TEMPERATURE: 99 F | SYSTOLIC BLOOD PRESSURE: 144 MMHG | DIASTOLIC BLOOD PRESSURE: 79 MMHG | OXYGEN SATURATION: 97 %

## 2025-05-10 LAB — GLUCOSE BLDC GLUCOMTR-MCNC: 188 MG/DL — HIGH (ref 70–99)

## 2025-05-10 PROCEDURE — C9399: CPT

## 2025-05-10 PROCEDURE — 87637 SARSCOV2&INF A&B&RSV AMP PRB: CPT

## 2025-05-10 PROCEDURE — A9537: CPT

## 2025-05-10 PROCEDURE — 76705 ECHO EXAM OF ABDOMEN: CPT

## 2025-05-10 PROCEDURE — 85610 PROTHROMBIN TIME: CPT

## 2025-05-10 PROCEDURE — 83036 HEMOGLOBIN GLYCOSYLATED A1C: CPT

## 2025-05-10 PROCEDURE — 74176 CT ABD & PELVIS W/O CONTRAST: CPT | Mod: MC

## 2025-05-10 PROCEDURE — 80053 COMPREHEN METABOLIC PANEL: CPT

## 2025-05-10 PROCEDURE — 97161 PT EVAL LOW COMPLEX 20 MIN: CPT

## 2025-05-10 PROCEDURE — 96361 HYDRATE IV INFUSION ADD-ON: CPT

## 2025-05-10 PROCEDURE — 86850 RBC ANTIBODY SCREEN: CPT

## 2025-05-10 PROCEDURE — 93005 ELECTROCARDIOGRAM TRACING: CPT

## 2025-05-10 PROCEDURE — 80076 HEPATIC FUNCTION PANEL: CPT

## 2025-05-10 PROCEDURE — 84484 ASSAY OF TROPONIN QUANT: CPT

## 2025-05-10 PROCEDURE — 99285 EMERGENCY DEPT VISIT HI MDM: CPT | Mod: 25

## 2025-05-10 PROCEDURE — 82962 GLUCOSE BLOOD TEST: CPT

## 2025-05-10 PROCEDURE — 87186 SC STD MICRODIL/AGAR DIL: CPT

## 2025-05-10 PROCEDURE — 85730 THROMBOPLASTIN TIME PARTIAL: CPT

## 2025-05-10 PROCEDURE — 87086 URINE CULTURE/COLONY COUNT: CPT

## 2025-05-10 PROCEDURE — 86900 BLOOD TYPING SEROLOGIC ABO: CPT

## 2025-05-10 PROCEDURE — 87077 CULTURE AEROBIC IDENTIFY: CPT

## 2025-05-10 PROCEDURE — A9585: CPT

## 2025-05-10 PROCEDURE — 96365 THER/PROPH/DIAG IV INF INIT: CPT

## 2025-05-10 PROCEDURE — 85025 COMPLETE CBC W/AUTO DIFF WBC: CPT

## 2025-05-10 PROCEDURE — 81001 URINALYSIS AUTO W/SCOPE: CPT

## 2025-05-10 PROCEDURE — 85027 COMPLETE CBC AUTOMATED: CPT

## 2025-05-10 PROCEDURE — 86901 BLOOD TYPING SEROLOGIC RH(D): CPT

## 2025-05-10 PROCEDURE — 83605 ASSAY OF LACTIC ACID: CPT

## 2025-05-10 PROCEDURE — 83735 ASSAY OF MAGNESIUM: CPT

## 2025-05-10 PROCEDURE — 83690 ASSAY OF LIPASE: CPT

## 2025-05-10 PROCEDURE — 88304 TISSUE EXAM BY PATHOLOGIST: CPT

## 2025-05-10 PROCEDURE — 87040 BLOOD CULTURE FOR BACTERIA: CPT

## 2025-05-10 PROCEDURE — C1889: CPT

## 2025-05-10 PROCEDURE — 78227 HEPATOBIL SYST IMAGE W/DRUG: CPT | Mod: MC

## 2025-05-10 PROCEDURE — 80048 BASIC METABOLIC PNL TOTAL CA: CPT

## 2025-05-10 PROCEDURE — 36415 COLL VENOUS BLD VENIPUNCTURE: CPT

## 2025-05-10 PROCEDURE — 84100 ASSAY OF PHOSPHORUS: CPT

## 2025-05-10 PROCEDURE — 74183 MRI ABD W/O CNTR FLWD CNTR: CPT | Mod: MC

## 2025-05-10 PROCEDURE — 74018 RADEX ABDOMEN 1 VIEW: CPT

## 2025-05-10 PROCEDURE — 82272 OCCULT BLD FECES 1-3 TESTS: CPT

## 2025-05-10 PROCEDURE — 51702 INSERT TEMP BLADDER CATH: CPT

## 2025-05-10 PROCEDURE — 96375 TX/PRO/DX INJ NEW DRUG ADDON: CPT

## 2025-05-10 RX ADMIN — METHOCARBAMOL 1500 MILLIGRAM(S): 500 TABLET, FILM COATED ORAL at 00:20

## 2025-05-10 RX ADMIN — Medication 40 MILLIGRAM(S): at 05:09

## 2025-05-10 RX ADMIN — Medication 4 MILLIGRAM(S): at 03:11

## 2025-05-10 RX ADMIN — GABAPENTIN 400 MILLIGRAM(S): 400 CAPSULE ORAL at 05:08

## 2025-05-10 RX ADMIN — METHOCARBAMOL 1500 MILLIGRAM(S): 500 TABLET, FILM COATED ORAL at 08:43

## 2025-05-10 RX ADMIN — Medication 4 MILLIGRAM(S): at 03:41

## 2025-05-10 RX ADMIN — INSULIN LISPRO 2: 100 INJECTION, SOLUTION INTRAVENOUS; SUBCUTANEOUS at 08:42

## 2025-05-10 RX ADMIN — AMLODIPINE BESYLATE 10 MILLIGRAM(S): 10 TABLET ORAL at 05:08

## 2025-05-10 RX ADMIN — Medication 1 TABLET(S): at 05:07

## 2025-05-10 RX ADMIN — METOPROLOL SUCCINATE 25 MILLIGRAM(S): 50 TABLET, EXTENDED RELEASE ORAL at 05:08

## 2025-05-10 RX ADMIN — OXYBUTYNIN CHLORIDE 5 MILLIGRAM(S): 5 TABLET, FILM COATED, EXTENDED RELEASE ORAL at 05:08

## 2025-05-25 ENCOUNTER — INPATIENT (INPATIENT)
Facility: HOSPITAL | Age: 50
LOS: 4 days | Discharge: NURSING, REHAB OR LONG-TERM CARE | DRG: 690 | End: 2025-05-30
Attending: INTERNAL MEDICINE | Admitting: INTERNAL MEDICINE
Payer: MEDICAID

## 2025-05-25 VITALS
HEART RATE: 80 BPM | RESPIRATION RATE: 14 BRPM | WEIGHT: 261.91 LBS | HEIGHT: 72 IN | OXYGEN SATURATION: 96 % | SYSTOLIC BLOOD PRESSURE: 150 MMHG | TEMPERATURE: 98 F | DIASTOLIC BLOOD PRESSURE: 80 MMHG

## 2025-05-25 DIAGNOSIS — Z93.59 OTHER CYSTOSTOMY STATUS: Chronic | ICD-10-CM

## 2025-05-25 DIAGNOSIS — Z95.828 PRESENCE OF OTHER VASCULAR IMPLANTS AND GRAFTS: Chronic | ICD-10-CM

## 2025-05-25 DIAGNOSIS — N17.9 ACUTE KIDNEY FAILURE, UNSPECIFIED: ICD-10-CM

## 2025-05-25 DIAGNOSIS — Z98.890 OTHER SPECIFIED POSTPROCEDURAL STATES: Chronic | ICD-10-CM

## 2025-05-25 DIAGNOSIS — N39.0 URINARY TRACT INFECTION, SITE NOT SPECIFIED: ICD-10-CM

## 2025-05-25 LAB
ALBUMIN SERPL ELPH-MCNC: 2.9 G/DL — LOW (ref 3.3–5)
ALP SERPL-CCNC: 108 U/L — SIGNIFICANT CHANGE UP (ref 30–120)
ALT FLD-CCNC: 27 U/L — SIGNIFICANT CHANGE UP (ref 10–60)
ANION GAP SERPL CALC-SCNC: 12 MMOL/L — SIGNIFICANT CHANGE UP (ref 5–17)
APPEARANCE UR: ABNORMAL
AST SERPL-CCNC: 31 U/L — SIGNIFICANT CHANGE UP (ref 10–40)
BASOPHILS # BLD AUTO: 0.05 K/UL — SIGNIFICANT CHANGE UP (ref 0–0.2)
BASOPHILS NFR BLD AUTO: 0.5 % — SIGNIFICANT CHANGE UP (ref 0–2)
BILIRUB SERPL-MCNC: 0.5 MG/DL — SIGNIFICANT CHANGE UP (ref 0.2–1.2)
BILIRUB UR-MCNC: NEGATIVE — SIGNIFICANT CHANGE UP
BUN SERPL-MCNC: 34 MG/DL — HIGH (ref 7–23)
CALCIUM SERPL-MCNC: 8.7 MG/DL — SIGNIFICANT CHANGE UP (ref 8.4–10.5)
CHLORIDE SERPL-SCNC: 101 MMOL/L — SIGNIFICANT CHANGE UP (ref 96–108)
CO2 SERPL-SCNC: 22 MMOL/L — SIGNIFICANT CHANGE UP (ref 22–31)
COLOR SPEC: YELLOW — SIGNIFICANT CHANGE UP
CREAT SERPL-MCNC: 3.28 MG/DL — HIGH (ref 0.5–1.3)
DIFF PNL FLD: ABNORMAL
EGFR: 22 ML/MIN/1.73M2 — LOW
EGFR: 22 ML/MIN/1.73M2 — LOW
EOSINOPHIL # BLD AUTO: 0.21 K/UL — SIGNIFICANT CHANGE UP (ref 0–0.5)
EOSINOPHIL NFR BLD AUTO: 2 % — SIGNIFICANT CHANGE UP (ref 0–6)
GLUCOSE BLDC GLUCOMTR-MCNC: 218 MG/DL — HIGH (ref 70–99)
GLUCOSE SERPL-MCNC: 235 MG/DL — HIGH (ref 70–99)
GLUCOSE UR QL: 100 MG/DL
HCT VFR BLD CALC: 31 % — LOW (ref 39–50)
HGB BLD-MCNC: 9.8 G/DL — LOW (ref 13–17)
IMM GRANULOCYTES NFR BLD AUTO: 0.5 % — SIGNIFICANT CHANGE UP (ref 0–0.9)
KETONES UR QL: NEGATIVE MG/DL — SIGNIFICANT CHANGE UP
LACTATE SERPL-SCNC: 1.8 MMOL/L — SIGNIFICANT CHANGE UP (ref 0.7–2)
LEUKOCYTE ESTERASE UR-ACNC: ABNORMAL
LYMPHOCYTES # BLD AUTO: 1.68 K/UL — SIGNIFICANT CHANGE UP (ref 1–3.3)
LYMPHOCYTES # BLD AUTO: 15.9 % — SIGNIFICANT CHANGE UP (ref 13–44)
MCHC RBC-ENTMCNC: 23.8 PG — LOW (ref 27–34)
MCHC RBC-ENTMCNC: 31.6 G/DL — LOW (ref 32–36)
MCV RBC AUTO: 75.2 FL — LOW (ref 80–100)
MONOCYTES # BLD AUTO: 0.53 K/UL — SIGNIFICANT CHANGE UP (ref 0–0.9)
MONOCYTES NFR BLD AUTO: 5 % — SIGNIFICANT CHANGE UP (ref 2–14)
NEUTROPHILS # BLD AUTO: 8.05 K/UL — HIGH (ref 1.8–7.4)
NEUTROPHILS NFR BLD AUTO: 76.1 % — SIGNIFICANT CHANGE UP (ref 43–77)
NITRITE UR-MCNC: NEGATIVE — SIGNIFICANT CHANGE UP
NRBC BLD AUTO-RTO: 0 /100 WBCS — SIGNIFICANT CHANGE UP (ref 0–0)
PH UR: 6 — SIGNIFICANT CHANGE UP (ref 5–8)
PLATELET # BLD AUTO: 152 K/UL — SIGNIFICANT CHANGE UP (ref 150–400)
POTASSIUM SERPL-MCNC: 4.5 MMOL/L — SIGNIFICANT CHANGE UP (ref 3.5–5.3)
POTASSIUM SERPL-SCNC: 4.5 MMOL/L — SIGNIFICANT CHANGE UP (ref 3.5–5.3)
PROT SERPL-MCNC: 7.6 G/DL — SIGNIFICANT CHANGE UP (ref 6–8.3)
PROT UR-MCNC: 300 MG/DL
RBC # BLD: 4.12 M/UL — LOW (ref 4.2–5.8)
RBC # FLD: 18 % — HIGH (ref 10.3–14.5)
SODIUM SERPL-SCNC: 135 MMOL/L — SIGNIFICANT CHANGE UP (ref 135–145)
SP GR SPEC: 1.01 — SIGNIFICANT CHANGE UP (ref 1–1.03)
UROBILINOGEN FLD QL: 0.2 MG/DL — SIGNIFICANT CHANGE UP (ref 0.2–1)
WBC # BLD: 10.57 K/UL — HIGH (ref 3.8–10.5)
WBC # FLD AUTO: 10.57 K/UL — HIGH (ref 3.8–10.5)

## 2025-05-25 PROCEDURE — 93010 ELECTROCARDIOGRAM REPORT: CPT

## 2025-05-25 PROCEDURE — 99285 EMERGENCY DEPT VISIT HI MDM: CPT | Mod: 25

## 2025-05-25 RX ORDER — INSULIN LISPRO 100 U/ML
INJECTION, SOLUTION INTRAVENOUS; SUBCUTANEOUS
Refills: 0 | Status: DISCONTINUED | OUTPATIENT
Start: 2025-05-25 | End: 2025-05-30

## 2025-05-25 RX ORDER — INSULIN LISPRO 100 U/ML
INJECTION, SOLUTION INTRAVENOUS; SUBCUTANEOUS AT BEDTIME
Refills: 0 | Status: DISCONTINUED | OUTPATIENT
Start: 2025-05-25 | End: 2025-05-30

## 2025-05-25 RX ORDER — HYDROMORPHONE/SOD CHLOR,ISO/PF 2 MG/10 ML
0.5 SYRINGE (ML) INJECTION ONCE
Refills: 0 | Status: DISCONTINUED | OUTPATIENT
Start: 2025-05-25 | End: 2025-05-25

## 2025-05-25 RX ORDER — DULOXETINE 20 MG/1
60 CAPSULE, DELAYED RELEASE ORAL DAILY
Refills: 0 | Status: DISCONTINUED | OUTPATIENT
Start: 2025-05-25 | End: 2025-05-30

## 2025-05-25 RX ORDER — SODIUM CHLORIDE 9 G/1000ML
1000 INJECTION, SOLUTION INTRAVENOUS
Refills: 0 | Status: DISCONTINUED | OUTPATIENT
Start: 2025-05-25 | End: 2025-05-30

## 2025-05-25 RX ORDER — METHOCARBAMOL 500 MG/1
1500 TABLET, FILM COATED ORAL EVERY 8 HOURS
Refills: 0 | Status: DISCONTINUED | OUTPATIENT
Start: 2025-05-25 | End: 2025-05-30

## 2025-05-25 RX ORDER — INSULIN LISPRO 100 U/ML
3 INJECTION, SOLUTION INTRAVENOUS; SUBCUTANEOUS
Refills: 0 | Status: DISCONTINUED | OUTPATIENT
Start: 2025-05-25 | End: 2025-05-30

## 2025-05-25 RX ORDER — DEXTROSE 50 % IN WATER 50 %
25 SYRINGE (ML) INTRAVENOUS ONCE
Refills: 0 | Status: DISCONTINUED | OUTPATIENT
Start: 2025-05-25 | End: 2025-05-30

## 2025-05-25 RX ORDER — ERTAPENEM SODIUM 1 G/1
500 INJECTION, POWDER, LYOPHILIZED, FOR SOLUTION INTRAMUSCULAR; INTRAVENOUS ONCE
Refills: 0 | Status: COMPLETED | OUTPATIENT
Start: 2025-05-25 | End: 2025-05-25

## 2025-05-25 RX ORDER — HYDROMORPHONE/SOD CHLOR,ISO/PF 2 MG/10 ML
4 SYRINGE (ML) INJECTION EVERY 6 HOURS
Refills: 0 | Status: DISCONTINUED | OUTPATIENT
Start: 2025-05-25 | End: 2025-05-27

## 2025-05-25 RX ORDER — OXYBUTYNIN CHLORIDE 5 MG/1
10 TABLET, FILM COATED, EXTENDED RELEASE ORAL
Refills: 0 | Status: DISCONTINUED | OUTPATIENT
Start: 2025-05-25 | End: 2025-05-30

## 2025-05-25 RX ORDER — SENNA 187 MG
2 TABLET ORAL AT BEDTIME
Refills: 0 | Status: DISCONTINUED | OUTPATIENT
Start: 2025-05-25 | End: 2025-05-30

## 2025-05-25 RX ORDER — NICOTINE POLACRILEX 4 MG/1
1 GUM, CHEWING ORAL DAILY
Refills: 0 | Status: DISCONTINUED | OUTPATIENT
Start: 2025-05-25 | End: 2025-05-30

## 2025-05-25 RX ORDER — GLUCAGON 3 MG/1
1 POWDER NASAL ONCE
Refills: 0 | Status: DISCONTINUED | OUTPATIENT
Start: 2025-05-25 | End: 2025-05-30

## 2025-05-25 RX ORDER — AMLODIPINE BESYLATE 10 MG/1
10 TABLET ORAL DAILY
Refills: 0 | Status: DISCONTINUED | OUTPATIENT
Start: 2025-05-25 | End: 2025-05-30

## 2025-05-25 RX ORDER — INSULIN GLARGINE-YFGN 100 [IU]/ML
10 INJECTION, SOLUTION SUBCUTANEOUS AT BEDTIME
Refills: 0 | Status: DISCONTINUED | OUTPATIENT
Start: 2025-05-25 | End: 2025-05-30

## 2025-05-25 RX ORDER — HYDROMORPHONE/SOD CHLOR,ISO/PF 2 MG/10 ML
0.5 SYRINGE (ML) INJECTION EVERY 6 HOURS
Refills: 0 | Status: DISCONTINUED | OUTPATIENT
Start: 2025-05-25 | End: 2025-05-29

## 2025-05-25 RX ORDER — FINASTERIDE 1 MG/1
5 TABLET, FILM COATED ORAL DAILY
Refills: 0 | Status: DISCONTINUED | OUTPATIENT
Start: 2025-05-25 | End: 2025-05-30

## 2025-05-25 RX ORDER — DEXTROSE 50 % IN WATER 50 %
15 SYRINGE (ML) INTRAVENOUS ONCE
Refills: 0 | Status: DISCONTINUED | OUTPATIENT
Start: 2025-05-25 | End: 2025-05-30

## 2025-05-25 RX ORDER — METOPROLOL SUCCINATE 50 MG/1
25 TABLET, EXTENDED RELEASE ORAL
Refills: 0 | Status: DISCONTINUED | OUTPATIENT
Start: 2025-05-25 | End: 2025-05-30

## 2025-05-25 RX ORDER — ERTAPENEM SODIUM 1 G/1
500 INJECTION, POWDER, LYOPHILIZED, FOR SOLUTION INTRAMUSCULAR; INTRAVENOUS EVERY 24 HOURS
Refills: 0 | Status: COMPLETED | OUTPATIENT
Start: 2025-05-26 | End: 2025-05-28

## 2025-05-25 RX ORDER — MELATONIN 5 MG
3 TABLET ORAL AT BEDTIME
Refills: 0 | Status: DISCONTINUED | OUTPATIENT
Start: 2025-05-25 | End: 2025-05-30

## 2025-05-25 RX ORDER — FOLIC ACID 1 MG/1
1 TABLET ORAL DAILY
Refills: 0 | Status: DISCONTINUED | OUTPATIENT
Start: 2025-05-25 | End: 2025-05-30

## 2025-05-25 RX ORDER — DEXTROSE 50 % IN WATER 50 %
12.5 SYRINGE (ML) INTRAVENOUS ONCE
Refills: 0 | Status: DISCONTINUED | OUTPATIENT
Start: 2025-05-25 | End: 2025-05-30

## 2025-05-25 RX ORDER — ACETAMINOPHEN 500 MG/5ML
650 LIQUID (ML) ORAL EVERY 6 HOURS
Refills: 0 | Status: DISCONTINUED | OUTPATIENT
Start: 2025-05-25 | End: 2025-05-30

## 2025-05-25 RX ORDER — HYDROMORPHONE/SOD CHLOR,ISO/PF 2 MG/10 ML
1 SYRINGE (ML) INJECTION ONCE
Refills: 0 | Status: DISCONTINUED | OUTPATIENT
Start: 2025-05-25 | End: 2025-05-25

## 2025-05-25 RX ORDER — NALOXEGOL OXALATE 12.5 MG/1
12.5 TABLET, FILM COATED ORAL DAILY
Refills: 0 | Status: DISCONTINUED | OUTPATIENT
Start: 2025-05-25 | End: 2025-05-30

## 2025-05-25 RX ADMIN — OXYBUTYNIN CHLORIDE 10 MILLIGRAM(S): 5 TABLET, FILM COATED, EXTENDED RELEASE ORAL at 21:46

## 2025-05-25 RX ADMIN — Medication 0.5 MILLIGRAM(S): at 23:06

## 2025-05-25 RX ADMIN — Medication 1 MILLIGRAM(S): at 17:46

## 2025-05-25 RX ADMIN — Medication 0.5 MILLIGRAM(S): at 23:25

## 2025-05-25 RX ADMIN — Medication 1 MILLIGRAM(S): at 18:16

## 2025-05-25 RX ADMIN — Medication 0.5 MILLIGRAM(S): at 21:36

## 2025-05-25 RX ADMIN — Medication 0.5 MILLIGRAM(S): at 22:00

## 2025-05-25 RX ADMIN — METOPROLOL SUCCINATE 25 MILLIGRAM(S): 50 TABLET, EXTENDED RELEASE ORAL at 21:46

## 2025-05-25 RX ADMIN — ERTAPENEM SODIUM 100 MILLIGRAM(S): 1 INJECTION, POWDER, LYOPHILIZED, FOR SOLUTION INTRAMUSCULAR; INTRAVENOUS at 19:29

## 2025-05-25 RX ADMIN — INSULIN GLARGINE-YFGN 10 UNIT(S): 100 INJECTION, SOLUTION SUBCUTANEOUS at 22:27

## 2025-05-25 RX ADMIN — Medication 1000 MILLILITER(S): at 17:46

## 2025-05-25 RX ADMIN — Medication 0.1 MILLIGRAM(S): at 21:32

## 2025-05-25 RX ADMIN — METHOCARBAMOL 1500 MILLIGRAM(S): 500 TABLET, FILM COATED ORAL at 21:32

## 2025-05-25 RX ADMIN — Medication 3 MILLIGRAM(S): at 22:31

## 2025-05-25 NOTE — CONSULT NOTE ADULT - SUBJECTIVE AND OBJECTIVE BOX
HPI:  51YO M long term resident of [facility] PMH hypertension CKD GBS DVT CVA antiphospholipid antibody syndrome on warfarin diabetes hyperlipidemia neurogenic bladder with suprapubic catheter chronic bladder spasms recent admission for Acute cally transferred to [facility]  S/P lap cally 4/28 now presents with c/o cloudy urine in his suprapubic catheter, feeling some generalized fatigue and malaise over the past 1 week.  Patient states this is consistent with his usual UTI. NO fever chills n/v/d. In ED afebrile. WBC 10K ANTONIO On CKD UA wih minimal pyuria. SPC changed in ED.     Infectious Disease consult was called to evaluate pt and for antibiotic management.    for acute cholecystitis,  Past Medical & Surgical Hx:  PAST MEDICAL & SURGICAL HISTORY:  BPH (benign prostatic hyperplasia)  HTN (hypertension)  Type 2 diabetes mellitus  Peripheral neuropathy  History of Guillain-Cullen syndrome  History of CVA in adulthood  DDD (degenerative disc disease), lumbar  DVT, lower extremity  Renal stones  H/O Guillain-Cullen syndrome  History of neurogenic bowel  DM2 (diabetes mellitus, type 2)  HTN (hypertension)  BPH without obstruction/lower urinary tract symptoms  Degenerative arthritis  DVT of lower limb, acute  CVA (cerebrovascular accident)  CVD (cerebrovascular disease)  Muscle weakness  Iron deficiency anemia  History of muscle spasm  Pain, unspecified  Vitamin deficiency  Obesity  GERD (gastroesophageal reflux disease)  H/O constipation  H/O insomnia  Essential (primary) hypertension  Acute embolism and thrombosis of deep vein of lower extremity  BPH (benign prostatic hyperplasia)  Diabetes mellitus due to underlying condition with diabetic neuropathy  Major depressive disorder, single episode  S/P IVC filter  H/O lithotripsy  Chronic suprapubic catheter        Social History-  EtOH: denies  Tobacco: denies   Drug Use: denies      FAMILY HISTORY:  FH: heart disease    FH: HTN (hypertension)    Family history of sinus tachycardia (Father)    FH: HTN (hypertension) (Mother)        Allergies  sulfa drugs (Rash)    Intolerances  Pipercillin Sodium-Tazobactam Sodium (Pruritus)      Home Medications:  alfuzosin 10 mg oral tablet, extended release: 1 tab(s) orally once a day (25 May 2025 20:05)  aluminum hydroxide-magnesium hydroxide 200 mg-200 mg/5 mL oral suspension: 30 milliliter(s) orally every 4 hours As needed Dyspepsia (25 May 2025 20:05)  amLODIPine 10 mg oral tablet: 1 tab(s) orally once a day (25 May 2025 20:05)  ascorbic acid 500 mg oral tablet: 1 tab(s) orally once a day (25 May 2025 20:05)  Basaglar KwikPen 100 units/mL subcutaneous solution: 10 unit(s) subcutaneous once a day (at bedtime) (25 May 2025 20:05)  cloNIDine 0.1 mg oral tablet: 1 tab(s) orally 2 times a day (25 Apr 2025 23:24)  DULoxetine 60 mg oral delayed release capsule: 1 cap(s) orally once a day (25 Apr 2025 23:24)  DULoxetine 60 mg oral delayed release capsule: 1 cap(s) orally once a day (08 May 2025 10:37)  ferrous sulfate 325 mg (65 mg elemental iron) oral tablet: 1 tab(s) orally once a day (25 Apr 2025 23:24)  finasteride 5 mg oral tablet: 1 tab(s) orally once a day (08 May 2025 10:37)  finasteride 5 mg oral tablet: 1 tab(s) orally once a day (25 Apr 2025 23:24)  folic acid: 1 milligram(s) once a day (25 Apr 2025 23:24)  gabapentin 600 mg oral tablet: 1 tab(s) orally every 8 hours (25 Apr 2025 23:24)  hydrALAZINE 25 mg oral tablet: 1 tab(s) orally every 8 hours as needed for BP &gt;160 (25 Apr 2025 23:56)  HYDROmorphone 4 mg oral tablet: 1 tab(s) orally every 6 hours PRN (25 Apr 2025 23:24)  Insulin Lispro KwikPen 100 units/mL injectable solution: 3 unit(s) subcutaneous 3 times a day (before meals) and  additionally  sliding scale (25 Apr 2025 23:24)  lactobacillus acidophilus oral tablet: 1 tab(s) orally 2 times a day (25 Apr 2025 23:24)  melatonin 5 mg oral tablet: 1 tab(s) orally once a day (at bedtime) (25 Apr 2025 23:24)  melatonin 5 mg oral tablet: 1 tab(s) orally once a day (at bedtime) (08 May 2025 10:37)  methocarbamol 750 mg oral tablet: 2 tab(s) orally every 8 hours (08 May 2025 10:37)  metoprolol tartrate 25 mg oral tablet: 1 tab(s) orally 2 times a day (08 May 2025 10:37)  metoprolol tartrate 25 mg oral tablet: 1 tab(s) orally 2 times a day (25 Apr 2025 23:24)  Multiple Vitamins oral tablet: 1 tab(s) orally once a day (25 Apr 2025 23:24)  Myrbetriq 25 mg oral tablet, extended release: 1 tab(s) orally once a day (25 Apr 2025 23:24)  naloxegol 12.5 mg oral tablet: 1 tab(s) orally once a day (08 May 2025 10:37)  nicotine 14 mg/24 hr transdermal film, extended release: 1 film(s) transdermal once as needed for  agitation (08 May 2025 10:37)  oxyBUTYnin 5 mg oral tablet: 2 tab(s) orally 2 times a day (25 Apr 2025 23:24)  pantoprazole 40 mg oral delayed release tablet: 1 tab(s) orally once a day (before a meal) (25 Apr 2025 23:24)  Senna 8.6 mg oral tablet: 2 tab(s) orally once a day (25 Apr 2025 23:24)  Tylenol 325 mg oral tablet: 2 tab(s) orally every 4 hours as needed for  mild pain (25 Apr 2025 23:24)  warfarin 4 mg oral tablet: 1 tab(s) orally once a day (25 Apr 2025 23:56)      Current Inpatient Medications :    ANTIBIOTICS:   ertapenem  IVPB 500 milliGRAM(s) IV Intermittent every 24 hours      OTHER RELEVANT MEDICATIONS :  acetaminophen     Tablet .. 650 milliGRAM(s) Oral every 6 hours PRN  amLODIPine   Tablet 10 milliGRAM(s) Oral daily  ascorbic acid 500 milliGRAM(s) Oral daily  cloNIDine 0.1 milliGRAM(s) Oral two times a day  dextrose 5%. 1000 milliLiter(s) IV Continuous <Continuous>  dextrose 5%. 1000 milliLiter(s) IV Continuous <Continuous>  dextrose 50% Injectable 25 Gram(s) IV Push once  dextrose 50% Injectable 12.5 Gram(s) IV Push once  dextrose 50% Injectable 25 Gram(s) IV Push once  dextrose Oral Gel 15 Gram(s) Oral once PRN  DULoxetine 60 milliGRAM(s) Oral daily  finasteride 5 milliGRAM(s) Oral daily  folic acid 1 milliGRAM(s) Oral daily  glucagon  Injectable 1 milliGRAM(s) IntraMuscular once  HYDROmorphone   Tablet 4 milliGRAM(s) Oral every 6 hours PRN  HYDROmorphone  Injectable 0.5 milliGRAM(s) IV Push every 6 hours PRN  insulin glargine Injectable (LANTUS) 10 Unit(s) SubCutaneous at bedtime  insulin lispro (ADMELOG) corrective regimen sliding scale   SubCutaneous three times a day before meals  insulin lispro (ADMELOG) corrective regimen sliding scale   SubCutaneous at bedtime  insulin lispro Injectable (ADMELOG) 3 Unit(s) SubCutaneous before breakfast  insulin lispro Injectable (ADMELOG) 3 Unit(s) SubCutaneous before lunch  insulin lispro Injectable (ADMELOG) 3 Unit(s) SubCutaneous before dinner  melatonin 3 milliGRAM(s) Oral at bedtime  methocarbamol 1500 milliGRAM(s) Oral every 8 hours  metoprolol tartrate 25 milliGRAM(s) Oral two times a day  naloxegol 12.5 milliGRAM(s) Oral daily  oxybutynin 10 milliGRAM(s) Oral two times a day  pantoprazole    Tablet 40 milliGRAM(s) Oral before breakfast  senna 2 Tablet(s) Oral at bedtime      ROS:  CONSTITUTIONAL:  Negative fever or chills  EYES:  Negative  blurry vision or double vision  CARDIOVASCULAR:  Negative for chest pain or palpitations  RESPIRATORY:  Negative for cough, wheezing, or SOB   GASTROINTESTINAL:  Negative for nausea, vomiting, diarrhea, constipation, or abdominal pain  GENITOURINARY:  Negative frequency, urgency , dysuria or hematuria   NEUROLOGIC:  No headache, confusion, dizziness, lightheadedness  All other systems were reviewed and are negative      I&O's Detail    25 May 2025 07:01  -  26 May 2025 02:06  --------------------------------------------------------  IN:  Total IN: 0 mL    OUT:    Indwelling Catheter - Suprapubic (mL): 800 mL  Total OUT: 800 mL    Total NET: -800 mL      Physical Exam:  Vital Signs Last 24 Hrs  T(C): 37.1 (25 May 2025 20:59), Max: 37.1 (25 May 2025 20:59)  T(F): 98.7 (25 May 2025 20:59), Max: 98.7 (25 May 2025 20:59)  HR: 72 (25 May 2025 20:59) (72 - 80)  BP: 159/90 (25 May 2025 20:59) (150/80 - 190/82)  BP(mean): 110 (25 May 2025 20:59) (110 - 110)  RR: 19 (25 May 2025 20:59) (14 - 19)  SpO2: 97% (25 May 2025 20:59) (96% - 97%)    Parameters below as of 25 May 2025 20:59  Patient On (Oxygen Delivery Method): room air      Height (cm): 182.9 (05-25 @ 17:02)  Weight (kg): 118.8 (05-25 @ 17:02)  BMI (kg/m2): 35.5 (05-25 @ 17:02)  BSA (m2): 2.39 (05-25 @ 17:02)    General: well developed well nourished, in no acute distress  Neck: supple, trachea midline  Lungs: clear, no wheeze/rhonchi  Cardiovascular: regular rate and rhythm, S1 S2  Abdomen: soft, nontender, ND, bowel sounds normal +SPC  Neurological:  alert and oriented x3  Skin: no rash  Extremities: no edema    Labs:                        9.8    10.57 )-----------( 152      ( 25 May 2025 17:47 )             31.0   05-25    135  |  101  |  34[H]  ----------------------------<  235[H]  4.5   |  22  |  3.28[H]    Ca    8.7      25 May 2025 17:47    TPro  7.6  /  Alb  2.9[L]  /  TBili  0.5  /  DBili  x   /  AST  31  /  ALT  27  /  AlkPhos  108  05-25    Urine Microscopic-Add On (NC) (05.25.25 @ 18:20)    White Blood Cell - Urine: 16 /HPF   Red Blood Cell - Urine: 2 /HPF   Bacteria: Negative /HPF   Squamous Epithelial Cells: Present  Urinalysis (05.25.25 @ 18:20)    Glucose Qualitative, Urine: 100 mg/dL   Blood, Urine: Moderate   pH Urine: 6.0   Color: Yellow   Urine Appearance: Cloudy   Bilirubin: Negative   Ketone , Urine: Negative mg/dL   Specific Gravity: 1.012   Protein, Urine: 300 mg/dL   Urobilinogen: 0.2 mg/dL   Nitrite: Negative   Leukocyte Esterase Concentration: Large    RECENT CULTURES:          RADIOLOGY & ADDITIONAL STUDIES:    Assessment :   51YO M long term resident of [facility] PMH hypertension CKD GBS DVT CVA antiphospholipid antibody syndrome on warfarin diabetes hyperlipidemia neurogenic bladder with suprapubic catheter chronic bladder spasms recent admission for Acute cally transferred to [facility]  S/P lap cally 4/28 now presents with c/o cloudy urine in his suprapubic catheter, feeling some generalized fatigue and malaise over the past 1 week.  Patient states this is consistent with his usual UTI. No fever chills n/v/d. In ED afebrile. WBC 10K ANTONIO On CKD UA wih minimal pyuria. SPC changed in ED.   Rule out CAUTI- not convincing      Plan :   On empiric Ertapenem ( pt with hx of esbl infections )  Trend temps and cbc  Fu cultures      Advance Directives- Full ode  Current Medications are documented.   Drug-drug interactions reviewed.    Continue with present regiment .  Approptiate use of antibiotics and adverse effects reviewed.      I have discussed the above plan of care with patient in detail. He expressed understanding of the treatment plan . Risks, benefits and alternatives discussed in detail. I have asked if he has any questions or concerns and appropriately addressed them to the best of my ability .      > 45 minutes spent in direct patient care reviewing  the notes, lab data/ imaging , discussion with multidisciplinary team. All questions were addressed and answered to the best of my capacity .    Thank you for allowing me to participate in the care of your patient .      Yadira Blackmon MD  Infectious Disease  [phone]    Individualized infection control protocols for an individual patient based on their diagnosis and risks in order to reduce risk of disease transmission followed. Coordinating with  infection prevention and control team members to enable healthcare facility staff to safely care for patient.  Managing infection prevention and treatment protocols associated with transitions of care for complex patients.  In-depth patient chart review that entails going back farther in time and assessing the complete breadth of all health care interactions, with higher-level synthesis for complex diagnoses.  Engaging in complex medical decision-making associated with antimicrobial prescribing including considerations such as antimicrobial resistance patterns, emergence of new variants/strains, recent antibiotic exposure, interactions/complications from comorbidities including concurrent infections, public health considerations to minimize development of antimicrobial resistance, and emerging and re-emerging infections.

## 2025-05-25 NOTE — H&P ADULT - ASSESSMENT
Initial evaluation/ADMISSION HNP requested by DR COYLE on 5/25/2025   from Dr Manuel Bell    Patient examined chart reviewed    HOSPITAL ADMISSION   PATIENT CAME  FROM (if information available)      REASON FOR VISIT  .. Management of problems listed below      CC.   . 5/25/2025 49 y/o male presents axo3 via elise fraga from [facility] for UTI parker/treatment.  OVERALL PRESENTATION.  . 5/25/2025   . 50-year-old male with history of multiple medical issues, currently living at [facility] presents with has been having some cloudy urine in his suprapubic catheter, feeling some generalized fatigue and malaise over the past 1 week.  Patient states this is consistent with his usual UTI.  The patient will often need to get admitted for UTIs, secondary to MDR.  No aggravating or alleviating factors otherwise noted.  No other acute injury or complaints.  No recent trauma.  No known fever.  PMH.      PAST HOSPITAL STAYS .  Home Medications:   * Patient Currently Takes Medications as of 08-May-2025 10:37 documented in Structured Notes  · nicotine 14 mg/24 hr transdermal film, extended release: 1 film(s) transdermal once as needed for  agitation  · methocarbamol 750 mg oral tablet: 2 tab(s) orally every 8 hours  · naloxegol 12.5 mg oral tablet: 1 tab(s) orally once a day  · metoprolol tartrate 25 mg oral tablet: 1 tab(s) orally 2 times a day  · melatonin 5 mg oral tablet: 1 tab(s) orally once a day (at bedtime)  · Basaglar KwikPen 100 units/mL subcutaneous solution: 10 unit(s) subcutaneous once a day (at bedtime)  · DULoxetine 60 mg oral delayed release capsule: 1 cap(s) orally once a day  · aluminum hydroxide-magnesium hydroxide 200 mg-200 mg/5 mL oral suspension: 30 milliliter(s) orally every 4 hours As needed Dyspepsia  · finasteride 5 mg oral tablet: 1 tab(s) orally once a day  · lactobacillus acidophilus oral tablet: 1 tab(s) orally 2 times a day  · metoprolol tartrate 25 mg oral tablet: 1 tab(s) orally 2 times a day  · melatonin 5 mg oral tablet: 1 tab(s) orally once a day (at bedtime)  · DULoxetine 60 mg oral delayed release capsule: 1 cap(s) orally once a day  · finasteride 5 mg oral tablet: 1 tab(s) orally once a day  · oxyBUTYnin 5 mg oral tablet: 2 tab(s) orally 2 times a day  · Insulin Lispro KwikPen 100 units/mL injectable solution: 3 unit(s) subcutaneous 3 times a day (before meals) and  additionally  sliding scale  · cloNIDine 0.1 mg oral tablet: 1 tab(s) orally 2 times a day  · gabapentin 600 mg oral tablet: 1 tab(s) orally every 8 hours  · ferrous sulfate 325 mg (65 mg elemental iron) oral tablet: 1 tab(s) orally once a day  · ascorbic acid 500 mg oral tablet: 1 tab(s) orally once a day  · amLODIPine 10 mg oral tablet: 1 tab(s) orally once a day  · Myrbetriq 25 mg oral tablet, extended release: 1 tab(s) orally once a day  · Multiple Vitamins oral tablet: 1 tab(s) orally once a day  · pantoprazole 40 mg oral delayed release tablet: 1 tab(s) orally once a day (before a meal)  · Senna 8.6 mg oral tablet: 2 tab(s) orally once a day  · Tylenol 325 mg oral tablet: 2 tab(s) orally every 4 hours as needed for  mild pain  · HYDROmorphone 4 mg oral tablet: 1 tab(s) orally every 6 hours PRN  · folic acid: 1 milligram(s) once a day  · hydrALAZINE 25 mg oral tablet: 1 tab(s) orally every 8 hours as needed for BP >160  · warfarin 4 mg oral tablet: 1 tab(s) orally once a day  · alfuzosin 10 mg oral tablet, extended release: 1 tab(s) orally once a day  ER MGMT .      [id number]  VITALS/GAS EXCHANGE/DRIPS    ABGS.     .  VS/ PO/IO/ VENT/ DRIPS.   5/25/2025 afeb 80 150/80   5/25/2025 ra 96%     [id number]  PROBLEM ASSESSMENT RECOMMENDATIONS.  RESP.   . GAS EXCHANGE .   .... target PO 90-95%       INFECTION.  . DATA  .... w 5/25/2025 w 10.5  .... ua 5/25/2025 ua w 18    . UTI  .... 5/25/2025 spc changed in er and given ertapenem        CARDIAC.  . LACTICEMIA   .... la 5/25/2025 la 1.8     HEMAT.  . DATA  .... Hb 5/25/2025 Hb 9.8   .... Plt 5/25/2025 Plt 152   .... monitor      RENAL.  . DATA  .... Na 5/25/2025 Na 135  .... K 5/25/2025 K 4.5  .... CO2 5/25/2025 co2 22   .... ag 5/25/2025 ag 12   .... alb 5/25/2025 alb 2.9     . ANTONIO on CKD   .... Cr 5/9-5/25 cr 2.8-3.2   .... Fluids       [id number]   SUMMARY BASELINE .     .50 m from [facility] spc multiple uti admissions   CC.   . 5/25/2025 POSSIBLE UTI IN SETTING OF SUPRAPUBIC CATHETER   MAIN ISSUES.  . UTI CAUTI   . ANTONIO ON CKD     PMH.   . UTI 10/22/2024  . SUPRAPUBIC CATHETER MALFUNCTION 10/22/2024  . ANTONIO   . VTE   . BPH   . CVA   . DDD  . DM   . GBS   . MDD   . OBESITY     PSH  . IVCF   . LITHOTRIPSY   . SPC    PROCEDURES/DEVICES .  . 5/25/2025 spc changed in er     DISCUSSIONS.  .... Discussed with primary care and relevant consultants on an ongoing basis       TIME SPENT.  . Over 55  minutes aggregate care time spent on encounter; activities included   direct patient care, counseling and/or coordinating care reviewing notes, lab data/ imaging , discussion with multidisciplinary team/ patient  /family and explaining in detail risks, benefits, alternatives  of the recommendations     KIANA SALTER 49 m 1975 5/25/2025

## 2025-05-25 NOTE — ED PROVIDER NOTE - DIFFERENTIAL DIAGNOSIS
Differential Diagnosis Rule out acute UTI, leukocytosis, electrolyte abnormality, other acute pathology

## 2025-05-25 NOTE — H&P ADULT - NSHPPHYSICALEXAM_GEN_ALL_CORE
VITAL SIGNS ([place]):    ,,ED ADULT Flow Sheet:    25-May-2025 17:02  · Temp (F): 98.4  · Temp (C) Temp (C): 36.9  · Temp site Temp Site: oral  · Heart Rate Heart Rate (beats/min): 80  · BP Systolic Systolic: 150  · BP Diastolic Diastolic (mm Hg): 80  · Respiration Rate (breaths/min) Respiration Rate (breaths/min): 14  · SpO2 (%) SpO2 (%): 96  · O2 Delivery/Oxygen Delivery Method Patient On (Oxygen Delivery Method): room air  · How was the weight captured? Weight Type/Method: stated  · Dosing Weight (KILOGRAMS) Dosing Weight (KILOGRAMS): 118.8  · Dosing Weight  (POUNDS) Dosing Weight (POUNDS): 261.9  · Height type Height Type: stated  · Height (FEET) Height (FEET): 6  · Height (INCHES) Height (INCHES): 0  · Height (CENTIMETERS) Height (CENTIMETERS): 182.88  · BSA (m2): 2.39  · BMI (kG/m2) BMI (kG/m2): 35.5  · Ideal Body Weight(kg) Ideal Body Weight(kg): 78  · SpO2 (%) SpO2 (%): 96  · Preferred Language to Address Healthcare Preferred Language to Address Healthcare: English    PHYSICAL EXAM:   · Physical Examination: Moderate obesity.  · CONSTITUTIONAL: Well appearing, awake, alert, oriented to person, place, time/situation and in no apparent distress.  · ENMT: Airway patent, Nasal mucosa clear. Mouth with normal mucosa. Throat has no vesicles, no oropharyngeal exudates and uvula is midline. neck supple. no meningeal signs.  · EYES: Clear bilaterally, pupils equal, round and reactive to light.  · CARDIAC: Normal rate, regular rhythm.  Heart sounds S1, S2.  No murmurs, rubs or gallops.  · RESPIRATORY: Breath sounds clear and equal bilaterally. nl resp effort. no w/r/r  · GASTROINTESTINAL: Abdomen soft, non-tender, no guarding. non-distended. Suprapubic catheter in place.  · MUSCULOSKELETAL: Spine appears normal, range of motion is not limited, no muscle or joint tenderness  · NEUROLOGICAL: Alert and oriented, no focal deficits, no motor or sensory deficits.  · SKIN: Skin normal color for race, warm, dry and intact. No evidence of rash.

## 2025-05-25 NOTE — CONSULT NOTE ADULT - SUBJECTIVE AND OBJECTIVE BOX
9.8    10.57 )-----------( 152      ( 25 May 2025 17:47 )             31.0       CBC Full  -  ( 25 May 2025 17:47 )  WBC Count : 10.57 K/uL  RBC Count : 4.12 M/uL  Hemoglobin : 9.8 g/dL  Hematocrit : 31.0 %  Platelet Count - Automated : 152 K/uL  Mean Cell Volume : 75.2 fL  Mean Cell Hemoglobin : 23.8 pg  Mean Cell Hemoglobin Concentration : 31.6 g/dL  Auto Neutrophil # : x  Auto Lymphocyte # : x  Auto Monocyte # : x  Auto Eosinophil # : x  Auto Basophil # : x  Auto Neutrophil % : x  Auto Lymphocyte % : x  Auto Monocyte % : x  Auto Eosinophil % : x  Auto Basophil % : x      05-    135  |  101  |  34[H]  ----------------------------<  235[H]  4.5   |  22  |  3.28[H]    Ca    8.7      25 May 2025 17:47    TPro  7.6  /  Alb  2.9[L]  /  TBili  0.5  /  DBili  x   /  AST  31  /  ALT  27  /  AlkPhos  108  05-25      CAPILLARY BLOOD GLUCOSE          Vital Signs Last 24 Hrs  T(C): 36.7 (25 May 2025 20:36), Max: 36.9 (25 May 2025 17:02)  T(F): 98.1 (25 May 2025 20:36), Max: 98.4 (25 May 2025 17:02)  HR: 79 (25 May 2025 20:36) (79 - 80)  BP: 190/82 (25 May 2025 20:36) (150/80 - 190/82)  BP(mean): --  RR: 18 (25 May 2025 20:36) (14 - 18)  SpO2: 97% (25 May 2025 20:36) (96% - 97%)    Parameters below as of 25 May 2025 20:36  Patient On (Oxygen Delivery Method): room air        Urinalysis Basic - ( 25 May 2025 18:20 )    Color: Yellow / Appearance: Cloudy / S.012 / pH: x  Gluc: x / Ketone: x  / Bili: Negative / Urobili: 0.2 mg/dL   Blood: x / Protein: 300 mg/dL / Nitrite: Negative   Leuk Esterase: Large / RBC: 2 /HPF / WBC 16 /HPF   Sq Epi: x / Non Sq Epi: x / Bacteria: Negative /HPF               Patient is a 50y Male whom presented to the hospital with ckd     PAST MEDICAL & SURGICAL HISTORY:  BPH (benign prostatic hyperplasia)      HTN (hypertension)      Type 2 diabetes mellitus      Peripheral neuropathy      History of Guillain-[place] syndrome      History of CVA in adulthood      DDD (degenerative disc disease), lumbar      DVT, lower extremity      Renal stones      H/O Guillain-[place] syndrome      History of neurogenic bowel      DM2 (diabetes mellitus, type 2)      HTN (hypertension)      BPH without obstruction/lower urinary tract symptoms      Degenerative arthritis      DVT of lower limb, acute      CVA (cerebrovascular accident)      CVD (cerebrovascular disease)      Muscle weakness      Iron deficiency anemia      History of muscle spasm      Pain, unspecified      Vitamin deficiency      Obesity      GERD (gastroesophageal reflux disease)      H/O constipation      H/O insomnia      Essential (primary) hypertension      Acute embolism and thrombosis of deep vein of lower extremity      BPH (benign prostatic hyperplasia)      Diabetes mellitus due to underlying condition with diabetic neuropathy      Major depressive disorder, single episode      S/P IVC filter      H/O lithotripsy      Chronic suprapubic catheter          MEDICATIONS  (STANDING):  amLODIPine   Tablet 10 milliGRAM(s) Oral daily  ascorbic acid 500 milliGRAM(s) Oral daily  cloNIDine 0.1 milliGRAM(s) Oral two times a day  dextrose 5%. 1000 milliLiter(s) (50 mL/Hr) IV Continuous <Continuous>  dextrose 5%. 1000 milliLiter(s) (100 mL/Hr) IV Continuous <Continuous>  dextrose 50% Injectable 25 Gram(s) IV Push once  dextrose 50% Injectable 12.5 Gram(s) IV Push once  dextrose 50% Injectable 25 Gram(s) IV Push once  DULoxetine 60 milliGRAM(s) Oral daily  ertapenem  IVPB 500 milliGRAM(s) IV Intermittent every 24 hours  finasteride 5 milliGRAM(s) Oral daily  folic acid 1 milliGRAM(s) Oral daily  glucagon  Injectable 1 milliGRAM(s) IntraMuscular once  insulin glargine Injectable (LANTUS) 10 Unit(s) SubCutaneous at bedtime  insulin lispro (ADMELOG) corrective regimen sliding scale   SubCutaneous three times a day before meals  insulin lispro (ADMELOG) corrective regimen sliding scale   SubCutaneous at bedtime  insulin lispro Injectable (ADMELOG) 3 Unit(s) SubCutaneous before breakfast  insulin lispro Injectable (ADMELOG) 3 Unit(s) SubCutaneous before lunch  insulin lispro Injectable (ADMELOG) 3 Unit(s) SubCutaneous before dinner  melatonin 3 milliGRAM(s) Oral at bedtime  methocarbamol 1500 milliGRAM(s) Oral every 8 hours  metoprolol tartrate 25 milliGRAM(s) Oral two times a day  naloxegol 12.5 milliGRAM(s) Oral daily  nicotine -  14 mG/24Hr(s) Patch 1 Patch Transdermal daily  oxybutynin 10 milliGRAM(s) Oral two times a day  pantoprazole    Tablet 40 milliGRAM(s) Oral before breakfast  senna 2 Tablet(s) Oral at bedtime      Allergies    sulfa drugs (Other)  sulfa drugs (Hives)  sulfa drugs (Rash)  sulfa drugs (Unknown)    Intolerances    Pipercillin Sodium-Tazobactam Sodium (Pruritus)      SOCIAL HISTORY:  Denies ETOh,Smoking,     FAMILY HISTORY:  FH: heart disease    FH: HTN (hypertension)    Family history of sinus tachycardia (Father)    FH: HTN (hypertension) (Mother)        REVIEW OF SYSTEMS:    CONSTITUTIONAL: No weakness, fevers or chills  RESPIRATORY: No cough, wheezing, hemoptysis; No shortness of breath  CARDIOVASCULAR: No chest pain or palpitations  GASTROINTESTINAL: No abdominal or epigastric pain. No nausea, vomiting,     No diarrhea or constipation. No melena   SKIN: dry  Chronic suprapubic catheter      VITAL:  T(C): , Max: 37.1 (25 @ 20:59)  T(F): , Max: 98.7 (25 @ 20:59)  HR: 64 (25 @ 05:30)  BP: 139/79 (25 @ 05:30)  BP(mean): 110 (25 @ 20:59)  RR: 18 (25 @ 04:09)  SpO2: 97% (25 @ 04:09)  Wt(kg): --    I and O's:     @ 07:01  -   @ 07:00  --------------------------------------------------------  IN: 0 mL / OUT: 1800 mL / NET: -1800 mL      Height (cm): 182.9 ( 17:02)  Weight (kg): 118.8 (05-25 @ 17:02)  BMI (kg/m2): 35.5 ( @ 17:02)  BSA (m2): 2.39 ( @ 17:02)    PHYSICAL EXAM:    Constitutional: NAD  HEENT: conjunctive   clear   Neck:  No JVD  Respiratory: CTAB  Cardiovascular: S1 and S2  Gastrointestinal: BS+, soft, NT/ND  Extremities: No peripheral edema  : Chronic suprapubic catheter  Skin: No rashes  Access: Not applicable    LABS:                        8.5    8.34  )-----------( 164      ( 26 May 2025 06:00 )             27.4         140  |  106  |  31[H]  ----------------------------<  165[H]  3.8   |  22  |  3.07[H]    Ca    8.3[L]      26 May 2025 06:00    TPro  6.5  /  Alb  2.5[L]  /  TBili  0.3  /  DBili  x   /  AST  13  /  ALT  21  /  AlkPhos  92        Urine Studies:  Urinalysis Basic - ( 26 May 2025 06:00 )    Color: x / Appearance: x / SG: x / pH: x  Gluc: 165 mg/dL / Ketone: x  / Bili: x / Urobili: x   Blood: x / Protein: x / Nitrite: x   Leuk Esterase: x / RBC: x / WBC x   Sq Epi: x / Non Sq Epi: x / Bacteria: x            RADIOLOGY & ADDITIONAL STUDIES:      MEDICATIONS  (STANDING):  amLODIPine   Tablet 10 milliGRAM(s) Oral daily  ascorbic acid 500 milliGRAM(s) Oral daily  cloNIDine 0.1 milliGRAM(s) Oral two times a day  dextrose 5%. 1000 milliLiter(s) (50 mL/Hr) IV Continuous <Continuous>  dextrose 5%. 1000 milliLiter(s) (100 mL/Hr) IV Continuous <Continuous>  dextrose 50% Injectable 25 Gram(s) IV Push once  dextrose 50% Injectable 12.5 Gram(s) IV Push once  dextrose 50% Injectable 25 Gram(s) IV Push once  DULoxetine 60 milliGRAM(s) Oral daily  ertapenem  IVPB 500 milliGRAM(s) IV Intermittent every 24 hours  finasteride 5 milliGRAM(s) Oral daily  folic acid 1 milliGRAM(s) Oral daily  glucagon  Injectable 1 milliGRAM(s) IntraMuscular once  insulin glargine Injectable (LANTUS) 10 Unit(s) SubCutaneous at bedtime  insulin lispro (ADMELOG) corrective regimen sliding scale   SubCutaneous three times a day before meals  insulin lispro (ADMELOG) corrective regimen sliding scale   SubCutaneous at bedtime  insulin lispro Injectable (ADMELOG) 3 Unit(s) SubCutaneous before breakfast  insulin lispro Injectable (ADMELOG) 3 Unit(s) SubCutaneous before lunch  insulin lispro Injectable (ADMELOG) 3 Unit(s) SubCutaneous before dinner  melatonin 3 milliGRAM(s) Oral at bedtime  methocarbamol 1500 milliGRAM(s) Oral every 8 hours  metoprolol tartrate 25 milliGRAM(s) Oral two times a day  naloxegol 12.5 milliGRAM(s) Oral daily  nicotine -  14 mG/24Hr(s) Patch 1 Patch Transdermal daily  oxybutynin 10 milliGRAM(s) Oral two times a day  pantoprazole    Tablet 40 milliGRAM(s) Oral before breakfast  senna 2 Tablet(s) Oral at bedtime                                                                            9.8    10.57 )-----------( 152      ( 25 May 2025 17:47 )             31.0       CBC Full  -  ( 25 May 2025 17:47 )  WBC Count : 10.57 K/uL  RBC Count : 4.12 M/uL  Hemoglobin : 9.8 g/dL  Hematocrit : 31.0 %  Platelet Count - Automated : 152 K/uL  Mean Cell Volume : 75.2 fL  Mean Cell Hemoglobin : 23.8 pg  Mean Cell Hemoglobin Concentration : 31.6 g/dL  Auto Neutrophil # : x  Auto Lymphocyte # : x  Auto Monocyte # : x  Auto Eosinophil # : x  Auto Basophil # : x  Auto Neutrophil % : x  Auto Lymphocyte % : x  Auto Monocyte % : x  Auto Eosinophil % : x  Auto Basophil % : x      05-25    135  |  101  |  34[H]  ----------------------------<  235[H]  4.5   |  22  |  3.28[H]    Ca    8.7      25 May 2025 17:47    TPro  7.6  /  Alb  2.9[L]  /  TBili  0.5  /  DBili  x   /  AST  31  /  ALT  27  /  AlkPhos  108  05-25      CAPILLARY BLOOD GLUCOSE          Vital Signs Last 24 Hrs  T(C): 36.7 (25 May 2025 20:36), Max: 36.9 (25 May 2025 17:02)  T(F): 98.1 (25 May 2025 20:36), Max: 98.4 (25 May 2025 17:02)  HR: 79 (25 May 2025 20:36) (79 - 80)  BP: 190/82 (25 May 2025 20:36) (150/80 - 190/82)  BP(mean): --  RR: 18 (25 May 2025 20:36) (14 - 18)  SpO2: 97% (25 May 2025 20:36) (96% - 97%)    Parameters below as of 25 May 2025 20:36  Patient On (Oxygen Delivery Method): room air        Urinalysis Basic - ( 25 May 2025 18:20 )    Color: Yellow / Appearance: Cloudy / S.012 / pH: x  Gluc: x / Ketone: x  / Bili: Negative / Urobili: 0.2 mg/dL   Blood: x / Protein: 300 mg/dL / Nitrite: Negative   Leuk Esterase: Large / RBC: 2 /HPF / WBC 16 /HPF   Sq Epi: x / Non Sq Epi: x / Bacteria: Negative /HPF      MEDICATIONS  (STANDING):  amLODIPine   Tablet 10 milliGRAM(s) Oral daily  ascorbic acid 500 milliGRAM(s) Oral daily  cloNIDine 0.1 milliGRAM(s) Oral two times a day  DULoxetine 60 milliGRAM(s) Oral daily  ertapenem  IVPB 500 milliGRAM(s) IV Intermittent every 24 hours  finasteride 5 milliGRAM(s) Oral daily  folic acid 1 milliGRAM(s) Oral daily  glucagon  Injectable 1 milliGRAM(s) IntraMuscular once  melatonin 3 milliGRAM(s) Oral at bedtime  methocarbamol 1500 milliGRAM(s) Oral every 8 hours  metoprolol tartrate 25 milliGRAM(s) Oral two times a day  naloxegol 12.5 milliGRAM(s) Oral daily  nicotine -  14 mG/24Hr(s) Patch 1 Patch Transdermal daily  oxybutynin 10 milliGRAM(s) Oral two times a day  pantoprazole    Tablet 40 milliGRAM(s) Oral before breakfast  senna 2 Tablet(s) Oral at bedtime           Patient is a 50y Male whom presented to the hospital with ckd     PAST MEDICAL & SURGICAL HISTORY:  BPH (benign prostatic hyperplasia)      HTN (hypertension)      Type 2 diabetes mellitus      Peripheral neuropathy      History of Guillain-[place] syndrome      History of CVA in adulthood      DDD (degenerative disc disease), lumbar      DVT, lower extremity      Renal stones      H/O Guillain-[place] syndrome      History of neurogenic bowel      DM2 (diabetes mellitus, type 2)      HTN (hypertension)      BPH without obstruction/lower urinary tract symptoms      Degenerative arthritis      DVT of lower limb, acute      CVA (cerebrovascular accident)      CVD (cerebrovascular disease)      Muscle weakness      Iron deficiency anemia      History of muscle spasm      Pain, unspecified      Vitamin deficiency      Obesity      GERD (gastroesophageal reflux disease)      H/O constipation      H/O insomnia      Essential (primary) hypertension      Acute embolism and thrombosis of deep vein of lower extremity      BPH (benign prostatic hyperplasia)      Diabetes mellitus due to underlying condition with diabetic neuropathy      Major depressive disorder, single episode      S/P IVC filter      H/O lithotripsy      Chronic suprapubic catheter          MEDICATIONS  (STANDING):  amLODIPine   Tablet 10 milliGRAM(s) Oral daily  ascorbic acid 500 milliGRAM(s) Oral daily  cloNIDine 0.1 milliGRAM(s) Oral two times a day  dextrose 5%. 1000 milliLiter(s) (50 mL/Hr) IV Continuous <Continuous>  dextrose 5%. 1000 milliLiter(s) (100 mL/Hr) IV Continuous <Continuous>  dextrose 50% Injectable 25 Gram(s) IV Push once  dextrose 50% Injectable 12.5 Gram(s) IV Push once  dextrose 50% Injectable 25 Gram(s) IV Push once  DULoxetine 60 milliGRAM(s) Oral daily  ertapenem  IVPB 500 milliGRAM(s) IV Intermittent every 24 hours  finasteride 5 milliGRAM(s) Oral daily  folic acid 1 milliGRAM(s) Oral daily  glucagon  Injectable 1 milliGRAM(s) IntraMuscular once  insulin glargine Injectable (LANTUS) 10 Unit(s) SubCutaneous at bedtime  insulin lispro (ADMELOG) corrective regimen sliding scale   SubCutaneous three times a day before meals  insulin lispro (ADMELOG) corrective regimen sliding scale   SubCutaneous at bedtime  insulin lispro Injectable (ADMELOG) 3 Unit(s) SubCutaneous before breakfast  insulin lispro Injectable (ADMELOG) 3 Unit(s) SubCutaneous before lunch  insulin lispro Injectable (ADMELOG) 3 Unit(s) SubCutaneous before dinner  melatonin 3 milliGRAM(s) Oral at bedtime  methocarbamol 1500 milliGRAM(s) Oral every 8 hours  metoprolol tartrate 25 milliGRAM(s) Oral two times a day  naloxegol 12.5 milliGRAM(s) Oral daily  nicotine -  14 mG/24Hr(s) Patch 1 Patch Transdermal daily  oxybutynin 10 milliGRAM(s) Oral two times a day  pantoprazole    Tablet 40 milliGRAM(s) Oral before breakfast  senna 2 Tablet(s) Oral at bedtime      Allergies    sulfa drugs (Other)  sulfa drugs (Hives)  sulfa drugs (Rash)  sulfa drugs (Unknown)    Intolerances    Pipercillin Sodium-Tazobactam Sodium (Pruritus)      SOCIAL HISTORY:  Denies ETOh,Smoking,     FAMILY HISTORY:  FH: heart disease    FH: HTN (hypertension)    Family history of sinus tachycardia (Father)    FH: HTN (hypertension) (Mother)        REVIEW OF SYSTEMS:    CONSTITUTIONAL: No weakness, fevers or chills  RESPIRATORY: No cough, wheezing, hemoptysis; No shortness of breath  CARDIOVASCULAR: No chest pain or palpitations  GASTROINTESTINAL: No abdominal or epigastric pain. No nausea, vomiting,     No diarrhea or constipation. No melena   SKIN: dry  Chronic suprapubic catheter      CAPILLARY BLOOD GLUCOSE          Vital Signs Last 24 Hrs  T(C): 36.7 (25 May 2025 20:36), Max: 36.9 (25 May 2025 17:02)  T(F): 98.1 (25 May 2025 20:36), Max: 98.4 (25 May 2025 17:02)  HR: 79 (25 May 2025 20:36) (79 - 80)  BP: 190/82 (25 May 2025 20:36) (150/80 - 190/82)  BP(mean): --  RR: 18 (25 May 2025 20:36) (14 - 18)  SpO2: 97% (25 May 2025 20:36) (96% - 97%)    Parameters below as of 25 May 2025 20:36  Patient On (Oxygen Delivery Method): room air        Urinalysis Basic - ( 25 May 2025 18:20 )    Color: Yellow / Appearance: Cloudy / S.012 / pH: x  Gluc: x / Ketone: x  / Bili: Negative / Urobili: 0.2 mg/dL   Blood: x / Protein: 300 mg/dL / Nitrite: Negative   Leuk Esterase: Large / RBC: 2 /HPF / WBC 16 /HPF   Sq Epi: x / Non Sq Epi: x / Bacteria: Negative /HPF                      PHYSICAL EXAM:    Constitutional: NAD  HEENT: conjunctive   clear   Neck:  No JVD  Respiratory: CTAB  Cardiovascular: S1 and S2  Gastrointestinal: BS+, soft, NT/ND  Extremities: No peripheral edema  : Chronic suprapubic catheter  Skin: No rashes  Access: Not applicable                MEDICATIONS  (STANDING):  amLODIPine   Tablet 10 milliGRAM(s) Oral daily  ascorbic acid 500 milliGRAM(s) Oral daily  cloNIDine 0.1 milliGRAM(s) Oral two times a day  dextrose 5%. 1000 milliLiter(s) (50 mL/Hr) IV Continuous <Continuous>  dextrose 5%. 1000 milliLiter(s) (100 mL/Hr) IV Continuous <Continuous>  dextrose 50% Injectable 25 Gram(s) IV Push once  dextrose 50% Injectable 12.5 Gram(s) IV Push once  dextrose 50% Injectable 25 Gram(s) IV Push once  DULoxetine 60 milliGRAM(s) Oral daily  ertapenem  IVPB 500 milliGRAM(s) IV Intermittent every 24 hours  finasteride 5 milliGRAM(s) Oral daily  folic acid 1 milliGRAM(s) Oral daily  glucagon  Injectable 1 milliGRAM(s) IntraMuscular once  insulin glargine Injectable (LANTUS) 10 Unit(s) SubCutaneous at bedtime  insulin lispro (ADMELOG) corrective regimen sliding scale   SubCutaneous three times a day before meals  insulin lispro (ADMELOG) corrective regimen sliding scale   SubCutaneous at bedtime  insulin lispro Injectable (ADMELOG) 3 Unit(s) SubCutaneous before breakfast  insulin lispro Injectable (ADMELOG) 3 Unit(s) SubCutaneous before lunch  insulin lispro Injectable (ADMELOG) 3 Unit(s) SubCutaneous before dinner  melatonin 3 milliGRAM(s) Oral at bedtime  methocarbamol 1500 milliGRAM(s) Oral every 8 hours  metoprolol tartrate 25 milliGRAM(s) Oral two times a day  naloxegol 12.5 milliGRAM(s) Oral daily  nicotine -  14 mG/24Hr(s) Patch 1 Patch Transdermal daily  oxybutynin 10 milliGRAM(s) Oral two times a day  pantoprazole    Tablet 40 milliGRAM(s) Oral before breakfast  senna 2 Tablet(s) Oral at bedtime                          9.8    10.57 )-----------( 152      ( 25 May 2025 17:47 )             31.0       CBC Full  -  ( 25 May 2025 17:47 )  WBC Count : 10.57 K/uL  RBC Count : 4.12 M/uL  Hemoglobin : 9.8 g/dL  Hematocrit : 31.0 %  Platelet Count - Automated : 152 K/uL  Mean Cell Volume : 75.2 fL  Mean Cell Hemoglobin : 23.8 pg  Mean Cell Hemoglobin Concentration : 31.6 g/dL  Auto Neutrophil # : x  Auto Lymphocyte # : x  Auto Monocyte # : x  Auto Eosinophil # : x  Auto Basophil # : x  Auto Neutrophil % : x  Auto Lymphocyte % : x  Auto Monocyte % : x  Auto Eosinophil % : x  Auto Basophil % : x      05-25    135  |  101  |  34[H]  ----------------------------<  235[H]  4.5   |  22  |  3.28[H]    Ca    8.7      25 May 2025 17:47    TPro  7.6  /  Alb  2.9[L]  /  TBili  0.5  /  DBili  x   /  AST  31  /  ALT  27  /  AlkPhos  108  05-25      CAPILLARY BLOOD GLUCOSE          Vital Signs Last 24 Hrs  T(C): 36.7 (25 May 2025 20:36), Max: 36.9 (25 May 2025 17:02)  T(F): 98.1 (25 May 2025 20:36), Max: 98.4 (25 May 2025 17:02)  HR: 79 (25 May 2025 20:36) (79 - 80)  BP: 190/82 (25 May 2025 20:36) (150/80 - 190/82)  BP(mean): --  RR: 18 (25 May 2025 20:36) (14 - 18)  SpO2: 97% (25 May 2025 20:36) (96% - 97%)    Parameters below as of 25 May 2025 20:36  Patient On (Oxygen Delivery Method): room air        Urinalysis Basic - ( 25 May 2025 18:20 )    Color: Yellow / Appearance: Cloudy / S.012 / pH: x  Gluc: x / Ketone: x  / Bili: Negative / Urobili: 0.2 mg/dL   Blood: x / Protein: 300 mg/dL / Nitrite: Negative   Leuk Esterase: Large / RBC: 2 /HPF / WBC 16 /HPF   Sq Epi: x / Non Sq Epi: x / Bacteria: Negative /HPF      MEDICATIONS  (STANDING):  amLODIPine   Tablet 10 milliGRAM(s) Oral daily  ascorbic acid 500 milliGRAM(s) Oral daily  cloNIDine 0.1 milliGRAM(s) Oral two times a day  DULoxetine 60 milliGRAM(s) Oral daily  ertapenem  IVPB 500 milliGRAM(s) IV Intermittent every 24 hours  finasteride 5 milliGRAM(s) Oral daily  folic acid 1 milliGRAM(s) Oral daily  glucagon  Injectable 1 milliGRAM(s) IntraMuscular once  melatonin 3 milliGRAM(s) Oral at bedtime  methocarbamol 1500 milliGRAM(s) Oral every 8 hours  metoprolol tartrate 25 milliGRAM(s) Oral two times a day  naloxegol 12.5 milliGRAM(s) Oral daily  nicotine -  14 mG/24Hr(s) Patch 1 Patch Transdermal daily  oxybutynin 10 milliGRAM(s) Oral two times a day  pantoprazole    Tablet 40 milliGRAM(s) Oral before breakfast  senna 2 Tablet(s) Oral at bedtime

## 2025-05-25 NOTE — H&P ADULT - NSICDXPASTMEDICALHX_GEN_ALL_CORE_FT
PAST MEDICAL HISTORY:  Acute embolism and thrombosis of deep vein of lower extremity     BPH (benign prostatic hyperplasia)     BPH (benign prostatic hyperplasia)     BPH without obstruction/lower urinary tract symptoms     CVA (cerebrovascular accident)     CVD (cerebrovascular disease)     DDD (degenerative disc disease), lumbar     Degenerative arthritis     Diabetes mellitus due to underlying condition with diabetic neuropathy     DM2 (diabetes mellitus, type 2)     DVT of lower limb, acute     DVT, lower extremity     Essential (primary) hypertension     GERD (gastroesophageal reflux disease)     H/O constipation     H/O Guillain-N[place] syndrome     H/O insomnia     History of CVA in adulthood     History of Guillain-Nome syndrome     History of muscle spasm     History of neurogenic bowel     HTN (hypertension)     HTN (hypertension)     Iron deficiency anemia     Major depressive disorder, single episode     Muscle weakness     Obesity     Pain, unspecified     Peripheral neuropathy     Renal stones     Type 2 diabetes mellitus     Vitamin deficiency

## 2025-05-25 NOTE — ED ADULT TRIAGE NOTE - HOW PATIENT ADDRESSED, PROFILE
I have personally seen and examined this patient.  I have fully participated in the care of this patient. I have reviewed all pertinent clinical information, including history, physical exam, plan and the Resident’s note and agree except as noted. Hardik

## 2025-05-25 NOTE — H&P ADULT - NSHPREVIEWOFSYSTEMS_GEN_ALL_CORE
REVIEW OF SYSTEMS:    Review of Systems:  · CONSTITUTIONAL: - - -  · Constitutional [+]: malaise  · GENITOURINARY: - - -  · Genitourinary [+]: urine changes  · ROS STATEMENT: all other ROS negative except as per HPI

## 2025-05-25 NOTE — ED ADULT NURSE NOTE - NSICDXPASTMEDICALHX_GEN_ALL_CORE_FT
PAST MEDICAL HISTORY:  Acute embolism and thrombosis of deep vein of lower extremity     BPH (benign prostatic hyperplasia)     BPH (benign prostatic hyperplasia)     BPH without obstruction/lower urinary tract symptoms     CVA (cerebrovascular accident)     CVD (cerebrovascular disease)     DDD (degenerative disc disease), lumbar     Degenerative arthritis     Diabetes mellitus due to underlying condition with diabetic neuropathy     DM2 (diabetes mellitus, type 2)     DVT of lower limb, acute     DVT, lower extremity     Essential (primary) hypertension     GERD (gastroesophageal reflux disease)     H/O constipation     H/O Guillain-[place] syndrome     H/O insomnia     History of CVA in adulthood     History of Guillain-[place] syndrome     History of muscle spasm     History of neurogenic bowel     HTN (hypertension)     HTN (hypertension)     Iron deficiency anemia     Major depressive disorder, single episode     Muscle weakness     Obesity     Pain, unspecified     Peripheral neuropathy     Renal stones     Type 2 diabetes mellitus     Vitamin deficiency

## 2025-05-25 NOTE — ED ADULT NURSE NOTE - CHIEF COMPLAINT QUOTE
49 y/o male presents axo3 via stretcher, elise from [facility] for UTI parker/treatment.

## 2025-05-25 NOTE — PATIENT PROFILE ADULT - FALL HARM RISK - HARM RISK INTERVENTIONS

## 2025-05-25 NOTE — ED PROVIDER NOTE - CLINICAL SUMMARY MEDICAL DECISION MAKING FREE TEXT BOX
Acute UTI, generalized weakness from nursing facility.  Will check labs, urine, IV antibiotics, possible admission

## 2025-05-25 NOTE — ED ADULT NURSE NOTE - OBJECTIVE STATEMENT
Pt comes in complaining of urinary symptoms secondary to having suprapubic catheter. Pt states he has been having cloudy urine and dealing with generalized fatigue as well as feeling more tired then usual. Pt requesting a catheter change. From [facility].

## 2025-05-25 NOTE — ED PROVIDER NOTE - OBJECTIVE STATEMENT
50-year-old male with history of multiple medical issues, currently living at [facility] presents with has been having some cloudy urine in his suprapubic catheter, feeling some generalized fatigue and malaise over the past 1 week.  Patient states this is consistent with his usual UTI.  The patient will often need to get admitted for UTIs, secondary to MDR.  No aggravating or alleviating factors otherwise noted.  No other acute injury or complaints.  No recent trauma.  No known fever.

## 2025-05-25 NOTE — ED PROVIDER NOTE - PROGRESS NOTE DETAILS
Discussed with patient regarding the nature of his infection, findings on labs.  The patient states that he cannot go back to the facility to get the IV antibiotics, as there is a lot of issues with his care.  Will admit for IV antibiotics and further monitoring. Discussed with Dr. Bell.  Accepts admission to Dr. Sanchez.  He will notify Dr. Cespedes. Reviewed prior cultures, last positive urine culture on 4/25/2025.  Positive Proteus ESBL, sensitive to ertapenem

## 2025-05-25 NOTE — ED ADULT NURSE NOTE - NSFALLHARMRISKINTERV_ED_ALL_ED
Assistance OOB with selected safe patient handling equipment if applicable/Communicate risk of Fall with Harm to all staff, patient, and family/Monitor gait and stability/Provide patient with walking aids/Provide visual cue: red socks, yellow wristband, yellow gown, etc/Reinforce activity limits and safety measures with patient and family/Bed in lowest position, wheels locked, appropriate side rails in place/Call bell, personal items and telephone in reach/Instruct patient to call for assistance before getting out of bed/chair/stretcher/Non-slip footwear applied when patient is off stretcher/Reva to call system/Physically safe environment - no spills, clutter or unnecessary equipment/Purposeful Proactive Rounding/Room/bathroom lighting operational, light cord in reach

## 2025-05-25 NOTE — H&P ADULT - HISTORY OF PRESENT ILLNESS
CC.   . 5/25/2025 51 y/o male presents axo3 via elise fraga from [facility] for UTI parker/treatment.  OVERALL PRESENTATION.  . 5/25/2025   . 50-year-old male with history of multiple medical issues, currently living at [facility] presents with has been having some cloudy urine in his suprapubic catheter, feeling some generalized fatigue and malaise over the past 1 week.  Patient states this is consistent with his usual UTI.  The patient will often need to get admitted for UTIs, secondary to MDR.  No aggravating or alleviating factors otherwise noted.  No other acute injury or complaints.  No recent trauma.  No known fever.

## 2025-05-25 NOTE — PATIENT PROFILE ADULT - NSPROIMPLANTSMEDDEV_GEN_A_NUR
Pt is A&Ox4. VSS on RA. Up ind in room. C/o 5/10 headache pain that decreased w/ PRN tylenol. Neuro's intact. PIV SL. Tele NSR. Neurology following. Discharge today pending progress.    IVC filter, suprapubic catheter

## 2025-05-25 NOTE — ED PROVIDER NOTE - NSICDXPASTMEDICALHX_GEN_ALL_CORE_FT
PAST MEDICAL HISTORY:  Acute embolism and thrombosis of deep vein of lower extremity     BPH (benign prostatic hyperplasia)     BPH (benign prostatic hyperplasia)     BPH without obstruction/lower urinary tract symptoms     CVA (cerebrovascular accident)     CVD (cerebrovascular disease)     DDD (degenerative disc disease), lumbar     Degenerative arthritis     Diabetes mellitus due to underlying condition with diabetic neuropathy     DM2 (diabetes mellitus, type 2)     DVT of lower limb, acute     DVT, lower extremity     Essential (primary) hypertension     GERD (gastroesophageal reflux disease)     H/O constipation     H/O Guillain-Maitland syndrome     H/O insomnia     History of CVA in adulthood     History of Guillain-Maitland syndrome     History of muscle spasm     History of neurogenic bowel     HTN (hypertension)     HTN (hypertension)     Iron deficiency anemia     Major depressive disorder, single episode     Muscle weakness     Obesity     Pain, unspecified     Peripheral neuropathy     Renal stones     Type 2 diabetes mellitus     Vitamin deficiency

## 2025-05-25 NOTE — PATIENT PROFILE ADULT - NSPROHMDIABETBLDGLCTST_GEN_A_NUR
pt does not know his usual numbers; states the staff at [facility] manage his diabetes for him/before meals and at bedtime

## 2025-05-25 NOTE — ED PROCEDURE NOTE - CPROC ED TIME OUT STATEMENT1
Brief Postoperative Note      Patient: Marty Salinas  YOB: 1939  MRN: [id number]    Date of Procedure: 8/15/2022    Pre-Op Diagnosis: MULTIPLE VESSEL CORONARY ARTERY DISEASE    Post-Op Diagnosis: Same       Procedure(s):  CABG CORONARY ARTERY BYPASS X3,LEFT ATRIAL APPENDAGE CLIP, ON-PUMP  JAKOB,    Surgeon(s):  Jada Starkey MD    Assistant:  Surgical Assistant: Sabas Mcknight    Anesthesia: General    Estimated Blood Loss (mL): 126    Complications: None    Specimens:   * No specimens in log *    Implants:  Implant Name Type Inv.  Item Serial No.  Lot No. LRB No. Used Action   CLIP MED SUTURE LESS 45 MM SYS 1 HND STRL ATRICLIP FLX V - [id number]  CLIP MED SUTURE LESS 45 MM SYS 1 HND STRL ATRICLIP FLX V  [employer]- [id number] Left 1 Implanted         Drains:   Chest Tube Anterior 1 (Active)       Chest Tube Anterior 2 (Active)       Chest Tube Anterior 3 (Active)       NG/OG/NJ/NE Tube Orogastric 18 fr Center mouth (Active)       Urinary Catheter [place]-Temperature (Active)       Findings: Good EF    Electronically signed by Kolton Ramirez MD on 8/15/2022 at 2:30 PM
“Patient's name, , procedure and correct site were confirmed during the [place] Timeout.”

## 2025-05-26 LAB
ALBUMIN SERPL ELPH-MCNC: 2.5 G/DL — LOW (ref 3.3–5)
ALP SERPL-CCNC: 92 U/L — SIGNIFICANT CHANGE UP (ref 30–120)
ALT FLD-CCNC: 21 U/L — SIGNIFICANT CHANGE UP (ref 10–60)
ANION GAP SERPL CALC-SCNC: 12 MMOL/L — SIGNIFICANT CHANGE UP (ref 5–17)
AST SERPL-CCNC: 13 U/L — SIGNIFICANT CHANGE UP (ref 10–40)
BILIRUB SERPL-MCNC: 0.3 MG/DL — SIGNIFICANT CHANGE UP (ref 0.2–1.2)
BUN SERPL-MCNC: 31 MG/DL — HIGH (ref 7–23)
CALCIUM SERPL-MCNC: 8.3 MG/DL — LOW (ref 8.4–10.5)
CHLORIDE SERPL-SCNC: 106 MMOL/L — SIGNIFICANT CHANGE UP (ref 96–108)
CO2 SERPL-SCNC: 22 MMOL/L — SIGNIFICANT CHANGE UP (ref 22–31)
CREAT SERPL-MCNC: 3.07 MG/DL — HIGH (ref 0.5–1.3)
EGFR: 24 ML/MIN/1.73M2 — LOW
EGFR: 24 ML/MIN/1.73M2 — LOW
GLUCOSE BLDC GLUCOMTR-MCNC: 168 MG/DL — HIGH (ref 70–99)
GLUCOSE BLDC GLUCOMTR-MCNC: 179 MG/DL — HIGH (ref 70–99)
GLUCOSE BLDC GLUCOMTR-MCNC: 200 MG/DL — HIGH (ref 70–99)
GLUCOSE BLDC GLUCOMTR-MCNC: 234 MG/DL — HIGH (ref 70–99)
GLUCOSE SERPL-MCNC: 165 MG/DL — HIGH (ref 70–99)
HCT VFR BLD CALC: 27.4 % — LOW (ref 39–50)
HGB BLD-MCNC: 8.5 G/DL — LOW (ref 13–17)
INR BLD: 3.09 RATIO — HIGH (ref 0.85–1.16)
MCHC RBC-ENTMCNC: 23.4 PG — LOW (ref 27–34)
MCHC RBC-ENTMCNC: 31 G/DL — LOW (ref 32–36)
MCV RBC AUTO: 75.3 FL — LOW (ref 80–100)
NRBC BLD AUTO-RTO: 0 /100 WBCS — SIGNIFICANT CHANGE UP (ref 0–0)
PLATELET # BLD AUTO: 164 K/UL — SIGNIFICANT CHANGE UP (ref 150–400)
POTASSIUM SERPL-MCNC: 3.8 MMOL/L — SIGNIFICANT CHANGE UP (ref 3.5–5.3)
POTASSIUM SERPL-SCNC: 3.8 MMOL/L — SIGNIFICANT CHANGE UP (ref 3.5–5.3)
PROT SERPL-MCNC: 6.5 G/DL — SIGNIFICANT CHANGE UP (ref 6–8.3)
PROTHROM AB SERPL-ACNC: 36.1 SEC — HIGH (ref 9.9–13.4)
RBC # BLD: 3.64 M/UL — LOW (ref 4.2–5.8)
RBC # FLD: 17.6 % — HIGH (ref 10.3–14.5)
SODIUM SERPL-SCNC: 140 MMOL/L — SIGNIFICANT CHANGE UP (ref 135–145)
WBC # BLD: 8.34 K/UL — SIGNIFICANT CHANGE UP (ref 3.8–10.5)
WBC # FLD AUTO: 8.34 K/UL — SIGNIFICANT CHANGE UP (ref 3.8–10.5)

## 2025-05-26 RX ORDER — ACETAMINOPHEN 500 MG/5ML
1000 LIQUID (ML) ORAL ONCE
Refills: 0 | Status: COMPLETED | OUTPATIENT
Start: 2025-05-26 | End: 2025-05-26

## 2025-05-26 RX ADMIN — Medication 4 MILLIGRAM(S): at 21:50

## 2025-05-26 RX ADMIN — DULOXETINE 60 MILLIGRAM(S): 20 CAPSULE, DELAYED RELEASE ORAL at 12:31

## 2025-05-26 RX ADMIN — Medication 0.1 MILLIGRAM(S): at 05:37

## 2025-05-26 RX ADMIN — OXYBUTYNIN CHLORIDE 10 MILLIGRAM(S): 5 TABLET, FILM COATED, EXTENDED RELEASE ORAL at 05:49

## 2025-05-26 RX ADMIN — INSULIN LISPRO 3 UNIT(S): 100 INJECTION, SOLUTION INTRAVENOUS; SUBCUTANEOUS at 12:29

## 2025-05-26 RX ADMIN — Medication 400 MILLIGRAM(S): at 17:31

## 2025-05-26 RX ADMIN — METOPROLOL SUCCINATE 25 MILLIGRAM(S): 50 TABLET, EXTENDED RELEASE ORAL at 05:37

## 2025-05-26 RX ADMIN — AMLODIPINE BESYLATE 10 MILLIGRAM(S): 10 TABLET ORAL at 05:37

## 2025-05-26 RX ADMIN — Medication 0.1 MILLIGRAM(S): at 17:31

## 2025-05-26 RX ADMIN — INSULIN LISPRO 2: 100 INJECTION, SOLUTION INTRAVENOUS; SUBCUTANEOUS at 12:29

## 2025-05-26 RX ADMIN — Medication 0.5 MILLIGRAM(S): at 12:30

## 2025-05-26 RX ADMIN — METOPROLOL SUCCINATE 25 MILLIGRAM(S): 50 TABLET, EXTENDED RELEASE ORAL at 17:31

## 2025-05-26 RX ADMIN — METHOCARBAMOL 1500 MILLIGRAM(S): 500 TABLET, FILM COATED ORAL at 05:36

## 2025-05-26 RX ADMIN — Medication 3 MILLIGRAM(S): at 21:07

## 2025-05-26 RX ADMIN — Medication 2 TABLET(S): at 21:07

## 2025-05-26 RX ADMIN — NICOTINE POLACRILEX 1 PATCH: 4 GUM, CHEWING ORAL at 12:30

## 2025-05-26 RX ADMIN — METHOCARBAMOL 1500 MILLIGRAM(S): 500 TABLET, FILM COATED ORAL at 13:18

## 2025-05-26 RX ADMIN — FOLIC ACID 1 MILLIGRAM(S): 1 TABLET ORAL at 12:31

## 2025-05-26 RX ADMIN — INSULIN LISPRO 1: 100 INJECTION, SOLUTION INTRAVENOUS; SUBCUTANEOUS at 07:58

## 2025-05-26 RX ADMIN — Medication 0.5 MILLIGRAM(S): at 04:02

## 2025-05-26 RX ADMIN — Medication 0.5 MILLIGRAM(S): at 18:48

## 2025-05-26 RX ADMIN — Medication 40 MILLIGRAM(S): at 05:36

## 2025-05-26 RX ADMIN — FINASTERIDE 5 MILLIGRAM(S): 1 TABLET, FILM COATED ORAL at 12:30

## 2025-05-26 RX ADMIN — NALOXEGOL OXALATE 12.5 MILLIGRAM(S): 12.5 TABLET, FILM COATED ORAL at 12:30

## 2025-05-26 RX ADMIN — INSULIN LISPRO 1: 100 INJECTION, SOLUTION INTRAVENOUS; SUBCUTANEOUS at 17:30

## 2025-05-26 RX ADMIN — Medication 4 MILLIGRAM(S): at 14:50

## 2025-05-26 RX ADMIN — NICOTINE POLACRILEX 1 PATCH: 4 GUM, CHEWING ORAL at 19:19

## 2025-05-26 RX ADMIN — Medication 0.5 MILLIGRAM(S): at 12:43

## 2025-05-26 RX ADMIN — Medication 4 MILLIGRAM(S): at 15:45

## 2025-05-26 RX ADMIN — ERTAPENEM SODIUM 100 MILLIGRAM(S): 1 INJECTION, POWDER, LYOPHILIZED, FOR SOLUTION INTRAMUSCULAR; INTRAVENOUS at 19:30

## 2025-05-26 RX ADMIN — INSULIN LISPRO 3 UNIT(S): 100 INJECTION, SOLUTION INTRAVENOUS; SUBCUTANEOUS at 17:30

## 2025-05-26 RX ADMIN — Medication 4 MILLIGRAM(S): at 08:18

## 2025-05-26 RX ADMIN — INSULIN LISPRO 3 UNIT(S): 100 INJECTION, SOLUTION INTRAVENOUS; SUBCUTANEOUS at 07:58

## 2025-05-26 RX ADMIN — Medication 4 MILLIGRAM(S): at 09:00

## 2025-05-26 RX ADMIN — Medication 0.5 MILLIGRAM(S): at 04:25

## 2025-05-26 RX ADMIN — OXYBUTYNIN CHLORIDE 10 MILLIGRAM(S): 5 TABLET, FILM COATED, EXTENDED RELEASE ORAL at 17:31

## 2025-05-26 RX ADMIN — INSULIN GLARGINE-YFGN 10 UNIT(S): 100 INJECTION, SOLUTION SUBCUTANEOUS at 21:07

## 2025-05-26 RX ADMIN — Medication 4 MILLIGRAM(S): at 21:08

## 2025-05-26 RX ADMIN — Medication 500 MILLIGRAM(S): at 12:31

## 2025-05-26 RX ADMIN — METHOCARBAMOL 1500 MILLIGRAM(S): 500 TABLET, FILM COATED ORAL at 21:08

## 2025-05-26 NOTE — DIETITIAN INITIAL EVALUATION ADULT - REASON FOR ADMISSION
Urinary tract infection    Per HPI:     CC.   . 5/25/2025 49 y/o male presents axo3 via elise fraga from [facility] for UTI parker/treatment.  OVERALL PRESENTATION.  . 5/25/2025   . 50-year-old male with history of multiple medical issues, currently living at [facility] presents with has been having some cloudy urine in his suprapubic catheter, feeling some generalized fatigue and malaise over the past 1 week.  Patient states this is consistent with his usual UTI.  The patient will often need to get admitted for UTIs, secondary to MDR.  No aggravating or alleviating factors otherwise noted.  No other acute injury or complaints.  No recent trauma.  No known fever. (25 May 2025 20:05)

## 2025-05-26 NOTE — PROGRESS NOTE ADULT - SUBJECTIVE AND OBJECTIVE BOX
CHIEF COMPLAINT/ REASON FOR VISIT  .. Patient was seen to address the  issue listed under PROBLEM LIST which is located toward bottom of this note     [facility]    Allergies    sulfa drugs (Other)  sulfa drugs (Hives)  sulfa drugs (Rash)  sulfa drugs (Unknown)    Intolerances    Pipercillin Sodium-Tazobactam Sodium (Pruritus)      PAST MEDICAL & SURGICAL HISTORY:  BPH (benign prostatic hyperplasia)      HTN (hypertension)      Type 2 diabetes mellitus      Peripheral neuropathy      History of Guillain-Olyphant syndrome      History of CVA in adulthood      DDD (degenerative disc disease), lumbar      DVT, lower extremity      Renal stones      H/O Guillain-Olyphant syndrome      History of neurogenic bowel      DM2 (diabetes mellitus, type 2)      HTN (hypertension)      BPH without obstruction/lower urinary tract symptoms      Degenerative arthritis      DVT of lower limb, acute      CVA (cerebrovascular accident)      CVD (cerebrovascular disease)      Muscle weakness      Iron deficiency anemia      History of muscle spasm      Pain, unspecified      Vitamin deficiency      Obesity      GERD (gastroesophageal reflux disease)      H/O constipation      H/O insomnia      Essential (primary) hypertension      Acute embolism and thrombosis of deep vein of lower extremity      BPH (benign prostatic hyperplasia)      Diabetes mellitus due to underlying condition with diabetic neuropathy      Major depressive disorder, single episode      S/P IVC filter      H/O lithotripsy      Chronic suprapubic catheter          FAMILY HISTORY:  FH: heart disease    FH: HTN (hypertension)    Family history of sinus tachycardia (Father)    FH: HTN (hypertension) (Mother)        Home Medications:  alfuzosin 10 mg oral tablet, extended release: 1 tab(s) orally once a day (25 May 2025 20:05)  aluminum hydroxide-magnesium hydroxide 200 mg-200 mg/5 mL oral suspension: 30 milliliter(s) orally every 4 hours As needed Dyspepsia (25 May 2025 20:05)  amLODIPine 10 mg oral tablet: 1 tab(s) orally once a day (25 May 2025 20:05)  ascorbic acid 500 mg oral tablet: 1 tab(s) orally once a day (25 May 2025 20:05)  Basaglar KwikPen 100 units/mL subcutaneous solution: 10 unit(s) subcutaneous once a day (at bedtime) (25 May 2025 20:05)  cloNIDine 0.1 mg oral tablet: 1 tab(s) orally 2 times a day (25 Apr 2025 23:24)  DULoxetine 60 mg oral delayed release capsule: 1 cap(s) orally once a day (25 Apr 2025 23:24)  DULoxetine 60 mg oral delayed release capsule: 1 cap(s) orally once a day (08 May 2025 10:37)  ferrous sulfate 325 mg (65 mg elemental iron) oral tablet: 1 tab(s) orally once a day (25 Apr 2025 23:24)  finasteride 5 mg oral tablet: 1 tab(s) orally once a day (08 May 2025 10:37)  finasteride 5 mg oral tablet: 1 tab(s) orally once a day (25 Apr 2025 23:24)  folic acid: 1 milligram(s) once a day (25 Apr 2025 23:24)  gabapentin 600 mg oral tablet: 1 tab(s) orally every 8 hours (25 Apr 2025 23:24)  hydrALAZINE 25 mg oral tablet: 1 tab(s) orally every 8 hours as needed for BP &gt;160 (25 Apr 2025 23:56)  HYDROmorphone 4 mg oral tablet: 1 tab(s) orally every 6 hours PRN (25 Apr 2025 23:24)  Insulin Lispro KwikPen 100 units/mL injectable solution: 3 unit(s) subcutaneous 3 times a day (before meals) and  additionally  sliding scale (25 Apr 2025 23:24)  lactobacillus acidophilus oral tablet: 1 tab(s) orally 2 times a day (25 Apr 2025 23:24)  melatonin 5 mg oral tablet: 1 tab(s) orally once a day (at bedtime) (25 Apr 2025 23:24)  melatonin 5 mg oral tablet: 1 tab(s) orally once a day (at bedtime) (08 May 2025 10:37)  methocarbamol 750 mg oral tablet: 2 tab(s) orally every 8 hours (08 May 2025 10:37)  metoprolol tartrate 25 mg oral tablet: 1 tab(s) orally 2 times a day (08 May 2025 10:37)  metoprolol tartrate 25 mg oral tablet: 1 tab(s) orally 2 times a day (25 Apr 2025 23:24)  Multiple Vitamins oral tablet: 1 tab(s) orally once a day (25 Apr 2025 23:24)  Myrbetriq 25 mg oral tablet, extended release: 1 tab(s) orally once a day (25 Apr 2025 23:24)  naloxegol 12.5 mg oral tablet: 1 tab(s) orally once a day (08 May 2025 10:37)  nicotine 14 mg/24 hr transdermal film, extended release: 1 film(s) transdermal once as needed for  agitation (08 May 2025 10:37)  oxyBUTYnin 5 mg oral tablet: 2 tab(s) orally 2 times a day (25 Apr 2025 23:24)  pantoprazole 40 mg oral delayed release tablet: 1 tab(s) orally once a day (before a meal) (25 Apr 2025 23:24)  Senna 8.6 mg oral tablet: 2 tab(s) orally once a day (25 Apr 2025 23:24)  Tylenol 325 mg oral tablet: 2 tab(s) orally every 4 hours as needed for  mild pain (25 Apr 2025 23:24)  warfarin 4 mg oral tablet: 1 tab(s) orally once a day (25 Apr 2025 23:56)      MEDICATIONS  (STANDING):  amLODIPine   Tablet 10 milliGRAM(s) Oral daily  ascorbic acid 500 milliGRAM(s) Oral daily  cloNIDine 0.1 milliGRAM(s) Oral two times a day  dextrose 5%. 1000 milliLiter(s) (50 mL/Hr) IV Continuous <Continuous>  dextrose 5%. 1000 milliLiter(s) (100 mL/Hr) IV Continuous <Continuous>  dextrose 50% Injectable 25 Gram(s) IV Push once  dextrose 50% Injectable 12.5 Gram(s) IV Push once  dextrose 50% Injectable 25 Gram(s) IV Push once  DULoxetine 60 milliGRAM(s) Oral daily  ertapenem  IVPB 500 milliGRAM(s) IV Intermittent every 24 hours  finasteride 5 milliGRAM(s) Oral daily  folic acid 1 milliGRAM(s) Oral daily  glucagon  Injectable 1 milliGRAM(s) IntraMuscular once  insulin glargine Injectable (LANTUS) 10 Unit(s) SubCutaneous at bedtime  insulin lispro (ADMELOG) corrective regimen sliding scale   SubCutaneous three times a day before meals  insulin lispro (ADMELOG) corrective regimen sliding scale   SubCutaneous at bedtime  insulin lispro Injectable (ADMELOG) 3 Unit(s) SubCutaneous before breakfast  insulin lispro Injectable (ADMELOG) 3 Unit(s) SubCutaneous before lunch  insulin lispro Injectable (ADMELOG) 3 Unit(s) SubCutaneous before dinner  melatonin 3 milliGRAM(s) Oral at bedtime  methocarbamol 1500 milliGRAM(s) Oral every 8 hours  metoprolol tartrate 25 milliGRAM(s) Oral two times a day  naloxegol 12.5 milliGRAM(s) Oral daily  nicotine -  14 mG/24Hr(s) Patch 1 Patch Transdermal daily  oxybutynin 10 milliGRAM(s) Oral two times a day  pantoprazole    Tablet 40 milliGRAM(s) Oral before breakfast  senna 2 Tablet(s) Oral at bedtime    MEDICATIONS  (PRN):  acetaminophen     Tablet .. 650 milliGRAM(s) Oral every 6 hours PRN Temp greater or equal to 38C (100.4F), Mild Pain (1 - 3)  dextrose Oral Gel 15 Gram(s) Oral once PRN Blood Glucose LESS THAN 70 milliGRAM(s)/deciliter  HYDROmorphone   Tablet 4 milliGRAM(s) Oral every 6 hours PRN Severe Pain (7 - 10)  HYDROmorphone  Injectable 0.5 milliGRAM(s) IV Push every 6 hours PRN Severe Pain (7 - 10)      Diet, DASH/TLC:   Sodium & Cholesterol Restricted  Consistent Carbohydrate Evening Snack (05-25-25 @ 20:28) [Active]          Vital Signs Last 24 Hrs  T(C): 36.7 (26 May 2025 07:52), Max: 37.1 (25 May 2025 20:59)  T(F): 98.1 (26 May 2025 07:52), Max: 98.7 (25 May 2025 20:59)  HR: 64 (26 May 2025 05:30) (55 - 80)  BP: 139/79 (26 May 2025 05:30) (139/79 - 190/82)  BP(mean): 110 (25 May 2025 20:59) (110 - 110)  RR: 18 (26 May 2025 04:09) (14 - 19)  SpO2: 97% (26 May 2025 04:09) (96% - 97%)    Parameters below as of 26 May 2025 04:09  Patient On (Oxygen Delivery Method): room air          05-25-25 @ 07:01  -  05-26-25 @ 07:00  --------------------------------------------------------  IN: 0 mL / OUT: 1800 mL / NET: -1800 mL              LABS:                        8.5    8.34  )-----------( 164      ( 26 May 2025 06:00 )             27.4     05-26    140  |  106  |  31[H]  ----------------------------<  165[H]  3.8   |  22  |  3.07[H]    Ca    8.3[L]      26 May 2025 06:00    TPro  6.5  /  Alb  2.5[L]  /  TBili  0.3  /  DBili  x   /  AST  13  /  ALT  21  /  AlkPhos  92  05-26    PT/INR - ( 26 May 2025 06:00 )   PT: 36.1 sec;   INR: 3.09 ratio           Urinalysis Basic - ( 26 May 2025 06:00 )    Color: x / Appearance: x / SG: x / pH: x  Gluc: 165 mg/dL / Ketone: x  / Bili: x / Urobili: x   Blood: x / Protein: x / Nitrite: x   Leuk Esterase: x / RBC: x / WBC x   Sq Epi: x / Non Sq Epi: x / Bacteria: x            WBC:  WBC Count: 8.34 K/uL (05-26 @ 06:00)  WBC Count: 10.57 K/uL (05-25 @ 17:47)      MICROBIOLOGY:  RECENT CULTURES:              PT/INR - ( 26 May 2025 06:00 )   PT: 36.1 sec;   INR: 3.09 ratio             Sodium:  Sodium: 140 mmol/L (05-26 @ 06:00)  Sodium: 135 mmol/L (05-25 @ 17:47)      3.07 mg/dL 05-26 @ 06:00  3.28 mg/dL 05-25 @ 17:47      Hemoglobin:  Hemoglobin: 8.5 g/dL (05-26 @ 06:00)  Hemoglobin: 9.8 g/dL (05-25 @ 17:47)      Platelets: Platelet Count - Automated: 164 K/uL (05-26 @ 06:00)  Platelet Count - Automated: 152 K/uL (05-25 @ 17:47)      LIVER FUNCTIONS - ( 26 May 2025 06:00 )  Alb: 2.5 g/dL / Pro: 6.5 g/dL / ALK PHOS: 92 U/L / ALT: 21 U/L / AST: 13 U/L / GGT: x             Urinalysis Basic - ( 26 May 2025 06:00 )    Color: x / Appearance: x / SG: x / pH: x  Gluc: 165 mg/dL / Ketone: x  / Bili: x / Urobili: x   Blood: x / Protein: x / Nitrite: x   Leuk Esterase: x / RBC: x / WBC x   Sq Epi: x / Non Sq Epi: x / Bacteria: x        RADIOLOGY & ADDITIONAL STUDIES:      MICROBIOLOGY:  RECENT CULTURES:

## 2025-05-26 NOTE — DIETITIAN INITIAL EVALUATION ADULT - NS FNS DIET ORDER
Diet, DASH/TLC:   Sodium & Cholesterol Restricted  Consistent Carbohydrate {Evening Snack} (05-25-25 @ 20:28)

## 2025-05-26 NOTE — PROGRESS NOTE ADULT - SUBJECTIVE AND OBJECTIVE BOX
Patient is a 50y Male whom presented to the hospital with ckd     PAST MEDICAL & SURGICAL HISTORY:  BPH (benign prostatic hyperplasia)      HTN (hypertension)      Type 2 diabetes mellitus      Peripheral neuropathy      History of Guillain-[place] syndrome      History of CVA in adulthood      DDD (degenerative disc disease), lumbar      DVT, lower extremity      Renal stones      H/O Guillain-[place] syndrome      History of neurogenic bowel      DM2 (diabetes mellitus, type 2)      HTN (hypertension)      BPH without obstruction/lower urinary tract symptoms      Degenerative arthritis      DVT of lower limb, acute      CVA (cerebrovascular accident)      CVD (cerebrovascular disease)      Muscle weakness      Iron deficiency anemia      History of muscle spasm      Pain, unspecified      Vitamin deficiency      Obesity      GERD (gastroesophageal reflux disease)      H/O constipation      H/O insomnia      Essential (primary) hypertension      Acute embolism and thrombosis of deep vein of lower extremity      BPH (benign prostatic hyperplasia)      Diabetes mellitus due to underlying condition with diabetic neuropathy      Major depressive disorder, single episode      S/P IVC filter      H/O lithotripsy      Chronic suprapubic catheter          MEDICATIONS  (STANDING):  amLODIPine   Tablet 10 milliGRAM(s) Oral daily  ascorbic acid 500 milliGRAM(s) Oral daily  cloNIDine 0.1 milliGRAM(s) Oral two times a day  dextrose 5%. 1000 milliLiter(s) (50 mL/Hr) IV Continuous <Continuous>  dextrose 5%. 1000 milliLiter(s) (100 mL/Hr) IV Continuous <Continuous>  dextrose 50% Injectable 25 Gram(s) IV Push once  dextrose 50% Injectable 12.5 Gram(s) IV Push once  dextrose 50% Injectable 25 Gram(s) IV Push once  DULoxetine 60 milliGRAM(s) Oral daily  ertapenem  IVPB 500 milliGRAM(s) IV Intermittent every 24 hours  finasteride 5 milliGRAM(s) Oral daily  folic acid 1 milliGRAM(s) Oral daily  glucagon  Injectable 1 milliGRAM(s) IntraMuscular once  insulin glargine Injectable (LANTUS) 10 Unit(s) SubCutaneous at bedtime  insulin lispro (ADMELOG) corrective regimen sliding scale   SubCutaneous three times a day before meals  insulin lispro (ADMELOG) corrective regimen sliding scale   SubCutaneous at bedtime  insulin lispro Injectable (ADMELOG) 3 Unit(s) SubCutaneous before breakfast  insulin lispro Injectable (ADMELOG) 3 Unit(s) SubCutaneous before lunch  insulin lispro Injectable (ADMELOG) 3 Unit(s) SubCutaneous before dinner  melatonin 3 milliGRAM(s) Oral at bedtime  methocarbamol 1500 milliGRAM(s) Oral every 8 hours  metoprolol tartrate 25 milliGRAM(s) Oral two times a day  naloxegol 12.5 milliGRAM(s) Oral daily  nicotine -  14 mG/24Hr(s) Patch 1 Patch Transdermal daily  oxybutynin 10 milliGRAM(s) Oral two times a day  pantoprazole    Tablet 40 milliGRAM(s) Oral before breakfast  senna 2 Tablet(s) Oral at bedtime      Allergies    sulfa drugs (Other)  sulfa drugs (Hives)  sulfa drugs (Rash)  sulfa drugs (Unknown)    Intolerances    Pipercillin Sodium-Tazobactam Sodium (Pruritus)      SOCIAL HISTORY:  Denies ETOh,Smoking,     FAMILY HISTORY:  FH: heart disease    FH: HTN (hypertension)    Family history of sinus tachycardia (Father)    FH: HTN (hypertension) (Mother)        REVIEW OF SYSTEMS:    CONSTITUTIONAL: No weakness, fevers or chills  RESPIRATORY: No cough, wheezing, hemoptysis; No shortness of breath  CARDIOVASCULAR: No chest pain or palpitations  GASTROINTESTINAL: No abdominal or epigastric pain. No nausea, vomiting,     No diarrhea or constipation. No melena   SKIN: dry  Chronic suprapubic catheter      VITAL:  T(C): , Max: 37.1 (25 @ 20:59)  T(F): , Max: 98.7 (25 @ 20:59)  HR: 64 (25 @ 05:30)  BP: 139/79 (25 @ 05:30)  BP(mean): 110 (25 @ 20:59)  RR: 18 (25 @ 04:09)  SpO2: 97% (25 @ 04:09)  Wt(kg): --    I and O's:     @ 07:01  -   @ 07:00  --------------------------------------------------------  IN: 0 mL / OUT: 1800 mL / NET: -1800 mL      Height (cm): 182.9 ( 17:02)  Weight (kg): 118.8 (05-25 @ 17:02)  BMI (kg/m2): 35.5 ( @ 17:02)  BSA (m2): 2.39 ( @ 17:02)    PHYSICAL EXAM:    Constitutional: NAD  HEENT: conjunctive   clear   Neck:  No JVD  Respiratory: CTAB  Cardiovascular: S1 and S2  Gastrointestinal: BS+, soft, NT/ND  Extremities: No peripheral edema  : Chronic suprapubic catheter  Skin: No rashes  Access: Not applicable    LABS:                        8.5    8.34  )-----------( 164      ( 26 May 2025 06:00 )             27.4         140  |  106  |  31[H]  ----------------------------<  165[H]  3.8   |  22  |  3.07[H]    Ca    8.3[L]      26 May 2025 06:00    TPro  6.5  /  Alb  2.5[L]  /  TBili  0.3  /  DBili  x   /  AST  13  /  ALT  21  /  AlkPhos  92        Urine Studies:  Urinalysis Basic - ( 26 May 2025 06:00 )    Color: x / Appearance: x / SG: x / pH: x  Gluc: 165 mg/dL / Ketone: x  / Bili: x / Urobili: x   Blood: x / Protein: x / Nitrite: x   Leuk Esterase: x / RBC: x / WBC x   Sq Epi: x / Non Sq Epi: x / Bacteria: x            RADIOLOGY & ADDITIONAL STUDIES:      MEDICATIONS  (STANDING):  amLODIPine   Tablet 10 milliGRAM(s) Oral daily  ascorbic acid 500 milliGRAM(s) Oral daily  cloNIDine 0.1 milliGRAM(s) Oral two times a day  dextrose 5%. 1000 milliLiter(s) (50 mL/Hr) IV Continuous <Continuous>  dextrose 5%. 1000 milliLiter(s) (100 mL/Hr) IV Continuous <Continuous>  dextrose 50% Injectable 25 Gram(s) IV Push once  dextrose 50% Injectable 12.5 Gram(s) IV Push once  dextrose 50% Injectable 25 Gram(s) IV Push once  DULoxetine 60 milliGRAM(s) Oral daily  ertapenem  IVPB 500 milliGRAM(s) IV Intermittent every 24 hours  finasteride 5 milliGRAM(s) Oral daily  folic acid 1 milliGRAM(s) Oral daily  glucagon  Injectable 1 milliGRAM(s) IntraMuscular once  insulin glargine Injectable (LANTUS) 10 Unit(s) SubCutaneous at bedtime  insulin lispro (ADMELOG) corrective regimen sliding scale   SubCutaneous three times a day before meals  insulin lispro (ADMELOG) corrective regimen sliding scale   SubCutaneous at bedtime  insulin lispro Injectable (ADMELOG) 3 Unit(s) SubCutaneous before breakfast  insulin lispro Injectable (ADMELOG) 3 Unit(s) SubCutaneous before lunch  insulin lispro Injectable (ADMELOG) 3 Unit(s) SubCutaneous before dinner  melatonin 3 milliGRAM(s) Oral at bedtime  methocarbamol 1500 milliGRAM(s) Oral every 8 hours  metoprolol tartrate 25 milliGRAM(s) Oral two times a day  naloxegol 12.5 milliGRAM(s) Oral daily  nicotine -  14 mG/24Hr(s) Patch 1 Patch Transdermal daily  oxybutynin 10 milliGRAM(s) Oral two times a day  pantoprazole    Tablet 40 milliGRAM(s) Oral before breakfast  senna 2 Tablet(s) Oral at bedtime                                                                            9.8    10.57 )-----------( 152      ( 25 May 2025 17:47 )             31.0       CBC Full  -  ( 25 May 2025 17:47 )  WBC Count : 10.57 K/uL  RBC Count : 4.12 M/uL  Hemoglobin : 9.8 g/dL  Hematocrit : 31.0 %  Platelet Count - Automated : 152 K/uL  Mean Cell Volume : 75.2 fL  Mean Cell Hemoglobin : 23.8 pg  Mean Cell Hemoglobin Concentration : 31.6 g/dL  Auto Neutrophil # : x  Auto Lymphocyte # : x  Auto Monocyte # : x  Auto Eosinophil # : x  Auto Basophil # : x  Auto Neutrophil % : x  Auto Lymphocyte % : x  Auto Monocyte % : x  Auto Eosinophil % : x  Auto Basophil % : x      05-25    135  |  101  |  34[H]  ----------------------------<  235[H]  4.5   |  22  |  3.28[H]    Ca    8.7      25 May 2025 17:47    TPro  7.6  /  Alb  2.9[L]  /  TBili  0.5  /  DBili  x   /  AST  31  /  ALT  27  /  AlkPhos  108  05-25      CAPILLARY BLOOD GLUCOSE          Vital Signs Last 24 Hrs  T(C): 36.7 (25 May 2025 20:36), Max: 36.9 (25 May 2025 17:02)  T(F): 98.1 (25 May 2025 20:36), Max: 98.4 (25 May 2025 17:02)  HR: 79 (25 May 2025 20:36) (79 - 80)  BP: 190/82 (25 May 2025 20:36) (150/80 - 190/82)  BP(mean): --  RR: 18 (25 May 2025 20:36) (14 - 18)  SpO2: 97% (25 May 2025 20:36) (96% - 97%)    Parameters below as of 25 May 2025 20:36  Patient On (Oxygen Delivery Method): room air        Urinalysis Basic - ( 25 May 2025 18:20 )    Color: Yellow / Appearance: Cloudy / S.012 / pH: x  Gluc: x / Ketone: x  / Bili: Negative / Urobili: 0.2 mg/dL   Blood: x / Protein: 300 mg/dL / Nitrite: Negative   Leuk Esterase: Large / RBC: 2 /HPF / WBC 16 /HPF   Sq Epi: x / Non Sq Epi: x / Bacteria: Negative /HPF      MEDICATIONS  (STANDING):  amLODIPine   Tablet 10 milliGRAM(s) Oral daily  ascorbic acid 500 milliGRAM(s) Oral daily  cloNIDine 0.1 milliGRAM(s) Oral two times a day  DULoxetine 60 milliGRAM(s) Oral daily  ertapenem  IVPB 500 milliGRAM(s) IV Intermittent every 24 hours  finasteride 5 milliGRAM(s) Oral daily  folic acid 1 milliGRAM(s) Oral daily  glucagon  Injectable 1 milliGRAM(s) IntraMuscular once  melatonin 3 milliGRAM(s) Oral at bedtime  methocarbamol 1500 milliGRAM(s) Oral every 8 hours  metoprolol tartrate 25 milliGRAM(s) Oral two times a day  naloxegol 12.5 milliGRAM(s) Oral daily  nicotine -  14 mG/24Hr(s) Patch 1 Patch Transdermal daily  oxybutynin 10 milliGRAM(s) Oral two times a day  pantoprazole    Tablet 40 milliGRAM(s) Oral before breakfast  senna 2 Tablet(s) Oral at bedtime

## 2025-05-26 NOTE — DIETITIAN INITIAL EVALUATION ADULT - ORAL INTAKE PTA/DIET HISTORY
Pt with good appetite PTA, with hx of DM and on No concentrated sweets diet at [facility]. ON reg/thin texture PTA. Doesn't elaborate on foods consumed at facility.    HIE Wts:  127kg July-Dec 2024

## 2025-05-26 NOTE — DIETITIAN INITIAL EVALUATION ADULT - PERTINENT LABORATORY DATA
05-26    140  |  106  |  31[H]  ----------------------------<  165[H]  3.8   |  22  |  3.07[H]    Ca    8.3[L]      26 May 2025 06:00    TPro  6.5  /  Alb  2.5[L]  /  TBili  0.3  /  DBili  x   /  AST  13  /  ALT  21  /  AlkPhos  92  05-26  POCT Blood Glucose.: 168 mg/dL (05-26-25 @ 07:50)  A1C with Estimated Average Glucose Result: 8.4 % (04-26-25 @ 07:18)  A1C with Estimated Average Glucose Result: 9.0 % (04-11-25 @ 05:30)  A1C with Estimated Average Glucose Result: 8.0 % (10-22-24 @ 10:07)

## 2025-05-26 NOTE — DIETITIAN INITIAL EVALUATION ADULT - PERTINENT MEDS FT
MEDICATIONS  (STANDING):  amLODIPine   Tablet 10 milliGRAM(s) Oral daily  ascorbic acid 500 milliGRAM(s) Oral daily  cloNIDine 0.1 milliGRAM(s) Oral two times a day  dextrose 5%. 1000 milliLiter(s) (50 mL/Hr) IV Continuous <Continuous>  dextrose 5%. 1000 milliLiter(s) (100 mL/Hr) IV Continuous <Continuous>  dextrose 50% Injectable 25 Gram(s) IV Push once  dextrose 50% Injectable 12.5 Gram(s) IV Push once  dextrose 50% Injectable 25 Gram(s) IV Push once  DULoxetine 60 milliGRAM(s) Oral daily  ertapenem  IVPB 500 milliGRAM(s) IV Intermittent every 24 hours  finasteride 5 milliGRAM(s) Oral daily  folic acid 1 milliGRAM(s) Oral daily  glucagon  Injectable 1 milliGRAM(s) IntraMuscular once  insulin glargine Injectable (LANTUS) 10 Unit(s) SubCutaneous at bedtime  insulin lispro (ADMELOG) corrective regimen sliding scale   SubCutaneous three times a day before meals  insulin lispro (ADMELOG) corrective regimen sliding scale   SubCutaneous at bedtime  insulin lispro Injectable (ADMELOG) 3 Unit(s) SubCutaneous before breakfast  insulin lispro Injectable (ADMELOG) 3 Unit(s) SubCutaneous before lunch  insulin lispro Injectable (ADMELOG) 3 Unit(s) SubCutaneous before dinner  melatonin 3 milliGRAM(s) Oral at bedtime  methocarbamol 1500 milliGRAM(s) Oral every 8 hours  metoprolol tartrate 25 milliGRAM(s) Oral two times a day  naloxegol 12.5 milliGRAM(s) Oral daily  nicotine -  14 mG/24Hr(s) Patch 1 Patch Transdermal daily  oxybutynin 10 milliGRAM(s) Oral two times a day  pantoprazole    Tablet 40 milliGRAM(s) Oral before breakfast  senna 2 Tablet(s) Oral at bedtime    MEDICATIONS  (PRN):  acetaminophen     Tablet .. 650 milliGRAM(s) Oral every 6 hours PRN Temp greater or equal to 38C (100.4F), Mild Pain (1 - 3)  dextrose Oral Gel 15 Gram(s) Oral once PRN Blood Glucose LESS THAN 70 milliGRAM(s)/deciliter  HYDROmorphone   Tablet 4 milliGRAM(s) Oral every 6 hours PRN Severe Pain (7 - 10)  HYDROmorphone  Injectable 0.5 milliGRAM(s) IV Push every 6 hours PRN Severe Pain (7 - 10)

## 2025-05-26 NOTE — DIETITIAN INITIAL EVALUATION ADULT - ADD RECOMMEND
1) Continue CCHO/DASH diet  2) Continue to monitor PO intake, tolerance to diet prescription, weights, labs, GI tolerance, and skin integrity. MD notified via teams regarding any change/changes to diet order.

## 2025-05-26 NOTE — DIETITIAN INITIAL EVALUATION ADULT - OTHER INFO
Visited patient in room, pt able to answer questions and also discussed w RN and performed extensive chart review. presents with excellent appetite/po intake, consuming >75% of DASH/CCHO meals. Denies n/v/d/c, GI WDL per flowsheet . No reported difficulty chewing or swallowing. NKFA. Current adm weight 118.8kg, bed wt today 124kg, weight appears to be slightly decreased however pt noted with obesity and on meds for DM, possible wt loss effect of meds PTA; will continue to monitor weight trends as able.    Pertinent labs/meds reviewed: receiving Lantus 10 units QD, Lispro 3 units + SS TID , senna, vit C, folic acid; -218 x 24hr    Education provided at this time on CCHO trays provided in house. RD to remain available per protocol.

## 2025-05-26 NOTE — PROGRESS NOTE ADULT - SUBJECTIVE AND OBJECTIVE BOX
JOIE BRUNO is a 50yMale , patient examined and chart reviewed.     INTERVAL HPI/ OVERNIGHT EVENTS:   No events Chronic pains.   Afebrile.    PAST MEDICAL & SURGICAL HISTORY:  BPH (benign prostatic hyperplasia)  HTN (hypertension)  Type 2 diabetes mellitus  Peripheral neuropathy  History of Guillain-[place] syndrome  History of CVA in adulthood  DDD (degenerative disc disease), lumbar  DVT, lower extremity  Renal stones  History of neurogenic bowel  Degenerative arthritis  Muscle weakness  Iron deficiency anemia  History of muscle spasm  Pain, unspecified  Vitamin deficiency  Obesity  GERD (gastroesophageal reflux disease)  H/O constipation  H/O insomnia  Major depressive disorder, single episode  S/P IVC filter  H/O lithotripsy  Chronic suprapubic catheter      For details regarding the patient's social history, family history, and other miscellaneous elements, please refer the initial infectious diseases consultation and/or the admitting history and physical examination for this admission.    ROS:  CONSTITUTIONAL:  Negative fever or chills  EYES:  Negative  blurry vision or double vision  CARDIOVASCULAR:  Negative for chest pain or palpitations  RESPIRATORY:  Negative for cough, wheezing, or SOB   GASTROINTESTINAL:  Negative for nausea, vomiting, diarrhea, constipation, or abdominal pain  GENITOURINARY:  Negative frequency, urgency or dysuria  NEUROLOGIC:  No headache, confusion, dizziness, lightheadedness  All other systems were reviewed and are negative     ALLERGIES  Pipercillin Sodium-Tazobactam Sodium (Pruritus)  sulfa drugs (Rash)        Current inpatient medications :    ANTIBIOTICS/RELEVANT:  ertapenem  IVPB 500 milliGRAM(s) IV Intermittent every 24 hours  nicotine -  14 mG/24Hr(s) Patch 1 Patch Transdermal daily      acetaminophen     Tablet .. 650 milliGRAM(s) Oral every 6 hours PRN  amLODIPine   Tablet 10 milliGRAM(s) Oral daily  ascorbic acid 500 milliGRAM(s) Oral daily  cloNIDine 0.1 milliGRAM(s) Oral two times a day  dextrose 5%. 1000 milliLiter(s) IV Continuous <Continuous>  dextrose 5%. 1000 milliLiter(s) IV Continuous <Continuous>  dextrose 50% Injectable 25 Gram(s) IV Push once  dextrose 50% Injectable 12.5 Gram(s) IV Push once  dextrose 50% Injectable 25 Gram(s) IV Push once  dextrose Oral Gel 15 Gram(s) Oral once PRN  DULoxetine 60 milliGRAM(s) Oral daily  finasteride 5 milliGRAM(s) Oral daily  folic acid 1 milliGRAM(s) Oral daily  glucagon  Injectable 1 milliGRAM(s) IntraMuscular once  HYDROmorphone   Tablet 4 milliGRAM(s) Oral every 6 hours PRN  HYDROmorphone  Injectable 0.5 milliGRAM(s) IV Push every 6 hours PRN  insulin glargine Injectable (LANTUS) 10 Unit(s) SubCutaneous at bedtime  insulin lispro (ADMELOG) corrective regimen sliding scale   SubCutaneous three times a day before meals  insulin lispro (ADMELOG) corrective regimen sliding scale   SubCutaneous at bedtime  insulin lispro Injectable (ADMELOG) 3 Unit(s) SubCutaneous before breakfast  insulin lispro Injectable (ADMELOG) 3 Unit(s) SubCutaneous before lunch  insulin lispro Injectable (ADMELOG) 3 Unit(s) SubCutaneous before dinner  melatonin 3 milliGRAM(s) Oral at bedtime  methocarbamol 1500 milliGRAM(s) Oral every 8 hours  metoprolol tartrate 25 milliGRAM(s) Oral two times a day  naloxegol 12.5 milliGRAM(s) Oral daily  oxybutynin 10 milliGRAM(s) Oral two times a day  pantoprazole    Tablet 40 milliGRAM(s) Oral before breakfast  senna 2 Tablet(s) Oral at bedtime      Objective:    05-25 @ 07:01  -  05-26 @ 07:00  --------------------------------------------------------  IN: 0 mL / OUT: 1800 mL / NET: -1800 mL    05-26 @ 07:01  -  05-26 @ 23:50  --------------------------------------------------------  IN: 0 mL / OUT: 1900 mL / NET: -1900 mL      T(C): 36.7 (05-26-25 @ 20:44), Max: 36.9 (05-26-25 @ 13:20)  HR: 74 (05-26-25 @ 17:30) (55 - 74)  BP: 169/87 (05-26-25 @ 17:30) (139/79 - 169/87)  RR: 20 (05-26-25 @ 13:20) (18 - 20)  SpO2: 98% (05-26-25 @ 13:20) (97% - 98%)      Physical Exam:  General: well developed well nourished, in no acute distress  Neck: supple, trachea midline  Lungs: clear, no wheeze/rhonchi  Cardiovascular: regular rate and rhythm, S1 S2  Abdomen: soft, nontender,  bowel sounds normal +SPC  Neurological: alert and oriented x3  Skin: no rash  Extremities: no cyanosis/clubbing/edema        LABS:                        8.5    8.34  )-----------( 164      ( 26 May 2025 06:00 )             27.4       05-26    140  |  106  |  31[H]  ----------------------------<  165[H]  3.8   |  22  |  3.07[H]    Ca    8.3[L]      26 May 2025 06:00    TPro  6.5  /  Alb  2.5[L]  /  TBili  0.3  /  DBili  x   /  AST  13  /  ALT  21  /  AlkPhos  92  05-26      PT/INR - ( 26 May 2025 06:00 )   PT: 36.1 sec;   INR: 3.09 ratio       Urine Microscopic-Add On (NC) (05.25.25 @ 18:20)    White Blood Cell - Urine: 16 /HPF   Red Blood Cell - Urine: 2 /HPF   Bacteria: Negative /HPF   Squamous Epithelial Cells: Present  Urinalysis (05.25.25 @ 18:20)    Glucose Qualitative, Urine: 100 mg/dL   Blood, Urine: Moderate   pH Urine: 6.0   Color: Yellow   Urine Appearance: Cloudy   Bilirubin: Negative   Ketone , Urine: Negative mg/dL   Specific Gravity: 1.012   Protein, Urine: 300 mg/dL   Urobilinogen: 0.2 mg/dL   Nitrite: Negative   Leukocyte Esterase Concentration: Large    MICROBIOLOGY:      RADIOLOGY & ADDITIONAL STUDIES:      Assessment :  51YO M long term resident of [facility] PMH hypertension CKD GBS DVT CVA antiphospholipid antibody syndrome on warfarin diabetes hyperlipidemia neurogenic bladder with suprapubic catheter chronic bladder spasms recent admission for Acute cally transferred to [facility]  S/P lap cally 4/28 now presents with c/o cloudy urine in his suprapubic catheter, feeling some generalized fatigue and malaise over the past 1 week.  Patient states this is consistent with his usual UTI. No fever chills n/v/d. In ED afebrile. WBC 10K ANTONIO On CKD UA wih minimal pyuria. SPC changed in ED.   Rule out CAUTI  Chronic pains    Plan :   On empiric Ertapenem ( pt with hx of esbl infections )  Trend temps and cbc  Fu cultures  Stable from ID standpoint    Advance Directives- Full ode  Current Medications are documented.   Drug-drug interactions reviewed.    Continue with present regiment.  Appropriate use of antibiotics and adverse effects reviewed.      I have discussed the above plan of care with patient/ family in detail. They expressed understanding of the  treatment plan . Risks, benefits and alternatives discussed in detail. I have asked if they have any questions or concerns and appropriately addressed them to the best of my ability .    > 35 minutes were spent in direct patient care reviewing notes, medications ,labs data/ imaging , discussion with multidisciplinary team.    Thank you for allowing me to participate in care of your patient .    Yadira Blackmon MD  Infectious Disease  [phone]    Individualized infection control protocols for an individual patient based on their diagnosis and risks in order to reduce risk of disease transmission followed. Coordinating with  infection prevention and control team members to enable healthcare facility staff to safely care for patient.  Managing infection prevention and treatment protocols associated with transitions of care for complex patients.  In-depth patient chart review that entails going back farther in time and assessing the complete breadth of all health care interactions, with higher-level synthesis for complex diagnoses.  Engaging in complex medical decision-making associated with antimicrobial prescribing including considerations such as antimicrobial resistance patterns, emergence of new variants/strains, recent antibiotic exposure, interactions/complications from comorbidities including concurrent infections, public health considerations to minimize development of antimicrobial resistance, and emerging and re-emerging infections.

## 2025-05-26 NOTE — DIETITIAN INITIAL EVALUATION ADULT - NSICDXPASTMEDICALHX_GEN_ALL_CORE_FT
PAST MEDICAL HISTORY:  Acute embolism and thrombosis of deep vein of lower extremity     BPH (benign prostatic hyperplasia)     BPH (benign prostatic hyperplasia)     BPH without obstruction/lower urinary tract symptoms     CVA (cerebrovascular accident)     CVD (cerebrovascular disease)     DDD (degenerative disc disease), lumbar     Degenerative arthritis     Diabetes mellitus due to underlying condition with diabetic neuropathy     DM2 (diabetes mellitus, type 2)     DVT of lower limb, acute     DVT, lower extremity     Essential (primary) hypertension     GERD (gastroesophageal reflux disease)     H/O constipation     H/O Guillain-[place] syndrome     H/O insomnia     History of CVA in adulthood     History of Guillain-[place] syndrome     History of muscle spasm     History of neurogenic bowel     HTN (hypertension)     HTN (hypertension)     Iron deficiency anemia     Major depressive disorder, single episode     Muscle weakness     Obesity     Pain, unspecified     Peripheral neuropathy     Renal stones     Type 2 diabetes mellitus     Vitamin deficiency

## 2025-05-27 DIAGNOSIS — Z29.9 ENCOUNTER FOR PROPHYLACTIC MEASURES, UNSPECIFIED: ICD-10-CM

## 2025-05-27 DIAGNOSIS — G62.9 POLYNEUROPATHY, UNSPECIFIED: ICD-10-CM

## 2025-05-27 DIAGNOSIS — N32.89 OTHER SPECIFIED DISORDERS OF BLADDER: ICD-10-CM

## 2025-05-27 DIAGNOSIS — M54.9 DORSALGIA, UNSPECIFIED: ICD-10-CM

## 2025-05-27 DIAGNOSIS — E11.9 TYPE 2 DIABETES MELLITUS WITHOUT COMPLICATIONS: ICD-10-CM

## 2025-05-27 DIAGNOSIS — N39.0 URINARY TRACT INFECTION, SITE NOT SPECIFIED: ICD-10-CM

## 2025-05-27 DIAGNOSIS — Z91.89 OTHER SPECIFIED PERSONAL RISK FACTORS, NOT ELSEWHERE CLASSIFIED: ICD-10-CM

## 2025-05-27 DIAGNOSIS — D68.61 ANTIPHOSPHOLIPID SYNDROME: ICD-10-CM

## 2025-05-27 DIAGNOSIS — N40.0 BENIGN PROSTATIC HYPERPLASIA WITHOUT LOWER URINARY TRACT SYMPTOMS: ICD-10-CM

## 2025-05-27 DIAGNOSIS — I10 ESSENTIAL (PRIMARY) HYPERTENSION: ICD-10-CM

## 2025-05-27 LAB
ALBUMIN SERPL ELPH-MCNC: 2.5 G/DL — LOW (ref 3.3–5)
ALP SERPL-CCNC: 98 U/L — SIGNIFICANT CHANGE UP (ref 30–120)
ALT FLD-CCNC: 24 U/L — SIGNIFICANT CHANGE UP (ref 10–60)
ANION GAP SERPL CALC-SCNC: 10 MMOL/L — SIGNIFICANT CHANGE UP (ref 5–17)
AST SERPL-CCNC: 14 U/L — SIGNIFICANT CHANGE UP (ref 10–40)
BILIRUB SERPL-MCNC: 0.4 MG/DL — SIGNIFICANT CHANGE UP (ref 0.2–1.2)
BUN SERPL-MCNC: 29 MG/DL — HIGH (ref 7–23)
CALCIUM SERPL-MCNC: 8.5 MG/DL — SIGNIFICANT CHANGE UP (ref 8.4–10.5)
CHLORIDE SERPL-SCNC: 103 MMOL/L — SIGNIFICANT CHANGE UP (ref 96–108)
CO2 SERPL-SCNC: 24 MMOL/L — SIGNIFICANT CHANGE UP (ref 22–31)
CREAT SERPL-MCNC: 2.72 MG/DL — HIGH (ref 0.5–1.3)
EGFR: 28 ML/MIN/1.73M2 — LOW
EGFR: 28 ML/MIN/1.73M2 — LOW
GLUCOSE BLDC GLUCOMTR-MCNC: 144 MG/DL — HIGH (ref 70–99)
GLUCOSE BLDC GLUCOMTR-MCNC: 146 MG/DL — HIGH (ref 70–99)
GLUCOSE BLDC GLUCOMTR-MCNC: 157 MG/DL — HIGH (ref 70–99)
GLUCOSE BLDC GLUCOMTR-MCNC: 268 MG/DL — HIGH (ref 70–99)
GLUCOSE SERPL-MCNC: 160 MG/DL — HIGH (ref 70–99)
HCT VFR BLD CALC: 28.9 % — LOW (ref 39–50)
HGB BLD-MCNC: 8.9 G/DL — LOW (ref 13–17)
INR BLD: 2.34 RATIO — HIGH (ref 0.85–1.16)
MCHC RBC-ENTMCNC: 23.2 PG — LOW (ref 27–34)
MCHC RBC-ENTMCNC: 30.8 G/DL — LOW (ref 32–36)
MCV RBC AUTO: 75.3 FL — LOW (ref 80–100)
NRBC BLD AUTO-RTO: 0 /100 WBCS — SIGNIFICANT CHANGE UP (ref 0–0)
PLATELET # BLD AUTO: 180 K/UL — SIGNIFICANT CHANGE UP (ref 150–400)
POTASSIUM SERPL-MCNC: 3.6 MMOL/L — SIGNIFICANT CHANGE UP (ref 3.5–5.3)
POTASSIUM SERPL-SCNC: 3.6 MMOL/L — SIGNIFICANT CHANGE UP (ref 3.5–5.3)
PROT SERPL-MCNC: 6.4 G/DL — SIGNIFICANT CHANGE UP (ref 6–8.3)
PROTHROM AB SERPL-ACNC: 26.9 SEC — HIGH (ref 9.9–13.4)
RBC # BLD: 3.84 M/UL — LOW (ref 4.2–5.8)
RBC # FLD: 17.5 % — HIGH (ref 10.3–14.5)
SODIUM SERPL-SCNC: 137 MMOL/L — SIGNIFICANT CHANGE UP (ref 135–145)
WBC # BLD: 8.31 K/UL — SIGNIFICANT CHANGE UP (ref 3.8–10.5)
WBC # FLD AUTO: 8.31 K/UL — SIGNIFICANT CHANGE UP (ref 3.8–10.5)

## 2025-05-27 RX ORDER — LACTOBACILLUS ACIDOPHILUS/PECT 75 MM-100
1 CAPSULE ORAL EVERY 12 HOURS
Refills: 0 | Status: DISCONTINUED | OUTPATIENT
Start: 2025-05-27 | End: 2025-05-30

## 2025-05-27 RX ORDER — EPOETIN ALFA 10000 [IU]/ML
10000 SOLUTION INTRAVENOUS; SUBCUTANEOUS
Refills: 0 | Status: DISCONTINUED | OUTPATIENT
Start: 2025-05-27 | End: 2025-05-30

## 2025-05-27 RX ORDER — MAGNESIUM, ALUMINUM HYDROXIDE 200-200 MG
30 TABLET,CHEWABLE ORAL EVERY 4 HOURS
Refills: 0 | Status: DISCONTINUED | OUTPATIENT
Start: 2025-05-27 | End: 2025-05-30

## 2025-05-27 RX ORDER — ONDANSETRON HCL/PF 4 MG/2 ML
4 VIAL (ML) INJECTION EVERY 8 HOURS
Refills: 0 | Status: DISCONTINUED | OUTPATIENT
Start: 2025-05-27 | End: 2025-05-30

## 2025-05-27 RX ORDER — POLYETHYLENE GLYCOL 3350 17 G/17G
17 POWDER, FOR SOLUTION ORAL DAILY
Refills: 0 | Status: DISCONTINUED | OUTPATIENT
Start: 2025-05-27 | End: 2025-05-30

## 2025-05-27 RX ORDER — HYDROMORPHONE/SOD CHLOR,ISO/PF 2 MG/10 ML
4 SYRINGE (ML) INJECTION EVERY 4 HOURS
Refills: 0 | Status: DISCONTINUED | OUTPATIENT
Start: 2025-05-27 | End: 2025-05-29

## 2025-05-27 RX ADMIN — Medication 0.5 MILLIGRAM(S): at 03:42

## 2025-05-27 RX ADMIN — Medication 4 MILLIGRAM(S): at 16:30

## 2025-05-27 RX ADMIN — NALOXEGOL OXALATE 12.5 MILLIGRAM(S): 12.5 TABLET, FILM COATED ORAL at 11:08

## 2025-05-27 RX ADMIN — Medication 4 MILLIGRAM(S): at 20:14

## 2025-05-27 RX ADMIN — FOLIC ACID 1 MILLIGRAM(S): 1 TABLET ORAL at 11:08

## 2025-05-27 RX ADMIN — POLYETHYLENE GLYCOL 3350 17 GRAM(S): 17 POWDER, FOR SOLUTION ORAL at 13:33

## 2025-05-27 RX ADMIN — Medication 0.1 MILLIGRAM(S): at 06:09

## 2025-05-27 RX ADMIN — Medication 0.5 MILLIGRAM(S): at 22:01

## 2025-05-27 RX ADMIN — METHOCARBAMOL 1500 MILLIGRAM(S): 500 TABLET, FILM COATED ORAL at 13:33

## 2025-05-27 RX ADMIN — Medication 0.5 MILLIGRAM(S): at 03:14

## 2025-05-27 RX ADMIN — Medication 4 MILLIGRAM(S): at 15:30

## 2025-05-27 RX ADMIN — NICOTINE POLACRILEX 1 PATCH: 4 GUM, CHEWING ORAL at 11:08

## 2025-05-27 RX ADMIN — Medication 0.5 MILLIGRAM(S): at 16:30

## 2025-05-27 RX ADMIN — NICOTINE POLACRILEX 1 PATCH: 4 GUM, CHEWING ORAL at 19:18

## 2025-05-27 RX ADMIN — METHOCARBAMOL 1500 MILLIGRAM(S): 500 TABLET, FILM COATED ORAL at 06:09

## 2025-05-27 RX ADMIN — Medication 0.5 MILLIGRAM(S): at 16:29

## 2025-05-27 RX ADMIN — INSULIN LISPRO 3 UNIT(S): 100 INJECTION, SOLUTION INTRAVENOUS; SUBCUTANEOUS at 12:30

## 2025-05-27 RX ADMIN — DULOXETINE 60 MILLIGRAM(S): 20 CAPSULE, DELAYED RELEASE ORAL at 11:08

## 2025-05-27 RX ADMIN — INSULIN LISPRO 1: 100 INJECTION, SOLUTION INTRAVENOUS; SUBCUTANEOUS at 08:24

## 2025-05-27 RX ADMIN — METHOCARBAMOL 1500 MILLIGRAM(S): 500 TABLET, FILM COATED ORAL at 21:35

## 2025-05-27 RX ADMIN — INSULIN GLARGINE-YFGN 10 UNIT(S): 100 INJECTION, SOLUTION SUBCUTANEOUS at 21:36

## 2025-05-27 RX ADMIN — METOPROLOL SUCCINATE 25 MILLIGRAM(S): 50 TABLET, EXTENDED RELEASE ORAL at 06:08

## 2025-05-27 RX ADMIN — AMLODIPINE BESYLATE 10 MILLIGRAM(S): 10 TABLET ORAL at 06:09

## 2025-05-27 RX ADMIN — Medication 1 TABLET(S): at 17:13

## 2025-05-27 RX ADMIN — INSULIN LISPRO 3: 100 INJECTION, SOLUTION INTRAVENOUS; SUBCUTANEOUS at 12:30

## 2025-05-27 RX ADMIN — Medication 0.5 MILLIGRAM(S): at 10:34

## 2025-05-27 RX ADMIN — OXYBUTYNIN CHLORIDE 10 MILLIGRAM(S): 5 TABLET, FILM COATED, EXTENDED RELEASE ORAL at 06:09

## 2025-05-27 RX ADMIN — NICOTINE POLACRILEX 1 PATCH: 4 GUM, CHEWING ORAL at 07:00

## 2025-05-27 RX ADMIN — Medication 4 MILLIGRAM(S): at 06:41

## 2025-05-27 RX ADMIN — Medication 0.5 MILLIGRAM(S): at 09:34

## 2025-05-27 RX ADMIN — Medication 500 MILLIGRAM(S): at 11:08

## 2025-05-27 RX ADMIN — Medication 2.5 MILLIGRAM(S): at 21:35

## 2025-05-27 RX ADMIN — Medication 4 MILLIGRAM(S): at 20:43

## 2025-05-27 RX ADMIN — INSULIN LISPRO 3 UNIT(S): 100 INJECTION, SOLUTION INTRAVENOUS; SUBCUTANEOUS at 17:11

## 2025-05-27 RX ADMIN — Medication 4 MILLIGRAM(S): at 06:13

## 2025-05-27 RX ADMIN — INSULIN LISPRO 3 UNIT(S): 100 INJECTION, SOLUTION INTRAVENOUS; SUBCUTANEOUS at 08:24

## 2025-05-27 RX ADMIN — Medication 40 MILLIGRAM(S): at 06:08

## 2025-05-27 RX ADMIN — FINASTERIDE 5 MILLIGRAM(S): 1 TABLET, FILM COATED ORAL at 11:07

## 2025-05-27 RX ADMIN — Medication 3 MILLIGRAM(S): at 21:35

## 2025-05-27 RX ADMIN — OXYBUTYNIN CHLORIDE 10 MILLIGRAM(S): 5 TABLET, FILM COATED, EXTENDED RELEASE ORAL at 17:13

## 2025-05-27 RX ADMIN — ERTAPENEM SODIUM 100 MILLIGRAM(S): 1 INJECTION, POWDER, LYOPHILIZED, FOR SOLUTION INTRAMUSCULAR; INTRAVENOUS at 18:21

## 2025-05-27 RX ADMIN — Medication 0.5 MILLIGRAM(S): at 22:30

## 2025-05-27 NOTE — PROGRESS NOTE ADULT - SUBJECTIVE AND OBJECTIVE BOX
JOIE BRUNO is a 50yMale , patient examined and chart reviewed.     INTERVAL HPI/ OVERNIGHT EVENTS:   No events NAD. Chronic pains.   Afebrile.    PAST MEDICAL & SURGICAL HISTORY:  BPH (benign prostatic hyperplasia)  HTN (hypertension)  Type 2 diabetes mellitus  Peripheral neuropathy  History of Guillain-[place] syndrome  History of CVA in adulthood  DDD (degenerative disc disease), lumbar  DVT, lower extremity  Renal stones  History of neurogenic bowel  Degenerative arthritis  Muscle weakness  Iron deficiency anemia  History of muscle spasm  Pain, unspecified  Vitamin deficiency  Obesity  GERD (gastroesophageal reflux disease)  H/O constipation  H/O insomnia  Major depressive disorder, single episode  S/P IVC filter  H/O lithotripsy  Chronic suprapubic catheter      For details regarding the patient's social history, family history, and other miscellaneous elements, please refer the initial infectious diseases consultation and/or the admitting history and physical examination for this admission.    ROS:  CONSTITUTIONAL:  Negative fever or chills  EYES:  Negative  blurry vision or double vision  CARDIOVASCULAR:  Negative for chest pain or palpitations  RESPIRATORY:  Negative for cough, wheezing, or SOB   GASTROINTESTINAL:  Negative for nausea, vomiting, diarrhea, constipation, or abdominal pain  GENITOURINARY:  Negative frequency, urgency or dysuria  NEUROLOGIC:  No headache, confusion, dizziness, lightheadedness  All other systems were reviewed and are negative     ALLERGIES  Pipercillin Sodium-Tazobactam Sodium (Pruritus)  sulfa drugs (Rash)        Current inpatient medications :    ANTIBIOTICS/RELEVANT:  ertapenem  IVPB 500 milliGRAM(s) IV Intermittent every 24 hours    MEDICATIONS  (STANDING):  amLODIPine   Tablet 10 milliGRAM(s) Oral daily  ascorbic acid 500 milliGRAM(s) Oral daily  cloNIDine 0.1 milliGRAM(s) Oral two times a day  dextrose 5%. 1000 milliLiter(s) (100 mL/Hr) IV Continuous <Continuous>  dextrose 5%. 1000 milliLiter(s) (50 mL/Hr) IV Continuous <Continuous>  dextrose 50% Injectable 25 Gram(s) IV Push once  dextrose 50% Injectable 12.5 Gram(s) IV Push once  dextrose 50% Injectable 25 Gram(s) IV Push once  DULoxetine 60 milliGRAM(s) Oral daily  epoetin juan a-epbx (RETACRIT) Injectable 19513 Unit(s) SubCutaneous <User Schedule>  ertapenem  IVPB 500 milliGRAM(s) IV Intermittent every 24 hours  finasteride 5 milliGRAM(s) Oral daily  folic acid 1 milliGRAM(s) Oral daily  glucagon  Injectable 1 milliGRAM(s) IntraMuscular once  insulin glargine Injectable (LANTUS) 10 Unit(s) SubCutaneous at bedtime  insulin lispro (ADMELOG) corrective regimen sliding scale   SubCutaneous three times a day before meals  insulin lispro (ADMELOG) corrective regimen sliding scale   SubCutaneous at bedtime  insulin lispro Injectable (ADMELOG) 3 Unit(s) SubCutaneous before breakfast  insulin lispro Injectable (ADMELOG) 3 Unit(s) SubCutaneous before lunch  insulin lispro Injectable (ADMELOG) 3 Unit(s) SubCutaneous before dinner  lactobacillus acidophilus 1 Tablet(s) Oral every 12 hours  melatonin 3 milliGRAM(s) Oral at bedtime  methocarbamol 1500 milliGRAM(s) Oral every 8 hours  metoprolol tartrate 25 milliGRAM(s) Oral two times a day  naloxegol 12.5 milliGRAM(s) Oral daily  nicotine -  14 mG/24Hr(s) Patch 1 Patch Transdermal daily  oxybutynin 10 milliGRAM(s) Oral two times a day  pantoprazole    Tablet 40 milliGRAM(s) Oral before breakfast  polyethylene glycol 3350 17 Gram(s) Oral daily  senna 2 Tablet(s) Oral at bedtime    MEDICATIONS  (PRN):  acetaminophen     Tablet .. 650 milliGRAM(s) Oral every 6 hours PRN Temp greater or equal to 38C (100.4F), Mild Pain (1 - 3)  aluminum hydroxide/magnesium hydroxide/simethicone Suspension 30 milliLiter(s) Oral every 4 hours PRN Dyspepsia  dextrose Oral Gel 15 Gram(s) Oral once PRN Blood Glucose LESS THAN 70 milliGRAM(s)/deciliter  HYDROmorphone   Tablet 4 milliGRAM(s) Oral every 4 hours PRN Moderate Pain (4 - 6)  HYDROmorphone  Injectable 0.5 milliGRAM(s) IV Push every 6 hours PRN Severe Pain (7 - 10)  ondansetron Injectable 4 milliGRAM(s) IV Push every 8 hours PRN Nausea and/or Vomiting        Objective:  Vital Signs Last 24 Hrs  T(C): 37.2 (27 May 2025 20:43), Max: 37.2 (27 May 2025 20:43)  T(F): 98.9 (27 May 2025 20:43), Max: 98.9 (27 May 2025 20:43)  HR: 56 (27 May 2025 20:19) (49 - 80)  BP: 155/90 (27 May 2025 20:19) (134/75 - 162/84)  BP(mean): 106 (27 May 2025 16:43) (106 - 106)  RR: 20 (27 May 2025 20:43) (18 - 22)  SpO2: 98% (27 May 2025 20:43) (96% - 99%)    Parameters below as of 27 May 2025 20:43  Patient On (Oxygen Delivery Method): room air    Physical Exam:  General: no acute distress  Neck: supple, trachea midline  Lungs: clear, no wheeze/rhonchi  Cardiovascular: regular rate and rhythm, S1 S2  Abdomen: soft, nontender,  bowel sounds normal +SPC  Neurological: alert and oriented x3  Skin: no rash  Extremities: no edema        LABS:                        8.9    8.31  )-----------( 180      ( 27 May 2025 06:00 )             28.9   05-27    137  |  103  |  29[H]  ----------------------------<  160[H]  3.6   |  24  |  2.72[H]    Ca    8.5      27 May 2025 06:00    TPro  6.4  /  Alb  2.5[L]  /  TBili  0.4  /  DBili  x   /  AST  14  /  ALT  24  /  AlkPhos  98  05-27         Urine Microscopic-Add On (NC) (05.25.25 @ 18:20)    White Blood Cell - Urine: 16 /HPF   Red Blood Cell - Urine: 2 /HPF   Bacteria: Negative /HPF   Squamous Epithelial Cells: Present  Urinalysis (05.25.25 @ 18:20)    Glucose Qualitative, Urine: 100 mg/dL   Blood, Urine: Moderate   pH Urine: 6.0   Color: Yellow   Urine Appearance: Cloudy   Bilirubin: Negative   Ketone , Urine: Negative mg/dL   Specific Gravity: 1.012   Protein, Urine: 300 mg/dL   Urobilinogen: 0.2 mg/dL   Nitrite: Negative   Leukocyte Esterase Concentration: Large    MICROBIOLOGY:  Culture - Blood (collected 25 May 2025 18:10)  Source: Blood Blood-Peripheral  Preliminary Report (27 May 2025 02:02):    No growth at 24 hours    Culture - Blood (collected 25 May 2025 18:10)  Source: Blood Blood-Peripheral  Preliminary Report (27 May 2025 02:02):    No growth at 24 hours      RADIOLOGY & ADDITIONAL STUDIES:      Assessment :  49YO M long term resident of [facility] PMH hypertension CKD GBS DVT CVA antiphospholipid antibody syndrome on warfarin diabetes hyperlipidemia neurogenic bladder with suprapubic catheter chronic bladder spasms recent admission for Acute cally transferred to [facility]  S/P lap cally 4/28 now presents with c/o cloudy urine in his suprapubic catheter, feeling some generalized fatigue and malaise over the past 1 week.  Patient states this is consistent with his usual UTI. No fever chills n/v/d. In ED afebrile. WBC 10K ANOTNIO On CKD UA wih minimal pyuria. SPC changed in ED.   Rule out CAUTI  Chronic pains    Plan :   On empiric Ertapenem ( pt with hx of esbl infections )  Trend temps and cbc  Fu cultures  Stable from ID standpoint    Advance Directives- Full ode  Current Medications are documented.   Drug-drug interactions reviewed.    Continue with present regiment.  Appropriate use of antibiotics and adverse effects reviewed.      > 35 minutes were spent in direct patient care reviewing notes, medications ,labs data/ imaging , discussion with multidisciplinary team.    Thank you for allowing me to participate in care of your patient .    Yadira Blackmon MD  Infectious Disease  [phone]    Individualized infection control protocols for an individual patient based on their diagnosis and risks in order to reduce risk of disease transmission followed. Coordinating with  infection prevention and control team members to enable healthcare facility staff to safely care for patient.  Managing infection prevention and treatment protocols associated with transitions of care for complex patients.  In-depth patient chart review that entails going back farther in time and assessing the complete breadth of all health care interactions, with higher-level synthesis for complex diagnoses.  Engaging in complex medical decision-making associated with antimicrobial prescribing including considerations such as antimicrobial resistance patterns, emergence of new variants/strains, recent antibiotic exposure, interactions/complications from comorbidities including concurrent infections, public health considerations to minimize development of antimicrobial resistance, and emerging and re-emerging infections.

## 2025-05-27 NOTE — CARE COORDINATION ASSESSMENT. - OTHER PERTINENT DISCHARGE PLANNING INFORMATION:
Pt is a 50 year old male who resides at  [facility]. Pt is alert and oriented x4. He is bedbound/wheelchair bound but was able to transfer with assist. Pt needs assistance with bathing. he is able to dress self and feed self. Pt prefers to return to back to [place]  ([facility]) SNF on dc.

## 2025-05-27 NOTE — PHARMACOTHERAPY INTERVENTION NOTE - COMMENTS
dr shoemaker contacted because of duplication on therapy. dilaudid injectable prn for severe pain was already ordered for the patient by Dr Bell. Dr shoemaker agreed to change Dilaudid tablet from prn severe pain to prn for Moderate Pain to avoid duplication in therapy

## 2025-05-27 NOTE — PROGRESS NOTE ADULT - SUBJECTIVE AND OBJECTIVE BOX
PROGRESS NOTE  Patient is a 50y old  Male who presents with a chief complaint of Urinary tract infection    Per HPI:     CC.   . 5/25/2025 51 y/o male presents axo3 via stretcher, bibems from [facility] for UTI parker/treatment.  OVERALL PRESENTATION.  . 5/25/2025   . 50-year-old male with history of multiple medical issues, currently living at [facility] presents with has been having some cloudy urine in his suprapubic catheter, feeling some generalized fatigue and malaise over the past 1 week.  Patient states this is consistent with his usual UTI.  The patient will often need to get admitted for UTIs, secondary to MDR.  No aggravating or alleviating factors otherwise noted.  No other acute injury or complaints.  No recent trauma.  No known fever. (25 May 2025 20:05) (26 May 2025 11:33)      OVERNIGHT      HPI:     CC.   . 5/25/2025 51 y/o male presents axo3 via stretcher, bibems from [facility] for UTI parker/treatment.  OVERALL PRESENTATION.  . 5/25/2025   . 50-year-old male with history of multiple medical issues, currently living at [facility] presents with has been having some cloudy urine in his suprapubic catheter, feeling some generalized fatigue and malaise over the past 1 week.  Patient states this is consistent with his usual UTI.  The patient will often need to get admitted for UTIs, secondary to MDR.  No aggravating or alleviating factors otherwise noted.  No other acute injury or complaints.  No recent trauma.  No known fever. (25 May 2025 20:05)    PAST MEDICAL & SURGICAL HISTORY:  BPH (benign prostatic hyperplasia)      HTN (hypertension)      Type 2 diabetes mellitus      Peripheral neuropathy      History of Guillain-[place] syndrome      History of CVA in adulthood      DDD (degenerative disc disease), lumbar      DVT, lower extremity      Renal stones      H/O Guillain-[place] syndrome      History of neurogenic bowel      DM2 (diabetes mellitus, type 2)      HTN (hypertension)      BPH without obstruction/lower urinary tract symptoms      Degenerative arthritis      DVT of lower limb, acute      CVA (cerebrovascular accident)      CVD (cerebrovascular disease)      Muscle weakness      Iron deficiency anemia      History of muscle spasm      Pain, unspecified      Vitamin deficiency      Obesity      GERD (gastroesophageal reflux disease)      H/O constipation      H/O insomnia      Essential (primary) hypertension      Acute embolism and thrombosis of deep vein of lower extremity      BPH (benign prostatic hyperplasia)      Diabetes mellitus due to underlying condition with diabetic neuropathy      Major depressive disorder, single episode      S/P IVC filter      H/O lithotripsy      Chronic suprapubic catheter          MEDICATIONS  (STANDING):  amLODIPine   Tablet 10 milliGRAM(s) Oral daily  ascorbic acid 500 milliGRAM(s) Oral daily  cloNIDine 0.1 milliGRAM(s) Oral two times a day  dextrose 5%. 1000 milliLiter(s) (50 mL/Hr) IV Continuous <Continuous>  dextrose 5%. 1000 milliLiter(s) (100 mL/Hr) IV Continuous <Continuous>  dextrose 50% Injectable 25 Gram(s) IV Push once  dextrose 50% Injectable 12.5 Gram(s) IV Push once  dextrose 50% Injectable 25 Gram(s) IV Push once  DULoxetine 60 milliGRAM(s) Oral daily  epoetin juan a-epbx (RETACRIT) Injectable 68497 Unit(s) SubCutaneous <User Schedule>  ertapenem  IVPB 500 milliGRAM(s) IV Intermittent every 24 hours  finasteride 5 milliGRAM(s) Oral daily  folic acid 1 milliGRAM(s) Oral daily  glucagon  Injectable 1 milliGRAM(s) IntraMuscular once  insulin glargine Injectable (LANTUS) 10 Unit(s) SubCutaneous at bedtime  insulin lispro (ADMELOG) corrective regimen sliding scale   SubCutaneous three times a day before meals  insulin lispro (ADMELOG) corrective regimen sliding scale   SubCutaneous at bedtime  insulin lispro Injectable (ADMELOG) 3 Unit(s) SubCutaneous before breakfast  insulin lispro Injectable (ADMELOG) 3 Unit(s) SubCutaneous before lunch  insulin lispro Injectable (ADMELOG) 3 Unit(s) SubCutaneous before dinner  lactobacillus acidophilus 1 Tablet(s) Oral every 12 hours  melatonin 3 milliGRAM(s) Oral at bedtime  methocarbamol 1500 milliGRAM(s) Oral every 8 hours  metoprolol tartrate 25 milliGRAM(s) Oral two times a day  naloxegol 12.5 milliGRAM(s) Oral daily  nicotine -  14 mG/24Hr(s) Patch 1 Patch Transdermal daily  oxybutynin 10 milliGRAM(s) Oral two times a day  pantoprazole    Tablet 40 milliGRAM(s) Oral before breakfast  polyethylene glycol 3350 17 Gram(s) Oral daily  senna 2 Tablet(s) Oral at bedtime    MEDICATIONS  (PRN):  acetaminophen     Tablet .. 650 milliGRAM(s) Oral every 6 hours PRN Temp greater or equal to 38C (100.4F), Mild Pain (1 - 3)  aluminum hydroxide/magnesium hydroxide/simethicone Suspension 30 milliLiter(s) Oral every 4 hours PRN Dyspepsia  dextrose Oral Gel 15 Gram(s) Oral once PRN Blood Glucose LESS THAN 70 milliGRAM(s)/deciliter  HYDROmorphone   Tablet 4 milliGRAM(s) Oral every 4 hours PRN Moderate Pain (4 - 6)  HYDROmorphone  Injectable 0.5 milliGRAM(s) IV Push every 6 hours PRN Severe Pain (7 - 10)  ondansetron Injectable 4 milliGRAM(s) IV Push every 8 hours PRN Nausea and/or Vomiting      OBJECTIVE    T(C): 36.7 (05-27-25 @ 12:12), Max: 36.9 (05-26-25 @ 15:45)  HR: 49 (05-27-25 @ 08:13) (49 - 80)  BP: 134/75 (05-27-25 @ 08:13) (134/75 - 169/87)  RR: 20 (05-27-25 @ 08:13) (18 - 20)  SpO2: 98% (05-27-25 @ 08:13) (96% - 98%)  Wt(kg): --  I&O's Summary    26 May 2025 07:01  -  27 May 2025 07:00  --------------------------------------------------------  IN: 490 mL / OUT: 3300 mL / NET: -2810 mL          REVIEW OF SYSTEMS:  CONSTITUTIONAL: No fever, weight loss, or fatigue  EYES: No eye pain, visual disturbances, or discharge  ENMT:   No sinus or throat pain  NECK: No pain or stiffness  RESPIRATORY: No cough, wheezing, chills or hemoptysis; No shortness of breath  CARDIOVASCULAR: No chest pain, palpitations, dizziness, or leg swelling  GASTROINTESTINAL: No abdominal pain. No nausea, vomiting; No diarrhea or constipation. No melena or hematochezia.  GENITOURINARY: No dysuria, frequency, hematuria, or incontinence  NEUROLOGICAL: No headaches, memory loss, loss of strength, numbness, or tremors  SKIN: No itching, burning, rashes, or lesions   MUSCULOSKELETAL: No joint pain or swelling; No muscle, back, or extremity pain    PHYSICAL EXAM:  Appearance: NAD. VS past 24 hrs -as above   HEENT:   Moist oral mucosa. Conjunctiva clear b/l.   Neck : supple  Respiratory: Lungs CTAB.  Gastrointestinal:  Soft, nontender. No rebound. No rigidity. BS present	  Cardiovascular: RRR ,S1S2 present  Neurologic: Non-focal. Moving all extremities.  Extremities: No edema. No erythema. No calf tenderness.  Skin: No rashes, No ecchymoses, No cyanosis.	  wounds ,skin lesions-See skin assesment flow sheet   LABS:                        8.9    8.31  )-----------( 180      ( 27 May 2025 06:00 )             28.9     05-27    137  |  103  |  29[H]  ----------------------------<  160[H]  3.6   |  24  |  2.72[H]    Ca    8.5      27 May 2025 06:00    TPro  6.4  /  Alb  2.5[L]  /  TBili  0.4  /  DBili  x   /  AST  14  /  ALT  24  /  AlkPhos  98  05-27    CAPILLARY BLOOD GLUCOSE      POCT Blood Glucose.: 268 mg/dL (27 May 2025 12:16)  POCT Blood Glucose.: 157 mg/dL (27 May 2025 07:59)  POCT Blood Glucose.: 200 mg/dL (26 May 2025 20:57)  POCT Blood Glucose.: 179 mg/dL (26 May 2025 16:56)    PT/INR - ( 27 May 2025 06:00 )   PT: 26.9 sec;   INR: 2.34 ratio           Urinalysis Basic - ( 27 May 2025 06:00 )    Color: x / Appearance: x / SG: x / pH: x  Gluc: 160 mg/dL / Ketone: x  / Bili: x / Urobili: x   Blood: x / Protein: x / Nitrite: x   Leuk Esterase: x / RBC: x / WBC x   Sq Epi: x / Non Sq Epi: x / Bacteria: x        Culture - Urine (collected 26 May 2025 06:25)  Source: Suprapubic Suprapubic  Preliminary Report (27 May 2025 10:59):    Culture positive, Few . Identification to follow.    Culture - Blood (collected 25 May 2025 18:10)  Source: Blood Blood-Peripheral  Preliminary Report (27 May 2025 02:02):    No growth at 24 hours    Culture - Blood (collected 25 May 2025 18:10)  Source: Blood Blood-Peripheral  Preliminary Report (27 May 2025 02:02):    No growth at 24 hours      RADIOLOGY & ADDITIONAL TESTS:   reviewed elctronically  ASSESSMENT/PLAN: 	    25 minutes aggregate time was spent on this visit, 50% visit time spent in care co-ordination with other attendings and counselling patient .I have discussed care plan with patient / HCP/family member ,who expressed understanding of problems treatment and their effect and side effects, alternatives in details. I have asked if they have any questions and concerns and appropriately addressed them to best of my ability.

## 2025-05-27 NOTE — PATIENT CHOICE NOTE. - NSPTCHOICENOTES_GEN_ALL_CORE
return to [facility]) [facility] LTR

## 2025-05-27 NOTE — PROGRESS NOTE ADULT - SUBJECTIVE AND OBJECTIVE BOX
Patient is a 50y Male whom presented to the hospital with ckd     PAST MEDICAL & SURGICAL HISTORY:  BPH (benign prostatic hyperplasia)      HTN (hypertension)      Type 2 diabetes mellitus      Peripheral neuropathy      History of Guillain-[place] syndrome      History of CVA in adulthood      DDD (degenerative disc disease), lumbar      DVT, lower extremity      Renal stones      H/O Guillain-[place] syndrome      History of neurogenic bowel      DM2 (diabetes mellitus, type 2)      HTN (hypertension)      BPH without obstruction/lower urinary tract symptoms      Degenerative arthritis      DVT of lower limb, acute      CVA (cerebrovascular accident)      CVD (cerebrovascular disease)      Muscle weakness      Iron deficiency anemia      History of muscle spasm      Pain, unspecified      Vitamin deficiency      Obesity      GERD (gastroesophageal reflux disease)      H/O constipation      H/O insomnia      Essential (primary) hypertension      Acute embolism and thrombosis of deep vein of lower extremity      BPH (benign prostatic hyperplasia)      Diabetes mellitus due to underlying condition with diabetic neuropathy      Major depressive disorder, single episode      S/P IVC filter      H/O lithotripsy      Chronic suprapubic catheter          MEDICATIONS  (STANDING):  amLODIPine   Tablet 10 milliGRAM(s) Oral daily  ascorbic acid 500 milliGRAM(s) Oral daily  cloNIDine 0.1 milliGRAM(s) Oral two times a day  dextrose 5%. 1000 milliLiter(s) (50 mL/Hr) IV Continuous <Continuous>  dextrose 5%. 1000 milliLiter(s) (100 mL/Hr) IV Continuous <Continuous>  dextrose 50% Injectable 25 Gram(s) IV Push once  dextrose 50% Injectable 12.5 Gram(s) IV Push once  dextrose 50% Injectable 25 Gram(s) IV Push once  DULoxetine 60 milliGRAM(s) Oral daily  ertapenem  IVPB 500 milliGRAM(s) IV Intermittent every 24 hours  finasteride 5 milliGRAM(s) Oral daily  folic acid 1 milliGRAM(s) Oral daily  glucagon  Injectable 1 milliGRAM(s) IntraMuscular once  insulin glargine Injectable (LANTUS) 10 Unit(s) SubCutaneous at bedtime  insulin lispro (ADMELOG) corrective regimen sliding scale   SubCutaneous three times a day before meals  insulin lispro (ADMELOG) corrective regimen sliding scale   SubCutaneous at bedtime  insulin lispro Injectable (ADMELOG) 3 Unit(s) SubCutaneous before breakfast  insulin lispro Injectable (ADMELOG) 3 Unit(s) SubCutaneous before lunch  insulin lispro Injectable (ADMELOG) 3 Unit(s) SubCutaneous before dinner  melatonin 3 milliGRAM(s) Oral at bedtime  methocarbamol 1500 milliGRAM(s) Oral every 8 hours  metoprolol tartrate 25 milliGRAM(s) Oral two times a day  naloxegol 12.5 milliGRAM(s) Oral daily  nicotine -  14 mG/24Hr(s) Patch 1 Patch Transdermal daily  oxybutynin 10 milliGRAM(s) Oral two times a day  pantoprazole    Tablet 40 milliGRAM(s) Oral before breakfast  senna 2 Tablet(s) Oral at bedtime      Allergies    sulfa drugs (Other)  sulfa drugs (Hives)  sulfa drugs (Rash)  sulfa drugs (Unknown)    Intolerances    Pipercillin Sodium-Tazobactam Sodium (Pruritus)      SOCIAL HISTORY:  Denies ETOh,Smoking,     FAMILY HISTORY:  FH: heart disease    FH: HTN (hypertension)    Family history of sinus tachycardia (Father)    FH: HTN (hypertension) (Mother)        REVIEW OF SYSTEMS:    CONSTITUTIONAL: No weakness, fevers or chills  RESPIRATORY: No cough, wheezing, hemoptysis; No shortness of breath  CARDIOVASCULAR: No chest pain or palpitations  GASTROINTESTINAL: No abdominal or epigastric pain. No nausea, vomiting,     No diarrhea or constipation. No melena   SKIN: dry  Chronic suprapubic catheter                              8.9    8.31  )-----------( 180      ( 27 May 2025 06:00 )             28.9       CBC Full  -  ( 27 May 2025 06:00 )  WBC Count : 8.31 K/uL  RBC Count : 3.84 M/uL  Hemoglobin : 8.9 g/dL  Hematocrit : 28.9 %  Platelet Count - Automated : 180 K/uL  Mean Cell Volume : 75.3 fL  Mean Cell Hemoglobin : 23.2 pg  Mean Cell Hemoglobin Concentration : 30.8 g/dL  Auto Neutrophil # : x  Auto Lymphocyte # : x  Auto Monocyte # : x  Auto Eosinophil # : x  Auto Basophil # : x  Auto Neutrophil % : x  Auto Lymphocyte % : x  Auto Monocyte % : x  Auto Eosinophil % : x  Auto Basophil % : x          137  |  103  |  29[H]  ----------------------------<  160[H]  3.6   |  24  |  2.72[H]    Ca    8.5      27 May 2025 06:00    TPro  6.4  /  Alb  2.5[L]  /  TBili  0.4  /  DBili  x   /  AST  14  /  ALT  24  /  AlkPhos  98  05-27      CAPILLARY BLOOD GLUCOSE      POCT Blood Glucose.: 157 mg/dL (27 May 2025 07:59)  POCT Blood Glucose.: 200 mg/dL (26 May 2025 20:57)  POCT Blood Glucose.: 179 mg/dL (26 May 2025 16:56)  POCT Blood Glucose.: 234 mg/dL (26 May 2025 12:25)      Vital Signs Last 24 Hrs  T(C): 36.6 (27 May 2025 08:13), Max: 36.9 (26 May 2025 13:20)  T(F): 97.8 (27 May 2025 08:13), Max: 98.5 (26 May 2025 13:20)  HR: 49 (27 May 2025 08:13) (49 - 80)  BP: 134/75 (27 May 2025 08:13) (134/75 - 169/87)  BP(mean): 111 (26 May 2025 17:30) (111 - 111)  RR: 20 (27 May 2025 08:13) (18 - 20)  SpO2: 98% (27 May 2025 08:13) (96% - 98%)    Parameters below as of 27 May 2025 08:13  Patient On (Oxygen Delivery Method): room air        Urinalysis Basic - ( 27 May 2025 06:00 )    Color: x / Appearance: x / SG: x / pH: x  Gluc: 160 mg/dL / Ketone: x  / Bili: x / Urobili: x   Blood: x / Protein: x / Nitrite: x   Leuk Esterase: x / RBC: x / WBC x   Sq Epi: x / Non Sq Epi: x / Bacteria: x        PT/INR - ( 27 May 2025 06:00 )   PT: 26.9 sec;   INR: 2.34 ratio                 PHYSICAL EXAM:    Constitutional: NAD  HEENT: conjunctive   clear   Neck:  No JVD  Respiratory: CTAB  Cardiovascular: S1 and S2  Gastrointestinal: BS+, soft, NT/ND  Extremities: No peripheral edema  : Chronic suprapubic catheter  Skin: No rashes  Access: Not applicable                                                          9.8    10.57 )-----------( 152      ( 25 May 2025 17:47 )             31.0       CBC Full  -  ( 25 May 2025 17:47 )  WBC Count : 10.57 K/uL  RBC Count : 4.12 M/uL  Hemoglobin : 9.8 g/dL  Hematocrit : 31.0 %  Platelet Count - Automated : 152 K/uL  Mean Cell Volume : 75.2 fL  Mean Cell Hemoglobin : 23.8 pg  Mean Cell Hemoglobin Concentration : 31.6 g/dL  Auto Neutrophil # : x  Auto Lymphocyte # : x  Auto Monocyte # : x  Auto Eosinophil # : x  Auto Basophil # : x  Auto Neutrophil % : x  Auto Lymphocyte % : x  Auto Monocyte % : x  Auto Eosinophil % : x  Auto Basophil % : x      05-    135  |  101  |  34[H]  ----------------------------<  235[H]  4.5   |  22  |  3.28[H]    Ca    8.7      25 May 2025 17:47    TPro  7.6  /  Alb  2.9[L]  /  TBili  0.5  /  DBili  x   /  AST  31  /  ALT  27  /  AlkPhos  108  05-      CAPILLARY BLOOD GLUCOSE          Vital Signs Last 24 Hrs  T(C): 36.7 (25 May 2025 20:36), Max: 36.9 (25 May 2025 17:02)  T(F): 98.1 (25 May 2025 20:36), Max: 98.4 (25 May 2025 17:02)  HR: 79 (25 May 2025 20:36) (79 - 80)  BP: 190/82 (25 May 2025 20:36) (150/80 - 190/82)  BP(mean): --  RR: 18 (25 May 2025 20:36) (14 - 18)  SpO2: 97% (25 May 2025 20:36) (96% - 97%)    Parameters below as of 25 May 2025 20:36  Patient On (Oxygen Delivery Method): room air        Urinalysis Basic - ( 25 May 2025 18:20 )    Color: Yellow / Appearance: Cloudy / S.012 / pH: x  Gluc: x / Ketone: x  / Bili: Negative / Urobili: 0.2 mg/dL   Blood: x / Protein: 300 mg/dL / Nitrite: Negative   Leuk Esterase: Large / RBC: 2 /HPF / WBC 16 /HPF   Sq Epi: x / Non Sq Epi: x / Bacteria: Negative /HPF      MEDICATIONS  (STANDING):  amLODIPine   Tablet 10 milliGRAM(s) Oral daily  ascorbic acid 500 milliGRAM(s) Oral daily  cloNIDine 0.1 milliGRAM(s) Oral two times a day  DULoxetine 60 milliGRAM(s) Oral daily  ertapenem  IVPB 500 milliGRAM(s) IV Intermittent every 24 hours  finasteride 5 milliGRAM(s) Oral daily  folic acid 1 milliGRAM(s) Oral daily  glucagon  Injectable 1 milliGRAM(s) IntraMuscular once  melatonin 3 milliGRAM(s) Oral at bedtime  methocarbamol 1500 milliGRAM(s) Oral every 8 hours  metoprolol tartrate 25 milliGRAM(s) Oral two times a day  naloxegol 12.5 milliGRAM(s) Oral daily  nicotine -  14 mG/24Hr(s) Patch 1 Patch Transdermal daily  oxybutynin 10 milliGRAM(s) Oral two times a day  pantoprazole    Tablet 40 milliGRAM(s) Oral before breakfast  senna 2 Tablet(s) Oral at bedtime

## 2025-05-27 NOTE — CARE COORDINATION ASSESSMENT. - NSPASTMEDSURGHISTORY_GEN_ALL_CORE_FT
PAST MEDICAL & SURGICAL HISTORY:  DVT, lower extremity      DDD (degenerative disc disease), lumbar      History of CVA in adulthood      History of Guillain-[place] syndrome      Peripheral neuropathy      Type 2 diabetes mellitus      HTN (hypertension)      BPH (benign prostatic hyperplasia)      S/P IVC filter      CVA (cerebrovascular accident)      DVT of lower limb, acute      Degenerative arthritis      BPH without obstruction/lower urinary tract symptoms      HTN (hypertension)      DM2 (diabetes mellitus, type 2)      History of neurogenic bowel      H/O Guillain-[place] syndrome      Renal stones      H/O lithotripsy      Major depressive disorder, single episode      Diabetes mellitus due to underlying condition with diabetic neuropathy      BPH (benign prostatic hyperplasia)      Acute embolism and thrombosis of deep vein of lower extremity      Essential (primary) hypertension      H/O insomnia      H/O constipation      GERD (gastroesophageal reflux disease)      Obesity      Vitamin deficiency      Pain, unspecified      History of muscle spasm      Iron deficiency anemia      Muscle weakness      CVD (cerebrovascular disease)      Chronic suprapubic catheter

## 2025-05-27 NOTE — CARE COORDINATION ASSESSMENT. - NSCAREPROVIDERS_GEN_ALL_CORE_FT
CARE PROVIDERS:  Accepting Physician: Janie Sanchez  Administration: Lola Mishra  Admitting: Janie Sanchez  Attending: Janie Sanchez  Consultant: Manuel Bell  Consultant: Yadira Blackmon  ED Attending: Addi Peña  ED Nurse: Aiden Harding  Infection Control: Madalyn Castro  Nurse: Sailaja Arroyo  Nurse: Irma Schroeder  Nurse: Carmen Banks  Ordered: Pahlavan, Mohsen  Ordered: Doctor, Unknown  Outpatient Provider: Gus Leon  Outpatient Provider: Pahlavan, Mohsen  PCA/Nursing Assistant: Reshma Buckley  Primary Team: Jorge Luis Szymanski  Primary Team: Ming Kendall  Primary Team: Cruzito Groves  Registered Dietitian: Gabriela Srinivasan  Registered Dietitian: Bhavani Botello  : Lindsey Cain  Team: Team, Critical Care Non Applicable  UR// Supp. Assoc.: Luisa Kelly

## 2025-05-27 NOTE — CASE MANAGEMENT PROGRESS NOTE - NSCMPROGRESSNOTE_GEN_ALL_CORE
Update Communication Note: AS per rounds, 50-year-old male with history of multiple medical issues, currently living at [facility] presents with has been having some cloudy urine in his suprapubic catheter, feeling some generalized fatigue and malaise over the past 1 week.  Patient states this is consistent with his usual UTI.  The patient will often need to get admitted for UTIs, secondary to MDR.  No aggravating or alleviating factors otherwise noted.  No other acute injury or complaints.  No recent trauma.  No known fever. Patient has a Suprapubic Catheter was changed in the ED here. Pending Urine Cultures, patient is receiving IV INVANZ, drug seeking behavior.  MSW following, will continue to follow patient.

## 2025-05-27 NOTE — PATIENT CHOICE NOTE. - NSPTCHOICESTATE_GEN_ALL_CORE

## 2025-05-28 LAB
-  AMOXICILLIN/CLAVULANIC ACID: SIGNIFICANT CHANGE UP
-  AMPICILLIN/SULBACTAM: SIGNIFICANT CHANGE UP
-  AMPICILLIN: SIGNIFICANT CHANGE UP
-  AZTREONAM: SIGNIFICANT CHANGE UP
-  CEFAZOLIN: SIGNIFICANT CHANGE UP
-  CEFEPIME: SIGNIFICANT CHANGE UP
-  CEFOXITIN: SIGNIFICANT CHANGE UP
-  CEFTRIAXONE: SIGNIFICANT CHANGE UP
-  CIPROFLOXACIN: SIGNIFICANT CHANGE UP
-  ERTAPENEM: SIGNIFICANT CHANGE UP
-  GENTAMICIN: SIGNIFICANT CHANGE UP
-  LEVOFLOXACIN: SIGNIFICANT CHANGE UP
-  MEROPENEM: SIGNIFICANT CHANGE UP
-  NITROFURANTOIN: SIGNIFICANT CHANGE UP
-  PIPERACILLIN/TAZOBACTAM: SIGNIFICANT CHANGE UP
-  TOBRAMYCIN: SIGNIFICANT CHANGE UP
-  TRIMETHOPRIM/SULFAMETHOXAZOLE: SIGNIFICANT CHANGE UP
ALBUMIN SERPL ELPH-MCNC: 2.7 G/DL — LOW (ref 3.3–5)
ALP SERPL-CCNC: 105 U/L — SIGNIFICANT CHANGE UP (ref 30–120)
ALT FLD-CCNC: 20 U/L — SIGNIFICANT CHANGE UP (ref 10–60)
ANION GAP SERPL CALC-SCNC: 12 MMOL/L — SIGNIFICANT CHANGE UP (ref 5–17)
AST SERPL-CCNC: 16 U/L — SIGNIFICANT CHANGE UP (ref 10–40)
BASOPHILS # BLD AUTO: 0.05 K/UL — SIGNIFICANT CHANGE UP (ref 0–0.2)
BASOPHILS NFR BLD AUTO: 0.5 % — SIGNIFICANT CHANGE UP (ref 0–2)
BILIRUB SERPL-MCNC: 0.4 MG/DL — SIGNIFICANT CHANGE UP (ref 0.2–1.2)
BUN SERPL-MCNC: 27 MG/DL — HIGH (ref 7–23)
CALCIUM SERPL-MCNC: 8.6 MG/DL — SIGNIFICANT CHANGE UP (ref 8.4–10.5)
CHLORIDE SERPL-SCNC: 103 MMOL/L — SIGNIFICANT CHANGE UP (ref 96–108)
CO2 SERPL-SCNC: 20 MMOL/L — LOW (ref 22–31)
CREAT SERPL-MCNC: 2.61 MG/DL — HIGH (ref 0.5–1.3)
CULTURE RESULTS: ABNORMAL
EGFR: 29 ML/MIN/1.73M2 — LOW
EGFR: 29 ML/MIN/1.73M2 — LOW
EOSINOPHIL # BLD AUTO: 0.28 K/UL — SIGNIFICANT CHANGE UP (ref 0–0.5)
EOSINOPHIL NFR BLD AUTO: 3 % — SIGNIFICANT CHANGE UP (ref 0–6)
GLUCOSE BLDC GLUCOMTR-MCNC: 111 MG/DL — HIGH (ref 70–99)
GLUCOSE BLDC GLUCOMTR-MCNC: 131 MG/DL — HIGH (ref 70–99)
GLUCOSE BLDC GLUCOMTR-MCNC: 142 MG/DL — HIGH (ref 70–99)
GLUCOSE BLDC GLUCOMTR-MCNC: 153 MG/DL — HIGH (ref 70–99)
GLUCOSE SERPL-MCNC: 127 MG/DL — HIGH (ref 70–99)
HCT VFR BLD CALC: 29.2 % — LOW (ref 39–50)
HGB BLD-MCNC: 9.2 G/DL — LOW (ref 13–17)
IMM GRANULOCYTES NFR BLD AUTO: 0.4 % — SIGNIFICANT CHANGE UP (ref 0–0.9)
INR BLD: 1.62 RATIO — HIGH (ref 0.85–1.16)
LYMPHOCYTES # BLD AUTO: 1.56 K/UL — SIGNIFICANT CHANGE UP (ref 1–3.3)
LYMPHOCYTES # BLD AUTO: 16.9 % — SIGNIFICANT CHANGE UP (ref 13–44)
MCHC RBC-ENTMCNC: 23.2 PG — LOW (ref 27–34)
MCHC RBC-ENTMCNC: 31.5 G/DL — LOW (ref 32–36)
MCV RBC AUTO: 73.7 FL — LOW (ref 80–100)
METHOD TYPE: SIGNIFICANT CHANGE UP
MONOCYTES # BLD AUTO: 0.54 K/UL — SIGNIFICANT CHANGE UP (ref 0–0.9)
MONOCYTES NFR BLD AUTO: 5.9 % — SIGNIFICANT CHANGE UP (ref 2–14)
NEUTROPHILS # BLD AUTO: 6.74 K/UL — SIGNIFICANT CHANGE UP (ref 1.8–7.4)
NEUTROPHILS NFR BLD AUTO: 73.3 % — SIGNIFICANT CHANGE UP (ref 43–77)
NRBC BLD AUTO-RTO: 0 /100 WBCS — SIGNIFICANT CHANGE UP (ref 0–0)
ORGANISM # SPEC MICROSCOPIC CNT: ABNORMAL
ORGANISM # SPEC MICROSCOPIC CNT: ABNORMAL
PLATELET # BLD AUTO: 171 K/UL — SIGNIFICANT CHANGE UP (ref 150–400)
POTASSIUM SERPL-MCNC: 4.3 MMOL/L — SIGNIFICANT CHANGE UP (ref 3.5–5.3)
POTASSIUM SERPL-SCNC: 4.3 MMOL/L — SIGNIFICANT CHANGE UP (ref 3.5–5.3)
PROT SERPL-MCNC: 7 G/DL — SIGNIFICANT CHANGE UP (ref 6–8.3)
PROTHROM AB SERPL-ACNC: 19 SEC — HIGH (ref 9.9–13.4)
RBC # BLD: 3.96 M/UL — LOW (ref 4.2–5.8)
RBC # FLD: 17.2 % — HIGH (ref 10.3–14.5)
SODIUM SERPL-SCNC: 135 MMOL/L — SIGNIFICANT CHANGE UP (ref 135–145)
SPECIMEN SOURCE: SIGNIFICANT CHANGE UP
WBC # BLD: 9.21 K/UL — SIGNIFICANT CHANGE UP (ref 3.8–10.5)
WBC # FLD AUTO: 9.21 K/UL — SIGNIFICANT CHANGE UP (ref 3.8–10.5)

## 2025-05-28 PROCEDURE — 99221 1ST HOSP IP/OBS SF/LOW 40: CPT

## 2025-05-28 RX ADMIN — NICOTINE POLACRILEX 1 PATCH: 4 GUM, CHEWING ORAL at 12:32

## 2025-05-28 RX ADMIN — INSULIN GLARGINE-YFGN 10 UNIT(S): 100 INJECTION, SOLUTION SUBCUTANEOUS at 21:21

## 2025-05-28 RX ADMIN — FOLIC ACID 1 MILLIGRAM(S): 1 TABLET ORAL at 12:33

## 2025-05-28 RX ADMIN — Medication 500 MILLIGRAM(S): at 12:33

## 2025-05-28 RX ADMIN — Medication 0.5 MILLIGRAM(S): at 13:15

## 2025-05-28 RX ADMIN — INSULIN LISPRO 3 UNIT(S): 100 INJECTION, SOLUTION INTRAVENOUS; SUBCUTANEOUS at 16:43

## 2025-05-28 RX ADMIN — Medication 4 MILLIGRAM(S): at 12:19

## 2025-05-28 RX ADMIN — EPOETIN ALFA 10000 UNIT(S): 10000 SOLUTION INTRAVENOUS; SUBCUTANEOUS at 17:08

## 2025-05-28 RX ADMIN — Medication 40 MILLIGRAM(S): at 05:53

## 2025-05-28 RX ADMIN — Medication 0.5 MILLIGRAM(S): at 13:43

## 2025-05-28 RX ADMIN — Medication 0.5 MILLIGRAM(S): at 20:38

## 2025-05-28 RX ADMIN — Medication 4 MILLIGRAM(S): at 04:05

## 2025-05-28 RX ADMIN — METOPROLOL SUCCINATE 25 MILLIGRAM(S): 50 TABLET, EXTENDED RELEASE ORAL at 17:09

## 2025-05-28 RX ADMIN — Medication 1 TABLET(S): at 05:54

## 2025-05-28 RX ADMIN — Medication 0.1 MILLIGRAM(S): at 17:09

## 2025-05-28 RX ADMIN — OXYBUTYNIN CHLORIDE 10 MILLIGRAM(S): 5 TABLET, FILM COATED, EXTENDED RELEASE ORAL at 17:08

## 2025-05-28 RX ADMIN — ERTAPENEM SODIUM 100 MILLIGRAM(S): 1 INJECTION, POWDER, LYOPHILIZED, FOR SOLUTION INTRAMUSCULAR; INTRAVENOUS at 18:03

## 2025-05-28 RX ADMIN — NICOTINE POLACRILEX 1 PATCH: 4 GUM, CHEWING ORAL at 07:21

## 2025-05-28 RX ADMIN — METHOCARBAMOL 1500 MILLIGRAM(S): 500 TABLET, FILM COATED ORAL at 13:15

## 2025-05-28 RX ADMIN — DULOXETINE 60 MILLIGRAM(S): 20 CAPSULE, DELAYED RELEASE ORAL at 12:32

## 2025-05-28 RX ADMIN — METHOCARBAMOL 1500 MILLIGRAM(S): 500 TABLET, FILM COATED ORAL at 05:55

## 2025-05-28 RX ADMIN — FINASTERIDE 5 MILLIGRAM(S): 1 TABLET, FILM COATED ORAL at 12:33

## 2025-05-28 RX ADMIN — METHOCARBAMOL 1500 MILLIGRAM(S): 500 TABLET, FILM COATED ORAL at 21:21

## 2025-05-28 RX ADMIN — Medication 4 MILLIGRAM(S): at 04:30

## 2025-05-28 RX ADMIN — Medication 4 MILLIGRAM(S): at 17:20

## 2025-05-28 RX ADMIN — NICOTINE POLACRILEX 1 PATCH: 4 GUM, CHEWING ORAL at 20:00

## 2025-05-28 RX ADMIN — INSULIN LISPRO 1: 100 INJECTION, SOLUTION INTRAVENOUS; SUBCUTANEOUS at 16:43

## 2025-05-28 RX ADMIN — Medication 4 MILLIGRAM(S): at 17:51

## 2025-05-28 RX ADMIN — INSULIN LISPRO 3 UNIT(S): 100 INJECTION, SOLUTION INTRAVENOUS; SUBCUTANEOUS at 12:18

## 2025-05-28 RX ADMIN — Medication 0.5 MILLIGRAM(S): at 06:04

## 2025-05-28 RX ADMIN — Medication 0.5 MILLIGRAM(S): at 06:25

## 2025-05-28 RX ADMIN — AMLODIPINE BESYLATE 10 MILLIGRAM(S): 10 TABLET ORAL at 06:04

## 2025-05-28 RX ADMIN — Medication 3 MILLIGRAM(S): at 21:21

## 2025-05-28 RX ADMIN — Medication 1 TABLET(S): at 17:08

## 2025-05-28 RX ADMIN — Medication 0.5 MILLIGRAM(S): at 21:00

## 2025-05-28 RX ADMIN — INSULIN LISPRO 3 UNIT(S): 100 INJECTION, SOLUTION INTRAVENOUS; SUBCUTANEOUS at 07:46

## 2025-05-28 RX ADMIN — NICOTINE POLACRILEX 1 PATCH: 4 GUM, CHEWING ORAL at 11:42

## 2025-05-28 RX ADMIN — OXYBUTYNIN CHLORIDE 10 MILLIGRAM(S): 5 TABLET, FILM COATED, EXTENDED RELEASE ORAL at 05:54

## 2025-05-28 RX ADMIN — Medication 4 MILLIGRAM(S): at 11:48

## 2025-05-28 RX ADMIN — Medication 4 MILLIGRAM(S): at 21:21

## 2025-05-28 NOTE — PROGRESS NOTE ADULT - SUBJECTIVE AND OBJECTIVE BOX
Patient is a 50y Male whom presented to the hospital with ckd     PAST MEDICAL & SURGICAL HISTORY:  BPH (benign prostatic hyperplasia)      HTN (hypertension)      Type 2 diabetes mellitus      Peripheral neuropathy      History of Guillain-[place] syndrome      History of CVA in adulthood      DDD (degenerative disc disease), lumbar      DVT, lower extremity      Renal stones      H/O Guillain-[place] syndrome      History of neurogenic bowel      DM2 (diabetes mellitus, type 2)      HTN (hypertension)      BPH without obstruction/lower urinary tract symptoms      Degenerative arthritis      DVT of lower limb, acute      CVA (cerebrovascular accident)      CVD (cerebrovascular disease)      Muscle weakness      Iron deficiency anemia      History of muscle spasm      Pain, unspecified      Vitamin deficiency      Obesity      GERD (gastroesophageal reflux disease)      H/O constipation      H/O insomnia      Essential (primary) hypertension      Acute embolism and thrombosis of deep vein of lower extremity      BPH (benign prostatic hyperplasia)      Diabetes mellitus due to underlying condition with diabetic neuropathy      Major depressive disorder, single episode      S/P IVC filter      H/O lithotripsy      Chronic suprapubic catheter          MEDICATIONS  (STANDING):  amLODIPine   Tablet 10 milliGRAM(s) Oral daily  ascorbic acid 500 milliGRAM(s) Oral daily  cloNIDine 0.1 milliGRAM(s) Oral two times a day  dextrose 5%. 1000 milliLiter(s) (50 mL/Hr) IV Continuous <Continuous>  dextrose 5%. 1000 milliLiter(s) (100 mL/Hr) IV Continuous <Continuous>  dextrose 50% Injectable 25 Gram(s) IV Push once  dextrose 50% Injectable 12.5 Gram(s) IV Push once  dextrose 50% Injectable 25 Gram(s) IV Push once  DULoxetine 60 milliGRAM(s) Oral daily  ertapenem  IVPB 500 milliGRAM(s) IV Intermittent every 24 hours  finasteride 5 milliGRAM(s) Oral daily  folic acid 1 milliGRAM(s) Oral daily  glucagon  Injectable 1 milliGRAM(s) IntraMuscular once  insulin glargine Injectable (LANTUS) 10 Unit(s) SubCutaneous at bedtime  insulin lispro (ADMELOG) corrective regimen sliding scale   SubCutaneous three times a day before meals  insulin lispro (ADMELOG) corrective regimen sliding scale   SubCutaneous at bedtime  insulin lispro Injectable (ADMELOG) 3 Unit(s) SubCutaneous before breakfast  insulin lispro Injectable (ADMELOG) 3 Unit(s) SubCutaneous before lunch  insulin lispro Injectable (ADMELOG) 3 Unit(s) SubCutaneous before dinner  melatonin 3 milliGRAM(s) Oral at bedtime  methocarbamol 1500 milliGRAM(s) Oral every 8 hours  metoprolol tartrate 25 milliGRAM(s) Oral two times a day  naloxegol 12.5 milliGRAM(s) Oral daily  nicotine -  14 mG/24Hr(s) Patch 1 Patch Transdermal daily  oxybutynin 10 milliGRAM(s) Oral two times a day  pantoprazole    Tablet 40 milliGRAM(s) Oral before breakfast  senna 2 Tablet(s) Oral at bedtime      Allergies    sulfa drugs (Other)  sulfa drugs (Hives)  sulfa drugs (Rash)  sulfa drugs (Unknown)    Intolerances    Pipercillin Sodium-Tazobactam Sodium (Pruritus)      SOCIAL HISTORY:  Denies ETOh,Smoking,     FAMILY HISTORY:  FH: heart disease    FH: HTN (hypertension)    Family history of sinus tachycardia (Father)    FH: HTN (hypertension) (Mother)        REVIEW OF SYSTEMS:    CONSTITUTIONAL: No weakness, fevers or chills  RESPIRATORY: No cough, wheezing, hemoptysis; No shortness of breath  CARDIOVASCULAR: No chest pain or palpitations  GASTROINTESTINAL: No abdominal or epigastric pain. No nausea, vomiting,     No diarrhea or constipation. No melena                         9.2    9.21  )-----------( 171      ( 28 May 2025 05:45 )             29.2       CBC Full  -  ( 28 May 2025 05:45 )  WBC Count : 9.21 K/uL  RBC Count : 3.96 M/uL  Hemoglobin : 9.2 g/dL  Hematocrit : 29.2 %  Platelet Count - Automated : 171 K/uL  Mean Cell Volume : 73.7 fL  Mean Cell Hemoglobin : 23.2 pg  Mean Cell Hemoglobin Concentration : 31.5 g/dL  Auto Neutrophil # : x  Auto Lymphocyte # : x  Auto Monocyte # : x  Auto Eosinophil # : x  Auto Basophil # : x  Auto Neutrophil % : x  Auto Lymphocyte % : x  Auto Monocyte % : x  Auto Eosinophil % : x  Auto Basophil % : x          135  |  103  |  27[H]  ----------------------------<  127[H]  4.3   |  20[L]  |  2.61[H]    Ca    8.6      28 May 2025 05:45    TPro  7.0  /  Alb  2.7[L]  /  TBili  0.4  /  DBili  x   /  AST  16  /  ALT  20  /  AlkPhos  105  05-28      CAPILLARY BLOOD GLUCOSE      POCT Blood Glucose.: 153 mg/dL (28 May 2025 16:36)  POCT Blood Glucose.: 131 mg/dL (28 May 2025 12:12)  POCT Blood Glucose.: 142 mg/dL (28 May 2025 07:40)  POCT Blood Glucose.: 146 mg/dL (27 May 2025 21:32)      Vital Signs Last 24 Hrs  T(C): 36.9 (28 May 2025 15:59), Max: 37.2 (27 May 2025 20:43)  T(F): 98.4 (28 May 2025 15:59), Max: 98.9 (27 May 2025 20:43)  HR: 62 (28 May 2025 15:59) (56 - 62)  BP: 144/85 (28 May 2025 15:59) (133/75 - 157/82)  BP(mean): 105 (28 May 2025 15:59) (105 - 105)  RR: 15 (28 May 2025 15:59) (15 - 20)  SpO2: 97% (28 May 2025 15:59) (97% - 98%)    Parameters below as of 28 May 2025 15:59  Patient On (Oxygen Delivery Method): room air        Urinalysis Basic - ( 28 May 2025 05:45 )    Color: x / Appearance: x / SG: x / pH: x  Gluc: 127 mg/dL / Ketone: x  / Bili: x / Urobili: x   Blood: x / Protein: x / Nitrite: x   Leuk Esterase: x / RBC: x / WBC x   Sq Epi: x / Non Sq Epi: x / Bacteria: x        PT/INR - ( 28 May 2025 05:45 )   PT: 19.0 sec;   INR: 1.62 ratio               PHYSICAL EXAM:    Constitutional: NAD  HEENT: conjunctive   clear   Neck:  No JVD  Respiratory: CTAB  Cardiovascular: S1 and S2  Gastrointestinal: BS+, soft, NT/ND  Extremities: No peripheral edema  : Chronic suprapubic catheter  Skin: No rashes  Access: Not applicable                                                          9.8    10.57 )-----------( 152      ( 25 May 2025 17:47 )             31.0       CBC Full  -  ( 25 May 2025 17:47 )  WBC Count : 10.57 K/uL  RBC Count : 4.12 M/uL  Hemoglobin : 9.8 g/dL  Hematocrit : 31.0 %  Platelet Count - Automated : 152 K/uL  Mean Cell Volume : 75.2 fL  Mean Cell Hemoglobin : 23.8 pg  Mean Cell Hemoglobin Concentration : 31.6 g/dL  Auto Neutrophil # : x  Auto Lymphocyte # : x  Auto Monocyte # : x  Auto Eosinophil # : x  Auto Basophil # : x  Auto Neutrophil % : x  Auto Lymphocyte % : x  Auto Monocyte % : x  Auto Eosinophil % : x  Auto Basophil % : x          135  |  101  |  34[H]  ----------------------------<  235[H]  4.5   |  22  |  3.28[H]    Ca    8.7      25 May 2025 17:47    TPro  7.6  /  Alb  2.9[L]  /  TBili  0.5  /  DBili  x   /  AST  31  /  ALT  27  /  AlkPhos  108  05-25      CAPILLARY BLOOD GLUCOSE          Vital Signs Last 24 Hrs  T(C): 36.7 (25 May 2025 20:36), Max: 36.9 (25 May 2025 17:02)  T(F): 98.1 (25 May 2025 20:36), Max: 98.4 (25 May 2025 17:02)  HR: 79 (25 May 2025 20:36) (79 - 80)  BP: 190/82 (25 May 2025 20:36) (150/80 - 190/82)  BP(mean): --  RR: 18 (25 May 2025 20:36) (14 - 18)  SpO2: 97% (25 May 2025 20:36) (96% - 97%)    Parameters below as of 25 May 2025 20:36  Patient On (Oxygen Delivery Method): room air        Urinalysis Basic - ( 25 May 2025 18:20 )    Color: Yellow / Appearance: Cloudy / S.012 / pH: x  Gluc: x / Ketone: x  / Bili: Negative / Urobili: 0.2 mg/dL   Blood: x / Protein: 300 mg/dL / Nitrite: Negative   Leuk Esterase: Large / RBC: 2 /HPF / WBC 16 /HPF   Sq Epi: x / Non Sq Epi: x / Bacteria: Negative /HPF      MEDICATIONS  (STANDING):  amLODIPine   Tablet 10 milliGRAM(s) Oral daily  ascorbic acid 500 milliGRAM(s) Oral daily  cloNIDine 0.1 milliGRAM(s) Oral two times a day  DULoxetine 60 milliGRAM(s) Oral daily  ertapenem  IVPB 500 milliGRAM(s) IV Intermittent every 24 hours  finasteride 5 milliGRAM(s) Oral daily  folic acid 1 milliGRAM(s) Oral daily  glucagon  Injectable 1 milliGRAM(s) IntraMuscular once  melatonin 3 milliGRAM(s) Oral at bedtime  methocarbamol 1500 milliGRAM(s) Oral every 8 hours  metoprolol tartrate 25 milliGRAM(s) Oral two times a day  naloxegol 12.5 milliGRAM(s) Oral daily  nicotine -  14 mG/24Hr(s) Patch 1 Patch Transdermal daily  oxybutynin 10 milliGRAM(s) Oral two times a day  pantoprazole    Tablet 40 milliGRAM(s) Oral before breakfast  senna 2 Tablet(s) Oral at bedtime

## 2025-05-28 NOTE — CASE MANAGEMENT PROGRESS NOTE - NSCMPROGRESSNOTE_GEN_ALL_CORE
Per treatment team rounds Pt. still acute Full code from  [facility] on  ertapenem  IVPB  for Suprapubic tube UTI ,  Tube changed in ED  5/25 by MD.   epoetin juan a-epbx (RETACRIT) h/h 9.2/29 cr 2.6, bun 27 .  Pain management with Dilaudid q4h and PRN, Pt. Wheelchair bound with 1 assist to transfer.     DM Hypoglycemic protocol.      51YO M long term resident of [facility] PMH hypertension CKD GBS DVT CVA antiphospholipid antibody syndrome on warfarin diabetes hyperlipidemia neurogenic bladder with suprapubic catheter chronic bladder spasms recent admission for Acute cally transferred to [facility]  S/P lap cally 4/28 now presents with c/o cloudy urine in his suprapubic catheter, feeling some generalized fatigue and malaise over the past 1 week.  Patient states this is consistent with his usual UTI. No fever chills n/v/d. In ED afebrile. WBC 10K ANTONIO On CKD UA wih minimal pyuria. Suprapubic catheter  changed in ED.   SW following for safe transition planning.

## 2025-05-28 NOTE — CONSULT NOTE ADULT - CONSULT REASON
CAUTI
Pain control, bladder spasms
ckd
50y A1C with Estimated Average Glucose Result: 8.4 % (04-26-25 @ 07:18)  A1C with Estimated Average Glucose Result: 9.0 % (04-11-25 @ 05:30)   diabetes mellitus uncontrolled type 2

## 2025-05-28 NOTE — PROGRESS NOTE ADULT - SUBJECTIVE AND OBJECTIVE BOX
JOIE BRUNO is a 50yMale , patient examined and chart reviewed.     INTERVAL HPI/ OVERNIGHT EVENTS:   No events NAD.   Afebrile.    PAST MEDICAL & SURGICAL HISTORY:  BPH (benign prostatic hyperplasia)  HTN (hypertension)  Type 2 diabetes mellitus  Peripheral neuropathy  History of Guillain-[place] syndrome  History of CVA in adulthood  DDD (degenerative disc disease), lumbar  DVT, lower extremity  Renal stones  History of neurogenic bowel  Degenerative arthritis  Muscle weakness  Iron deficiency anemia  History of muscle spasm  Pain, unspecified  Vitamin deficiency  Obesity  GERD (gastroesophageal reflux disease)  H/O constipation  H/O insomnia  Major depressive disorder, single episode  S/P IVC filter  H/O lithotripsy  Chronic suprapubic catheter      For details regarding the patient's social history, family history, and other miscellaneous elements, please refer the initial infectious diseases consultation and/or the admitting history and physical examination for this admission.    ROS:  CONSTITUTIONAL:  Negative fever or chills  EYES:  Negative  blurry vision or double vision  CARDIOVASCULAR:  Negative for chest pain or palpitations  RESPIRATORY:  Negative for cough, wheezing, or SOB   GASTROINTESTINAL:  Negative for nausea, vomiting, diarrhea, constipation, or abdominal pain  GENITOURINARY:  Negative frequency, urgency or dysuria  NEUROLOGIC:  No headache, confusion, dizziness, lightheadedness  All other systems were reviewed and are negative     ALLERGIES  Pipercillin Sodium-Tazobactam Sodium (Pruritus)  sulfa drugs (Rash)        Current inpatient medications :    ANTIBIOTICS/RELEVANT:  ertapenem  IVPB 500 milliGRAM(s) IV Intermittent every 24 hours    MEDICATIONS  (STANDING):  amLODIPine   Tablet 10 milliGRAM(s) Oral daily  ascorbic acid 500 milliGRAM(s) Oral daily  cloNIDine 0.1 milliGRAM(s) Oral two times a day  dextrose 5%. 1000 milliLiter(s) (50 mL/Hr) IV Continuous <Continuous>  dextrose 5%. 1000 milliLiter(s) (100 mL/Hr) IV Continuous <Continuous>  dextrose 50% Injectable 25 Gram(s) IV Push once  dextrose 50% Injectable 12.5 Gram(s) IV Push once  dextrose 50% Injectable 25 Gram(s) IV Push once  DULoxetine 60 milliGRAM(s) Oral daily  epoetin juan a-epbx (RETACRIT) Injectable 64061 Unit(s) SubCutaneous <User Schedule>  finasteride 5 milliGRAM(s) Oral daily  folic acid 1 milliGRAM(s) Oral daily  glucagon  Injectable 1 milliGRAM(s) IntraMuscular once  insulin glargine Injectable (LANTUS) 10 Unit(s) SubCutaneous at bedtime  insulin lispro (ADMELOG) corrective regimen sliding scale   SubCutaneous three times a day before meals  insulin lispro (ADMELOG) corrective regimen sliding scale   SubCutaneous at bedtime  insulin lispro Injectable (ADMELOG) 3 Unit(s) SubCutaneous before breakfast  insulin lispro Injectable (ADMELOG) 3 Unit(s) SubCutaneous before lunch  insulin lispro Injectable (ADMELOG) 3 Unit(s) SubCutaneous before dinner  lactobacillus acidophilus 1 Tablet(s) Oral every 12 hours  melatonin 3 milliGRAM(s) Oral at bedtime  methocarbamol 1500 milliGRAM(s) Oral every 8 hours  metoprolol tartrate 25 milliGRAM(s) Oral two times a day  naloxegol 12.5 milliGRAM(s) Oral daily  nicotine -  14 mG/24Hr(s) Patch 1 Patch Transdermal daily  oxybutynin 10 milliGRAM(s) Oral two times a day  pantoprazole    Tablet 40 milliGRAM(s) Oral before breakfast  polyethylene glycol 3350 17 Gram(s) Oral daily  senna 2 Tablet(s) Oral at bedtime  warfarin 4 milliGRAM(s) Oral once    MEDICATIONS  (PRN):  acetaminophen     Tablet .. 650 milliGRAM(s) Oral every 6 hours PRN Temp greater or equal to 38C (100.4F), Mild Pain (1 - 3)  aluminum hydroxide/magnesium hydroxide/simethicone Suspension 30 milliLiter(s) Oral every 4 hours PRN Dyspepsia  dextrose Oral Gel 15 Gram(s) Oral once PRN Blood Glucose LESS THAN 70 milliGRAM(s)/deciliter  HYDROmorphone   Tablet 4 milliGRAM(s) Oral every 4 hours PRN Moderate Pain (4 - 6)  HYDROmorphone  Injectable 0.5 milliGRAM(s) IV Push every 6 hours PRN Severe Pain (7 - 10)  ondansetron Injectable 4 milliGRAM(s) IV Push every 8 hours PRN Nausea and/or Vomiting      Objective:  Vital Signs Last 24 Hrs  T(C): 36.9 (28 May 2025 15:59), Max: 37.2 (27 May 2025 20:43)  T(F): 98.4 (28 May 2025 15:59), Max: 98.9 (27 May 2025 20:43)  HR: 62 (28 May 2025 15:59) (56 - 62)  BP: 144/85 (28 May 2025 15:59) (133/75 - 155/90)  BP(mean): 105 (28 May 2025 15:59) (105 - 105)  RR: 15 (28 May 2025 15:59) (15 - 20)  SpO2: 97% (28 May 2025 15:59) (97% - 98%)    Parameters below as of 28 May 2025 15:59  Patient On (Oxygen Delivery Method): room air      Physical Exam:  General: no acute distress  Neck: supple, trachea midline  Lungs: clear, no wheeze/rhonchi  Cardiovascular: regular rate and rhythm, S1 S2  Abdomen: soft, nontender,  bowel sounds normal +SPC  Neurological: alert and oriented x3  Skin: no rash  Extremities: no edema        LABS:                        9.2    9.21  )-----------( 171      ( 28 May 2025 05:45 )             29.2   05-28    135  |  103  |  27[H]  ----------------------------<  127[H]  4.3   |  20[L]  |  2.61[H]    Ca    8.6      28 May 2025 05:45    TPro  7.0  /  Alb  2.7[L]  /  TBili  0.4  /  DBili  x   /  AST  16  /  ALT  20  /  AlkPhos  105  05-28       Urine Microscopic-Add On (NC) (05.25.25 @ 18:20)    White Blood Cell - Urine: 16 /HPF   Red Blood Cell - Urine: 2 /HPF   Bacteria: Negative /HPF   Squamous Epithelial Cells: Present  Urinalysis (05.25.25 @ 18:20)    Glucose Qualitative, Urine: 100 mg/dL   Blood, Urine: Moderate   pH Urine: 6.0   Color: Yellow   Urine Appearance: Cloudy   Bilirubin: Negative   Ketone , Urine: Negative mg/dL   Specific Gravity: 1.012   Protein, Urine: 300 mg/dL   Urobilinogen: 0.2 mg/dL   Nitrite: Negative   Leukocyte Esterase Concentration: Large    MICROBIOLOGY:  Culture - Urine (05.26.25 @ 06:25)    -  Amoxicillin/Clavulanic Acid: R >16/8   -  Ampicillin: R >16 These ampicillin results predict results for amoxicillin   -  Ampicillin/Sulbactam: R >16/8   -  Aztreonam: S <=4   -  Cefazolin: R >16   -  Cefepime: S <=2   -  Ceftriaxone: R 32   -  Cefoxitin: S <=8   -  Ertapenem: S <=0.5   -  Ciprofloxacin: R 1   -  Levofloxacin: I 1   -  Gentamicin: S <=2   -  Meropenem: S <=1   -  Nitrofurantoin: R 64 Should not be used to treat pyelonephritis   -  Piperacillin/Tazobactam: S <=8   -  Tobramycin: S <=2   -  Trimethoprim/Sulfamethoxazole: S <=0.5/9.5   Specimen Source: Suprapubic Suprapubic   Culture Results:   50,000 - 99,000 CFU/mL Candida albicans "Susceptibilities not performed"  <10,000 CFU/ml Morganella morganii   Organism Identification: Morganella morganii   Organism: Morganella morganii   Method Type: JOSE RAMON      Culture - Blood (collected 25 May 2025 18:10)  Source: Blood Blood-Peripheral  Preliminary Report (27 May 2025 02:02):    No growth at 24 hours    Culture - Blood (collected 25 May 2025 18:10)  Source: Blood Blood-Peripheral  Preliminary Report (27 May 2025 02:02):    No growth at 24 hours      RADIOLOGY & ADDITIONAL STUDIES:      Assessment :  49[facility] G[facility] DVT CVA antiphospholipid antibody syndrome on warfarin diabetes hyperlipidemia neurogenic bladder with suprapubic catheter chronic bladder spasms recent admission for Acute cally transferred [facility]  S/P lap cally 4/28 now presents with c/o cloudy urine in his suprapubic catheter, feeling some generalized fatigue and malaise over the past 1 week.  Patient states this is consistent with his usual UTI. No fever chills n/v/d. In ED afebrile. WBC 10K ANTONIO On CKD UA wih minimal pyuria. SPC changed in ED.   CAUTI  Ucx with 50,000 - 99,000 CFU/mL Candida albicans "Susceptibilities not performed" <10,000 CFU/ml Morganella morganii Chronic pains  Remains afebrile wbc wnl  Clinically stable    Plan :   Ertapenem x 3 days - last dose today  Trend temps and cbc  Stable from ID standpoint  Dc planning per primary team    Advance Directives- Full code  Current Medications are documented.   Drug-drug interactions reviewed.    Continue with present regiment.  Appropriate use of antibiotics and adverse effects reviewed.      > 35 minutes were spent in direct patient care reviewing notes, medications ,labs data/ imaging , discussion with multidisciplinary team.    Thank you for allowing me to participate in care of your patient .    Yadira Blackmon MD  Infectious Disease  [phone]    Individualized infection control protocols for an individual patient based on their diagnosis and risks in order to reduce risk of disease transmission followed. Coordinating with  infection prevention and control team members to enable healthcare facility staff to safely care for patient.  Managing infection prevention and treatment protocols associated with transitions of care for complex patients.  In-depth patient chart review that entails going back farther in time and assessing the complete breadth of all health care interactions, with higher-level synthesis for complex diagnoses.  Engaging in complex medical decision-making associated with antimicrobial prescribing including considerations such as antimicrobial resistance patterns, emergence of new variants/strains, recent antibiotic exposure, interactions/complications from comorbidities including concurrent infections, public health considerations to minimize development of antimicrobial resistance, and emerging and re-emerging infections.

## 2025-05-28 NOTE — CONSULT NOTE ADULT - SUBJECTIVE AND OBJECTIVE BOX
Physical Medicine and Rehabilitation Initial Evaluation    Patients acute care records reviewed and are summarized as follows:     Patient is a 50y Male who is admitted to acute care for UTI, complains of bladder spasms and abdominal pain. Patient has previously tolerated dilaudid well 1mg IV Q6H PRN and his prior oral dilaudid regimen continued at 4mg PO Q6H PRN for moderate pain. He is agreeable to this regimen at this time.    Medical studies/laboratory studies reviewed, includin-28-25 @ 05:45    135  |  103  |  27[H]             --------------------------< 127[H]     4.3  |  20[L]  | 2.61[H]    eGFR AA: --  eGFR N-AA: --    Calcium: 8.6  Phosphorus: --  Magnesium: --    AST: 16    ALT: 20  AlkPhos: 105  Protein: 7.0  Albumin: 2.7[L]  TBili: 0.4  D-Bili: --      The patient was seen and examined at bedside.    ROS:  Constitutional: Denies fevers or chills  GI/: Complains of abdominal pain/bladder pain/spasms    PAST MEDICAL & SURGICAL HISTORY:  BPH (benign prostatic hyperplasia)  HTN (hypertension)  Type 2 diabetes mellitus  Peripheral neuropathy  History of Guillain-[place] syndrome  History of CVA in adulthood  DDD (degenerative disc disease), lumbar  DVT, lower extremity  Renal stones  H/O Guillain-[place] syndrome  History of neurogenic bowel  DM2 (diabetes mellitus, type 2)  HTN (hypertension)  BPH without obstruction/lower urinary tract symptoms  Degenerative arthritis  DVT of lower limb, acute  VA (cerebrovascular accident)  CVD (cerebrovascular disease)  Muscle weakness  Iron deficiency anemia  History of muscle spasm  Pain, unspecified  Vitamin deficiency  Obesity  GERD (gastroesophageal reflux disease)  H/O constipation  H/O insomnia  Essential (primary) hypertension  Acute embolism and thrombosis of deep vein of lower extremity  BPH (benign prostatic hyperplasia)  Diabetes mellitus due to underlying condition with diabetic neuropathy  Major depressive disorder, single episode  S/P IVC filter  H/O lithotripsy  Chronic suprapubic catheter    Medications: reviewed and revised    Physical Exam:   Vitals: T(C): 37 (25 @ 23:44), Max: 37 (25 @ 23:44)  HR: 58 (25 @ 20:56) (56 - 62)  BP: 164/88 (25 @ 20:56) (133/75 - 164/88)  RR: 19 (25 @ 20:56) (15 - 19)  SpO2: 97% (25 @ 20:56) (97% - 97%)    Constitutional: Gen: In no acute distress, cooperative with exam and questioning   GI/: Complains of abdominal/bladder pain on palpation  Psychiatric: Awake alert fully oriented    A/P 50y year old Male with bladder spasms, UTI, abdominal pain    Revision of regimen to 1mg IV dilaudid Q6H PRN for severe pain  4mg oral dilaudid PO Q6H PRN for moderate pain  Patient can resume home regimen following discharge, can follow up with his outpatient pain management physician    Istop reviewed: Reference #: [id number]  Short courses of 4mg oral dilaudid noted    Primary team notes are reviewed  Laboratory studies reviewed including those mentioned earlier/above  Discussed management/coordinated care with primary team/referring provider.  High risk of morbidity from treatment with: schedule II narcotics

## 2025-05-28 NOTE — CONSULT NOTE ADULT - ASSESSMENT
50-year-old male with history of multiple medical issues, currently living at [facility] presents with has been having some cloudy urine in his suprapubic catheter, feeling some generalized fatigue and malaise over the past 1 week.  Patient states this is consistent with his usual UTI.  The patient will often need to get admitted for UTIs, secondary to MDR.  No aggravating or alleviating factors otherwise noted.  No other acute injury or complaints.  No recent trauma.  No known fever      CHRONIC KIDNEY DISEASE, STAGE 4:   Serum creatinine is stable at 3, approximating a GFR is controlled .   There is no progression.  No uremic symptoms. No evidence of  worsening  Anemia. Fluid status stable.   Will continue to avoid nephrotoxic drugs.  Patient remains asymptomatic.  Continue current therapy.      BP monitoring,continue current antihypertensive meds, low salt diet,followup with PMD in 1-2 weeks  amLODIPine   Tablet 10 milliGRAM(s) Oral daily  cloNIDine 0.1 milliGRAM(s) Oral two times a day        Admit for septic workup and ID evaluation,send blood and urine cx,serial lactate levels,monitor vitals closley,ivfs hydration,monitor urine output and renal profile,iv abx as per id cons  ertapenem  IVPB 500 milliGRAM(s) IV Intermittent every 24 hours  
Physical Exam:   Vital Signs Last 24 Hrs  T(C): 36.8 (28 May 2025 08:28), Max: 37.2 (27 May 2025 20:43)  T(F): 98.3 (28 May 2025 08:28), Max: 98.9 (27 May 2025 20:43)  HR: 56 (28 May 2025 05:59) (55 - 59)  BP: 133/75 (28 May 2025 05:59) (133/75 - 162/84)  BP(mean): 106 (27 May 2025 16:43) (106 - 106)  RR: 20 (27 May 2025 20:43) (20 - 22)  SpO2: 98% (27 May 2025 20:43) (96% - 99%)    Parameters below as of 27 May 2025 20:43  Patient On (Oxygen Delivery Method): room air               CAPILLARY BLOOD GLUCOSE      POCT Blood Glucose.: 142 mg/dL (28 May 2025 07:40)  POCT Blood Glucose.: 146 mg/dL (27 May 2025 21:32)  POCT Blood Glucose.: 144 mg/dL (27 May 2025 16:56)  POCT Blood Glucose.: 268 mg/dL (27 May 2025 12:16)        DIET: CC  >50%

## 2025-05-28 NOTE — CONSULT NOTE ADULT - SUBJECTIVE AND OBJECTIVE BOX
Patient is a 50y old  Male who presents with a chief complaint of Urinary tract infection    Type 2: DX 10 years ago,  known complications of neuropathy, currently at [facility] with regular PCP f/u with Dr. Sanchez at facility. Does not recall prior A1C but was told he was a pre-diabetic.  Rx at facility; Lispro 3 Units AC TID plus Lispro sliding scale, Basaglar 10 Units HS. No hx of DKA/HHS, Glucometer checks TID by facility with BS ranging in the 190's. Denies any hypoglycemic events. Pt states he is wheelchair bound therefore with limited physical activity. Discussed the pathophysiology of DM and risk of complications associated with persistent elevated BS. Pt endorses that his meals at the facility have " a lot of carbohydrates" despite being on a consistent carbohydrate diet, denies family brining in outside food.     Reviewed CC diet and carb identification/portion control, prioritizing lean protein and non-starchy vegetables, provided diabetes daily meal planning guide with plate method with understanding.    Diabetes education provided- A1c measure and BG targets  fasting, <180 2 hours postmeal. medication MOA and considerations/side effects, in-hospital BGM frequency and insulin administration, s/s of hyperglycemia/hypoglycemia and management, glycemic control and preventing complications, consistent carb diet, balanced plate method, consistent meal planning. sick day management, provider f/u      HPI:     CC.   . 5/25/2025 49 y/o male presents axo3 via el[facility] from [facility] for UTI parker/treatment.  OVERALL PRESENTATION.  . 5/25/2025   . 50-year-old male with history of multiple medical issues, currently living at [facility] presents with has been having some cloudy urine in his suprapubic catheter, feeling some generalized fatigue and malaise over the past 1 week.  Patient states this is consistent with his usual UTI.  The patient will often need to get admitted for UTIs, secondary to MDR.  No aggravating or alleviating factors otherwise noted.  No other acute injury or complaints.  No recent trauma.  No known fever. (25 May 2025 20:05)      PAST MEDICAL & SURGICAL HISTORY:  BPH (benign prostatic hyperplasia)      HTN (hypertension)      Type 2 diabetes mellitus      Peripheral neuropathy      History of Guillain-[place] syndrome      History of CVA in adulthood      DDD (degenerative disc disease), lumbar      DVT, lower extremity      Renal stones      H/O Guillain-[place] syndrome      History of neurogenic bowel      DM2 (diabetes mellitus, type 2)      HTN (hypertension)      BPH without obstruction/lower urinary tract symptoms      Degenerative arthritis      DVT of lower limb, acute      CVA (cerebrovascular accident)      CVD (cerebrovascular disease)      Muscle weakness      Iron deficiency anemia      History of muscle spasm      Pain, unspecified      Vitamin deficiency      Obesity      GERD (gastroesophageal reflux disease)      H/O constipation      H/O insomnia      Essential (primary) hypertension      Acute embolism and thrombosis of deep vein of lower extremity      BPH (benign prostatic hyperplasia)      Diabetes mellitus due to underlying condition with diabetic neuropathy      Major depressive disorder, single episode      S/P IVC filter      H/O lithotripsy      Chronic suprapubic catheter          Allergies    sulfa drugs (Other)  sulfa drugs (Hives)  sulfa drugs (Rash)  sulfa drugs (Unknown)    Intolerances    Pipercillin Sodium-Tazobactam Sodium (Pruritus)      MEDICATIONS  (STANDING):  amLODIPine   Tablet 10 milliGRAM(s) Oral daily  ascorbic acid 500 milliGRAM(s) Oral daily  cloNIDine 0.1 milliGRAM(s) Oral two times a day  dextrose 5%. 1000 milliLiter(s) (50 mL/Hr) IV Continuous <Continuous>  dextrose 5%. 1000 milliLiter(s) (100 mL/Hr) IV Continuous <Continuous>  dextrose 50% Injectable 25 Gram(s) IV Push once  dextrose 50% Injectable 12.5 Gram(s) IV Push once  dextrose 50% Injectable 25 Gram(s) IV Push once  DULoxetine 60 milliGRAM(s) Oral daily  epoetin juan a-epbx (RETACRIT) Injectable 20474 Unit(s) SubCutaneous <User Schedule>  ertapenem  IVPB 500 milliGRAM(s) IV Intermittent every 24 hours  finasteride 5 milliGRAM(s) Oral daily  folic acid 1 milliGRAM(s) Oral daily  glucagon  Injectable 1 milliGRAM(s) IntraMuscular once  insulin glargine Injectable (LANTUS) 10 Unit(s) SubCutaneous at bedtime  insulin lispro (ADMELOG) corrective regimen sliding scale   SubCutaneous three times a day before meals  insulin lispro (ADMELOG) corrective regimen sliding scale   SubCutaneous at bedtime  insulin lispro Injectable (ADMELOG) 3 Unit(s) SubCutaneous before breakfast  insulin lispro Injectable (ADMELOG) 3 Unit(s) SubCutaneous before lunch  insulin lispro Injectable (ADMELOG) 3 Unit(s) SubCutaneous before dinner  lactobacillus acidophilus 1 Tablet(s) Oral every 12 hours  melatonin 3 milliGRAM(s) Oral at bedtime  methocarbamol 1500 milliGRAM(s) Oral every 8 hours  metoprolol tartrate 25 milliGRAM(s) Oral two times a day  naloxegol 12.5 milliGRAM(s) Oral daily  nicotine -  14 mG/24Hr(s) Patch 1 Patch Transdermal daily  oxybutynin 10 milliGRAM(s) Oral two times a day  pantoprazole    Tablet 40 milliGRAM(s) Oral before breakfast  polyethylene glycol 3350 17 Gram(s) Oral daily  senna 2 Tablet(s) Oral at bedtime  warfarin 4 milliGRAM(s) Oral once       Patient is a 50y old  Male who presents with a chief complaint of Urinary tract infection    Type 2: DX 10 years ago,  known complications of neuropathy, currently at [facility] with regular PCP f/u with Dr. Sanchez at facility. Current A1C 8.4%, Does not recall prior A1C but was told he was a pre-diabetic.  Rx at facility; Lispro 3 Units AC TID plus Lispro sliding scale, Basaglar 10 Units HS. No hx of DKA/HHS, Glucometer checks TID by facility with BS ranging in the 190's. Denies any hypoglycemic events. Pt states he is wheelchair bound therefore with limited physical activity. Discussed the pathophysiology of DM and risk of complications associated with persistent elevated BS. Pt endorses that his meals at the facility have " a lot of carbohydrates" despite being on a consistent carbohydrate diet, denies family brining in outside food.     Reviewed CC diet and carb identification/portion control, prioritizing lean protein and non-starchy vegetables, provided diabetes daily meal planning guide with plate method with understanding.    Diabetes education provided- A1c measure and BG targets  fasting, <180 2 hours postmeal. medication MOA and considerations/side effects, in-hospital BGM frequency and insulin administration, s/s of hyperglycemia/hypoglycemia and management, glycemic control and preventing complications, consistent carb diet, balanced plate method, consistent meal planning. sick day management, provider f/u      HPI:     CC.   . 5/25/2025 49 y/o male presents axo3 via stretcherelise from [facility] for UTI parker/treatment.  OVERALL PRESENTATION.  . 5/25/2025   . 50-year-old male with history of multiple medical issues, currently living at [facility] presents with has been having some cloudy urine in his suprapubic catheter, feeling some generalized fatigue and malaise over the past 1 week.  Patient states this is consistent with his usual UTI.  The patient will often need to get admitted for UTIs, secondary to MDR.  No aggravating or alleviating factors otherwise noted.  No other acute injury or complaints.  No recent trauma.  No known fever. (25 May 2025 20:05)      PAST MEDICAL & SURGICAL HISTORY:  BPH (benign prostatic hyperplasia)      HTN (hypertension)      Type 2 diabetes mellitus      Peripheral neuropathy      History of Guillain-[place] syndrome      History of CVA in adulthood      DDD (degenerative disc disease), lumbar      DVT, lower extremity      Renal stones      H/O Guillain-[place] syndrome      History of neurogenic bowel      DM2 (diabetes mellitus, type 2)      HTN (hypertension)      BPH without obstruction/lower urinary tract symptoms      Degenerative arthritis      DVT of lower limb, acute      CVA (cerebrovascular accident)      CVD (cerebrovascular disease)      Muscle weakness      Iron deficiency anemia      History of muscle spasm      Pain, unspecified      Vitamin deficiency      Obesity      GERD (gastroesophageal reflux disease)      H/O constipation      H/O insomnia      Essential (primary) hypertension      Acute embolism and thrombosis of deep vein of lower extremity      BPH (benign prostatic hyperplasia)      Diabetes mellitus due to underlying condition with diabetic neuropathy      Major depressive disorder, single episode      S/P IVC filter      H/O lithotripsy      Chronic suprapubic catheter          Allergies    sulfa drugs (Other)  sulfa drugs (Hives)  sulfa drugs (Rash)  sulfa drugs (Unknown)    Intolerances    Pipercillin Sodium-Tazobactam Sodium (Pruritus)      MEDICATIONS  (STANDING):  amLODIPine   Tablet 10 milliGRAM(s) Oral daily  ascorbic acid 500 milliGRAM(s) Oral daily  cloNIDine 0.1 milliGRAM(s) Oral two times a day  dextrose 5%. 1000 milliLiter(s) (50 mL/Hr) IV Continuous <Continuous>  dextrose 5%. 1000 milliLiter(s) (100 mL/Hr) IV Continuous <Continuous>  dextrose 50% Injectable 25 Gram(s) IV Push once  dextrose 50% Injectable 12.5 Gram(s) IV Push once  dextrose 50% Injectable 25 Gram(s) IV Push once  DULoxetine 60 milliGRAM(s) Oral daily  epoetin juan a-epbx (RETACRIT) Injectable 71078 Unit(s) SubCutaneous <User Schedule>  ertapenem  IVPB 500 milliGRAM(s) IV Intermittent every 24 hours  finasteride 5 milliGRAM(s) Oral daily  folic acid 1 milliGRAM(s) Oral daily  glucagon  Injectable 1 milliGRAM(s) IntraMuscular once  insulin glargine Injectable (LANTUS) 10 Unit(s) SubCutaneous at bedtime  insulin lispro (ADMELOG) corrective regimen sliding scale   SubCutaneous three times a day before meals  insulin lispro (ADMELOG) corrective regimen sliding scale   SubCutaneous at bedtime  insulin lispro Injectable (ADMELOG) 3 Unit(s) SubCutaneous before breakfast  insulin lispro Injectable (ADMELOG) 3 Unit(s) SubCutaneous before lunch  insulin lispro Injectable (ADMELOG) 3 Unit(s) SubCutaneous before dinner  lactobacillus acidophilus 1 Tablet(s) Oral every 12 hours  melatonin 3 milliGRAM(s) Oral at bedtime  methocarbamol 1500 milliGRAM(s) Oral every 8 hours  metoprolol tartrate 25 milliGRAM(s) Oral two times a day  naloxegol 12.5 milliGRAM(s) Oral daily  nicotine -  14 mG/24Hr(s) Patch 1 Patch Transdermal daily  oxybutynin 10 milliGRAM(s) Oral two times a day  pantoprazole    Tablet 40 milliGRAM(s) Oral before breakfast  polyethylene glycol 3350 17 Gram(s) Oral daily  senna 2 Tablet(s) Oral at bedtime  warfarin 4 milliGRAM(s) Oral once

## 2025-05-28 NOTE — PROGRESS NOTE ADULT - SUBJECTIVE AND OBJECTIVE BOX
PROGRESS NOTE  Patient is a 50y old  Male who presents with a chief complaint of Urinary tract infection  Chart and available morning labs /imaging are reviewed electronically , urgent issues addressed . More information  is being added upon completion of rounds , when more information is collected and management discussed with consultants , medical staff and social service/case management on the floor   Per HPI:  No new issues reported by medical staff . All above noted Patient is resting in a bed comfortably .Comfortable appearance .No distress noted    CC.   . 5/25/2025 49 y/o male presents axo3 via stretcherelise from [facility] for UTI parker/treatment.  OVERALL PRESENTATION.  . 5/25/2025   . 50-year-old male with history of multiple medical issues, currently living at [facility] presents with has been having some cloudy urine in his suprapubic catheter, feeling some generalized fatigue and malaise over the past 1 week.  Patient states this is consistent with his usual UTI.  The patient will often need to get admitted for UTIs, secondary to MDR.  No aggravating or alleviating factors otherwise noted.  No other acute injury or complaints.  No recent trauma.  No known fever. (25 May 2025 20:05) (26 May 2025 11:33)  OVERNIGHT  No new issues reported by medical staff . All above noted   HPI:     CC.   . 5/25/2025 49 y/o male presents axo3 via stretcherangems from [facility] for UTI parker/treatment.  OVERALL PRESENTATION.  . 5/25/2025   . 50-year-old male with history of multiple medical issues, currently living at [facility] presents with has been having some cloudy urine in his suprapubic catheter, feeling some generalized fatigue and malaise over the past 1 week.  Patient states this is consistent with his usual UTI.  The patient will often need to get admitted for UTIs, secondary to MDR.  No aggravating or alleviating factors otherwise noted.  No other acute injury or complaints.  No recent trauma.  No known fever. (25 May 2025 20:05)    PAST MEDICAL & SURGICAL HISTORY:  History of Guillain-[place] syndrome      History of CVA in adulthood      DDD (degenerative disc disease), lumbar      DVT, lower extremity      Renal stones      H/O Guillain-[place] syndrome      History of neurogenic bowel      DM2 (diabetes mellitus, type 2)      HTN (hypertension)      BPH without obstruction/lower urinary tract symptoms      Degenerative arthritis      DVT of lower limb, acute      CVA (cerebrovascular accident)      CVD (cerebrovascular disease)      Muscle weakness      Iron deficiency anemia      History of muscle spasm      Pain, unspecified      Vitamin deficiency      Obesity      GERD (gastroesophageal reflux disease)      H/O constipation      H/O insomnia      Essential (primary) hypertension      Acute embolism and thrombosis of deep vein of lower extremity      BPH (benign prostatic hyperplasia)      Diabetes mellitus due to underlying condition with diabetic neuropathy      Major depressive disorder, single episode      Type 2 diabetes mellitus      BPH (benign prostatic hyperplasia)      HTN (hypertension)      Peripheral neuropathy      S/P IVC filter      H/O lithotripsy      Chronic suprapubic catheter          MEDICATIONS  (STANDING):  amLODIPine   Tablet 10 milliGRAM(s) Oral daily  ascorbic acid 500 milliGRAM(s) Oral daily  cloNIDine 0.1 milliGRAM(s) Oral two times a day  dextrose 5%. 1000 milliLiter(s) (50 mL/Hr) IV Continuous <Continuous>  dextrose 5%. 1000 milliLiter(s) (100 mL/Hr) IV Continuous <Continuous>  dextrose 50% Injectable 25 Gram(s) IV Push once  dextrose 50% Injectable 12.5 Gram(s) IV Push once  dextrose 50% Injectable 25 Gram(s) IV Push once  DULoxetine 60 milliGRAM(s) Oral daily  epoetin juan a-epbx (RETACRIT) Injectable 67428 Unit(s) SubCutaneous <User Schedule>  ertapenem  IVPB 500 milliGRAM(s) IV Intermittent every 24 hours  finasteride 5 milliGRAM(s) Oral daily  folic acid 1 milliGRAM(s) Oral daily  glucagon  Injectable 1 milliGRAM(s) IntraMuscular once  insulin glargine Injectable (LANTUS) 10 Unit(s) SubCutaneous at bedtime  insulin lispro (ADMELOG) corrective regimen sliding scale   SubCutaneous three times a day before meals  insulin lispro (ADMELOG) corrective regimen sliding scale   SubCutaneous at bedtime  insulin lispro Injectable (ADMELOG) 3 Unit(s) SubCutaneous before breakfast  insulin lispro Injectable (ADMELOG) 3 Unit(s) SubCutaneous before lunch  insulin lispro Injectable (ADMELOG) 3 Unit(s) SubCutaneous before dinner  lactobacillus acidophilus 1 Tablet(s) Oral every 12 hours  melatonin 3 milliGRAM(s) Oral at bedtime  methocarbamol 1500 milliGRAM(s) Oral every 8 hours  metoprolol tartrate 25 milliGRAM(s) Oral two times a day  naloxegol 12.5 milliGRAM(s) Oral daily  nicotine -  14 mG/24Hr(s) Patch 1 Patch Transdermal daily  oxybutynin 10 milliGRAM(s) Oral two times a day  pantoprazole    Tablet 40 milliGRAM(s) Oral before breakfast  polyethylene glycol 3350 17 Gram(s) Oral daily  senna 2 Tablet(s) Oral at bedtime    MEDICATIONS  (PRN):  acetaminophen     Tablet .. 650 milliGRAM(s) Oral every 6 hours PRN Temp greater or equal to 38C (100.4F), Mild Pain (1 - 3)  aluminum hydroxide/magnesium hydroxide/simethicone Suspension 30 milliLiter(s) Oral every 4 hours PRN Dyspepsia  dextrose Oral Gel 15 Gram(s) Oral once PRN Blood Glucose LESS THAN 70 milliGRAM(s)/deciliter  HYDROmorphone   Tablet 4 milliGRAM(s) Oral every 4 hours PRN Moderate Pain (4 - 6)  HYDROmorphone  Injectable 0.5 milliGRAM(s) IV Push every 6 hours PRN Severe Pain (7 - 10)  ondansetron Injectable 4 milliGRAM(s) IV Push every 8 hours PRN Nausea and/or Vomiting      OBJECTIVE    T(C): 36.8 (05-28-25 @ 08:28), Max: 37.2 (05-27-25 @ 20:43)  HR: 56 (05-28-25 @ 05:59) (55 - 59)  BP: 133/75 (05-28-25 @ 05:59) (133/75 - 162/84)  RR: 20 (05-27-25 @ 20:43) (20 - 22)  SpO2: 98% (05-27-25 @ 20:43) (96% - 99%)  Wt(kg): --  I&O's Summary    27 May 2025 07:01  -  28 May 2025 07:00  --------------------------------------------------------  IN: 0 mL / OUT: 2400 mL / NET: -2400 mL          REVIEW OF SYSTEMS:  CONSTITUTIONAL: No fever, weight loss, or fatigue  EYES: No eye pain, visual disturbances, or discharge  ENMT:   No sinus or throat pain  NECK: No pain or stiffness  RESPIRATORY: No cough, wheezing, chills or hemoptysis; No shortness of breath  CARDIOVASCULAR: No chest pain, palpitations, dizziness, or leg swelling  GASTROINTESTINAL: No abdominal pain. No nausea, vomiting; No diarrhea or constipation. No melena or hematochezia.  GENITOURINARY: No dysuria, frequency, hematuria, or incontinence  NEUROLOGICAL: No headaches, memory loss, loss of strength, numbness, or tremors  SKIN: No itching, burning, rashes, or lesions   MUSCULOSKELETAL: No joint pain or swelling; No muscle, back, or extremity pain    PHYSICAL EXAM:  Appearance: NAD. VS past 24 hrs -as above   HEENT:   Moist oral mucosa. Conjunctiva clear b/l.   Neck : supple  Respiratory: Lungs CTAB.  Gastrointestinal:  Soft, nontender. No rebound. No rigidity. BS present	  Cardiovascular: RRR ,S1S2 present  Neurologic: Non-focal. Moving all extremities.  Extremities: No edema. No erythema. No calf tenderness.  Skin: No rashes, No ecchymoses, No cyanosis.	  wounds ,skin lesions-See skin assesment flow sheet   LABS:                        9.2    9.21  )-----------( 171      ( 28 May 2025 05:45 )             29.2     05-28    135  |  103  |  27[H]  ----------------------------<  127[H]  4.3   |  20[L]  |  2.61[H]    Ca    8.6      28 May 2025 05:45    TPro  7.0  /  Alb  2.7[L]  /  TBili  0.4  /  DBili  x   /  AST  16  /  ALT  20  /  AlkPhos  105  05-28    CAPILLARY BLOOD GLUCOSE      POCT Blood Glucose.: 142 mg/dL (28 May 2025 07:40)  POCT Blood Glucose.: 146 mg/dL (27 May 2025 21:32)  POCT Blood Glucose.: 144 mg/dL (27 May 2025 16:56)  POCT Blood Glucose.: 268 mg/dL (27 May 2025 12:16)    PT/INR - ( 28 May 2025 05:45 )   PT: 19.0 sec;   INR: 1.62 ratio           Urinalysis Basic - ( 28 May 2025 05:45 )    Color: x / Appearance: x / SG: x / pH: x  Gluc: 127 mg/dL / Ketone: x  / Bili: x / Urobili: x   Blood: x / Protein: x / Nitrite: x   Leuk Esterase: x / RBC: x / WBC x   Sq Epi: x / Non Sq Epi: x / Bacteria: x        Culture - Urine (collected 26 May 2025 06:25)  Source: Suprapubic Suprapubic  Preliminary Report (27 May 2025 19:03):    50,000 - 99,000 CFU/mL Yeast Identification to follow.    <10,000 CFU/ml Morganella morganii    Culture - Blood (collected 25 May 2025 18:10)  Source: Blood Blood-Peripheral  Preliminary Report (28 May 2025 02:02):    No growth at 48 Hours    Culture - Blood (collected 25 May 2025 18:10)  Source: Blood Blood-Peripheral  Preliminary Report (28 May 2025 02:02):    No growth at 48 Hours      RADIOLOGY & ADDITIONAL TESTS:   reviewed elctronically  ASSESSMENT/PLAN: 	    25 minutes aggregate time was spent on this visit, 50% visit time spent in care co-ordination with other attendings and counselling patient .I have discussed care plan with patient / HCP/family member ,who expressed understanding of problems treatment and their effect and side effects, alternatives in details. I have asked if they have any questions and concerns and appropriately addressed them to best of my ability.

## 2025-05-28 NOTE — CONSULT NOTE ADULT - PROBLEM SELECTOR RECOMMENDATION 9
Type 2  A1c 8.4% adm Urinary Tract infection  Recommend endocrine-Perlman on consult  Hospital regimen: Lantus 10 Units HS  Admelog SS AC TID  Admelog pre-meal - 3 Units AC TID  FU appt: at Nursing Facility  DSC recommendations: return to previous regimen; Lispro 3 Units pre-meal and Lispro SS AC TID, Basaglar 10 Units HS.  Glucose monitoring TID.   diabetes education provided  Goal 100-180 mg/dL; 140-180 mg/dL in critical care areas Type 2  A1c 8.4% adm Urinary Tract infection  Recommend endocrine-Perlman on consult  Hospital regimen: Lantus 10 Units HS  Admelog SS AC TID  Admelog pre-meal - 3 Units q/AC TID  CC diet, Accucheck  ACHS  FU appt: at Nursing Facility  DSC recommendations: return to previous regimen; Lispro 3 Units pre-meal and Lispro SS AC TID, Basaglar 10 Units HS.  Glucose monitoring TID.   diabetes education provided  Goal 100-180 mg/dL; 140-180 mg/dL in critical care areas

## 2025-05-28 NOTE — CHART NOTE - NSCHARTNOTEFT_GEN_A_CORE
Admission Diagnosis: Urinary tract infection    Per HPI: CC.   . 2025 49 y/o male presents axo3 via elise fraga from [facility] for UTI parker/treatment.  OVERALL PRESENTATION.  . 2025   . 50-year-old male with history of multiple medical issues, currently living at [facility] presents with has been having some cloudy urine in his suprapubic catheter, feeling some generalized fatigue and malaise over the past 1 week.  Patient states this is consistent with his usual UTI.  The patient will often need to get admitted for UTIs, secondary to MDR.  No aggravating or alleviating factors otherwise noted.  No other acute injury or complaints.  No recent trauma.  No known fever. (25 May 2025 20:05)    Diet hx per initial assessment: Pt with good appetite PTA, with hx of DM and on No concentrated sweets diet at [facility]. ON reg/thin texture PTA. Doesn't elaborate on foods consumed at facility.    Interim : NEW ASSESSMENT ORDERED AND COUMADIN INITIATION TODAY  Visited patient in room, pt able to answer questions and also discussed w RN and at SCU team rounds. presents with excellent appetite/po intake, consuming >75% of DASH/CCHO meals. Denies n/v/d/c, GI WDL per flowsheet . No reported difficulty chewing or swallowing. Pertinent labs/meds reviewed: receiving Lantus 10 units QD, Lispro 3 units + SS TID , miralax, senna, lactobacillus, zofran, on coumadin as of today (noted in pt's home meds); -268 x 24hr. Education provided at this time on coumadin and consistent vitamin K intake. Handout per [facility] provided "Vitamin K and Medications". RD to remain available per protocol.    Factors impacting intake: [ x] none [ ] nausea  [ ] vomiting [ ] diarrhea [ ] constipation  [ ]chewing problems [ ] swallowing issues  [ ] other:     Diet Prescription: Diet, DASH/TLC:   Sodium & Cholesterol Restricted  Consistent Carbohydrate {Evening Snack} (25 @ 20:28)    Intake: excellent    Daily Weight in k (28 May 2025 06:04)  Weight in k (27 May 2025 05:35)  Weight in k.2 (26 May 2025 10:28)  Weight in k.2 (25 May 2025 20:59)  admit wt 118.8kg  % Weight Johns; admit wt inaccurately low, will continue to monitor    Pertinent Medications: MEDICATIONS  (STANDING):  amLODIPine   Tablet 10 milliGRAM(s) Oral daily  ascorbic acid 500 milliGRAM(s) Oral daily  cloNIDine 0.1 milliGRAM(s) Oral two times a day  dextrose 5%. 1000 milliLiter(s) (50 mL/Hr) IV Continuous <Continuous>  dextrose 5%. 1000 milliLiter(s) (100 mL/Hr) IV Continuous <Continuous>  dextrose 50% Injectable 25 Gram(s) IV Push once  dextrose 50% Injectable 12.5 Gram(s) IV Push once  dextrose 50% Injectable 25 Gram(s) IV Push once  DULoxetine 60 milliGRAM(s) Oral daily  epoetin juan a-epbx (RETACRIT) Injectable 15838 Unit(s) SubCutaneous <User Schedule>  ertapenem  IVPB 500 milliGRAM(s) IV Intermittent every 24 hours  finasteride 5 milliGRAM(s) Oral daily  folic acid 1 milliGRAM(s) Oral daily  glucagon  Injectable 1 milliGRAM(s) IntraMuscular once  insulin glargine Injectable (LANTUS) 10 Unit(s) SubCutaneous at bedtime  insulin lispro (ADMELOG) corrective regimen sliding scale   SubCutaneous three times a day before meals  insulin lispro (ADMELOG) corrective regimen sliding scale   SubCutaneous at bedtime  insulin lispro Injectable (ADMELOG) 3 Unit(s) SubCutaneous before breakfast  insulin lispro Injectable (ADMELOG) 3 Unit(s) SubCutaneous before lunch  insulin lispro Injectable (ADMELOG) 3 Unit(s) SubCutaneous before dinner  lactobacillus acidophilus 1 Tablet(s) Oral every 12 hours  melatonin 3 milliGRAM(s) Oral at bedtime  methocarbamol 1500 milliGRAM(s) Oral every 8 hours  metoprolol tartrate 25 milliGRAM(s) Oral two times a day  naloxegol 12.5 milliGRAM(s) Oral daily  nicotine -  14 mG/24Hr(s) Patch 1 Patch Transdermal daily  oxybutynin 10 milliGRAM(s) Oral two times a day  pantoprazole    Tablet 40 milliGRAM(s) Oral before breakfast  polyethylene glycol 3350 17 Gram(s) Oral daily  senna 2 Tablet(s) Oral at bedtime  warfarin 4 milliGRAM(s) Oral once    MEDICATIONS  (PRN):  acetaminophen     Tablet .. 650 milliGRAM(s) Oral every 6 hours PRN Temp greater or equal to 38C (100.4F), Mild Pain (1 - 3)  aluminum hydroxide/magnesium hydroxide/simethicone Suspension 30 milliLiter(s) Oral every 4 hours PRN Dyspepsia  dextrose Oral Gel 15 Gram(s) Oral once PRN Blood Glucose LESS THAN 70 milliGRAM(s)/deciliter  HYDROmorphone   Tablet 4 milliGRAM(s) Oral every 4 hours PRN Moderate Pain (4 - 6)  HYDROmorphone  Injectable 0.5 milliGRAM(s) IV Push every 6 hours PRN Severe Pain (7 - 10)  ondansetron Injectable 4 milliGRAM(s) IV Push every 8 hours PRN Nausea and/or Vomiting    Pertinent Labs:  Na135 mmol/L Glu 127 mg/dL[H] K+ 4.3 mmol/L Cr  2.61 mg/dL[H] BUN 27 mg/dL[H]  Alb 2.7 g/dL[L]     CAPILLARY BLOOD GLUCOSE      POCT Blood Glucose.: 142 mg/dL (28 May 2025 07:40)  POCT Blood Glucose.: 146 mg/dL (27 May 2025 21:32)  POCT Blood Glucose.: 144 mg/dL (27 May 2025 16:56)  POCT Blood Glucose.: 268 mg/dL (27 May 2025 12:16)      Edema per flowsheets: none  Skin: sacrum stage I      Estimated Needs:   [x ] no change since previous assessment  [ ] recalculated:     Previous Nutrition Diagnosis:   [ ] Inadequate Energy Intake [ ]Inadequate Oral Intake [ ] Excessive Energy Intake   [ ] Underweight [ ] Increased Nutrient Needs [ x] Overweight/Obesity [ ] Altered Nutrition related lab values  [ ] Altered GI Function [ ] Unintended Weight Loss [ ] Food & Nutrition Related Knowledge Deficit [ ] Malnutrition     Nutrition Diagnosis is [ x] ongoing  [ ] resolved [ ] not applicable     New Nutrition Diagnosis: [ x] not applicable       Interventions: Continue CCHO/DASH diet, vit K/warfarin edu provided  Recommend  [ ] Change Diet To:  [ ] Nutrition Supplement  [ ] Nutrition Support  [ x] Other: handout "Vitamin K and Medications" per [facility] provided    Monitoring and Evaluation:   [X ] Intake [ x ] Tolerance to diet prescription [ x ] weights [ x ] labs[ x ] follow up per protocol  [ ] other:

## 2025-05-29 LAB
ANION GAP SERPL CALC-SCNC: 11 MMOL/L — SIGNIFICANT CHANGE UP (ref 5–17)
BUN SERPL-MCNC: 26 MG/DL — HIGH (ref 7–23)
CALCIUM SERPL-MCNC: 8.7 MG/DL — SIGNIFICANT CHANGE UP (ref 8.4–10.5)
CHLORIDE SERPL-SCNC: 103 MMOL/L — SIGNIFICANT CHANGE UP (ref 96–108)
CO2 SERPL-SCNC: 22 MMOL/L — SIGNIFICANT CHANGE UP (ref 22–31)
CREAT SERPL-MCNC: 2.61 MG/DL — HIGH (ref 0.5–1.3)
EGFR: 29 ML/MIN/1.73M2 — LOW
EGFR: 29 ML/MIN/1.73M2 — LOW
GLUCOSE BLDC GLUCOMTR-MCNC: 120 MG/DL — HIGH (ref 70–99)
GLUCOSE BLDC GLUCOMTR-MCNC: 132 MG/DL — HIGH (ref 70–99)
GLUCOSE BLDC GLUCOMTR-MCNC: 137 MG/DL — HIGH (ref 70–99)
GLUCOSE BLDC GLUCOMTR-MCNC: 145 MG/DL — HIGH (ref 70–99)
GLUCOSE SERPL-MCNC: 130 MG/DL — HIGH (ref 70–99)
HCT VFR BLD CALC: 32.3 % — LOW (ref 39–50)
HGB BLD-MCNC: 10.2 G/DL — LOW (ref 13–17)
INR BLD: 1.79 RATIO — HIGH (ref 0.85–1.16)
MCHC RBC-ENTMCNC: 23.4 PG — LOW (ref 27–34)
MCHC RBC-ENTMCNC: 31.6 G/DL — LOW (ref 32–36)
MCV RBC AUTO: 74.1 FL — LOW (ref 80–100)
NRBC BLD AUTO-RTO: 0 /100 WBCS — SIGNIFICANT CHANGE UP (ref 0–0)
PLATELET # BLD AUTO: 222 K/UL — SIGNIFICANT CHANGE UP (ref 150–400)
POTASSIUM SERPL-MCNC: 3.9 MMOL/L — SIGNIFICANT CHANGE UP (ref 3.5–5.3)
POTASSIUM SERPL-SCNC: 3.9 MMOL/L — SIGNIFICANT CHANGE UP (ref 3.5–5.3)
PROTHROM AB SERPL-ACNC: 21 SEC — HIGH (ref 9.9–13.4)
RBC # BLD: 4.36 M/UL — SIGNIFICANT CHANGE UP (ref 4.2–5.8)
RBC # FLD: 17.2 % — HIGH (ref 10.3–14.5)
SODIUM SERPL-SCNC: 136 MMOL/L — SIGNIFICANT CHANGE UP (ref 135–145)
WBC # BLD: 10.2 K/UL — SIGNIFICANT CHANGE UP (ref 3.8–10.5)
WBC # FLD AUTO: 10.2 K/UL — SIGNIFICANT CHANGE UP (ref 3.8–10.5)

## 2025-05-29 RX ORDER — GABAPENTIN 400 MG/1
600 CAPSULE ORAL EVERY 8 HOURS
Refills: 0 | Status: DISCONTINUED | OUTPATIENT
Start: 2025-05-29 | End: 2025-05-30

## 2025-05-29 RX ORDER — NICOTINE POLACRILEX 4 MG/1
1 GUM, CHEWING ORAL
Qty: 0 | Refills: 0 | DISCHARGE
Start: 2025-05-29

## 2025-05-29 RX ORDER — POLYETHYLENE GLYCOL 3350 17 G/17G
17 POWDER, FOR SOLUTION ORAL
Qty: 0 | Refills: 0 | DISCHARGE
Start: 2025-05-29

## 2025-05-29 RX ORDER — HYDROMORPHONE/SOD CHLOR,ISO/PF 2 MG/10 ML
1 SYRINGE (ML) INJECTION EVERY 6 HOURS
Refills: 0 | Status: DISCONTINUED | OUTPATIENT
Start: 2025-05-29 | End: 2025-05-29

## 2025-05-29 RX ORDER — HYDROMORPHONE/SOD CHLOR,ISO/PF 2 MG/10 ML
4 SYRINGE (ML) INJECTION EVERY 6 HOURS
Refills: 0 | Status: DISCONTINUED | OUTPATIENT
Start: 2025-05-29 | End: 2025-05-30

## 2025-05-29 RX ADMIN — Medication 500 MILLIGRAM(S): at 12:38

## 2025-05-29 RX ADMIN — NALOXEGOL OXALATE 12.5 MILLIGRAM(S): 12.5 TABLET, FILM COATED ORAL at 12:38

## 2025-05-29 RX ADMIN — GABAPENTIN 600 MILLIGRAM(S): 400 CAPSULE ORAL at 13:40

## 2025-05-29 RX ADMIN — INSULIN LISPRO 3 UNIT(S): 100 INJECTION, SOLUTION INTRAVENOUS; SUBCUTANEOUS at 12:39

## 2025-05-29 RX ADMIN — METHOCARBAMOL 1500 MILLIGRAM(S): 500 TABLET, FILM COATED ORAL at 21:51

## 2025-05-29 RX ADMIN — Medication 1 MILLIGRAM(S): at 01:43

## 2025-05-29 RX ADMIN — NICOTINE POLACRILEX 1 PATCH: 4 GUM, CHEWING ORAL at 07:00

## 2025-05-29 RX ADMIN — OXYBUTYNIN CHLORIDE 10 MILLIGRAM(S): 5 TABLET, FILM COATED, EXTENDED RELEASE ORAL at 17:08

## 2025-05-29 RX ADMIN — METOPROLOL SUCCINATE 25 MILLIGRAM(S): 50 TABLET, EXTENDED RELEASE ORAL at 05:17

## 2025-05-29 RX ADMIN — Medication 4 MILLIGRAM(S): at 13:42

## 2025-05-29 RX ADMIN — Medication 4 MILLIGRAM(S): at 21:51

## 2025-05-29 RX ADMIN — FINASTERIDE 5 MILLIGRAM(S): 1 TABLET, FILM COATED ORAL at 12:38

## 2025-05-29 RX ADMIN — Medication 0.1 MILLIGRAM(S): at 17:07

## 2025-05-29 RX ADMIN — METHOCARBAMOL 1500 MILLIGRAM(S): 500 TABLET, FILM COATED ORAL at 13:40

## 2025-05-29 RX ADMIN — DULOXETINE 60 MILLIGRAM(S): 20 CAPSULE, DELAYED RELEASE ORAL at 12:38

## 2025-05-29 RX ADMIN — Medication 1 TABLET(S): at 05:17

## 2025-05-29 RX ADMIN — Medication 3 MILLIGRAM(S): at 21:51

## 2025-05-29 RX ADMIN — INSULIN LISPRO 3 UNIT(S): 100 INJECTION, SOLUTION INTRAVENOUS; SUBCUTANEOUS at 17:08

## 2025-05-29 RX ADMIN — NICOTINE POLACRILEX 1 PATCH: 4 GUM, CHEWING ORAL at 20:10

## 2025-05-29 RX ADMIN — NICOTINE POLACRILEX 1 PATCH: 4 GUM, CHEWING ORAL at 12:32

## 2025-05-29 RX ADMIN — Medication 4 MILLIGRAM(S): at 12:42

## 2025-05-29 RX ADMIN — METOPROLOL SUCCINATE 25 MILLIGRAM(S): 50 TABLET, EXTENDED RELEASE ORAL at 17:07

## 2025-05-29 RX ADMIN — Medication 1 TABLET(S): at 17:08

## 2025-05-29 RX ADMIN — Medication 0.1 MILLIGRAM(S): at 05:17

## 2025-05-29 RX ADMIN — NICOTINE POLACRILEX 1 PATCH: 4 GUM, CHEWING ORAL at 12:39

## 2025-05-29 RX ADMIN — INSULIN LISPRO 3 UNIT(S): 100 INJECTION, SOLUTION INTRAVENOUS; SUBCUTANEOUS at 08:02

## 2025-05-29 RX ADMIN — OXYBUTYNIN CHLORIDE 10 MILLIGRAM(S): 5 TABLET, FILM COATED, EXTENDED RELEASE ORAL at 05:17

## 2025-05-29 RX ADMIN — AMLODIPINE BESYLATE 10 MILLIGRAM(S): 10 TABLET ORAL at 05:17

## 2025-05-29 RX ADMIN — Medication 1 MILLIGRAM(S): at 02:30

## 2025-05-29 RX ADMIN — METHOCARBAMOL 1500 MILLIGRAM(S): 500 TABLET, FILM COATED ORAL at 05:17

## 2025-05-29 RX ADMIN — GABAPENTIN 600 MILLIGRAM(S): 400 CAPSULE ORAL at 21:50

## 2025-05-29 RX ADMIN — Medication 40 MILLIGRAM(S): at 05:17

## 2025-05-29 RX ADMIN — INSULIN GLARGINE-YFGN 10 UNIT(S): 100 INJECTION, SOLUTION SUBCUTANEOUS at 21:51

## 2025-05-29 RX ADMIN — FOLIC ACID 1 MILLIGRAM(S): 1 TABLET ORAL at 12:38

## 2025-05-29 NOTE — DISCHARGE NOTE NURSING/CASE MANAGEMENT/SOCIAL WORK - FINANCIAL ASSISTANCE
[facility] provides services at a reduced cost to those who are determined to be eligible through [facility]’s financial assistance program. Information regarding [facility]’s financial assistance program can be found by going to [email or web address] or by calling [phone].

## 2025-05-29 NOTE — DISCHARGE NOTE PROVIDER - NSDCCPCAREPLAN_GEN_ALL_CORE_FT
PRINCIPAL DISCHARGE DIAGNOSIS  Diagnosis: Recurrent UTI (urinary tract infection)  Assessment and Plan of Treatment: 2/2 to chronic indwelling ayala cathteter ( suprapubic ) poa      SECONDARY DISCHARGE DIAGNOSES  Diagnosis: Acute on chronic renal insufficiency  Assessment and Plan of Treatment:     Diagnosis: Bladder spasms  Assessment and Plan of Treatment:     Diagnosis: Type 2 diabetes mellitus  Assessment and Plan of Treatment:     Diagnosis: HTN (hypertension)  Assessment and Plan of Treatment:     Diagnosis: Chronic back pain  Assessment and Plan of Treatment:     Diagnosis: Peripheral neuropathy  Assessment and Plan of Treatment:     Diagnosis: BPH (benign prostatic hyperplasia)  Assessment and Plan of Treatment:     Diagnosis: APL (antiphospholipid syndrome)  Assessment and Plan of Treatment:     Diagnosis: At risk for urinary tract infection associated with indwelling catheter  Assessment and Plan of Treatment:

## 2025-05-29 NOTE — SOCIAL WORK PROGRESS NOTE - NSSWPROGRESSNOTE_GEN_ALL_CORE
JANINE notified that pt is medically stable for dc to SNF. EDIN completed. Per [place] ([facility]) pt is in need of insurance authorization from [facility] since his auth has . [facility] reports they will open case with insurance. JANINE remains available. DC packet and ernestine form on unit. Pt reports he would like to appeal his dc however it was explained that at this time we are waiting on authorization form [facility] prior to him leaving the hospital. Pt aware of above. JANINE remains available.

## 2025-05-29 NOTE — DISCHARGE NOTE PROVIDER - NSDCCAREPROVSEEN_GEN_ALL_CORE_FT
Neymar, Yadira Kimball, Jerry Bender, Johnathan Walters, Brock Kendall, Ming Szymanski, Jorge Luis Sanchez, Janie Bell, Manuel

## 2025-05-29 NOTE — PROGRESS NOTE ADULT - SUBJECTIVE AND OBJECTIVE BOX
JOIE BRUNO is a 50yMale , patient examined and chart reviewed.     INTERVAL HPI/ OVERNIGHT EVENTS:   No events NAD.   Afebrile.    PAST MEDICAL & SURGICAL HISTORY:  BPH (benign prostatic hyperplasia)  HTN (hypertension)  Type 2 diabetes mellitus  Peripheral neuropathy  History of Guillain-Midland syndrome  History of CVA in adulthood  DDD (degenerative disc disease), lumbar  DVT, lower extremity  Renal stones  History of neurogenic bowel  Degenerative arthritis  Muscle weakness  Iron deficiency anemia  History of muscle spasm  Pain, unspecified  Vitamin deficiency  Obesity  GERD (gastroesophageal reflux disease)  H/O constipation  H/O insomnia  Major depressive disorder, single episode  S/P IVC filter  H/O lithotripsy  Chronic suprapubic catheter      For details regarding the patient's social history, family history, and other miscellaneous elements, please refer the initial infectious diseases consultation and/or the admitting history and physical examination for this admission.    ROS:  CONSTITUTIONAL:  Negative fever or chills  EYES:  Negative  blurry vision or double vision  CARDIOVASCULAR:  Negative for chest pain or palpitations  RESPIRATORY:  Negative for cough, wheezing, or SOB   GASTROINTESTINAL:  Negative for nausea, vomiting, diarrhea, constipation, or abdominal pain  GENITOURINARY:  Negative frequency, urgency or dysuria  NEUROLOGIC:  No headache, confusion, dizziness, lightheadedness  All other systems were reviewed and are negative     ALLERGIES  Pipercillin Sodium-Tazobactam Sodium (Pruritus)  sulfa drugs (Rash)        Current inpatient medications :    ANTIBIOTICS/RELEVANT:    MEDICATIONS  (STANDING):  amLODIPine   Tablet 10 milliGRAM(s) Oral daily  ascorbic acid 500 milliGRAM(s) Oral daily  cloNIDine 0.1 milliGRAM(s) Oral two times a day  dextrose 5%. 1000 milliLiter(s) (50 mL/Hr) IV Continuous <Continuous>  dextrose 5%. 1000 milliLiter(s) (100 mL/Hr) IV Continuous <Continuous>  dextrose 50% Injectable 25 Gram(s) IV Push once  dextrose 50% Injectable 12.5 Gram(s) IV Push once  dextrose 50% Injectable 25 Gram(s) IV Push once  DULoxetine 60 milliGRAM(s) Oral daily  epoetin juan a-epbx (RETACRIT) Injectable 65859 Unit(s) SubCutaneous <User Schedule>  finasteride 5 milliGRAM(s) Oral daily  folic acid 1 milliGRAM(s) Oral daily  gabapentin 600 milliGRAM(s) Oral every 8 hours  glucagon  Injectable 1 milliGRAM(s) IntraMuscular once  insulin glargine Injectable (LANTUS) 10 Unit(s) SubCutaneous at bedtime  insulin lispro (ADMELOG) corrective regimen sliding scale   SubCutaneous three times a day before meals  insulin lispro (ADMELOG) corrective regimen sliding scale   SubCutaneous at bedtime  insulin lispro Injectable (ADMELOG) 3 Unit(s) SubCutaneous before lunch  insulin lispro Injectable (ADMELOG) 3 Unit(s) SubCutaneous before dinner  insulin lispro Injectable (ADMELOG) 3 Unit(s) SubCutaneous before breakfast  lactobacillus acidophilus 1 Tablet(s) Oral every 12 hours  melatonin 3 milliGRAM(s) Oral at bedtime  methocarbamol 1500 milliGRAM(s) Oral every 8 hours  metoprolol tartrate 25 milliGRAM(s) Oral two times a day  naloxegol 12.5 milliGRAM(s) Oral daily  nicotine -  14 mG/24Hr(s) Patch 1 Patch Transdermal daily  oxybutynin 10 milliGRAM(s) Oral two times a day  pantoprazole    Tablet 40 milliGRAM(s) Oral before breakfast  polyethylene glycol 3350 17 Gram(s) Oral daily  senna 2 Tablet(s) Oral at bedtime  warfarin 4 milliGRAM(s) Oral once    MEDICATIONS  (PRN):  acetaminophen     Tablet .. 650 milliGRAM(s) Oral every 6 hours PRN Temp greater or equal to 38C (100.4F), Mild Pain (1 - 3)  aluminum hydroxide/magnesium hydroxide/simethicone Suspension 30 milliLiter(s) Oral every 4 hours PRN Dyspepsia  dextrose Oral Gel 15 Gram(s) Oral once PRN Blood Glucose LESS THAN 70 milliGRAM(s)/deciliter  HYDROmorphone   Tablet 4 milliGRAM(s) Oral every 6 hours PRN Moderate Pain (4 - 6)  ondansetron Injectable 4 milliGRAM(s) IV Push every 8 hours PRN Nausea and/or Vomiting      Objective:  Vital Signs Last 24 Hrs  T(C): 36.9 (29 May 2025 12:40), Max: 37.1 (29 May 2025 05:13)  T(F): 98.5 (29 May 2025 12:40), Max: 98.7 (29 May 2025 05:13)  HR: 61 (29 May 2025 12:40) (58 - 61)  BP: 145/83 (29 May 2025 12:40) (145/83 - 165/84)  BP(mean): 113 (28 May 2025 20:56) (113 - 113)  RR: 18 (29 May 2025 12:40) (18 - 19)  SpO2: 97% (29 May 2025 12:40) (97% - 98%)    Parameters below as of 29 May 2025 12:40  Patient On (Oxygen Delivery Method): room air    Physical Exam:  General: no acute distress  Neck: supple, trachea midline  Lungs: clear, no wheeze/rhonchi  Cardiovascular: regular rate and rhythm, S1 S2  Abdomen: soft, nontender,  bowel sounds normal +SPC  Neurological: alert and oriented x3  Skin: no rash  Extremities: no edema      LABS:                        9.2    9.21  )-----------( 171      ( 28 May 2025 05:45 )             29.2   05-28    135  |  103  |  27[H]  ----------------------------<  127[H]  4.3   |  20[L]  |  2.61[H]    Ca    8.6      28 May 2025 05:45    TPro  7.0  /  Alb  2.7[L]  /  TBili  0.4  /  DBili  x   /  AST  16  /  ALT  20  /  AlkPhos  105  05-28      Urine Microscopic-Add On (NC) (05.25.25 @ 18:20)   White Blood Cell - Urine: 16 /HPF   Red Blood Cell - Urine: 2 /HPF   Bacteria: Negative /HPF   Squamous Epithelial Cells: Present  Urinalysis (05.25.25 @ 18:20)    Glucose Qualitative, Urine: 100 mg/dL   Blood, Urine: Moderate   pH Urine: 6.0   Color: Yellow   Urine Appearance: Cloudy   Bilirubin: Negative   Ketone , Urine: Negative mg/dL   Specific Gravity: 1.012   Protein, Urine: 300 mg/dL   Urobilinogen: 0.2 mg/dL   Nitrite: Negative   Leukocyte Esterase Concentration: Large    MICROBIOLOGY:  Culture - Urine (05.26.25 @ 06:25)    -  Amoxicillin/Clavulanic Acid: R >16/8   -  Ampicillin: R >16 These ampicillin results predict results for amoxicillin   -  Ampicillin/Sulbactam: R >16/8   -  Aztreonam: S <=4   -  Cefazolin: R >16   -  Cefepime: S <=2   -  Ceftriaxone: R 32   -  Cefoxitin: S <=8   -  Ertapenem: S <=0.5   -  Ciprofloxacin: R 1   -  Levofloxacin: I 1   -  Gentamicin: S <=2   -  Meropenem: S <=1   -  Nitrofurantoin: R 64 Should not be used to treat pyelonephritis   -  Piperacillin/Tazobactam: S <=8   -  Tobramycin: S <=2   -  Trimethoprim/Sulfamethoxazole: S <=0.5/9.5   Specimen Source: Suprapubic Suprapubic   Culture Results:   50,000 - 99,000 CFU/mL Candida albicans "Susceptibilities not performed"  <10,000 CFU/ml Morganella morganii   Organism Identification: Morganella morganii   Organism: Morganella morganii   Method Type: JOSE RAMON      Culture - Blood (collected 25 May 2025 18:10)  Source: Blood Blood-Peripheral  Preliminary Report (27 May 2025 02:02):    No growth at 24 hours    Culture - Blood (collected 25 May 2025 18:10)  Source: Blood Blood-Peripheral  Preliminary Report (27 May 2025 02:02):    No growth at 24 hours      RADIOLOGY & ADDITIONAL STUDIES:      Assessment :  51YO M long term resident of [facility] PMH hypertension CKD GBS DVT CVA antiphospholipid antibody syndrome on warfarin diabetes hyperlipidemia neurogenic bladder with suprapubic catheter chronic bladder spasms recent admission for Acute cally transferred to [facility]  S/P lap cally 4/28 now presents with c/o cloudy urine in his suprapubic catheter, feeling some generalized fatigue and malaise over the past 1 week.  Patient states this is consistent with his usual UTI. No fever chills n/v/d. In ED afebrile. WBC 10K ANTONIO On CKD UA wih minimal pyuria. SPC changed in ED.   CAUTI  Ucx with 50,000 - 99,000 CFU/mL Candida albicans "Susceptibilities not performed" <10,000 CFU/ml Morganella morganii Chronic pains  Remains afebrile wbc wnl  Clinically stable    Plan :   Monitor off antibiotics  SP Ertapenem x 3 days ended 5/28  Trend temps and cbc  Stable from ID standpoint  Dc planning per primary team    Advance Directives- Full code  Current Medications are documented.   Drug-drug interactions reviewed.    Continue with present regiment.  Appropriate use of antibiotics and adverse effects reviewed.      > 35 minutes were spent in direct patient care reviewing notes, medications ,labs data/ imaging , discussion with multidisciplinary team.    Thank you for allowing me to participate in care of your patient .    Yadira Blackmon MD  Infectious Disease  [phone]    Individualized infection control protocols for an individual patient based on their diagnosis and risks in order to reduce risk of disease transmission followed. Coordinating with  infection prevention and control team members to enable healthcare facility staff to safely care for patient.  Managing infection prevention and treatment protocols associated with transitions of care for complex patients.  In-depth patient chart review that entails going back farther in time and assessing the complete breadth of all health care interactions, with higher-level synthesis for complex diagnoses.  Engaging in complex medical decision-making associated with antimicrobial prescribing including considerations such as antimicrobial resistance patterns, emergence of new variants/strains, recent antibiotic exposure, interactions/complications from comorbidities including concurrent infections, public health considerations to minimize development of antimicrobial resistance, and emerging and re-emerging infections.

## 2025-05-29 NOTE — PROGRESS NOTE ADULT - SUBJECTIVE AND OBJECTIVE BOX
PROGRESS NOTE  Patient is a 50y old  Male who presents with a chief complaint of Urinary tract infection  Chart and available morning labs /imaging are reviewed electronically , urgent issues addressed . More information  is being added upon completion of rounds , when more information is collected and management discussed with consultants , medical staff and social service/case management on the floor   Per HPI:     CC.   . 5/25/2025 49 y/o male presents axo3 via stretcher, bibems from [facility] for UTI parker/treatment.  OVERALL PRESENTATION.  . 5/25/2025   . 50-year-old male with history of multiple medical issues, currently living at [facility] presents with has been having some cloudy urine in his suprapubic catheter, feeling some generalized fatigue and malaise over the past 1 week.  Patient states this is consistent with his usual UTI.  The patient will often need to get admitted for UTIs, secondary to MDR.  No aggravating or alleviating factors otherwise noted.  No other acute injury or complaints.  No recent trauma.  No known fever. (25 May 2025 20:05) (26 May 2025 11:33)      OVERNIGHT  No new issues reported by medical staff . All above noted Patient is resting in a bed comfortably  .No distress noted   Agreed to return o [facility] today , but later i was informed by RN that he wants to appeal Finished iv abx as per ID  , no iv fluids required as per nephrologist since patient drinks up to 3 liters of water daily   HPI:     CC.   . 5/25/2025 49 y/o male presents axo3 via stretcher, bibems from [facility] for UTI parker/treatment.  OVERALL PRESENTATION.  . 5/25/2025   . 50-year-old male with history of multiple medical issues, currently living at [facility] presents with has been having some cloudy urine in his suprapubic catheter, feeling some generalized fatigue and malaise over the past 1 week.  Patient states this is consistent with his usual UTI.  The patient will often need to get admitted for UTIs, secondary to MDR.  No aggravating or alleviating factors otherwise noted.  No other acute injury or complaints.  No recent trauma.  No known fever. (25 May 2025 20:05)    PAST MEDICAL & SURGICAL HISTORY:  BPH (benign prostatic hyperplasia)      HTN (hypertension)      Type 2 diabetes mellitus      Peripheral neuropathy      History of Guillain-[place] syndrome      History of CVA in adulthood      DDD (degenerative disc disease), lumbar      DVT, lower extremity      Renal stones      H/O Guillain-[place] syndrome      History of neurogenic bowel      DM2 (diabetes mellitus, type 2)      HTN (hypertension)      BPH without obstruction/lower urinary tract symptoms      Degenerative arthritis      DVT of lower limb, acute      CVA (cerebrovascular accident)      CVD (cerebrovascular disease)      Muscle weakness      Iron deficiency anemia      History of muscle spasm      Pain, unspecified      Vitamin deficiency      Obesity      GERD (gastroesophageal reflux disease)      H/O constipation      H/O insomnia      Essential (primary) hypertension      Acute embolism and thrombosis of deep vein of lower extremity      BPH (benign prostatic hyperplasia)      Diabetes mellitus due to underlying condition with diabetic neuropathy      Major depressive disorder, single episode      S/P IVC filter      H/O lithotripsy      Chronic suprapubic catheter          MEDICATIONS  (STANDING):  amLODIPine   Tablet 10 milliGRAM(s) Oral daily  ascorbic acid 500 milliGRAM(s) Oral daily  cloNIDine 0.1 milliGRAM(s) Oral two times a day  dextrose 5%. 1000 milliLiter(s) (50 mL/Hr) IV Continuous <Continuous>  dextrose 5%. 1000 milliLiter(s) (100 mL/Hr) IV Continuous <Continuous>  dextrose 50% Injectable 25 Gram(s) IV Push once  dextrose 50% Injectable 12.5 Gram(s) IV Push once  dextrose 50% Injectable 25 Gram(s) IV Push once  DULoxetine 60 milliGRAM(s) Oral daily  epoetin juan a-epbx (RETACRIT) Injectable 97858 Unit(s) SubCutaneous <User Schedule>  finasteride 5 milliGRAM(s) Oral daily  folic acid 1 milliGRAM(s) Oral daily  gabapentin 600 milliGRAM(s) Oral every 8 hours  glucagon  Injectable 1 milliGRAM(s) IntraMuscular once  insulin glargine Injectable (LANTUS) 10 Unit(s) SubCutaneous at bedtime  insulin lispro (ADMELOG) corrective regimen sliding scale   SubCutaneous three times a day before meals  insulin lispro (ADMELOG) corrective regimen sliding scale   SubCutaneous at bedtime  insulin lispro Injectable (ADMELOG) 3 Unit(s) SubCutaneous before breakfast  insulin lispro Injectable (ADMELOG) 3 Unit(s) SubCutaneous before lunch  insulin lispro Injectable (ADMELOG) 3 Unit(s) SubCutaneous before dinner  lactobacillus acidophilus 1 Tablet(s) Oral every 12 hours  melatonin 3 milliGRAM(s) Oral at bedtime  methocarbamol 1500 milliGRAM(s) Oral every 8 hours  metoprolol tartrate 25 milliGRAM(s) Oral two times a day  naloxegol 12.5 milliGRAM(s) Oral daily  nicotine -  14 mG/24Hr(s) Patch 1 Patch Transdermal daily  oxybutynin 10 milliGRAM(s) Oral two times a day  pantoprazole    Tablet 40 milliGRAM(s) Oral before breakfast  polyethylene glycol 3350 17 Gram(s) Oral daily  senna 2 Tablet(s) Oral at bedtime  warfarin 4 milliGRAM(s) Oral once    MEDICATIONS  (PRN):  acetaminophen     Tablet .. 650 milliGRAM(s) Oral every 6 hours PRN Temp greater or equal to 38C (100.4F), Mild Pain (1 - 3)  aluminum hydroxide/magnesium hydroxide/simethicone Suspension 30 milliLiter(s) Oral every 4 hours PRN Dyspepsia  dextrose Oral Gel 15 Gram(s) Oral once PRN Blood Glucose LESS THAN 70 milliGRAM(s)/deciliter  HYDROmorphone   Tablet 4 milliGRAM(s) Oral every 6 hours PRN Moderate Pain (4 - 6)  ondansetron Injectable 4 milliGRAM(s) IV Push every 8 hours PRN Nausea and/or Vomiting      OBJECTIVE    T(C): 37.1 (05-29-25 @ 05:13), Max: 37.1 (05-29-25 @ 05:13)  HR: 58 (05-29-25 @ 05:13) (58 - 62)  BP: 165/84 (05-29-25 @ 05:13) (144/85 - 165/84)  RR: 18 (05-29-25 @ 05:13) (15 - 19)  SpO2: 98% (05-29-25 @ 05:13) (97% - 98%)  Wt(kg): --  I&O's Summary    28 May 2025 07:01  -  29 May 2025 07:00  --------------------------------------------------------  IN: 0 mL / OUT: 1625 mL / NET: -1625 mL          REVIEW OF SYSTEMS:  CONSTITUTIONAL: No fever, weight loss, or fatigue  EYES: No eye pain, visual disturbances, or discharge  ENMT:   No sinus or throat pain  NECK: No pain or stiffness  RESPIRATORY: No cough, wheezing, chills or hemoptysis; No shortness of breath  CARDIOVASCULAR: No chest pain, palpitations, dizziness, or leg swelling  GASTROINTESTINAL: No abdominal pain. No nausea, vomiting; No diarrhea or constipation. No melena or hematochezia.  GENITOURINARY: No dysuria, frequency, hematuria, or incontinence  NEUROLOGICAL: No headaches, memory loss, loss of strength, numbness, or tremors  SKIN: No itching, burning, rashes, or lesions   MUSCULOSKELETAL: No joint pain or swelling; No muscle, back, or extremity pain    PHYSICAL EXAM:  Appearance: NAD. VS past 24 hrs -as above   HEENT:   Moist oral mucosa. Conjunctiva clear b/l.   Neck : supple  Respiratory: Lungs CTAB.  Gastrointestinal:  Soft, nontender. No rebound. No rigidity. BS present	  Cardiovascular: RRR ,S1S2 present  Neurologic: Non-focal. Moving all extremities.  Extremities: No edema. No erythema. No calf tenderness.  Skin: No rashes, No ecchymoses, No cyanosis.	  wounds ,skin lesions-See skin assesment flow sheet   LABS:                        9.2    9.21  )-----------( 171      ( 28 May 2025 05:45 )             29.2     05-28    135  |  103  |  27[H]  ----------------------------<  127[H]  4.3   |  20[L]  |  2.61[H]    Ca    8.6      28 May 2025 05:45    TPro  7.0  /  Alb  2.7[L]  /  TBili  0.4  /  DBili  x   /  AST  16  /  ALT  20  /  AlkPhos  105  05-28    CAPILLARY BLOOD GLUCOSE      POCT Blood Glucose.: 120 mg/dL (29 May 2025 07:55)  POCT Blood Glucose.: 111 mg/dL (28 May 2025 21:20)  POCT Blood Glucose.: 153 mg/dL (28 May 2025 16:36)  POCT Blood Glucose.: 131 mg/dL (28 May 2025 12:12)    PT/INR - ( 28 May 2025 05:45 )   PT: 19.0 sec;   INR: 1.62 ratio           Urinalysis Basic - ( 28 May 2025 05:45 )    Color: x / Appearance: x / SG: x / pH: x  Gluc: 127 mg/dL / Ketone: x  / Bili: x / Urobili: x   Blood: x / Protein: x / Nitrite: x   Leuk Esterase: x / RBC: x / WBC x   Sq Epi: x / Non Sq Epi: x / Bacteria: x        Culture - Urine (collected 26 May 2025 06:25)  Source: Suprapubic Suprapubic  Final Report (28 May 2025 17:17):    50,000 - 99,000 CFU/mL Candida albicans "Susceptibilities not performed"    <10,000 CFU/ml Morganella morganii  Organism: Morganella morganii (28 May 2025 17:17)  Organism: Morganella morganii (28 May 2025 17:17)    Culture - Blood (collected 25 May 2025 18:10)  Source: Blood Blood-Peripheral  Preliminary Report (29 May 2025 02:01):    No growth at 72 Hours    Culture - Blood (collected 25 May 2025 18:10)  Source: Blood Blood-Peripheral  Preliminary Report (29 May 2025 02:01):    No growth at 72 Hours      RADIOLOGY & ADDITIONAL TESTS:   reviewed elctronically  ASSESSMENT/PLAN: 	    25 minutes aggregate time was spent on this visit, 50% visit time spent in care co-ordination with other attendings and counselling patient .I have discussed care plan with patient / HCP/family member ,who expressed understanding of problems treatment and their effect and side effects, alternatives in details. I have asked if they have any questions and concerns and appropriately addressed them to best of my ability.

## 2025-05-29 NOTE — DISCHARGE NOTE PROVIDER - HOSPITAL COURSE
51[facility] GBS DVT CVA antiphospholipid antibody syndrome on warfarin diabetes hyperlipidemia neurogenic bladder with suprapubic catheter chronic bladder spasms recent admission for Acute cally transferred to [facility]  S/P lap cally 4/28 now presents with c/o cloudy urine in his suprapubic catheter, feeling some generalized fatigue and malaise over the past 1 week.  Patient states this is consistent with his usual UTI. No fever chills n/v/d. In ED afebrile. WBC 10K ANTONIO On CKD UA wih minimal pyuria. SPC changed in ED.   Rule out CAUTI  Chronic pains/bladder spasms   On empiric Ertapenem ( pt with hx of esbl infections )  Trend temps and cbc  Fu cultures  Stable from ID standpoint  Advance Directives- Full ode  Current Medications are documented.   Drug-drug interactions reviewed.      Problem/Plan - 1:  ·  Problem: Recurrent UTI (urinary tract infection).   ·  Plan: 2/2 to chronic indwelling ayala catheter -On empiric Ertapenem ( pt with hx of esbl infections )  Trend temps and cbc  Fu cultures  Stable from ID standpoint.    Problem/Plan - 2:  ·  Problem: At risk for urinary tract infection associated with indwelling catheter.   ·  Plan: On empiric Ertapenem ( pt with hx of esbl infections )  Trend temps and cbc  Fu cultures  Stable from ID standpoint.    Problem/Plan - 3:  ·  Problem: ANTONIO (acute kidney injury).   ·  Plan: serial bmp , ins/outs , iv fluids as per nephrologist.    Problem/Plan - 4:  ·  Problem: Type 2 diabetes mellitus.   ·  Plan: Accuchecks monitoring and insulin corrective regimen  sliding scale coverage with short acting inslulin, add longacting insulin as needed ,no concentrated sweets diet, serial labs ,HbA1C,education.    Problem/Plan - 5:  ·  Problem: Bladder spasms.   ·  Plan: pain mangement consult requested.    Problem/Plan - 6:  ·  Problem: HTN (hypertension).   ·  Plan: continue home medications.    Problem/Plan - 7:  ·  Problem: Chronic back pain.   ·  Plan: pain management consult.    Problem/Plan - 8:  ·  Problem: Peripheral neuropathy.   ·  Plan: continue home medications.    Problem/Plan - 9:  ·  Problem: BPH (benign prostatic hyperplasia).   ·  Plan: continue home medications.    Problem/Plan - 10:  ·  Problem: APL (antiphospholipid syndrome).   ·  Plan; on coumadin , serial pt/inr.    Problem/Plan - 11:  ·  Problem: Prophylactic measure.   ·  Plan: Gastrointestinal stress ulcer prophylaxis and DVT prophylaxis administered

## 2025-05-29 NOTE — CASE MANAGEMENT PROGRESS NOTE - NSCMPROGRESSNOTE_GEN_ALL_CORE
Update Communication Note: As per morning rounds, 51 y/o male presents axo3 via [facility] from [facility] for UTI parker/treatment. Patient appealing Discharge Today, MSW following case, will continue to follow patient.

## 2025-05-29 NOTE — DISCHARGE NOTE PROVIDER - CARE PROVIDER_API CALL
Jeff John [facility]  Urology  [address] [place] [id number]  Phone: [phone]  Fax: [phone]  Follow Up Time: 2 weeks

## 2025-05-29 NOTE — DISCHARGE NOTE NURSING/CASE MANAGEMENT/SOCIAL WORK - NSDCPEFALRISK_GEN_ALL_CORE
For information on Fall & Injury Prevention, visit: [email or web address] OR  [email or web address] OR  https://www.cdc.gov/steadi/patient.html

## 2025-05-29 NOTE — PROGRESS NOTE ADULT - SUBJECTIVE AND OBJECTIVE BOX
Patient is a 50y Male whom presented to the hospital with ckd     PAST MEDICAL & SURGICAL HISTORY:  BPH (benign prostatic hyperplasia)      HTN (hypertension)      Type 2 diabetes mellitus      Peripheral neuropathy      History of Guillain-[place] syndrome      History of CVA in adulthood      DDD (degenerative disc disease), lumbar      DVT, lower extremity      Renal stones      H/O Guillain-[place] syndrome      History of neurogenic bowel      DM2 (diabetes mellitus, type 2)      HTN (hypertension)      BPH without obstruction/lower urinary tract symptoms      Degenerative arthritis      DVT of lower limb, acute      CVA (cerebrovascular accident)      CVD (cerebrovascular disease)      Muscle weakness      Iron deficiency anemia      History of muscle spasm      Pain, unspecified      Vitamin deficiency      Obesity      GERD (gastroesophageal reflux disease)      H/O constipation      H/O insomnia      Essential (primary) hypertension      Acute embolism and thrombosis of deep vein of lower extremity      BPH (benign prostatic hyperplasia)      Diabetes mellitus due to underlying condition with diabetic neuropathy      Major depressive disorder, single episode      S/P IVC filter      H/O lithotripsy      Chronic suprapubic catheter          MEDICATIONS  (STANDING):  amLODIPine   Tablet 10 milliGRAM(s) Oral daily  ascorbic acid 500 milliGRAM(s) Oral daily  cloNIDine 0.1 milliGRAM(s) Oral two times a day  dextrose 5%. 1000 milliLiter(s) (50 mL/Hr) IV Continuous <Continuous>  dextrose 5%. 1000 milliLiter(s) (100 mL/Hr) IV Continuous <Continuous>  dextrose 50% Injectable 25 Gram(s) IV Push once  dextrose 50% Injectable 12.5 Gram(s) IV Push once  dextrose 50% Injectable 25 Gram(s) IV Push once  DULoxetine 60 milliGRAM(s) Oral daily  ertapenem  IVPB 500 milliGRAM(s) IV Intermittent every 24 hours  finasteride 5 milliGRAM(s) Oral daily  folic acid 1 milliGRAM(s) Oral daily  glucagon  Injectable 1 milliGRAM(s) IntraMuscular once  insulin glargine Injectable (LANTUS) 10 Unit(s) SubCutaneous at bedtime  insulin lispro (ADMELOG) corrective regimen sliding scale   SubCutaneous three times a day before meals  insulin lispro (ADMELOG) corrective regimen sliding scale   SubCutaneous at bedtime  insulin lispro Injectable (ADMELOG) 3 Unit(s) SubCutaneous before breakfast  insulin lispro Injectable (ADMELOG) 3 Unit(s) SubCutaneous before lunch  insulin lispro Injectable (ADMELOG) 3 Unit(s) SubCutaneous before dinner  melatonin 3 milliGRAM(s) Oral at bedtime  methocarbamol 1500 milliGRAM(s) Oral every 8 hours  metoprolol tartrate 25 milliGRAM(s) Oral two times a day  naloxegol 12.5 milliGRAM(s) Oral daily  nicotine -  14 mG/24Hr(s) Patch 1 Patch Transdermal daily  oxybutynin 10 milliGRAM(s) Oral two times a day  pantoprazole    Tablet 40 milliGRAM(s) Oral before breakfast  senna 2 Tablet(s) Oral at bedtime      Allergies    sulfa drugs (Other)  sulfa drugs (Hives)  sulfa drugs (Rash)  sulfa drugs (Unknown)    Intolerances    Pipercillin Sodium-Tazobactam Sodium (Pruritus)      SOCIAL HISTORY:  Denies ETOh,Smoking,     FAMILY HISTORY:  FH: heart disease    FH: HTN (hypertension)    Family history of sinus tachycardia (Father)    FH: HTN (hypertension) (Mother)        REVIEW OF SYSTEMS:    CONSTITUTIONAL: No weakness, fevers or chills  RESPIRATORY: No cough, wheezing, hemoptysis; No shortness of breath  CARDIOVASCULAR: No chest pain or palpitations  GASTROINTESTINAL: No abdominal or epigastric pain. No nausea, vomiting,     No diarrhea or constipation. No melena                                            10.2   10.20 )-----------( 222      ( 29 May 2025 16:40 )             32.3       CBC Full  -  ( 29 May 2025 16:40 )  WBC Count : 10.20 K/uL  RBC Count : 4.36 M/uL  Hemoglobin : 10.2 g/dL  Hematocrit : 32.3 %  Platelet Count - Automated : 222 K/uL  Mean Cell Volume : 74.1 fL  Mean Cell Hemoglobin : 23.4 pg  Mean Cell Hemoglobin Concentration : 31.6 g/dL  Auto Neutrophil # : x  Auto Lymphocyte # : x  Auto Monocyte # : x  Auto Eosinophil # : x  Auto Basophil # : x  Auto Neutrophil % : x  Auto Lymphocyte % : x  Auto Monocyte % : x  Auto Eosinophil % : x  Auto Basophil % : x      -    136  |  103  |  26[H]  ----------------------------<  130[H]  3.9   |  22  |  2.61[H]    Ca    8.7      29 May 2025 16:40    TPro  7.0  /  Alb  2.7[L]  /  TBili  0.4  /  DBili  x   /  AST  16  /  ALT  20  /  AlkPhos  105  05-28      CAPILLARY BLOOD GLUCOSE      POCT Blood Glucose.: 132 mg/dL (29 May 2025 16:52)  POCT Blood Glucose.: 137 mg/dL (29 May 2025 12:16)  POCT Blood Glucose.: 120 mg/dL (29 May 2025 07:55)  POCT Blood Glucose.: 111 mg/dL (28 May 2025 21:20)      Vital Signs Last 24 Hrs  T(C): 36.9 (29 May 2025 17:00), Max: 37.1 (29 May 2025 05:13)  T(F): 98.5 (29 May 2025 17:00), Max: 98.7 (29 May 2025 05:13)  HR: 60 (29 May 2025 17:00) (58 - 61)  BP: 138/71 (29 May 2025 17:00) (138/71 - 165/84)  BP(mean): 113 (28 May 2025 20:56) (113 - 113)  RR: 16 (29 May 2025 17:00) (16 - 19)  SpO2: 96% (29 May 2025 17:00) (96% - 98%)    Parameters below as of 29 May 2025 17:00  Patient On (Oxygen Delivery Method): room air        Urinalysis Basic - ( 29 May 2025 16:40 )    Color: x / Appearance: x / SG: x / pH: x  Gluc: 130 mg/dL / Ketone: x  / Bili: x / Urobili: x   Blood: x / Protein: x / Nitrite: x   Leuk Esterase: x / RBC: x / WBC x   Sq Epi: x / Non Sq Epi: x / Bacteria: x        PT/INR - ( 29 May 2025 16:40 )   PT: 21.0 sec;   INR: 1.79 ratio                  PHYSICAL EXAM:    Constitutional: NAD  HEENT: conjunctive   clear   Neck:  No JVD  Respiratory: CTAB  Cardiovascular: S1 and S2  Gastrointestinal: BS+, soft, NT/ND  Extremities: No peripheral edema  : Chronic suprapubic catheter  Skin: No rashes  Access: Not applicable                                                          9.8    10.57 )-----------( 152      ( 25 May 2025 17:47 )             31.0       CBC Full  -  ( 25 May 2025 17:47 )  WBC Count : 10.57 K/uL  RBC Count : 4.12 M/uL  Hemoglobin : 9.8 g/dL  Hematocrit : 31.0 %  Platelet Count - Automated : 152 K/uL  Mean Cell Volume : 75.2 fL  Mean Cell Hemoglobin : 23.8 pg  Mean Cell Hemoglobin Concentration : 31.6 g/dL  Auto Neutrophil # : x  Auto Lymphocyte # : x  Auto Monocyte # : x  Auto Eosinophil # : x  Auto Basophil # : x  Auto Neutrophil % : x  Auto Lymphocyte % : x  Auto Monocyte % : x  Auto Eosinophil % : x  Auto Basophil % : x      05    135  |  101  |  34[H]  ----------------------------<  235[H]  4.5   |  22  |  3.28[H]    Ca    8.7      25 May 2025 17:47    TPro  7.6  /  Alb  2.9[L]  /  TBili  0.5  /  DBili  x   /  AST  31  /  ALT  27  /  AlkPhos  108  05-      CAPILLARY BLOOD GLUCOSE          Vital Signs Last 24 Hrs  T(C): 36.7 (25 May 2025 20:36), Max: 36.9 (25 May 2025 17:02)  T(F): 98.1 (25 May 2025 20:36), Max: 98.4 (25 May 2025 17:02)  HR: 79 (25 May 2025 20:36) (79 - 80)  BP: 190/82 (25 May 2025 20:36) (150/80 - 190/82)  BP(mean): --  RR: 18 (25 May 2025 20:36) (14 - 18)  SpO2: 97% (25 May 2025 20:36) (96% - 97%)    Parameters below as of 25 May 2025 20:36  Patient On (Oxygen Delivery Method): room air        Urinalysis Basic - ( 25 May 2025 18:20 )    Color: Yellow / Appearance: Cloudy / S.012 / pH: x  Gluc: x / Ketone: x  / Bili: Negative / Urobili: 0.2 mg/dL   Blood: x / Protein: 300 mg/dL / Nitrite: Negative   Leuk Esterase: Large / RBC: 2 /HPF / WBC 16 /HPF   Sq Epi: x / Non Sq Epi: x / Bacteria: Negative /HPF      MEDICATIONS  (STANDING):  amLODIPine   Tablet 10 milliGRAM(s) Oral daily  ascorbic acid 500 milliGRAM(s) Oral daily  cloNIDine 0.1 milliGRAM(s) Oral two times a day  DULoxetine 60 milliGRAM(s) Oral daily  ertapenem  IVPB 500 milliGRAM(s) IV Intermittent every 24 hours  finasteride 5 milliGRAM(s) Oral daily  folic acid 1 milliGRAM(s) Oral daily  glucagon  Injectable 1 milliGRAM(s) IntraMuscular once  melatonin 3 milliGRAM(s) Oral at bedtime  methocarbamol 1500 milliGRAM(s) Oral every 8 hours  metoprolol tartrate 25 milliGRAM(s) Oral two times a day  naloxegol 12.5 milliGRAM(s) Oral daily  nicotine -  14 mG/24Hr(s) Patch 1 Patch Transdermal daily  oxybutynin 10 milliGRAM(s) Oral two times a day  pantoprazole    Tablet 40 milliGRAM(s) Oral before breakfast  senna 2 Tablet(s) Oral at bedtime

## 2025-05-29 NOTE — DISCHARGE NOTE PROVIDER - NSDCMRMEDTOKEN_GEN_ALL_CORE_FT
alfuzosin 10 mg oral tablet, extended release: 1 tab(s) orally once a day  aluminum hydroxide-magnesium hydroxide 200 mg-200 mg/5 mL oral suspension: 30 milliliter(s) orally every 4 hours As needed Dyspepsia  amLODIPine 10 mg oral tablet: 1 tab(s) orally once a day  ascorbic acid 500 mg oral tablet: 1 tab(s) orally once a day  Basaglar KwikPen 100 units/mL subcutaneous solution: 10 unit(s) subcutaneous once a day (at bedtime)  cloNIDine 0.1 mg oral tablet: 1 tab(s) orally 2 times a day  DULoxetine 60 mg oral delayed release capsule: 1 cap(s) orally once a day  ferrous sulfate 325 mg (65 mg elemental iron) oral tablet: 1 tab(s) orally once a day  finasteride 5 mg oral tablet: 1 tab(s) orally once a day  folic acid: 1 milligram(s) once a day  gabapentin 600 mg oral tablet: 1 tab(s) orally every 8 hours  hydrALAZINE 25 mg oral tablet: 1 tab(s) orally every 8 hours as needed for BP &gt;160  HYDROmorphone 4 mg oral tablet: 1 tab(s) orally every 6 hours PRN  Insulin Lispro KwikPen 100 units/mL injectable solution: 3 unit(s) subcutaneous 3 times a day (before meals) and  additionally  sliding scale  lactobacillus acidophilus oral tablet: 1 tab(s) orally 2 times a day  melatonin 5 mg oral tablet: 1 tab(s) orally once a day (at bedtime)  methocarbamol 750 mg oral tablet: 2 tab(s) orally every 8 hours  metoprolol tartrate 25 mg oral tablet: 1 tab(s) orally 2 times a day  Multiple Vitamins oral tablet: 1 tab(s) orally once a day  Myrbetriq 25 mg oral tablet, extended release: 1 tab(s) orally once a day  naloxegol 12.5 mg oral tablet: 1 tab(s) orally once a day  nicotine 14 mg/24 hr transdermal film, extended release: 1 patch transdermal once a day  oxyBUTYnin 5 mg oral tablet: 2 tab(s) orally 2 times a day  pantoprazole 40 mg oral delayed release tablet: 1 tab(s) orally once a day (before a meal)  polyethylene glycol 3350 oral powder for reconstitution: 17 gram(s) orally once a day  Senna 8.6 mg oral tablet: 2 tab(s) orally once a day  Tylenol 325 mg oral tablet: 2 tab(s) orally every 4 hours as needed for  mild pain  warfarin 4 mg oral tablet: 1 tab(s) orally once a day

## 2025-05-29 NOTE — PROGRESS NOTE ADULT - NSPROGADDITIONALINFOA_GEN_ALL_CORE
discharge to [facility] when arranged by diana Patient was seen and examined on a day of discharge . Plan of care , discharge medications and recommendations discussed with consultants and clearance for discharge obtained .Social service , case management  and medical staff are aware of plan. Family is notified. Discharge summary  is  prepared electronically-see separate document prepared by me .75minutes spent on this visit, 50% visit time spent in care co-ordination with other attendings and counselling patient  I have discussed care plan with patient and HCP,expressed understanding of problems treatment and their effect and side effects, alternatives in detail,I have asked if they have any questions and concerns and appropriately addressed them to best of my ability discharge to [facility] when arranged by diana Patient was seen and examined on a day of discharge . Plan of care , discharge medications and recommendations discussed with consultants and clearance for discharge obtained .Social service , case management  and medical staff are aware of plan. Family is notified. Discharge summary  is  prepared electronically-see separate document prepared by me .55minutes spent on this visit, 50% visit time spent in care co-ordination with other attendings and counselling patient  I have discussed care plan with patient and HCP,expressed understanding of problems treatment and their effect and side effects, alternatives in detail,I have asked if they have any questions and concerns and appropriately addressed them to best of my ability

## 2025-05-29 NOTE — DISCHARGE NOTE NURSING/CASE MANAGEMENT/SOCIAL WORK - PATIENT PORTAL LINK FT
You can access the FollowMyHealth Patient Portal offered by [facility] by registering at the following website: [email or web address]. By joining [facility]’s FollowMyHealth portal, you will also be able to view your health information using other applications (apps) compatible with our system.

## 2025-05-30 VITALS
HEART RATE: 55 BPM | SYSTOLIC BLOOD PRESSURE: 148 MMHG | OXYGEN SATURATION: 97 % | RESPIRATION RATE: 16 BRPM | DIASTOLIC BLOOD PRESSURE: 87 MMHG | TEMPERATURE: 98 F

## 2025-05-30 LAB
GLUCOSE BLDC GLUCOMTR-MCNC: 147 MG/DL — HIGH (ref 70–99)
GLUCOSE BLDC GLUCOMTR-MCNC: 152 MG/DL — HIGH (ref 70–99)
GLUCOSE BLDC GLUCOMTR-MCNC: 183 MG/DL — HIGH (ref 70–99)
INR BLD: 1.93 RATIO — HIGH (ref 0.85–1.16)
PROTHROM AB SERPL-ACNC: 22.6 SEC — HIGH (ref 9.9–13.4)

## 2025-05-30 PROCEDURE — 87040 BLOOD CULTURE FOR BACTERIA: CPT

## 2025-05-30 PROCEDURE — 85027 COMPLETE CBC AUTOMATED: CPT

## 2025-05-30 PROCEDURE — 87086 URINE CULTURE/COLONY COUNT: CPT

## 2025-05-30 PROCEDURE — 51702 INSERT TEMP BLADDER CATH: CPT

## 2025-05-30 PROCEDURE — 81001 URINALYSIS AUTO W/SCOPE: CPT

## 2025-05-30 PROCEDURE — 87077 CULTURE AEROBIC IDENTIFY: CPT

## 2025-05-30 PROCEDURE — 80053 COMPREHEN METABOLIC PANEL: CPT

## 2025-05-30 PROCEDURE — 80048 BASIC METABOLIC PNL TOTAL CA: CPT

## 2025-05-30 PROCEDURE — 93005 ELECTROCARDIOGRAM TRACING: CPT

## 2025-05-30 PROCEDURE — 87186 SC STD MICRODIL/AGAR DIL: CPT

## 2025-05-30 PROCEDURE — 85610 PROTHROMBIN TIME: CPT

## 2025-05-30 PROCEDURE — 99285 EMERGENCY DEPT VISIT HI MDM: CPT | Mod: 25

## 2025-05-30 PROCEDURE — 96374 THER/PROPH/DIAG INJ IV PUSH: CPT

## 2025-05-30 PROCEDURE — 36415 COLL VENOUS BLD VENIPUNCTURE: CPT

## 2025-05-30 PROCEDURE — 83605 ASSAY OF LACTIC ACID: CPT

## 2025-05-30 PROCEDURE — 82962 GLUCOSE BLOOD TEST: CPT

## 2025-05-30 PROCEDURE — 85025 COMPLETE CBC W/AUTO DIFF WBC: CPT

## 2025-05-30 RX ADMIN — Medication 40 MILLIGRAM(S): at 06:23

## 2025-05-30 RX ADMIN — INSULIN LISPRO 1: 100 INJECTION, SOLUTION INTRAVENOUS; SUBCUTANEOUS at 12:18

## 2025-05-30 RX ADMIN — AMLODIPINE BESYLATE 10 MILLIGRAM(S): 10 TABLET ORAL at 06:23

## 2025-05-30 RX ADMIN — EPOETIN ALFA 10000 UNIT(S): 10000 SOLUTION INTRAVENOUS; SUBCUTANEOUS at 14:35

## 2025-05-30 RX ADMIN — NICOTINE POLACRILEX 1 PATCH: 4 GUM, CHEWING ORAL at 11:50

## 2025-05-30 RX ADMIN — OXYBUTYNIN CHLORIDE 10 MILLIGRAM(S): 5 TABLET, FILM COATED, EXTENDED RELEASE ORAL at 17:01

## 2025-05-30 RX ADMIN — Medication 1 TABLET(S): at 06:23

## 2025-05-30 RX ADMIN — FINASTERIDE 5 MILLIGRAM(S): 1 TABLET, FILM COATED ORAL at 11:50

## 2025-05-30 RX ADMIN — Medication 500 MILLIGRAM(S): at 11:50

## 2025-05-30 RX ADMIN — INSULIN LISPRO 1: 100 INJECTION, SOLUTION INTRAVENOUS; SUBCUTANEOUS at 16:58

## 2025-05-30 RX ADMIN — GABAPENTIN 600 MILLIGRAM(S): 400 CAPSULE ORAL at 06:23

## 2025-05-30 RX ADMIN — OXYBUTYNIN CHLORIDE 10 MILLIGRAM(S): 5 TABLET, FILM COATED, EXTENDED RELEASE ORAL at 06:25

## 2025-05-30 RX ADMIN — Medication 1 TABLET(S): at 17:01

## 2025-05-30 RX ADMIN — INSULIN LISPRO 3 UNIT(S): 100 INJECTION, SOLUTION INTRAVENOUS; SUBCUTANEOUS at 16:59

## 2025-05-30 RX ADMIN — Medication 0.1 MILLIGRAM(S): at 17:01

## 2025-05-30 RX ADMIN — INSULIN LISPRO 3 UNIT(S): 100 INJECTION, SOLUTION INTRAVENOUS; SUBCUTANEOUS at 08:04

## 2025-05-30 RX ADMIN — METHOCARBAMOL 1500 MILLIGRAM(S): 500 TABLET, FILM COATED ORAL at 06:23

## 2025-05-30 RX ADMIN — METOPROLOL SUCCINATE 25 MILLIGRAM(S): 50 TABLET, EXTENDED RELEASE ORAL at 17:01

## 2025-05-30 RX ADMIN — Medication 0.1 MILLIGRAM(S): at 06:23

## 2025-05-30 RX ADMIN — INSULIN LISPRO 3 UNIT(S): 100 INJECTION, SOLUTION INTRAVENOUS; SUBCUTANEOUS at 12:19

## 2025-05-30 RX ADMIN — METHOCARBAMOL 1500 MILLIGRAM(S): 500 TABLET, FILM COATED ORAL at 14:05

## 2025-05-30 RX ADMIN — NICOTINE POLACRILEX 1 PATCH: 4 GUM, CHEWING ORAL at 12:00

## 2025-05-30 RX ADMIN — NICOTINE POLACRILEX 1 PATCH: 4 GUM, CHEWING ORAL at 07:29

## 2025-05-30 RX ADMIN — NALOXEGOL OXALATE 12.5 MILLIGRAM(S): 12.5 TABLET, FILM COATED ORAL at 11:54

## 2025-05-30 RX ADMIN — FOLIC ACID 1 MILLIGRAM(S): 1 TABLET ORAL at 11:50

## 2025-05-30 RX ADMIN — DULOXETINE 60 MILLIGRAM(S): 20 CAPSULE, DELAYED RELEASE ORAL at 11:50

## 2025-05-30 RX ADMIN — GABAPENTIN 600 MILLIGRAM(S): 400 CAPSULE ORAL at 14:06

## 2025-05-30 NOTE — PROGRESS NOTE ADULT - PROBLEM SELECTOR PLAN 2
On empiric Ertapenem ( pt with hx of esbl infections )  Trend temps and cbc  Fu cultures  Stable from ID standpoint

## 2025-05-30 NOTE — SOCIAL WORK PROGRESS NOTE - NSSWPROGRESSNOTE_GEN_ALL_CORE
SW discussed patients case with treatment team and met with patient.  Pt is agreeable to discharge back to [facility] once auth is obtained.  SW spoke to [place] today and they have requested auth and are waiting to hear back.  SW to follow.

## 2025-05-30 NOTE — PROGRESS NOTE ADULT - SUBJECTIVE AND OBJECTIVE BOX
PROGRESS NOTE  Patient is a 50y old  Male who presents with a chief complaint of Urinary tract infection  Chart and available morning labs /imaging are reviewed electronically , urgent issues addressed . More information  is being added upon completion of rounds , when more information is collected and management discussed with consultants , medical staff and social service/case management on the floor   Per HPI:  No new issues reported by medical staff . All above noted    CC.   . 5/25/2025 49 y/o male presents axo3 via stretcher, bibems from [facility] for UTI parker/treatment.  OVERALL PRESENTATION.  . 5/25/2025   . 50-year-old male with history of multiple medical issues, currently living at [facility] presents with has been having some cloudy urine in his suprapubic catheter, feeling some generalized fatigue and malaise over the past 1 week.  Patient states this is consistent with his usual UTI.  The patient will often need to get admitted for UTIs, secondary to MDR.  No aggravating or alleviating factors otherwise noted.  No other acute injury or complaints.  No recent trauma.  No known fever. (25 May 2025 20:05) (26 May 2025 11:33)    Chart and available morning labs /imaging are reviewed electronically , urgent issues addressed . More information  is being added upon completion of rounds , when more information is collected and management discussed with consultants , medical staff and social service/case management on the floor   OVERNIGHT  No new issues reported by medical staff . All above noted   Patient is awaiting for transfer back to [facility] , authorization is pending   HPI:     CC.   . 5/25/2025 49 y/o male presents axo3 via stretcher, bibems from [facility] for UTI parker/treatment.  OVERALL PRESENTATION.  . 5/25/2025   . 50-year-old male with history of multiple medical issues, currently living at [facility] presents with has been having some cloudy urine in his suprapubic catheter, feeling some generalized fatigue and malaise over the past 1 week.  Patient states this is consistent with his usual UTI.  The patient will often need to get admitted for UTIs, secondary to MDR.  No aggravating or alleviating factors otherwise noted.  No other acute injury or complaints.  No recent trauma.  No known fever. (25 May 2025 20:05)    PAST MEDICAL & SURGICAL HISTORY:  BPH (benign prostatic hyperplasia)      HTN (hypertension)      Type 2 diabetes mellitus      Peripheral neuropathy      History of Guillain-[place] syndrome      History of CVA in adulthood      DDD (degenerative disc disease), lumbar      DVT, lower extremity      Renal stones      H/O Guillain-[place] syndrome      History of neurogenic bowel      DM2 (diabetes mellitus, type 2)      HTN (hypertension)      BPH without obstruction/lower urinary tract symptoms      Degenerative arthritis      DVT of lower limb, acute      CVA (cerebrovascular accident)      CVD (cerebrovascular disease)      Muscle weakness      Iron deficiency anemia      History of muscle spasm      Pain, unspecified      Vitamin deficiency      Obesity      GERD (gastroesophageal reflux disease)      H/O constipation      H/O insomnia      Essential (primary) hypertension      Acute embolism and thrombosis of deep vein of lower extremity      BPH (benign prostatic hyperplasia)      Diabetes mellitus due to underlying condition with diabetic neuropathy      Major depressive disorder, single episode      S/P IVC filter      H/O lithotripsy      Chronic suprapubic catheter          MEDICATIONS  (STANDING):  amLODIPine   Tablet 10 milliGRAM(s) Oral daily  ascorbic acid 500 milliGRAM(s) Oral daily  cloNIDine 0.1 milliGRAM(s) Oral two times a day  dextrose 5%. 1000 milliLiter(s) (100 mL/Hr) IV Continuous <Continuous>  dextrose 5%. 1000 milliLiter(s) (50 mL/Hr) IV Continuous <Continuous>  dextrose 50% Injectable 25 Gram(s) IV Push once  dextrose 50% Injectable 12.5 Gram(s) IV Push once  dextrose 50% Injectable 25 Gram(s) IV Push once  DULoxetine 60 milliGRAM(s) Oral daily  epoetin juan a-epbx (RETACRIT) Injectable 85994 Unit(s) SubCutaneous <User Schedule>  finasteride 5 milliGRAM(s) Oral daily  folic acid 1 milliGRAM(s) Oral daily  gabapentin 600 milliGRAM(s) Oral every 8 hours  glucagon  Injectable 1 milliGRAM(s) IntraMuscular once  insulin glargine Injectable (LANTUS) 10 Unit(s) SubCutaneous at bedtime  insulin lispro (ADMELOG) corrective regimen sliding scale   SubCutaneous three times a day before meals  insulin lispro (ADMELOG) corrective regimen sliding scale   SubCutaneous at bedtime  insulin lispro Injectable (ADMELOG) 3 Unit(s) SubCutaneous before breakfast  insulin lispro Injectable (ADMELOG) 3 Unit(s) SubCutaneous before lunch  insulin lispro Injectable (ADMELOG) 3 Unit(s) SubCutaneous before dinner  lactobacillus acidophilus 1 Tablet(s) Oral every 12 hours  melatonin 3 milliGRAM(s) Oral at bedtime  methocarbamol 1500 milliGRAM(s) Oral every 8 hours  metoprolol tartrate 25 milliGRAM(s) Oral two times a day  naloxegol 12.5 milliGRAM(s) Oral daily  nicotine -  14 mG/24Hr(s) Patch 1 Patch Transdermal daily  oxybutynin 10 milliGRAM(s) Oral two times a day  pantoprazole    Tablet 40 milliGRAM(s) Oral before breakfast  polyethylene glycol 3350 17 Gram(s) Oral daily  senna 2 Tablet(s) Oral at bedtime  warfarin 4 milliGRAM(s) Oral once    MEDICATIONS  (PRN):  acetaminophen     Tablet .. 650 milliGRAM(s) Oral every 6 hours PRN Temp greater or equal to 38C (100.4F), Mild Pain (1 - 3)  aluminum hydroxide/magnesium hydroxide/simethicone Suspension 30 milliLiter(s) Oral every 4 hours PRN Dyspepsia  dextrose Oral Gel 15 Gram(s) Oral once PRN Blood Glucose LESS THAN 70 milliGRAM(s)/deciliter  HYDROmorphone   Tablet 4 milliGRAM(s) Oral every 6 hours PRN Moderate Pain (4 - 6)  ondansetron Injectable 4 milliGRAM(s) IV Push every 8 hours PRN Nausea and/or Vomiting      OBJECTIVE    T(C): 36.7 (05-30-25 @ 13:28), Max: 36.9 (05-29-25 @ 17:00)  HR: 62 (05-30-25 @ 13:28) (53 - 62)  BP: 131/77 (05-30-25 @ 13:28) (130/80 - 138/71)  RR: 16 (05-30-25 @ 13:28) (16 - 16)  SpO2: 96% (05-30-25 @ 13:28) (96% - 97%)  Wt(kg): --  I&O's Summary    29 May 2025 07:01  -  30 May 2025 07:00  --------------------------------------------------------  IN: 0 mL / OUT: 1500 mL / NET: -1500 mL          REVIEW OF SYSTEMS:  CONSTITUTIONAL: No fever, weight loss, or fatigue  EYES: No eye pain, visual disturbances, or discharge  ENMT:   No sinus or throat pain  NECK: No pain or stiffness  RESPIRATORY: No cough, wheezing, chills or hemoptysis; No shortness of breath  CARDIOVASCULAR: No chest pain, palpitations, dizziness, or leg swelling  GASTROINTESTINAL: No abdominal pain. No nausea, vomiting; No diarrhea or constipation. No melena or hematochezia.  GENITOURINARY: No dysuria, frequency, hematuria, or incontinence  NEUROLOGICAL: No headaches, memory loss, loss of strength, numbness, or tremors  SKIN: No itching, burning, rashes, or lesions   MUSCULOSKELETAL: No joint pain or swelling; No muscle, back, or extremity pain    PHYSICAL EXAM:  Appearance: NAD. VS past 24 hrs -as above   HEENT:   Moist oral mucosa. Conjunctiva clear b/l.   Neck : supple  Respiratory: Lungs CTAB.  Gastrointestinal:  Soft, nontender. No rebound. No rigidity. BS present	  Cardiovascular: RRR ,S1S2 present  Neurologic: Non-focal. Moving all extremities.  Extremities: No edema. No erythema. No calf tenderness.  Skin: No rashes, No ecchymoses, No cyanosis.	  wounds ,skin lesions-See skin assesment flow sheet   LABS:                        10.2   10.20 )-----------( 222      ( 29 May 2025 16:40 )             32.3     05-29    136  |  103  |  26[H]  ----------------------------<  130[H]  3.9   |  22  |  2.61[H]    Ca    8.7      29 May 2025 16:40      CAPILLARY BLOOD GLUCOSE      POCT Blood Glucose.: 183 mg/dL (30 May 2025 12:17)  POCT Blood Glucose.: 147 mg/dL (30 May 2025 07:58)  POCT Blood Glucose.: 145 mg/dL (29 May 2025 21:46)  POCT Blood Glucose.: 132 mg/dL (29 May 2025 16:52)    PT/INR - ( 30 May 2025 05:30 )   PT: 22.6 sec;   INR: 1.93 ratio           Urinalysis Basic - ( 29 May 2025 16:40 )    Color: x / Appearance: x / SG: x / pH: x  Gluc: 130 mg/dL / Ketone: x  / Bili: x / Urobili: x   Blood: x / Protein: x / Nitrite: x   Leuk Esterase: x / RBC: x / WBC x   Sq Epi: x / Non Sq Epi: x / Bacteria: x        Culture - Urine (collected 26 May 2025 06:25)  Source: Suprapubic Suprapubic  Final Report (28 May 2025 17:17):    50,000 - 99,000 CFU/mL Candida albicans "Susceptibilities not performed"    <10,000 CFU/ml Morganella morganii  Organism: Morganella morganii (28 May 2025 17:17)  Organism: Morganella morganii (28 May 2025 17:17)    Culture - Blood (collected 25 May 2025 18:10)  Source: Blood Blood-Peripheral  Preliminary Report (30 May 2025 02:01):    No growth at 4 days    Culture - Blood (collected 25 May 2025 18:10)  Source: Blood Blood-Peripheral  Preliminary Report (30 May 2025 02:01):    No growth at 4 days      RADIOLOGY & ADDITIONAL TESTS:   reviewed elctronically  ASSESSMENT/PLAN: 	    25 minutes aggregate time was spent on this visit, 50% visit time spent in care co-ordination with other attendings and counselling patient .I have discussed care plan with patient / HCP/family member ,who expressed understanding of problems treatment and their effect and side effects, alternatives in details. I have asked if they have any questions and concerns and appropriately addressed them to best of my ability.

## 2025-05-30 NOTE — SOCIAL WORK PROGRESS NOTE - NSSWPROGRESSNOTE_GEN_ALL_CORE
Pt will be discharged back to [facility] today at 5pm via [facility].  Pt does not need to wait for insurance authorization to be discharged.  Pt is in agreement with plan.  RN aware.

## 2025-05-30 NOTE — PROGRESS NOTE ADULT - ASSESSMENT
49YO M long term resident of [facility] PMH hypertension CKD GBS DVT CVA antiphospholipid antibody syndrome on warfarin diabetes hyperlipidemia neurogenic bladder with suprapubic catheter chronic bladder spasms recent admission for Acute cally transferred to [facility]  S/P lap cally 4/28 now presents with c/o cloudy urine in his suprapubic catheter, feeling some generalized fatigue and malaise over the past 1 week.  Patient states this is consistent with his usual UTI. No fever chills n/v/d. In ED afebrile. WBC 10K ANTONIO On CKD UA wih minimal pyuria. SPC changed in ED.   Rule out CAUTI  Chronic pains/bladder spasms   On empiric Ertapenem ( pt with hx of esbl infections )  Trend temps and cbc  Fu cultures  Stable from ID standpoint  Advance Directives- Full ode  Current Medications are documented.   Drug-drug interactions reviewed.
50-year-old male with history of multiple medical issues, currently living at [facility] presents with has been having some cloudy urine in his suprapubic catheter, feeling some generalized fatigue and malaise over the past 1 week.  Patient states this is consistent with his usual UTI.  The patient will often need to get admitted for UTIs, secondary to MDR.  No aggravating or alleviating factors otherwise noted.  No other acute injury or complaints.  No recent trauma.  No known fever      CHRONIC KIDNEY DISEASE, STAGE 4:   Serum creatinine is stable at 2.7, approximating a GFR is controlled .   There is no progression.  No uremic symptoms. No evidence of  worsening  Anemia. Fluid status stable.   Will continue to avoid nephrotoxic drugs.  Patient remains asymptomatic.  Continue current therapy.      BP monitoring,continue current antihypertensive meds, low salt diet,followup with PMD in 1-2 weeks  amLODIPine   Tablet 10 milliGRAM(s) Oral daily  cloNIDine 0.1 milliGRAM(s) Oral two times a day        Admit for septic workup and ID evaluation,send blood and urine cx,serial lactate levels,monitor vitals closley,ivfs hydration,monitor urine output and renal profile,iv abx as per id cons  ertapenem  IVPB 500 milliGRAM(s) IV Intermittent every 24 hours      ANEMIA PLAN:  Anemia of chronic disease:  We will continue epo aiming for a HCT of 32-36 %.   We will monitor Iron stores, B12 and RBC folate .    
50-year-old male with history of multiple medical issues, currently living at [facility] presents with has been having some cloudy urine in his suprapubic catheter, feeling some generalized fatigue and malaise over the past 1 week.  Patient states this is consistent with his usual UTI.  The patient will often need to get admitted for UTIs, secondary to MDR.  No aggravating or alleviating factors otherwise noted.  No other acute injury or complaints.  No recent trauma.  No known fever      CHRONIC KIDNEY DISEASE, STAGE 4:   Serum creatinine is stable at 2.7, approximating a GFR is controlled .   There is no progression.  No uremic symptoms. No evidence of  worsening  Anemia. Fluid status stable.   Will continue to avoid nephrotoxic drugs.  Patient remains asymptomatic.  Continue current therapy.      BP monitoring,continue current antihypertensive meds, low salt diet,followup with PMD in 1-2 weeks  amLODIPine   Tablet 10 milliGRAM(s) Oral daily  cloNIDine 0.1 milliGRAM(s) Oral two times a day        Admit for septic workup and ID evaluation,send blood and urine cx,serial lactate levels,monitor vitals closley,ivfs hydration,monitor urine output and renal profile,iv abx as per id cons  ertapenem  IVPB 500 milliGRAM(s) IV Intermittent every 24 hours      ANEMIA PLAN:  Anemia of chronic disease:  We will continue epo aiming for a HCT of 32-36 %.   We will monitor Iron stores, B12 and RBC folate .    
50-year-old male with history of multiple medical issues, currently living at [facility] presents with has been having some cloudy urine in his suprapubic catheter, feeling some generalized fatigue and malaise over the past 1 week.  Patient states this is consistent with his usual UTI.  The patient will often need to get admitted for UTIs, secondary to MDR.  No aggravating or alleviating factors otherwise noted.  No other acute injury or complaints.  No recent trauma.  No known fever      CHRONIC KIDNEY DISEASE, STAGE 4:   Serum creatinine is stable at 2.7, approximating a GFR is controlled .   There is no progression.  No uremic symptoms. No evidence of  worsening  Anemia. Fluid status stable.   Will continue to avoid nephrotoxic drugs.  Patient remains asymptomatic.  Continue current therapy.      BP monitoring,continue current antihypertensive meds, low salt diet,followup with PMD in 1-2 weeks  amLODIPine   Tablet 10 milliGRAM(s) Oral daily  cloNIDine 0.1 milliGRAM(s) Oral two times a day        Admit for septic workup and ID evaluation,send blood and urine cx,serial lactate levels,monitor vitals closley,ivfs hydration,monitor urine output and renal profile,iv abx as per id cons  ertapenem  IVPB 500 milliGRAM(s) IV Intermittent every 24 hours      ANEMIA PLAN:  Anemia of chronic disease:  We will continue epo aiming for a HCT of 32-36 %.   We will monitor Iron stores, B12 and RBC folate .    
50-year-old male with history of multiple medical issues, currently living at [facility] presents with has been having some cloudy urine in his suprapubic catheter, feeling some generalized fatigue and malaise over the past 1 week.  Patient states this is consistent with his usual UTI.  The patient will often need to get admitted for UTIs, secondary to MDR.  No aggravating or alleviating factors otherwise noted.  No other acute injury or complaints.  No recent trauma.  No known fever      CHRONIC KIDNEY DISEASE, STAGE 4:   Serum creatinine is stable at 3, approximating a GFR is controlled .   There is no progression.  No uremic symptoms. No evidence of  worsening  Anemia. Fluid status stable.   Will continue to avoid nephrotoxic drugs.  Patient remains asymptomatic.  Continue current therapy.      BP monitoring,continue current antihypertensive meds, low salt diet,followup with PMD in 1-2 weeks  amLODIPine   Tablet 10 milliGRAM(s) Oral daily  cloNIDine 0.1 milliGRAM(s) Oral two times a day        Admit for septic workup and ID evaluation,send blood and urine cx,serial lactate levels,monitor vitals closley,ivfs hydration,monitor urine output and renal profile,iv abx as per id cons  ertapenem  IVPB 500 milliGRAM(s) IV Intermittent every 24 hours  
   REASON FOR VISIT  .. Management of problems listed below      EVENTS/CURRENT ISSUES.  . 5/26/2025 keeps asking for iv dilaudid         REVIEW OF SYMPTOMS   Able to give ROS  Yes     RELIABILITY +/-   CONSTITUTIONAL Weakness Yes    ENDOCRINE  No heat or cold intolerance    ALLERGY No hives  Sore throat No stridor  RESP Shortness of breath YES   NEURO New weakness No   CARDIAC   Palpitations No         PHYSICAL EXAM    HEENT Unremarkable  atraumatic   RESP Fair air entry  Harsh breath sound   CARDIAC S1 S2 No S3     NO JVD    ABDOMEN No hepatosplenomegaly   PEDAL EDEMA present No calf tenderness    REASON FOR VISIT  .. Management of problems listed below      CC.   . 5/25/2025 49 y/o male presents axo3 via [facility] from [facility] for UTI parker/treatment.  OVERALL PRESENTATION.  . 5/25/2025   . 50-year-old male with history of multiple medical issues, currently living at [facility] presents with has been having some cloudy urine in his suprapubic catheter, feeling some generalized fatigue and malaise over the past 1 week.  Patient states this is consistent with his usual UTI.  The patient will often need to get admitted for UTIs, secondary to MDR.  No aggravating or alleviating factors otherwise noted.  No other acute injury or complaints.  No recent trauma.  No known fever.  PMH.      PAST HOSPITAL STAYS .  Home Medications:   * Patient Currently Takes Medications as of 08-May-2025 10:37 documented in Structured Notes  · nicotine 14 mg/24 hr transdermal film, extended release: 1 film(s) transdermal once as needed for  agitation  · methocarbamol 750 mg oral tablet: 2 tab(s) orally every 8 hours  · naloxegol 12.5 mg oral tablet: 1 tab(s) orally once a day  · metoprolol tartrate 25 mg oral tablet: 1 tab(s) orally 2 times a day  · melatonin 5 mg oral tablet: 1 tab(s) orally once a day (at bedtime)  · Basaglar KwikPen 100 units/mL subcutaneous solution: 10 unit(s) subcutaneous once a day (at bedtime)  · DULoxetine 60 mg oral delayed release capsule: 1 cap(s) orally once a day  · aluminum hydroxide-magnesium hydroxide 200 mg-200 mg/5 mL oral suspension: 30 milliliter(s) orally every 4 hours As needed Dyspepsia  · finasteride 5 mg oral tablet: 1 tab(s) orally once a day  · lactobacillus acidophilus oral tablet: 1 tab(s) orally 2 times a day  · metoprolol tartrate 25 mg oral tablet: 1 tab(s) orally 2 times a day  · melatonin 5 mg oral tablet: 1 tab(s) orally once a day (at bedtime)  · DULoxetine 60 mg oral delayed release capsule: 1 cap(s) orally once a day  · finasteride 5 mg oral tablet: 1 tab(s) orally once a day  · oxyBUTYnin 5 mg oral tablet: 2 tab(s) orally 2 times a day  · Insulin Lispro KwikPen 100 units/mL injectable solution: 3 unit(s) subcutaneous 3 times a day (before meals) and  additionally  sliding scale  · cloNIDine 0.1 mg oral tablet: 1 tab(s) orally 2 times a day  · gabapentin 600 mg oral tablet: 1 tab(s) orally every 8 hours  · ferrous sulfate 325 mg (65 mg elemental iron) oral tablet: 1 tab(s) orally once a day  · ascorbic acid 500 mg oral tablet: 1 tab(s) orally once a day  · amLODIPine 10 mg oral tablet: 1 tab(s) orally once a day  · Myrbetriq 25 mg oral tablet, extended release: 1 tab(s) orally once a day  · Multiple Vitamins oral tablet: 1 tab(s) orally once a day  · pantoprazole 40 mg oral delayed release tablet: 1 tab(s) orally once a day (before a meal)  · Senna 8.6 mg oral tablet: 2 tab(s) orally once a day  · Tylenol 325 mg oral tablet: 2 tab(s) orally every 4 hours as needed for  mild pain  · HYDROmorphone 4 mg oral tablet: 1 tab(s) orally every 6 hours PRN  · folic acid: 1 milligram(s) once a day  · hydrALAZINE 25 mg oral tablet: 1 tab(s) orally every 8 hours as needed for BP >160  · warfarin 4 mg oral tablet: 1 tab(s) orally once a day  · alfuzosin 10 mg oral tablet, extended release: 1 tab(s) orally once a day  ER MGMT .      [id number]  VITALS/GAS EXCHANGE/DRIPS    ABGS.     .  VS/ PO/IO/ VENT/ DRIPS.   5/26/2025 afeb 60 150/60   5/26/2025 ra 98%    [id number]  PROBLEM ASSESSMENT RECOMMENDATIONS.  RESP.   . GAS EXCHANGE .   .... target PO 90-95%       INFECTION.  . DATA  .... w 5/25/2025 w 10.5  .... ua 5/25/2025 ua w 18    . UTI  .... 5/25/2025 spc changed in er and given ertapenem        CARDIAC.  . LACTICEMIA   .... la 5/25/2025 la 1.8     HEMAT.  . DATA  .... Hb 5/25-5/26/2025 Hb 9.8 - 8.5   .... Plt 5/25/2025 Plt 152   .... inr 5/26/2025 inr 3   .... monitor      RENAL.  . DATA  .... Na 5/25/2025 Na 135  .... K 5/25/2025 K 4.5  .... CO2 5/25/2025 co2 22   .... ag 5/25/2025 ag 12   .... alb 5/25/2025 alb 2.9     . ANTONIO on CKD   .... Cr 5/9-5/25/3/26 cr 2.8-3.2 - 3   .... Fluids       . OPIOID DEPENDENT   .... 5/26/2025 will need pain mgmt       [id number]   SUMMARY BASELINE .     .50 m from [facility] spc multiple uti admissions   CC.   . 5/25/2025 POSSIBLE UTI IN SETTING OF SUPRAPUBIC CATHETER   MAIN ISSUES.  . UTI CAUTI   . ANTONIO ON CKD   . OPIOID DEPENDENT     PMH.   . UTI 10/22/2024  . SUPRAPUBIC CATHETER MALFUNCTION 10/22/2024  . ANTONIO   . VTE   . BPH   . CVA   . DDD  . DM   . GBS   . MDD   . OBESITY     PSH  . IVCF   . LITHOTRIPSY   . SPC    PROCEDURES/DEVICES .  . 5/25/2025 spc changed in er     DISCUSSIONS.  .... Discussed with primary care and relevant consultants on an ongoing basis       TIME SPENT.  . Over 36 minutes aggregate care time spent on encounter; activities included   direct patient care, counseling and/or coordinating care reviewing notes, lab data/ imaging , discussion with multidisciplinary team/ patient  /family and explaining in detail risks, benefits, alternatives  of the recommendations     KIANA SALTER 49 m 1975 5/25/2025   
50-year-old male with history of multiple medical issues, currently living at [facility] presents with has been having some cloudy urine in his suprapubic catheter, feeling some generalized fatigue and malaise over the past 1 week.  Patient states this is consistent with his usual UTI.  The patient will often need to get admitted for UTIs, secondary to MDR.  No aggravating or alleviating factors otherwise noted.  No other acute injury or complaints.  No recent trauma.  No known fever      CHRONIC KIDNEY DISEASE, STAGE 4:   Serum creatinine is stable at 2.7, approximating a GFR is controlled .   There is no progression.  No uremic symptoms. No evidence of  worsening  Anemia. Fluid status stable.   Will continue to avoid nephrotoxic drugs.  Patient remains asymptomatic.  Continue current therapy.      BP monitoring,continue current antihypertensive meds, low salt diet,followup with PMD in 1-2 weeks  amLODIPine   Tablet 10 milliGRAM(s) Oral daily  cloNIDine 0.1 milliGRAM(s) Oral two times a day        Admit for septic workup and ID evaluation,send blood and urine cx,serial lactate levels,monitor vitals closley,ivfs hydration,monitor urine output and renal profile,iv abx as per id cons  ertapenem  IVPB 500 milliGRAM(s) IV Intermittent every 24 hours      ANEMIA PLAN:  Anemia of chronic disease:  We will continue epo aiming for a HCT of 32-36 %.   We will monitor Iron stores, B12 and RBC folate .    
51YO M long term resident of [facility] PMH hypertension CKD GBS DVT CVA antiphospholipid antibody syndrome on warfarin diabetes hyperlipidemia neurogenic bladder with suprapubic catheter chronic bladder spasms recent admission for Acute cally transferred to [facility]  S/P lap cally 4/28 now presents with c/o cloudy urine in his suprapubic catheter, feeling some generalized fatigue and malaise over the past 1 week.  Patient states this is consistent with his usual UTI. No fever chills n/v/d. In ED afebrile. WBC 10K ANTONIO On CKD UA wih minimal pyuria. SPC changed in ED.   Rule out CAUTI  Chronic pains/bladder spasms   On empiric Ertapenem ( pt with hx of esbl infections )  Trend temps and cbc  Fu cultures  Stable from ID standpoint  Advance Directives- Full ode  Current Medications are documented.   Drug-drug interactions reviewed.
49YO M long term resident of [facility] PMH hypertension CKD GBS DVT CVA antiphospholipid antibody syndrome on warfarin diabetes hyperlipidemia neurogenic bladder with suprapubic catheter chronic bladder spasms recent admission for Acute cally transferred to [facility]  S/P lap cally 4/28 now presents with c/o cloudy urine in his suprapubic catheter, feeling some generalized fatigue and malaise over the past 1 week.  Patient states this is consistent with his usual UTI. No fever chills n/v/d. In ED afebrile. WBC 10K ANTONIO On CKD UA wih minimal pyuria. SPC changed in ED.   Rule out CAUTI  Chronic pains/bladder spasms   On empiric Ertapenem ( pt with hx of esbl infections )  Trend temps and cbc  Fu cultures  Stable from ID standpoint  Advance Directives- Full ode  Current Medications are documented.   Drug-drug interactions reviewed.
51YO M long term resident of [facility] PMH hypertension CKD GBS DVT CVA antiphospholipid antibody syndrome on warfarin diabetes hyperlipidemia neurogenic bladder with suprapubic catheter chronic bladder spasms recent admission for Acute cally transferred to [facility]  S/P lap cally 4/28 now presents with c/o cloudy urine in his suprapubic catheter, feeling some generalized fatigue and malaise over the past 1 week.  Patient states this is consistent with his usual UTI. No fever chills n/v/d. In ED afebrile. WBC 10K ANTONIO On CKD UA wih minimal pyuria. SPC changed in ED.   Rule out CAUTI  Chronic pains/bladder spasms   On empiric Ertapenem ( pt with hx of esbl infections )  Trend temps and cbc  Fu cultures  Stable from ID standpoint  Advance Directives- Full ode  Current Medications are documented.   Drug-drug interactions reviewed.

## 2025-05-30 NOTE — PROGRESS NOTE ADULT - PROBLEM SELECTOR PROBLEM 10
APL (antiphospholipid syndrome)

## 2025-05-30 NOTE — PROGRESS NOTE ADULT - PROBLEM SELECTOR PLAN 10
S/W EMERGENCY CONTACT AND HE GAVE ME ANOTHER # TO CALL. CALLED THE NUMBER THAT HE GAVE ME AND SHE DID NOT ANSWER  
on coumadin , serial pt/inr

## 2025-05-30 NOTE — PROGRESS NOTE ADULT - PROBLEM SELECTOR PLAN 3
serial bmp , ins/outs , iv fluids as per nephrologist

## 2025-05-30 NOTE — PROGRESS NOTE ADULT - SUBJECTIVE AND OBJECTIVE BOX
JOIE BRUNO is a 50yMale , patient examined and chart reviewed.     INTERVAL HPI/ OVERNIGHT EVENTS:   No events NAD.   Afebrile.    PAST MEDICAL & SURGICAL HISTORY:  BPH (benign prostatic hyperplasia)  HTN (hypertension)  Type 2 diabetes mellitus  Peripheral neuropathy  History of Guillain-Newport syndrome  History of CVA in adulthood  DDD (degenerative disc disease), lumbar  DVT, lower extremity  Renal stones  History of neurogenic bowel  Degenerative arthritis  Muscle weakness  Iron deficiency anemia  History of muscle spasm  Pain, unspecified  Vitamin deficiency  Obesity  GERD (gastroesophageal reflux disease)  H/O constipation  H/O insomnia  Major depressive disorder, single episode  S/P IVC filter  H/O lithotripsy  Chronic suprapubic catheter      For details regarding the patient's social history, family history, and other miscellaneous elements, please refer the initial infectious diseases consultation and/or the admitting history and physical examination for this admission.    ROS:  CONSTITUTIONAL:  Negative fever or chills  EYES:  Negative  blurry vision or double vision  CARDIOVASCULAR:  Negative for chest pain or palpitations  RESPIRATORY:  Negative for cough, wheezing, or SOB   GASTROINTESTINAL:  Negative for nausea, vomiting, diarrhea, constipation, or abdominal pain  GENITOURINARY:  Negative frequency, urgency or dysuria  NEUROLOGIC:  No headache, confusion, dizziness, lightheadedness  All other systems were reviewed and are negative     ALLERGIES  Pipercillin Sodium-Tazobactam Sodium (Pruritus)  sulfa drugs (Rash)        Current inpatient medications :    ANTIBIOTICS/RELEVANT:    MEDICATIONS  (STANDING):  amLODIPine   Tablet 10 milliGRAM(s) Oral daily  ascorbic acid 500 milliGRAM(s) Oral daily  cloNIDine 0.1 milliGRAM(s) Oral two times a day  dextrose 5%. 1000 milliLiter(s) (100 mL/Hr) IV Continuous <Continuous>  dextrose 5%. 1000 milliLiter(s) (50 mL/Hr) IV Continuous <Continuous>  dextrose 50% Injectable 25 Gram(s) IV Push once  dextrose 50% Injectable 12.5 Gram(s) IV Push once  dextrose 50% Injectable 25 Gram(s) IV Push once  DULoxetine 60 milliGRAM(s) Oral daily  epoetin juan a-epbx (RETACRIT) Injectable 70908 Unit(s) SubCutaneous <User Schedule>  finasteride 5 milliGRAM(s) Oral daily  folic acid 1 milliGRAM(s) Oral daily  gabapentin 600 milliGRAM(s) Oral every 8 hours  glucagon  Injectable 1 milliGRAM(s) IntraMuscular once  insulin glargine Injectable (LANTUS) 10 Unit(s) SubCutaneous at bedtime  insulin lispro (ADMELOG) corrective regimen sliding scale   SubCutaneous three times a day before meals  insulin lispro (ADMELOG) corrective regimen sliding scale   SubCutaneous at bedtime  insulin lispro Injectable (ADMELOG) 3 Unit(s) SubCutaneous before breakfast  insulin lispro Injectable (ADMELOG) 3 Unit(s) SubCutaneous before lunch  insulin lispro Injectable (ADMELOG) 3 Unit(s) SubCutaneous before dinner  lactobacillus acidophilus 1 Tablet(s) Oral every 12 hours  melatonin 3 milliGRAM(s) Oral at bedtime  methocarbamol 1500 milliGRAM(s) Oral every 8 hours  metoprolol tartrate 25 milliGRAM(s) Oral two times a day  naloxegol 12.5 milliGRAM(s) Oral daily  nicotine -  14 mG/24Hr(s) Patch 1 Patch Transdermal daily  oxybutynin 10 milliGRAM(s) Oral two times a day  pantoprazole    Tablet 40 milliGRAM(s) Oral before breakfast  polyethylene glycol 3350 17 Gram(s) Oral daily  senna 2 Tablet(s) Oral at bedtime  warfarin 4 milliGRAM(s) Oral once    MEDICATIONS  (PRN):  acetaminophen     Tablet .. 650 milliGRAM(s) Oral every 6 hours PRN Temp greater or equal to 38C (100.4F), Mild Pain (1 - 3)  aluminum hydroxide/magnesium hydroxide/simethicone Suspension 30 milliLiter(s) Oral every 4 hours PRN Dyspepsia  dextrose Oral Gel 15 Gram(s) Oral once PRN Blood Glucose LESS THAN 70 milliGRAM(s)/deciliter  HYDROmorphone   Tablet 4 milliGRAM(s) Oral every 6 hours PRN Moderate Pain (4 - 6)  ondansetron Injectable 4 milliGRAM(s) IV Push every 8 hours PRN Nausea and/or Vomiting      Objective:  Vital Signs Last 24 Hrs  T(C): 36.7 (30 May 2025 13:28), Max: 36.9 (29 May 2025 17:00)  T(F): 98 (30 May 2025 13:28), Max: 98.5 (29 May 2025 17:00)  HR: 62 (30 May 2025 13:28) (53 - 62)  BP: 131/77 (30 May 2025 13:28) (130/80 - 138/71)  RR: 16 (30 May 2025 13:28) (16 - 16)  SpO2: 96% (30 May 2025 13:28) (96% - 97%)    Parameters below as of 30 May 2025 13:28  Patient On (Oxygen Delivery Method): room air      Physical Exam:  General: no acute distress  Neck: supple, trachea midline  Lungs: clear, no wheeze/rhonchi  Cardiovascular: regular rate and rhythm, S1 S2  Abdomen: soft, nontender,  bowel sounds normal +SPC  Neurological: alert and oriented x3  Skin: no rash  Extremities: no edema      LABS:                        10.2   10.20 )-----------( 222      ( 29 May 2025 16:40 )             32.3   05-29    136  |  103  |  26[H]  ----------------------------<  130[H]  3.9   |  22  |  2.61[H]    Ca    8.7      29 May 2025 16:40    MICROBIOLOGY:  Culture - Urine (05.26.25 @ 06:25)    -  Amoxicillin/Clavulanic Acid: R >16/8   -  Ampicillin: R >16 These ampicillin results predict results for amoxicillin   -  Ampicillin/Sulbactam: R >16/8   -  Aztreonam: S <=4   -  Cefazolin: R >16   -  Cefepime: S <=2   -  Ceftriaxone: R 32   -  Cefoxitin: S <=8   -  Ertapenem: S <=0.5   -  Ciprofloxacin: R 1   -  Levofloxacin: I 1   -  Gentamicin: S <=2   -  Meropenem: S <=1   -  Nitrofurantoin: R 64 Should not be used to treat pyelonephritis   -  Piperacillin/Tazobactam: S <=8   -  Tobramycin: S <=2   -  Trimethoprim/Sulfamethoxazole: S <=0.5/9.5   Specimen Source: Suprapubic Suprapubic   Culture Results:   50,000 - 99,000 CFU/mL Candida albicans "Susceptibilities not performed"  <10,000 CFU/ml Morganella morganii   Organism Identification: Morganella morganii   Organism: Morganella morganii   Method Type: JOSE RAMON      Culture - Blood (collected 25 May 2025 18:10)  Source: Blood Blood-Peripheral  Preliminary Report (27 May 2025 02:02):    No growth at 24 hours    Culture - Blood (collected 25 May 2025 18:10)  Source: Blood Blood-Peripheral  Preliminary Report (27 May 2025 02:02):    No growth at 24 hours      RADIOLOGY & ADDITIONAL STUDIES:      Assessment :  51YO M long term resident of [facility] PMH hypertension CKD GBS DVT CVA antiphospholipid antibody syndrome on warfarin diabetes hyperlipidemia neurogenic bladder with suprapubic catheter chronic bladder spasms recent admission for Acute cally transferred to [facility]  S/P lap cally 4/28 now presents with c/o cloudy urine in his suprapubic catheter, feeling some generalized fatigue and malaise over the past 1 week.  Patient states this is consistent with his usual UTI. No fever chills n/v/d. In ED afebrile. WBC 10K ANTONIO On CKD UA wih minimal pyuria. SPC changed in ED.   CAUTI  Ucx with 50,000 - 99,000 CFU/mL Candida albicans "Susceptibilities not performed" <10,000 CFU/ml Morganella morganii Chronic pains  Remains afebrile wbc wnl  Clinically stable    Plan :   Monitor off antibiotics  SP Ertapenem x 3 days ended 5/28  Trend temps and cbc  Stable from ID standpoint  Dc planning per primary team    Advance Directives- Full code  Current Medications are documented.   Drug-drug interactions reviewed.    Continue with present regiment.  Appropriate use of antibiotics and adverse effects reviewed.      > 35 minutes were spent in direct patient care reviewing notes, medications ,labs data/ imaging , discussion with multidisciplinary team.    Thank you for allowing me to participate in care of your patient .    Yadira Blackmon MD  Infectious Disease  [phone]    Individualized infection control protocols for an individual patient based on their diagnosis and risks in order to reduce risk of disease transmission followed. Coordinating with  infection prevention and control team members to enable healthcare facility staff to safely care for patient.  Managing infection prevention and treatment protocols associated with transitions of care for complex patients.  In-depth patient chart review that entails going back farther in time and assessing the complete breadth of all health care interactions, with higher-level synthesis for complex diagnoses.  Engaging in complex medical decision-making associated with antimicrobial prescribing including considerations such as antimicrobial resistance patterns, emergence of new variants/strains, recent antibiotic exposure, interactions/complications from comorbidities including concurrent infections, public health considerations to minimize development of antimicrobial resistance, and emerging and re-emerging infections.

## 2025-05-30 NOTE — PROGRESS NOTE ADULT - SUBJECTIVE AND OBJECTIVE BOX
Patient is a 50y Male whom presented to the hospital with ckd     PAST MEDICAL & SURGICAL HISTORY:  BPH (benign prostatic hyperplasia)      HTN (hypertension)      Type 2 diabetes mellitus      Peripheral neuropathy      History of Guillain-[place] syndrome      History of CVA in adulthood      DDD (degenerative disc disease), lumbar      DVT, lower extremity      Renal stones      H/O Guillain-[place] syndrome      History of neurogenic bowel      DM2 (diabetes mellitus, type 2)      HTN (hypertension)      BPH without obstruction/lower urinary tract symptoms      Degenerative arthritis      DVT of lower limb, acute      CVA (cerebrovascular accident)      CVD (cerebrovascular disease)      Muscle weakness      Iron deficiency anemia      History of muscle spasm      Pain, unspecified      Vitamin deficiency      Obesity      GERD (gastroesophageal reflux disease)      H/O constipation      H/O insomnia      Essential (primary) hypertension      Acute embolism and thrombosis of deep vein of lower extremity      BPH (benign prostatic hyperplasia)      Diabetes mellitus due to underlying condition with diabetic neuropathy      Major depressive disorder, single episode      S/P IVC filter      H/O lithotripsy      Chronic suprapubic catheter          MEDICATIONS  (STANDING):  amLODIPine   Tablet 10 milliGRAM(s) Oral daily  ascorbic acid 500 milliGRAM(s) Oral daily  cloNIDine 0.1 milliGRAM(s) Oral two times a day  dextrose 5%. 1000 milliLiter(s) (50 mL/Hr) IV Continuous <Continuous>  dextrose 5%. 1000 milliLiter(s) (100 mL/Hr) IV Continuous <Continuous>  dextrose 50% Injectable 25 Gram(s) IV Push once  dextrose 50% Injectable 12.5 Gram(s) IV Push once  dextrose 50% Injectable 25 Gram(s) IV Push once  DULoxetine 60 milliGRAM(s) Oral daily  ertapenem  IVPB 500 milliGRAM(s) IV Intermittent every 24 hours  finasteride 5 milliGRAM(s) Oral daily  folic acid 1 milliGRAM(s) Oral daily  glucagon  Injectable 1 milliGRAM(s) IntraMuscular once  insulin glargine Injectable (LANTUS) 10 Unit(s) SubCutaneous at bedtime  insulin lispro (ADMELOG) corrective regimen sliding scale   SubCutaneous three times a day before meals  insulin lispro (ADMELOG) corrective regimen sliding scale   SubCutaneous at bedtime  insulin lispro Injectable (ADMELOG) 3 Unit(s) SubCutaneous before breakfast  insulin lispro Injectable (ADMELOG) 3 Unit(s) SubCutaneous before lunch  insulin lispro Injectable (ADMELOG) 3 Unit(s) SubCutaneous before dinner  melatonin 3 milliGRAM(s) Oral at bedtime  methocarbamol 1500 milliGRAM(s) Oral every 8 hours  metoprolol tartrate 25 milliGRAM(s) Oral two times a day  naloxegol 12.5 milliGRAM(s) Oral daily  nicotine -  14 mG/24Hr(s) Patch 1 Patch Transdermal daily  oxybutynin 10 milliGRAM(s) Oral two times a day  pantoprazole    Tablet 40 milliGRAM(s) Oral before breakfast  senna 2 Tablet(s) Oral at bedtime      Allergies    sulfa drugs (Other)  sulfa drugs (Hives)  sulfa drugs (Rash)  sulfa drugs (Unknown)    Intolerances    Pipercillin Sodium-Tazobactam Sodium (Pruritus)      SOCIAL HISTORY:  Denies ETOh,Smoking,     FAMILY HISTORY:  FH: heart disease    FH: HTN (hypertension)    Family history of sinus tachycardia (Father)    FH: HTN (hypertension) (Mother)        REVIEW OF SYSTEMS:    CONSTITUTIONAL: No weakness, fevers or chills  RESPIRATORY: No cough, wheezing, hemoptysis; No shortness of breath  CARDIOVASCULAR: No chest pain or palpitations  GASTROINTESTINAL: No abdominal or epigastric pain. No nausea, vomiting,     No diarrhea or constipation. No melena                                            10.2   10.20 )-----------( 222      ( 29 May 2025 16:40 )             32.3       CBC Full  -  ( 29 May 2025 16:40 )  WBC Count : 10.20 K/uL  RBC Count : 4.36 M/uL  Hemoglobin : 10.2 g/dL  Hematocrit : 32.3 %  Platelet Count - Automated : 222 K/uL  Mean Cell Volume : 74.1 fL  Mean Cell Hemoglobin : 23.4 pg  Mean Cell Hemoglobin Concentration : 31.6 g/dL  Auto Neutrophil # : x  Auto Lymphocyte # : x  Auto Monocyte # : x  Auto Eosinophil # : x  Auto Basophil # : x  Auto Neutrophil % : x  Auto Lymphocyte % : x  Auto Monocyte % : x  Auto Eosinophil % : x  Auto Basophil % : x      -    136  |  103  |  26[H]  ----------------------------<  130[H]  3.9   |  22  |  2.61[H]    Ca    8.7      29 May 2025 16:40    TPro  7.0  /  Alb  2.7[L]  /  TBili  0.4  /  DBili  x   /  AST  16  /  ALT  20  /  AlkPhos  105  05-28      CAPILLARY BLOOD GLUCOSE      POCT Blood Glucose.: 132 mg/dL (29 May 2025 16:52)  POCT Blood Glucose.: 137 mg/dL (29 May 2025 12:16)  POCT Blood Glucose.: 120 mg/dL (29 May 2025 07:55)  POCT Blood Glucose.: 111 mg/dL (28 May 2025 21:20)      Vital Signs Last 24 Hrs  T(C): 36.9 (29 May 2025 17:00), Max: 37.1 (29 May 2025 05:13)  T(F): 98.5 (29 May 2025 17:00), Max: 98.7 (29 May 2025 05:13)  HR: 60 (29 May 2025 17:00) (58 - 61)  BP: 138/71 (29 May 2025 17:00) (138/71 - 165/84)  BP(mean): 113 (28 May 2025 20:56) (113 - 113)  RR: 16 (29 May 2025 17:00) (16 - 19)  SpO2: 96% (29 May 2025 17:00) (96% - 98%)    Parameters below as of 29 May 2025 17:00  Patient On (Oxygen Delivery Method): room air        Urinalysis Basic - ( 29 May 2025 16:40 )    Color: x / Appearance: x / SG: x / pH: x  Gluc: 130 mg/dL / Ketone: x  / Bili: x / Urobili: x   Blood: x / Protein: x / Nitrite: x   Leuk Esterase: x / RBC: x / WBC x   Sq Epi: x / Non Sq Epi: x / Bacteria: x        PT/INR - ( 29 May 2025 16:40 )   PT: 21.0 sec;   INR: 1.79 ratio                  PHYSICAL EXAM:    Constitutional: NAD  HEENT: conjunctive   clear   Neck:  No JVD  Respiratory: CTAB  Cardiovascular: S1 and S2  Gastrointestinal: BS+, soft, NT/ND  Extremities: No peripheral edema  : Chronic suprapubic catheter  Skin: No rashes  Access: Not applicable                                                          9.8    10.57 )-----------( 152      ( 25 May 2025 17:47 )             31.0       CBC Full  -  ( 25 May 2025 17:47 )  WBC Count : 10.57 K/uL  RBC Count : 4.12 M/uL  Hemoglobin : 9.8 g/dL  Hematocrit : 31.0 %  Platelet Count - Automated : 152 K/uL  Mean Cell Volume : 75.2 fL  Mean Cell Hemoglobin : 23.8 pg  Mean Cell Hemoglobin Concentration : 31.6 g/dL  Auto Neutrophil # : x  Auto Lymphocyte # : x  Auto Monocyte # : x  Auto Eosinophil # : x  Auto Basophil # : x  Auto Neutrophil % : x  Auto Lymphocyte % : x  Auto Monocyte % : x  Auto Eosinophil % : x  Auto Basophil % : x      05    135  |  101  |  34[H]  ----------------------------<  235[H]  4.5   |  22  |  3.28[H]    Ca    8.7      25 May 2025 17:47    TPro  7.6  /  Alb  2.9[L]  /  TBili  0.5  /  DBili  x   /  AST  31  /  ALT  27  /  AlkPhos  108  05-      CAPILLARY BLOOD GLUCOSE          Vital Signs Last 24 Hrs  T(C): 36.7 (25 May 2025 20:36), Max: 36.9 (25 May 2025 17:02)  T(F): 98.1 (25 May 2025 20:36), Max: 98.4 (25 May 2025 17:02)  HR: 79 (25 May 2025 20:36) (79 - 80)  BP: 190/82 (25 May 2025 20:36) (150/80 - 190/82)  BP(mean): --  RR: 18 (25 May 2025 20:36) (14 - 18)  SpO2: 97% (25 May 2025 20:36) (96% - 97%)    Parameters below as of 25 May 2025 20:36  Patient On (Oxygen Delivery Method): room air        Urinalysis Basic - ( 25 May 2025 18:20 )    Color: Yellow / Appearance: Cloudy / S.012 / pH: x  Gluc: x / Ketone: x  / Bili: Negative / Urobili: 0.2 mg/dL   Blood: x / Protein: 300 mg/dL / Nitrite: Negative   Leuk Esterase: Large / RBC: 2 /HPF / WBC 16 /HPF   Sq Epi: x / Non Sq Epi: x / Bacteria: Negative /HPF      MEDICATIONS  (STANDING):  amLODIPine   Tablet 10 milliGRAM(s) Oral daily  ascorbic acid 500 milliGRAM(s) Oral daily  cloNIDine 0.1 milliGRAM(s) Oral two times a day  DULoxetine 60 milliGRAM(s) Oral daily  ertapenem  IVPB 500 milliGRAM(s) IV Intermittent every 24 hours  finasteride 5 milliGRAM(s) Oral daily  folic acid 1 milliGRAM(s) Oral daily  glucagon  Injectable 1 milliGRAM(s) IntraMuscular once  melatonin 3 milliGRAM(s) Oral at bedtime  methocarbamol 1500 milliGRAM(s) Oral every 8 hours  metoprolol tartrate 25 milliGRAM(s) Oral two times a day  naloxegol 12.5 milliGRAM(s) Oral daily  nicotine -  14 mG/24Hr(s) Patch 1 Patch Transdermal daily  oxybutynin 10 milliGRAM(s) Oral two times a day  pantoprazole    Tablet 40 milliGRAM(s) Oral before breakfast  senna 2 Tablet(s) Oral at bedtime           Patient is a 50y Male whom presented to the hospital with ckd     PAST MEDICAL & SURGICAL HISTORY:  BPH (benign prostatic hyperplasia)      HTN (hypertension)      Type 2 diabetes mellitus      Peripheral neuropathy      History of Guillain-[place] syndrome      History of CVA in adulthood      DDD (degenerative disc disease), lumbar      DVT, lower extremity      Renal stones      H/O Guillain-[place] syndrome      History of neurogenic bowel      DM2 (diabetes mellitus, type 2)      HTN (hypertension)      BPH without obstruction/lower urinary tract symptoms      Degenerative arthritis      DVT of lower limb, acute      CVA (cerebrovascular accident)      CVD (cerebrovascular disease)      Muscle weakness      Iron deficiency anemia      History of muscle spasm      Pain, unspecified      Vitamin deficiency      Obesity      GERD (gastroesophageal reflux disease)      H/O constipation      H/O insomnia      Essential (primary) hypertension      Acute embolism and thrombosis of deep vein of lower extremity      BPH (benign prostatic hyperplasia)      Diabetes mellitus due to underlying condition with diabetic neuropathy      Major depressive disorder, single episode      S/P IVC filter      H/O lithotripsy      Chronic suprapubic catheter          MEDICATIONS  (STANDING):  amLODIPine   Tablet 10 milliGRAM(s) Oral daily  ascorbic acid 500 milliGRAM(s) Oral daily  cloNIDine 0.1 milliGRAM(s) Oral two times a day  dextrose 5%. 1000 milliLiter(s) (50 mL/Hr) IV Continuous <Continuous>  dextrose 5%. 1000 milliLiter(s) (100 mL/Hr) IV Continuous <Continuous>  dextrose 50% Injectable 25 Gram(s) IV Push once  dextrose 50% Injectable 12.5 Gram(s) IV Push once  dextrose 50% Injectable 25 Gram(s) IV Push once  DULoxetine 60 milliGRAM(s) Oral daily  ertapenem  IVPB 500 milliGRAM(s) IV Intermittent every 24 hours  finasteride 5 milliGRAM(s) Oral daily  folic acid 1 milliGRAM(s) Oral daily  glucagon  Injectable 1 milliGRAM(s) IntraMuscular once  insulin glargine Injectable (LANTUS) 10 Unit(s) SubCutaneous at bedtime  insulin lispro (ADMELOG) corrective regimen sliding scale   SubCutaneous three times a day before meals  insulin lispro (ADMELOG) corrective regimen sliding scale   SubCutaneous at bedtime  insulin lispro Injectable (ADMELOG) 3 Unit(s) SubCutaneous before breakfast  insulin lispro Injectable (ADMELOG) 3 Unit(s) SubCutaneous before lunch  insulin lispro Injectable (ADMELOG) 3 Unit(s) SubCutaneous before dinner  melatonin 3 milliGRAM(s) Oral at bedtime  methocarbamol 1500 milliGRAM(s) Oral every 8 hours  metoprolol tartrate 25 milliGRAM(s) Oral two times a day  naloxegol 12.5 milliGRAM(s) Oral daily  nicotine -  14 mG/24Hr(s) Patch 1 Patch Transdermal daily  oxybutynin 10 milliGRAM(s) Oral two times a day  pantoprazole    Tablet 40 milliGRAM(s) Oral before breakfast  senna 2 Tablet(s) Oral at bedtime      Allergies    sulfa drugs (Other)  sulfa drugs (Hives)  sulfa drugs (Rash)  sulfa drugs (Unknown)    Intolerances    Pipercillin Sodium-Tazobactam Sodium (Pruritus)      SOCIAL HISTORY:  Denies ETOh,Smoking,     FAMILY HISTORY:  FH: heart disease    FH: HTN (hypertension)    Family history of sinus tachycardia (Father)    FH: HTN (hypertension) (Mother)        REVIEW OF SYSTEMS:    CONSTITUTIONAL: No weakness, fevers or chills  RESPIRATORY: No cough, wheezing, hemoptysis; No shortness of breath  CARDIOVASCULAR: No chest pain or palpitations  GASTROINTESTINAL: No abdominal or epigastric pain. No nausea, vomiting,     No diarrhea or constipation. No melena                                                                                 10.2   10.20 )-----------( 222      ( 29 May 2025 16:40 )             32.3       CBC Full  -  ( 29 May 2025 16:40 )  WBC Count : 10.20 K/uL  RBC Count : 4.36 M/uL  Hemoglobin : 10.2 g/dL  Hematocrit : 32.3 %  Platelet Count - Automated : 222 K/uL  Mean Cell Volume : 74.1 fL  Mean Cell Hemoglobin : 23.4 pg  Mean Cell Hemoglobin Concentration : 31.6 g/dL  Auto Neutrophil # : x  Auto Lymphocyte # : x  Auto Monocyte # : x  Auto Eosinophil # : x  Auto Basophil # : x  Auto Neutrophil % : x  Auto Lymphocyte % : x  Auto Monocyte % : x  Auto Eosinophil % : x  Auto Basophil % : x      -    136  |  103  |  26[H]  ----------------------------<  130[H]  3.9   |  22  |  2.61[H]    Ca    8.7      29 May 2025 16:40        CAPILLARY BLOOD GLUCOSE      POCT Blood Glucose.: 183 mg/dL (30 May 2025 12:17)  POCT Blood Glucose.: 147 mg/dL (30 May 2025 07:58)  POCT Blood Glucose.: 145 mg/dL (29 May 2025 21:46)  POCT Blood Glucose.: 132 mg/dL (29 May 2025 16:52)      Vital Signs Last 24 Hrs  T(C): 36.7 (30 May 2025 13:28), Max: 36.9 (29 May 2025 17:00)  T(F): 98 (30 May 2025 13:28), Max: 98.5 (29 May 2025 17:00)  HR: 62 (30 May 2025 13:28) (53 - 62)  BP: 131/77 (30 May 2025 13:28) (130/80 - 138/71)  BP(mean): --  RR: 16 (30 May 2025 13:28) (16 - 16)  SpO2: 96% (30 May 2025 13:28) (96% - 97%)    Parameters below as of 30 May 2025 13:28  Patient On (Oxygen Delivery Method): room air        Urinalysis Basic - ( 29 May 2025 16:40 )    Color: x / Appearance: x / SG: x / pH: x  Gluc: 130 mg/dL / Ketone: x  / Bili: x / Urobili: x   Blood: x / Protein: x / Nitrite: x   Leuk Esterase: x / RBC: x / WBC x   Sq Epi: x / Non Sq Epi: x / Bacteria: x        PT/INR - ( 30 May 2025 05:30 )   PT: 22.6 sec;   INR: 1.93 ratio                          PHYSICAL EXAM:    Constitutional: NAD  HEENT: conjunctive   clear   Neck:  No JVD  Respiratory: CTAB  Cardiovascular: S1 and S2  Gastrointestinal: BS+, soft, NT/ND  Extremities: No peripheral edema  : Chronic suprapubic catheter  Skin: No rashes  Access: Not applicable                                                          9.8    10.57 )-----------( 152      ( 25 May 2025 17:47 )             31.0       CBC Full  -  ( 25 May 2025 17:47 )  WBC Count : 10.57 K/uL  RBC Count : 4.12 M/uL  Hemoglobin : 9.8 g/dL  Hematocrit : 31.0 %  Platelet Count - Automated : 152 K/uL  Mean Cell Volume : 75.2 fL  Mean Cell Hemoglobin : 23.8 pg  Mean Cell Hemoglobin Concentration : 31.6 g/dL  Auto Neutrophil # : x  Auto Lymphocyte # : x  Auto Monocyte # : x  Auto Eosinophil # : x  Auto Basophil # : x  Auto Neutrophil % : x  Auto Lymphocyte % : x  Auto Monocyte % : x  Auto Eosinophil % : x  Auto Basophil % : x          135  |  101  |  34[H]  ----------------------------<  235[H]  4.5   |  22  |  3.28[H]    Ca    8.7      25 May 2025 17:47    TPro  7.6  /  Alb  2.9[L]  /  TBili  0.5  /  DBili  x   /  AST  31  /  ALT  27  /  AlkPhos  108        CAPILLARY BLOOD GLUCOSE          Vital Signs Last 24 Hrs  T(C): 36.7 (25 May 2025 20:36), Max: 36.9 (25 May 2025 17:02)  T(F): 98.1 (25 May 2025 20:36), Max: 98.4 (25 May 2025 17:02)  HR: 79 (25 May 2025 20:36) (79 - 80)  BP: 190/82 (25 May 2025 20:36) (150/80 - 190/82)  BP(mean): --  RR: 18 (25 May 2025 20:36) (14 - 18)  SpO2: 97% (25 May 2025 20:36) (96% - 97%)    Parameters below as of 25 May 2025 20:36  Patient On (Oxygen Delivery Method): room air        Urinalysis Basic - ( 25 May 2025 18:20 )    Color: Yellow / Appearance: Cloudy / S.012 / pH: x  Gluc: x / Ketone: x  / Bili: Negative / Urobili: 0.2 mg/dL   Blood: x / Protein: 300 mg/dL / Nitrite: Negative   Leuk Esterase: Large / RBC: 2 /HPF / WBC 16 /HPF   Sq Epi: x / Non Sq Epi: x / Bacteria: Negative /HPF      MEDICATIONS  (STANDING):  amLODIPine   Tablet 10 milliGRAM(s) Oral daily  ascorbic acid 500 milliGRAM(s) Oral daily  cloNIDine 0.1 milliGRAM(s) Oral two times a day  DULoxetine 60 milliGRAM(s) Oral daily  ertapenem  IVPB 500 milliGRAM(s) IV Intermittent every 24 hours  finasteride 5 milliGRAM(s) Oral daily  folic acid 1 milliGRAM(s) Oral daily  glucagon  Injectable 1 milliGRAM(s) IntraMuscular once  melatonin 3 milliGRAM(s) Oral at bedtime  methocarbamol 1500 milliGRAM(s) Oral every 8 hours  metoprolol tartrate 25 milliGRAM(s) Oral two times a day  naloxegol 12.5 milliGRAM(s) Oral daily  nicotine -  14 mG/24Hr(s) Patch 1 Patch Transdermal daily  oxybutynin 10 milliGRAM(s) Oral two times a day  pantoprazole    Tablet 40 milliGRAM(s) Oral before breakfast  senna 2 Tablet(s) Oral at bedtime

## 2025-05-30 NOTE — PROGRESS NOTE ADULT - NUTRITIONAL ASSESSMENT
MEDICATIONS  (STANDING):  amLODIPine   Tablet 10 milliGRAM(s) Oral daily  ascorbic acid 500 milliGRAM(s) Oral daily  cloNIDine 0.1 milliGRAM(s) Oral two times a day  dextrose 5%. 1000 milliLiter(s) (100 mL/Hr) IV Continuous <Continuous>  dextrose 5%. 1000 milliLiter(s) (50 mL/Hr) IV Continuous <Continuous>  dextrose 50% Injectable 25 Gram(s) IV Push once  dextrose 50% Injectable 12.5 Gram(s) IV Push once  dextrose 50% Injectable 25 Gram(s) IV Push once  DULoxetine 60 milliGRAM(s) Oral daily  ertapenem  IVPB 500 milliGRAM(s) IV Intermittent every 24 hours  finasteride 5 milliGRAM(s) Oral daily  folic acid 1 milliGRAM(s) Oral daily  glucagon  Injectable 1 milliGRAM(s) IntraMuscular once  insulin glargine Injectable (LANTUS) 10 Unit(s) SubCutaneous at bedtime  insulin lispro (ADMELOG) corrective regimen sliding scale   SubCutaneous three times a day before meals  insulin lispro (ADMELOG) corrective regimen sliding scale   SubCutaneous at bedtime  insulin lispro Injectable (ADMELOG) 3 Unit(s) SubCutaneous before breakfast  insulin lispro Injectable (ADMELOG) 3 Unit(s) SubCutaneous before lunch  insulin lispro Injectable (ADMELOG) 3 Unit(s) SubCutaneous before dinner  melatonin 3 milliGRAM(s) Oral at bedtime  methocarbamol 1500 milliGRAM(s) Oral every 8 hours  metoprolol tartrate 25 milliGRAM(s) Oral two times a day  naloxegol 12.5 milliGRAM(s) Oral daily  nicotine -  14 mG/24Hr(s) Patch 1 Patch Transdermal daily  oxybutynin 10 milliGRAM(s) Oral two times a day  pantoprazole    Tablet 40 milliGRAM(s) Oral before breakfast  senna 2 Tablet(s) Oral at bedtime

## 2025-05-30 NOTE — PROGRESS NOTE ADULT - PROBLEM SELECTOR PROBLEM 2
At risk for urinary tract infection associated with indwelling catheter

## 2025-05-30 NOTE — PROGRESS NOTE ADULT - PROBLEM SELECTOR PROBLEM 3
ANTONIO (acute kidney injury)

## 2025-05-30 NOTE — PROGRESS NOTE ADULT - PROBLEM SELECTOR PLAN 1
2/2 to chronic indwelling ayala catheter -On empiric Ertapenem ( pt with hx of esbl infections )  Trend temps and cbc  Fu cultures  Stable from ID standpoint

## 2025-05-31 LAB
CULTURE RESULTS: SIGNIFICANT CHANGE UP
CULTURE RESULTS: SIGNIFICANT CHANGE UP
SPECIMEN SOURCE: SIGNIFICANT CHANGE UP
SPECIMEN SOURCE: SIGNIFICANT CHANGE UP

## (undated) DEVICE — Device

## (undated) DEVICE — SUT BIOSYN 4-0 18" P-12

## (undated) DEVICE — INSUFFLATION NDL COVIDIEN STEP 14G FOR STEP/VERSASTEP

## (undated) DEVICE — D HELP - CLEARVIEW CLEARIFY SYSTEM

## (undated) DEVICE — PACK ADVANCED LAPAROSCOPIC NS

## (undated) DEVICE — ENDOCATCH II 15MM

## (undated) DEVICE — SHEARS COVIDIEN ENDO SHEAR 5MM X 31CM W UNIPOLAR CAUTERY

## (undated) DEVICE — TROCAR COVIDIEN VERSAPORT BLADELESS OPTICAL 15MM STANDARD

## (undated) DEVICE — TROCAR COVIDIEN VERSAPORT BLADELESS OPTICAL 5MM STANDARD

## (undated) DEVICE — DRSG STERISTRIPS 0.5 X 4"

## (undated) DEVICE — VENODYNE/SCD SLEEVE CALF MEDIUM

## (undated) DEVICE — TROCAR COVIDIEN VERSASTEP PLUS 11MM STANDARD

## (undated) DEVICE — SPECIMEN CONTAINER 100ML

## (undated) DEVICE — SUT SURGIPRO 2-0 30" GS-22

## (undated) DEVICE — TROCAR COVIDIEN VERSASTEP 5MM STANDARD

## (undated) DEVICE — SOL IRR POUR NS 0.9% 500ML

## (undated) DEVICE — TROCAR APPLIED MEDICAL KII BALLOON BLUNT TIP 12MM X 100MM

## (undated) DEVICE — SUT POLYSORB 2-0 30" V-20 UNDYED

## (undated) DEVICE — TUBING STRYKEFLOW II SUCTION / IRRIGATOR

## (undated) DEVICE — DRAPE TOWEL BLUE 17" X 24"

## (undated) DEVICE — DISSECTOR ENDO PEANUT 5MM

## (undated) DEVICE — SOL IRR POUR H2O 250ML

## (undated) DEVICE — SUT POLYSORB 3-0 30" V-20 UNDYED

## (undated) DEVICE — SUT POLYSORB 0 36" GU-46

## (undated) DEVICE — DRAPE C ARM UNIVERSAL

## (undated) DEVICE — TROCAR COVIDIEN VERSAONE FIXATION CANNULA 5MM

## (undated) DEVICE — WARMING BLANKET UPPER ADULT

## (undated) DEVICE — MEDICATION LABELS W MARKER

## (undated) DEVICE — GOWN TRIMAX LG

## (undated) DEVICE — SYR LUER LOK 20CC

## (undated) DEVICE — POSITIONER FOAM EGG CRATE ULNAR 2PCS (PINK)

## (undated) DEVICE — ELCTR BOVIE PENCIL SMOKE EVACUATION

## (undated) DEVICE — ENDOCATCH 10MM

## (undated) DEVICE — DRSG OPSITE 2.5 X 2"